# Patient Record
Sex: FEMALE | Race: BLACK OR AFRICAN AMERICAN | NOT HISPANIC OR LATINO | Employment: OTHER | URBAN - METROPOLITAN AREA
[De-identification: names, ages, dates, MRNs, and addresses within clinical notes are randomized per-mention and may not be internally consistent; named-entity substitution may affect disease eponyms.]

---

## 2017-05-09 PROBLEM — I20.9 ANGINA, CLASS III: Status: ACTIVE | Noted: 2017-05-09

## 2018-11-13 PROBLEM — N18.9 ACUTE ON CHRONIC RENAL FAILURE: Status: ACTIVE | Noted: 2018-11-13

## 2018-11-13 PROBLEM — E87.29 METABOLIC ACIDOSIS, INCREASED ANION GAP: Status: ACTIVE | Noted: 2018-11-13

## 2018-11-13 PROBLEM — N18.4 CKD (CHRONIC KIDNEY DISEASE) STAGE 4, GFR 15-29 ML/MIN: Status: ACTIVE | Noted: 2018-11-13

## 2018-11-13 PROBLEM — R73.9 HYPERGLYCEMIA: Status: ACTIVE | Noted: 2018-11-13

## 2018-11-13 PROBLEM — N17.9 ACUTE ON CHRONIC RENAL FAILURE: Status: ACTIVE | Noted: 2018-11-13

## 2018-12-03 ENCOUNTER — TELEPHONE (OUTPATIENT)
Dept: ADMINISTRATIVE | Facility: HOSPITAL | Age: 38
End: 2018-12-03

## 2018-12-03 NOTE — TELEPHONE ENCOUNTER
Attempted to reach patient, unable to contact, left a message. Hospital follow up rescheduled and mailed.

## 2018-12-17 ENCOUNTER — TELEPHONE (OUTPATIENT)
Dept: ADMINISTRATIVE | Facility: HOSPITAL | Age: 38
End: 2018-12-17

## 2019-07-22 PROBLEM — M25.562 ACUTE PAIN OF LEFT KNEE: Status: ACTIVE | Noted: 2019-07-22

## 2019-07-26 PROBLEM — M10.9 GOUT: Status: ACTIVE | Noted: 2019-07-26

## 2019-08-05 PROBLEM — N18.9 CHRONIC KIDNEY DISEASE: Status: ACTIVE | Noted: 2019-08-05

## 2019-08-05 PROBLEM — I73.9 PERIPHERAL VASCULAR DISEASE: Status: ACTIVE | Noted: 2019-08-05

## 2020-04-18 ENCOUNTER — HISTORICAL (OUTPATIENT)
Dept: ADMINISTRATIVE | Facility: HOSPITAL | Age: 40
End: 2020-04-18

## 2020-04-18 LAB
ALBUMIN SERPL BCP-MCNC: 3 G/DL (ref 3.5–5)
ALBUMIN/GLOB SERPL ELPH: 0.6 {RATIO} (ref 1.5–2.2)
ALP SERPL-CCNC: 157 U/L (ref 45–117)
ALT SERPL W P-5'-P-CCNC: 20 U/L (ref 13–56)
ANION GAP SERPL CALC-SCNC: 11.6 MEQ/L (ref 10–20)
AST SERPL-CCNC: 27 U/L (ref 15–37)
BASOPHILS NFR BLD: 0.1 10 (ref 0–0.1)
BASOPHILS NFR BLD: 0.7 % (ref 0–1.5)
BILIRUB SERPL-MCNC: 0.24 MG/DL (ref 0.2–1)
BUN SERPL-MCNC: 65 MG/DL (ref 7–18)
CALCIUM SERPL-MCNC: 8.8 MG/DL (ref 8.5–10.1)
CHLORIDE SERPL-SCNC: 85 MMOL/L (ref 98–107)
CO2 SERPL-SCNC: 32 MMOL/L (ref 22–32)
CREAT SERPL-MCNC: 3.49 MG/DL (ref 0.55–1.02)
EGFR: 19 ML/MIN/1.73M
EOSINOPHIL NFR BLD: 0.6 10 (ref 0–0.7)
EOSINOPHIL NFR BLD: 6.1 % (ref 0–7)
ERYTHROCYTE [DISTWIDTH] IN BLOOD BY AUTOMATED COUNT: 14.9 % (ref 11.5–14.5)
GLOBULIN: 5.3 G/DL (ref 2.3–3.5)
GLUCOSE SERPL-MCNC: 414 MG/DL (ref 70–99)
GRAN #: 6.09 10 (ref 2–7.5)
GRAN%: 0.5 %
GRAN%: 58.8 % (ref 50–80)
HCT VFR BLD AUTO: 28.6 % (ref 37.7–47.9)
HEMOCUE, POC GLUCOSE: 411 MG/DL (ref 74–106)
HEMOCUE, POC GLUCOSE: >444 MG/DL (ref 74–106)
HGB BLD-MCNC: 9.8 G/DL (ref 12.2–16.2)
IMMATURE GRANULOCYTES #: 0.05 10
LIPASE SERPL-CCNC: 297 U/L (ref 73–393)
LYMPH #: 2.8 10 (ref 1–3.5)
LYMPH%: 27.4 % (ref 12–50)
MCH RBC QN AUTO: 27.9 PG (ref 27–31)
MCHC RBC AUTO-ENTMCNC: 34.3 G% (ref 32–35)
MCV RBC AUTO: 81.5 FL (ref 80–97)
MONO #: 0.7 10 (ref 0–0.8)
MONO%: 6.5 % (ref 0–12)
OSMOC: 290 MOSM/KG (ref 275–295)
PMV BLD AUTO: 11.8 FL (ref 7.4–10.4)
PMV BLD AUTO: 334 10 (ref 142–424)
POTASSIUM SERPL-SCNC: 2.6 MMOL/L (ref 3.5–5.1)
PROT SERPL-MCNC: 8.3 G/DL (ref 6.4–8.2)
RBC # BLD AUTO: 3.51 M/UL (ref 4.04–5.48)
SODIUM BLD-SCNC: 126 MMOL/L (ref 136–145)
TROPONIN I SERPL DL<=0.01 NG/ML-MCNC: 0.37 NG/ML (ref 0–0.05)
WBC # BLD AUTO: 10.4 10 (ref 4–10.2)

## 2020-10-06 PROBLEM — I50.30 HEART FAILURE WITH PRESERVED EJECTION FRACTION: Status: ACTIVE | Noted: 2020-10-06

## 2020-10-06 PROBLEM — E03.9 HYPOTHYROIDISM: Status: ACTIVE | Noted: 2020-10-06

## 2020-10-06 PROBLEM — R55 SYNCOPE: Status: ACTIVE | Noted: 2020-10-06

## 2020-10-06 PROBLEM — N18.4 CKD (CHRONIC KIDNEY DISEASE), STAGE IV: Status: ACTIVE | Noted: 2020-10-06

## 2020-12-16 ENCOUNTER — HOSPITAL ENCOUNTER (OUTPATIENT)
Facility: HOSPITAL | Age: 40
Discharge: HOME | End: 2020-12-17
Attending: EMERGENCY MEDICINE | Admitting: INTERNAL MEDICINE
Payer: MEDICAID

## 2020-12-16 DIAGNOSIS — R07.9 CHEST PAIN: ICD-10-CM

## 2020-12-16 DIAGNOSIS — N18.4 CKD (CHRONIC KIDNEY DISEASE), STAGE IV: ICD-10-CM

## 2020-12-16 DIAGNOSIS — I16.0 HYPERTENSIVE URGENCY: Primary | ICD-10-CM

## 2020-12-16 LAB
ALBUMIN SERPL BCP-MCNC: 2.3 G/DL (ref 3.5–5.2)
ALP SERPL-CCNC: 122 U/L (ref 55–135)
ALT SERPL W/O P-5'-P-CCNC: 20 U/L (ref 10–44)
ANION GAP SERPL CALC-SCNC: 4 MMOL/L (ref 8–16)
APTT BLDCRRT: 25.3 SEC (ref 21–32)
AST SERPL-CCNC: 15 U/L (ref 10–40)
BASOPHILS # BLD AUTO: 0.06 K/UL (ref 0–0.2)
BASOPHILS NFR BLD: 0.8 % (ref 0–1.9)
BILIRUB SERPL-MCNC: 0.2 MG/DL (ref 0.1–1)
BUN SERPL-MCNC: 13 MG/DL (ref 6–20)
CALCIUM SERPL-MCNC: 8.7 MG/DL (ref 8.7–10.5)
CHLORIDE SERPL-SCNC: 105 MMOL/L (ref 95–110)
CO2 SERPL-SCNC: 32 MMOL/L (ref 23–29)
CREAT SERPL-MCNC: 1.7 MG/DL (ref 0.5–1.4)
CTP QC/QA: YES
DIFFERENTIAL METHOD: ABNORMAL
EOSINOPHIL # BLD AUTO: 0.3 K/UL (ref 0–0.5)
EOSINOPHIL NFR BLD: 3.4 % (ref 0–8)
ERYTHROCYTE [DISTWIDTH] IN BLOOD BY AUTOMATED COUNT: 16.3 % (ref 11.5–14.5)
EST. GFR  (AFRICAN AMERICAN): 42.6 ML/MIN/1.73 M^2
EST. GFR  (NON AFRICAN AMERICAN): 36.9 ML/MIN/1.73 M^2
GLUCOSE SERPL-MCNC: 255 MG/DL (ref 70–110)
HCT VFR BLD AUTO: 27.4 % (ref 37–48.5)
HGB BLD-MCNC: 8.5 G/DL (ref 12–16)
IMM GRANULOCYTES # BLD AUTO: 0.03 K/UL (ref 0–0.04)
IMM GRANULOCYTES NFR BLD AUTO: 0.4 % (ref 0–0.5)
INR PPP: 0.8 (ref 0.8–1.2)
LIPASE SERPL-CCNC: 137 U/L (ref 23–300)
LYMPHOCYTES # BLD AUTO: 2.3 K/UL (ref 1–4.8)
LYMPHOCYTES NFR BLD: 28.8 % (ref 18–48)
MAGNESIUM SERPL-MCNC: 1.7 MG/DL (ref 1.6–2.6)
MCH RBC QN AUTO: 26.2 PG (ref 27–31)
MCHC RBC AUTO-ENTMCNC: 31 G/DL (ref 32–36)
MCV RBC AUTO: 84 FL (ref 82–98)
MONOCYTES # BLD AUTO: 0.6 K/UL (ref 0.3–1)
MONOCYTES NFR BLD: 8.1 % (ref 4–15)
NEUTROPHILS # BLD AUTO: 4.7 K/UL (ref 1.8–7.7)
NEUTROPHILS NFR BLD: 58.5 % (ref 38–73)
NRBC BLD-RTO: 0 /100 WBC
NT-PROBNP SERPL-MCNC: 1554 PG/ML (ref 5–450)
PLATELET # BLD AUTO: 463 K/UL (ref 150–350)
PMV BLD AUTO: 10.7 FL (ref 9.2–12.9)
POTASSIUM SERPL-SCNC: 3.7 MMOL/L (ref 3.5–5.1)
PROT SERPL-MCNC: 7 G/DL (ref 6–8.4)
PROTHROMBIN TIME: 10.2 SEC (ref 9–12.5)
RBC # BLD AUTO: 3.25 M/UL (ref 4–5.4)
SARS-COV-2 RDRP RESP QL NAA+PROBE: NEGATIVE
SODIUM SERPL-SCNC: 141 MMOL/L (ref 136–145)
TROPONIN I SERPL DL<=0.01 NG/ML-MCNC: 1.16 NG/ML (ref 0–0.03)
TROPONIN I SERPL DL<=0.01 NG/ML-MCNC: 1.21 NG/ML (ref 0–0.03)
WBC # BLD AUTO: 7.93 K/UL (ref 3.9–12.7)

## 2020-12-16 PROCEDURE — 99285 EMERGENCY DEPT VISIT HI MDM: CPT | Mod: 25

## 2020-12-16 PROCEDURE — 96372 THER/PROPH/DIAG INJ SC/IM: CPT | Mod: 59

## 2020-12-16 PROCEDURE — 84484 ASSAY OF TROPONIN QUANT: CPT

## 2020-12-16 PROCEDURE — 63600175 PHARM REV CODE 636 W HCPCS: Performed by: EMERGENCY MEDICINE

## 2020-12-16 PROCEDURE — 85025 COMPLETE CBC W/AUTO DIFF WBC: CPT

## 2020-12-16 PROCEDURE — 93005 ELECTROCARDIOGRAM TRACING: CPT

## 2020-12-16 PROCEDURE — 93010 ELECTROCARDIOGRAM REPORT: CPT | Mod: ,,, | Performed by: INTERNAL MEDICINE

## 2020-12-16 PROCEDURE — 93010 EKG 12-LEAD: ICD-10-PCS | Mod: ,,, | Performed by: INTERNAL MEDICINE

## 2020-12-16 PROCEDURE — 85730 THROMBOPLASTIN TIME PARTIAL: CPT

## 2020-12-16 PROCEDURE — 85610 PROTHROMBIN TIME: CPT

## 2020-12-16 PROCEDURE — 36415 COLL VENOUS BLD VENIPUNCTURE: CPT

## 2020-12-16 PROCEDURE — U0002 COVID-19 LAB TEST NON-CDC: HCPCS | Performed by: EMERGENCY MEDICINE

## 2020-12-16 PROCEDURE — 96374 THER/PROPH/DIAG INJ IV PUSH: CPT

## 2020-12-16 PROCEDURE — G0378 HOSPITAL OBSERVATION PER HR: HCPCS

## 2020-12-16 PROCEDURE — 25000003 PHARM REV CODE 250: Performed by: CLINICAL NURSE SPECIALIST

## 2020-12-16 PROCEDURE — 83690 ASSAY OF LIPASE: CPT

## 2020-12-16 PROCEDURE — 83735 ASSAY OF MAGNESIUM: CPT

## 2020-12-16 PROCEDURE — 83880 ASSAY OF NATRIURETIC PEPTIDE: CPT

## 2020-12-16 PROCEDURE — 84484 ASSAY OF TROPONIN QUANT: CPT | Mod: 91

## 2020-12-16 PROCEDURE — 25000003 PHARM REV CODE 250: Performed by: EMERGENCY MEDICINE

## 2020-12-16 PROCEDURE — 80053 COMPREHEN METABOLIC PANEL: CPT

## 2020-12-16 RX ORDER — IBUPROFEN 200 MG
24 TABLET ORAL
Status: DISCONTINUED | OUTPATIENT
Start: 2020-12-16 | End: 2020-12-17 | Stop reason: HOSPADM

## 2020-12-16 RX ORDER — ZOLPIDEM TARTRATE 5 MG/1
5 TABLET ORAL NIGHTLY PRN
Status: DISCONTINUED | OUTPATIENT
Start: 2020-12-16 | End: 2020-12-17 | Stop reason: HOSPADM

## 2020-12-16 RX ORDER — LISINOPRIL 20 MG/1
40 TABLET ORAL NIGHTLY
Status: DISCONTINUED | OUTPATIENT
Start: 2020-12-16 | End: 2020-12-17 | Stop reason: HOSPADM

## 2020-12-16 RX ORDER — ALLOPURINOL 100 MG/1
100 TABLET ORAL 2 TIMES DAILY
Status: DISCONTINUED | OUTPATIENT
Start: 2020-12-16 | End: 2020-12-17 | Stop reason: HOSPADM

## 2020-12-16 RX ORDER — TORSEMIDE 20 MG/1
100 TABLET ORAL 2 TIMES DAILY
Status: DISCONTINUED | OUTPATIENT
Start: 2020-12-16 | End: 2020-12-17 | Stop reason: HOSPADM

## 2020-12-16 RX ORDER — ONDANSETRON 2 MG/ML
4 INJECTION INTRAMUSCULAR; INTRAVENOUS EVERY 8 HOURS PRN
Status: DISCONTINUED | OUTPATIENT
Start: 2020-12-16 | End: 2020-12-17 | Stop reason: HOSPADM

## 2020-12-16 RX ORDER — AMOXICILLIN 250 MG
1 CAPSULE ORAL 2 TIMES DAILY
Status: DISCONTINUED | OUTPATIENT
Start: 2020-12-16 | End: 2020-12-17 | Stop reason: HOSPADM

## 2020-12-16 RX ORDER — POLYETHYLENE GLYCOL 3350 17 G/17G
17 POWDER, FOR SOLUTION ORAL NIGHTLY
Status: DISCONTINUED | OUTPATIENT
Start: 2020-12-16 | End: 2020-12-17 | Stop reason: HOSPADM

## 2020-12-16 RX ORDER — HYDRALAZINE HYDROCHLORIDE 25 MG/1
50 TABLET, FILM COATED ORAL
Status: COMPLETED | OUTPATIENT
Start: 2020-12-16 | End: 2020-12-16

## 2020-12-16 RX ORDER — NAPROXEN SODIUM 220 MG/1
81 TABLET, FILM COATED ORAL DAILY
Status: DISCONTINUED | OUTPATIENT
Start: 2020-12-17 | End: 2020-12-17 | Stop reason: HOSPADM

## 2020-12-16 RX ORDER — ACETAMINOPHEN 500 MG
500 TABLET ORAL EVERY 6 HOURS PRN
Status: DISCONTINUED | OUTPATIENT
Start: 2020-12-16 | End: 2020-12-17 | Stop reason: HOSPADM

## 2020-12-16 RX ORDER — GLUCAGON 1 MG
1 KIT INJECTION
Status: DISCONTINUED | OUTPATIENT
Start: 2020-12-16 | End: 2020-12-17 | Stop reason: HOSPADM

## 2020-12-16 RX ORDER — AMLODIPINE BESYLATE 10 MG/1
10 TABLET ORAL DAILY
Status: DISCONTINUED | OUTPATIENT
Start: 2020-12-17 | End: 2020-12-17 | Stop reason: HOSPADM

## 2020-12-16 RX ORDER — IBUPROFEN 200 MG
16 TABLET ORAL
Status: DISCONTINUED | OUTPATIENT
Start: 2020-12-16 | End: 2020-12-17 | Stop reason: HOSPADM

## 2020-12-16 RX ORDER — AMLODIPINE BESYLATE 10 MG/1
10 TABLET ORAL
Status: COMPLETED | OUTPATIENT
Start: 2020-12-16 | End: 2020-12-17

## 2020-12-16 RX ORDER — ATORVASTATIN CALCIUM 40 MG/1
40 TABLET, FILM COATED ORAL DAILY
Status: DISCONTINUED | OUTPATIENT
Start: 2020-12-17 | End: 2020-12-17 | Stop reason: HOSPADM

## 2020-12-16 RX ORDER — ONDANSETRON 4 MG/1
4 TABLET, ORALLY DISINTEGRATING ORAL
Status: COMPLETED | OUTPATIENT
Start: 2020-12-16 | End: 2020-12-16

## 2020-12-16 RX ORDER — TALC
6 POWDER (GRAM) TOPICAL NIGHTLY PRN
Status: DISCONTINUED | OUTPATIENT
Start: 2020-12-16 | End: 2020-12-16

## 2020-12-16 RX ORDER — SODIUM CHLORIDE 0.9 % (FLUSH) 0.9 %
10 SYRINGE (ML) INJECTION
Status: DISCONTINUED | OUTPATIENT
Start: 2020-12-16 | End: 2020-12-17 | Stop reason: HOSPADM

## 2020-12-16 RX ORDER — ASPIRIN 325 MG
325 TABLET ORAL
Status: COMPLETED | OUTPATIENT
Start: 2020-12-16 | End: 2020-12-16

## 2020-12-16 RX ORDER — SYRING-NEEDL,DISP,INSUL,0.3 ML 29 G X1/2"
296 SYRINGE, EMPTY DISPOSABLE MISCELLANEOUS
Status: COMPLETED | OUTPATIENT
Start: 2020-12-16 | End: 2020-12-16

## 2020-12-16 RX ORDER — CARVEDILOL 12.5 MG/1
25 TABLET ORAL 2 TIMES DAILY WITH MEALS
Status: DISCONTINUED | OUTPATIENT
Start: 2020-12-17 | End: 2020-12-17 | Stop reason: HOSPADM

## 2020-12-16 RX ORDER — ENOXAPARIN SODIUM 100 MG/ML
100 INJECTION SUBCUTANEOUS
Status: COMPLETED | OUTPATIENT
Start: 2020-12-16 | End: 2020-12-16

## 2020-12-16 RX ORDER — LABETALOL HCL 20 MG/4 ML
20 SYRINGE (ML) INTRAVENOUS
Status: COMPLETED | OUTPATIENT
Start: 2020-12-16 | End: 2020-12-16

## 2020-12-16 RX ADMIN — ONDANSETRON 4 MG: 4 TABLET, ORALLY DISINTEGRATING ORAL at 06:12

## 2020-12-16 RX ADMIN — HYDRALAZINE HYDROCHLORIDE 50 MG: 25 TABLET, FILM COATED ORAL at 05:12

## 2020-12-16 RX ADMIN — LABETALOL HYDROCHLORIDE 20 MG: 5 INJECTION, SOLUTION INTRAVENOUS at 07:12

## 2020-12-16 RX ADMIN — MAGNESIUM CITRATE 296 ML: 1.75 LIQUID ORAL at 05:12

## 2020-12-16 RX ADMIN — LIDOCAINE HYDROCHLORIDE: 20 SOLUTION ORAL; TOPICAL at 05:12

## 2020-12-16 RX ADMIN — ENOXAPARIN SODIUM 100 MG: 100 INJECTION SUBCUTANEOUS at 07:12

## 2020-12-16 RX ADMIN — ASPIRIN 325 MG ORAL TABLET 325 MG: 325 PILL ORAL at 07:12

## 2020-12-16 NOTE — ED PROVIDER NOTES
Encounter Date: 2020       History     Chief Complaint   Patient presents with    Abdominal Pain     upper abdominal pain and constipation for 3-4 days. Denies N/V.      Pilar Fernandez is an 40 y.o. female who complains of LEFT CHEST WALL PAIN, SHORTNESS OF BREATH, EPIGASTRIC PAIN, CONSTIPATION.  PATIENT REPORTS TAKING MIRALAX AT HOME AND HAVING A SMALL BOWEL MOVEMENT THIS MORNING. Symptoms BEGAN 3-4 DAYS AGO.  HISTORY OF GERD, CONSTIPATION, HYPERTENSION, BUT DID NOT TAKE HER MORNING MEDS          Review of patient's allergies indicates:   Allergen Reactions    Tramadol Itching and Nausea And Vomiting     Past Medical History:   Diagnosis Date    Anemia     Anemia     Chronic constipation     CKD (chronic kidney disease)     Diabetes mellitus     Erosive gastritis 2016    GERD (gastroesophageal reflux disease)     Gout     High cholesterol     Hypertension     Knee pain      Past Surgical History:   Procedure Laterality Date    AV FISTULA PLACEMENT Left 2019    Procedure: creation av fistula;  Surgeon: Buck Fernandez MD;  Location: [facility];  Service: Cardiovascular;  Laterality: Left;  Left Radialcephalic: PACU     SECTION  , , ,     CHOLECYSTECTOMY      COLONOSCOPY N/A 10/23/2017    Procedure: COLONOSCOPY;  Surgeon: Bri Harry MD;  Location: [facility];  Service: Endoscopy;  Laterality: N/A;    TUBAL LIGATION      UPPER GASTROINTESTINAL ENDOSCOPY  2016    Erosive Gastritis-Dr Bailey     Family History   Problem Relation Age of Onset    Thyroid disease Mother     Diabetes Father     Cancer Maternal Aunt      Social History     Tobacco Use    Smoking status: Never Smoker    Smokeless tobacco: Never Used   Substance Use Topics    Alcohol use: No     Alcohol/week: 0.0 standard drinks    Drug use: No     Review of Systems   Constitutional: Negative for fever.   HENT: Negative for sore throat.    Respiratory: Positive for chest  tightness and shortness of breath.    Cardiovascular: Positive for chest pain.   Gastrointestinal: Positive for abdominal distention, abdominal pain and constipation. Negative for nausea.   Genitourinary: Negative for dysuria.   Musculoskeletal: Negative for back pain.   Skin: Negative for rash.   Neurological: Negative for weakness.   Hematological: Does not bruise/bleed easily.   All other systems reviewed and are negative.      Physical Exam     Initial Vitals [12/16/20 1709]   BP Pulse Resp Temp SpO2   (!) 223/104 84 18 98.4 °F (36.9 °C) 99 %      MAP       --         Physical Exam    Nursing note and vitals reviewed.  Constitutional: She appears well-developed and well-nourished.   HENT:   Head: Normocephalic and atraumatic.   Eyes: Pupils are equal, round, and reactive to light.   Neck: Normal range of motion.   Cardiovascular: Normal rate and regular rhythm.   Pulmonary/Chest: Breath sounds normal.   Abdominal: Soft. Bowel sounds are normal. She exhibits distension. There is abdominal tenderness.   Musculoskeletal: Normal range of motion.   Neurological: She is alert and oriented to person, place, and time.   Skin: Skin is warm and dry.   Psychiatric: She has a normal mood and affect.         ED Course   Procedures  Labs Reviewed   NT-PRO NATRIURETIC PEPTIDE - Abnormal; Notable for the following components:       Result Value    NT-proBNP 1554 (*)     All other components within normal limits   CBC W/ AUTO DIFFERENTIAL - Abnormal; Notable for the following components:    RBC 3.25 (*)     Hemoglobin 8.5 (*)     Hematocrit 27.4 (*)     MCH 26.2 (*)     MCHC 31.0 (*)     RDW 16.3 (*)     Platelets 463 (*)     All other components within normal limits   COMPREHENSIVE METABOLIC PANEL - Abnormal; Notable for the following components:    CO2 32 (*)     Glucose 255 (*)     Creatinine 1.7 (*)     Albumin 2.3 (*)     Anion Gap 4 (*)     eGFR if  42.6 (*)     eGFR if non  36.9 (*)     All  other components within normal limits   TROPONIN I - Abnormal; Notable for the following components:    Troponin I 1.210 (*)     All other components within normal limits   LIPASE   APTT   MAGNESIUM   PROTIME-INR     EKG Readings: (Independently Interpreted)   Initial Reading: No STEMI. Previous EKG: Compared with most recent EKG Rhythm: Normal Sinus Rhythm. Heart Rate: 98. Conduction: Normal. ST Segments: Normal ST Segments.       Imaging Results    None          Medical Decision Making:   Differential Diagnosis:   GERD, ABDOMINAL PAIN, MI, ANXIETY  Clinical Tests:   Lab Tests: Ordered and Reviewed  ED Management:  Patient with history of hypertension and renal failure.  Patient had chest pain with mild shortness of breath last night and again this morning, no current chest pain in the emergency room.  Patient is not taking her hypertension medications was found to be quite hypertensive.  Troponin elevated to 1.2, upon review of her history her troponin is always slightly elevated around 0.0 7 or 8.  Discussed with Adriel with [facility], and will admit for hypertension of urgency and rule out acute coronary syndrome.  Discussed with Yvonne schreiber Dr. addition will be the primary admission.  Patient given aspirin, Lovenox, hydralazine, and labetalol                             Clinical Impression:     ICD-10-CM ICD-9-CM   1. Hypertensive urgency  I16.0 401.9   2. Chest pain  R07.9 786.50                          ED Disposition Condition    Admit                             Elvin Camara MD  12/16/20 2023

## 2020-12-17 VITALS
HEIGHT: 64 IN | HEART RATE: 96 BPM | RESPIRATION RATE: 20 BRPM | OXYGEN SATURATION: 95 % | WEIGHT: 240.5 LBS | DIASTOLIC BLOOD PRESSURE: 81 MMHG | SYSTOLIC BLOOD PRESSURE: 142 MMHG | BODY MASS INDEX: 41.06 KG/M2 | TEMPERATURE: 99 F

## 2020-12-17 LAB
ALBUMIN SERPL BCP-MCNC: 2.3 G/DL (ref 3.5–5.2)
ALP SERPL-CCNC: 119 U/L (ref 55–135)
ALT SERPL W/O P-5'-P-CCNC: 18 U/L (ref 10–44)
ANION GAP SERPL CALC-SCNC: 8 MMOL/L (ref 8–16)
AST SERPL-CCNC: 14 U/L (ref 10–40)
BASOPHILS # BLD AUTO: 0.07 K/UL (ref 0–0.2)
BASOPHILS NFR BLD: 0.8 % (ref 0–1.9)
BILIRUB SERPL-MCNC: 0.2 MG/DL (ref 0.1–1)
BUN SERPL-MCNC: 14 MG/DL (ref 6–20)
CALCIUM SERPL-MCNC: 8.7 MG/DL (ref 8.7–10.5)
CHLORIDE SERPL-SCNC: 105 MMOL/L (ref 95–110)
CO2 SERPL-SCNC: 27 MMOL/L (ref 23–29)
CREAT SERPL-MCNC: 1.7 MG/DL (ref 0.5–1.4)
DIFFERENTIAL METHOD: ABNORMAL
EOSINOPHIL # BLD AUTO: 0.3 K/UL (ref 0–0.5)
EOSINOPHIL NFR BLD: 3.4 % (ref 0–8)
ERYTHROCYTE [DISTWIDTH] IN BLOOD BY AUTOMATED COUNT: 16.7 % (ref 11.5–14.5)
EST. GFR  (AFRICAN AMERICAN): 42.6 ML/MIN/1.73 M^2
EST. GFR  (NON AFRICAN AMERICAN): 36.9 ML/MIN/1.73 M^2
GLUCOSE SERPL-MCNC: 301 MG/DL (ref 70–110)
HCT VFR BLD AUTO: 27 % (ref 37–48.5)
HGB BLD-MCNC: 8.5 G/DL (ref 12–16)
IMM GRANULOCYTES # BLD AUTO: 0.04 K/UL (ref 0–0.04)
IMM GRANULOCYTES NFR BLD AUTO: 0.5 % (ref 0–0.5)
LYMPHOCYTES # BLD AUTO: 2.8 K/UL (ref 1–4.8)
LYMPHOCYTES NFR BLD: 33 % (ref 18–48)
MCH RBC QN AUTO: 26.5 PG (ref 27–31)
MCHC RBC AUTO-ENTMCNC: 31.5 G/DL (ref 32–36)
MCV RBC AUTO: 84 FL (ref 82–98)
MONOCYTES # BLD AUTO: 0.6 K/UL (ref 0.3–1)
MONOCYTES NFR BLD: 7.2 % (ref 4–15)
NEUTROPHILS # BLD AUTO: 4.7 K/UL (ref 1.8–7.7)
NEUTROPHILS NFR BLD: 55.1 % (ref 38–73)
NRBC BLD-RTO: 0 /100 WBC
PLATELET # BLD AUTO: 465 K/UL (ref 150–350)
PMV BLD AUTO: 12.3 FL (ref 9.2–12.9)
POTASSIUM SERPL-SCNC: 3.3 MMOL/L (ref 3.5–5.1)
PROT SERPL-MCNC: 6.7 G/DL (ref 6–8.4)
RBC # BLD AUTO: 3.21 M/UL (ref 4–5.4)
SODIUM SERPL-SCNC: 140 MMOL/L (ref 136–145)
TROPONIN I SERPL DL<=0.01 NG/ML-MCNC: 1.13 NG/ML (ref 0–0.03)
WBC # BLD AUTO: 8.48 K/UL (ref 3.9–12.7)

## 2020-12-17 PROCEDURE — 25000003 PHARM REV CODE 250: Performed by: NURSE PRACTITIONER

## 2020-12-17 PROCEDURE — 85025 COMPLETE CBC W/AUTO DIFF WBC: CPT

## 2020-12-17 PROCEDURE — 84484 ASSAY OF TROPONIN QUANT: CPT

## 2020-12-17 PROCEDURE — 80053 COMPREHEN METABOLIC PANEL: CPT

## 2020-12-17 PROCEDURE — G0378 HOSPITAL OBSERVATION PER HR: HCPCS

## 2020-12-17 PROCEDURE — 36415 COLL VENOUS BLD VENIPUNCTURE: CPT

## 2020-12-17 PROCEDURE — 25000003 PHARM REV CODE 250: Performed by: EMERGENCY MEDICINE

## 2020-12-17 RX ORDER — HYDRALAZINE HYDROCHLORIDE 25 MG/1
25 TABLET, FILM COATED ORAL EVERY 8 HOURS
Qty: 90 TABLET | Refills: 0 | Status: SHIPPED | OUTPATIENT
Start: 2020-12-17 | End: 2022-12-21

## 2020-12-17 RX ORDER — HYDRALAZINE HYDROCHLORIDE 25 MG/1
25 TABLET, FILM COATED ORAL EVERY 8 HOURS
Status: DISCONTINUED | OUTPATIENT
Start: 2020-12-17 | End: 2020-12-17 | Stop reason: HOSPADM

## 2020-12-17 RX ORDER — POLYETHYLENE GLYCOL 3350 17 G/17G
17 POWDER, FOR SOLUTION ORAL NIGHTLY
Qty: 30 PACKET | Refills: 0 | Status: SHIPPED | OUTPATIENT
Start: 2020-12-17 | End: 2021-01-16

## 2020-12-17 RX ADMIN — ALLOPURINOL 100 MG: 100 TABLET ORAL at 12:12

## 2020-12-17 RX ADMIN — AMLODIPINE BESYLATE 10 MG: 10 TABLET ORAL at 09:12

## 2020-12-17 RX ADMIN — ATORVASTATIN CALCIUM 40 MG: 40 TABLET, FILM COATED ORAL at 09:12

## 2020-12-17 RX ADMIN — SENNOSIDES AND DOCUSATE SODIUM 1 TABLET: 8.6; 5 TABLET ORAL at 12:12

## 2020-12-17 RX ADMIN — ALLOPURINOL 100 MG: 100 TABLET ORAL at 09:12

## 2020-12-17 RX ADMIN — TORSEMIDE 100 MG: 20 TABLET ORAL at 09:12

## 2020-12-17 RX ADMIN — POLYETHYLENE GLYCOL 3350 17 G: 17 POWDER, FOR SOLUTION ORAL at 12:12

## 2020-12-17 RX ADMIN — CARVEDILOL 25 MG: 12.5 TABLET, FILM COATED ORAL at 06:12

## 2020-12-17 RX ADMIN — ASPIRIN 81 MG 81 MG: 81 TABLET ORAL at 09:12

## 2020-12-17 RX ADMIN — AMLODIPINE BESYLATE 10 MG: 10 TABLET ORAL at 12:12

## 2020-12-17 RX ADMIN — LISINOPRIL 40 MG: 20 TABLET ORAL at 12:12

## 2020-12-17 RX ADMIN — SENNOSIDES AND DOCUSATE SODIUM 1 TABLET: 8.6; 5 TABLET ORAL at 09:12

## 2020-12-17 RX ADMIN — TORSEMIDE 100 MG: 20 TABLET ORAL at 12:12

## 2020-12-17 RX ADMIN — HYDRALAZINE HYDROCHLORIDE 25 MG: 25 TABLET, FILM COATED ORAL at 09:12

## 2020-12-17 NOTE — PLAN OF CARE
12/17/20 1342   Final Note   Assessment Type Final Discharge Note   Anticipated Discharge Disposition Home   What phone number can be called within the next 1-3 days to see how you are doing after discharge? [phone]   Hospital Follow Up  Appt(s) scheduled? No  (Patient making own appts)   Post-Acute Status   Discharge Delays None known at this time

## 2020-12-17 NOTE — SUBJECTIVE & OBJECTIVE
Past Medical History:   Diagnosis Date    Anemia     Anemia     Chronic constipation     CKD (chronic kidney disease)     Diabetes mellitus     Erosive gastritis 2016    GERD (gastroesophageal reflux disease)     Gout     High cholesterol     Hypertension     Knee pain        Past Surgical History:   Procedure Laterality Date    AV FISTULA PLACEMENT Left 2019    Procedure: creation av fistula;  Surgeon: Buck Fernandez MD;  Location: [facility];  Service: Cardiovascular;  Laterality: Left;  Left Radialcephalic: PACU     SECTION  , , ,     CHOLECYSTECTOMY      COLONOSCOPY N/A 10/23/2017    Procedure: COLONOSCOPY;  Surgeon: Bri Harry MD;  Location: [facility]placed by [facility];  Service: Endoscopy;  Laterality: N/A;    TUBAL LIGATION      UPPER GASTROINTESTINAL ENDOSCOPY  2016    Erosive Gastritis-Dr Bailey       Review of patient's allergies indicates:   Allergen Reactions    Tramadol Itching and Nausea And Vomiting       No current facility-administered medications on file prior to encounter.      Current Outpatient Medications on File Prior to Encounter   Medication Sig    acetaminophen (TYLENOL) 500 MG tablet Take 1 tablet (500 mg total) by mouth daily as needed for Pain.    albuterol sulfate 2.5 mg/0.5 mL Nebu Take 2.5 mg by nebulization every 4 (four) hours as needed.    allopurinoL (ZYLOPRIM) 100 MG tablet Take 100 mg by mouth 2 (two) times daily.    amlodipine (NORVASC) 10 MG tablet Take 1 tablet (10 mg total) by mouth once daily.    aspirin 81 MG Chew Take 1 tablet (81 mg total) by mouth once daily.    atorvastatin (LIPITOR) 40 MG tablet Take 1 tablet (40 mg total) by mouth once daily.    carvedilol (COREG) 25 MG tablet TAKE ONE TABLET BY MOUTH TWICE DAILY WITH MEALS    gabapentin (NEURONTIN) 400 MG capsule Take 1 capsule by mouth 3 (three) times daily.    HYDROcodone-acetaminophen (NORCO) 5-325 mg per tablet Take 1 tablet by mouth every 4 (four) hours  as needed.    insulin aspart U-100 (NOVOLOG U-100 INSULIN ASPART) 100 unit/mL injection Inject 10 Units into the skin 3 (three) times daily before meals.    insulin detemir U-100 (LEVEMIR U-100 INSULIN) 100 unit/mL injection Inject 70 Units into the skin As instructed (Every afternoon (noon)).    lactulose (CHRONULAC) 10 gram/15 mL solution Take by mouth.     lisinopril (PRINIVIL,ZESTRIL) 40 MG tablet Take 1 tablet (40 mg total) by mouth every evening.    polyethylene glycol (GLYCOLAX) 17 gram/dose powder Take 17 g by mouth every evening. (Patient taking differently: Take 17 g by mouth every evening. )    senna-docusate 8.6-50 mg (SENNA WITH DOCUSATE SODIUM) 8.6-50 mg per tablet Take 1 tablet by mouth 2 (two) times daily.    sorbitol 70 % solution Take 30 mLs by mouth daily as needed (Severe constipation.  Use prn no BM X 3 days despite Miralax qhs.).    torsemide (DEMADEX) 100 MG Tab Take 1-2 tablets by mouth 2 (two) times daily.    zolpidem (AMBIEN) 5 MG Tab Take 5 mg by mouth nightly as needed.     Family History     Problem Relation (Age of Onset)    Cancer Maternal Aunt    Diabetes Father    Thyroid disease Mother        Tobacco Use    Smoking status: Never Smoker    Smokeless tobacco: Never Used   Substance and Sexual Activity    Alcohol use: No     Alcohol/week: 0.0 standard drinks    Drug use: No    Sexual activity: Yes     Partners: Male     Birth control/protection: None, Surgical     Review of Systems   Constitutional: Positive for fatigue.   Cardiovascular: Positive for chest pain.   Gastrointestinal: Positive for abdominal pain and constipation.   Musculoskeletal: Positive for arthralgias.     Objective:     Vital Signs (Most Recent):  Temp: 98 °F (36.7 °C) (12/17/20 0710)  Pulse: 102 (12/17/20 0710)  Resp: 20 (12/17/20 0710)  BP: (!) 157/75 (12/17/20 0710)  SpO2: (!) 92 % (12/17/20 0710) Vital Signs (24h Range):  Temp:  [98 °F (36.7 °C)-98.9 °F (37.2 °C)] 98 °F (36.7 °C)  Pulse:  []  102  Resp:  [12-27] 20  SpO2:  [92 %-99 %] 92 %  BP: (157-223)/() 157/75     Weight: 109.1 kg (240 lb 8 oz)  Body mass index is 41.28 kg/m².    Physical Exam  Constitutional:       Appearance: She is obese. She is ill-appearing.   HENT:      Head: Normocephalic.      Mouth/Throat:      Mouth: Mucous membranes are moist.   Cardiovascular:      Rate and Rhythm: Normal rate and regular rhythm.      Pulses: Normal pulses.   Pulmonary:      Effort: Pulmonary effort is normal.   Abdominal:      General: Bowel sounds are normal.      Palpations: Abdomen is soft.   Musculoskeletal: Normal range of motion.   Neurological:      Mental Status: She is alert.   Psychiatric:         Mood and Affect: Mood normal.         Thought Content: Thought content normal.         Judgment: Judgment normal.             Significant Labs:   CBC:   Recent Labs   Lab 12/16/20 1730 12/17/20 0225   WBC 7.93 8.48   HGB 8.5* 8.5*   HCT 27.4* 27.0*   * 465*     CMP:   Recent Labs   Lab 12/16/20 1730 12/17/20 0225    140   K 3.7 3.3*    105   CO2 32* 27   * 301*   BUN 13 14   CREATININE 1.7* 1.7*   CALCIUM 8.7 8.7   PROT 7.0 6.7   ALBUMIN 2.3* 2.3*   BILITOT 0.2 0.2   ALKPHOS 122 119   AST 15 14   ALT 20 18   ANIONGAP 4* 8   EGFRNONAA 36.9* 36.9*     Magnesium:   Recent Labs   Lab 12/16/20 1730   MG 1.7     Recent Labs   Lab 12/17/20 0225   TROPONINI 1.130*     Significant Imaging: none

## 2020-12-17 NOTE — HOSPITAL COURSE
12/17/20 LF patient has chronic constipation and had not had a bowel movement in a couple of days causing chest pain and she was unable to take blood pressure medication. Came in with HTN emergency bp 223/104. Medications resumed, patient had bowel movement is feeling much better this am. See per Cardiology and ok to discharge

## 2020-12-17 NOTE — PLAN OF CARE
Pt DC home. IV and telemetry removed. Pt walked down in wheelchair. Pt verbalizes understanding of DC instructions

## 2020-12-17 NOTE — H&P
[facility] Medicine  History & Physical    Patient Name: Pilar Fernandez  MRN: [id number]  Patient Class: OP- Observation  Admission Date: 2020  Attending Physician: Debra Fong MD   Primary Care Provider: Harris Haley MD         Patient information was obtained from patient, past medical records and ER records.     Subjective:     Principal Problem:Hypertensive urgency    Chief Complaint:   Chief Complaint   Patient presents with    Abdominal Pain     upper abdominal pain and constipation for 3-4 days. Denies N/V.         HPI: Pilar Fernandez is an 40 y.o. female who complains of LEFT CHEST WALL PAIN, SHORTNESS OF BREATH, EPIGASTRIC PAIN, CONSTIPATION.  PATIENT REPORTS TAKING MIRALAX AT HOME AND HAVING A SMALL BOWEL MOVEMENT THIS MORNING. Symptoms BEGAN 3-4 DAYS AGO.  HISTORY OF GERD, CONSTIPATION, HYPERTENSION, BUT DID NOT TAKE HER MORNING MEDS patient of dr dudley pcp and dr zepeda    Past Medical History:   Diagnosis Date    Anemia     Anemia     Chronic constipation     CKD (chronic kidney disease)     Diabetes mellitus     Erosive gastritis 2016    GERD (gastroesophageal reflux disease)     Gout     High cholesterol     Hypertension     Knee pain        Past Surgical History:   Procedure Laterality Date    AV FISTULA PLACEMENT Left 2019    Procedure: creation av fistula;  Surgeon: Buck Fernandez MD;  Location: [facility];  Service: Cardiovascular;  Laterality: Left;  Left Radialcephalic: PACU     SECTION  , , ,     CHOLECYSTECTOMY      COLONOSCOPY N/A 10/23/2017    Procedure: COLONOSCOPY;  Surgeon: Bri Harry MD;  Location: [facility];  Service: Endoscopy;  Laterality: N/A;    TUBAL LIGATION      UPPER GASTROINTESTINAL ENDOSCOPY  2016    Erosive Gastritis-Dr Bailey       Review of patient's allergies indicates:   Allergen Reactions    Tramadol Itching and Nausea And Vomiting       No current  facility-administered medications on file prior to encounter.      Current Outpatient Medications on File Prior to Encounter   Medication Sig    acetaminophen (TYLENOL) 500 MG tablet Take 1 tablet (500 mg total) by mouth daily as needed for Pain.    albuterol sulfate 2.5 mg/0.5 mL Nebu Take 2.5 mg by nebulization every 4 (four) hours as needed.    allopurinoL (ZYLOPRIM) 100 MG tablet Take 100 mg by mouth 2 (two) times daily.    amlodipine (NORVASC) 10 MG tablet Take 1 tablet (10 mg total) by mouth once daily.    aspirin 81 MG Chew Take 1 tablet (81 mg total) by mouth once daily.    atorvastatin (LIPITOR) 40 MG tablet Take 1 tablet (40 mg total) by mouth once daily.    carvedilol (COREG) 25 MG tablet TAKE ONE TABLET BY MOUTH TWICE DAILY WITH MEALS    gabapentin (NEURONTIN) 400 MG capsule Take 1 capsule by mouth 3 (three) times daily.    HYDROcodone-acetaminophen (NORCO) 5-325 mg per tablet Take 1 tablet by mouth every 4 (four) hours as needed.    insulin aspart U-100 (NOVOLOG U-100 INSULIN ASPART) 100 unit/mL injection Inject 10 Units into the skin 3 (three) times daily before meals.    insulin detemir U-100 (LEVEMIR U-100 INSULIN) 100 unit/mL injection Inject 70 Units into the skin As instructed (Every afternoon (noon)).    lactulose (CHRONULAC) 10 gram/15 mL solution Take by mouth.     lisinopril (PRINIVIL,ZESTRIL) 40 MG tablet Take 1 tablet (40 mg total) by mouth every evening.    polyethylene glycol (GLYCOLAX) 17 gram/dose powder Take 17 g by mouth every evening. (Patient taking differently: Take 17 g by mouth every evening. )    senna-docusate 8.6-50 mg (SENNA WITH DOCUSATE SODIUM) 8.6-50 mg per tablet Take 1 tablet by mouth 2 (two) times daily.    sorbitol 70 % solution Take 30 mLs by mouth daily as needed (Severe constipation.  Use prn no BM X 3 days despite Miralax qhs.).    torsemide (DEMADEX) 100 MG Tab Take 1-2 tablets by mouth 2 (two) times daily.    zolpidem (AMBIEN) 5 MG Tab Take 5 mg  by mouth nightly as needed.     Family History     Problem Relation (Age of Onset)    Cancer Maternal Aunt    Diabetes Father    Thyroid disease Mother        Tobacco Use    Smoking status: Never Smoker    Smokeless tobacco: Never Used   Substance and Sexual Activity    Alcohol use: No     Alcohol/week: 0.0 standard drinks    Drug use: No    Sexual activity: Yes     Partners: Male     Birth control/protection: None, Surgical     Review of Systems   Constitutional: Positive for fatigue.   Cardiovascular: Positive for chest pain.   Gastrointestinal: Positive for abdominal pain and constipation.   Musculoskeletal: Positive for arthralgias.     Objective:     Vital Signs (Most Recent):  Temp: 98 °F (36.7 °C) (12/17/20 0710)  Pulse: 102 (12/17/20 0710)  Resp: 20 (12/17/20 0710)  BP: (!) 157/75 (12/17/20 0710)  SpO2: (!) 92 % (12/17/20 0710) Vital Signs (24h Range):  Temp:  [98 °F (36.7 °C)-98.9 °F (37.2 °C)] 98 °F (36.7 °C)  Pulse:  [] 102  Resp:  [12-27] 20  SpO2:  [92 %-99 %] 92 %  BP: (157-223)/() 157/75     Weight: 109.1 kg (240 lb 8 oz)  Body mass index is 41.28 kg/m².    Physical Exam  Constitutional:       Appearance: She is obese. She is ill-appearing.   HENT:      Head: Normocephalic.      Mouth/Throat:      Mouth: Mucous membranes are moist.   Cardiovascular:      Rate and Rhythm: Normal rate and regular rhythm.      Pulses: Normal pulses.   Pulmonary:      Effort: Pulmonary effort is normal.   Abdominal:      General: Bowel sounds are normal.      Palpations: Abdomen is soft.   Musculoskeletal: Normal range of motion.   Neurological:      Mental Status: She is alert.   Psychiatric:         Mood and Affect: Mood normal.         Thought Content: Thought content normal.         Judgment: Judgment normal.             Significant Labs:   CBC:   Recent Labs   Lab 12/16/20  1730 12/17/20  0225   WBC 7.93 8.48   HGB 8.5* 8.5*   HCT 27.4* 27.0*   * 465*     CMP:   Recent Labs   Lab  12/16/20  1730 12/17/20  0225    140   K 3.7 3.3*    105   CO2 32* 27   * 301*   BUN 13 14   CREATININE 1.7* 1.7*   CALCIUM 8.7 8.7   PROT 7.0 6.7   ALBUMIN 2.3* 2.3*   BILITOT 0.2 0.2   ALKPHOS 122 119   AST 15 14   ALT 20 18   ANIONGAP 4* 8   EGFRNONAA 36.9* 36.9*     Magnesium:   Recent Labs   Lab 12/16/20  1730   MG 1.7     Recent Labs   Lab 12/17/20  0225   TROPONINI 1.130*     Significant Imaging: none    Assessment/Plan:     * Hypertensive urgency  12/17/20 LF patient due to constipation was unable to take bp meds. Restarted, seen per cards and adjusted bp at baseline, plan to d/c today      Constipation  12/17/20 LF resolved discharge home with miralax      Chest pain  12/17/20 LF chronic and resolved with BM--seen per Cards this am and ok to discharge home         VTE Risk Mitigation (From admission, onward)         Ordered     IP VTE HIGH RISK PATIENT  Once      12/16/20 2225     Place sequential compression device  Until discontinued      12/16/20 2225                   Velia Delacruz NP  Department of Hospital Medicine   [facility]

## 2020-12-17 NOTE — HPI
Pilar Fernandez is an 40 y.o. female who complains of LEFT CHEST WALL PAIN, SHORTNESS OF BREATH, EPIGASTRIC PAIN, CONSTIPATION.  PATIENT REPORTS TAKING MIRALAX AT HOME AND HAVING A SMALL BOWEL MOVEMENT THIS MORNING. Symptoms BEGAN 3-4 DAYS AGO.  HISTORY OF GERD, CONSTIPATION, HYPERTENSION, BUT DID NOT TAKE HER MORNING MEDS patient of dr dudley pcp and dr zepeda

## 2020-12-17 NOTE — NURSING
Patient up to floor via stretcher, oriented to room and nurse, assessment performed, bed low, call bell in reach, side rails up x 2. No distress noted, denies any pain or discomfort at present, will continue to monitor.

## 2020-12-17 NOTE — CONSULTS
[facility]  Cardiology  Consult Note    Patient Name: Pilar Fernandez  Patient : 1980  MRN: [id number]  Admission Date: 2020  Hospital Length of Stay: 0 days  Code Status: Full Code   Attending Provider: Debra Fong MD   Consulting Provider: NATY Maldonado  Primary Care Physician: Harris Haley MD  Principal Problem:<principal problem not specified>      Patient information was obtained from patient and ER records.     Inpatient consult to Cardiology  Consult performed by: NATY Maldonado  Consult ordered by: Elvin Camara MD        Subjective:     Chief Complaint:  Abdominal pain     HPI:      Patient is a 40-year-old female with hypertension, hyperlipidemia and type 2 diabetes.    Patient presented to hospital for epigastric pain and missed her morning medications.  Blood pressures were markedly elevated and labs were drawn to assess for MI.  Troponin was elevated and she was admitted for further monitoring.      This morning during exam she reports that she feels great and denies any chest pain, headaches, or shortness of breath.  Blood pressure is better controlled but still above goal.  ECG without acute findings.     Of note, patient had a normal LHC in 2017 without evidence of CAD and has chronically elevated troponin levels.    Past Medical History:   Diagnosis Date    Anemia     Anemia     Chronic constipation     CKD (chronic kidney disease)     Diabetes mellitus     Erosive gastritis 2016    GERD (gastroesophageal reflux disease)     Gout     High cholesterol     Hypertension     Knee pain        Past Surgical History:   Procedure Laterality Date    AV FISTULA PLACEMENT Left 2019    Procedure: creation av fistula;  Surgeon: Buck Fernandez MD;  [facility];  Service: Cardiovascular;  Laterality: Left;  Left Radialcephalic: PACU     SECTION  , , ,     CHOLECYSTECTOMY      COLONOSCOPY N/A 10/23/2017     Procedure: COLONOSCOPY;  Surgeon: Bri Harry MD;  Location: [facility];  Service: Endoscopy;  Laterality: N/A;    TUBAL LIGATION      UPPER GASTROINTESTINAL ENDOSCOPY  03/31/2016    Erosive Gastritis-Dr Bailey       Review of patient's allergies indicates:   Allergen Reactions    Tramadol Itching and Nausea And Vomiting       No current facility-administered medications on file prior to encounter.      Current Outpatient Medications on File Prior to Encounter   Medication Sig    acetaminophen (TYLENOL) 500 MG tablet Take 1 tablet (500 mg total) by mouth daily as needed for Pain.    albuterol sulfate 2.5 mg/0.5 mL Nebu Take 2.5 mg by nebulization every 4 (four) hours as needed.    allopurinoL (ZYLOPRIM) 100 MG tablet Take 100 mg by mouth 2 (two) times daily.    amlodipine (NORVASC) 10 MG tablet Take 1 tablet (10 mg total) by mouth once daily.    aspirin 81 MG Chew Take 1 tablet (81 mg total) by mouth once daily.    atorvastatin (LIPITOR) 40 MG tablet Take 1 tablet (40 mg total) by mouth once daily.    carvedilol (COREG) 25 MG tablet TAKE ONE TABLET BY MOUTH TWICE DAILY WITH MEALS    gabapentin (NEURONTIN) 400 MG capsule Take 1 capsule by mouth 3 (three) times daily.    HYDROcodone-acetaminophen (NORCO) 5-325 mg per tablet Take 1 tablet by mouth every 4 (four) hours as needed.    insulin aspart U-100 (NOVOLOG U-100 INSULIN ASPART) 100 unit/mL injection Inject 10 Units into the skin 3 (three) times daily before meals.    insulin detemir U-100 (LEVEMIR U-100 INSULIN) 100 unit/mL injection Inject 70 Units into the skin As instructed (Every afternoon (noon)).    lactulose (CHRONULAC) 10 gram/15 mL solution Take by mouth.     lisinopril (PRINIVIL,ZESTRIL) 40 MG tablet Take 1 tablet (40 mg total) by mouth every evening.    polyethylene glycol (GLYCOLAX) 17 gram/dose powder Take 17 g by mouth every evening. (Patient taking differently: Take 17 g by mouth every evening. )    senna-docusate  8.6-50 mg (SENNA WITH DOCUSATE SODIUM) 8.6-50 mg per tablet Take 1 tablet by mouth 2 (two) times daily.    sorbitol 70 % solution Take 30 mLs by mouth daily as needed (Severe constipation.  Use prn no BM X 3 days despite Miralax qhs.).    torsemide (DEMADEX) 100 MG Tab Take 1-2 tablets by mouth 2 (two) times daily.    zolpidem (AMBIEN) 5 MG Tab Take 5 mg by mouth nightly as needed.     Family History     Problem Relation (Age of Onset)    Cancer Maternal Aunt    Diabetes Father    Thyroid disease Mother        Tobacco Use    Smoking status: Never Smoker    Smokeless tobacco: Never Used   Substance and Sexual Activity    Alcohol use: No     Alcohol/week: 0.0 standard drinks    Drug use: No    Sexual activity: Yes     Partners: Male     Birth control/protection: None, Surgical     Review of Systems   Constitutional: Negative.    HENT: Negative.    Eyes: Negative.    Respiratory: Negative.    Cardiovascular: Negative.    Gastrointestinal: Positive for abdominal pain.   Endocrine: Negative.    Genitourinary: Negative.    Musculoskeletal: Negative.    Skin: Negative.    Allergic/Immunologic: Negative.    Neurological: Negative.    Psychiatric/Behavioral: Negative.      Objective:     Vital Signs (Most Recent):  Temp: 98 °F (36.7 °C) (12/17/20 0710)  Pulse: 102 (12/17/20 0710)  Resp: 20 (12/17/20 0710)  BP: (!) 157/75 (12/17/20 0710)  SpO2: (!) 92 % (12/17/20 0710) Vital Signs (24h Range):  Temp:  [98 °F (36.7 °C)-98.9 °F (37.2 °C)] 98 °F (36.7 °C)  Pulse:  [] 102  Resp:  [12-27] 20  SpO2:  [92 %-99 %] 92 %  BP: (157-223)/() 157/75     Weight: 109.1 kg (240 lb 8 oz)  Body mass index is 41.28 kg/m².    SpO2: (!) 92 %  O2 Device (Oxygen Therapy): room air      Intake/Output Summary (Last 24 hours) at 12/17/2020 0854  Last data filed at 12/17/2020 0500  Gross per 24 hour   Intake 240 ml   Output --   Net 240 ml       Lines/Drains/Airways     Drain                 Hemodialysis AV Fistula 08/05/19 Left  forearm 500 days          Peripheral Intravenous Line                 Peripheral IV - Single Lumen 12/16/20 1900 20 G Right Antecubital less than 1 day                Physical Exam  Vitals signs and nursing note reviewed.   Constitutional:       Appearance: She is obese.   HENT:      Head: Normocephalic.      Nose: Nose normal.      Mouth/Throat:      Mouth: Mucous membranes are moist.   Eyes:      Pupils: Pupils are equal, round, and reactive to light.   Neck:      Musculoskeletal: Normal range of motion.   Cardiovascular:      Rate and Rhythm: Normal rate and regular rhythm.   Pulmonary:      Effort: Pulmonary effort is normal.      Breath sounds: Normal breath sounds.   Abdominal:      General: Bowel sounds are normal.      Palpations: Abdomen is soft.   Musculoskeletal: Normal range of motion.   Skin:     General: Skin is warm and dry.      Capillary Refill: Capillary refill takes less than 2 seconds.   Neurological:      General: No focal deficit present.      Mental Status: She is alert and oriented to person, place, and time. Mental status is at baseline.   Psychiatric:         Mood and Affect: Mood normal.         Behavior: Behavior normal.         Thought Content: Thought content normal.         Judgment: Judgment normal.         Significant Labs:  All pertinent lab results from the last 24 hours have been reviewed.   Recent Lab Results  (Last 5 results in the past 72 hours)      12/17/20  0225   12/16/20  2235   12/16/20  2146   12/16/20  1730   12/14/20  1143        Lipase Result       137       Albumin 2.3     2.3       Alkaline Phosphatase 119     122       ALT 18     20       Anion Gap 8     4       aPTT       25.3  Comment:  aPTT therapeutic range = 39-69 seconds       AST 14     15       Baso # 0.07     0.06       Basophil % 0.8     0.8       BILIRUBIN TOTAL 0.2  Comment:  For infants and newborns, interpretation of results should be based  on gestational age, weight and in agreement with  clinical  observations.  Premature Infant recommended reference ranges:  Up to 24 hours.............<8.0 mg/dL  Up to 48 hours............<12.0 mg/dL  3-5 days..................<15.0 mg/dL  6-29 days.................<15.0 mg/dL  For patients on Eltrombopag therapy, use of Dimension [place] TBIL is   not   recommended.       0.2  Comment:  For infants and newborns, interpretation of results should be based  on gestational age, weight and in agreement with clinical  observations.  Premature Infant recommended reference ranges:  Up to 24 hours.............<8.0 mg/dL  Up to 48 hours............<12.0 mg/dL  3-5 days..................<15.0 mg/dL  6-29 days.................<15.0 mg/dL  For patients on Eltrombopag therapy, use of Dimension [place] TBIL is   not   recommended.         BUN 14     13       Calcium 8.7     8.7       Chloride 105     105       CO2 27     32       Creatinine 1.7     1.7       Creatinine, Urine         64.2  Comment:  The random urine reference ranges provided were established   for 24 hour urine collections.  No reference ranges exist for  random urine specimens.  Correlate clinically.       Differential Method Automated     Automated       eGFR if  42.6     42.6       eGFR if non  36.9  Comment:  Calculation used to obtain the estimated glomerular filtration  rate (eGFR) is the CKD-EPI equation.        36.9  Comment:  Calculation used to obtain the estimated glomerular filtration  rate (eGFR) is the CKD-EPI equation.          Eos # 0.3     0.3       Eosinophil % 3.4     3.4       Glucose 301     255       Gran # (ANC) 4.7     4.7       Gran % 55.1     58.5       Hematocrit 27.0     27.4       Hemoglobin 8.5     8.5       Immature Grans (Abs) 0.04  Comment:  Mild elevation in immature granulocytes is non specific and   can be seen in a variety of conditions including stress response,   acute inflammation, trauma and pregnancy. Correlation with other   laboratory and  clinical findings is essential.       0.03  Comment:  Mild elevation in immature granulocytes is non specific and   can be seen in a variety of conditions including stress response,   acute inflammation, trauma and pregnancy. Correlation with other   laboratory and clinical findings is essential.         Immature Granulocytes 0.5     0.4       INR       0.8  Comment:  Coumadin Therapy:  2.0 - 3.0 for INR for all indicators except mechanical heart valves  and antiphospholipid syndromes which should use 2.5 - 3.5.         Lymph # 2.8     2.3       Lymph % 33.0     28.8       Magnesium       1.7       MCH 26.5     26.2       MCHC 31.5     31.0       MCV 84     84       Mono # 0.6     0.6       Mono % 7.2     8.1       MPV 12.3     10.7       nRBC 0     0       NT-proBNP       1554       Platelets 465     463       Potassium 3.3     3.7       Prot/Creat Ratio, Urine         5.17     PROTEIN TOTAL 6.7     7.0       Protein, Urine Random         332  Comment:  The random urine reference ranges provided were established   for 24 hour urine collections.  No reference ranges exist for  random urine specimens.  Correlate clinically.       Protime       10.2        Acceptable     Yes         RBC 3.21     3.25       RDW 16.7     16.3       SARS-CoV-2 RNA, Amplification, Qual     Negative         Sodium 140     141       Troponin I 1.130  Comment:  The reference interval for Troponin I represents the 99th percentile   cutoff   for our facility and is consistent with 3rd generation assay   performance.  ATTENTION: The use of Biotin Supplements may interfere with this   assay.   1.160  Comment:  The reference interval for Troponin I represents the 99th percentile   cutoff   for our facility and is consistent with 3rd generation assay   performance.  ATTENTION: The use of Biotin Supplements may interfere with this   assay.     1.210  Comment:  The reference interval for Troponin I represents the 99th percentile    cutoff   for our facility and is consistent with 3rd generation assay   performance.  ATTENTION: The use of Biotin Supplements may interfere with this   assay.         WBC 8.48     7.93                            Significant Imaging:  Imaging Results    None         ECHO:         ECG Sinus rhythm, no acute change     TELEMETRY: Sinus rhythm     LAST PERFUSION:       LAST LIPIDS:     Lab Results   Component Value Date    CHOL 138 10/06/2020    CHOL 143 04/30/2019    CHOL 185 10/18/2018     Lab Results   Component Value Date    HDL 27 (L) 10/06/2020    HDL 25 (L) 04/30/2019    HDL 25 (L) 10/18/2018     Lab Results   Component Value Date    LDLCALC 78.2 10/06/2020    LDLCALC 78.8 04/30/2019    LDLCALC 116.4 10/18/2018     Lab Results   Component Value Date    TRIG 164 (H) 10/06/2020    TRIG 196 (H) 04/30/2019    TRIG 218 (H) 10/18/2018     Lab Results   Component Value Date    CHOLHDL 19.6 (L) 10/06/2020    CHOLHDL 17.5 (L) 04/30/2019    CHOLHDL 13.5 (L) 10/18/2018       MEDICATIONS:     Current Facility-Administered Medications:     acetaminophen tablet 500 mg, 500 mg, Oral, Q6H PRN, Elvin Camara MD    allopurinoL tablet 100 mg, 100 mg, Oral, BID, Elvin Camara MD, 100 mg at 12/17/20 0040    amLODIPine tablet 10 mg, 10 mg, Oral, Daily, Elvin Camara MD    aspirin chewable tablet 81 mg, 81 mg, Oral, Daily, Elvin Camara MD    atorvastatin tablet 40 mg, 40 mg, Oral, Daily, Elvin Camara MD    carvediloL tablet 25 mg, 25 mg, Oral, BID WM, Elvin Camara MD, 25 mg at 12/17/20 0652    dextrose 50% injection 12.5 g, 12.5 g, Intravenous, PRN, Elvin Camara MD    dextrose 50% injection 25 g, 25 g, Intravenous, PRN, Elvin Camara MD    glucagon (human recombinant) injection 1 mg, 1 mg, Intramuscular, PRN, Elvin Camara MD    glucose chewable tablet 16 g, 16 g, Oral, PRN, Elvin Camara MD    glucose chewable tablet 24 g, 24 g, Oral, PRN, Elvin Camara MD     hydrALAZINE tablet 25 mg, 25 mg, Oral, Q8H, NATY Maldonado    lisinopriL tablet 40 mg, 40 mg, Oral, QHS, Elvin Camara MD, 40 mg at 12/17/20 0040    ondansetron injection 4 mg, 4 mg, Intravenous, Q8H PRN, Elvin Camara MD    polyethylene glycol packet 17 g, 17 g, Oral, QHS, Elvin Camara MD, 17 g at 12/17/20 0041    senna-docusate 8.6-50 mg per tablet 1 tablet, 1 tablet, Oral, BID, Elvin Camara MD, 1 tablet at 12/17/20 0039    sodium chloride 0.9% flush 10 mL, 10 mL, Intravenous, PRN, Elvin Camara MD    torsemide tablet 100 mg, 100 mg, Oral, BID, Elvin Camara MD, 100 mg at 12/17/20 0039    zolpidem tablet 5 mg, 5 mg, Oral, Nightly PRN, Elvin Camara MD      Assessment and Plan:     Active Diagnoses:    Diagnosis Date Noted POA    Hypertensive urgency [I16.0] 12/16/2020 Yes      Problems Resolved During this Admission:       VTE Risk Mitigation (From admission, onward)         Ordered     IP VTE HIGH RISK PATIENT  Once      12/16/20 2225     Place sequential compression device  Until discontinued      12/16/20 2225              1.  Hypertensive emergency:  Blood pressures better controlled.  She denies any hypertensive s/s at this time  Will start hydralazine 25mg PO TID    2.  Chronically elevated troponin:  Ok to discharge home, normal coronary angiogram in 2017  Plan for outpatient lexiscan MPI to further evaluate.     3.  DMII:  Defer to PCP     4.  CKD:  AV fistula in place in left arm      Ok to d/c home from cardiac perspective.  Follow up at [facility] with lexiscan MPI.            Thank you for your consult.          NATY Maldonado  Cardiology  [facility]  12/17/2020

## 2020-12-17 NOTE — DISCHARGE SUMMARY
[facility] Hospital Medicine  Discharge Summary      Patient Name: Pilar Fernandez  MRN: [id number]  Patient Class: OP- Observation  Admission Date: 12/16/2020  Hospital Length of Stay: 0 days  Discharge Date and Time:  12/17/2020 9:25 AM  Attending Physician: Debra Fong MD   Discharging Provider: Velia Reed NP  Primary Care Provider: Harris Haley MD      HPI:   Pilar Fernandez is an 40 y.o. female who complains of LEFT CHEST WALL PAIN, SHORTNESS OF BREATH, EPIGASTRIC PAIN, CONSTIPATION.  PATIENT REPORTS TAKING MIRALAX AT HOME AND HAVING A SMALL BOWEL MOVEMENT THIS MORNING. Symptoms BEGAN 3-4 DAYS AGO.  HISTORY OF GERD, CONSTIPATION, HYPERTENSION, BUT DID NOT TAKE HER MORNING MEDS patient of dr dudley pcp and dr zepeda    * No surgery found *      Hospital Course:   12/17/20 LF patient has chronic constipation and had not had a bowel movement in a couple of days causing chest pain and she was unable to take blood pressure medication. Came in with HTN emergency bp 223/104. Medications resumed, patient had bowel movement is feeling much better this am. See per Cardiology and ok to discharge     Consults:   Consults (From admission, onward)        Status Ordering Provider     Inpatient consult to Cardiology  Once     Provider:  Alf Hairston III, MD    Completed VELIA REED          * Hypertensive urgency  12/17/20 LF patient due to constipation was unable to take bp meds. Restarted, seen per cards and adjusted bp at baseline, plan to d/c today      Constipation  12/17/20 LF resolved discharge home with miralax      Chest pain  12/17/20 LF chronic and resolved with BM--seen per Cards this am and ok to discharge home         Final Active Diagnoses:    Diagnosis Date Noted POA    PRINCIPAL PROBLEM:  Hypertensive urgency [I16.0] 12/16/2020 Yes    Constipation [K59.00] 04/20/2016 Yes    Chest pain [R07.9] 02/11/2016 Yes      Problems Resolved During this Admission:        Discharged Condition: good    Disposition:     Follow Up:  Follow-up Information     Harris Haley MD In 1 week.    Specialty: Family Medicine  Why: hospital follow up  Contact information:  [address]  [place] [address]  [phone]             Vinh Starks MD In 2 weeks.    Specialty: Cardiology  Why: hospital follow  Contact information:  [address] [facility] [address]  [phone]                 Patient Instructions:   No discharge procedures on file.    Significant Diagnostic Studies: Labs:   CMP   Recent Labs   Lab 12/16/20  1730 12/17/20  0225    140   K 3.7 3.3*    105   CO2 32* 27   * 301*   BUN 13 14   CREATININE 1.7* 1.7*   CALCIUM 8.7 8.7   PROT 7.0 6.7   ALBUMIN 2.3* 2.3*   BILITOT 0.2 0.2   ALKPHOS 122 119   AST 15 14   ALT 20 18   ANIONGAP 4* 8   ESTGFRAFRICA 42.6* 42.6*   EGFRNONAA 36.9* 36.9*    and CBC   Recent Labs   Lab 12/16/20  1730 12/17/20  0225   WBC 7.93 8.48   HGB 8.5* 8.5*   HCT 27.4* 27.0*   * 465*       Pending Diagnostic Studies:     None         Medications:  Reconciled Home Medications:      Medication List      START taking these medications    hydrALAZINE 25 MG tablet  Commonly known as: APRESOLINE  Take 1 tablet (25 mg total) by mouth every 8 (eight) hours.        CHANGE how you take these medications    * polyethylene glycol 17 gram/dose powder  Commonly known as: GLYCOLAX  Take 17 g by mouth every evening.  What changed: Another medication with the same name was added. Make sure you understand how and when to take each.     * polyethylene glycol 17 gram Pwpk  Commonly known as: GLYCOLAX  Take 17 g by mouth every evening.  What changed: You were already taking a medication with the same name, and this prescription was added. Make sure you understand how and when to take each.         * This list has 2 medication(s) that are the same as other medications prescribed for you. Read the directions carefully, and ask your  doctor or other care provider to review them with you.            CONTINUE taking these medications    acetaminophen 500 MG tablet  Commonly known as: TYLENOL  Take 1 tablet (500 mg total) by mouth daily as needed for Pain.     albuterol sulfate 2.5 mg/0.5 mL Nebu  Take 2.5 mg by nebulization every 4 (four) hours as needed.     allopurinoL 100 MG tablet  Commonly known as: ZYLOPRIM  Take 100 mg by mouth 2 (two) times daily.     amLODIPine 10 MG tablet  Commonly known as: NORVASC  Take 1 tablet (10 mg total) by mouth once daily.     aspirin 81 MG Chew  Take 1 tablet (81 mg total) by mouth once daily.     atorvastatin 40 MG tablet  Commonly known as: LIPITOR  Take 1 tablet (40 mg total) by mouth once daily.     carvediloL 25 MG tablet  Commonly known as: COREG  TAKE ONE TABLET BY MOUTH TWICE DAILY WITH MEALS     gabapentin 400 MG capsule  Commonly known as: NEURONTIN  Take 1 capsule by mouth 3 (three) times daily.     HYDROcodone-acetaminophen 5-325 mg per tablet  Commonly known as: NORCO  Take 1 tablet by mouth every 4 (four) hours as needed.     insulin aspart U-100 100 unit/mL injection  Commonly known as: NovoLOG U-100 Insulin aspart  Inject 10 Units into the skin 3 (three) times daily before meals.     lactulose 10 gram/15 mL solution  Commonly known as: CHRONULAC  Take by mouth.     LEVEMIR U-100 INSULIN 100 unit/mL injection  Generic drug: insulin detemir U-100  Inject 70 Units into the skin As instructed (Every afternoon (noon)).     lisinopriL 40 MG tablet  Commonly known as: PRINIVIL,ZESTRIL  Take 1 tablet (40 mg total) by mouth every evening.     senna-docusate 8.6-50 mg 8.6-50 mg per tablet  Commonly known as: SENNA WITH DOCUSATE SODIUM  Take 1 tablet by mouth 2 (two) times daily.     sorbitoL 70 % solution  Take 30 mLs by mouth daily as needed (Severe constipation.  Use prn no BM X 3 days despite Miralax qhs.).     torsemide 100 MG Tab  Commonly known as: DEMADEX  Take 1-2 tablets by mouth 2 (two) times  daily.     zolpidem 5 MG Tab  Commonly known as: AMBIEN  Take 5 mg by mouth nightly as needed.            Indwelling Lines/Drains at time of discharge:   Lines/Drains/Airways     Drain                 Hemodialysis AV Fistula 08/05/19 Left forearm 500 days                Time spent on the discharge of patient: 45 minutes  Patient was seen and examined on the date of discharge and determined to be suitable for discharge.         Velia Delacruz NP  Department of Hospital Medicine  [facility]

## 2020-12-17 NOTE — ASSESSMENT & PLAN NOTE
12/17/20 LF patient due to constipation was unable to take bp meds. Restarted, seen per cards and adjusted bp at baseline, plan to d/c today

## 2020-12-17 NOTE — PLAN OF CARE
12/17/20 0828   Discharge Assessment   Assessment Type Discharge Planning Assessment   Discharge Plan A Home   Discharge Plan B Home

## 2021-03-16 ENCOUNTER — HISTORICAL (OUTPATIENT)
Dept: ADMINISTRATIVE | Facility: HOSPITAL | Age: 41
End: 2021-03-16

## 2021-03-18 LAB — FINAL CULTURE: NO GROWTH

## 2021-05-06 ENCOUNTER — PATIENT MESSAGE (OUTPATIENT)
Dept: RESEARCH | Facility: HOSPITAL | Age: 41
End: 2021-05-06

## 2021-07-01 ENCOUNTER — PATIENT MESSAGE (OUTPATIENT)
Dept: ADMINISTRATIVE | Facility: OTHER | Age: 41
End: 2021-07-01

## 2021-07-17 PROCEDURE — 99283 EMERGENCY DEPT VISIT LOW MDM: CPT

## 2021-07-18 ENCOUNTER — HOSPITAL ENCOUNTER (EMERGENCY)
Facility: HOSPITAL | Age: 41
Discharge: HOME | End: 2021-07-18
Attending: FAMILY MEDICINE
Payer: MEDICAID

## 2021-07-18 VITALS
WEIGHT: 255 LBS | HEART RATE: 103 BPM | OXYGEN SATURATION: 95 % | SYSTOLIC BLOOD PRESSURE: 158 MMHG | DIASTOLIC BLOOD PRESSURE: 79 MMHG | HEIGHT: 64 IN | RESPIRATION RATE: 18 BRPM | TEMPERATURE: 98 F | BODY MASS INDEX: 43.54 KG/M2

## 2021-07-18 DIAGNOSIS — R05.9 COUGH: Primary | ICD-10-CM

## 2021-07-18 DIAGNOSIS — I10 HYPERTENSION, UNSPECIFIED TYPE: ICD-10-CM

## 2021-07-18 DIAGNOSIS — B34.9 VIRAL SYNDROME: ICD-10-CM

## 2021-07-18 LAB
CTP QC/QA: YES
SARS-COV-2 RDRP RESP QL NAA+PROBE: NEGATIVE

## 2021-07-18 PROCEDURE — U0002 COVID-19 LAB TEST NON-CDC: HCPCS | Performed by: FAMILY MEDICINE

## 2021-07-18 PROCEDURE — 25000003 PHARM REV CODE 250: Performed by: FAMILY MEDICINE

## 2021-07-18 RX ORDER — BENZONATATE 100 MG/1
100 CAPSULE ORAL ONCE
Status: COMPLETED | OUTPATIENT
Start: 2021-07-18 | End: 2021-07-18

## 2021-07-18 RX ORDER — CLONIDINE HYDROCHLORIDE 0.1 MG/1
0.1 TABLET ORAL
Status: COMPLETED | OUTPATIENT
Start: 2021-07-18 | End: 2021-07-18

## 2021-07-18 RX ORDER — BENZONATATE 100 MG/1
100 CAPSULE ORAL 3 TIMES DAILY PRN
Qty: 10 CAPSULE | Refills: 0 | Status: SHIPPED | OUTPATIENT
Start: 2021-07-18 | End: 2021-07-28

## 2021-07-18 RX ADMIN — CLONIDINE HYDROCHLORIDE 0.1 MG: 0.1 TABLET ORAL at 01:07

## 2021-07-18 RX ADMIN — BENZONATATE 100 MG: 100 CAPSULE ORAL at 01:07

## 2021-08-05 ENCOUNTER — LAB VISIT (OUTPATIENT)
Dept: LAB | Facility: HOSPITAL | Age: 41
End: 2021-08-05
Attending: INTERNAL MEDICINE
Payer: MEDICAID

## 2021-08-05 DIAGNOSIS — N18.6 ESRD (END STAGE RENAL DISEASE): ICD-10-CM

## 2021-08-05 PROCEDURE — 80074 ACUTE HEPATITIS PANEL: CPT | Performed by: INTERNAL MEDICINE

## 2021-08-05 PROCEDURE — 86706 HEP B SURFACE ANTIBODY: CPT | Performed by: INTERNAL MEDICINE

## 2021-08-05 PROCEDURE — 36415 COLL VENOUS BLD VENIPUNCTURE: CPT | Performed by: INTERNAL MEDICINE

## 2021-08-05 PROCEDURE — 86704 HEP B CORE ANTIBODY TOTAL: CPT | Performed by: INTERNAL MEDICINE

## 2021-08-06 LAB
HAV IGM SERPL QL IA: NEGATIVE
HBV CORE AB SERPL QL IA: NEGATIVE
HBV CORE IGM SERPL QL IA: NEGATIVE
HBV SURFACE AB SER-ACNC: NEGATIVE M[IU]/ML
HBV SURFACE AG SERPL QL IA: NEGATIVE
HCV AB SERPL QL IA: NEGATIVE

## 2021-08-25 ENCOUNTER — TELEPHONE (OUTPATIENT)
Dept: TRANSPLANT | Facility: CLINIC | Age: 41
End: 2021-08-25

## 2022-08-12 ENCOUNTER — HOSPITAL ENCOUNTER (EMERGENCY)
Facility: HOSPITAL | Age: 42
Discharge: HOME | End: 2022-08-13
Attending: EMERGENCY MEDICINE
Payer: MEDICARE

## 2022-08-12 DIAGNOSIS — R10.9 RIGHT FLANK PAIN: Primary | ICD-10-CM

## 2022-08-12 DIAGNOSIS — N12 PYELONEPHRITIS: ICD-10-CM

## 2022-08-12 LAB
ALBUMIN SERPL BCP-MCNC: 3.4 G/DL (ref 3.5–5.2)
ALP SERPL-CCNC: 88 U/L (ref 55–135)
ALT SERPL W/O P-5'-P-CCNC: 15 U/L (ref 10–44)
ANION GAP SERPL CALC-SCNC: 7 MMOL/L (ref 8–16)
AST SERPL-CCNC: 12 U/L (ref 10–40)
B-HCG UR QL: NEGATIVE
BACTERIA #/AREA URNS HPF: ABNORMAL /HPF
BASOPHILS # BLD AUTO: 0.07 K/UL (ref 0–0.2)
BASOPHILS NFR BLD: 1 % (ref 0–1.9)
BILIRUB SERPL-MCNC: 0.3 MG/DL (ref 0.1–1)
BILIRUB UR QL STRIP: NEGATIVE
BUN SERPL-MCNC: 36 MG/DL (ref 6–20)
CALCIUM SERPL-MCNC: 8.6 MG/DL (ref 8.7–10.5)
CHLORIDE SERPL-SCNC: 100 MMOL/L (ref 95–110)
CLARITY UR: ABNORMAL
CO2 SERPL-SCNC: 27 MMOL/L (ref 23–29)
COLOR UR: YELLOW
CREAT SERPL-MCNC: 6.4 MG/DL (ref 0.5–1.4)
DIFFERENTIAL METHOD: ABNORMAL
EOSINOPHIL # BLD AUTO: 0.3 K/UL (ref 0–0.5)
EOSINOPHIL NFR BLD: 4.1 % (ref 0–8)
ERYTHROCYTE [DISTWIDTH] IN BLOOD BY AUTOMATED COUNT: 14.7 % (ref 11.5–14.5)
EST. GFR  (NO RACE VARIABLE): 7.7 ML/MIN/1.73 M^2
GLUCOSE SERPL-MCNC: 323 MG/DL (ref 70–110)
GLUCOSE UR QL STRIP: ABNORMAL
HCT VFR BLD AUTO: 30.9 % (ref 37–48.5)
HGB BLD-MCNC: 10.4 G/DL (ref 12–16)
HGB UR QL STRIP: ABNORMAL
HYALINE CASTS #/AREA URNS LPF: 6.3 /LPF
IMM GRANULOCYTES # BLD AUTO: 0.02 K/UL (ref 0–0.04)
IMM GRANULOCYTES NFR BLD AUTO: 0.3 % (ref 0–0.5)
KETONES UR QL STRIP: NEGATIVE
LEUKOCYTE ESTERASE UR QL STRIP: ABNORMAL
LYMPHOCYTES # BLD AUTO: 2 K/UL (ref 1–4.8)
LYMPHOCYTES NFR BLD: 27.7 % (ref 18–48)
MCH RBC QN AUTO: 29.7 PG (ref 27–31)
MCHC RBC AUTO-ENTMCNC: 33.7 G/DL (ref 32–36)
MCV RBC AUTO: 88 FL (ref 82–98)
MICROSCOPIC COMMENT: ABNORMAL
MONOCYTES # BLD AUTO: 0.6 K/UL (ref 0.3–1)
MONOCYTES NFR BLD: 8.8 % (ref 4–15)
NEUTROPHILS # BLD AUTO: 4.2 K/UL (ref 1.8–7.7)
NEUTROPHILS NFR BLD: 58.1 % (ref 38–73)
NITRITE UR QL STRIP: NEGATIVE
NRBC BLD-RTO: 0 /100 WBC
PH UR STRIP: 7 [PH] (ref 5–8)
PLATELET # BLD AUTO: 241 K/UL (ref 150–450)
PMV BLD AUTO: 11.3 FL (ref 9.2–12.9)
POTASSIUM SERPL-SCNC: 3 MMOL/L (ref 3.5–5.1)
PROT SERPL-MCNC: 7.5 G/DL (ref 6–8.4)
PROT UR QL STRIP: ABNORMAL
RBC # BLD AUTO: 3.5 M/UL (ref 4–5.4)
RBC #/AREA URNS HPF: 23 /HPF (ref 0–4)
SODIUM SERPL-SCNC: 134 MMOL/L (ref 136–145)
SP GR UR STRIP: 1.01 (ref 1–1.03)
SQUAMOUS #/AREA URNS HPF: 29 /HPF
URN SPEC COLLECT METH UR: ABNORMAL
UROBILINOGEN UR STRIP-ACNC: 1 EU/DL
WBC # BLD AUTO: 7.28 K/UL (ref 3.9–12.7)
WBC #/AREA URNS HPF: 16 /HPF (ref 0–5)
YEAST URNS QL MICRO: ABNORMAL

## 2022-08-12 PROCEDURE — 81025 URINE PREGNANCY TEST: CPT | Performed by: EMERGENCY MEDICINE

## 2022-08-12 PROCEDURE — 81000 URINALYSIS NONAUTO W/SCOPE: CPT | Performed by: EMERGENCY MEDICINE

## 2022-08-12 PROCEDURE — 87086 URINE CULTURE/COLONY COUNT: CPT | Performed by: EMERGENCY MEDICINE

## 2022-08-12 PROCEDURE — 80053 COMPREHEN METABOLIC PANEL: CPT | Performed by: EMERGENCY MEDICINE

## 2022-08-12 PROCEDURE — 36415 COLL VENOUS BLD VENIPUNCTURE: CPT | Performed by: EMERGENCY MEDICINE

## 2022-08-12 PROCEDURE — 96375 TX/PRO/DX INJ NEW DRUG ADDON: CPT

## 2022-08-12 PROCEDURE — 25000003 PHARM REV CODE 250: Performed by: EMERGENCY MEDICINE

## 2022-08-12 PROCEDURE — 99285 EMERGENCY DEPT VISIT HI MDM: CPT | Mod: 25

## 2022-08-12 PROCEDURE — 96365 THER/PROPH/DIAG IV INF INIT: CPT

## 2022-08-12 PROCEDURE — 63600175 PHARM REV CODE 636 W HCPCS: Performed by: EMERGENCY MEDICINE

## 2022-08-12 PROCEDURE — 85025 COMPLETE CBC W/AUTO DIFF WBC: CPT | Performed by: EMERGENCY MEDICINE

## 2022-08-12 PROCEDURE — 96376 TX/PRO/DX INJ SAME DRUG ADON: CPT

## 2022-08-12 RX ORDER — LEVOFLOXACIN 250 MG/1
250 TABLET ORAL DAILY
Qty: 5 TABLET | Refills: 0 | Status: SHIPPED | OUTPATIENT
Start: 2022-08-12 | End: 2022-08-17

## 2022-08-12 RX ORDER — MORPHINE SULFATE 4 MG/ML
4 INJECTION, SOLUTION INTRAMUSCULAR; INTRAVENOUS
Status: COMPLETED | OUTPATIENT
Start: 2022-08-12 | End: 2022-08-12

## 2022-08-12 RX ORDER — MORPHINE SULFATE 4 MG/ML
4 INJECTION, SOLUTION INTRAMUSCULAR; INTRAVENOUS
Status: COMPLETED | OUTPATIENT
Start: 2022-08-13 | End: 2022-08-12

## 2022-08-12 RX ORDER — ONDANSETRON 2 MG/ML
4 INJECTION INTRAMUSCULAR; INTRAVENOUS
Status: COMPLETED | OUTPATIENT
Start: 2022-08-12 | End: 2022-08-12

## 2022-08-12 RX ORDER — ONDANSETRON 2 MG/ML
4 INJECTION INTRAMUSCULAR; INTRAVENOUS
Status: COMPLETED | OUTPATIENT
Start: 2022-08-13 | End: 2022-08-12

## 2022-08-12 RX ADMIN — ONDANSETRON 4 MG: 2 INJECTION INTRAMUSCULAR; INTRAVENOUS at 11:08

## 2022-08-12 RX ADMIN — MORPHINE SULFATE 4 MG: 4 INJECTION INTRAVENOUS at 10:08

## 2022-08-12 RX ADMIN — ONDANSETRON HYDROCHLORIDE 4 MG: 2 SOLUTION INTRAMUSCULAR; INTRAVENOUS at 10:08

## 2022-08-12 RX ADMIN — POTASSIUM BICARBONATE 20 MEQ: 391 TABLET, EFFERVESCENT ORAL at 10:08

## 2022-08-12 RX ADMIN — CEFTRIAXONE 1 G: 1 INJECTION, SOLUTION INTRAVENOUS at 10:08

## 2022-08-12 RX ADMIN — MORPHINE SULFATE 4 MG: 4 INJECTION INTRAVENOUS at 11:08

## 2022-08-13 VITALS
RESPIRATION RATE: 16 BRPM | DIASTOLIC BLOOD PRESSURE: 71 MMHG | OXYGEN SATURATION: 100 % | BODY MASS INDEX: 41.48 KG/M2 | HEART RATE: 74 BPM | TEMPERATURE: 98 F | HEIGHT: 64 IN | SYSTOLIC BLOOD PRESSURE: 198 MMHG | WEIGHT: 243 LBS

## 2022-08-13 PROCEDURE — 63600175 PHARM REV CODE 636 W HCPCS: Performed by: EMERGENCY MEDICINE

## 2022-08-13 RX ORDER — ONDANSETRON 4 MG/1
4-8 TABLET, ORALLY DISINTEGRATING ORAL EVERY 8 HOURS PRN
Qty: 20 TABLET | Refills: 0 | Status: SHIPPED | OUTPATIENT
Start: 2022-08-12 | End: 2022-09-30

## 2022-08-13 RX ORDER — OXYCODONE AND ACETAMINOPHEN 7.5; 325 MG/1; MG/1
1 TABLET ORAL EVERY 6 HOURS PRN
Qty: 18 TABLET | Refills: 0 | Status: SHIPPED | OUTPATIENT
Start: 2022-08-12 | End: 2022-09-30

## 2022-08-13 NOTE — DISCHARGE INSTRUCTIONS
Read all discharge instructions/education provided: follow all recommendations and return precautions.  Take all medications as prescribed.  Follow up with your primary care doctor as directed.  Return to emergency department immediately for any new or worsening or recurrent symptoms.

## 2022-08-14 LAB
BACTERIA UR CULT: NORMAL
BACTERIA UR CULT: NORMAL

## 2022-08-14 NOTE — ED PROVIDER NOTES
Encounter Date: 2022       History     Chief Complaint   Patient presents with    Flank Pain     Pt stated that for the past 2 days she has been experiencing intermittent right flank/back pain with nausea. Denied dysuria. Last BM 2 days ago. Pt does home dialysis 4 days/week - unable to complete treatment today.      42-year-old female with a history of anemia, diabetes, GERD, hypertension, hypercholesterolemia comes in complaining of flank pain.  She reports right-sided flank pain associated with nausea for the last 2 days.  She denies dysuria and hematuria.  She also reports constipation.  Patient does home dialysis 4 days a week; she was supposed to dialyze today but did not and will dialyze tomorrow.  She has no chest pain or shortness of breath or fever or abdominal pain or vomiting or diarrhea.            Review of patient's allergies indicates:   Allergen Reactions    Tramadol Itching and Nausea And Vomiting     Past Medical History:   Diagnosis Date    Anemia     Anemia     Chronic constipation     CKD (chronic kidney disease)     Diabetes mellitus     Erosive gastritis 2016    GERD (gastroesophageal reflux disease)     Gout     High cholesterol     Hypertension     Knee pain      Past Surgical History:   Procedure Laterality Date    AV FISTULA PLACEMENT Left 2019    Procedure: creation av fistula;  Surgeon: Buck Fernandez MD;  Location: [facility];  Service: Cardiovascular;  Laterality: Left;  Left Radialcephalic: PACU     SECTION  , , ,     CHOLECYSTECTOMY      COLONOSCOPY N/A 10/23/2017    Procedure: COLONOSCOPY;  Surgeon: Bri Harry MD;  Location: [facility];  Service: Endoscopy;  Laterality: N/A;    TUBAL LIGATION      UPPER GASTROINTESTINAL ENDOSCOPY  2016    Erosive Gastritis-Dr Bailey     Family History   Problem Relation Age of Onset    Thyroid disease Mother     Diabetes Father     Ovarian cancer Maternal Aunt     Breast  cancer Maternal Aunt      Social History     Tobacco Use    Smoking status: Never Smoker    Smokeless tobacco: Never Used   Substance Use Topics    Alcohol use: No     Alcohol/week: 0.0 standard drinks    Drug use: No     Review of Systems   Constitutional: Negative for fever.   HENT: Negative for sore throat.    Respiratory: Negative for shortness of breath.    Cardiovascular: Negative for chest pain.   Gastrointestinal: Negative for nausea.   Genitourinary: Positive for flank pain. Negative for dysuria.   Musculoskeletal: Negative for back pain.   Skin: Negative for rash.   Neurological: Negative for weakness.   Hematological: Does not bruise/bleed easily.   All other systems reviewed and are negative.      Physical Exam     Initial Vitals [08/12/22 2203]   BP Pulse Resp Temp SpO2   (!) 238/118 94 16 98.2 °F (36.8 °C) 98 %      MAP       --         Physical Exam    Nursing note and vitals reviewed.  Constitutional: She appears well-developed and well-nourished. She is not diaphoretic. No distress.   HENT:   Head: Normocephalic and atraumatic.   Eyes: EOM are normal. Pupils are equal, round, and reactive to light.   Neck: Neck supple.   Normal range of motion.  Cardiovascular: Normal rate and regular rhythm.   Pulmonary/Chest: Breath sounds normal. She has no wheezes.   Abdominal: Abdomen is soft. Bowel sounds are normal. There is no abdominal tenderness.   Musculoskeletal:         General: No edema. Normal range of motion.      Cervical back: Normal range of motion and neck supple.     Neurological: She is alert and oriented to person, place, and time. She has normal strength. No cranial nerve deficit or sensory deficit.   Skin: Skin is warm and dry.   Psychiatric: She has a normal mood and affect. Thought content normal.         ED Course   Procedures  Labs Reviewed   CBC W/ AUTO DIFFERENTIAL - Abnormal; Notable for the following components:       Result Value    RBC 3.50 (*)     Hemoglobin 10.4 (*)      Hematocrit 30.9 (*)     RDW 14.7 (*)     All other components within normal limits   COMPREHENSIVE METABOLIC PANEL - Abnormal; Notable for the following components:    Sodium 134 (*)     Potassium 3.0 (*)     Glucose 323 (*)     BUN 36 (*)     Creatinine 6.4 (*)     Calcium 8.6 (*)     Albumin 3.4 (*)     Anion Gap 7 (*)     eGFR 7.7 (*)     All other components within normal limits   URINALYSIS, REFLEX TO URINE CULTURE - Abnormal; Notable for the following components:    Appearance, UA Cloudy (*)     Protein, UA 3+ (*)     Glucose, UA 3+ (*)     Occult Blood UA 1+ (*)     Leukocytes, UA 1+ (*)     All other components within normal limits    Narrative:     Preferred Collection Type->Urine, Clean Catch  Specimen Source->Urine   URINALYSIS MICROSCOPIC - Abnormal; Notable for the following components:    RBC, UA 23 (*)     WBC, UA 16 (*)     Hyaline Casts, UA 6.3 (*)     All other components within normal limits    Narrative:     Preferred Collection Type->Urine, Clean Catch  Specimen Source->Urine   CULTURE, URINE   PREGNANCY TEST, URINE RAPID    Narrative:     Specimen Source->Urine          Imaging Results          CT Renal Stone Study ABD Pelvis WO (Final result)  Result time 08/13/22 07:19:56    Final result by Adriel Foster MD (08/13/22 07:19:56)                 Impression:      1. No CT evidence of urinary tract stone.  2. IUD present with lower limb appearing to be within the cervix.      Electronically signed by: Adriel Foster MD  Date:    08/13/2022  Time:    07:19             Narrative:    EXAMINATION:  CT RENAL STONE STUDY ABD PELVIS WO    CLINICAL HISTORY:  Flank pain, kidney stone suspected;    TECHNIQUE:  Iterative reconstruction technique was used.    CT/cardiac nuclear exam/s in prior 12 months:  0.    COMPARISON:  None.    FINDINGS:  Unremarkable noncontrast liver and spleen.  Prior cholecystectomy.  Unremarkable noncontrast pancreas and adrenal glands.  No stones in either kidney, and no  hydronephrosis.  No bowel dilatation.  No free fluid.  IUD present with the lower limb.  To be within the cervix.  No free fluid.                                 Medications   potassium bicarbonate disintegrating tablet 20 mEq (20 mEq Oral Given 8/12/22 2255)   morphine injection 4 mg (4 mg Intravenous Given 8/12/22 2254)   ondansetron injection 4 mg (4 mg Intravenous Given 8/12/22 2255)   cefTRIAXone (ROCEPHIN) 1 g/50 mL D5W IVPB (0 g Intravenous Stopped 8/12/22 2347)   morphine injection 4 mg (4 mg Intravenous Given 8/12/22 2357)   ondansetron injection 4 mg (4 mg Intravenous Given 8/12/22 2357)                Attending Attestation:             Attending ED Notes:   42-year-old female with a history of anemia, diabetes, GERD, hypertension, hypercholesterolemia comes in complaining of flank pain.  She reports right-sided flank pain associated with nausea for the last 2 days.  She denies dysuria and hematuria.  She also reports constipation.  Patient does home dialysis 4 days a week; she was supposed to dialyze today but did not and will dialyze tomorrow.  She has no chest pain or shortness of breath or fever or abdominal pain or vomiting or diarrhea.    CT stone reveals no evidence of nephrolithiasis.  Incidental findings noted on CT reported to patient.  Presentation consistent with pyelonephritis.  Patient given renally dosed Levaquin prescription along with analgesics and antiemetics. Patient is non-toxic appearing, in no acute distress, and vital signs are stable and normal upon discharge. Upon completion of ED evaluation and management, with consideration of thorough differential diagnosis, the patient was found to have no acutely abnormal physical exam findings or other pathology requiring further emergent intervention or admission at this time. Patient/caregiver has no complaints upon discharge and verbalizes understanding and agreement with diagnosis and treatment plan. Discharge instructions with return  precautions provided. Patient/caregiver verbalizes understanding to return to ED immediately for any new or worsening symptoms and to follow up with PCP/specialist recommended in 1-2 days.                      Clinical Impression:   Final diagnoses:  [R10.9] Right flank pain (Primary)  [N12] Pyelonephritis          ED Disposition Condition    Discharge Stable        ED Prescriptions     Medication Sig Dispense Start Date End Date Auth. Provider    levoFLOXacin (LEVAQUIN) 250 MG tablet Take 1 tablet (250 mg total) by mouth once daily. for 5 days 5 tablet 8/12/2022 8/17/2022 Val Wallace MD    oxyCODONE-acetaminophen (PERCOCET) 7.5-325 mg per tablet Take 1 tablet by mouth every 6 (six) hours as needed for Pain. 18 tablet 8/12/2022  Val Wallace MD    ondansetron (ZOFRAN-ODT) 4 MG TbDL Take 1-2 tablets (4-8 mg total) by mouth every 8 (eight) hours as needed (Nausea/Vomiting). 20 tablet 8/12/2022  Val Wallace MD        Follow-up Information     Follow up With Specialties Details Why Contact Info Additional Information    Harris Haley MD Family Medicine Schedule an appointment as soon as possible for a visit in 1 day  [address]  [facility] [address]  [phone]       your nephrologist  Schedule an appointment as soon as possible for a visit in 1 day       DIALYSIS  Go in 1 day       [facility] Emergency Department Emergency Medicine Go to  As needed, If symptoms worsen [address] [facility] [id number] [phone] Floor 1           Val Wallace MD  08/14/22 0561

## 2022-08-22 ENCOUNTER — HOSPITAL ENCOUNTER (EMERGENCY)
Facility: HOSPITAL | Age: 42
Discharge: HOME | End: 2022-08-23
Attending: EMERGENCY MEDICINE
Payer: MEDICARE

## 2022-08-22 DIAGNOSIS — N39.0 URINARY TRACT INFECTION WITHOUT HEMATURIA, SITE UNSPECIFIED: Primary | ICD-10-CM

## 2022-08-22 PROCEDURE — 96365 THER/PROPH/DIAG IV INF INIT: CPT

## 2022-08-22 PROCEDURE — 87088 URINE BACTERIA CULTURE: CPT | Performed by: EMERGENCY MEDICINE

## 2022-08-22 PROCEDURE — 87086 URINE CULTURE/COLONY COUNT: CPT | Performed by: EMERGENCY MEDICINE

## 2022-08-22 PROCEDURE — 99284 EMERGENCY DEPT VISIT MOD MDM: CPT | Mod: 25

## 2022-08-22 PROCEDURE — 81000 URINALYSIS NONAUTO W/SCOPE: CPT | Performed by: EMERGENCY MEDICINE

## 2022-08-22 PROCEDURE — 96375 TX/PRO/DX INJ NEW DRUG ADDON: CPT

## 2022-08-23 VITALS
SYSTOLIC BLOOD PRESSURE: 204 MMHG | RESPIRATION RATE: 18 BRPM | HEART RATE: 82 BPM | BODY MASS INDEX: 41.28 KG/M2 | OXYGEN SATURATION: 98 % | HEIGHT: 64 IN | DIASTOLIC BLOOD PRESSURE: 90 MMHG | TEMPERATURE: 99 F | WEIGHT: 241.81 LBS

## 2022-08-23 LAB
BACTERIA #/AREA URNS HPF: ABNORMAL /HPF
BILIRUB UR QL STRIP: NEGATIVE
CLARITY UR: ABNORMAL
COLOR UR: YELLOW
GLUCOSE UR QL STRIP: ABNORMAL
HGB UR QL STRIP: ABNORMAL
HYALINE CASTS #/AREA URNS LPF: 0 /LPF
KETONES UR QL STRIP: NEGATIVE
LEUKOCYTE ESTERASE UR QL STRIP: ABNORMAL
MICROSCOPIC COMMENT: ABNORMAL
NITRITE UR QL STRIP: NEGATIVE
PH UR STRIP: 7 [PH] (ref 5–8)
PROT UR QL STRIP: ABNORMAL
RBC #/AREA URNS HPF: 48 /HPF (ref 0–4)
SP GR UR STRIP: 1.02 (ref 1–1.03)
SQUAMOUS #/AREA URNS HPF: >36 /HPF
URN SPEC COLLECT METH UR: ABNORMAL
UROBILINOGEN UR STRIP-ACNC: NEGATIVE EU/DL
WBC #/AREA URNS HPF: 30 /HPF (ref 0–5)
YEAST URNS QL MICRO: ABNORMAL

## 2022-08-23 PROCEDURE — 63600175 PHARM REV CODE 636 W HCPCS: Performed by: EMERGENCY MEDICINE

## 2022-08-23 PROCEDURE — 25000003 PHARM REV CODE 250: Performed by: EMERGENCY MEDICINE

## 2022-08-23 RX ORDER — HYDRALAZINE HYDROCHLORIDE 20 MG/ML
10 INJECTION INTRAMUSCULAR; INTRAVENOUS
Status: COMPLETED | OUTPATIENT
Start: 2022-08-23 | End: 2022-08-23

## 2022-08-23 RX ORDER — CEPHALEXIN 500 MG/1
500 CAPSULE ORAL EVERY 12 HOURS
Qty: 10 CAPSULE | Refills: 0 | Status: SHIPPED | OUTPATIENT
Start: 2022-08-23 | End: 2022-08-28

## 2022-08-23 RX ORDER — LABETALOL HCL 20 MG/4 ML
10 SYRINGE (ML) INTRAVENOUS
Status: COMPLETED | OUTPATIENT
Start: 2022-08-23 | End: 2022-08-23

## 2022-08-23 RX ADMIN — CEFTRIAXONE 1 G: 1 INJECTION, SOLUTION INTRAVENOUS at 12:08

## 2022-08-23 RX ADMIN — LABETALOL HYDROCHLORIDE 10 MG: 5 INJECTION, SOLUTION INTRAVENOUS at 12:08

## 2022-08-23 RX ADMIN — LABETALOL HYDROCHLORIDE 10 MG: 5 INJECTION, SOLUTION INTRAVENOUS at 01:08

## 2022-08-23 RX ADMIN — HYDRALAZINE HYDROCHLORIDE 10 MG: 20 INJECTION, SOLUTION INTRAMUSCULAR; INTRAVENOUS at 01:08

## 2022-08-25 LAB — BACTERIA UR CULT: ABNORMAL

## 2022-08-26 ENCOUNTER — PATIENT OUTREACH (OUTPATIENT)
Dept: EMERGENCY MEDICINE | Facility: HOSPITAL | Age: 42
End: 2022-08-26
Payer: MEDICARE

## 2022-08-26 NOTE — ED PROVIDER NOTES
"Encounter Date: 2022       History     Chief Complaint   Patient presents with    Hypotension     Reports hypotensive (89/60) at home after finishing peritoneal dialysis at 2230h. Was seen by [facility] and was supposed to have antibiotics and blood pressure patches, but won't be ready until tomorrow. Feeling "like I have an infection".      42-year-old female comes in with concerns that her blood pressure is low.  She reports that she had a blood pressure of 89/60 at home after she finished peritoneal dialysis.  This was just prior to arrival.  She has no other specific complaints but feels like she may have an infection of some sort.  Upon arrival patient is actually hypertensive.  No chest pain or shortness of breath or numbness or weakness or vision changes or speech difficulty.          Review of patient's allergies indicates:   Allergen Reactions    Tramadol Itching and Nausea And Vomiting     Past Medical History:   Diagnosis Date    Anemia     Anemia     Chronic constipation     CKD (chronic kidney disease)     Diabetes mellitus     Erosive gastritis 2016    GERD (gastroesophageal reflux disease)     Gout     High cholesterol     Hypertension     Knee pain      Past Surgical History:   Procedure Laterality Date    AV FISTULA PLACEMENT Left 2019    Procedure: creation av fistula;  Surgeon: Bcuk Fernandez MD;  Location: [facility];  Service: Cardiovascular;  Laterality: Left;  Left Radialcephalic: PACU     SECTION  , , ,     CHOLECYSTECTOMY      COLONOSCOPY N/A 10/23/2017    Procedure: COLONOSCOPY;  Surgeon: Bri Harry MD;  Location: [facility];  Service: Endoscopy;  Laterality: N/A;    TUBAL LIGATION      UPPER GASTROINTESTINAL ENDOSCOPY  2016    Erosive Gastritis-Dr Bailey     Family History   Problem Relation Age of Onset    Thyroid disease Mother     Diabetes Father     Ovarian cancer Maternal Aunt     Breast cancer Maternal Aunt  "     Social History     Tobacco Use    Smoking status: Never Smoker    Smokeless tobacco: Never Used   Substance Use Topics    Alcohol use: No     Alcohol/week: 0.0 standard drinks    Drug use: No     Review of Systems   Constitutional: Negative for activity change and appetite change.   HENT: Negative for congestion and voice change.    Eyes: Negative for pain and visual disturbance.   Respiratory: Negative for cough, shortness of breath and wheezing.    Cardiovascular: Negative for chest pain, palpitations and leg swelling.   Gastrointestinal: Negative for abdominal pain, blood in stool, diarrhea, nausea and vomiting.   Genitourinary: Negative for difficulty urinating, dysuria, hematuria and vaginal bleeding.   Musculoskeletal: Negative for arthralgias and myalgias.   Skin: Negative for color change and pallor.   Neurological: Negative for dizziness, facial asymmetry, speech difficulty and numbness.   Hematological: Negative for adenopathy. Does not bruise/bleed easily.   Psychiatric/Behavioral: Negative for agitation and confusion.   All other systems reviewed and are negative.      Physical Exam     Initial Vitals   BP Pulse Resp Temp SpO2   08/22/22 2324 08/22/22 2321 08/22/22 2324 08/22/22 2324 08/22/22 2324   (!) 151/96 99 18 98.6 °F (37 °C) 99 %      MAP       --                Physical Exam    Nursing note and vitals reviewed.  Constitutional: She appears well-developed and well-nourished. She is not diaphoretic. No distress.   HENT:   Head: Normocephalic and atraumatic.   Eyes: EOM are normal. Pupils are equal, round, and reactive to light.   Neck: Neck supple.   Normal range of motion.  Cardiovascular: Normal rate and regular rhythm.   Pulmonary/Chest: Breath sounds normal. She has no wheezes.   Abdominal: Abdomen is soft. Bowel sounds are normal. There is no abdominal tenderness.   Musculoskeletal:         General: No edema. Normal range of motion.      Cervical back: Normal range of motion and neck  supple.     Neurological: She is alert and oriented to person, place, and time. She has normal strength. No cranial nerve deficit or sensory deficit.   Skin: Skin is warm and dry.   Psychiatric: She has a normal mood and affect. Thought content normal.         ED Course   Procedures  Labs Reviewed   CULTURE, URINE - Abnormal; Notable for the following components:       Result Value    Urine Culture, Routine   (*)     Value: COAGULASE-NEGATIVE STAPHYLOCOCCUS SPECIES  10,000 - 49,999 cfu/ml  Susceptibility testing not routinely performed.      All other components within normal limits    Narrative:     Preferred Collection Type->Urine, Clean Catch  Specimen Source->Urine   URINALYSIS, REFLEX TO URINE CULTURE - Abnormal; Notable for the following components:    Appearance, UA Cloudy (*)     Protein, UA 3+ (*)     Glucose, UA 3+ (*)     Occult Blood UA 2+ (*)     Leukocytes, UA Trace (*)     All other components within normal limits    Narrative:     Preferred Collection Type->Urine, Clean Catch  Specimen Source->Urine   URINALYSIS MICROSCOPIC - Abnormal; Notable for the following components:    RBC, UA 48 (*)     WBC, UA 30 (*)     Bacteria Few (*)     All other components within normal limits    Narrative:     Preferred Collection Type->Urine, Clean Catch  Specimen Source->Urine          Imaging Results    None          Medications   cefTRIAXone (ROCEPHIN) 1 g/50 mL D5W IVPB (0 g Intravenous Stopped 8/23/22 0107)   labetalol 20 mg/4 mL (5 mg/mL) IV syring (10 mg Intravenous Given 8/23/22 0057)   labetalol 20 mg/4 mL (5 mg/mL) IV syring (10 mg Intravenous Given 8/23/22 0105)   hydrALAZINE injection 10 mg (10 mg Intravenous Given 8/23/22 0118)                Attending Attestation:             Attending ED Notes:   42-year-old female comes in with concerns that her blood pressure is low.  She reports that she had a blood pressure of 89/60 at home after she finished peritoneal dialysis.  This was just prior to arrival.   She has no other specific complaints but feels like she may have an infection of some sort.  Upon arrival patient is actually hypertensive.  No chest pain or shortness of breath or numbness or weakness or vision changes or speech difficulty.    Medications  cefTRIAXone (ROCEPHIN) 1 g/50 mL D5W IVPB (0 g Intravenous Stopped 8/23/22 0107)  labetalol 20 mg/4 mL (5 mg/mL) IV syring (10 mg Intravenous Given 8/23/22 0057)  labetalol 20 mg/4 mL (5 mg/mL) IV syring (10 mg Intravenous Given 8/23/22 0105)  hydrALAZINE injection 10 mg (10 mg Intravenous Given 8/23/22 0118)    Presentation is consistent with hypertension uncontrolled and UTI.  No evidence of end-organ damage.  Patient sent home with prescription for Keflex.  MAP appropriately decreased by 20% from the highest blood pressure refill she was in the emergency department.    Patient is non-toxic appearing, in no acute distress, and vital signs are stable and normal upon discharge. Upon completion of ED evaluation and management, with consideration of thorough differential diagnosis, the patient was found to have no acutely abnormal physical exam findings or other pathology requiring further emergent intervention or admission at this time. Patient/caregiver has no complaints upon discharge and verbalizes understanding and agreement with diagnosis and treatment plan. Discharge instructions with return precautions provided. Patient/caregiver verbalizes understanding to return to ED immediately for any new or worsening symptoms and to follow up with PCP/specialist recommended in 1-2 days.                          Clinical Impression:   Final diagnoses:  [N39.0] Urinary tract infection without hematuria, site unspecified (Primary)          ED Disposition Condition    Discharge Stable        ED Prescriptions     Medication Sig Dispense Start Date End Date Auth. Provider    cephALEXin (KEFLEX) 500 MG capsule Take 1 capsule (500 mg total) by mouth every 12 (twelve) hours.  for 5 days 10 capsule 8/23/2022 8/28/2022 Val Wallace MD        Follow-up Information     Follow up With Specialties Details Why Contact Info Additional Information    Harris Haley MD Family Medicine Schedule an appointment as soon as possible for a visit   [address]  [place] [address]  [phone]       [facility] Emergency Department Emergency Medicine Go to  If symptoms worsen, As needed [address] [place] [id number] [phone] Floor 1           Val Wallace MD  08/25/22 2248

## 2022-09-01 NOTE — PROGRESS NOTES
ED Navigator attempted to contact patient on 3 or more separate occasions, patient is unable to reach. ED Navigator to close encounter at this time.     Nora Tijerina  ED Navigator- [place]

## 2022-09-29 PROBLEM — H25.11 AGE-RELATED NUCLEAR CATARACT, RIGHT EYE: Status: ACTIVE | Noted: 2022-09-29

## 2022-09-29 PROBLEM — H25.011 CORTICAL AGE-RELATED CATARACT, RIGHT EYE: Status: ACTIVE | Noted: 2022-09-29

## 2022-09-29 NOTE — DISCHARGE INSTRUCTIONS
BEFORE THE PROCEDURE:    REPORT ANY CHANGE IN YOUR PHYSICAL CONDITION TO YOUR DOCTOR IMMEDIATELY.  SELF ISOLATE AND CHECK TEMPERATURE DAILY, IF TEMP OVER 100, CALL PHYSICIAN IMMEDIATELY.   NO SMOKING OR ALCOHOL FOR 72 HOURS BEFORE YOUR PROCEDURE.   DO NOT EAT OR DRINK ANYTHING AFTER MIDNIGHT THE NIGHT BEFORE YOUR PROCEDURE    THE MORNING OF YOUR PROCEDURE:      YOU  WILL GET A PHONE CALL THE DAY BEFORE YOUR PROCEDURE WITH YOUR APPOINTED TIME   NO MAKE UP OR NAIL POLISH.   ALL MINORS MUST BE ACCOMPANIED BY AN ADULT AT ALL TIMES.     TAKE A SHOWER/BATH THE NIGHT BEFORE YOUR PROCEDURE.     TAKE BLOOD PRESSURE MEDICATIONS THE MORNING OF YOUR PROCEDURE, WITH SMALL SIPS WATER, AS DIRECTED BY YOUR PHYSICIAN.   DO NOT TAKE ANY DIABETIC MEDICATIONS UNLESS DIRECTED TO DO SO BY YOUR PHYSICIAN.   CONTACT LENSES AND DENTURES MUST BE REMOVED.  A RESPONSIBLE ADULT MUST ACCOMPANY YOU HOME UPON DISCHARGE.   ONLY 1 VISITORS ALLOWED PER ROOM.   BRING YOUR EYE DROPS    YOUR THOUGHTS AND OPINIONS HELP US TO BETTER SERVE YOU.     PLEASE PARTICIPATE IN SURVEYS ABOUT YOUR CARE.     THANK YOU FOR CHOOSING [facility].

## 2022-09-30 VITALS — WEIGHT: 230 LBS | HEIGHT: 64 IN | BODY MASS INDEX: 39.27 KG/M2

## 2022-10-03 ENCOUNTER — HOSPITAL ENCOUNTER (OUTPATIENT)
Dept: PREADMISSION TESTING | Facility: HOSPITAL | Age: 42
Discharge: HOME | End: 2022-10-03
Attending: OPHTHALMOLOGY
Payer: MEDICARE

## 2022-10-12 ENCOUNTER — DOCUMENTATION ONLY (OUTPATIENT)
Dept: INTERVENTIONAL RADIOLOGY/VASCULAR | Facility: HOSPITAL | Age: 42
End: 2022-10-12
Payer: MEDICARE

## 2022-10-12 NOTE — PROGRESS NOTES
Attempted to reach patient for updated on plan of care, no numbers in patient chart work or belong to patient.

## 2022-10-19 ENCOUNTER — HOSPITAL ENCOUNTER (EMERGENCY)
Facility: HOSPITAL | Age: 42
Discharge: HOME | End: 2022-10-20
Attending: STUDENT IN AN ORGANIZED HEALTH CARE EDUCATION/TRAINING PROGRAM
Payer: MEDICARE

## 2022-10-19 DIAGNOSIS — R07.9 CHEST PAIN: ICD-10-CM

## 2022-10-19 DIAGNOSIS — R42 DIZZINESS: ICD-10-CM

## 2022-10-19 DIAGNOSIS — M79.631 PAIN OF RIGHT FOREARM: Primary | ICD-10-CM
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
## 2022-10-19 DIAGNOSIS — Z99.2 DIALYSIS PATIENT: ICD-10-CM

## 2022-10-19 LAB
ALBUMIN SERPL BCP-MCNC: 3.7 G/DL (ref 3.5–5.2)
ALP SERPL-CCNC: 88 U/L (ref 55–135)
ALT SERPL W/O P-5'-P-CCNC: 19 U/L (ref 10–44)
ANION GAP SERPL CALC-SCNC: 5 MMOL/L (ref 8–16)
APTT BLDCRRT: 26.6 SEC (ref 21–32)
AST SERPL-CCNC: 17 U/L (ref 10–40)
BASOPHILS # BLD AUTO: 0.07 K/UL (ref 0–0.2)
BASOPHILS NFR BLD: 0.8 % (ref 0–1.9)
BILIRUB SERPL-MCNC: 0.3 MG/DL (ref 0.1–1)
BUN SERPL-MCNC: 43 MG/DL (ref 6–20)
CALCIUM SERPL-MCNC: 8.9 MG/DL (ref 8.7–10.5)
CHLORIDE SERPL-SCNC: 105 MMOL/L (ref 95–110)
CO2 SERPL-SCNC: 29 MMOL/L (ref 23–29)
CREAT SERPL-MCNC: 6.9 MG/DL (ref 0.5–1.4)
DIFFERENTIAL METHOD: ABNORMAL
EOSINOPHIL # BLD AUTO: 0.4 K/UL (ref 0–0.5)
EOSINOPHIL NFR BLD: 4.3 % (ref 0–8)
ERYTHROCYTE [DISTWIDTH] IN BLOOD BY AUTOMATED COUNT: 13.2 % (ref 11.5–14.5)
EST. GFR  (NO RACE VARIABLE): 7.1 ML/MIN/1.73 M^2
GLUCOSE SERPL-MCNC: 140 MG/DL (ref 70–110)
HCT VFR BLD AUTO: 34.1 % (ref 37–48.5)
HGB BLD-MCNC: 11.7 G/DL (ref 12–16)
IMM GRANULOCYTES # BLD AUTO: 0.03 K/UL (ref 0–0.04)
IMM GRANULOCYTES NFR BLD AUTO: 0.4 % (ref 0–0.5)
INR PPP: 0.9 (ref 0.8–1.2)
LYMPHOCYTES # BLD AUTO: 1.9 K/UL (ref 1–4.8)
LYMPHOCYTES NFR BLD: 21.8 % (ref 18–48)
MAGNESIUM SERPL-MCNC: 1.9 MG/DL (ref 1.6–2.6)
MCH RBC QN AUTO: 30.2 PG (ref 27–31)
MCHC RBC AUTO-ENTMCNC: 34.3 G/DL (ref 32–36)
MCV RBC AUTO: 88 FL (ref 82–98)
MONOCYTES # BLD AUTO: 0.6 K/UL (ref 0.3–1)
MONOCYTES NFR BLD: 6.6 % (ref 4–15)
NEUTROPHILS # BLD AUTO: 5.6 K/UL (ref 1.8–7.7)
NEUTROPHILS NFR BLD: 66.1 % (ref 38–73)
NRBC BLD-RTO: 0 /100 WBC
PLATELET # BLD AUTO: 270 K/UL (ref 150–450)
PMV BLD AUTO: 12.6 FL (ref 9.2–12.9)
POTASSIUM SERPL-SCNC: 3 MMOL/L (ref 3.5–5.1)
PROT SERPL-MCNC: 8.2 G/DL (ref 6–8.4)
PROTHROMBIN TIME: 10.2 SEC (ref 9–12.5)
RBC # BLD AUTO: 3.87 M/UL (ref 4–5.4)
SODIUM SERPL-SCNC: 139 MMOL/L (ref 136–145)
TROPONIN I SERPL DL<=0.01 NG/ML-MCNC: 554.1 PG/ML (ref 0–60)
TROPONIN I SERPL DL<=0.01 NG/ML-MCNC: 628.4 PG/ML (ref 0–60)
WBC # BLD AUTO: 8.53 K/UL (ref 3.9–12.7)

## 2022-10-19 PROCEDURE — 93010 ELECTROCARDIOGRAM REPORT: CPT | Mod: ,,, | Performed by: INTERNAL MEDICINE

## 2022-10-19 PROCEDURE — 80053 COMPREHEN METABOLIC PANEL: CPT | Performed by: STUDENT IN AN ORGANIZED HEALTH CARE EDUCATION/TRAINING PROGRAM

## 2022-10-19 PROCEDURE — 96374 THER/PROPH/DIAG INJ IV PUSH: CPT

## 2022-10-19 PROCEDURE — 93005 ELECTROCARDIOGRAM TRACING: CPT

## 2022-10-19 PROCEDURE — 96375 TX/PRO/DX INJ NEW DRUG ADDON: CPT

## 2022-10-19 PROCEDURE — 85025 COMPLETE CBC W/AUTO DIFF WBC: CPT | Performed by: STUDENT IN AN ORGANIZED HEALTH CARE EDUCATION/TRAINING PROGRAM

## 2022-10-19 PROCEDURE — 93010 EKG 12-LEAD: ICD-10-PCS | Mod: 76,,, | Performed by: INTERNAL MEDICINE

## 2022-10-19 PROCEDURE — 85730 THROMBOPLASTIN TIME PARTIAL: CPT | Performed by: STUDENT IN AN ORGANIZED HEALTH CARE EDUCATION/TRAINING PROGRAM

## 2022-10-19 PROCEDURE — 83735 ASSAY OF MAGNESIUM: CPT | Performed by: STUDENT IN AN ORGANIZED HEALTH CARE EDUCATION/TRAINING PROGRAM

## 2022-10-19 PROCEDURE — 99285 EMERGENCY DEPT VISIT HI MDM: CPT | Mod: 25

## 2022-10-19 PROCEDURE — 84484 ASSAY OF TROPONIN QUANT: CPT | Mod: 91 | Performed by: STUDENT IN AN ORGANIZED HEALTH CARE EDUCATION/TRAINING PROGRAM

## 2022-10-19 PROCEDURE — 25000003 PHARM REV CODE 250: Performed by: STUDENT IN AN ORGANIZED HEALTH CARE EDUCATION/TRAINING PROGRAM

## 2022-10-19 PROCEDURE — 63600175 PHARM REV CODE 636 W HCPCS: Performed by: STUDENT IN AN ORGANIZED HEALTH CARE EDUCATION/TRAINING PROGRAM

## 2022-10-19 PROCEDURE — 85610 PROTHROMBIN TIME: CPT | Performed by: STUDENT IN AN ORGANIZED HEALTH CARE EDUCATION/TRAINING PROGRAM

## 2022-10-19 RX ORDER — ONDANSETRON 2 MG/ML
4 INJECTION INTRAMUSCULAR; INTRAVENOUS
Status: COMPLETED | OUTPATIENT
Start: 2022-10-19 | End: 2022-10-19

## 2022-10-19 RX ORDER — LABETALOL HCL 20 MG/4 ML
10 SYRINGE (ML) INTRAVENOUS
Status: COMPLETED | OUTPATIENT
Start: 2022-10-19 | End: 2022-10-19

## 2022-10-19 RX ORDER — ASPIRIN 325 MG
325 TABLET ORAL
Status: COMPLETED | OUTPATIENT
Start: 2022-10-19 | End: 2022-10-19

## 2022-10-19 RX ORDER — POTASSIUM CHLORIDE 20 MEQ/1
40 TABLET, EXTENDED RELEASE ORAL
Status: COMPLETED | OUTPATIENT
Start: 2022-10-19 | End: 2022-10-19

## 2022-10-19 RX ORDER — MORPHINE SULFATE 4 MG/ML
4 INJECTION, SOLUTION INTRAMUSCULAR; INTRAVENOUS
Status: COMPLETED | OUTPATIENT
Start: 2022-10-19 | End: 2022-10-19

## 2022-10-19 RX ORDER — ACETAMINOPHEN 325 MG/1
650 TABLET ORAL
Status: COMPLETED | OUTPATIENT
Start: 2022-10-19 | End: 2022-10-19

## 2022-10-19 RX ADMIN — LABETALOL HYDROCHLORIDE 10 MG: 5 INJECTION, SOLUTION INTRAVENOUS at 09:10

## 2022-10-19 RX ADMIN — MORPHINE SULFATE 4 MG: 4 INJECTION INTRAVENOUS at 09:10

## 2022-10-19 RX ADMIN — ASPIRIN 325 MG ORAL TABLET 325 MG: 325 PILL ORAL at 10:10

## 2022-10-19 RX ADMIN — ONDANSETRON HYDROCHLORIDE 4 MG: 2 SOLUTION INTRAMUSCULAR; INTRAVENOUS at 09:10

## 2022-10-19 RX ADMIN — ACETAMINOPHEN 650 MG: 325 TABLET ORAL at 09:10

## 2022-10-19 RX ADMIN — POTASSIUM CHLORIDE 40 MEQ: 1500 TABLET, EXTENDED RELEASE ORAL at 10:10

## 2022-10-19 NOTE — Clinical Note
"Pilar "Pilar" Rebecca was seen and treated in our emergency department on 10/19/2022.  She may return to work on 10/21/2022.       If you have any questions or concerns, please don't hesitate to call.      Stanley Avalos MD"

## 2022-10-20 VITALS
DIASTOLIC BLOOD PRESSURE: 58 MMHG | SYSTOLIC BLOOD PRESSURE: 122 MMHG | TEMPERATURE: 98 F | BODY MASS INDEX: 41.52 KG/M2 | HEIGHT: 64 IN | WEIGHT: 243.19 LBS | RESPIRATION RATE: 16 BRPM | OXYGEN SATURATION: 98 % | HEART RATE: 69 BPM

## 2022-10-20 RX ORDER — METHOCARBAMOL 500 MG/1
500 TABLET, FILM COATED ORAL 3 TIMES DAILY
Qty: 15 TABLET | Refills: 0 | Status: SHIPPED | OUTPATIENT
Start: 2022-10-20 | End: 2022-10-25

## 2022-10-20 NOTE — ED PROVIDER NOTES
Encounter Date: 10/19/2022       History     Chief Complaint   Patient presents with    Fall     Pt reports getting dizzy and falling from standing height prior to dialysis at home. Complains of lower right arm pain and chest wall pain. Pain 10.     42-year-old female with history of dialysis left fistula, hypertension, high cholesterol presents with right forearm pain and left chest wall pain.  Pain is sharp worse with palpation and certain movements.  Patient says that she fell because she felt dizzy prior to doing her dialysis.  Patient says that she has similar symptoms prior to her dialysis but improves after normally.  Denies any vomiting, diarrhea, shortness of breath, head trauma, blood thinner use, neck pain    Review of patient's allergies indicates:   Allergen Reactions    Tramadol Itching and Nausea And Vomiting     Past Medical History:   Diagnosis Date    Anemia     Anemia     Chronic constipation     CKD (chronic kidney disease)     Diabetes mellitus     Dialysis patient     MON, ISABELAE, THURS, SAT.    Encounter for blood transfusion     Erosive gastritis 2016    GERD (gastroesophageal reflux disease)     Gout     High cholesterol     Hypertension     Knee pain     Thyroid disease     Unspecified cataract 2022     Past Surgical History:   Procedure Laterality Date    AV FISTULA PLACEMENT Left 2019    Procedure: creation av fistula;  Surgeon: Buck Fernandez MD;  Location: [facility];  Service: Cardiovascular;  Laterality: Left;  Left Radialcephalic: PACU     SECTION  , , ,     CHOLECYSTECTOMY      COLONOSCOPY N/A 10/23/2017    Procedure: COLONOSCOPY;  Surgeon: Bri Harry MD;  Location: [facility];  Service: Endoscopy;  Laterality: N/A;    TUBAL LIGATION      UPPER GASTROINTESTINAL ENDOSCOPY  2016    Erosive Gastritis-Dr Bailey     Family History   Problem Relation Age of Onset    Thyroid disease Mother     Diabetes Father     Ovarian cancer Maternal Aunt      Breast cancer Maternal Aunt      Social History     Tobacco Use    Smoking status: Never    Smokeless tobacco: Never   Substance Use Topics    Alcohol use: No     Alcohol/week: 0.0 standard drinks    Drug use: No     Review of Systems   Constitutional: Negative.    HENT: Negative.     Respiratory: Negative.     Cardiovascular: Negative.    Gastrointestinal: Negative.    Genitourinary: Negative.    Musculoskeletal:  Positive for myalgias.   Skin: Negative.    Neurological:  Positive for dizziness and light-headedness.   Psychiatric/Behavioral: Negative.     All other systems reviewed and are negative.    Physical Exam     Initial Vitals [10/19/22 2020]   BP Pulse Resp Temp SpO2   (!) 192/107 83 16 98.4 °F (36.9 °C) 100 %      MAP       --         Physical Exam    Nursing note and vitals reviewed.  Constitutional: Vital signs are normal. She appears well-developed and well-nourished.   HENT:   Head: Normocephalic and atraumatic.   Eyes: Conjunctivae and lids are normal.   Neck: Trachea normal. Neck supple.   Cardiovascular:  Normal rate, regular rhythm, normal heart sounds, intact distal pulses and normal pulses.           No murmur heard.  Pulmonary/Chest: Breath sounds normal. No respiratory distress.   Abdominal: Abdomen is soft. Bowel sounds are normal.   Musculoskeletal:      Cervical back: Neck supple.      Comments: Tenderness to palpation of right forearm without any obvious deformity.  Compartments soft.  Tenderness to hand without any obvious deformity.  No snuffbox tenderness full range of motion of right wrist and elbow and digits     Neurological: She is alert and oriented to person, place, and time. She has normal strength. No cranial nerve deficit or sensory deficit.   Skin: Skin is warm. Capillary refill takes less than 2 seconds.   Psychiatric: She has a normal mood and affect. Her speech is normal. Thought content normal.       ED Course   Procedures  Labs Reviewed   CBC W/ AUTO DIFFERENTIAL -  Abnormal; Notable for the following components:       Result Value    RBC 3.87 (*)     Hemoglobin 11.7 (*)     Hematocrit 34.1 (*)     All other components within normal limits   COMPREHENSIVE METABOLIC PANEL - Abnormal; Notable for the following components:    Potassium 3.0 (*)     Glucose 140 (*)     BUN 43 (*)     Creatinine 6.9 (*)     Anion Gap 5 (*)     eGFR 7.1 (*)     All other components within normal limits   TROPONIN I HIGH SENSITIVITY - Abnormal; Notable for the following components:    Troponin I High Sensitivity 628.4 (*)     All other components within normal limits   TROPONIN I HIGH SENSITIVITY - Abnormal; Notable for the following components:    Troponin I High Sensitivity 554.1 (*)     All other components within normal limits   MAGNESIUM   PROTIME-INR   APTT     EKG Readings: (Independently Interpreted)   Sinus rhythm with PVCs.  No STEMI.  QTC 47     Imaging Results              X-Ray Hand 3 view Right (In process)  Result time 10/19/22 21:17:27                     X-Ray Forearm Right (In process)                      X-Ray Chest 1 View (In process)                      Medications   acetaminophen tablet 650 mg (650 mg Oral Given 10/19/22 2105)   labetalol 20 mg/4 mL (5 mg/mL) IV syring (10 mg Intravenous Given 10/19/22 2142)   morphine injection 4 mg (4 mg Intravenous Given 10/19/22 2142)   ondansetron injection 4 mg (4 mg Intravenous Given 10/19/22 2142)   aspirin tablet 325 mg (325 mg Oral Given 10/19/22 2213)   potassium chloride SA CR tablet 40 mEq (40 mEq Oral Given 10/19/22 2213)     Medical Decision Making:   Initial Assessment:   42-year-old female with history of dialysis left fistula, hypertension, high cholesterol presents with right forearm pain and left chest wall pain.  Pain is sharp worse with palpation and certain movements.  Hypertensive otherwise stable vitals.  Physical noted.  Will treat pain.  Will check for electrolyte derangement, arrhythmia, injury.  Treat pain.   Re-evaluate  Clinical Tests:   Lab Tests: Reviewed and Ordered  The following lab test(s) were unremarkable: CBC, CMP and Troponin  Radiological Study: Ordered and Reviewed  Medical Tests: Ordered and Reviewed           ED Course as of 10/20/22 0010   Wed Oct 19, 2022   2159 Lymph #: 1.9 [HD]   2239 Patient [HD]   Thu Oct 20, 2022   0007 Labs imaging noted.  Troponin improved.  Patient has history of end-stage renal disease most likely always elevated.  troponin more elevated earlier due to hypertension.  Patient says chest pain that she had is very similar to the chest pain that she normally has prior to dialysis.  Offered patient admission for observation but patient says that she would rather go home and has had follow-up with kidney doctor tomorrow and will call her cardiologist tomorrow also.  X-ray does not show any obvious fracture.  Will call patient if radiologist reads anything different. [HD]      ED Course User Index  [HD] Stanley Avalos MD                 Clinical Impression:   Final diagnoses:  [Z99.2] Dialysis patient  [W19.XXXA] Fall  [M79.631] Pain of right forearm (Primary)  [R42] Dizziness  [R07.9] Chest pain        ED Disposition Condition    Discharge Stable          ED Prescriptions       Medication Sig Dispense Start Date End Date Auth. Provider    methocarbamoL (ROBAXIN) 500 MG Tab Take 1 tablet (500 mg total) by mouth 3 (three) times daily. for 5 days 15 tablet 10/20/2022 10/25/2022 Stanley Avalos MD          Follow-up Information       Follow up With Specialties Details Why Contact Info    Vinh Starks MD Cardiology In 1 day  [address] [facility] [id number] [phone]               Stanley Avalos MD  10/20/22 0010

## 2022-10-21 NOTE — PROVIDER PROGRESS NOTES - EMERGENCY DEPT.
Encounter Date: 10/19/2022    ED Physician Progress Notes        Attempted to reach out to patient and close contact to update on x-ray and follow-up but was unable to reach patient.

## 2022-12-20 ENCOUNTER — DOCUMENTATION ONLY (OUTPATIENT)
Dept: HEMATOLOGY/ONCOLOGY | Facility: CLINIC | Age: 42
End: 2022-12-20
Payer: MEDICARE

## 2022-12-20 NOTE — PROGRESS NOTES
Spoke with Shayne at [facility].  I faxed an DEYSI to the number she gave me requesting slides of both sites.  She said she would get them to their  first thing in the morning.  I provided my direct contact information and encouraged her to call for any assistance.

## 2022-12-22 ENCOUNTER — DOCUMENTATION ONLY (OUTPATIENT)
Dept: HEMATOLOGY/ONCOLOGY | Facility: CLINIC | Age: 42
End: 2022-12-22
Payer: MEDICARE

## 2022-12-22 DIAGNOSIS — C50.919 BREAST CANCER: Primary | ICD-10-CM

## 2022-12-28 ENCOUNTER — LAB VISIT (OUTPATIENT)
Dept: LAB | Facility: HOSPITAL | Age: 42
End: 2022-12-28
Attending: SURGERY
Payer: MEDICARE

## 2022-12-28 DIAGNOSIS — C50.919 BREAST CANCER: ICD-10-CM

## 2022-12-28 PROCEDURE — 88321 CONSLTJ&REPRT SLD PREP ELSWR: CPT | Mod: ,,, | Performed by: PATHOLOGY

## 2022-12-28 PROCEDURE — 88321 CONSLTJ&REPRT SLD PREP ELSWR: CPT | Performed by: PATHOLOGY

## 2022-12-28 PROCEDURE — 88321 PR  MICROSLIDE CONSULT: ICD-10-PCS | Mod: ,,, | Performed by: PATHOLOGY

## 2022-12-30 PROBLEM — N63.0 MASS OF BREAST: Status: ACTIVE | Noted: 2022-12-30

## 2022-12-30 LAB
FINAL PATHOLOGIC DIAGNOSIS: NORMAL
Lab: NORMAL

## 2023-04-21 ENCOUNTER — HOSPITAL ENCOUNTER (EMERGENCY)
Facility: HOSPITAL | Age: 43
Discharge: HOME | End: 2023-04-21
Attending: EMERGENCY MEDICINE
Payer: MEDICARE

## 2023-04-21 VITALS
WEIGHT: 257 LBS | OXYGEN SATURATION: 94 % | HEART RATE: 78 BPM | SYSTOLIC BLOOD PRESSURE: 195 MMHG | DIASTOLIC BLOOD PRESSURE: 93 MMHG | HEIGHT: 64 IN | BODY MASS INDEX: 43.87 KG/M2 | RESPIRATION RATE: 20 BRPM | TEMPERATURE: 99 F

## 2023-04-21 DIAGNOSIS — K04.01 ACUTE PULPITIS: Primary | ICD-10-CM

## 2023-04-21 PROCEDURE — 99284 EMERGENCY DEPT VISIT MOD MDM: CPT

## 2023-04-21 PROCEDURE — 25000003 PHARM REV CODE 250

## 2023-04-21 RX ORDER — PENICILLIN V POTASSIUM 500 MG/1
500 TABLET, FILM COATED ORAL 4 TIMES DAILY
Qty: 40 TABLET | Refills: 0 | Status: SHIPPED | OUTPATIENT
Start: 2023-04-21 | End: 2023-05-01

## 2023-04-21 RX ORDER — HYDROCODONE BITARTRATE AND ACETAMINOPHEN 10; 325 MG/1; MG/1
1 TABLET ORAL
Status: COMPLETED | OUTPATIENT
Start: 2023-04-21 | End: 2023-04-21

## 2023-04-21 RX ORDER — LIDOCAINE HYDROCHLORIDE 20 MG/ML
SOLUTION OROPHARYNGEAL
Qty: 100 ML | Refills: 0 | Status: SHIPPED | OUTPATIENT
Start: 2023-04-21

## 2023-04-21 RX ADMIN — HYDROCODONE BITARTRATE AND ACETAMINOPHEN 1 TABLET: 10; 325 TABLET ORAL at 08:04

## 2023-04-22 NOTE — ED PROVIDER NOTES
Encounter Date: 2023       History     Chief Complaint   Patient presents with    Dental Problem     Pt stated that for the past 2 days she has been experiencing left lower dental pain with pain radiating to face/ear.      43-year-old female with history of diabetes, dialysis, GERD, gout, hypertension presents to ED with complaints of left-sided dental pain that radiates into her left ear.  Symptoms have been ongoing for 2 days.  She does report she has an appointment with a dentist in 3 days.  Denies any fevers.  She is a Tuesday, Thursday, Saturday dialysis patient.    The history is provided by the patient.   Review of patient's allergies indicates:   Allergen Reactions    Tramadol Itching and Nausea And Vomiting     Past Medical History:   Diagnosis Date    Anemia     Anemia     Breast mass     Chronic constipation     CKD (chronic kidney disease)     Diabetes mellitus     Dialysis patient     MON, TUE, THURS, SAT.    Encounter for blood transfusion     Erosive gastritis 2016    GERD (gastroesophageal reflux disease)     Gout     High cholesterol     Hypertension     Knee pain     Thyroid disease     Unspecified cataract 2022     Past Surgical History:   Procedure Laterality Date    AV FISTULA PLACEMENT Left 2019    Procedure: creation av fistula;  Surgeon: Buck Fernandez MD;  Location: [facility];  Service: Cardiovascular;  Laterality: Left;  Left Radialcephalic: PACU     SECTION  , , ,     CHOLECYSTECTOMY      COLONOSCOPY N/A 10/23/2017    Procedure: COLONOSCOPY;  Surgeon: Bri Harry MD;  [facility]: [facility];  Service: Endoscopy;  Laterality: N/A;    EXCISIONAL BIOPSY Left 2022    Procedure: EXCISIONAL BIOPSY;  Surgeon: Mick Juarez MD;  Location: [facility];  Service: General;  Laterality: Left;    TUBAL LIGATION      UPPER GASTROINTESTINAL ENDOSCOPY  2016    Erosive Gastritis-Dr Bailey     Family History   Problem Relation Age of Onset    Thyroid  disease Mother     Diabetes Father     Ovarian cancer Maternal Aunt     Breast cancer Maternal Aunt      Social History     Tobacco Use    Smoking status: Never    Smokeless tobacco: Never   Substance Use Topics    Alcohol use: No     Alcohol/week: 0.0 standard drinks    Drug use: No     Review of Systems   Constitutional:  Negative for fever.   HENT:  Positive for dental problem and ear pain. Negative for sore throat.    Eyes: Negative.    Respiratory:  Negative for shortness of breath.    Cardiovascular:  Negative for chest pain.   Gastrointestinal:  Negative for nausea.   Endocrine: Negative.    Genitourinary:  Negative for dysuria.   Musculoskeletal:  Negative for back pain.   Skin:  Negative for rash.   Allergic/Immunologic: Negative.    Neurological:  Negative for weakness.   Hematological:  Does not bruise/bleed easily.   Psychiatric/Behavioral: Negative.       Physical Exam     Initial Vitals   BP Pulse Resp Temp SpO2   04/21/23 1959 04/21/23 1958 04/21/23 1958 04/21/23 1958 04/21/23 1959   (!) 195/93 78 16 98.5 °F (36.9 °C) (!) 94 %      MAP       --                Physical Exam    Nursing note and vitals reviewed.  Constitutional: She appears well-developed and well-nourished.   HENT:   Head: Normocephalic and atraumatic.   Mouth/Throat: Mucous membranes are normal. No dental abscesses. Posterior oropharyngeal erythema present. No oropharyngeal exudate, posterior oropharyngeal edema or tonsillar abscesses.       Eyes: EOM are normal.   Neck: Neck supple.   Normal range of motion.  Cardiovascular:  Normal rate and regular rhythm.           Pulmonary/Chest: No respiratory distress.   Abdominal: She exhibits no distension.   Musculoskeletal:         General: Normal range of motion.      Cervical back: Normal range of motion and neck supple.     Neurological: She is alert and oriented to person, place, and time.   Skin: Skin is warm and dry.   Psychiatric: Thought content normal.       ED Course    Procedures  Labs Reviewed - No data to display       Imaging Results    None          Medications   HYDROcodone-acetaminophen  mg per tablet 1 tablet (1 tablet Oral Given 4/21/23 2025)     Medical Decision Making:   History:   Old Medical Records: I decided to obtain old medical records.  ED Management:  43-year-old female to ED for above complaints.  She was multiple fractured teeth noted in her left lower oral cavity.  There was some mild erythema noted in her mouth.  No trismus was noted.  No obvious drainable dental abscess was noted.  Due to her history I will cover her with penicillin VK.  She says she has follow up with dentist in 3 days.  She was instructed to follow up without fail.  She was stable for discharge.  She was given 1 [place] while in ED.  She was also sent home with a prescription for lidocaine topical gel.                        Clinical Impression:   Final diagnoses:  [K04.01] Acute pulpitis (Primary)        ED Disposition Condition    Discharge Stable          ED Prescriptions       Medication Sig Dispense Start Date End Date Auth. Provider    penicillin v potassium (VEETID) 500 MG tablet Take 1 tablet (500 mg total) by mouth 4 (four) times daily. for 10 days 40 tablet 4/21/2023 5/1/2023 Geremias Stover NP    LIDOcaine HCl 2% (LIDOCAINE VISCOUS) 2 % Soln by Mucous Membrane route every 3 (three) hours. Apply with a piece of gauze or cotton ball between cheek and gums. 100 mL 4/21/2023 -- Geremias Stover NP          Follow-up Information       Follow up With Specialties Details Why Contact Info    Your Dentist  On 4/24/2023               Geremias Stover NP  04/21/23 2038

## 2023-04-22 NOTE — DISCHARGE INSTRUCTIONS
Follow up your dentist as scheduled.  Take the antibiotics 4 times a day until completed.  You can apply the lidocaine every 3 hours as needed for pain.  You can also try using clove oil.

## 2023-04-26 ENCOUNTER — PATIENT OUTREACH (OUTPATIENT)
Dept: EMERGENCY MEDICINE | Facility: HOSPITAL | Age: 43
End: 2023-04-26
Payer: MEDICAID

## 2023-04-28 NOTE — PROGRESS NOTES
ED navigator will close encounter.    Nora Sandoval  ED Navigator- [place]  [phone]

## 2023-06-07 ENCOUNTER — HOSPITAL ENCOUNTER (EMERGENCY)
Facility: HOSPITAL | Age: 43
Discharge: HOME | End: 2023-06-07
Attending: EMERGENCY MEDICINE
Payer: MEDICARE

## 2023-06-07 VITALS
HEART RATE: 82 BPM | WEIGHT: 232 LBS | TEMPERATURE: 99 F | SYSTOLIC BLOOD PRESSURE: 177 MMHG | OXYGEN SATURATION: 97 % | HEIGHT: 64 IN | RESPIRATION RATE: 18 BRPM | DIASTOLIC BLOOD PRESSURE: 73 MMHG | BODY MASS INDEX: 39.61 KG/M2

## 2023-06-07 DIAGNOSIS — M79.604 RIGHT LEG PAIN: Primary | ICD-10-CM

## 2023-06-07 PROCEDURE — 63600175 PHARM REV CODE 636 W HCPCS

## 2023-06-07 PROCEDURE — 99284 EMERGENCY DEPT VISIT MOD MDM: CPT

## 2023-06-07 PROCEDURE — 25000003 PHARM REV CODE 250

## 2023-06-07 PROCEDURE — 96372 THER/PROPH/DIAG INJ SC/IM: CPT

## 2023-06-07 RX ORDER — TIZANIDINE 4 MG/1
4 TABLET ORAL EVERY 6 HOURS PRN
Qty: 20 TABLET | Refills: 0 | Status: SHIPPED | OUTPATIENT
Start: 2023-06-07 | End: 2023-06-12

## 2023-06-07 RX ORDER — HYDROCODONE BITARTRATE AND ACETAMINOPHEN 10; 325 MG/1; MG/1
1 TABLET ORAL
Status: COMPLETED | OUTPATIENT
Start: 2023-06-07 | End: 2023-06-07

## 2023-06-07 RX ORDER — ORPHENADRINE CITRATE 30 MG/ML
60 INJECTION INTRAMUSCULAR; INTRAVENOUS
Status: COMPLETED | OUTPATIENT
Start: 2023-06-07 | End: 2023-06-07

## 2023-06-07 RX ADMIN — HYDROCODONE BITARTRATE AND ACETAMINOPHEN 1 TABLET: 10; 325 TABLET ORAL at 02:06

## 2023-06-07 RX ADMIN — ORPHENADRINE CITRATE 60 MG: 30 INJECTION INTRAMUSCULAR; INTRAVENOUS at 02:06

## 2023-06-07 NOTE — ED PROVIDER NOTES
Encounter Date: 2023       History     Chief Complaint   Patient presents with    Leg Pain     Pt stated that for the past 3 days she has been experiencing right thigh pain - no injury/fall.      43-year-old female with history diabetes, end-stage renal disease, GERD, gout, hypertension presents to ED with complaints of right upper leg pain x1 week.  Denies any trauma or injury.  Reports pain is similar to when she pulled a muscle on her left side.  She attempted Flexeril without any improvement in symptoms.  Also reports Tylenol is not effective.  She did see her nephrologist today was instructed to increase the amount of fluid that she removes through home dialysis.  Labs were drawn by nephrologist today.    The history is provided by the patient.   Review of patient's allergies indicates:   Allergen Reactions    Tramadol Itching and Nausea And Vomiting     Past Medical History:   Diagnosis Date    Anemia     Anemia     Breast mass     Chronic constipation     CKD (chronic kidney disease)     Diabetes mellitus     Dialysis patient     MON, TUE, THURS, SAT.    Encounter for blood transfusion     Erosive gastritis 2016    GERD (gastroesophageal reflux disease)     Gout     High cholesterol     Hypertension     Knee pain     Thyroid disease     Unspecified cataract 2022     Past Surgical History:   Procedure Laterality Date    AV FISTULA PLACEMENT Left 2019    Procedure: creation av fistula;  Surgeon: Buck Fernandez MD;  Location: [facility];  Service: Cardiovascular;  Laterality: Left;  Left Radialcephalic: PACU     SECTION  , , ,     CHOLECYSTECTOMY      COLONOSCOPY N/A 10/23/2017    Procedure: COLONOSCOPY;  Surgeon: Bri Harry MD;  Location: [facility];  Service: Endoscopy;  Laterality: N/A;    EXCISIONAL BIOPSY Left 2022    Procedure: EXCISIONAL BIOPSY;  Surgeon: Mick Juarez MD;  Location: [facility];  Service: General;  Laterality: Left;    TUBAL LIGATION       UPPER GASTROINTESTINAL ENDOSCOPY  2016    Erosive Gastritis-Dr Bailey     Family History   Problem Relation Age of Onset    Thyroid disease Mother     Diabetes Father     Ovarian cancer Maternal Aunt     Breast cancer Maternal Aunt      Social History     Tobacco Use    Smoking status: Never    Smokeless tobacco: Never   Substance Use Topics    Alcohol use: No     Alcohol/week: 0.0 standard drinks    Drug use: No     Review of Systems   Constitutional:  Negative for fever.   HENT:  Negative for sore throat.    Eyes: Negative.    Respiratory:  Negative for shortness of breath.    Cardiovascular:  Positive for leg swelling. Negative for chest pain.   Gastrointestinal:  Negative for nausea.   Endocrine: Negative.    Genitourinary:  Negative for dysuria.   Musculoskeletal:  Positive for myalgias (Right upper leg). Negative for back pain.   Skin:  Negative for rash.   Allergic/Immunologic: Negative.    Neurological:  Negative for weakness.   Hematological:  Does not bruise/bleed easily.   Psychiatric/Behavioral: Negative.       Physical Exam     Initial Vitals   BP Pulse Resp Temp SpO2   23 1416 23 1415 23 1415 23 1415 23 1416   (!) 177/73 82 16 98.5 °F (36.9 °C) 97 %      MAP       --                Physical Exam    Nursing note and vitals reviewed.  Constitutional: She appears well-developed and well-nourished.   HENT:   Head: Normocephalic and atraumatic.   Eyes: EOM are normal.   Neck: Neck supple.   Normal range of motion.  Cardiovascular:  Normal rate and regular rhythm.           Pulmonary/Chest: No respiratory distress.   Abdominal: She exhibits no distension.   Musculoskeletal:         General: Edema present. Normal range of motion.      Cervical back: Normal range of motion and neck supple.      Right upper leg: No swelling, edema, deformity or tenderness.      Left upper leg: No swelling or tenderness.      Right lower le+ Pitting Edema present.      Left lower leg:  2+ Pitting Edema present.     Neurological: She is alert and oriented to person, place, and time.   Skin: Skin is warm and dry.   Psychiatric: Thought content normal.       ED Course   Procedures  Labs Reviewed - No data to display       Imaging Results    None          Medications   orphenadrine injection 60 mg (60 mg Intramuscular Given 6/7/23 0371)   HYDROcodone-acetaminophen  mg per tablet 1 tablet (1 tablet Oral Given 6/7/23 4228)     Medical Decision Making:   History:   Old Medical Records: I decided to obtain old medical records.  ED Management:  43-year-old female to ED for above complaints.  She was complaining of right upper leg pain without known injury or trauma.  Patient reported that her pain was similar to muscular pain she has had previously.  She does have 2+ pitting edema bilaterally in her lower extremities.  There was no obvious swelling or deformity noted in upper right leg.  No tenderness noted on exam.  Patient was ambulatory without assistance.  She saw Nephrology today and was instructed to increase her dialysis frequency to 5 days a week.  Labs were drawn by nephrologist today.  He was instructed to keep and close contact with Nephrology concerning labs as lower extremity edema.  Treated her muscular pain with Norflex and [place] while in ED.  She was sent home with a prescription for Zanaflex.  She was to follow up with primary care if symptoms do not improve.                        Clinical Impression:   Final diagnoses:  [M79.604] Right leg pain (Primary)        ED Disposition Condition    Discharge Stable          ED Prescriptions       Medication Sig Dispense Start Date End Date Auth. Provider    tiZANidine (ZANAFLEX) 4 MG tablet Take 1 tablet (4 mg total) by mouth every 6 (six) hours as needed (pain). 20 tablet 6/7/2023 6/12/2023 Geremias Stover NP          Follow-up Information       Follow up With Specialties Details Why Contact Info    Carlitos Ibrahim MD Internal  Medicine In 2 days  [address] [facility] [id number]  [phone]               Geremias Stover NP  06/07/23 1391

## 2023-06-07 NOTE — DISCHARGE INSTRUCTIONS
You can take the Zanaflex every 6 hours as needed for pain.  Follow up with your primary care provider if symptoms do not improve.  Make sure you are keeping in close contact with your nephrologist to monitor the amount of fluid your pulling off with dialysis.  Return to ED with extreme muscle cramping.

## 2023-11-26 ENCOUNTER — HOSPITAL ENCOUNTER (EMERGENCY)
Facility: HOSPITAL | Age: 43
Discharge: HOME | End: 2023-11-26
Attending: EMERGENCY MEDICINE
Payer: MEDICARE

## 2023-11-26 VITALS
OXYGEN SATURATION: 100 % | RESPIRATION RATE: 16 BRPM | BODY MASS INDEX: 34.15 KG/M2 | DIASTOLIC BLOOD PRESSURE: 93 MMHG | TEMPERATURE: 99 F | WEIGHT: 200 LBS | HEIGHT: 64 IN | SYSTOLIC BLOOD PRESSURE: 183 MMHG | HEART RATE: 68 BPM

## 2023-11-26 DIAGNOSIS — N18.6 ESRD ON HEMODIALYSIS: ICD-10-CM

## 2023-11-26 DIAGNOSIS — E87.6 HYPOKALEMIA: ICD-10-CM

## 2023-11-26 DIAGNOSIS — K86.2 PANCREATIC CYST: ICD-10-CM

## 2023-11-26 DIAGNOSIS — K85.90 ACUTE PANCREATITIS, UNSPECIFIED COMPLICATION STATUS, UNSPECIFIED PANCREATITIS TYPE: Primary | ICD-10-CM

## 2023-11-26 DIAGNOSIS — Z99.2 ESRD ON HEMODIALYSIS: ICD-10-CM

## 2023-11-26 DIAGNOSIS — R10.13 EPIGASTRIC ABDOMINAL PAIN: ICD-10-CM

## 2023-11-26 LAB
ALBUMIN SERPL BCP-MCNC: 3.4 G/DL (ref 3.5–5.2)
ALP SERPL-CCNC: 153 U/L (ref 55–135)
ALT SERPL W/O P-5'-P-CCNC: 40 U/L (ref 10–44)
ANION GAP SERPL CALC-SCNC: 7 MMOL/L (ref 3–11)
AST SERPL-CCNC: 38 U/L (ref 10–40)
BACTERIA #/AREA URNS HPF: ABNORMAL /HPF
BASOPHILS # BLD AUTO: 0.05 K/UL (ref 0–0.2)
BASOPHILS NFR BLD: 0.7 % (ref 0–1.9)
BILIRUB SERPL-MCNC: 0.6 MG/DL (ref 0.1–1)
BILIRUB UR QL STRIP: NEGATIVE
BUN SERPL-MCNC: 23 MG/DL (ref 6–20)
CALCIUM SERPL-MCNC: 9.2 MG/DL (ref 8.7–10.5)
CHLORIDE SERPL-SCNC: 94 MMOL/L (ref 95–110)
CLARITY UR: ABNORMAL
CO2 SERPL-SCNC: 31 MMOL/L (ref 23–29)
COLOR UR: YELLOW
CREAT SERPL-MCNC: 5.7 MG/DL (ref 0.5–1.4)
DIFFERENTIAL METHOD: ABNORMAL
EOSINOPHIL # BLD AUTO: 0.1 K/UL (ref 0–0.5)
EOSINOPHIL NFR BLD: 1.9 % (ref 0–8)
ERYTHROCYTE [DISTWIDTH] IN BLOOD BY AUTOMATED COUNT: 18 % (ref 11.5–14.5)
EST. GFR  (NO RACE VARIABLE): 8.8 ML/MIN/1.73 M^2
GLUCOSE SERPL-MCNC: 194 MG/DL (ref 70–110)
GLUCOSE UR QL STRIP: NEGATIVE
GRAN CASTS #/AREA URNS LPF: 1 /LPF
HCT VFR BLD AUTO: 32.9 % (ref 37–48.5)
HGB BLD-MCNC: 10.8 G/DL (ref 12–16)
HGB UR QL STRIP: ABNORMAL
HYALINE CASTS #/AREA URNS LPF: 21.2 /LPF
IMM GRANULOCYTES # BLD AUTO: 0.3 K/UL (ref 0–0.04)
IMM GRANULOCYTES NFR BLD AUTO: 4 % (ref 0–0.5)
KETONES UR QL STRIP: ABNORMAL
LEUKOCYTE ESTERASE UR QL STRIP: ABNORMAL
LIPASE SERPL-CCNC: 705 U/L (ref 13–75)
LYMPHOCYTES # BLD AUTO: 1.2 K/UL (ref 1–4.8)
LYMPHOCYTES NFR BLD: 15.9 % (ref 18–48)
MCH RBC QN AUTO: 28.7 PG (ref 27–31)
MCHC RBC AUTO-ENTMCNC: 32.8 G/DL (ref 32–36)
MCV RBC AUTO: 88 FL (ref 82–98)
MICROSCOPIC COMMENT: ABNORMAL
MONOCYTES # BLD AUTO: 0.9 K/UL (ref 0.3–1)
MONOCYTES NFR BLD: 11.7 % (ref 4–15)
NEUTROPHILS # BLD AUTO: 5 K/UL (ref 1.8–7.7)
NEUTROPHILS NFR BLD: 65.8 % (ref 38–73)
NITRITE UR QL STRIP: NEGATIVE
NRBC BLD-RTO: 1 /100 WBC
PH UR STRIP: 6 [PH] (ref 5–8)
PLATELET # BLD AUTO: 214 K/UL (ref 150–450)
PMV BLD AUTO: 11.5 FL (ref 9.2–12.9)
POTASSIUM SERPL-SCNC: 2.6 MMOL/L (ref 3.5–5.1)
PROT SERPL-MCNC: 7.7 G/DL (ref 6–8.4)
PROT UR QL STRIP: ABNORMAL
RBC # BLD AUTO: 3.76 M/UL (ref 4–5.4)
RBC #/AREA URNS HPF: 5 /HPF (ref 0–4)
SODIUM SERPL-SCNC: 132 MMOL/L (ref 136–145)
SP GR UR STRIP: 1.02 (ref 1–1.03)
SQUAMOUS #/AREA URNS HPF: 20 /HPF
URN SPEC COLLECT METH UR: ABNORMAL
UROBILINOGEN UR STRIP-ACNC: NEGATIVE EU/DL
WBC # BLD AUTO: 7.54 K/UL (ref 3.9–12.7)
WBC #/AREA URNS HPF: 15 /HPF (ref 0–5)
YEAST URNS QL MICRO: ABNORMAL

## 2023-11-26 PROCEDURE — 81000 URINALYSIS NONAUTO W/SCOPE: CPT | Performed by: EMERGENCY MEDICINE

## 2023-11-26 PROCEDURE — 25000003 PHARM REV CODE 250: Performed by: EMERGENCY MEDICINE

## 2023-11-26 PROCEDURE — 80053 COMPREHEN METABOLIC PANEL: CPT | Performed by: EMERGENCY MEDICINE

## 2023-11-26 PROCEDURE — 99285 EMERGENCY DEPT VISIT HI MDM: CPT | Mod: 25

## 2023-11-26 PROCEDURE — 25500020 PHARM REV CODE 255: Performed by: EMERGENCY MEDICINE

## 2023-11-26 PROCEDURE — 87086 URINE CULTURE/COLONY COUNT: CPT | Performed by: EMERGENCY MEDICINE

## 2023-11-26 PROCEDURE — 83690 ASSAY OF LIPASE: CPT | Performed by: EMERGENCY MEDICINE

## 2023-11-26 PROCEDURE — 36415 COLL VENOUS BLD VENIPUNCTURE: CPT | Performed by: EMERGENCY MEDICINE

## 2023-11-26 PROCEDURE — 85025 COMPLETE CBC W/AUTO DIFF WBC: CPT | Performed by: EMERGENCY MEDICINE

## 2023-11-26 RX ORDER — POTASSIUM CHLORIDE 20 MEQ/1
20 TABLET, EXTENDED RELEASE ORAL 2 TIMES DAILY
Qty: 6 TABLET | Refills: 0 | Status: SHIPPED | OUTPATIENT
Start: 2023-11-26 | End: 2023-11-29

## 2023-11-26 RX ORDER — ONDANSETRON 4 MG/1
4 TABLET, ORALLY DISINTEGRATING ORAL EVERY 8 HOURS PRN
Qty: 12 TABLET | Refills: 1 | Status: SHIPPED | OUTPATIENT
Start: 2023-11-26

## 2023-11-26 RX ORDER — HYDROCODONE BITARTRATE AND ACETAMINOPHEN 10; 325 MG/1; MG/1
1 TABLET ORAL EVERY 6 HOURS PRN
Qty: 12 TABLET | Refills: 0 | Status: SHIPPED | OUTPATIENT
Start: 2023-11-26

## 2023-11-26 RX ORDER — POTASSIUM CHLORIDE 20 MEQ/1
40 TABLET, EXTENDED RELEASE ORAL
Status: COMPLETED | OUTPATIENT
Start: 2023-11-26 | End: 2023-11-26

## 2023-11-26 RX ORDER — MAG HYDROX/ALUMINUM HYD/SIMETH 200-200-20
30 SUSPENSION, ORAL (FINAL DOSE FORM) ORAL ONCE
Status: COMPLETED | OUTPATIENT
Start: 2023-11-26 | End: 2023-11-26

## 2023-11-26 RX ORDER — ONDANSETRON 4 MG/1
4 TABLET, ORALLY DISINTEGRATING ORAL
Status: COMPLETED | OUTPATIENT
Start: 2023-11-26 | End: 2023-11-26

## 2023-11-26 RX ORDER — LIDOCAINE HYDROCHLORIDE 20 MG/ML
15 SOLUTION OROPHARYNGEAL ONCE
Status: DISCONTINUED | OUTPATIENT
Start: 2023-11-26 | End: 2023-11-26

## 2023-11-26 RX ORDER — HYDROCODONE BITARTRATE AND ACETAMINOPHEN 5; 325 MG/1; MG/1
2 TABLET ORAL
Status: COMPLETED | OUTPATIENT
Start: 2023-11-26 | End: 2023-11-26

## 2023-11-26 RX ADMIN — HYDROCODONE BITARTRATE AND ACETAMINOPHEN 2 TABLET: 5; 325 TABLET ORAL at 07:11

## 2023-11-26 RX ADMIN — ONDANSETRON 4 MG: 4 TABLET, ORALLY DISINTEGRATING ORAL at 08:11

## 2023-11-26 RX ADMIN — IOHEXOL 100 ML: 350 INJECTION, SOLUTION INTRAVENOUS at 08:11

## 2023-11-26 RX ADMIN — ALUMINUM HYDROXIDE, MAGNESIUM HYDROXIDE, AND SIMETHICONE 30 ML: 200; 200; 20 SUSPENSION ORAL at 07:11

## 2023-11-26 RX ADMIN — CLONIDINE HYDROCHLORIDE 0.3 MG: 0.2 TABLET ORAL at 07:11

## 2023-11-26 RX ADMIN — POTASSIUM CHLORIDE 40 MEQ: 1500 TABLET, EXTENDED RELEASE ORAL at 09:11

## 2023-11-26 NOTE — DISCHARGE INSTRUCTIONS
As discussed, you have a mild case of pancreatitis, the reason for this is not certain.      You also have mildly low potassium, and there is a cyst on your pancreas that has been present for several years and has gotten slightly larger.      Take the potassium as prescribed, use the other medications as labeled as needed for symptoms, clear liquids for 2 days, normal home hemodialysis including today, and see your primary care for a recheck in the next 1-3 days.      Return here at any time if worse.

## 2023-11-26 NOTE — ED PROVIDER NOTES
"Encounter Date: 11/26/2023       History     Chief Complaint   Patient presents with    Abdominal Pain     Patient reports abdominal pain since Friday. Went to [facility] at this time - had a CT scan performed (normal results per patient). Had vomiting Thursday.   "I just need something for the pain."      She reports ongoing upper epigastric abdominal discomfort for a few days, went to another facility 2 days ago and by report had laboratory studies, CT scan, morphine, and Lortab.  Workup was reported negative and she was felt to have gastritis or similar by report, documentation is not available.  She has had similar episodes in the past.  Denies being on chronic pain medication.  Other relevant past history includes cholecystectomy, tubal ligation, upper endoscopy, and other relevant past history includes hypertension, diabetes, chronic renal insufficiency, chronic constipation, erosive gastritis, and dialysis.  She is on chronic oxygen by nasal cannula, no dyspnea.  She dates the onset of symptoms 2 shortly after receiving a flu vaccine about 2 weeks ago.  No other specific complaints.  She performs home hemodialysis 4 days a week, varies the specific days somewhat, is planning to dialyzed tonight.  She does recall that she has been diagnosed with pancreatitis in the past once. She does not drink alcohol.    The history is provided by the patient. No  was used.     Review of patient's allergies indicates:   Allergen Reactions    Tramadol Itching and Nausea And Vomiting     Past Medical History:   Diagnosis Date    Anemia     Anemia     Breast mass     Chronic constipation     CKD (chronic kidney disease)     Diabetes mellitus     Dialysis patient     MON, TUE, THURS, SAT.    Encounter for blood transfusion     Erosive gastritis 03/2016    GERD (gastroesophageal reflux disease)     Gout     High cholesterol     Hypertension     Knee pain     Thyroid disease     Unspecified cataract 09/2022 "     Past Surgical History:   Procedure Laterality Date    AV FISTULA PLACEMENT Left 2019    Procedure: creation av fistula;  Surgeon: Buck Fernandez MD;  Location: [facility];  Service: Cardiovascular;  Laterality: Left;  Left Radialcephalic: PACU     SECTION  , , ,     CHOLECYSTECTOMY      COLONOSCOPY N/A 10/23/2017    Procedure: COLONOSCOPY;  Surgeon: Bri Harry MD;  Location: [facility];  Service: Endoscopy;  Laterality: N/A;    EXCISIONAL BIOPSY Left 2022    Procedure: EXCISIONAL BIOPSY;  Surgeon: Mick Juarez MD;  Location: [facility];  Service: General;  Laterality: Left;    TUBAL LIGATION      UPPER GASTROINTESTINAL ENDOSCOPY  2016    Erosive Gastritis-Dr Bailey     Family History   Problem Relation Age of Onset    Thyroid disease Mother     Diabetes Father     Ovarian cancer Maternal Aunt     Breast cancer Maternal Aunt      Social History     Tobacco Use    Smoking status: Never    Smokeless tobacco: Never   Substance Use Topics    Alcohol use: No     Alcohol/week: 0.0 standard drinks of alcohol    Drug use: No     Review of Systems   Constitutional:  Negative for activity change, fatigue and fever.   HENT:  Negative for congestion, ear pain, facial swelling, nosebleeds, sinus pressure and sore throat.    Eyes:  Negative for pain, discharge, redness and visual disturbance.   Respiratory:  Negative for cough, choking, chest tightness, shortness of breath and wheezing.    Cardiovascular:  Negative for chest pain, palpitations and leg swelling.   Gastrointestinal:  Positive for abdominal pain, nausea and vomiting. Negative for abdominal distention.   Endocrine: Negative for heat intolerance, polydipsia and polyuria.   Genitourinary:  Negative for difficulty urinating, dysuria, flank pain, hematuria and urgency.   Musculoskeletal:  Negative for back pain, gait problem, joint swelling and myalgias.   Skin:  Negative for color change and rash.    Allergic/Immunologic: Negative for environmental allergies and food allergies.   Neurological:  Negative for dizziness, weakness, numbness and headaches.   Hematological:  Negative for adenopathy. Does not bruise/bleed easily.   Psychiatric/Behavioral:  Negative for agitation and behavioral problems. The patient is not nervous/anxious.    All other systems reviewed and are negative.      Physical Exam     Initial Vitals [11/26/23 0735]   BP Pulse Resp Temp SpO2   (!) 214/87 68 18 98.5 °F (36.9 °C) 100 %      MAP       --         Physical Exam    Nursing note and vitals reviewed.  Constitutional: She appears well-developed and well-nourished. She is not diaphoretic. No distress.   Mildly obese/ mildly uncomfortable   HENT:   Head: Normocephalic and atraumatic.   Mouth/Throat: No oropharyngeal exudate.   Eyes: Conjunctivae and EOM are normal. Pupils are equal, round, and reactive to light. Right eye exhibits no discharge. Left eye exhibits no discharge. No scleral icterus.   Neck: Neck supple. No thyromegaly present. No tracheal deviation present. No JVD present.   Normal range of motion.  Cardiovascular:  Normal rate, regular rhythm, normal heart sounds and intact distal pulses.     Exam reveals no gallop and no friction rub.       No murmur heard.  Pulmonary/Chest: Breath sounds normal. No stridor. No respiratory distress. She has no wheezes. She has no rhonchi. She has no rales. She exhibits no tenderness.   Abdominal: Abdomen is soft. Bowel sounds are normal. She exhibits no distension and no mass. There is abdominal tenderness.   Minimal upper epigastric tendrness There is no rebound and no guarding.   Musculoskeletal:         General: No tenderness or edema. Normal range of motion.      Cervical back: Normal range of motion and neck supple.     Neurological: She is alert and oriented to person, place, and time. She has normal strength.   Skin: Skin is warm and dry. No rash and no abscess noted. No erythema.    Psychiatric: She has a normal mood and affect. Her behavior is normal. Judgment and thought content normal.         ED Course   Procedures  Labs Reviewed   CBC W/ AUTO DIFFERENTIAL - Abnormal; Notable for the following components:       Result Value    RBC 3.76 (*)     Hemoglobin 10.8 (*)     Hematocrit 32.9 (*)     RDW 18.0 (*)     Immature Granulocytes 4.0 (*)     Immature Grans (Abs) 0.30 (*)     nRBC 1 (*)     Lymph % 15.9 (*)     All other components within normal limits   COMPREHENSIVE METABOLIC PANEL - Abnormal; Notable for the following components:    Sodium 132 (*)     Potassium 2.6 (*)     Chloride 94 (*)     CO2 31 (*)     Glucose 194 (*)     BUN 23 (*)     Creatinine 5.7 (*)     Albumin 3.4 (*)     Alkaline Phosphatase 153 (*)     eGFR 8.8 (*)     All other components within normal limits    Narrative:     Potassium critical result(s) called and verbal readback obtained from   Kristen Hsu RN  by [facility] 11/26/2023 08:20   LIPASE - Abnormal; Notable for the following components:    Lipase 705 (*)     All other components within normal limits   URINALYSIS, REFLEX TO URINE CULTURE - Abnormal; Notable for the following components:    Appearance, UA Cloudy (*)     Protein, UA 3+ (*)     Ketones, UA Trace (*)     Occult Blood UA 1+ (*)     Leukocytes, UA Trace (*)     All other components within normal limits    Narrative:     Preferred Collection Type->Urine, Clean Catch  Specimen Source->Urine   URINALYSIS MICROSCOPIC - Abnormal; Notable for the following components:    RBC, UA 5 (*)     WBC, UA 15 (*)     Yeast, UA Many (*)     Hyaline Casts, UA 21.2 (*)     Granular Casts, UA 1 (*)     All other components within normal limits    Narrative:     Preferred Collection Type->Urine, Clean Catch  Specimen Source->Urine   CULTURE, URINE          Imaging Results              CT Abdomen Pelvis With IV Contrast NO Oral Contrast (Final result)  Result time 11/26/23 09:32:50      Final result by Adriel Foster MD  (11/26/23 09:32:50)                   Impression:      1. Moderate pericardial and left pleural fluid collections with adjacent left lower lobe airspace disease.  2. Mild ascites.  3. Moderate anasarca.  4. 2.6 cm cystic area of the uncinate process of the pancreas, present dating back to 10/30/2018, but enlarged since that time.      Electronically signed by: Adriel Foster MD  Date:    11/26/2023  Time:    09:32               Narrative:    EXAMINATION:  CT ABDOMEN PELVIS WITH IV CONTRAST    CLINICAL HISTORY:  Pancreatitis, acute, severe;    TECHNIQUE:  Iterative reconstruction technique was used.    CT/cardiac nuclear exam/s in prior 12 months:  0.    COMPARISON:  CT abdomen and pelvis 08/12/2022, 10/30/2018.    FINDINGS:  Lower chest images demonstrate moderate amount of pericardial fluid and moderate left pleural fluid collection with adjacent airspace disease.  Diffusely decreased hepatic attenuation consistent with steatosis.  Previous cholecystectomy.  No splenic abnormality.  2.6 cm cystic area of the uncinate process of the pancreas, present dating back to 10/30/2018, but has enlarged since that time.  No solid pancreatic mass detected.  No evidence of renal mass or hydronephrosis.  Trace ascites.  IUD in place.  Moderate to severe anasarca.                                       Medications   HYDROcodone-acetaminophen 5-325 mg per tablet 2 tablet (2 tablets Oral Given 11/26/23 0757)   cloNIDine tablet 0.3 mg (0.3 mg Oral Given 11/26/23 0757)   aluminum-magnesium hydroxide-simethicone 200-200-20 mg/5 mL suspension 30 mL (30 mLs Oral Given 11/26/23 0757)   ondansetron disintegrating tablet 4 mg (4 mg Oral Given 11/26/23 0810)   potassium chloride SA CR tablet 40 mEq (40 mEq Oral Given 11/26/23 0922)   iohexoL (OMNIPAQUE 350) injection 100 mL (100 mLs Intravenous Given 11/26/23 0849)       10:26 AM Good symptom relief, counseled in detail.  She recalls one previous episode of uncomplicated pancreatitis.   Counseled regarding all findings including hypokalemia and pancreatic cyst with the clinical finding of mild recurrent pancreatitis in the setting of chronic renal insufficiency on home hemodialysis.  Appropriate for home management with close follow-up, she believes she can see her primary care provider in the next day or two.       Medical Decision Making  Problems Addressed:  Acute pancreatitis, unspecified complication status, unspecified pancreatitis type: acute illness or injury  Hypokalemia: acute illness or injury  Pancreatic cyst: chronic illness or injury    Amount and/or Complexity of Data Reviewed  Labs: ordered. Decision-making details documented in ED Course.  Radiology: ordered. Decision-making details documented in ED Course.    Risk  OTC drugs.  Prescription drug management.  Decision regarding hospitalization.      Additional MDM:   Differential Diagnosis:   Gastritis, esophagitis, ulcer, hepatitis, pancreatitis, other causes of upper epigastric pain                                 Clinical Impression:  Final diagnoses:  [R10.13] Epigastric abdominal pain  [K85.90] Acute pancreatitis, unspecified complication status, unspecified pancreatitis type (Primary)  [E87.6] Hypokalemia  [N18.6, Z99.2] ESRD on hemodialysis  [K86.2] Pancreatic cyst          ED Disposition Condition    Discharge Stable          ED Prescriptions       Medication Sig Dispense Start Date End Date Auth. Provider    potassium chloride SA (K-DUR,KLOR-CON) 20 MEQ tablet Take 1 tablet (20 mEq total) by mouth 2 (two) times daily. for 3 days 6 tablet 11/26/2023 11/29/2023 René Witt MD    HYDROcodone-acetaminophen (NORCO)  mg per tablet Take 1 tablet by mouth every 6 (six) hours as needed for Pain (pain). 12 tablet 11/26/2023 -- René Witt MD    ondansetron (ZOFRAN-ODT) 4 MG TbDL Take 1 tablet (4 mg total) by mouth every 8 (eight) hours as needed (n/v). 12 tablet 11/26/2023 -- René Witt MD           Follow-up Information       Follow up With Specialties Details Why Contact Info Additional Information    [facility] - Emergency Department Emergency Medicine  As needed [address] [facility] [address] [phone] [address]    Carlitos Ibrahim MD Internal Medicine In 2 days  [address] [facility] [address]  [phone]                René Witt MD  11/26/23 1034       René Witt MD  11/26/23 1037       René Witt MD  11/26/23 1043

## 2023-11-26 NOTE — Clinical Note
Roberto Sharma accompanied their family member to the emergency department on 11/26/2023. They may return to work on 11/27/2023.      If you have any questions or concerns, please don't hesitate to call.      TYLER Barksdale

## 2023-11-27 ENCOUNTER — HOSPITAL ENCOUNTER (OUTPATIENT)
Dept: RADIOLOGY | Facility: HOSPITAL | Age: 43
Discharge: HOME | End: 2023-11-27
Attending: NURSE PRACTITIONER
Payer: MEDICARE

## 2023-11-27 DIAGNOSIS — R06.02 SHORTNESS OF BREATH: ICD-10-CM

## 2023-11-27 DIAGNOSIS — R06.02 SHORTNESS OF BREATH: Primary | ICD-10-CM

## 2023-11-27 LAB
BACTERIA UR CULT: NORMAL
BACTERIA UR CULT: NORMAL

## 2023-11-27 PROCEDURE — 71046 X-RAY EXAM CHEST 2 VIEWS: CPT | Mod: TC

## 2024-01-04 ENCOUNTER — HOSPITAL ENCOUNTER (EMERGENCY)
Facility: HOSPITAL | Age: 44
Discharge: HOME | End: 2024-01-04
Attending: EMERGENCY MEDICINE
Payer: MEDICARE

## 2024-01-04 VITALS
TEMPERATURE: 99 F | HEART RATE: 72 BPM | BODY MASS INDEX: 39.27 KG/M2 | WEIGHT: 230 LBS | DIASTOLIC BLOOD PRESSURE: 86 MMHG | OXYGEN SATURATION: 100 % | SYSTOLIC BLOOD PRESSURE: 193 MMHG | HEIGHT: 64 IN | RESPIRATION RATE: 18 BRPM

## 2024-01-04 DIAGNOSIS — Z20.828 EXPOSURE TO INFLUENZA: Primary | ICD-10-CM

## 2024-01-04 LAB
CTP QC/QA: YES
POC MOLECULAR INFLUENZA A AGN: NEGATIVE
POC MOLECULAR INFLUENZA B AGN: NEGATIVE

## 2024-01-04 PROCEDURE — 99284 EMERGENCY DEPT VISIT MOD MDM: CPT | Mod: 25

## 2024-01-04 PROCEDURE — 87502 INFLUENZA DNA AMP PROBE: CPT

## 2024-01-04 PROCEDURE — 25000003 PHARM REV CODE 250: Performed by: EMERGENCY MEDICINE

## 2024-01-04 RX ORDER — OSELTAMIVIR PHOSPHATE 6 MG/ML
30 FOR SUSPENSION ORAL ONCE
Status: COMPLETED | OUTPATIENT
Start: 2024-01-04 | End: 2024-01-04

## 2024-01-04 RX ORDER — MINOXIDIL 10 MG/1
10 TABLET ORAL
COMMUNITY
Start: 2023-12-29

## 2024-01-04 RX ORDER — ONDANSETRON 4 MG/1
4 TABLET, ORALLY DISINTEGRATING ORAL EVERY 8 HOURS PRN
Qty: 8 TABLET | Refills: 0 | Status: SHIPPED | OUTPATIENT
Start: 2024-01-04

## 2024-01-04 RX ORDER — TEMAZEPAM 15 MG/1
15 CAPSULE ORAL NIGHTLY
COMMUNITY
Start: 2023-12-28

## 2024-01-04 RX ORDER — PANTOPRAZOLE SODIUM 40 MG/1
40 TABLET, DELAYED RELEASE ORAL
COMMUNITY
Start: 2023-12-31

## 2024-01-04 RX ORDER — NIFEDIPINE 60 MG/1
1 TABLET, EXTENDED RELEASE ORAL
COMMUNITY
Start: 2022-11-01

## 2024-01-04 RX ORDER — SULFAMETHOXAZOLE AND TRIMETHOPRIM 400; 80 MG/1; MG/1
1 TABLET ORAL
COMMUNITY
Start: 2023-12-28

## 2024-01-04 RX ORDER — TRAZODONE HYDROCHLORIDE 100 MG/1
100 TABLET ORAL NIGHTLY PRN
COMMUNITY
Start: 2023-12-18

## 2024-01-04 RX ORDER — OSELTAMIVIR PHOSPHATE 30 MG/1
30 CAPSULE ORAL SEE ADMIN INSTRUCTIONS
Qty: 3 CAPSULE | Refills: 0 | Status: SHIPPED | OUTPATIENT
Start: 2024-01-05 | End: 2024-01-10

## 2024-01-04 RX ORDER — ONDANSETRON 4 MG/1
4 TABLET, ORALLY DISINTEGRATING ORAL
Status: COMPLETED | OUTPATIENT
Start: 2024-01-04 | End: 2024-01-04

## 2024-01-04 RX ADMIN — OSELTAMIVIR PHOSPHATE 30 MG: 6 FOR SUSPENSION ORAL at 11:01

## 2024-01-04 RX ADMIN — ONDANSETRON 4 MG: 4 TABLET, ORALLY DISINTEGRATING ORAL at 11:01

## 2024-01-05 NOTE — DISCHARGE INSTRUCTIONS
You were exposed to the flu.  Please take 30 mg of Oseltamivir (Tamiflu) after your dialysis session.  You should try to take it with food to prevent nausea.  Do this for only the next 5 days, last dose being on 1/8/24.

## 2024-01-05 NOTE — ED PROVIDER NOTES
EMERGENCY DEPARTMENT HISTORY AND PHYSICAL EXAM     This note is dictated on M*Modal word recognition program.  There are word recognition mistakes and grammatical errors that are occasionally missed on review.     Date: 1/4/2024   Patient Name: Pilar Fernandez       History of Presenting Illness      Chief Complaint   Patient presents with    Cough     Pt to the ER w/ complaints of cough and nausea x 2 days, family member at home flu positive.         2300   Pilar Fernandez is a 43 y.o. female with PMHX of ESRD, type 2 diabetes who presents to the emergency department C/O influenza exposure.    Patient initially arrived here as a visitor for a family member who tested positive for flu.  After learning of positive flu diagnosis patient checked in because she is requesting antiviral treatment.  Patient reports cough and congestion for the past 2 days.    PCP: Lindsey Singletary FNP        Current Facility-Administered Medications   Medication Dose Route Frequency Provider Last Rate Last Admin    levonorgestreL (LILETTA) 20.1 mcg/24 hrs (6 yrs) 52 mg IUD 18.6 mcg  18.6 mcg Intrauterine  Jolanta Bo MD   18.6 mcg at 04/27/22 1114    oseltamivir 6 mg/mL 30 mg  30 mg Oral Once Vicente Mcneill MD         Current Outpatient Medications   Medication Sig Dispense Refill    minoxidiL (LONITEN) 10 MG Tab Take 10 mg by mouth.      NIFEdipine (PROCARDIA-XL) 60 MG (OSM) 24 hr tablet Take 1 tablet by mouth.      pantoprazole (PROTONIX) 40 MG tablet Take 40 mg by mouth.      sulfamethoxazole-trimethoprim 400-80mg (BACTRIM,SEPTRA) 400-80 mg per tablet Take 1 tablet by mouth.      temazepam (RESTORIL) 15 mg Cap Take 15 mg by mouth every evening.      traZODone (DESYREL) 100 MG tablet Take 100 mg by mouth nightly as needed.      acetaminophen (TYLENOL) 500 MG tablet Take 1 tablet (500 mg total) by mouth daily as needed for Pain.  0    acetaminophen (TYLENOL) 650 MG TbSR Take 1 tablet (650 mg total) by mouth  every 6 (six) hours as needed (pain). (Patient not taking: Reported on 2/15/2023) 60 tablet 0    acetaminophen with codeine (ACETAMINOPHEN-CODEINE) 120mg 12mg 5mL Soln       allopurinoL (ZYLOPRIM) 300 MG tablet Take 300 mg by mouth once daily.       amlodipine (NORVASC) 10 MG tablet Take 1 tablet (10 mg total) by mouth once daily. 90 tablet 11    atorvastatin (LIPITOR) 40 MG tablet Take 1 tablet by mouth once daily 90 tablet 0    cloNIDine (CATAPRES) 0.3 MG tablet TAKE 1 TABLET BY MOUTH THREE TIMES DAILY (REPLACE PATCHES)      colchicine (COLCRYS) 0.6 mg tablet TAKE 1 TABLET BY MOUTH ONCE DAILY AS NEEDED FOR GOUT PAIN      EUTHYROX 50 mcg tablet Take 1 tablet (50 mcg total) by mouth every morning. 90 tablet 1    gabapentin (NEURONTIN) 400 MG capsule Take 1 capsule by mouth 3 (three) times daily.      HYDROcodone-acetaminophen (NORCO)  mg per tablet Take 1 tablet by mouth every 6 (six) hours as needed for Pain (pain). 12 tablet 0    insulin aspart U-100 (NOVOLOG FLEXPEN U-100 INSULIN) 100 unit/mL (3 mL) InPn pen Inject 10 Units into the skin 2 (two) times daily with meals. 15 mL 11    lactulose (CHRONULAC) 10 gram/15 mL solution Take by mouth.   5    LANTUS U-100 INSULIN 100 unit/mL injection 70 Units every evening.      LIDOcaine HCl 2% (LIDOCAINE VISCOUS) 2 % Soln by Mucous Membrane route every 3 (three) hours. Apply with a piece of gauze or cotton ball between cheek and gums. 100 mL 0    linaGLIPtin (TRADJENTA) 5 mg Tab tablet TAKE 1 TABLET (5 MG TOTAL) BY MOUTH ONCE DAILY. 90 tablet 3    nebivoloL (BYSTOLIC) 20 mg Tab Take 1 tablet by mouth.      ondansetron (ZOFRAN-ODT) 4 MG TbDL Take 1 tablet (4 mg total) by mouth every 8 (eight) hours as needed (n/v). 12 tablet 1    [START ON 1/5/2024] oseltamivir (TAMIFLU) 30 MG capsule Take 1 capsule (30 mg total) by mouth As instructed (Take 1 cap (30mg) after each dialysis session you have over the next 5 days). 3 capsule 0    oxyCODONE (ROXICODONE) 5 MG immediate  release tablet Take 1 tablet (5 mg total) by mouth every 6 (six) hours as needed for Pain. (Patient not taking: Reported on 2/15/2023) 12 tablet 0    polyethylene glycol (GLYCOLAX) 17 gram/dose powder Take 17 g by mouth every evening. 510 g 0    senna-docusate 8.6-50 mg (SENNA WITH DOCUSATE SODIUM) 8.6-50 mg per tablet Take 1 tablet by mouth 2 (two) times daily.      sorbitol 70 % solution Take 30 mLs by mouth daily as needed (Severe constipation.  Use prn no BM X 3 days despite Miralax qhs.). 454 mL 0    zolpidem (AMBIEN) 5 MG Tab Take 5 mg by mouth nightly as needed.             Past History     Past Medical History:   Past Medical History:   Diagnosis Date    Anemia     Anemia     Breast mass     Chronic constipation     CKD (chronic kidney disease)     Diabetes mellitus     Dialysis patient     MON, E, THURS, SAT.    Encounter for blood transfusion     Erosive gastritis 2016    GERD (gastroesophageal reflux disease)     Gout     High cholesterol     Hypertension     Knee pain     Thyroid disease     Unspecified cataract 2022        Past Surgical History:   Past Surgical History:   Procedure Laterality Date    AV FISTULA PLACEMENT Left 2019    Procedure: creation av fistula;  Surgeon: Buck Fernandez MD;  Location: [facility];  Service: Cardiovascular;  Laterality: Left;  Left Radialcephalic: PACU     SECTION  , , ,     CHOLECYSTECTOMY      COLONOSCOPY N/A 10/23/2017    Procedure: COLONOSCOPY;  Surgeon: Bri Harry MD;  Location: [facility];  Service: Endoscopy;  Laterality: N/A;    EXCISIONAL BIOPSY Left 2022    Procedure: EXCISIONAL BIOPSY;  Surgeon: Mick Juarez MD;  Location: [facility];  Service: General;  Laterality: Left;    TUBAL LIGATION      UPPER GASTROINTESTINAL ENDOSCOPY  2016    Erosive Gastritis-Dr Bailey        Family History:   Family History   Problem Relation Age of Onset    Thyroid disease Mother     Diabetes Father     Ovarian cancer  "Maternal Aunt     Breast cancer Maternal Aunt         Social History:   Social History     Tobacco Use    Smoking status: Never    Smokeless tobacco: Never   Substance Use Topics    Alcohol use: No     Alcohol/week: 0.0 standard drinks of alcohol    Drug use: No        Allergies:   Review of patient's allergies indicates:   Allergen Reactions    Tramadol Itching and Nausea And Vomiting          Review of Systems   Review of Systems   See HPI for pertinent positives and negatives       Physical Exam     Vitals:    01/04/24 2254   Weight: 104.3 kg (230 lb)   Height: 5' 4" (1.626 m)      Physical Exam  Vitals and nursing note reviewed.   Constitutional:       General: She is not in acute distress.     Appearance: Normal appearance. She is not ill-appearing.   HENT:      Head: Normocephalic and atraumatic.      Right Ear: External ear normal.      Left Ear: External ear normal.      Nose: Nose normal. No congestion or rhinorrhea.      Mouth/Throat:      Mouth: Mucous membranes are moist.   Eyes:      Conjunctiva/sclera: Conjunctivae normal.      Pupils: Pupils are equal, round, and reactive to light.   Pulmonary:      Effort: Pulmonary effort is normal. No respiratory distress.   Musculoskeletal:         General: No deformity. Normal range of motion.      Cervical back: Normal range of motion. No rigidity.   Skin:     General: Skin is dry.   Neurological:      General: No focal deficit present.      Mental Status: She is alert and oriented to person, place, and time. Mental status is at baseline.   Psychiatric:         Mood and Affect: Mood normal.         Behavior: Behavior normal.              Diagnostic Study Results      Labs -   No results found for this or any previous visit (from the past 12 hour(s)).     Radiologic Studies -    No orders to display        Medications given in the ED-   Medications   oseltamivir 6 mg/mL 30 mg (has no administration in time range)           Medical Decision Making    I am the " first provider for this patient.     I reviewed the vital signs, available nursing notes, past medical history, past surgical history, family history and social history.     Vital Signs:  Reviewed the patient's vital signs.     Pulse Oximetry Analysis and Interpretation:    100% on NC, normal (baseline on 24/7 NC)        External Test Results (Pertinent to encounter):    Records Reviewed: Nursing Notes    History Obtained By: Patient    Provider Notes: Pilar Fernandez is a 43 y.o. female with influenza exposure    Co-morbidities Considered:  ESRD    Differential Diagnosis:  Influenza exposure      ED Course:    Given high risk and close exposure to influenza will start on Tamiflu course, renally dosed around patient's dialysis sessions         Problems Addressed:  Flu exposure    Procedures:   Procedures       Diagnosis and Disposition     Critical Care:      DISCHARGE NOTE:       Pilar Fernandez's  results have been reviewed with her.  She has been counseled regarding her diagnosis, treatment, and plan.  She verbally conveys understanding and agreement of the signs, symptoms, diagnosis, treatment and prognosis and additionally agrees to follow up as discussed.  She also agrees with the care-plan and conveys that all of her questions have been answered.  I have also provided discharge instructions for her that include: educational information regarding their diagnosis and treatment, and list of reasons why they would want to return to the ED prior to their follow-up appointment, should her condition change. She has been provided with education for proper emergency department utilization.         CLINICAL IMPRESSION:         1. Exposure to influenza              PLAN:   1. Discharge Home  2.      Medication List        START taking these medications      oseltamivir 30 MG capsule  Commonly known as: TAMIFLU  Take 1 capsule (30 mg total) by mouth As instructed (Take 1 cap (30mg) after each dialysis  session you have over the next 5 days).  Start taking on: January 5, 2024            ASK your doctor about these medications      * acetaminophen 500 MG tablet  Commonly known as: TYLENOL  Take 1 tablet (500 mg total) by mouth daily as needed for Pain.     * acetaminophen 650 MG Tbsr  Commonly known as: TYLENOL  Take 1 tablet (650 mg total) by mouth every 6 (six) hours as needed (pain).     acetaminophen-codeine 120mg 12mg 5mL Soln     allopurinoL 300 MG tablet  Commonly known as: ZYLOPRIM     amLODIPine 10 MG tablet  Commonly known as: NORVASC  Take 1 tablet (10 mg total) by mouth once daily.     atorvastatin 40 MG tablet  Commonly known as: LIPITOR  Take 1 tablet by mouth once daily     cloNIDine 0.3 MG tablet  Commonly known as: CATAPRES     colchicine 0.6 mg tablet  Commonly known as: COLCRYS     EUTHYROX 50 MCG tablet  Generic drug: levothyroxine  Take 1 tablet (50 mcg total) by mouth every morning.     gabapentin 400 MG capsule  Commonly known as: NEURONTIN     HYDROcodone-acetaminophen  mg per tablet  Commonly known as: NORCO  Take 1 tablet by mouth every 6 (six) hours as needed for Pain (pain).     insulin aspart U-100 100 unit/mL (3 mL) Inpn pen  Commonly known as: NovoLOG Flexpen U-100 Insulin  Inject 10 Units into the skin 2 (two) times daily with meals.     lactulose 10 gram/15 mL solution  Commonly known as: CHRONULAC     LANTUS U-100 INSULIN 100 unit/mL injection  Generic drug: insulin glargine     LIDOcaine viscous HCl 2% Soln  Commonly known as: LIDOcaine VISCOUS  by Mucous Membrane route every 3 (three) hours. Apply with a piece of gauze or cotton ball between cheek and gums.     linaGLIPtin 5 mg Tab tablet  Commonly known as: TRADJENTA  TAKE 1 TABLET (5 MG TOTAL) BY MOUTH ONCE DAILY.     minoxidiL 10 MG Tab  Commonly known as: LONITEN     nebivoloL 20 mg Tab  Commonly known as: BYSTOLIC     NIFEdipine 60 MG (OSM) 24 hr tablet  Commonly known as: PROCARDIA-XL     ondansetron 4 MG  Tbdl  Commonly known as: ZOFRAN-ODT  Take 1 tablet (4 mg total) by mouth every 8 (eight) hours as needed (n/v).     oxyCODONE 5 MG immediate release tablet  Commonly known as: ROXICODONE  Take 1 tablet (5 mg total) by mouth every 6 (six) hours as needed for Pain.     pantoprazole 40 MG tablet  Commonly known as: PROTONIX     polyethylene glycol 17 gram/dose powder  Commonly known as: GLYCOLAX  Take 17 g by mouth every evening.     senna-docusate 8.6-50 mg 8.6-50 mg per tablet  Commonly known as: SENNA WITH DOCUSATE SODIUM  Take 1 tablet by mouth 2 (two) times daily.     sorbitoL 70 % solution  Take 30 mLs by mouth daily as needed (Severe constipation.  Use prn no BM X 3 days despite Miralax qhs.).     sulfamethoxazole-trimethoprim 400-80mg 400-80 mg per tablet  Commonly known as: BACTRIM,SEPTRA     temazepam 15 mg Cap  Commonly known as: RESTORIL     traZODone 100 MG tablet  Commonly known as: DESYREL     zolpidem 5 MG Tab  Commonly known as: AMBIEN           * This list has 2 medication(s) that are the same as other medications prescribed for you. Read the directions carefully, and ask your doctor or other care provider to review them with you.                   Where to Get Your Medications        These medications were sent to [facility] [address] [place] [id number]      Phone: [phone]   oseltamivir 30 MG capsule        3. Lindsey Singletary, P  [address] [place] [id number]  [phone]    Schedule an appointment as soon as possible for a visit   As needed       _______________________________     Please note that this dictation was completed with M*Converser, the computer voice recognition software.  Quite often unanticipated grammatical, syntax, homophones, and other interpretive errors are inadvertently transcribed by the computer software.  Please disregard these errors.  Please excuse any errors that have escaped final proofreading.              Vicente Mcneill MD  01/04/24 3342

## 2024-04-12 ENCOUNTER — LAB VISIT (OUTPATIENT)
Dept: LAB | Facility: HOSPITAL | Age: 44
End: 2024-04-12
Payer: MEDICARE

## 2024-04-12 DIAGNOSIS — R30.0 DYSURIA: ICD-10-CM

## 2024-04-12 LAB
BACTERIA #/AREA URNS HPF: ABNORMAL /HPF
BILIRUB UR QL STRIP: NEGATIVE
CLARITY UR: ABNORMAL
COLOR UR: YELLOW
GLUCOSE UR QL STRIP: NEGATIVE
GRAN CASTS #/AREA URNS LPF: 1 /LPF
HGB UR QL STRIP: ABNORMAL
HYALINE CASTS #/AREA URNS LPF: 3.2 /LPF
KETONES UR QL STRIP: NEGATIVE
LEUKOCYTE ESTERASE UR QL STRIP: ABNORMAL
MICROSCOPIC COMMENT: ABNORMAL
NITRITE UR QL STRIP: NEGATIVE
PH UR STRIP: 6 [PH] (ref 5–8)
PROT UR QL STRIP: ABNORMAL
RBC #/AREA URNS HPF: 24 /HPF (ref 0–4)
SP GR UR STRIP: 1.02 (ref 1–1.03)
SQUAMOUS #/AREA URNS HPF: 23 /HPF
URN SPEC COLLECT METH UR: ABNORMAL
UROBILINOGEN UR STRIP-ACNC: NEGATIVE EU/DL
WBC #/AREA URNS HPF: >100 /HPF (ref 0–5)
YEAST URNS QL MICRO: ABNORMAL

## 2024-04-12 PROCEDURE — 87086 URINE CULTURE/COLONY COUNT: CPT | Performed by: NURSE PRACTITIONER

## 2024-04-12 PROCEDURE — 81000 URINALYSIS NONAUTO W/SCOPE: CPT | Performed by: NURSE PRACTITIONER

## 2024-04-14 LAB — BACTERIA UR CULT: NO GROWTH

## 2024-07-13 ENCOUNTER — HOSPITAL ENCOUNTER (INPATIENT)
Facility: HOSPITAL | Age: 44
LOS: 4 days | Discharge: HOME | DRG: 064 | End: 2024-07-17
Attending: EMERGENCY MEDICINE | Admitting: EMERGENCY MEDICINE
Payer: MEDICARE

## 2024-07-13 DIAGNOSIS — I50.30 HEART FAILURE WITH PRESERVED EJECTION FRACTION, UNSPECIFIED HF CHRONICITY: ICD-10-CM

## 2024-07-13 DIAGNOSIS — I63.9 CEREBROVASCULAR ACCIDENT (CVA), UNSPECIFIED MECHANISM: ICD-10-CM

## 2024-07-13 DIAGNOSIS — I63.9 ISCHEMIC STROKE OF FRONTAL LOBE: Primary | ICD-10-CM

## 2024-07-13 DIAGNOSIS — N18.6 ESRD (END STAGE RENAL DISEASE) ON DIALYSIS: ICD-10-CM

## 2024-07-13 DIAGNOSIS — I63.411 EMBOLIC STROKE INVOLVING RIGHT MIDDLE CEREBRAL ARTERY: ICD-10-CM

## 2024-07-13 DIAGNOSIS — Z99.2 ESRD (END STAGE RENAL DISEASE) ON DIALYSIS: ICD-10-CM

## 2024-07-13 DIAGNOSIS — R29.818 ACUTE FOCAL NEUROLOGICAL DEFICIT: ICD-10-CM

## 2024-07-13 LAB
ALBUMIN SERPL BCP-MCNC: 3.5 G/DL (ref 3.5–5.2)
ALP SERPL-CCNC: 232 U/L (ref 55–135)
ALT SERPL W/O P-5'-P-CCNC: 25 U/L (ref 10–44)
ANION GAP SERPL CALC-SCNC: 11 MMOL/L (ref 3–11)
AST SERPL-CCNC: 39 U/L (ref 10–40)
BASOPHILS # BLD AUTO: 0.08 K/UL (ref 0–0.2)
BASOPHILS NFR BLD: 1.1 % (ref 0–1.9)
BILIRUB SERPL-MCNC: 1.3 MG/DL (ref 0.1–1)
BUN SERPL-MCNC: 34 MG/DL (ref 6–20)
CALCIUM SERPL-MCNC: 9 MG/DL (ref 8.7–10.5)
CHLORIDE SERPL-SCNC: 97 MMOL/L (ref 95–110)
CHOLEST SERPL-MCNC: 109 MG/DL (ref 120–199)
CHOLEST/HDLC SERPL: 1.6 {RATIO} (ref 2–5)
CO2 SERPL-SCNC: 27 MMOL/L (ref 23–29)
CREAT SERPL-MCNC: 6 MG/DL (ref 0.5–1.4)
DIFFERENTIAL METHOD BLD: ABNORMAL
EOSINOPHIL # BLD AUTO: 0.3 K/UL (ref 0–0.5)
EOSINOPHIL NFR BLD: 4.4 % (ref 0–8)
ERYTHROCYTE [DISTWIDTH] IN BLOOD BY AUTOMATED COUNT: 18.2 % (ref 11.5–14.5)
EST. GFR  (NO RACE VARIABLE): 8.2 ML/MIN/1.73 M^2
ESTIMATED AVG GLUCOSE: 114 MG/DL (ref 68–131)
GLUCOSE SERPL-MCNC: 190 MG/DL (ref 70–110)
HBA1C MFR BLD: 5.6 % (ref 4–5.6)
HCT VFR BLD AUTO: 31.9 % (ref 37–48.5)
HDLC SERPL-MCNC: 69 MG/DL (ref 40–75)
HDLC SERPL: 63.3 % (ref 20–50)
HGB BLD-MCNC: 10.6 G/DL (ref 12–16)
IMM GRANULOCYTES # BLD AUTO: 0.29 K/UL (ref 0–0.04)
IMM GRANULOCYTES NFR BLD AUTO: 3.9 % (ref 0–0.5)
INR PPP: 1.3 (ref 0.8–1.2)
LDLC SERPL CALC-MCNC: 27 MG/DL (ref 63–159)
LYMPHOCYTES # BLD AUTO: 0.7 K/UL (ref 1–4.8)
LYMPHOCYTES NFR BLD: 9.7 % (ref 18–48)
MCH RBC QN AUTO: 29.4 PG (ref 27–31)
MCHC RBC AUTO-ENTMCNC: 33.2 G/DL (ref 32–36)
MCV RBC AUTO: 88 FL (ref 82–98)
MONOCYTES # BLD AUTO: 1.3 K/UL (ref 0.3–1)
MONOCYTES NFR BLD: 16.8 % (ref 4–15)
NEUTROPHILS # BLD AUTO: 4.8 K/UL (ref 1.8–7.7)
NEUTROPHILS NFR BLD: 64.1 % (ref 38–73)
NONHDLC SERPL-MCNC: 40 MG/DL
NRBC BLD-RTO: 0 /100 WBC
PLATELET # BLD AUTO: 154 K/UL (ref 150–450)
PMV BLD AUTO: 11.1 FL (ref 9.2–12.9)
POCT GLUCOSE: 114 MG/DL (ref 70–110)
POCT GLUCOSE: 169 MG/DL (ref 70–110)
POTASSIUM SERPL-SCNC: 3 MMOL/L (ref 3.5–5.1)
PROT SERPL-MCNC: 8.5 G/DL (ref 6–8.4)
PROTHROMBIN TIME: 13.6 SEC (ref 9–12.5)
RBC # BLD AUTO: 3.61 M/UL (ref 4–5.4)
SODIUM SERPL-SCNC: 135 MMOL/L (ref 136–145)
T4 FREE SERPL-MCNC: 1.02 NG/DL (ref 0.71–1.51)
TRIGL SERPL-MCNC: 65 MG/DL (ref 30–150)
TSH SERPL DL<=0.005 MIU/L-ACNC: 8.47 UIU/ML (ref 0.4–4)
WBC # BLD AUTO: 7.51 K/UL (ref 3.9–12.7)

## 2024-07-13 PROCEDURE — 99285 EMERGENCY DEPT VISIT HI MDM: CPT | Mod: 25

## 2024-07-13 PROCEDURE — 80053 COMPREHEN METABOLIC PANEL: CPT | Performed by: EMERGENCY MEDICINE

## 2024-07-13 PROCEDURE — 25500020 PHARM REV CODE 255: Performed by: EMERGENCY MEDICINE

## 2024-07-13 PROCEDURE — 84443 ASSAY THYROID STIM HORMONE: CPT | Performed by: EMERGENCY MEDICINE

## 2024-07-13 PROCEDURE — 84439 ASSAY OF FREE THYROXINE: CPT | Performed by: EMERGENCY MEDICINE

## 2024-07-13 PROCEDURE — 85025 COMPLETE CBC W/AUTO DIFF WBC: CPT | Performed by: EMERGENCY MEDICINE

## 2024-07-13 PROCEDURE — 93010 ELECTROCARDIOGRAM REPORT: CPT | Mod: ,,, | Performed by: INTERNAL MEDICINE

## 2024-07-13 PROCEDURE — 36415 COLL VENOUS BLD VENIPUNCTURE: CPT | Performed by: EMERGENCY MEDICINE

## 2024-07-13 PROCEDURE — 85610 PROTHROMBIN TIME: CPT | Performed by: EMERGENCY MEDICINE

## 2024-07-13 PROCEDURE — 93005 ELECTROCARDIOGRAM TRACING: CPT

## 2024-07-13 PROCEDURE — 27000221 HC OXYGEN, UP TO 24 HOURS

## 2024-07-13 PROCEDURE — 94761 N-INVAS EAR/PLS OXIMETRY MLT: CPT

## 2024-07-13 PROCEDURE — 83036 HEMOGLOBIN GLYCOSYLATED A1C: CPT | Performed by: EMERGENCY MEDICINE

## 2024-07-13 PROCEDURE — 25000003 PHARM REV CODE 250: Performed by: INTERNAL MEDICINE

## 2024-07-13 PROCEDURE — 99900035 HC TECH TIME PER 15 MIN (STAT)

## 2024-07-13 PROCEDURE — 21400001 HC TELEMETRY ROOM

## 2024-07-13 PROCEDURE — 80061 LIPID PANEL: CPT | Performed by: EMERGENCY MEDICINE

## 2024-07-13 PROCEDURE — G0408 INPT/TELE FOLLOW UP 35: HCPCS | Mod: 95,,, | Performed by: STUDENT IN AN ORGANIZED HEALTH CARE EDUCATION/TRAINING PROGRAM

## 2024-07-13 PROCEDURE — 82962 GLUCOSE BLOOD TEST: CPT

## 2024-07-13 PROCEDURE — 25000003 PHARM REV CODE 250: Performed by: EMERGENCY MEDICINE

## 2024-07-13 RX ORDER — ACETAMINOPHEN 325 MG/1
650 TABLET ORAL EVERY 8 HOURS PRN
Status: DISCONTINUED | OUTPATIENT
Start: 2024-07-13 | End: 2024-07-17 | Stop reason: HOSPADM

## 2024-07-13 RX ORDER — SODIUM CHLORIDE 0.9 % (FLUSH) 0.9 %
10 SYRINGE (ML) INJECTION
Status: DISCONTINUED | OUTPATIENT
Start: 2024-07-13 | End: 2024-07-17 | Stop reason: HOSPADM

## 2024-07-13 RX ORDER — ENOXAPARIN SODIUM 100 MG/ML
30 INJECTION SUBCUTANEOUS EVERY 24 HOURS
Status: DISCONTINUED | OUTPATIENT
Start: 2024-07-13 | End: 2024-07-17 | Stop reason: HOSPADM

## 2024-07-13 RX ORDER — FUROSEMIDE 40 MG/1
40 TABLET ORAL 2 TIMES DAILY
Status: ON HOLD | COMMUNITY
End: 2024-07-17 | Stop reason: HOSPADM

## 2024-07-13 RX ORDER — ONDANSETRON HYDROCHLORIDE 2 MG/ML
4 INJECTION, SOLUTION INTRAVENOUS EVERY 8 HOURS PRN
Status: DISCONTINUED | OUTPATIENT
Start: 2024-07-13 | End: 2024-07-17 | Stop reason: HOSPADM

## 2024-07-13 RX ORDER — CLOPIDOGREL BISULFATE 75 MG/1
300 TABLET ORAL
Status: COMPLETED | OUTPATIENT
Start: 2024-07-13 | End: 2024-07-13

## 2024-07-13 RX ORDER — IBUPROFEN 200 MG
16 TABLET ORAL
Status: DISCONTINUED | OUTPATIENT
Start: 2024-07-13 | End: 2024-07-17 | Stop reason: HOSPADM

## 2024-07-13 RX ORDER — TALC
6 POWDER (GRAM) TOPICAL NIGHTLY PRN
Status: DISCONTINUED | OUTPATIENT
Start: 2024-07-13 | End: 2024-07-17 | Stop reason: HOSPADM

## 2024-07-13 RX ORDER — GLUCAGON 1 MG
1 KIT INJECTION
Status: DISCONTINUED | OUTPATIENT
Start: 2024-07-13 | End: 2024-07-17 | Stop reason: HOSPADM

## 2024-07-13 RX ORDER — ATORVASTATIN CALCIUM 40 MG/1
40 TABLET, FILM COATED ORAL DAILY
Status: DISCONTINUED | OUTPATIENT
Start: 2024-07-14 | End: 2024-07-17 | Stop reason: HOSPADM

## 2024-07-13 RX ORDER — ASPIRIN 325 MG
325 TABLET ORAL
Status: COMPLETED | OUTPATIENT
Start: 2024-07-13 | End: 2024-07-13

## 2024-07-13 RX ORDER — POLYETHYLENE GLYCOL 3350 17 G/17G
17 POWDER, FOR SOLUTION ORAL DAILY
Status: DISCONTINUED | OUTPATIENT
Start: 2024-07-14 | End: 2024-07-17 | Stop reason: HOSPADM

## 2024-07-13 RX ORDER — AMLODIPINE BESYLATE 10 MG/1
10 TABLET ORAL DAILY
Status: DISCONTINUED | OUTPATIENT
Start: 2024-07-14 | End: 2024-07-14

## 2024-07-13 RX ORDER — OXYCODONE AND ACETAMINOPHEN 5; 325 MG/1; MG/1
1 TABLET ORAL EVERY 6 HOURS PRN
Status: DISCONTINUED | OUTPATIENT
Start: 2024-07-13 | End: 2024-07-15

## 2024-07-13 RX ORDER — IBUPROFEN 200 MG
24 TABLET ORAL
Status: DISCONTINUED | OUTPATIENT
Start: 2024-07-13 | End: 2024-07-17 | Stop reason: HOSPADM

## 2024-07-13 RX ADMIN — CLOPIDOGREL BISULFATE 300 MG: 75 TABLET ORAL at 01:07

## 2024-07-13 RX ADMIN — ASPIRIN 325 MG ORAL TABLET 325 MG: 325 PILL ORAL at 01:07

## 2024-07-13 RX ADMIN — OXYCODONE HYDROCHLORIDE AND ACETAMINOPHEN 1 TABLET: 5; 325 TABLET ORAL at 10:07

## 2024-07-13 RX ADMIN — IOHEXOL 100 ML: 350 INJECTION, SOLUTION INTRAVENOUS at 12:07

## 2024-07-13 NOTE — ED PROVIDER NOTES
Encounter Date: 2024       History     Chief Complaint   Patient presents with    Extremity Weakness     Pt arrived per EMS with symptoms of possible CVA. Pt has L sided deficits, L sided facial drooping and slurred speech since 0500 this am after falling. Last known well at 0100 this am.  Pt states on O2 at @L nC at home. Pt denies having previous strokes. Pt on dialysis.      44-year-old female presents to the emergency department for evaluation due to left upper and lower extremity hemiparesis with left facial droop and slurred speech.  Onset 7 hours prior to ED arrival.  No pain.  No palliation.  No associated symptoms.        Review of patient's allergies indicates:   Allergen Reactions    Tramadol Itching and Nausea And Vomiting     Past Medical History:   Diagnosis Date    Anemia     Anemia     Breast mass     Chronic constipation     CKD (chronic kidney disease)     Diabetes mellitus     Dialysis patient     MON, E, THURS, SAT.    Embolic stroke involving right middle cerebral artery 2024    Encounter for blood transfusion     Erosive gastritis 2016    GERD (gastroesophageal reflux disease)     Gout     High cholesterol     Hypertension     Knee pain     Thyroid disease     Unspecified cataract 2022     Past Surgical History:   Procedure Laterality Date    AV FISTULA PLACEMENT Left 2019    Procedure: creation av fistula;  Surgeon: Buck Fernandez MD;  Location: [facility];  Service: Cardiovascular;  Laterality: Left;  Left Radialcephalic: PACU     SECTION  , , ,     CHOLECYSTECTOMY      COLONOSCOPY N/A 10/23/2017    Procedure: COLONOSCOPY;  Surgeon: Bri Harry MD;  Location: [facility];  Service: Endoscopy;  Laterality: N/A;    EXCISIONAL BIOPSY Left 2022    Procedure: EXCISIONAL BIOPSY;  Surgeon: Mick Juarez MD;  Location: [facility];  Service: General;  Laterality: Left;    TUBAL LIGATION      UPPER GASTROINTESTINAL ENDOSCOPY  2016     Erosive Gastritis-Dr Bailey     Family History   Problem Relation Name Age of Onset    Thyroid disease Mother      Diabetes Father      Ovarian cancer Maternal Aunt Malathi     Breast cancer Maternal Aunt Ada      Social History     Tobacco Use    Smoking status: Never    Smokeless tobacco: Never   Substance Use Topics    Alcohol use: No     Alcohol/week: 0.0 standard drinks of alcohol    Drug use: No     Review of Systems   Neurological:  Positive for speech difficulty.        Left hemiparesis.  Left facial droop.  Slurred speech.   All other systems reviewed and are negative.      Physical Exam     Initial Vitals   BP Pulse Resp Temp SpO2   07/13/24 1219 07/13/24 1219 07/13/24 1225 07/13/24 1219 07/13/24 1219   (!) 146/67 69 18 97.3 °F (36.3 °C) (!) 93 %      MAP       --                Physical Exam    Nursing note and vitals reviewed.  Constitutional: She appears well-developed and well-nourished.   HENT:   Head: Normocephalic and atraumatic.   Eyes: EOM are normal. Pupils are equal, round, and reactive to light.   Neck: Neck supple.   Normal range of motion.  Cardiovascular:  Normal rate, regular rhythm and normal heart sounds.     Exam reveals no gallop and no friction rub.       No murmur heard.  Pulmonary/Chest: Effort normal and breath sounds normal.   Abdominal: Abdomen is soft. Bowel sounds are normal. She exhibits no distension. There is no abdominal tenderness.   Musculoskeletal:         General: Normal range of motion.      Cervical back: Normal range of motion and neck supple.     Neurological: She is alert and oriented to person, place, and time. A cranial nerve deficit is present. GCS score is 15. GCS eye subscore is 4. GCS verbal subscore is 5. GCS motor subscore is 6.   Left hemiparesis.  Left facial droop.  Slurred speech.   Skin: Skin is warm and dry. No rash noted.   Psychiatric: She has a normal mood and affect.         ED Course   Procedures  Labs Reviewed   CBC W/ AUTO DIFFERENTIAL -  Abnormal; Notable for the following components:       Result Value    RBC 3.61 (*)     Hemoglobin 10.6 (*)     Hematocrit 31.9 (*)     RDW 18.2 (*)     Immature Granulocytes 3.9 (*)     Immature Grans (Abs) 0.29 (*)     Lymph # 0.7 (*)     Mono # 1.3 (*)     Lymph % 9.7 (*)     Mono % 16.8 (*)     All other components within normal limits   COMPREHENSIVE METABOLIC PANEL - Abnormal; Notable for the following components:    Sodium 135 (*)     Potassium 3.0 (*)     Glucose 190 (*)     BUN 34 (*)     Creatinine 6.0 (*)     Total Protein 8.5 (*)     Total Bilirubin 1.3 (*)     Alkaline Phosphatase 232 (*)     eGFR 8.2 (*)     All other components within normal limits   PROTIME-INR - Abnormal; Notable for the following components:    Prothrombin Time 13.6 (*)     INR 1.3 (*)     All other components within normal limits   TSH - Abnormal; Notable for the following components:    TSH 8.470 (*)     All other components within normal limits   LIPID PANEL - Abnormal; Notable for the following components:    Cholesterol 109 (*)     LDL Cholesterol 27.0 (*)     HDL/Cholesterol Ratio 63.3 (*)     Total Cholesterol/HDL Ratio 1.6 (*)     All other components within normal limits   POCT GLUCOSE - Abnormal; Notable for the following components:    POCT Glucose 169 (*)     All other components within normal limits   T4, FREE   HEMOGLOBIN A1C   POCT GLUCOSE, HAND-HELD DEVICE   POCT GLUCOSE MONITORING CONTINUOUS     EKG Readings: (Independently Interpreted)   Initial Reading: No STEMI. Rhythm: Normal Sinus Rhythm. Heart Rate: 71. Ectopy: No Ectopy. ST Segments: Normal ST Segments. Clinical Impression: Normal Sinus Rhythm       Imaging Results              CTA Head and Neck (xpd) (Final result)  Result time 07/13/24 13:59:53      Final result by Olegario Quiñones MD (07/13/24 13:59:53)                   Impression:      Contrast bolus on this exam is insufficient for evaluation of the cervical and intracranial arterial vasculature.   Atherosclerotic calcifications are seen at bilateral carotid bulb/proximal internal carotid and cavernous carotid regions.      Electronically signed by: Olegario Quiñones MD  Date:    07/13/2024  Time:    13:59               Narrative:    EXAMINATION:  CTA HEAD AND NECK (XPD)    CLINICAL HISTORY:  Left-sided facial droop and slurred speech Stroke/TIA, determine embolic source;    TECHNIQUE:  Helical CT images obtained from the thoracic inlet to the skull vertex after intravenous administration of contrast.  Maximum intensity projection images were evaluated in multiple planes along with the source images. Iterative reconstruction technique was used. NASCET criteria utilized for stenoses measurements.  CT/Cardiac nuclear examinations in the past 12 months: 1    COMPARISON:  Correlation made to CT head performed on today's date    FINDINGS:  Vasculature: Contrast bolus is insufficient for evaluation of the cervical and intracranial arterial vasculature atherosclerotic calcification is seen at bilateral carotid bulb/proximal internal carotid arteries as well as at the cavernous portions of bilateral internal carotid arteries.    Remaining structures:    Neck: Motion artifact through the upper lungs without definite focal parenchymal abnormality.  Peripherally calcified 8 mm nodule right thyroid lobe.  Bilateral submandibular and parotid glands are symmetric and unremarkable.  Mucosal surfaces of the nasopharynx and oropharynx are relatively symmetric.  Epiglottis is normal in appearance.  No detected cervical mass or evidence of cervical adenopathy.  Diffuse component of anasarca.    Head: Globes are intact in CT appearance.  No detected interval developed evidence for ventricular or basal cistern effacement.  No developed midline shift or mass effect.  Previously described subtle hypodensity within the right cerebrum is not clearly delineated on this exam.                                       CT Head Without Contrast  (Final result)  Result time 07/13/24 13:31:52      Final result by Olegario Quiñones MD (07/13/24 13:31:52)                   Impression:      Ill-defined subtle region of hypodensity within the right cerebrum measuring up to 1.4 cm in diameter could represent an evolving focus of recent ischemia.  Follow-up MRI could be performed to further assess.      Electronically signed by: Olegario Quiñones MD  Date:    07/13/2024  Time:    13:31               Narrative:    EXAMINATION:  CT HEAD WITHOUT CONTRAST    CLINICAL HISTORY:  Left-sided deficit, left-sided facial droop with slurred speech stroke, follow up;    TECHNIQUE:  Axial CT images were obtained from the skull base to the vertex without contrast. Iterative reconstruction technique was used.  CT/Cardiac nuclear examinations in the past 12 months: 1    COMPARISON:  10/06/2020    FINDINGS:  No ventricular or basal cistern effacement.  No evidence of acute intracranial hemorrhage, midline shift, mass, or mass effect.  No detected extra-axial fluid collections.  Gray-white differentiation is overall maintained.  Ill-defined rounded region of hypodensity within the right cerebrum image 41 measuring 1.4 cm in diameter.  Mild diffuse mucosal thickening and fluid within the paranasal sinuses.  The mastoid air cells are clear.  Calvarium is intact.                                       Medications   glucose chewable tablet 16 g (has no administration in time range)   glucose chewable tablet 24 g (has no administration in time range)   glucagon (human recombinant) injection 1 mg (has no administration in time range)   dextrose 10% bolus 125 mL 125 mL (has no administration in time range)   dextrose 10% bolus 250 mL 250 mL (has no administration in time range)   iohexoL (OMNIPAQUE 350) injection 100 mL (100 mLs Intravenous Given 7/13/24 1257)   aspirin tablet 325 mg (325 mg Oral Given 7/13/24 1359)   clopidogreL tablet 300 mg (300 mg Oral Given 7/13/24 1359)     Medical Decision  Making  Amount and/or Complexity of Data Reviewed  Labs: ordered.  Radiology: ordered.    Risk  OTC drugs.  Prescription drug management.      Additional MDM:     NIH Stroke Scale:   Interval = baseline (upon arrival/admit)  Level of consciousness = 0 - alert  LOC questions = 0 - answers both correctly  LOC commands = 0 - performs both correctly  Best gaze = 0 - normal  Facial palsy = 2 - partial  Motor left arm =  1 - drift  Motor right arm =  0 - no drift  Motor left leg = 2 - can't resist gravity  Motor right leg =  0 - no drift  Limb ataxia = 0 - absent  Sensory = 0 - normal  Best language = 0 - no aphasia  Dysarthria = 1 - mild to moderate dysarthria  Extinction and inattention = 0 - no neglect              ED Course as of 07/13/24 1734   Sat Jul 13, 2024 1227 Discussed with Dr. Cobb ([facility]). The patient is not a candidate for TPA. Dr. Cobb advises CTA Head and Neck; Dr. Cobb is aware that the patient is a hemodialysis patient, and he also advises that the IV Contrast can be dialyzed. [DH]   1625 The patient has had an acute CVA.  Admit for a follow up MRI. [DH]   1625 Discussed with Dr. Cordero. Admit to Dr. Duvall. [DH]      ED Course User Index  [DH] Nicola Tarango DO                           Clinical Impression:  Final diagnoses:  [R29.818] Acute focal neurological deficit  [I63.9] Cerebrovascular accident (CVA), unspecified mechanism (Primary)          ED Disposition Condition    Admit Stable                Nicola Tarango DO  07/13/24 1734

## 2024-07-13 NOTE — TELEMEDICINE CONSULT
[facility]  Vascular Neurology  Comprehensive Stroke Center  TeleVascular Neurology Acute Consultation Note        Consult Information  Consult to Telemedicine - Acute Stroke  Consult performed by: Umberto Nicholson MD  Consult ordered by: Nicola Tarango DO          Consulting Provider: NICOAL TARANGO   Current Providers  No providers found    Patient Location:  [facility] EMERGENCY DEPARTMENT Emergency Department    Spoke hospital nurse at bedside with patient assisting consultant.  Patient information was obtained from patient, past medical records, and ER records.       Stroke Documentation  Acute Stroke Times   Last Known Normal Date: 07/13/24  Last Known Normal Time: 0100  Stroke Team Called Date: 07/13/24  Stroke Team Called Time: 1214  Stroke Team Arrival Date: 07/13/24  Stroke Team Arrival Time: 1219  CT Interpretation Time: 1226  Thrombolytic Therapy Recommended: No  CTA Interpretation Time: 1310  Thrombectomy Recommended: No    NIH Scale:  1a. Level of Consciousness: 0-->Alert, keenly responsive  1b. LOC Questions: 0-->Answers both questions correctly  1c. LOC Commands: 0-->Performs both tasks correctly  2. Best Gaze: 0-->Normal  3. Visual: 0-->No visual loss  4. Facial Palsy: 2-->Partial paralysis (total or near-total paralysis of lower face)  5a. Motor Arm, Left: 2-->Some effort against gravity, limb cannot get to or maintain (if cued) 90 (or 45) degrees, drifts down to bed, but has some effort against gravity  5b. Motor Arm, Right: 0-->No drift, limb holds 90 (or 45) degrees for full 10 secs  6a. Motor Leg, Left: 3-->No effort against gravity, leg falls to bed immediately  6b. Motor Leg, Right: 0-->No drift, leg holds 30 degree position for full 5 secs  7. Limb Ataxia: 0-->Absent  8. Sensory: 0-->Normal, no sensory loss  9. Best Language: 0-->No aphasia, normal  10. Dysarthria: 1-->Mild-to-moderate dysarthria, patient slurs at least some words and, at worst, can be understood  with some difficulty  11. Extinction and Inattention (formerly Neglect): 1-->Visual, tactile, auditory, spatial, or personal inattention or extinction to bilateral simultaneous stimulation in one of the sensory modalities  Total (NIH Stroke Scale): 9      Modified Fillmore: Score: 0  Feli Coma Scale:     ABCD2 Score:    [id number] Score:    HAS -BLED Score:    ICH Score:    Hunt & Rincon Classification:      Blood pressure (!) 158/74, pulse 94, temperature 97.3 °F (36.3 °C), temperature source Tympanic, resp. rate 18, SpO2 100%, not currently breastfeeding.    Van Positive  In your opinion, this was a: Tier 1     Medical Decision Making  HPI:  44 y.o. female with a history of HTN, DM, HLD, CKD IV on HD, ardiomyopathy presents with left-sided weakness, dysarthria.  Fell today morning when she woke up at 5:00 a.m..  Not on any antithrombotics.    /67       Images personally reviewed and interpreted:  Study: Head CT  Study Interpretation:  Acute ischemic changes in the right insular cortex, extending to the BG.   ASPECTS 8  CTA - suboptimal imaging due to poor contrast bolus timing. No obvious LOV seen - M1 and M2 appear patent.      Assessment and plan:  43 y/o female with the above history who presents with acute infarct in the right MCA territory.    Not a candidate for IV lytics as she is out of the window.  CTA suboptimal due to poor contrast bolus timing (unclear if she has underlying heart failure), however, there is no obvious evidence of intracranial occlusion in the M1 and M2 segments.    -- Plavix load of 300mg x1 and ASA load of 325mg x1 now followed by dual antiplatelet therapy with ASA 81mg + Plavix 75mg x21 day, followed by monotherapy with ASA 81mg thereafter.  If already taking ASA, add Plavix with load similar to above, or, if already on Plavix, add ASA load similar to above and then dual antiplatelet therapy x21 days followed by monotherapy.  If unable to swallow, please administer rectal  aspirin 300 mg until cleared by Speech therapy.    If unable to receive aspirin due to history of renal disease, recommend just Plavix therapy.    -- MRI brain. Permissive HTN w/ BP<220/110 for the next 48-72 hrs.  -- Lipitor 40mg daily.   -- Check Lipid panel, A1c  -- Target LDL<70, A1c<7   -- TTE w/ bubble  -- PT/OT/SLP    Discussed recommendations w/ attending physician.   If MD unavailable, recommendations will be conveyed to patient's nurse.         Lytics recommendation: Thrombolytic therapy not recommended due to Outside of treatment window   Thrombectomy recommendation: No; No large vessel occlusion identified on imaging   Placement recommendation: pending further studies  admit to inpatient               ROS  Physical Exam  Neurological:      Mental Status: She is alert and oriented to person, place, and time.      Cranial Nerves: Dysarthria and facial asymmetry present.      Sensory: No sensory deficit.      Motor: Weakness and pronator drift present.      Comments: Possibly has some sensory neglect in the left lower extremity       Past Medical History:   Diagnosis Date    Anemia     Anemia     Breast mass     Chronic constipation     CKD (chronic kidney disease)     Diabetes mellitus     Dialysis patient     MON, TUE, ALANNAXIN, SAT.    Embolic stroke involving right middle cerebral artery 2024    Encounter for blood transfusion     Erosive gastritis 2016    GERD (gastroesophageal reflux disease)     Gout     High cholesterol     Hypertension     Knee pain     Thyroid disease     Unspecified cataract 2022     Past Surgical History:   Procedure Laterality Date    AV FISTULA PLACEMENT Left 2019    Procedure: creation av fistula;  Surgeon: Buck Fernandez MD;  Location: [facility];  Service: Cardiovascular;  Laterality: Left;  Left Radialcephalic: PACU     SECTION  , , ,     CHOLECYSTECTOMY      COLONOSCOPY N/A 10/23/2017    Procedure: COLONOSCOPY;  Surgeon: Bri  MD Kamryn;  Location: [facility];  Service: Endoscopy;  Laterality: N/A;    EXCISIONAL BIOPSY Left 12/30/2022    Procedure: EXCISIONAL BIOPSY;  Surgeon: Mick Juarez MD;  Location: [facility];  Service: General;  Laterality: Left;    TUBAL LIGATION      UPPER GASTROINTESTINAL ENDOSCOPY  03/31/2016    Erosive Gastritis-Dr Bailey     Family History   Problem Relation Name Age of Onset    Thyroid disease Mother      Diabetes Father      Ovarian cancer Maternal Aunt Malathi     Breast cancer Maternal Aunt Ada        Diagnoses  Problem Noted   Embolic Stroke Involving Right Middle Cerebral Artery 7/13/2024       Umberto Nicholson MD      Emergent/Acute neurological consultation requested by spoke provider due to critical concerns for possible cerebrovascular event that could result in permanent loss of neurologic/bodily function, severe disability or death of this patient.  Immediate/timely evaluation by a highly prepared expert is paramount for optimal outcomes  High risk for neurological deterioration if not properly diagnosed  High risk for neurological deterioration if not treated promplty/as soon as possible  Complex diagnostic evaluation may be required (advanced imaging)  High risk treatment options (thrombolytics and/or thrombectomy)    Patient care was coordinated with spoke provider, including but not limted to    Discussing likely diagnosis/etiology of symptoms  Making recommendations for further diagnostic studies  Making recommendations for intravenous thrombolytics or other advanced therapies  Making recommendations for disposition (admission/transfer for higher level of care)

## 2024-07-13 NOTE — SUBJECTIVE & OBJECTIVE
HPI:  44 y.o. female with a history of HTN, DM, HLD, CKD IV on HD, ardiomyopathy presents with left-sided weakness, dysarthria.  Fell today morning when she woke up at 5:00 a.m..  Not on any antithrombotics.    /67       Images personally reviewed and interpreted:  Study: Head CT  Study Interpretation:  Acute ischemic changes in the right insular cortex, extending to the BG.   ASPECTS 8  CTA - suboptimal imaging due to poor contrast bolus timing. M1 and M2 appear patent.      Assessment and plan:  45 y/o female with the above history who presents with acute infarct in the right MCA territory.    Not a candidate for IV lytics as she is out of the window.  CTA suboptimal due to poor contrast bolus timing (unclear if she has underlying heart failure), however, there is no obvious evidence of intracranial occlusion in the M1 and M2 segments.    -- Plavix load of 300mg x1 and ASA load of 325mg x1 now followed by dual antiplatelet therapy with ASA 81mg + Plavix 75mg x21 day, followed by monotherapy with ASA 81mg thereafter.  If already taking ASA, add Plavix with load similar to above, or, if already on Plavix, add ASA load similar to above and then dual antiplatelet therapy x21 days followed by monotherapy.  If unable to swallow, please administer rectal aspirin 300 mg until cleared by Speech therapy.    If unable to receive aspirin due to history of renal disease, recommend just Plavix therapy.    -- MRI brain. Permissive HTN w/ BP<220/110 until MRI rules out the presence of acute ischemia. If acute ischemia seen, please maintain permissive HTN for the next 48-72 hrs.  -- Lipitor 40mg daily.   -- Check Lipid panel, A1c  -- Target LDL<70, A1c<7   -- TTE w/ bubble  -- PT/OT/SLP    Discussed recommendations w/ attending physician.   If MD unavailable, recommendations will be conveyed to patient's nurse.         Lytics recommendation: Thrombolytic therapy not recommended due to Outside of treatment window    Thrombectomy recommendation: No; No large vessel occlusion identified on imaging   Placement recommendation: pending further studies  admit to inpatient

## 2024-07-14 PROBLEM — N18.6 ESRD (END STAGE RENAL DISEASE) ON DIALYSIS: Status: ACTIVE | Noted: 2020-10-06

## 2024-07-14 PROBLEM — Z99.2 ESRD (END STAGE RENAL DISEASE) ON DIALYSIS: Status: ACTIVE | Noted: 2020-10-06

## 2024-07-14 LAB
ALBUMIN SERPL BCP-MCNC: 3.3 G/DL (ref 3.5–5.2)
ALP SERPL-CCNC: 206 U/L (ref 55–135)
ALT SERPL W/O P-5'-P-CCNC: 19 U/L (ref 10–44)
ANION GAP SERPL CALC-SCNC: 8 MMOL/L (ref 3–11)
AST SERPL-CCNC: 31 U/L (ref 10–40)
BASOPHILS # BLD AUTO: 0.07 K/UL (ref 0–0.2)
BASOPHILS NFR BLD: 1.2 % (ref 0–1.9)
BILIRUB SERPL-MCNC: 1 MG/DL (ref 0.1–1)
BUN SERPL-MCNC: 37 MG/DL (ref 6–20)
CALCIUM SERPL-MCNC: 9.1 MG/DL (ref 8.7–10.5)
CHLORIDE SERPL-SCNC: 99 MMOL/L (ref 95–110)
CO2 SERPL-SCNC: 28 MMOL/L (ref 23–29)
CREAT SERPL-MCNC: 6.6 MG/DL (ref 0.5–1.4)
DIFFERENTIAL METHOD BLD: ABNORMAL
EOSINOPHIL # BLD AUTO: 0.4 K/UL (ref 0–0.5)
EOSINOPHIL NFR BLD: 7.3 % (ref 0–8)
ERYTHROCYTE [DISTWIDTH] IN BLOOD BY AUTOMATED COUNT: 18.2 % (ref 11.5–14.5)
EST. GFR  (NO RACE VARIABLE): 7.4 ML/MIN/1.73 M^2
GLUCOSE SERPL-MCNC: 137 MG/DL (ref 70–110)
HCT VFR BLD AUTO: 31.1 % (ref 37–48.5)
HGB BLD-MCNC: 10.2 G/DL (ref 12–16)
IMM GRANULOCYTES # BLD AUTO: 0.15 K/UL (ref 0–0.04)
IMM GRANULOCYTES NFR BLD AUTO: 2.5 % (ref 0–0.5)
LYMPHOCYTES # BLD AUTO: 0.9 K/UL (ref 1–4.8)
LYMPHOCYTES NFR BLD: 14.5 % (ref 18–48)
MCH RBC QN AUTO: 29.2 PG (ref 27–31)
MCHC RBC AUTO-ENTMCNC: 32.8 G/DL (ref 32–36)
MCV RBC AUTO: 89 FL (ref 82–98)
MONOCYTES # BLD AUTO: 1 K/UL (ref 0.3–1)
MONOCYTES NFR BLD: 17.2 % (ref 4–15)
NEUTROPHILS # BLD AUTO: 3.5 K/UL (ref 1.8–7.7)
NEUTROPHILS NFR BLD: 57.3 % (ref 38–73)
NRBC BLD-RTO: 0 /100 WBC
OHS QRS DURATION: 84 MS
OHS QTC CALCULATION: 478 MS
PLATELET # BLD AUTO: 164 K/UL (ref 150–450)
PMV BLD AUTO: 12.1 FL (ref 9.2–12.9)
POCT GLUCOSE: 113 MG/DL (ref 70–110)
POCT GLUCOSE: 121 MG/DL (ref 70–110)
POCT GLUCOSE: 126 MG/DL (ref 70–110)
POTASSIUM SERPL-SCNC: 3 MMOL/L (ref 3.5–5.1)
PROT SERPL-MCNC: 7.9 G/DL (ref 6–8.4)
RBC # BLD AUTO: 3.49 M/UL (ref 4–5.4)
SODIUM SERPL-SCNC: 135 MMOL/L (ref 136–145)
WBC # BLD AUTO: 6.05 K/UL (ref 3.9–12.7)

## 2024-07-14 PROCEDURE — 63600175 PHARM REV CODE 636 W HCPCS: Performed by: INTERNAL MEDICINE

## 2024-07-14 PROCEDURE — 99900031 HC PATIENT EDUCATION (STAT)

## 2024-07-14 PROCEDURE — 80053 COMPREHEN METABOLIC PANEL: CPT | Performed by: EMERGENCY MEDICINE

## 2024-07-14 PROCEDURE — 25000003 PHARM REV CODE 250: Performed by: EMERGENCY MEDICINE

## 2024-07-14 PROCEDURE — 36415 COLL VENOUS BLD VENIPUNCTURE: CPT | Performed by: EMERGENCY MEDICINE

## 2024-07-14 PROCEDURE — 21400001 HC TELEMETRY ROOM

## 2024-07-14 PROCEDURE — 25000003 PHARM REV CODE 250: Performed by: INTERNAL MEDICINE

## 2024-07-14 PROCEDURE — 94761 N-INVAS EAR/PLS OXIMETRY MLT: CPT

## 2024-07-14 PROCEDURE — 85025 COMPLETE CBC W/AUTO DIFF WBC: CPT | Performed by: EMERGENCY MEDICINE

## 2024-07-14 PROCEDURE — 27000221 HC OXYGEN, UP TO 24 HOURS

## 2024-07-14 PROCEDURE — 99900035 HC TECH TIME PER 15 MIN (STAT)

## 2024-07-14 RX ORDER — POTASSIUM CHLORIDE 20 MEQ/1
40 TABLET, EXTENDED RELEASE ORAL ONCE
Status: COMPLETED | OUTPATIENT
Start: 2024-07-14 | End: 2024-07-14

## 2024-07-14 RX ORDER — SODIUM CHLORIDE 9 MG/ML
INJECTION, SOLUTION INTRAVENOUS ONCE
Status: CANCELLED | OUTPATIENT
Start: 2024-07-14 | End: 2024-07-14

## 2024-07-14 RX ORDER — MUPIROCIN 20 MG/G
OINTMENT TOPICAL 2 TIMES DAILY
Status: CANCELLED | OUTPATIENT
Start: 2024-07-14 | End: 2024-07-19

## 2024-07-14 RX ORDER — INSULIN GLARGINE 100 [IU]/ML
20 INJECTION, SOLUTION SUBCUTANEOUS DAILY
Status: DISCONTINUED | OUTPATIENT
Start: 2024-07-14 | End: 2024-07-17 | Stop reason: HOSPADM

## 2024-07-14 RX ORDER — INSULIN ASPART 100 [IU]/ML
0-5 INJECTION, SOLUTION INTRAVENOUS; SUBCUTANEOUS
Status: DISCONTINUED | OUTPATIENT
Start: 2024-07-14 | End: 2024-07-17 | Stop reason: HOSPADM

## 2024-07-14 RX ORDER — PANTOPRAZOLE SODIUM 40 MG/1
40 TABLET, DELAYED RELEASE ORAL DAILY
Status: DISCONTINUED | OUTPATIENT
Start: 2024-07-14 | End: 2024-07-16

## 2024-07-14 RX ORDER — LEVOTHYROXINE SODIUM 50 UG/1
50 TABLET ORAL EVERY MORNING
Status: DISCONTINUED | OUTPATIENT
Start: 2024-07-14 | End: 2024-07-15

## 2024-07-14 RX ADMIN — ATORVASTATIN CALCIUM 40 MG: 40 TABLET, FILM COATED ORAL at 09:07

## 2024-07-14 RX ADMIN — AMLODIPINE BESYLATE 10 MG: 10 TABLET ORAL at 09:07

## 2024-07-14 RX ADMIN — PANTOPRAZOLE SODIUM 40 MG: 40 TABLET, DELAYED RELEASE ORAL at 10:07

## 2024-07-14 RX ADMIN — OXYCODONE HYDROCHLORIDE AND ACETAMINOPHEN 1 TABLET: 5; 325 TABLET ORAL at 11:07

## 2024-07-14 RX ADMIN — ENOXAPARIN SODIUM 30 MG: 40 INJECTION SUBCUTANEOUS at 07:07

## 2024-07-14 RX ADMIN — POTASSIUM CHLORIDE 40 MEQ: 20 TABLET, EXTENDED RELEASE ORAL at 10:07

## 2024-07-14 RX ADMIN — INSULIN GLARGINE 20 UNITS: 100 INJECTION, SOLUTION SUBCUTANEOUS at 11:07

## 2024-07-14 NOTE — SUBJECTIVE & OBJECTIVE
Past Medical History:   Diagnosis Date    Anemia     Anemia     Breast mass     Chronic constipation     CKD (chronic kidney disease)     Diabetes mellitus     Dialysis patient     GRZEGORZ BARRIOS, THURS, SAT.    Embolic stroke involving right middle cerebral artery 2024    Encounter for blood transfusion     Erosive gastritis 2016    GERD (gastroesophageal reflux disease)     Gout     High cholesterol     Hypertension     Knee pain     Thyroid disease     Unspecified cataract 2022       Past Surgical History:   Procedure Laterality Date    AV FISTULA PLACEMENT Left 2019    Procedure: creation av fistula;  Surgeon: Buck Fernandez MD;  Location: [facility];  Service: Cardiovascular;  Laterality: Left;  Left Radialcephalic: PACU     SECTION  , , ,     CHOLECYSTECTOMY      COLONOSCOPY N/A 10/23/2017    Procedure: COLONOSCOPY;  Surgeon: Bri Harry MD;  Location: [facility];  Service: Endoscopy;  Laterality: N/A;    EXCISIONAL BIOPSY Left 2022    Procedure: EXCISIONAL BIOPSY;  Surgeon: Mick Juarez MD;  Location: [facility];  Service: General;  Laterality: Left;    TUBAL LIGATION      UPPER GASTROINTESTINAL ENDOSCOPY  2016    Erosive Gastritis-Dr aBiley       Review of patient's allergies indicates:   Allergen Reactions    Tramadol Itching and Nausea And Vomiting       No current facility-administered medications on file prior to encounter.     Current Outpatient Medications on File Prior to Encounter   Medication Sig    acetaminophen (TYLENOL) 500 MG tablet Take 1 tablet (500 mg total) by mouth daily as needed for Pain.    amlodipine (NORVASC) 10 MG tablet Take 1 tablet (10 mg total) by mouth once daily.    atorvastatin (LIPITOR) 40 MG tablet Take 1 tablet by mouth once daily    EUTHYROX 50 mcg tablet Take 1 tablet (50 mcg total) by mouth every morning.    furosemide (LASIX) 40 MG tablet Take 40 mg by mouth 2 (two) times daily.    gabapentin (NEURONTIN) 400 MG capsule  Take 1 capsule by mouth 3 (three) times daily.    LANTUS U-100 INSULIN 100 unit/mL injection 70 Units every evening.    LIDOcaine HCl 2% (LIDOCAINE VISCOUS) 2 % Soln by Mucous Membrane route every 3 (three) hours. Apply with a piece of gauze or cotton ball between cheek and gums.    minoxidiL (LONITEN) 10 MG Tab Take 10 mg by mouth.    nebivoloL (BYSTOLIC) 20 mg Tab Take 1 tablet by mouth.    ondansetron (ZOFRAN-ODT) 4 MG TbDL Take 1 tablet (4 mg total) by mouth every 8 (eight) hours as needed ([place]).    oxyCODONE (ROXICODONE) 5 MG immediate release tablet Take 1 tablet (5 mg total) by mouth every 6 (six) hours as needed for Pain.    pantoprazole (PROTONIX) 40 MG tablet Take 40 mg by mouth.    traZODone (DESYREL) 100 MG tablet Take 100 mg by mouth nightly as needed.    acetaminophen (TYLENOL) 650 MG TbSR Take 1 tablet (650 mg total) by mouth every 6 (six) hours as needed (pain). (Patient not taking: Reported on 2/15/2023)    acetaminophen with codeine (ACETAMINOPHEN-CODEINE) 120mg 12mg 5mL Soln     colchicine (COLCRYS) 0.6 mg tablet TAKE 1 TABLET BY MOUTH ONCE DAILY AS NEEDED FOR GOUT PAIN    HYDROcodone-acetaminophen (NORCO)  mg per tablet Take 1 tablet by mouth every 6 (six) hours as needed for Pain (pain).    insulin aspart U-100 (NOVOLOG FLEXPEN U-100 INSULIN) 100 unit/mL (3 mL) InPn pen Inject 10 Units into the skin 2 (two) times daily with meals.    lactulose (CHRONULAC) 10 gram/15 mL solution Take by mouth.     linaGLIPtin (TRADJENTA) 5 mg Tab tablet TAKE 1 TABLET (5 MG TOTAL) BY MOUTH ONCE DAILY.    NIFEdipine (PROCARDIA-XL) 60 MG (OSM) 24 hr tablet Take 1 tablet by mouth.    ondansetron (ZOFRAN-ODT) 4 MG TbDL Take 1 tablet (4 mg total) by mouth every 8 (eight) hours as needed (n/v).    polyethylene glycol (GLYCOLAX) 17 gram/dose powder Take 17 g by mouth every evening.    senna-docusate 8.6-50 mg (SENNA WITH DOCUSATE SODIUM) 8.6-50 mg per tablet Take 1 tablet by mouth 2 (two) times daily.     sorbitol 70 % solution Take 30 mLs by mouth daily as needed (Severe constipation.  Use prn no BM X 3 days despite Miralax qhs.).    sulfamethoxazole-trimethoprim 400-80mg (BACTRIM,SEPTRA) 400-80 mg per tablet Take 1 tablet by mouth.    temazepam (RESTORIL) 15 mg Cap Take 15 mg by mouth every evening.    zolpidem (AMBIEN) 5 MG Tab Take 5 mg by mouth nightly as needed.     Family History       Problem Relation (Age of Onset)    Breast cancer Maternal Aunt    Diabetes Father    Ovarian cancer Maternal Aunt    Thyroid disease Mother          Tobacco Use    Smoking status: Never    Smokeless tobacco: Never   Substance and Sexual Activity    Alcohol use: No     Alcohol/week: 0.0 standard drinks of alcohol    Drug use: No    Sexual activity: Yes     Partners: Male     Birth control/protection: None, Surgical     Review of Systems   Constitutional:  Negative for chills, fatigue and fever.   HENT:  Negative for congestion.    Respiratory:  Positive for shortness of breath (at baseline). Negative for wheezing.    Cardiovascular:  Positive for leg swelling. Negative for chest pain.   Gastrointestinal:  Negative for abdominal pain, constipation, diarrhea, nausea and vomiting.   Musculoskeletal:  Positive for arthralgias and gait problem. Negative for back pain.   Skin:  Negative for wound.   Neurological:  Positive for weakness and numbness. Negative for speech difficulty.   Psychiatric/Behavioral:  Negative for agitation. The patient is not nervous/anxious.      Objective:     Vital Signs (Most Recent):  Temp: 97.7 °F (36.5 °C) (07/14/24 0808)  Pulse: 62 (07/14/24 1131)  Resp: 20 (07/14/24 0808)  BP: (!) 141/67 (07/14/24 0808)  SpO2: 100 % (07/14/24 1131) Vital Signs (24h Range):  Temp:  [97.7 °F (36.5 °C)-98.6 °F (37 °C)] 97.7 °F (36.5 °C)  Pulse:  [62-68] 62  Resp:  [12-20] 20  SpO2:  [97 %-100 %] 100 %  BP: (117-154)/(56-74) 141/67     Weight: 104.3 kg (229 lb 15 oz)  Body mass index is 39.47 kg/m².     Physical  Exam  Vitals and nursing note reviewed.   Constitutional:       General: She is not in acute distress.     Appearance: She is well-developed. She is not diaphoretic.   HENT:      Head: Normocephalic and atraumatic.      Nose: No congestion or rhinorrhea.   Eyes:      General: No scleral icterus.        Right eye: No discharge.         Left eye: No discharge.      Extraocular Movements: Extraocular movements intact.   Cardiovascular:      Rate and Rhythm: Normal rate.   Pulmonary:      Effort: Pulmonary effort is normal. No respiratory distress.   Musculoskeletal:      Cervical back: Normal range of motion.   Skin:     Coloration: Skin is not jaundiced or pale.   Neurological:      General: No focal deficit present.      Mental Status: She is alert. Mental status is at baseline.      Cranial Nerves: Dysarthria (improved) and facial asymmetry present.      Sensory: Sensory deficit (left lower extremity) present.      Motor: Weakness and pronator drift present.      Comments: Patient reported upper extremity weakness improved                Significant Labs: All pertinent labs within the past 24 hours have been reviewed.  A1C:   Recent Labs   Lab 07/13/24  1250   HGBA1C 5.6     Recent Lab Results         07/14/24  1239   07/14/24  0332   07/13/24  2122        Albumin   3.3         ALP   206         ALT   19         Anion Gap   8         AST   31         Baso #   0.07         Basophil %   1.2         BILIRUBIN TOTAL   1.0  Comment: For infants and newborns, interpretation of results should be based  on gestational age, weight and in agreement with clinical  observations.    Premature Infant recommended reference ranges:  Up to 24 hours.............<8.0 mg/dL  Up to 48 hours............<12.0 mg/dL  3-5 days..................<15.0 mg/dL  6-29 days.................<15.0 mg/dL    For patients on Eltrombopag therapy, use of Dimension [place] TBIL is   not   recommended.           BUN   37         Calcium   9.1         Chloride    99         CO2   28         Creatinine   6.6         Differential Method   Automated         eGFR   7.4         Eos #   0.4         Eos %   7.3         Glucose   137         Gran # (ANC)   3.5         Gran %   57.3         Hematocrit   31.1         Hemoglobin   10.2         Immature Grans (Abs)   0.15  Comment: Mild elevation in immature granulocytes is non specific and   can be seen in a variety of conditions including stress response,   acute inflammation, trauma and pregnancy. Correlation with other   laboratory and clinical findings is essential.           Immature Granulocytes   2.5         Lymph #   0.9         Lymph %   14.5         MCH   29.2         MCHC   32.8         MCV   89         Mono #   1.0         Mono %   17.2         MPV   12.1         nRBC   0         Platelet Count   164         POCT Glucose 126     114       Potassium   3.0         PROTEIN TOTAL   7.9         RBC   3.49         RDW   18.2         Sodium   135         WBC   6.05                 Significant Imaging: I have reviewed all pertinent imaging results/findings within the past 24 hours.

## 2024-07-14 NOTE — HPI
Patient 44-year-old female with past medical history hypertension, diabetes, hyperlipidemia, ESRD on HD, cardiomyopathy, presented with left-sided weakness and dysarthria about 7 hours prior to arrival, patient reported that she noted initial symptom of weakness when she went to grab her cup and dropped it, patient reported she went to bed around 1:00 a.m., woke up at 5:00 a.m. and fell, came to the ED for evaluation, had evaluation with telemedicine vascular Neurology, CTA was completed, suspected right MCA infarct, patient admitted for MRI and 2D echo, patient reported she was on aspirin therapy prior to admission Plavix started, patient admitted for stroke workup

## 2024-07-14 NOTE — H&P
[facility] Medicine  History & Physical    Patient Name: Pilar Fernandez  MRN: [id number]  Patient Class: IP- Inpatient  Admission Date: 7/13/2024  Attending Physician: Estiven Duvall DO   Primary Care Provider: Lindsey Singletary FNP         Patient information was obtained from patient, past medical records, and ER records.     Subjective:     Principal Problem:Embolic stroke involving right middle cerebral artery    Chief Complaint:   Chief Complaint   Patient presents with    Extremity Weakness     Pt arrived per EMS with symptoms of possible CVA. Pt has L sided deficits, L sided facial drooping and slurred speech since 0500 this am after falling. Last known well at 0100 this am.  Pt states on O2 at @L nC at home. Pt denies having previous strokes. Pt on dialysis.         HPI: Patient 44-year-old female with past medical history hypertension, diabetes, hyperlipidemia, ESRD on HD, cardiomyopathy, presented with left-sided weakness and dysarthria about 7 hours prior to arrival, patient reported that she noted initial symptom of weakness when she went to grab her cup and dropped it, patient reported she went to bed around 1:00 a.m., woke up at 5:00 a.m. and fell, came to the ED for evaluation, had evaluation with telemedicine vascular Neurology, CTA was completed, suspected right MCA infarct, patient admitted for MRI and 2D echo, patient reported she was on aspirin therapy prior to admission Plavix started, patient admitted for stroke workup        Past Medical History:   Diagnosis Date    Anemia     Anemia     Breast mass     Chronic constipation     CKD (chronic kidney disease)     Diabetes mellitus     Dialysis patient     MON, TUE, THURS, SAT.    Embolic stroke involving right middle cerebral artery 7/13/2024    Encounter for blood transfusion     Erosive gastritis 03/2016    GERD (gastroesophageal reflux disease)     Gout     High cholesterol     Hypertension     Knee pain      Thyroid disease     Unspecified cataract 2022       Past Surgical History:   Procedure Laterality Date    AV FISTULA PLACEMENT Left 2019    Procedure: creation av fistula;  Surgeon: Buck Fernandez MD;  Location: [facility];  Service: Cardiovascular;  Laterality: Left;  Left Radialcephalic: PACU     SECTION  , , ,     CHOLECYSTECTOMY      COLONOSCOPY N/A 10/23/2017    Procedure: COLONOSCOPY;  Surgeon: Bri Harry MD;  Location: [facility];  Service: Endoscopy;  Laterality: N/A;    EXCISIONAL BIOPSY Left 2022    Procedure: EXCISIONAL BIOPSY;  Surgeon: Mick Juarez MD;  Location: [facility];  Service: General;  Laterality: Left;    TUBAL LIGATION      UPPER GASTROINTESTINAL ENDOSCOPY  2016    Erosive Gastritis-Dr Bailey       Review of patient's allergies indicates:   Allergen Reactions    Tramadol Itching and Nausea And Vomiting       No current facility-administered medications on file prior to encounter.     Current Outpatient Medications on File Prior to Encounter   Medication Sig    acetaminophen (TYLENOL) 500 MG tablet Take 1 tablet (500 mg total) by mouth daily as needed for Pain.    amlodipine (NORVASC) 10 MG tablet Take 1 tablet (10 mg total) by mouth once daily.    atorvastatin (LIPITOR) 40 MG tablet Take 1 tablet by mouth once daily    EUTHYROX 50 mcg tablet Take 1 tablet (50 mcg total) by mouth every morning.    furosemide (LASIX) 40 MG tablet Take 40 mg by mouth 2 (two) times daily.    gabapentin (NEURONTIN) 400 MG capsule Take 1 capsule by mouth 3 (three) times daily.    LANTUS U-100 INSULIN 100 unit/mL injection 70 Units every evening.    LIDOcaine HCl 2% (LIDOCAINE VISCOUS) 2 % Soln by Mucous Membrane route every 3 (three) hours. Apply with a piece of gauze or cotton ball between cheek and gums.    minoxidiL (LONITEN) 10 MG Tab Take 10 mg by mouth.    nebivoloL (BYSTOLIC) 20 mg Tab Take 1 tablet by mouth.    ondansetron (ZOFRAN-ODT) 4 MG TbDL Take 1  tablet (4 mg total) by mouth every 8 (eight) hours as needed ([place]).    oxyCODONE (ROXICODONE) 5 MG immediate release tablet Take 1 tablet (5 mg total) by mouth every 6 (six) hours as needed for Pain.    pantoprazole (PROTONIX) 40 MG tablet Take 40 mg by mouth.    traZODone (DESYREL) 100 MG tablet Take 100 mg by mouth nightly as needed.    acetaminophen (TYLENOL) 650 MG TbSR Take 1 tablet (650 mg total) by mouth every 6 (six) hours as needed (pain). (Patient not taking: Reported on 2/15/2023)    acetaminophen with codeine (ACETAMINOPHEN-CODEINE) 120mg 12mg 5mL Soln     colchicine (COLCRYS) 0.6 mg tablet TAKE 1 TABLET BY MOUTH ONCE DAILY AS NEEDED FOR GOUT PAIN    HYDROcodone-acetaminophen (NORCO)  mg per tablet Take 1 tablet by mouth every 6 (six) hours as needed for Pain (pain).    insulin aspart U-100 (NOVOLOG FLEXPEN U-100 INSULIN) 100 unit/mL (3 mL) InPn pen Inject 10 Units into the skin 2 (two) times daily with meals.    lactulose (CHRONULAC) 10 gram/15 mL solution Take by mouth.     linaGLIPtin (TRADJENTA) 5 mg Tab tablet TAKE 1 TABLET (5 MG TOTAL) BY MOUTH ONCE DAILY.    NIFEdipine (PROCARDIA-XL) 60 MG (OSM) 24 hr tablet Take 1 tablet by mouth.    ondansetron (ZOFRAN-ODT) 4 MG TbDL Take 1 tablet (4 mg total) by mouth every 8 (eight) hours as needed (n/v).    polyethylene glycol (GLYCOLAX) 17 gram/dose powder Take 17 g by mouth every evening.    senna-docusate 8.6-50 mg (SENNA WITH DOCUSATE SODIUM) 8.6-50 mg per tablet Take 1 tablet by mouth 2 (two) times daily.    sorbitol 70 % solution Take 30 mLs by mouth daily as needed (Severe constipation.  Use prn no BM X 3 days despite Miralax qhs.).    sulfamethoxazole-trimethoprim 400-80mg (BACTRIM,SEPTRA) 400-80 mg per tablet Take 1 tablet by mouth.    temazepam (RESTORIL) 15 mg Cap Take 15 mg by mouth every evening.    zolpidem (AMBIEN) 5 MG Tab Take 5 mg by mouth nightly as needed.     Family History       Problem Relation (Age of Onset)    Breast cancer  Maternal Aunt    Diabetes Father    Ovarian cancer Maternal Aunt    Thyroid disease Mother          Tobacco Use    Smoking status: Never    Smokeless tobacco: Never   Substance and Sexual Activity    Alcohol use: No     Alcohol/week: 0.0 standard drinks of alcohol    Drug use: No    Sexual activity: Yes     Partners: Male     Birth control/protection: None, Surgical     Review of Systems   Constitutional:  Negative for chills, fatigue and fever.   HENT:  Negative for congestion.    Respiratory:  Positive for shortness of breath (at baseline). Negative for wheezing.    Cardiovascular:  Positive for leg swelling. Negative for chest pain.   Gastrointestinal:  Negative for abdominal pain, constipation, diarrhea, nausea and vomiting.   Musculoskeletal:  Positive for arthralgias and gait problem. Negative for back pain.   Skin:  Negative for wound.   Neurological:  Positive for weakness and numbness. Negative for speech difficulty.   Psychiatric/Behavioral:  Negative for agitation. The patient is not nervous/anxious.      Objective:     Vital Signs (Most Recent):  Temp: 97.7 °F (36.5 °C) (07/14/24 0808)  Pulse: 62 (07/14/24 1131)  Resp: 20 (07/14/24 0808)  BP: (!) 141/67 (07/14/24 0808)  SpO2: 100 % (07/14/24 1131) Vital Signs (24h Range):  Temp:  [97.7 °F (36.5 °C)-98.6 °F (37 °C)] 97.7 °F (36.5 °C)  Pulse:  [62-68] 62  Resp:  [12-20] 20  SpO2:  [97 %-100 %] 100 %  BP: (117-154)/(56-74) 141/67     Weight: 104.3 kg (229 lb 15 oz)  Body mass index is 39.47 kg/m².     Physical Exam  Vitals and nursing note reviewed.   Constitutional:       General: She is not in acute distress.     Appearance: She is well-developed. She is not diaphoretic.   HENT:      Head: Normocephalic and atraumatic.      Nose: No congestion or rhinorrhea.   Eyes:      General: No scleral icterus.        Right eye: No discharge.         Left eye: No discharge.      Extraocular Movements: Extraocular movements intact.   Cardiovascular:      Rate and  Rhythm: Normal rate.   Pulmonary:      Effort: Pulmonary effort is normal. No respiratory distress.   Musculoskeletal:      Cervical back: Normal range of motion.   Skin:     Coloration: Skin is not jaundiced or pale.   Neurological:      General: No focal deficit present.      Mental Status: She is alert. Mental status is at baseline.      Cranial Nerves: Dysarthria (improved) and facial asymmetry present.      Sensory: Sensory deficit (left lower extremity) present.      Motor: Weakness and pronator drift present.      Comments: Patient reported upper extremity weakness improved                Significant Labs: All pertinent labs within the past 24 hours have been reviewed.  A1C:   Recent Labs   Lab 07/13/24  1250   HGBA1C 5.6     Recent Lab Results         07/14/24  1239   07/14/24  0332   07/13/24  2122        Albumin   3.3         ALP   206         ALT   19         Anion Gap   8         AST   31         Baso #   0.07         Basophil %   1.2         BILIRUBIN TOTAL   1.0  Comment: For infants and newborns, interpretation of results should be based  on gestational age, weight and in agreement with clinical  observations.    Premature Infant recommended reference ranges:  Up to 24 hours.............<8.0 mg/dL  Up to 48 hours............<12.0 mg/dL  3-5 days..................<15.0 mg/dL  6-29 days.................<15.0 mg/dL    For patients on Eltrombopag therapy, use of Dimension [place] TBIL is   not   recommended.           BUN   37         Calcium   9.1         Chloride   99         CO2   28         Creatinine   6.6         Differential Method   Automated         eGFR   7.4         Eos #   0.4         Eos %   7.3         Glucose   137         Gran # (ANC)   3.5         Gran %   57.3         Hematocrit   31.1         Hemoglobin   10.2         Immature Grans (Abs)   0.15  Comment: Mild elevation in immature granulocytes is non specific and   can be seen in a variety of conditions including stress response,   acute  inflammation, trauma and pregnancy. Correlation with other   laboratory and clinical findings is essential.           Immature Granulocytes   2.5         Lymph #   0.9         Lymph %   14.5         MCH   29.2         MCHC   32.8         MCV   89         Mono #   1.0         Mono %   17.2         MPV   12.1         nRBC   0         Platelet Count   164         POCT Glucose 126     114       Potassium   3.0         PROTEIN TOTAL   7.9         RBC   3.49         RDW   18.2         Sodium   135         WBC   6.05                 Significant Imaging: I have reviewed all pertinent imaging results/findings within the past 24 hours.  Assessment/Plan:     * Embolic stroke involving right middle cerebral artery  Patient with episode of left-sided upper and lower extremity weakness, dysarthria and slurred speech, tele neuro stroke consult completed, aspirin Plavix, MRI pending, PT OT, statin, does not smoke  Antithrombotics for secondary stroke prevention: Antiplatelets: Aspirin: 81 mg daily  Clopidogrel: 300 mg loading dose x 1, now    Statins for secondary stroke prevention and hyperlipidemia, if present:   Statins: Atorvastatin- 40 mg daily    Aggressive risk factor modification: HTN, DM, HLD, Diet, Obesity     Rehab efforts: The patient has been evaluated by a stroke team provider and the therapy needs have been fully considered based off the presenting complaints and exam findings. The following therapy evaluations are needed: PT evaluate and treat, OT evaluate and treat, SLP evaluate and treat    Diagnostics ordered/pending: CTA Head to assess vasculature , CTA Neck/Arch to assess vasculature, MRI head without contrast to assess brain parenchyma, TTE to assess cardiac function/status     VTE prophylaxis: Enoxaparin (CrCl < 30 ml/min) 30 mg SQ every 24 hours    BP parameters: Infarct: No intervention, SBP <220        Hypothyroidism  Continue Synthroid      ESRD (end stage renal disease) on dialysis  Creatine stable for  now. BMP reviewed- noted Estimated Creatinine Clearance: 12.7 mL/min (A) (based on SCr of 6.6 mg/dL (H)). according to latest data. Based on current GFR, CKD stage is end stage.  Monitor UOP and serial BMP and adjust therapy as needed. Renally dose meds. Avoid nephrotoxic medications and procedures.  -patient on home dialysis, usually does four days a week, renal consult    GERD (gastroesophageal reflux disease)  Continue Protonix      Hyperlipidemia associated with type 2 diabetes mellitus  Continue Lipitor      Essential hypertension  Chronic,  allow permissive hypertension in setting of acute stroke . Latest blood pressure and vitals reviewed-     Temp:  [97.7 °F (36.5 °C)-98.6 °F (37 °C)]   Pulse:  [62-68]   Resp:  [12-20]   BP: (117-154)/(56-74)   SpO2:  [97 %-100 %] .   Home meds for hypertension were reviewed and noted below.   Hypertension Medications               amlodipine (NORVASC) 10 MG tablet Take 1 tablet (10 mg total) by mouth once daily.    furosemide (LASIX) 40 MG tablet Take 40 mg by mouth 2 (two) times daily.    minoxidiL (LONITEN) 10 MG Tab Take 10 mg by mouth.    nebivoloL (BYSTOLIC) 20 mg Tab Take 1 tablet by mouth.    NIFEdipine (PROCARDIA-XL) 60 MG (OSM) 24 hr tablet Take 1 tablet by mouth.            While in the hospital, will manage blood pressure as follows; medications held secondary to acute stroke    Will utilize p.r.n. blood pressure medication only if patient's blood pressure greater than 220/110 and she develops symptoms such as worsening chest pain or shortness of breath.    Type 2 diabetes mellitus with hyperglycemia, with long-term current use of insulin  Hold po meds, insulin regime and/or CSI, goal 140-180, adjust prn        VTE Risk Mitigation (From admission, onward)           Ordered     enoxaparin injection 30 mg  Daily         07/13/24 2108     IP VTE HIGH RISK PATIENT  Once         07/13/24 2108     Place sequential compression device  Until discontinued         07/13/24  0481                                    Maximus Cordero MD  Department of Hospital Medicine  [facility]

## 2024-07-14 NOTE — ASSESSMENT & PLAN NOTE
Patient with episode of left-sided upper and lower extremity weakness, dysarthria and slurred speech, tele neuro stroke consult completed, aspirin Plavix, MRI pending, PT OT, statin, does not smoke  Antithrombotics for secondary stroke prevention: Antiplatelets: Aspirin: 81 mg daily  Clopidogrel: 300 mg loading dose x 1, now    Statins for secondary stroke prevention and hyperlipidemia, if present:   Statins: Atorvastatin- 40 mg daily    Aggressive risk factor modification: HTN, DM, HLD, Diet, Obesity     Rehab efforts: The patient has been evaluated by a stroke team provider and the therapy needs have been fully considered based off the presenting complaints and exam findings. The following therapy evaluations are needed: PT evaluate and treat, OT evaluate and treat, SLP evaluate and treat    Diagnostics ordered/pending: CTA Head to assess vasculature , CTA Neck/Arch to assess vasculature, MRI head without contrast to assess brain parenchyma, TTE to assess cardiac function/status     VTE prophylaxis: Enoxaparin (CrCl < 30 ml/min) 30 mg SQ every 24 hours    BP parameters: Infarct: No intervention, SBP <220

## 2024-07-14 NOTE — ASSESSMENT & PLAN NOTE
Creatine stable for now. BMP reviewed- noted Estimated Creatinine Clearance: 12.7 mL/min (A) (based on SCr of 6.6 mg/dL (H)). according to latest data. Based on current GFR, CKD stage is end stage.  Monitor UOP and serial BMP and adjust therapy as needed. Renally dose meds. Avoid nephrotoxic medications and procedures.  -patient on home dialysis, usually does four days a week, renal consult

## 2024-07-14 NOTE — ASSESSMENT & PLAN NOTE
Chronic,  allow permissive hypertension in setting of acute stroke . Latest blood pressure and vitals reviewed-     Temp:  [97.7 °F (36.5 °C)-98.6 °F (37 °C)]   Pulse:  [62-68]   Resp:  [12-20]   BP: (117-154)/(56-74)   SpO2:  [97 %-100 %] .   Home meds for hypertension were reviewed and noted below.   Hypertension Medications               amlodipine (NORVASC) 10 MG tablet Take 1 tablet (10 mg total) by mouth once daily.    furosemide (LASIX) 40 MG tablet Take 40 mg by mouth 2 (two) times daily.    minoxidiL (LONITEN) 10 MG Tab Take 10 mg by mouth.    nebivoloL (BYSTOLIC) 20 mg Tab Take 1 tablet by mouth.    NIFEdipine (PROCARDIA-XL) 60 MG (OSM) 24 hr tablet Take 1 tablet by mouth.            While in the hospital, will manage blood pressure as follows; medications held secondary to acute stroke    Will utilize p.r.n. blood pressure medication only if patient's blood pressure greater than 220/110 and she develops symptoms such as worsening chest pain or shortness of breath.

## 2024-07-14 NOTE — NURSING
Received report from ED nurse TYLER Quevedo. Patient arrived to med-surg room 622 at approximately 2130 with complaints of left sided weakness.Plan of care reviewed with patient and spouse.Patient is being treated for CVA. MRI scheduled for Monday 7/15. Patent is saline locked. Bed alarm on for weakness. Left sided defects improving.Tele monitor placed and audible. Pain medication given , minimal relief. ACHS. Fall risk, allergy and limb alert to left side bracelets placed. All patients questions answered upon arrival to room. No concerns at this time. Call bell and remote in reach.

## 2024-07-14 NOTE — PLAN OF CARE
Plan of care reviewed with the patient. Accuchecks completed per order. Nephrology consulted for dialysis as is existing dialysis patient.

## 2024-07-15 LAB
ANION GAP SERPL CALC-SCNC: 13 MMOL/L (ref 3–11)
BUN SERPL-MCNC: 46 MG/DL (ref 6–20)
CALCIUM SERPL-MCNC: 9.2 MG/DL (ref 8.7–10.5)
CHLORIDE SERPL-SCNC: 97 MMOL/L (ref 95–110)
CO2 SERPL-SCNC: 25 MMOL/L (ref 23–29)
CREAT SERPL-MCNC: 7.8 MG/DL (ref 0.5–1.4)
EST. GFR  (NO RACE VARIABLE): 6 ML/MIN/1.73 M^2
GLUCOSE SERPL-MCNC: 124 MG/DL (ref 70–110)
MAGNESIUM SERPL-MCNC: 2.3 MG/DL (ref 1.6–2.6)
PHOSPHATE SERPL-MCNC: 5.3 MG/DL (ref 2.7–4.5)
POCT GLUCOSE: 118 MG/DL (ref 70–110)
POTASSIUM SERPL-SCNC: 3.6 MMOL/L (ref 3.5–5.1)
SODIUM SERPL-SCNC: 135 MMOL/L (ref 136–145)

## 2024-07-15 PROCEDURE — 99900035 HC TECH TIME PER 15 MIN (STAT)

## 2024-07-15 PROCEDURE — 25000003 PHARM REV CODE 250: Performed by: INTERNAL MEDICINE

## 2024-07-15 PROCEDURE — 84100 ASSAY OF PHOSPHORUS: CPT | Performed by: STUDENT IN AN ORGANIZED HEALTH CARE EDUCATION/TRAINING PROGRAM

## 2024-07-15 PROCEDURE — 99900031 HC PATIENT EDUCATION (STAT)

## 2024-07-15 PROCEDURE — 94761 N-INVAS EAR/PLS OXIMETRY MLT: CPT

## 2024-07-15 PROCEDURE — 99233 SBSQ HOSP IP/OBS HIGH 50: CPT | Mod: ,,, | Performed by: STUDENT IN AN ORGANIZED HEALTH CARE EDUCATION/TRAINING PROGRAM

## 2024-07-15 PROCEDURE — 97530 THERAPEUTIC ACTIVITIES: CPT

## 2024-07-15 PROCEDURE — 27000221 HC OXYGEN, UP TO 24 HOURS

## 2024-07-15 PROCEDURE — 21400001 HC TELEMETRY ROOM

## 2024-07-15 PROCEDURE — 97167 OT EVAL HIGH COMPLEX 60 MIN: CPT

## 2024-07-15 PROCEDURE — 36415 COLL VENOUS BLD VENIPUNCTURE: CPT | Performed by: STUDENT IN AN ORGANIZED HEALTH CARE EDUCATION/TRAINING PROGRAM

## 2024-07-15 PROCEDURE — 83735 ASSAY OF MAGNESIUM: CPT | Performed by: STUDENT IN AN ORGANIZED HEALTH CARE EDUCATION/TRAINING PROGRAM

## 2024-07-15 PROCEDURE — 80048 BASIC METABOLIC PNL TOTAL CA: CPT | Performed by: STUDENT IN AN ORGANIZED HEALTH CARE EDUCATION/TRAINING PROGRAM

## 2024-07-15 PROCEDURE — 63600175 PHARM REV CODE 636 W HCPCS: Performed by: INTERNAL MEDICINE

## 2024-07-15 PROCEDURE — 25000003 PHARM REV CODE 250: Performed by: STUDENT IN AN ORGANIZED HEALTH CARE EDUCATION/TRAINING PROGRAM

## 2024-07-15 PROCEDURE — 97162 PT EVAL MOD COMPLEX 30 MIN: CPT

## 2024-07-15 RX ORDER — FAMOTIDINE 10 MG/1
10 TABLET ORAL
Status: DISCONTINUED | OUTPATIENT
Start: 2024-07-15 | End: 2024-07-16

## 2024-07-15 RX ORDER — LEVOTHYROXINE SODIUM 50 UG/1
50 TABLET ORAL
Status: DISCONTINUED | OUTPATIENT
Start: 2024-07-15 | End: 2024-07-17 | Stop reason: HOSPADM

## 2024-07-15 RX ORDER — ALUMINUM HYDROXIDE, MAGNESIUM HYDROXIDE, AND SIMETHICONE 1200; 120; 1200 MG/30ML; MG/30ML; MG/30ML
30 SUSPENSION ORAL ONCE
Status: COMPLETED | OUTPATIENT
Start: 2024-07-15 | End: 2024-07-15

## 2024-07-15 RX ADMIN — POLYETHYLENE GLYCOL (3350) 17 G: 17 POWDER, FOR SOLUTION ORAL at 08:07

## 2024-07-15 RX ADMIN — PANTOPRAZOLE SODIUM 40 MG: 40 TABLET, DELAYED RELEASE ORAL at 08:07

## 2024-07-15 RX ADMIN — INSULIN GLARGINE 20 UNITS: 100 INJECTION, SOLUTION SUBCUTANEOUS at 08:07

## 2024-07-15 RX ADMIN — ENOXAPARIN SODIUM 30 MG: 40 INJECTION SUBCUTANEOUS at 05:07

## 2024-07-15 RX ADMIN — ATORVASTATIN CALCIUM 40 MG: 40 TABLET, FILM COATED ORAL at 08:07

## 2024-07-15 RX ADMIN — LEVOTHYROXINE SODIUM 50 MCG: 50 TABLET ORAL at 05:07

## 2024-07-15 RX ADMIN — ALUMINUM HYDROXIDE, MAGNESIUM HYDROXIDE, AND SIMETHICONE 30 ML: 1200; 120; 1200 SUSPENSION ORAL at 08:07

## 2024-07-15 RX ADMIN — Medication 6 MG: at 10:07

## 2024-07-15 RX ADMIN — ACETAMINOPHEN 650 MG: 325 TABLET ORAL at 10:07

## 2024-07-15 RX ADMIN — OXYCODONE HYDROCHLORIDE AND ACETAMINOPHEN 1 TABLET: 5; 325 TABLET ORAL at 05:07

## 2024-07-15 NOTE — PLAN OF CARE
[facility] - Med Surg  Initial Discharge Assessment       Primary Care Provider: Lindsey Singletary FNP    Admission Diagnosis: Acute focal neurological deficit [R29.818]  Cerebrovascular accident (CVA), unspecified mechanism [I63.9]    Admission Date: 7/13/2024  Expected Discharge Date:     Transition of Care Barriers: None    Payor: [facility] MCARE / Plan: [facility] DUAL COMPLETE PPO SNP / Product Type: Medicare Advantage /     Extended Emergency Contact Information  Primary Emergency Contact: Indira Ferrell   [place]  Mobile Phone: [phone]  Relation: Sister  Secondary Emergency Contact: Mile Welch  Mobile Phone: [phone]  Relation: Mother    Discharge Plan A: Home with family, Home Health  Discharge Plan B: Home with family      [facility]  [address]  [place] [address]  Phone: [phone] Fax: [phone]    [facility] [address] [place] [address]  Phone: [phone] Fax: [phone]      Initial Assessment (most recent)       Adult Discharge Assessment - 07/15/24 1013          Discharge Assessment    Assessment Type Discharge Planning Assessment     Confirmed/corrected address, phone number and insurance Yes     Confirmed Demographics Correct on Facesheet   physical address is [address]    Source of Information patient     When was your last doctors appointment? 02/16/24     Communicated SHASHA with patient/caregiver Date not available/Unable to determine     Reason For Admission Embolic stroke involving right middle cerebral artery     People in Home significant other;grandchild(jennifer)     Do you expect to return to your current living situation? Yes     Do you have help at home or someone to help you manage your care at home? Yes     Who are your caregiver(s) and their phone number(s)? Sister     Prior to hospitilization cognitive status: Alert/Oriented     Current cognitive status:  Alert/Oriented     Walking or Climbing Stairs Difficulty no     Dressing/Bathing Difficulty yes     Dressing/Bathing bathing difficulty, assistance 1 person;dressing difficulty, assistance 1 person     Home Layout Able to live on 1st floor     Equipment Currently Used at Home oxygen     Readmission within 30 days? No     Patient currently being followed by outpatient case management? No     Do you currently have service(s) that help you manage your care at home? Yes     How Many hours does patient receive services 18     Name and Contact number of agency Waiver Program ([facility])     Is the pt/caregiver preference to resume services with current agency Yes     Do you take prescription medications? Yes     Do you have prescription coverage? Yes     Do you have any problems affording any of your prescribed medications? No     Is the patient taking medications as prescribed? yes     Who is going to help you get home at discharge? family     How do you get to doctors appointments? family or friend will provide     Are you on dialysis? Yes     Dialysis Name and Scheduled days Currently on PD waiting to be scheduled for HD at [facility]     Do you take coumadin? No     Discharge Plan A Home with family;Home Health     Discharge Plan B Home with family     DME Needed Upon Discharge  wheelchair;rollator   Patient requesting    Discharge Plan discussed with: Patient     Transition of Care Barriers None        Physical Activity    On average, how many days per week do you engage in moderate to strenuous exercise (like a brisk walk)? 0 days     On average, how many minutes do you engage in exercise at this level? 0 min        Financial Resource Strain    How hard is it for you to pay for the very basics like food, housing, medical care, and heating? Not hard at all        Housing Stability    In the last 12 months, was there a time when you were not able to pay the mortgage or rent on time? No     At any time  in the past 12 months, were you homeless or living in a shelter (including now)? No        Transportation Needs    Has the lack of transportation kept you from medical appointments, meetings, work or from getting things needed for daily living? No        Food Insecurity    Within the past 12 months, you worried that your food would run out before you got the money to buy more. Never true     Within the past 12 months, the food you bought just didn't last and you didn't have money to get more. Never true        Stress    Do you feel stress - tense, restless, nervous, or anxious, or unable to sleep at night because your mind is troubled all the time - these days? Only a little        Social Isolation    How often do you feel lonely or isolated from those around you?  Never        [employer]    In the past 12 months has the electric, gas, oil, or water company threatened to shut off services in your home? No                   Discharge assessment completed with patient.  Patient is currently on PD but is waiting on a chair time at [facility] to start HD.  Patient is requesting a rollator, wheelchair and home health services.  Patient currently have 18 hours of waiver service with .  SW will follow as needed.

## 2024-07-15 NOTE — PLAN OF CARE
07/15/24 1241   Medicare Message   Important Message from Medicare regarding Discharge Appeal Rights Given to patient/caregiver;Explained to patient/caregiver;Signed/date by patient/caregiver   Date IMM was signed 07/15/24   Time IMM was signed 1232

## 2024-07-15 NOTE — PLAN OF CARE
Problem: Occupational Therapy  Goal: Occupational Therapy Goal  Description: Goals to be met by: 07/24/24     Patient will increase functional independence with ADLs by performing:    Feeding with Set-up Assistance.  UE Dressing with Minimal Assistance.  LE Dressing with Moderate Assistance.  Grooming while seated with Set-up Assistance.  Toileting from toilet with Minimal Assistance for hygiene and clothing management.   Bathing from  shower chair/bench with Minimal Assistance.  Toilet transfer to toilet with Contact Guard Assistance.  Increased functional strength to 4/5 for (L) UE.    Outcome: Established OT POC

## 2024-07-15 NOTE — PT/OT/SLP EVAL
Occupational Therapy   Evaluation    Name: Pilar Fernandez  MRN: [id number]  Admitting Diagnosis: Embolic stroke involving right middle cerebral artery  Recent Surgery: * No surgery found *      Recommendations:     Discharge Recommendations: High Intensity Therapy  Discharge Equipment Recommendations:  walker, rolling, bedside commode, bath bench  Barriers to discharge:  Other (Comment) (Medical status)    Assessment:     Pilar Fernandez is a 44 y.o. female with a medical diagnosis of Embolic stroke involving right middle cerebral artery.  She presents with unilateral weakness in (L) UE and LE. Performance deficits affecting function: weakness, impaired endurance, impaired sensation, impaired self care skills, impaired functional mobility, impaired balance, visual deficits, decreased coordination, decreased upper extremity function, decreased lower extremity function, decreased safety awareness, decreased ROM, impaired fine motor, impaired cardiopulmonary response to activity, edema, impaired joint extensibility, impaired muscle length.      Rehab Prognosis: Good; patient would benefit from acute skilled OT services to address these deficits and reach maximum level of function.       Plan:     Patient to be seen 5 x/week to address the above listed problems via self-care/home management, therapeutic activities, therapeutic exercises, neuromuscular re-education, sensory integration  Plan of Care Expires: 07/24/24  Plan of Care Reviewed with: patient    Subjective     Chief Complaint: (L) sided weakness  Patient/Family Comments/goals: Pt would like to increase functional use on (L) while regain independence with ADL's including functional mobility in order to return home safely with spouse.     Occupational Profile:  Living Environment: Pt lives in a [facility] with 3 steps and no handrails to enter/exit.  Previous level of function: Independent   Roles and Routines: ADL's and IADL's  Equipment Used at Home:  cane, straight  Assistance upon Discharge: Spouse    Pain/Comfort:  Pain Rating 1: 0/10  Pain Rating Post-Intervention 1: 0/10    Patients cultural, spiritual, Adventist conflicts given the current situation: no    Objective:     Communicated with: nurse prior to session.  Patient found HOB elevated with telemetry, peripheral IV, oxygen upon OT entry to room.    General Precautions: Standard, fall  Orthopedic Precautions: N/A  Braces: N/A  Respiratory Status: Nasal cannula, flow 2 L/min    Occupational Performance:    Bed Mobility:    Patient completed Rolling/Turning to Left with  minimum assistance  Patient completed Rolling/Turning to Right with minimum assistance  Patient completed Scooting/Bridging with minimum assistance  Patient completed Supine to Sit with moderate assistance  Patient completed Sit to Supine with moderate assistance    Functional Mobility/Transfers:  Patient completed Sit <> Stand Transfer with moderate assistance  with  rolling walker   Patient completed Bed <> Chair Transfer using Step Transfer technique with moderate assistance with rolling walker  Patient completed Toilet Transfer Step Transfer technique with moderate assistance with  rolling walker and bedside commode  Functional Mobility: Pt ambulated 73' between surfaces requiring steadying assist utilizing RW.     Activities of Daily Living:  Feeding:  supervision    Grooming: supervision    Bathing: moderate assistance    Upper Body Dressing: moderate assistance    Lower Body Dressing: maximal assistance    Toileting: moderate assistance      Cognitive/Visual Perceptual:  Cognitive/Psychosocial Skills:  -       Oriented to: Person, Place, Time, and Situation   -       Follows Commands/attention:Follows multistep  commands  -       Communication: dysarthria  -       Memory: No Deficits noted  -       Safety awareness/insight to disability: impaired   -       Mood/Affect/Coping skills/emotional control: Cooperative and  Pleasant    Physical Exam:  Postural examination/scapula alignment: -       Rounded shoulders  -       Forward head  Edema:  Mild (L) UE  Sensation: -       Impaired  light/touch  , proprioception  , and stereognosis (L) UE  Dominant hand: -       Right  Upper Extremity Range of Motion:  -       Right Upper Extremity: WFL  -       Left Upper Extremity: WFL  Upper Extremity Strength: -       Right Upper Extremity: grossly 4+/5  -       Left Upper Extremity: Deficits: 3- to 3+/5  Fine Motor Coordination: -       Impaired  Left hand, manipulation of objects    Gross motor coordination: (L) UE hemiplegia/paresis    AMPAC 6 Click ADL:  AMPAC Total Score: 17    Treatment & Education:  Pt was provided education / instruction regarding role of OT and established OT POC.    Patient left HOB elevated with all lines intact, call button in reach, and nurse notified    GOALS:   Goals to be met by: 07/24/24     Patient will increase functional independence with ADLs by performing:    Feeding with Set-up Assistance.  UE Dressing with Minimal Assistance.  LE Dressing with Moderate Assistance.  Grooming while seated with Set-up Assistance.  Toileting from toilet with Minimal Assistance for hygiene and clothing management.   Bathing from  shower chair/bench with Minimal Assistance.  Toilet transfer to toilet with Contact Guard Assistance.  Increased functional strength to 4/5 for (L) UE.      History:     Past Medical History:   Diagnosis Date    Anemia     Anemia     Breast mass     Chronic constipation     CKD (chronic kidney disease)     Diabetes mellitus     Dialysis patient     MON, TUE, THURS, SAT.    Embolic stroke involving right middle cerebral artery 7/13/2024    Encounter for blood transfusion     Erosive gastritis 03/2016    GERD (gastroesophageal reflux disease)     Gout     High cholesterol     Hypertension     Knee pain     Thyroid disease     Unspecified cataract 09/2022         Past Surgical History:   Procedure  Laterality Date    AV FISTULA PLACEMENT Left 2019    Procedure: creation av fistula;  Surgeon: Buck Fernandez MD;  Location: [facility];  Service: Cardiovascular;  Laterality: Left;  Left Radialcephalic: PACU     SECTION  , , ,     CHOLECYSTECTOMY      COLONOSCOPY N/A 10/23/2017    Procedure: COLONOSCOPY;  Surgeon: Bri Harry MD;  Location: [facility];  Service: Endoscopy;  Laterality: N/A;    EXCISIONAL BIOPSY Left 2022    Procedure: EXCISIONAL BIOPSY;  Surgeon: Mick Juarez MD;  Location: [facility];  Service: General;  Laterality: Left;    TUBAL LIGATION      UPPER GASTROINTESTINAL ENDOSCOPY  2016    Erosive Gastritis-Dr Bailey       Time Tracking:     OT Date of Treatment: 07/15/24  OT Start Time: 845  OT Stop Time: 923  OT Total Time (min): 38 min    Billable Minutes:Evaluation 38    7/15/2024

## 2024-07-15 NOTE — PT/OT/SLP EVAL
Physical Therapy Evaluation    Patient Name:  Pilar Fernandez   MRN:  [id number]    Recommendations:     Discharge Recommendations: High Intensity Therapy   Discharge Equipment Recommendations: bedside commode, bath bench, walker, rolling   Barriers to discharge: Ongoing medical treatment    Assessment:     Pilar Fernandez is a 44 y.o. female admitted with a medical diagnosis of Embolic stroke involving right middle cerebral artery.  She presents with the following impairments/functional limitations: weakness, impaired endurance, impaired sensation, impaired self care skills, impaired functional mobility, gait instability, impaired balance, decreased coordination, decreased upper extremity function, decreased lower extremity function, decreased safety awareness, decreased ROM, abnormal tone, impaired fine motor, edema, impaired cardiopulmonary response to activity. These limitations are causing decreased functional mobility and independence.     Patient found resting supine A&Ox4 with supplemental O2 via nasal canula at 2.5L. VSS prior to therapy. Patient able to perform bed mobility tasks and STS transfer with RW with minimal assistance. Patient ambulated ~100 ft with RW and minimal assistance for AD management secondary to LUE impairments. Patient reported several instances of fatigue likely secondary to SpO2 dropping to ~87%. Patient understands importance of early mobilization, strength training, and NMR when recovering from a stroke. She is pleasant and cooperative.     Rehab Prognosis: Fair; patient would benefit from acute skilled PT services to address these deficits and reach maximum level of function.    Recent Surgery: * No surgery found *      Plan:     During this hospitalization, patient to be seen 5 x/week to address the identified rehab impairments via gait training, therapeutic activities, therapeutic exercises, neuromuscular re-education and progress toward the following goals:    Plan  "of Care Expires:  07/22/24    Subjective     Chief Complaint: "I'm getting tired, I need a break"  Patient/Family Comments/goals: "We have a walker, but we need a wheelchair."  Pain/Comfort:  Pain Rating 1: 0/10    Patients cultural, spiritual, Worship conflicts given the current situation: no    Living Environment:  Living Environment: Pt lives in a [facility] with 3 steps and no handrails to enter/exit.  Previous level of function: Independent   Roles and Routines: ADL's and IADL's  Equipment Used at Home: cane, straight  Equipment owned but not currently used: rolling walker  Assistance upon Discharge: Spouse    Objective:     Communicated with nurse, family, and patient prior to session.  Patient found supine with telemetry, oxygen, peripheral IV  upon PT entry to room.    General Precautions: Standard, fall  Orthopedic Precautions:N/A   Braces: N/A  Respiratory Status: Nasal cannula, flow 2.5 L/min    Vitals:   Prior to treatment (supine) After treatment (seated EOB)   /56 mmHg 160/72 mmHg   HR 64 bpm 65-69 bpm   SpO2 (2 L/min) 96% 87-95%       Exams:  Cognitive Exam:  Patient is oriented to Person, Place, Time, and Situation  RLE ROM: WFL  RLE Strength: WFL  LLE ROM: Deficits: AROM moderately impaired, PROM WFL  LLE Strength: Deficits: grossly 3+/5    Functional Mobility:  Bed Mobility:     Rolling Left:  minimum assistance  Rolling Right: minimum assistance  Scooting: minimum assistance  Bridging: minimum assistance  Supine to Sit: minimum assistance  Sit to Supine: minimum assistance  Transfers:     Sit to Stand:  contact guard assistance with rolling walker  Gait: ~100 ft with RW and min assist for AD management secondary to LUE deficits   Balance: Standing balance with RW SBA-supervision, Sitting balance EOB supervision       AM-PAC 6 CLICK MOBILITY  Total Score:17       Treatment & Education:  Evaluation moderate complexity    Gait   Balance   Bed mobility   Transfers   Education   Patient educated on " prognosis, AD management, role of acute care PT, POC, importance of OOB mobility, transfer safety, and call bell usage.      Patient left supine with all lines intact, call button in reach, nurse notified, and family present.    GOALS:   Multidisciplinary Problems       Physical Therapy Goals          Problem: Physical Therapy    Goal Priority Disciplines Outcome Goal Variances Interventions   Physical Therapy Goal     PT, PT/OT Progressing     Description: Goals to be met by: 2024     Patient will increase functional independence with mobility by performin. Supine to sit with Stand-by Assistance  2. Sit to supine with Stand-by Assistance  3. Sit to stand transfer with Stand-by Assistance  4. Gait  x 200 feet with Stand-by Assistance using Rolling Walker.                          History:     Past Medical History:   Diagnosis Date    Anemia     Anemia     Breast mass     Chronic constipation     CKD (chronic kidney disease)     Diabetes mellitus     Dialysis patient     MON, TUE, THXIN, SAT.    Embolic stroke involving right middle cerebral artery 2024    Encounter for blood transfusion     Erosive gastritis 2016    GERD (gastroesophageal reflux disease)     Gout     High cholesterol     Hypertension     Knee pain     Thyroid disease     Unspecified cataract 2022       Past Surgical History:   Procedure Laterality Date    AV FISTULA PLACEMENT Left 2019    Procedure: creation av fistula;  Surgeon: Buck Fernandez MD;  Location: [facility];  Service: Cardiovascular;  Laterality: Left;  Left Radialcephalic: PACU     SECTION  , , ,     CHOLECYSTECTOMY      COLONOSCOPY N/A 10/23/2017    Procedure: COLONOSCOPY;  Surgeon: Bri Harry MD;  Location: [facility];  Service: Endoscopy;  Laterality: N/A;    EXCISIONAL BIOPSY Left 2022    Procedure: EXCISIONAL BIOPSY;  Surgeon: Mick Juarez MD;  Location: [facility] OR;  Service: General;  Laterality: Left;    TUBAL  LIGATION      UPPER GASTROINTESTINAL ENDOSCOPY  03/31/2016    Erosive Gastritis-Dr Bailey       Time Tracking:     PT Received On: 07/15/24  PT Start Time: 1045     PT Stop Time: 1112  PT Total Time (min): 27 min     Billable Minutes: Evaluation 15 and Therapeutic Activity 12      07/15/2024

## 2024-07-15 NOTE — PLAN OF CARE
Problem: Fall Injury Risk  Goal: Absence of Fall and Fall-Related Injury  Outcome: Not Progressing     Problem: Adult Inpatient Plan of Care  Goal: Plan of Care Review  Outcome: Not Progressing  Goal: Patient-Specific Goal (Individualized)  Outcome: Not Progressing  Goal: Absence of Hospital-Acquired Illness or Injury  Outcome: Not Progressing  Goal: Optimal Comfort and Wellbeing  Outcome: Not Progressing  Goal: Readiness for Transition of Care  Outcome: Not Progressing     Problem: Acute Kidney Injury/Impairment  Goal: Fluid and Electrolyte Balance  Outcome: Not Progressing  Goal: Improved Oral Intake  Outcome: Not Progressing  Goal: Effective Renal Function  Outcome: Not Progressing     Problem: Diabetes Comorbidity  Goal: Blood Glucose Level Within Targeted Range  Outcome: Not Progressing     Problem: Hemodialysis  Goal: Safe, Effective Therapy Delivery  Outcome: Not Progressing  Goal: Effective Tissue Perfusion  Outcome: Not Progressing  Goal: Absence of Infection Signs and Symptoms  Outcome: Not Progressing

## 2024-07-15 NOTE — PLAN OF CARE
Problem: Physical Therapy  Goal: Physical Therapy Goal  Description: Goals to be met by: 2024     Patient will increase functional independence with mobility by performin. Supine to sit with Stand-by Assistance  2. Sit to supine with Stand-by Assistance  3. Sit to stand transfer with Stand-by Assistance  4. Gait  x 200 feet with Stand-by Assistance using Rolling Walker.     7/15/2024 1644 by Zachary Espinal, PT  Outcome: Plan of Care Established

## 2024-07-16 LAB
ALBUMIN SERPL BCP-MCNC: 3.3 G/DL (ref 3.5–5.2)
ALP SERPL-CCNC: 223 U/L (ref 55–135)
ALT SERPL W/O P-5'-P-CCNC: 23 U/L (ref 10–44)
ANION GAP SERPL CALC-SCNC: 10 MMOL/L (ref 3–11)
AST SERPL-CCNC: 36 U/L (ref 10–40)
BILIRUB SERPL-MCNC: 0.9 MG/DL (ref 0.1–1)
BUN SERPL-MCNC: 52 MG/DL (ref 6–20)
CALCIUM SERPL-MCNC: 8.8 MG/DL (ref 8.7–10.5)
CHLORIDE SERPL-SCNC: 97 MMOL/L (ref 95–110)
CO2 SERPL-SCNC: 27 MMOL/L (ref 23–29)
CREAT SERPL-MCNC: 8.4 MG/DL (ref 0.5–1.4)
ERYTHROCYTE [DISTWIDTH] IN BLOOD BY AUTOMATED COUNT: 18 % (ref 11.5–14.5)
EST. GFR  (NO RACE VARIABLE): 5.5 ML/MIN/1.73 M^2
GLUCOSE SERPL-MCNC: 92 MG/DL (ref 70–110)
HCT VFR BLD AUTO: 32.4 % (ref 37–48.5)
HGB BLD-MCNC: 10.7 G/DL (ref 12–16)
MAGNESIUM SERPL-MCNC: 2.3 MG/DL (ref 1.6–2.6)
MCH RBC QN AUTO: 29.6 PG (ref 27–31)
MCHC RBC AUTO-ENTMCNC: 33 G/DL (ref 32–36)
MCV RBC AUTO: 90 FL (ref 82–98)
PHOSPHATE SERPL-MCNC: 5.8 MG/DL (ref 2.7–4.5)
PLATELET # BLD AUTO: 191 K/UL (ref 150–450)
PMV BLD AUTO: 12.4 FL (ref 9.2–12.9)
POCT GLUCOSE: 76 MG/DL (ref 70–110)
POTASSIUM SERPL-SCNC: 3.8 MMOL/L (ref 3.5–5.1)
PROT SERPL-MCNC: 8.1 G/DL (ref 6–8.4)
RBC # BLD AUTO: 3.62 M/UL (ref 4–5.4)
SODIUM SERPL-SCNC: 134 MMOL/L (ref 136–145)
WBC # BLD AUTO: 6.02 K/UL (ref 3.9–12.7)

## 2024-07-16 PROCEDURE — 84100 ASSAY OF PHOSPHORUS: CPT | Performed by: STUDENT IN AN ORGANIZED HEALTH CARE EDUCATION/TRAINING PROGRAM

## 2024-07-16 PROCEDURE — 97530 THERAPEUTIC ACTIVITIES: CPT

## 2024-07-16 PROCEDURE — 80053 COMPREHEN METABOLIC PANEL: CPT | Performed by: STUDENT IN AN ORGANIZED HEALTH CARE EDUCATION/TRAINING PROGRAM

## 2024-07-16 PROCEDURE — 5A1D70Z PERFORMANCE OF URINARY FILTRATION, INTERMITTENT, LESS THAN 6 HOURS PER DAY: ICD-10-PCS | Performed by: INTERNAL MEDICINE

## 2024-07-16 PROCEDURE — 97116 GAIT TRAINING THERAPY: CPT

## 2024-07-16 PROCEDURE — 94761 N-INVAS EAR/PLS OXIMETRY MLT: CPT

## 2024-07-16 PROCEDURE — 63600175 PHARM REV CODE 636 W HCPCS: Performed by: INTERNAL MEDICINE

## 2024-07-16 PROCEDURE — 85027 COMPLETE CBC AUTOMATED: CPT | Performed by: STUDENT IN AN ORGANIZED HEALTH CARE EDUCATION/TRAINING PROGRAM

## 2024-07-16 PROCEDURE — 25000003 PHARM REV CODE 250: Performed by: INTERNAL MEDICINE

## 2024-07-16 PROCEDURE — 83735 ASSAY OF MAGNESIUM: CPT | Performed by: STUDENT IN AN ORGANIZED HEALTH CARE EDUCATION/TRAINING PROGRAM

## 2024-07-16 PROCEDURE — 99233 SBSQ HOSP IP/OBS HIGH 50: CPT | Mod: ,,, | Performed by: STUDENT IN AN ORGANIZED HEALTH CARE EDUCATION/TRAINING PROGRAM

## 2024-07-16 PROCEDURE — 25000003 PHARM REV CODE 250: Performed by: STUDENT IN AN ORGANIZED HEALTH CARE EDUCATION/TRAINING PROGRAM

## 2024-07-16 PROCEDURE — 80100016 HC MAINTENANCE HEMODIALYSIS

## 2024-07-16 PROCEDURE — 27000221 HC OXYGEN, UP TO 24 HOURS

## 2024-07-16 PROCEDURE — 99900031 HC PATIENT EDUCATION (STAT)

## 2024-07-16 PROCEDURE — 63600175 PHARM REV CODE 636 W HCPCS: Performed by: STUDENT IN AN ORGANIZED HEALTH CARE EDUCATION/TRAINING PROGRAM

## 2024-07-16 PROCEDURE — 99900035 HC TECH TIME PER 15 MIN (STAT)

## 2024-07-16 PROCEDURE — 36415 COLL VENOUS BLD VENIPUNCTURE: CPT | Performed by: STUDENT IN AN ORGANIZED HEALTH CARE EDUCATION/TRAINING PROGRAM

## 2024-07-16 PROCEDURE — 21400001 HC TELEMETRY ROOM

## 2024-07-16 RX ORDER — LORAZEPAM 2 MG/ML
2 INJECTION INTRAMUSCULAR ONCE
Status: DISCONTINUED | OUTPATIENT
Start: 2024-07-16 | End: 2024-07-16

## 2024-07-16 RX ORDER — HYDROCODONE BITARTRATE AND ACETAMINOPHEN 5; 325 MG/1; MG/1
1 TABLET ORAL EVERY 6 HOURS PRN
Status: DISCONTINUED | OUTPATIENT
Start: 2024-07-16 | End: 2024-07-16

## 2024-07-16 RX ORDER — ALUMINUM HYDROXIDE, MAGNESIUM HYDROXIDE, AND SIMETHICONE 1200; 120; 1200 MG/30ML; MG/30ML; MG/30ML
30 SUSPENSION ORAL EVERY 6 HOURS PRN
Status: DISCONTINUED | OUTPATIENT
Start: 2024-07-16 | End: 2024-07-16

## 2024-07-16 RX ORDER — MINOXIDIL 10 MG/1
10 TABLET ORAL DAILY
Status: DISCONTINUED | OUTPATIENT
Start: 2024-07-16 | End: 2024-07-17 | Stop reason: HOSPADM

## 2024-07-16 RX ORDER — MUPIROCIN 20 MG/G
OINTMENT TOPICAL 2 TIMES DAILY
Status: DISCONTINUED | OUTPATIENT
Start: 2024-07-16 | End: 2024-07-17 | Stop reason: HOSPADM

## 2024-07-16 RX ORDER — HYDROCODONE BITARTRATE AND ACETAMINOPHEN 5; 325 MG/1; MG/1
1 TABLET ORAL EVERY 8 HOURS PRN
Status: DISCONTINUED | OUTPATIENT
Start: 2024-07-16 | End: 2024-07-16

## 2024-07-16 RX ORDER — LORAZEPAM 2 MG/ML
0.5 INJECTION INTRAMUSCULAR ONCE
Status: COMPLETED | OUTPATIENT
Start: 2024-07-16 | End: 2024-07-16

## 2024-07-16 RX ORDER — HYDROCODONE BITARTRATE AND ACETAMINOPHEN 5; 325 MG/1; MG/1
1 TABLET ORAL EVERY 6 HOURS PRN
Status: DISCONTINUED | OUTPATIENT
Start: 2024-07-16 | End: 2024-07-17 | Stop reason: HOSPADM

## 2024-07-16 RX ORDER — FAMOTIDINE 20 MG/1
20 TABLET, FILM COATED ORAL DAILY
Status: DISCONTINUED | OUTPATIENT
Start: 2024-07-16 | End: 2024-07-17 | Stop reason: HOSPADM

## 2024-07-16 RX ORDER — ALUMINUM HYDROXIDE, MAGNESIUM HYDROXIDE, AND SIMETHICONE 1200; 120; 1200 MG/30ML; MG/30ML; MG/30ML
30 SUSPENSION ORAL EVERY 6 HOURS PRN
Status: DISCONTINUED | OUTPATIENT
Start: 2024-07-16 | End: 2024-07-17 | Stop reason: HOSPADM

## 2024-07-16 RX ADMIN — HYDROCODONE BITARTRATE AND ACETAMINOPHEN 1 TABLET: 5; 325 TABLET ORAL at 03:07

## 2024-07-16 RX ADMIN — ENOXAPARIN SODIUM 30 MG: 40 INJECTION SUBCUTANEOUS at 04:07

## 2024-07-16 RX ADMIN — ATORVASTATIN CALCIUM 40 MG: 40 TABLET, FILM COATED ORAL at 09:07

## 2024-07-16 RX ADMIN — LORAZEPAM 0.5 MG: 2 INJECTION INTRAMUSCULAR; INTRAVENOUS at 10:07

## 2024-07-16 RX ADMIN — PANTOPRAZOLE SODIUM 40 MG: 40 TABLET, DELAYED RELEASE ORAL at 09:07

## 2024-07-16 RX ADMIN — MINOXIDIL 10 MG: 10 TABLET ORAL at 09:07

## 2024-07-16 RX ADMIN — FAMOTIDINE 20 MG: 20 TABLET, FILM COATED ORAL at 04:07

## 2024-07-16 RX ADMIN — LEVOTHYROXINE SODIUM 50 MCG: 50 TABLET ORAL at 06:07

## 2024-07-16 RX ADMIN — POLYETHYLENE GLYCOL (3350) 17 G: 17 POWDER, FOR SOLUTION ORAL at 09:07

## 2024-07-16 RX ADMIN — ALUMINUM HYDROXIDE, MAGNESIUM HYDROXIDE, AND SIMETHICONE 30 ML: 1200; 120; 1200 SUSPENSION ORAL at 04:07

## 2024-07-16 RX ADMIN — INSULIN GLARGINE 20 UNITS: 100 INJECTION, SOLUTION SUBCUTANEOUS at 09:07

## 2024-07-16 NOTE — SUBJECTIVE & OBJECTIVE
Interval History: Patient seen and examined.     Review of Systems   Constitutional:  Negative for chills, fatigue and fever.   HENT:  Negative for congestion.    Respiratory:  Positive for shortness of breath (at baseline). Negative for wheezing.    Cardiovascular:  Positive for leg swelling (chronic lymphedema). Negative for chest pain.   Gastrointestinal:  Negative for abdominal pain, constipation, diarrhea, nausea and vomiting.   Musculoskeletal:  Positive for arthralgias and gait problem. Negative for back pain, joint swelling, myalgias and neck pain.   Skin:  Negative for wound.   Neurological:  Positive for weakness and numbness. Negative for speech difficulty.   Psychiatric/Behavioral:  Negative for agitation. The patient is not nervous/anxious.      Objective:     Vital Signs (Most Recent):  Temp: 96.5 °F (35.8 °C) (07/15/24 1933)  Pulse: 62 (07/15/24 2304)  Resp: 18 (07/15/24 1933)  BP: (!) 183/83 (07/15/24 1933)  SpO2: 98 % (07/15/24 1933) Vital Signs (24h Range):  Temp:  [96.5 °F (35.8 °C)-98.6 °F (37 °C)] 96.5 °F (35.8 °C)  Pulse:  [61-65] 62  Resp:  [18-20] 18  SpO2:  [96 %-100 %] 98 %  BP: (120-183)/(56-83) 183/83     Weight: 104.3 kg (229 lb 15 oz)  Body mass index is 39.47 kg/m².    Intake/Output Summary (Last 24 hours) at 7/16/2024 0206  Last data filed at 7/15/2024 1758  Gross per 24 hour   Intake 640 ml   Output --   Net 640 ml         Physical Exam  Vitals and nursing note reviewed.   Constitutional:       General: She is not in acute distress.     Appearance: She is well-developed. She is obese. She is not ill-appearing, toxic-appearing or diaphoretic.   HENT:      Head: Normocephalic and atraumatic.      Nose: No congestion or rhinorrhea.   Eyes:      General: No scleral icterus.        Right eye: No discharge.         Left eye: No discharge.      Extraocular Movements: Extraocular movements intact.   Cardiovascular:      Rate and Rhythm: Normal rate.      Heart sounds: No murmur  "heard.  Pulmonary:      Effort: Pulmonary effort is normal. No respiratory distress.      Breath sounds: No wheezing.   Chest:      Chest wall: No tenderness.   Abdominal:      General: There is no distension.      Palpations: Abdomen is soft.      Tenderness: There is no abdominal tenderness. There is no guarding.   Musculoskeletal:      Cervical back: Normal range of motion and neck supple. No rigidity or tenderness.      Right lower leg: Edema present.      Left lower leg: Edema present.   Lymphadenopathy:      Cervical: No cervical adenopathy.   Skin:     Coloration: Skin is not jaundiced or pale.   Neurological:      General: No focal deficit present.      Mental Status: She is alert. Mental status is at baseline.      Cranial Nerves: Dysarthria (improved) and facial asymmetry present.      Sensory: No sensory deficit.      Motor: Weakness and pronator drift present.      Comments: Patient reported upper extremity weakness improved   Psychiatric:         Mood and Affect: Mood normal.         Behavior: Behavior normal.         Thought Content: Thought content normal.             Significant Labs: All pertinent labs within the past 24 hours have been reviewed.  A1C:   Recent Labs   Lab 07/13/24  1250   HGBA1C 5.6     Bilirubin:   Recent Labs   Lab 07/13/24  1250 07/14/24  0332   BILITOT 1.3* 1.0     Blood Culture: No results for input(s): "LABBLOO" in the last 48 hours.  BMP:   Recent Labs   Lab 07/15/24  0919   *   *   K 3.6   CL 97   CO2 25   BUN 46*   CREATININE 7.8*   CALCIUM 9.2   MG 2.3     CBC:   Recent Labs   Lab 07/14/24  0332   WBC 6.05   HGB 10.2*   HCT 31.1*        CMP:   Recent Labs   Lab 07/14/24  0332 07/15/24  0919   * 135*   K 3.0* 3.6   CL 99 97   CO2 28 25   * 124*   BUN 37* 46*   CREATININE 6.6* 7.8*   CALCIUM 9.1 9.2   PROT 7.9  --    ALBUMIN 3.3*  --    BILITOT 1.0  --    ALKPHOS 206*  --    AST 31  --    ALT 19  --    ANIONGAP 8 13*     Recent Labs   Lab " 07/15/24  0919   MG 2.3     POCT Glucose:   Recent Labs   Lab 07/14/24  1718 07/14/24  2100 07/15/24  2355   POCTGLUCOSE 113* 121* 118*      Lab Results   Component Value Date    TSH 8.470 (H) 07/13/2024    FREET4 1.02 07/13/2024       CTA Head and Neck (xpd)  Narrative: EXAMINATION:  CTA HEAD AND NECK (XPD)    CLINICAL HISTORY:  Left-sided facial droop and slurred speech Stroke/TIA, determine embolic source;    TECHNIQUE:  Helical CT images obtained from the thoracic inlet to the skull vertex after intravenous administration of contrast.  Maximum intensity projection images were evaluated in multiple planes along with the source images. Iterative reconstruction technique was used. NASCET criteria utilized for stenoses measurements.  CT/Cardiac nuclear examinations in the past 12 months: 1    COMPARISON:  Correlation made to CT head performed on today's date    FINDINGS:  Vasculature: Contrast bolus is insufficient for evaluation of the cervical and intracranial arterial vasculature atherosclerotic calcification is seen at bilateral carotid bulb/proximal internal carotid arteries as well as at the cavernous portions of bilateral internal carotid arteries.    Remaining structures:    Neck: Motion artifact through the upper lungs without definite focal parenchymal abnormality.  Peripherally calcified 8 mm nodule right thyroid lobe.  Bilateral submandibular and parotid glands are symmetric and unremarkable.  Mucosal surfaces of the nasopharynx and oropharynx are relatively symmetric.  Epiglottis is normal in appearance.  No detected cervical mass or evidence of cervical adenopathy.  Diffuse component of anasarca.    Head: Globes are intact in CT appearance.  No detected interval developed evidence for ventricular or basal cistern effacement.  No developed midline shift or mass effect.  Previously described subtle hypodensity within the right cerebrum is not clearly delineated on this exam.  Impression: Contrast bolus  on this exam is insufficient for evaluation of the cervical and intracranial arterial vasculature.  Atherosclerotic calcifications are seen at bilateral carotid bulb/proximal internal carotid and cavernous carotid regions.    Electronically signed by: Olegario Quiñones MD  Date:    07/13/2024  Time:    13:59  CT Head Without Contrast  Narrative: EXAMINATION:  CT HEAD WITHOUT CONTRAST    CLINICAL HISTORY:  Left-sided deficit, left-sided facial droop with slurred speech stroke, follow up;    TECHNIQUE:  Axial CT images were obtained from the skull base to the vertex without contrast. Iterative reconstruction technique was used.  CT/Cardiac nuclear examinations in the past 12 months: 1    COMPARISON:  10/06/2020    FINDINGS:  No ventricular or basal cistern effacement.  No evidence of acute intracranial hemorrhage, midline shift, mass, or mass effect.  No detected extra-axial fluid collections.  Gray-white differentiation is overall maintained.  Ill-defined rounded region of hypodensity within the right cerebrum image 41 measuring 1.4 cm in diameter.  Mild diffuse mucosal thickening and fluid within the paranasal sinuses.  The mastoid air cells are clear.  Calvarium is intact.  Impression: Ill-defined subtle region of hypodensity within the right cerebrum measuring up to 1.4 cm in diameter could represent an evolving focus of recent ischemia.  Follow-up MRI could be performed to further assess.    Electronically signed by: Olegario Quiñones MD  Date:    07/13/2024  Time:    13:31     Significant Imaging: I have reviewed all pertinent imaging results/findings within the past 24 hours.

## 2024-07-16 NOTE — PLAN OF CARE
Spoke to patient to inform her that recommendations were for patient to go to [facility], however, [facility] is not in network with patient's insurance. Informed her that [facility] nor [facility] are in network with her insurance either and that I would have to send referral to facilities farther that local. Patient states she would rather do [facility] as it is more convenient due to her having to go to dialysis. CM reaching out to MD to inform her of the above mentioned and request orders to submit request to [facility] rehab facilities.

## 2024-07-16 NOTE — PT/OT/SLP PROGRESS
"Physical Therapy Treatment    Patient Name:  Pilar Fernandez   MRN:  [id number]    Recommendations:     Discharge Recommendations: Low Intensity Therapy (out patient P.T.)  Discharge Equipment Recommendations: bedside commode, bath bench, walker, rolling  Barriers to discharge: None    Assessment:     Pilar Fernandez is a 44 y.o. female admitted with a medical diagnosis of Embolic stroke involving right middle cerebral artery.  She presents with the following impairments/functional limitations: weakness, impaired joint extensibility, impaired endurance, impaired cognition, impaired muscle length, impaired self care skills, decreased upper extremity function, impaired functional mobility, decreased lower extremity function, gait instability, impaired balance, impaired cardiopulmonary response to activity, decreased safety awareness Patient was found sitting at edge of bed with O2 supplement not placed properly.  O2 replaced on her nose.  SBA with sit <> stand using a RW, ambulated ~126 feet with RW with CGA, , left foot with decrease foot clearance from the floor, patient refused to walk farther.  She reports that this distance that she walked is a lot more than what she does at home. Patient was left sitting on the recliner chair with legs elevated due to lymphedema on both legs.     Rehab Prognosis: Fair; patient would benefit from acute skilled PT services to address these deficits and reach maximum level of function.    Recent Surgery: * No surgery found *      Plan:     During this hospitalization, patient to be seen 5 x/week to address the identified rehab impairments via gait training, therapeutic activities, therapeutic exercises, neuromuscular re-education and progress toward the following goals:    Plan of Care Expires:  07/22/24    Subjective     Chief Complaint: "I do not walk a lot at home."   Patient/Family Comments/goals: "I have to pay my utility bills."   Pain/Comfort:  Pain Rating 1: " 0/10  Pain Rating Post-Intervention 1: 0/10      Objective:     Communicated with nurse and patient  prior to session.  Patient found sitting edge of bed with telemetry, oxygen, peripheral IV upon PT entry to room.     General Precautions: Standard, fall  Orthopedic Precautions: N/A  Braces: N/A  Respiratory Status: Nasal cannula, flow 2 L/min     Functional Mobility:  Transfers:     Sit to Stand:  stand by assistance with rolling walker  Gait: 126 feet with RW with CGA, , left foot with decrease foot clearance from the floor, patient refused to walk farther.   Balance: independent with static sitting, SBA with static standing using a RW       AM-PAC 6 CLICK MOBILITY  Turning over in bed (including adjusting bedclothes, sheets and blankets)?: 3  Sitting down on and standing up from a chair with arms (e.g., wheelchair, bedside commode, etc.): 3  Moving from lying on back to sitting on the side of the bed?: 3  Moving to and from a bed to a chair (including a wheelchair)?: 3  Need to walk in hospital room?: 3  Climbing 3-5 steps with a railing?: 2 (based on clinical judgement)  Basic Mobility Total Score: 17       Treatment & Education:    Sit <> stand with RW  Standing balance/tolerance   Out of bed   Gait with RW   Chair positioning         Seated   Both Lower Extremity   Active ROM Sets / Reps / Resistance / Etc.   Hip Flexion 1 x 10   LAQ 1 x 10    Ankle DF/PF  1 x 10 (AAROM with stretch at end range, noted lymphedema on the left leg)        Patient left up in chair with all lines intact, call button in reach, and nurse  notified..    GOALS:   Multidisciplinary Problems       Physical Therapy Goals          Problem: Physical Therapy    Goal Priority Disciplines Outcome Goal Variances Interventions   Physical Therapy Goal     PT, PT/OT Progressing     Description: Goals to be met by: 2024     Patient will increase functional independence with mobility by performin. Supine to sit with Stand-by  Assistance  2. Sit to supine with Stand-by Assistance  3. Sit to stand transfer with Stand-by Assistance  4. Gait  x 200 feet with Stand-by Assistance using Rolling Walker.                          Time Tracking:     PT Received On: 07/16/24  PT Start Time: 0850     PT Stop Time: 0913  PT Total Time (min): 23 min     Billable Minutes: Gait Training 15 and Therapeutic Activity 8    Treatment Type: Treatment  PT/PTA: PT     Number of PTA visits since last PT visit: 0     07/16/2024

## 2024-07-16 NOTE — PT/OT/SLP PROGRESS
Occupational Therapy   Treatment    Name: Pilar Fernandez  MRN: [id number]  Admitting Diagnosis:  Embolic stroke involving right middle cerebral artery       Recommendations:     Discharge Recommendations: High Intensity Therapy  Discharge Equipment Recommendations:  walker, rolling, bedside commode, bath bench  Barriers to discharge:  Other (Comment) (Medical status)    Assessment:     Pilar Fernandez is a 44 y.o. female with a medical diagnosis of Embolic stroke involving right middle cerebral artery.  She presents with improved functional performance with transfers as noted by min assist secondary to steadying with use of RW. Performance deficits affecting function are weakness, impaired endurance, impaired sensation, impaired self care skills, impaired functional mobility, impaired balance, visual deficits, decreased coordination, decreased upper extremity function, decreased lower extremity function, decreased safety awareness, decreased ROM, impaired fine motor, impaired cardiopulmonary response to activity, edema, impaired joint extensibility, impaired muscle length.     Rehab Prognosis:  Good; patient would benefit from acute skilled OT services to address these deficits and reach maximum level of function.       Plan:     Patient to be seen 5 x/week to address the above listed problems via self-care/home management, therapeutic activities, therapeutic exercises, neuromuscular re-education, sensory integration  Plan of Care Expires: 07/24/24  Plan of Care Reviewed with: patient    Subjective     Chief Complaint: pain in (L) foot  Patient/Family Comments/goals: Pt would like to increase functional use on (L) while regain independence with ADL's including functional mobility in order to return home safely with spouse.   Pain/Comfort:  Pain Rating 1: 9/10  Location - Side 1: Left  Location 1: foot  Pain Addressed 1: Reposition, Distraction, Nurse notified  Pain Rating Post-Intervention 1:  9/10    Objective:     Communicated with: nurse prior to session.  Patient found HOB elevated with telemetry, oxygen, peripheral IV upon OT entry to room.    General Precautions: Standard, fall    Orthopedic Precautions:N/A  Braces: N/A  Respiratory Status: Nasal cannula, flow 2 L/min     Occupational Performance:     Bed Mobility:    Pt did not perform on this date.    Functional Mobility/Transfers:  Patient completed Sit <> Stand Transfer with minimum assistance  with  rolling walker   Patient completed Bed <> Chair Transfer using Step Transfer technique with minimum assistance with rolling walker  Patient completed Toilet Transfer Step Transfer technique with minimum assistance with  rolling walker and grab bars  Functional Mobility: Pt ambulated 138' between surfaces requiring steadying assist utilizing RW.     Treatment & Education:  Pt was cooperative and motivated with minimal verbal encouragement while exhibiting positive affect. She participated in functional transfer retraining to / from bed, chair, and toilet emphasizing fall prevention with use of assistive devices providing extra time requiring min assist secondary to steadying assist with cueing for safety awareness and technique. Also, she participated in therapeutic activity addressing functional reaching, gross motor coordination, static / dynamic standing balance, standing tolerance, and energy for task / endurance challenging her to reach in multiple planes utilizing affected (L) UE to lift, stabilize, manipulate, and place various items needed to perform functional tasks of ADL's requiring verbal and tactile cueing to facilitate optimal movement patterns while emphasizing quality of movement.    Patient left up in chair with all lines intact, call button in reach, and nurse notified    GOALS:   Multidisciplinary Problems       Occupational Therapy Goals          Problem: Occupational Therapy    Goal Priority Disciplines Outcome Interventions    Occupational Therapy Goal     OT, PT/OT Progressing    Description: Goals to be met by: 07/24/24     Patient will increase functional independence with ADLs by performing:    Feeding with Set-up Assistance.  UE Dressing with Minimal Assistance.  LE Dressing with Moderate Assistance.  Grooming while seated with Set-up Assistance.  Toileting from toilet with Minimal Assistance for hygiene and clothing management.   Bathing from  shower chair/bench with Minimal Assistance.  Toilet transfer to toilet with Contact Guard Assistance.  Increased functional strength to 4/5 for (L) UE.                         Time Tracking:     OT Date of Treatment: 07/16/24  OT Start Time: 1155  OT Stop Time: 1221  OT Total Time (min): 26 min    Billable Minutes:Therapeutic Activity 26    OT/MIKE: OT          7/16/2024

## 2024-07-16 NOTE — PLAN OF CARE
07/16/24 1033   Post-Acute Status   Post-Acute Authorization HME   HME Status Referrals Sent         New referral for rollator sent to [facility] via fax.       Addendum: 7/16/24 1105  Spoke to Dustin with [facility]. Rollator approved and will be dispatched to hospital today.

## 2024-07-16 NOTE — PLAN OF CARE
Problem: Occupational Therapy  Goal: Occupational Therapy Goal  Description: Goals to be met by: 07/24/24     Patient will increase functional independence with ADLs by performing:    Feeding with Set-up Assistance.  UE Dressing with Minimal Assistance.  LE Dressing with Moderate Assistance.  Grooming while seated with Set-up Assistance.  Toileting from toilet with Minimal Assistance for hygiene and clothing management.   Bathing from  shower chair/bench with Minimal Assistance.  Toilet transfer to toilet with Contact Guard Assistance.  Increased functional strength to 4/5 for (L) UE.    Outcome: Progressing

## 2024-07-16 NOTE — PROGRESS NOTES
[facility] Medicine  Progress Note    Patient Name: Pilar Fernandez  MRN: [id number]  Patient Class: IP- Inpatient   Admission Date: 7/13/2024  Length of Stay: 3 days  Attending Physician: Estiven Duvall DO  Primary Care Provider: Lindsey Singletary FNP        Subjective:     Principal Problem:Embolic stroke involving right middle cerebral artery        HPI:  Patient 44-year-old female with past medical history hypertension, diabetes, hyperlipidemia, ESRD on HD, cardiomyopathy, presented with left-sided weakness and dysarthria about 7 hours prior to arrival, patient reported that she noted initial symptom of weakness when she went to grab her cup and dropped it, patient reported she went to bed around 1:00 a.m., woke up at 5:00 a.m. and fell, came to the ED for evaluation, had evaluation with telemedicine vascular Neurology, CTA was completed, suspected right MCA infarct, patient admitted for MRI and 2D echo, patient reported she was on aspirin therapy prior to admission Plavix started, patient admitted for stroke workup        Overview/Hospital Course:  7/16/24 44 year old with diabetes mellitus type 2, CHF with LVEF 45%, hypertension, pulmonary hypertension, hyperlipidemia, COPD on chronic supplemental oxygen, chronic lymphedema, ESRD on hemodialysis with suspect right MCA infarct. Patient awake and alert, oriented to self, place, time endorses left side weakness in upper extremity compared to right. No acute events overnight. Breathing comfortably on home O2 2L via nasal cannula. Tolerating PO solids and fluids with no difficulties. Follow up MRI of brain. Follow up PT and OT evaluation and treat.     Interval History: Patient seen and examined.     Review of Systems   Constitutional:  Negative for chills, fatigue and fever.   HENT:  Negative for congestion.    Respiratory:  Positive for shortness of breath (at baseline). Negative for wheezing.    Cardiovascular:  Positive for leg  swelling (chronic lymphedema). Negative for chest pain.   Gastrointestinal:  Negative for abdominal pain, constipation, diarrhea, nausea and vomiting.   Musculoskeletal:  Positive for arthralgias and gait problem. Negative for back pain, joint swelling, myalgias and neck pain.   Skin:  Negative for wound.   Neurological:  Positive for weakness and numbness. Negative for speech difficulty.   Psychiatric/Behavioral:  Negative for agitation. The patient is not nervous/anxious.      Objective:     Vital Signs (Most Recent):  Temp: 96.5 °F (35.8 °C) (07/15/24 1933)  Pulse: 62 (07/15/24 2304)  Resp: 18 (07/15/24 1933)  BP: (!) 183/83 (07/15/24 1933)  SpO2: 98 % (07/15/24 1933) Vital Signs (24h Range):  Temp:  [96.5 °F (35.8 °C)-98.6 °F (37 °C)] 96.5 °F (35.8 °C)  Pulse:  [61-65] 62  Resp:  [18-20] 18  SpO2:  [96 %-100 %] 98 %  BP: (120-183)/(56-83) 183/83     Weight: 104.3 kg (229 lb 15 oz)  Body mass index is 39.47 kg/m².    Intake/Output Summary (Last 24 hours) at 7/16/2024 0206  Last data filed at 7/15/2024 1758  Gross per 24 hour   Intake 640 ml   Output --   Net 640 ml         Physical Exam  Vitals and nursing note reviewed.   Constitutional:       General: She is not in acute distress.     Appearance: She is well-developed. She is obese. She is not ill-appearing, toxic-appearing or diaphoretic.   HENT:      Head: Normocephalic and atraumatic.      Nose: No congestion or rhinorrhea.   Eyes:      General: No scleral icterus.        Right eye: No discharge.         Left eye: No discharge.      Extraocular Movements: Extraocular movements intact.   Cardiovascular:      Rate and Rhythm: Normal rate.      Heart sounds: No murmur heard.  Pulmonary:      Effort: Pulmonary effort is normal. No respiratory distress.      Breath sounds: No wheezing.   Chest:      Chest wall: No tenderness.   Abdominal:      General: There is no distension.      Palpations: Abdomen is soft.      Tenderness: There is no abdominal tenderness.  "There is no guarding.   Musculoskeletal:      Cervical back: Normal range of motion and neck supple. No rigidity or tenderness.      Right lower leg: Edema present.      Left lower leg: Edema present.   Lymphadenopathy:      Cervical: No cervical adenopathy.   Skin:     Coloration: Skin is not jaundiced or pale.   Neurological:      General: No focal deficit present.      Mental Status: She is alert. Mental status is at baseline.      Cranial Nerves: Dysarthria (improved) and facial asymmetry present.      Sensory: No sensory deficit.      Motor: Weakness and pronator drift present.      Comments: Patient reported upper extremity weakness improved   Psychiatric:         Mood and Affect: Mood normal.         Behavior: Behavior normal.         Thought Content: Thought content normal.             Significant Labs: All pertinent labs within the past 24 hours have been reviewed.  A1C:   Recent Labs   Lab 07/13/24  1250   HGBA1C 5.6     Bilirubin:   Recent Labs   Lab 07/13/24  1250 07/14/24  0332   BILITOT 1.3* 1.0     Blood Culture: No results for input(s): "LABBLOO" in the last 48 hours.  BMP:   Recent Labs   Lab 07/15/24  0919   *   *   K 3.6   CL 97   CO2 25   BUN 46*   CREATININE 7.8*   CALCIUM 9.2   MG 2.3     CBC:   Recent Labs   Lab 07/14/24  0332   WBC 6.05   HGB 10.2*   HCT 31.1*        CMP:   Recent Labs   Lab 07/14/24  0332 07/15/24  0919   * 135*   K 3.0* 3.6   CL 99 97   CO2 28 25   * 124*   BUN 37* 46*   CREATININE 6.6* 7.8*   CALCIUM 9.1 9.2   PROT 7.9  --    ALBUMIN 3.3*  --    BILITOT 1.0  --    ALKPHOS 206*  --    AST 31  --    ALT 19  --    ANIONGAP 8 13*     Recent Labs   Lab 07/15/24  0919   MG 2.3     POCT Glucose:   Recent Labs   Lab 07/14/24  1718 07/14/24  2100 07/15/24  2355   POCTGLUCOSE 113* 121* 118*      Lab Results   Component Value Date    TSH 8.470 (H) 07/13/2024    FREET4 1.02 07/13/2024       CTA Head and Neck (xpd)  Narrative: EXAMINATION:  CTA HEAD AND " NECK (XPD)    CLINICAL HISTORY:  Left-sided facial droop and slurred speech Stroke/TIA, determine embolic source;    TECHNIQUE:  Helical CT images obtained from the thoracic inlet to the skull vertex after intravenous administration of contrast.  Maximum intensity projection images were evaluated in multiple planes along with the source images. Iterative reconstruction technique was used. NASCET criteria utilized for stenoses measurements.  CT/Cardiac nuclear examinations in the past 12 months: 1    COMPARISON:  Correlation made to CT head performed on today's date    FINDINGS:  Vasculature: Contrast bolus is insufficient for evaluation of the cervical and intracranial arterial vasculature atherosclerotic calcification is seen at bilateral carotid bulb/proximal internal carotid arteries as well as at the cavernous portions of bilateral internal carotid arteries.    Remaining structures:    Neck: Motion artifact through the upper lungs without definite focal parenchymal abnormality.  Peripherally calcified 8 mm nodule right thyroid lobe.  Bilateral submandibular and parotid glands are symmetric and unremarkable.  Mucosal surfaces of the nasopharynx and oropharynx are relatively symmetric.  Epiglottis is normal in appearance.  No detected cervical mass or evidence of cervical adenopathy.  Diffuse component of anasarca.    Head: Globes are intact in CT appearance.  No detected interval developed evidence for ventricular or basal cistern effacement.  No developed midline shift or mass effect.  Previously described subtle hypodensity within the right cerebrum is not clearly delineated on this exam.  Impression: Contrast bolus on this exam is insufficient for evaluation of the cervical and intracranial arterial vasculature.  Atherosclerotic calcifications are seen at bilateral carotid bulb/proximal internal carotid and cavernous carotid regions.    Electronically signed by: Olegario Quiñones  MD  Date:    07/13/2024  Time:    13:59  CT Head Without Contrast  Narrative: EXAMINATION:  CT HEAD WITHOUT CONTRAST    CLINICAL HISTORY:  Left-sided deficit, left-sided facial droop with slurred speech stroke, follow up;    TECHNIQUE:  Axial CT images were obtained from the skull base to the vertex without contrast. Iterative reconstruction technique was used.  CT/Cardiac nuclear examinations in the past 12 months: 1    COMPARISON:  10/06/2020    FINDINGS:  No ventricular or basal cistern effacement.  No evidence of acute intracranial hemorrhage, midline shift, mass, or mass effect.  No detected extra-axial fluid collections.  Gray-white differentiation is overall maintained.  Ill-defined rounded region of hypodensity within the right cerebrum image 41 measuring 1.4 cm in diameter.  Mild diffuse mucosal thickening and fluid within the paranasal sinuses.  The mastoid air cells are clear.  Calvarium is intact.  Impression: Ill-defined subtle region of hypodensity within the right cerebrum measuring up to 1.4 cm in diameter could represent an evolving focus of recent ischemia.  Follow-up MRI could be performed to further assess.    Electronically signed by: Olegario Quiñones MD  Date:    07/13/2024  Time:    13:31     Significant Imaging: I have reviewed all pertinent imaging results/findings within the past 24 hours.    Assessment/Plan:      * Embolic stroke involving right middle cerebral artery  Patient with episode of left-sided upper and lower extremity weakness, dysarthria and slurred speech, tele neuro stroke consult completed, aspirin Plavix, MRI pending, PT OT, statin, does not smoke  Antithrombotics for secondary stroke prevention: Antiplatelets: Aspirin: 81 mg daily  Clopidogrel: 300 mg loading dose x 1, now    Statins for secondary stroke prevention and hyperlipidemia, if present:   Statins: Atorvastatin- 40 mg daily    Aggressive risk factor modification: HTN, DM, HLD, Diet, Obesity     Rehab efforts: The  patient has been evaluated by a stroke team provider and the therapy needs have been fully considered based off the presenting complaints and exam findings. The following therapy evaluations are needed: PT evaluate and treat, OT evaluate and treat, SLP evaluate and treat    Diagnostics ordered/pending: CTA Head to assess vasculature , CTA Neck/Arch to assess vasculature, MRI head without contrast to assess brain parenchyma, TTE to assess cardiac function/status     VTE prophylaxis: Enoxaparin (CrCl < 30 ml/min) 30 mg SQ every 24 hours    BP parameters: Infarct: No intervention, SBP <220        Hypothyroidism  Continue Synthroid      ESRD (end stage renal disease) on dialysis  Creatine stable for now. BMP reviewed- noted Estimated Creatinine Clearance: 10.7 mL/min (A) (based on SCr of 7.8 mg/dL (H)). according to latest data. Based on current GFR, CKD stage is end stage.  Monitor UOP and serial BMP and adjust therapy as needed. Renally dose meds. Avoid nephrotoxic medications and procedures.  -patient on home dialysis, usually does four days a week, renal consult    GERD (gastroesophageal reflux disease)  Continue Protonix      Hyperlipidemia associated with type 2 diabetes mellitus  Continue Lipitor      Essential hypertension  Chronic,  allow permissive hypertension in setting of acute stroke . Latest blood pressure and vitals reviewed-     Temp:  [96.5 °F (35.8 °C)-98.6 °F (37 °C)]   Pulse:  [61-65]   Resp:  [18-20]   BP: (120-183)/(56-83)   SpO2:  [96 %-100 %] .   Home meds for hypertension were reviewed and noted below.   Hypertension Medications               amlodipine (NORVASC) 10 MG tablet Take 1 tablet (10 mg total) by mouth once daily.    furosemide (LASIX) 40 MG tablet Take 40 mg by mouth 2 (two) times daily.    minoxidiL (LONITEN) 10 MG Tab Take 10 mg by mouth.    nebivoloL (BYSTOLIC) 20 mg Tab Take 1 tablet by mouth.    NIFEdipine (PROCARDIA-XL) 60 MG (OSM) 24 hr tablet Take 1 tablet by mouth.             While in the hospital, will manage blood pressure as follows; medications held secondary to acute stroke    Will utilize p.r.n. blood pressure medication only if patient's blood pressure greater than 220/110 and she develops symptoms such as worsening chest pain or shortness of breath.    Type 2 diabetes mellitus with hyperglycemia, with long-term current use of insulin  Hold po meds, insulin regime and/or CSI, goal 140-180, adjust prn        VTE Risk Mitigation (From admission, onward)           Ordered     enoxaparin injection 30 mg  Daily         07/13/24 2108     IP VTE HIGH RISK PATIENT  Once         07/13/24 2108     Place sequential compression device  Until discontinued         07/13/24 2108                    Discharge Planning   SHASHA:      Code Status: Full Code   Is the patient medically ready for discharge?:     Reason for patient still in hospital (select all that apply): Patient trending condition, Treatment, Imaging, Consult recommendations, PT / OT recommendations, and Pending disposition  Discharge Plan A: Home with family, Home Health                  Estiven Duvall DO  Department of Hospital Medicine   [facility]

## 2024-07-16 NOTE — PLAN OF CARE
Problem: Physical Therapy  Goal: Physical Therapy Goal  Description: Goals to be met by: 2024     Patient will increase functional independence with mobility by performin. Supine to sit with Stand-by Assistance  2. Sit to supine with Stand-by Assistance  3. Sit to stand transfer with Stand-by Assistance  4. Gait  x 200 feet with Stand-by Assistance using Rolling Walker.     Outcome: Progressing

## 2024-07-16 NOTE — PLAN OF CARE
Problem: Fall Injury Risk  Goal: Absence of Fall and Fall-Related Injury  Outcome: Progressing     Problem: Adult Inpatient Plan of Care  Goal: Plan of Care Review  Outcome: Progressing  Goal: Patient-Specific Goal (Individualized)  Outcome: Progressing  Goal: Absence of Hospital-Acquired Illness or Injury  Outcome: Progressing  Goal: Optimal Comfort and Wellbeing  Outcome: Progressing  Goal: Readiness for Transition of Care  Outcome: Progressing     Problem: Acute Kidney Injury/Impairment  Goal: Fluid and Electrolyte Balance  Outcome: Progressing  Goal: Improved Oral Intake  Outcome: Progressing  Goal: Effective Renal Function  Outcome: Progressing     Problem: Diabetes Comorbidity  Goal: Blood Glucose Level Within Targeted Range  Outcome: Progressing     Problem: Hemodialysis  Goal: Safe, Effective Therapy Delivery  Outcome: Progressing  Goal: Effective Tissue Perfusion  Outcome: Progressing  Goal: Absence of Infection Signs and Symptoms  Outcome: Progressing

## 2024-07-16 NOTE — ASSESSMENT & PLAN NOTE
Chronic,  allow permissive hypertension in setting of acute stroke . Latest blood pressure and vitals reviewed-     Temp:  [96.5 °F (35.8 °C)-98.6 °F (37 °C)]   Pulse:  [61-65]   Resp:  [18-20]   BP: (120-183)/(56-83)   SpO2:  [96 %-100 %] .   Home meds for hypertension were reviewed and noted below.   Hypertension Medications               amlodipine (NORVASC) 10 MG tablet Take 1 tablet (10 mg total) by mouth once daily.    furosemide (LASIX) 40 MG tablet Take 40 mg by mouth 2 (two) times daily.    minoxidiL (LONITEN) 10 MG Tab Take 10 mg by mouth.    nebivoloL (BYSTOLIC) 20 mg Tab Take 1 tablet by mouth.    NIFEdipine (PROCARDIA-XL) 60 MG (OSM) 24 hr tablet Take 1 tablet by mouth.            While in the hospital, will manage blood pressure as follows; medications held secondary to acute stroke    Will utilize p.r.n. blood pressure medication only if patient's blood pressure greater than 220/110 and she develops symptoms such as worsening chest pain or shortness of breath.

## 2024-07-16 NOTE — HOSPITAL COURSE
7/15/24 44 year old with diabetes mellitus type 2, CHF with LVEF 45%, hypertension, pulmonary hypertension, hyperlipidemia, COPD on chronic supplemental oxygen, chronic lymphedema, ESRD on hemodialysis with suspect right MCA infarct. Patient awake and alert, oriented to self, place, time endorses left side weakness in upper extremity compared to right. No acute events overnight. Breathing comfortably on home O2 2L via nasal cannula. Tolerating PO solids and fluids with no difficulties. Follow up MRI of brain. Follow up PT and OT evaluation and treat.   7/16/24 MRI Brain findings of subacute 2.5 cm posterior right frontal infarct. Patient endorses strength in left upper extremity returning with being able to hold on to objects longer than yesterday. Patient still feels a left foot drag to remain when ambulating with walker. Tolerated HD today with volume removal. Patient endorses feeling better. Provide continued supportive care, add back minoxidil for blood pressure control, pain control for leg pain from lymphedema.   7/17/24 No acute events overnight. Patient endorses ready to go home with close follow up with her PCP and cardiology, nephrology with continued management of all other chronic conditions. Will arrange for patient to follow up with neurology and outpatient PT, OT and ST for regaining strength and functionality in left side upper and lower extremities post stroke.

## 2024-07-16 NOTE — ASSESSMENT & PLAN NOTE
Creatine stable for now. BMP reviewed- noted Estimated Creatinine Clearance: 10.7 mL/min (A) (based on SCr of 7.8 mg/dL (H)). according to latest data. Based on current GFR, CKD stage is end stage.  Monitor UOP and serial BMP and adjust therapy as needed. Renally dose meds. Avoid nephrotoxic medications and procedures.  -patient on home dialysis, usually does four days a week, renal consult

## 2024-07-16 NOTE — PLAN OF CARE
Plan of care reviewed with patient. Peripheral IV in place and saline. Pt tolerated meds well. PRN pain meds given per MD orders. Telemetry electrodes in place. 2L via NC and tolerating well w/ sats > 95%. VSS. NADN. Pt free from falls and injury. Questions and concerns addressed. Pt belongings and call bell within reach.   Problem: Fall Injury Risk  Goal: Absence of Fall and Fall-Related Injury  Outcome: Progressing     Problem: Adult Inpatient Plan of Care  Goal: Plan of Care Review  Outcome: Progressing  Goal: Patient-Specific Goal (Individualized)  Outcome: Progressing  Goal: Absence of Hospital-Acquired Illness or Injury  Outcome: Progressing  Goal: Optimal Comfort and Wellbeing  Outcome: Progressing  Goal: Readiness for Transition of Care  Outcome: Progressing     Problem: Acute Kidney Injury/Impairment  Goal: Fluid and Electrolyte Balance  Outcome: Progressing  Goal: Improved Oral Intake  Outcome: Progressing  Goal: Effective Renal Function  Outcome: Progressing     Problem: Diabetes Comorbidity  Goal: Blood Glucose Level Within Targeted Range  Outcome: Progressing     Problem: Hemodialysis  Goal: Safe, Effective Therapy Delivery  Outcome: Progressing  Goal: Effective Tissue Perfusion  Outcome: Progressing  Goal: Absence of Infection Signs and Symptoms  Outcome: Progressing

## 2024-07-17 ENCOUNTER — HOSPITAL ENCOUNTER (OUTPATIENT)
Dept: RADIOLOGY | Facility: HOSPITAL | Age: 44
Discharge: HOME | DRG: 064 | End: 2024-07-17
Attending: INTERNAL MEDICINE | Admitting: EMERGENCY MEDICINE
Payer: MEDICARE

## 2024-07-17 ENCOUNTER — LAB VISIT (OUTPATIENT)
Dept: LAB | Facility: HOSPITAL | Age: 44
End: 2024-07-17
Attending: INTERNAL MEDICINE
Payer: MEDICARE

## 2024-07-17 VITALS
RESPIRATION RATE: 20 BRPM | TEMPERATURE: 98 F | HEART RATE: 64 BPM | SYSTOLIC BLOOD PRESSURE: 156 MMHG | DIASTOLIC BLOOD PRESSURE: 73 MMHG | OXYGEN SATURATION: 98 % | HEIGHT: 64 IN | WEIGHT: 229.94 LBS | BODY MASS INDEX: 39.26 KG/M2

## 2024-07-17 DIAGNOSIS — E87.70 HYPERVOLEMIA, UNSPECIFIED HYPERVOLEMIA TYPE: ICD-10-CM

## 2024-07-17 DIAGNOSIS — N18.6 END STAGE RENAL DISEASE: ICD-10-CM

## 2024-07-17 PROBLEM — I63.9 ISCHEMIC STROKE OF FRONTAL LOBE: Status: ACTIVE | Noted: 2024-07-13

## 2024-07-17 PROBLEM — I89.0 LYMPHEDEMA: Chronic | Status: ACTIVE | Noted: 2020-01-01

## 2024-07-17 LAB
ALBUMIN SERPL BCP-MCNC: 3.3 G/DL (ref 3.5–5.2)
ALP SERPL-CCNC: 215 U/L (ref 55–135)
ALT SERPL W/O P-5'-P-CCNC: 21 U/L (ref 10–44)
ANION GAP SERPL CALC-SCNC: 13 MMOL/L (ref 3–11)
AST SERPL-CCNC: 33 U/L (ref 10–40)
BILIRUB SERPL-MCNC: 0.9 MG/DL (ref 0.1–1)
BUN SERPL-MCNC: 43 MG/DL (ref 6–20)
CALCIUM SERPL-MCNC: 8.9 MG/DL (ref 8.7–10.5)
CHLORIDE SERPL-SCNC: 98 MMOL/L (ref 95–110)
CO2 SERPL-SCNC: 25 MMOL/L (ref 23–29)
CREAT SERPL-MCNC: 7.7 MG/DL (ref 0.5–1.4)
ERYTHROCYTE [DISTWIDTH] IN BLOOD BY AUTOMATED COUNT: 17.6 % (ref 11.5–14.5)
EST. GFR  (NO RACE VARIABLE): 6.1 ML/MIN/1.73 M^2
GLUCOSE SERPL-MCNC: 60 MG/DL (ref 70–110)
HBV CORE AB SERPL QL IA: NORMAL
HCT VFR BLD AUTO: 31.8 % (ref 37–48.5)
HGB BLD-MCNC: 10.5 G/DL (ref 12–16)
MAGNESIUM SERPL-MCNC: 2.4 MG/DL (ref 1.6–2.6)
MCH RBC QN AUTO: 29.5 PG (ref 27–31)
MCHC RBC AUTO-ENTMCNC: 33 G/DL (ref 32–36)
MCV RBC AUTO: 89 FL (ref 82–98)
PHOSPHATE SERPL-MCNC: 4.8 MG/DL (ref 2.7–4.5)
PLATELET # BLD AUTO: 184 K/UL (ref 150–450)
PMV BLD AUTO: 12.4 FL (ref 9.2–12.9)
POTASSIUM SERPL-SCNC: 3.8 MMOL/L (ref 3.5–5.1)
PROT SERPL-MCNC: 7.9 G/DL (ref 6–8.4)
RBC # BLD AUTO: 3.56 M/UL (ref 4–5.4)
SODIUM SERPL-SCNC: 136 MMOL/L (ref 136–145)
WBC # BLD AUTO: 5.55 K/UL (ref 3.9–12.7)

## 2024-07-17 PROCEDURE — 36415 COLL VENOUS BLD VENIPUNCTURE: CPT | Performed by: INTERNAL MEDICINE

## 2024-07-17 PROCEDURE — 30200315 PPD INTRADERMAL TEST REV CODE 302: Performed by: INTERNAL MEDICINE

## 2024-07-17 PROCEDURE — 83735 ASSAY OF MAGNESIUM: CPT | Performed by: STUDENT IN AN ORGANIZED HEALTH CARE EDUCATION/TRAINING PROGRAM

## 2024-07-17 PROCEDURE — 86704 HEP B CORE ANTIBODY TOTAL: CPT | Performed by: INTERNAL MEDICINE

## 2024-07-17 PROCEDURE — 25000003 PHARM REV CODE 250: Performed by: INTERNAL MEDICINE

## 2024-07-17 PROCEDURE — 80053 COMPREHEN METABOLIC PANEL: CPT | Performed by: STUDENT IN AN ORGANIZED HEALTH CARE EDUCATION/TRAINING PROGRAM

## 2024-07-17 PROCEDURE — 85027 COMPLETE CBC AUTOMATED: CPT | Performed by: STUDENT IN AN ORGANIZED HEALTH CARE EDUCATION/TRAINING PROGRAM

## 2024-07-17 PROCEDURE — 36415 COLL VENOUS BLD VENIPUNCTURE: CPT | Performed by: STUDENT IN AN ORGANIZED HEALTH CARE EDUCATION/TRAINING PROGRAM

## 2024-07-17 PROCEDURE — 94761 N-INVAS EAR/PLS OXIMETRY MLT: CPT

## 2024-07-17 PROCEDURE — 63600175 PHARM REV CODE 636 W HCPCS: Performed by: INTERNAL MEDICINE

## 2024-07-17 PROCEDURE — 71045 X-RAY EXAM CHEST 1 VIEW: CPT | Mod: TC

## 2024-07-17 PROCEDURE — 99900031 HC PATIENT EDUCATION (STAT)

## 2024-07-17 PROCEDURE — 99900035 HC TECH TIME PER 15 MIN (STAT)

## 2024-07-17 PROCEDURE — 86580 TB INTRADERMAL TEST: CPT | Performed by: INTERNAL MEDICINE

## 2024-07-17 PROCEDURE — 87522 HEPATITIS C REVRS TRNSCRPJ: CPT | Performed by: INTERNAL MEDICINE

## 2024-07-17 PROCEDURE — 25000003 PHARM REV CODE 250: Performed by: STUDENT IN AN ORGANIZED HEALTH CARE EDUCATION/TRAINING PROGRAM

## 2024-07-17 PROCEDURE — 27000221 HC OXYGEN, UP TO 24 HOURS

## 2024-07-17 PROCEDURE — 84100 ASSAY OF PHOSPHORUS: CPT | Performed by: STUDENT IN AN ORGANIZED HEALTH CARE EDUCATION/TRAINING PROGRAM

## 2024-07-17 PROCEDURE — 99238 HOSP IP/OBS DSCHRG MGMT 30/<: CPT | Mod: ,,, | Performed by: STUDENT IN AN ORGANIZED HEALTH CARE EDUCATION/TRAINING PROGRAM

## 2024-07-17 PROCEDURE — 36415 COLL VENOUS BLD VENIPUNCTURE: CPT | Mod: XB | Performed by: INTERNAL MEDICINE

## 2024-07-17 RX ORDER — CLOPIDOGREL BISULFATE 75 MG/1
75 TABLET ORAL DAILY
Qty: 30 TABLET | Refills: 0 | Status: SHIPPED | OUTPATIENT
Start: 2024-07-17

## 2024-07-17 RX ORDER — HYDROCODONE BITARTRATE AND ACETAMINOPHEN 5; 325 MG/1; MG/1
1 TABLET ORAL EVERY 6 HOURS PRN
Qty: 20 TABLET | Refills: 0 | Status: SHIPPED | OUTPATIENT
Start: 2024-07-17

## 2024-07-17 RX ORDER — ASPIRIN 81 MG/1
81 TABLET ORAL DAILY
Status: DISCONTINUED | OUTPATIENT
Start: 2024-07-17 | End: 2024-07-17 | Stop reason: HOSPADM

## 2024-07-17 RX ORDER — ASPIRIN 81 MG/1
81 TABLET ORAL DAILY
Qty: 30 TABLET | Refills: 0 | Status: SHIPPED | OUTPATIENT
Start: 2024-07-17

## 2024-07-17 RX ORDER — CLOPIDOGREL BISULFATE 75 MG/1
75 TABLET ORAL DAILY
Status: DISCONTINUED | OUTPATIENT
Start: 2024-07-17 | End: 2024-07-17 | Stop reason: HOSPADM

## 2024-07-17 RX ADMIN — FAMOTIDINE 20 MG: 20 TABLET, FILM COATED ORAL at 09:07

## 2024-07-17 RX ADMIN — LEVOTHYROXINE SODIUM 50 MCG: 50 TABLET ORAL at 06:07

## 2024-07-17 RX ADMIN — ONDANSETRON 4 MG: 2 INJECTION INTRAMUSCULAR; INTRAVENOUS at 06:07

## 2024-07-17 RX ADMIN — INSULIN GLARGINE 20 UNITS: 100 INJECTION, SOLUTION SUBCUTANEOUS at 09:07

## 2024-07-17 RX ADMIN — HYDROCODONE BITARTRATE AND ACETAMINOPHEN 1 TABLET: 5; 325 TABLET ORAL at 11:07

## 2024-07-17 RX ADMIN — ASPIRIN 81 MG: 81 TABLET, COATED ORAL at 09:07

## 2024-07-17 RX ADMIN — ATORVASTATIN CALCIUM 40 MG: 40 TABLET, FILM COATED ORAL at 09:07

## 2024-07-17 RX ADMIN — ALUMINUM HYDROXIDE, MAGNESIUM HYDROXIDE, AND SIMETHICONE 30 ML: 1200; 120; 1200 SUSPENSION ORAL at 01:07

## 2024-07-17 RX ADMIN — TUBERCULIN PURIFIED PROTEIN DERIVATIVE 5 UNITS: 5 INJECTION INTRADERMAL at 11:07

## 2024-07-17 RX ADMIN — MINOXIDIL 10 MG: 10 TABLET ORAL at 09:07

## 2024-07-17 RX ADMIN — MUPIROCIN: 20 OINTMENT TOPICAL at 09:07

## 2024-07-17 RX ADMIN — HYDROCODONE BITARTRATE AND ACETAMINOPHEN 1 TABLET: 5; 325 TABLET ORAL at 12:07

## 2024-07-17 RX ADMIN — CLOPIDOGREL BISULFATE 75 MG: 75 TABLET ORAL at 09:07

## 2024-07-17 NOTE — SUBJECTIVE & OBJECTIVE
Interval History: Patient seen and examined.     Review of Systems   Constitutional:  Negative for chills, fatigue and fever.   HENT:  Negative for congestion.    Respiratory:  Positive for shortness of breath (at baseline). Negative for wheezing.    Cardiovascular:  Positive for leg swelling (chronic lymphedema). Negative for chest pain.   Gastrointestinal:  Negative for abdominal pain, constipation, diarrhea, nausea and vomiting.   Musculoskeletal:  Positive for arthralgias and gait problem. Negative for back pain, joint swelling, myalgias and neck pain.   Skin:  Negative for wound.   Neurological:  Positive for weakness. Negative for speech difficulty and numbness.   Psychiatric/Behavioral:  Negative for agitation. The patient is not nervous/anxious.      Objective:     Vital Signs (Most Recent):  Temp: 97.5 °F (36.4 °C) (07/16/24 1919)  Pulse: 62 (07/16/24 1919)  Resp: 18 (07/16/24 1919)  BP: (!) 168/74 (07/16/24 1919)  SpO2: 97 % (07/16/24 1919) Vital Signs (24h Range):  Temp:  [97.5 °F (36.4 °C)-98.3 °F (36.8 °C)] 97.5 °F (36.4 °C)  Pulse:  [59-82] 62  Resp:  [16-20] 18  SpO2:  [95 %-100 %] 97 %  BP: (122-186)/(59-85) 168/74     Weight: 104.3 kg (229 lb 15 oz)  Body mass index is 39.47 kg/m².    Intake/Output Summary (Last 24 hours) at 7/16/2024 1955  Last data filed at 7/16/2024 1645  Gross per 24 hour   Intake 400 ml   Output 1900 ml   Net -1500 ml         Physical Exam  Vitals and nursing note reviewed.   Constitutional:       General: She is not in acute distress.     Appearance: She is well-developed. She is obese. She is not ill-appearing, toxic-appearing or diaphoretic.   HENT:      Head: Normocephalic and atraumatic.      Nose: No congestion or rhinorrhea.   Eyes:      General: No scleral icterus.        Right eye: No discharge.         Left eye: No discharge.      Extraocular Movements: Extraocular movements intact.   Cardiovascular:      Rate and Rhythm: Normal rate.      Heart sounds: No murmur  heard.  Pulmonary:      Effort: Pulmonary effort is normal. No respiratory distress.      Breath sounds: No wheezing.   Chest:      Chest wall: No tenderness.   Abdominal:      General: There is no distension.      Palpations: Abdomen is soft.      Tenderness: There is no abdominal tenderness. There is no right CVA tenderness, left CVA tenderness or guarding.   Musculoskeletal:      Cervical back: Normal range of motion and neck supple. No rigidity or tenderness.      Right lower leg: Edema present.      Left lower leg: Edema present.   Lymphadenopathy:      Cervical: No cervical adenopathy.   Skin:     Coloration: Skin is not jaundiced or pale.   Neurological:      General: No focal deficit present.      Mental Status: She is alert and oriented to person, place, and time. Mental status is at baseline.      Cranial Nerves: Dysarthria (improved) and facial asymmetry present.      Sensory: No sensory deficit.      Motor: Weakness and pronator drift present.      Comments: Patient reported upper extremity weakness improved   Psychiatric:         Mood and Affect: Mood normal.         Behavior: Behavior normal.         Thought Content: Thought content normal.         Judgment: Judgment normal.             Significant Labs: All pertinent labs within the past 24 hours have been reviewed.  A1C:   Recent Labs   Lab 07/13/24  1250   HGBA1C 5.6     Recent Labs   Lab 07/13/24  1250 07/14/24  0332 07/16/24  0524   BILITOT 1.3* 1.0 0.9     BMP:   Recent Labs   Lab 07/16/24  0524   GLU 92   *   K 3.8   CL 97   CO2 27   BUN 52*   CREATININE 8.4*   CALCIUM 8.8   MG 2.3     CBC:   Recent Labs   Lab 07/16/24 0524   WBC 6.02   HGB 10.7*   HCT 32.4*        CMP:   Recent Labs   Lab 07/15/24  0919 07/16/24  0524   * 134*   K 3.6 3.8   CL 97 97   CO2 25 27   * 92   BUN 46* 52*   CREATININE 7.8* 8.4*   CALCIUM 9.2 8.8   PROT  --  8.1   ALBUMIN  --  3.3*   BILITOT  --  0.9   ALKPHOS  --  223*   AST  --  36   ALT   --  23   ANIONGAP 13* 10     Recent Labs   Lab 07/15/24  0919 07/16/24  0524   MG 2.3 2.3     POCT Glucose:   Recent Labs   Lab 07/14/24  2100 07/15/24  2355   POCTGLUCOSE 121* 118*       MRI Brain Without Contrast  Narrative: EXAMINATION:  MRI BRAIN WITHOUT CONTRAST    CLINICAL HISTORY:  Stroke, follow up;    TECHNIQUE:  MRI of the brain was performed in multiple planes and sequences.    COMPARISON:  Head CT 07/13/2024.    FINDINGS:  Approximately 2.5 cm area of increased T2 signal and diffusion restriction posterior right frontal lobe consistent with a subacute infarct.  No mass effect.  No signs of acute hemorrhage.  No hydrocephalus.  No mass seen.  Pituitary unremarkable.  Impression: Subacute 2.5 cm posterior right frontal infarct.    Electronically signed by: Adriel Foster MD  Date:    07/16/2024  Time:    12:26     Significant Imaging: I have reviewed all pertinent imaging results/findings within the past 24 hours.

## 2024-07-17 NOTE — ASSESSMENT & PLAN NOTE
Patient with episode of left-sided upper and lower extremity weakness, dysarthria and slurred speech, tele neuro stroke consult completed, aspirin Plavix, MRI pending, PT OT, statin, does not smoke  Antithrombotics for secondary stroke prevention: Antiplatelets: Aspirin: 81 mg daily  Clopidogrel: 300 mg loading dose x 1, now    Statins for secondary stroke prevention and hyperlipidemia, if present:   Statins: Atorvastatin- 40 mg daily    Aggressive risk factor modification: HTN, DM, HLD, Diet, Obesity     Rehab efforts: The patient has been evaluated by a stroke team provider and the therapy needs have been fully considered based off the presenting complaints and exam findings. The following therapy evaluations are needed: PT evaluate and treat, OT evaluate and treat, SLP evaluate and treat    Diagnostics ordered/pending: CTA Head to assess vasculature , CTA Neck/Arch to assess vasculature, MRI head without contrast to assess brain parenchyma, TTE to assess cardiac function/status     VTE prophylaxis: Enoxaparin (CrCl < 30 ml/min) 30 mg SQ every 24 hours    BP parameters: Infarct: No intervention, SBP <220    7/16/24 MRI findings of 2.5 cm area of increased T2 signal and diffusion restriction posterior right frontal lobe consistent with a subacute infarct. No mass effect. No signs of acute hemorrhage. No hydrocephalus. No mass seen. Pituitary unremarkable.   - improving left upper and lower extremity weakness  - continue efforts with PT and OT

## 2024-07-17 NOTE — ASSESSMENT & PLAN NOTE
Creatine stable for now. BMP reviewed- noted Estimated Creatinine Clearance: 10.9 mL/min (A) (based on SCr of 7.7 mg/dL (H)). according to latest data. Based on current GFR, CKD stage is end stage.  Monitor UOP and serial BMP and adjust therapy as needed. Renally dose meds. Avoid nephrotoxic medications and procedures.  -patient on home dialysis, usually does four days a week, renal consult  7/16/24 Tolerated HD, 1.5 L volume removed

## 2024-07-17 NOTE — PLAN OF CARE
Plan of care reviewed with patient. Peripheral IV in place and infusing. Pt tolerated meds well. PRN medication for pain and heartburn given per MD orders. 2L via NC. VSS. NADN. Pt free from falls and injury. Questions and concerns addressed. Pt belongings and call bell within reach.   Problem: Fall Injury Risk  Goal: Absence of Fall and Fall-Related Injury  Outcome: Progressing     Problem: Adult Inpatient Plan of Care  Goal: Plan of Care Review  Outcome: Progressing  Goal: Patient-Specific Goal (Individualized)  Outcome: Progressing  Goal: Absence of Hospital-Acquired Illness or Injury  Outcome: Progressing  Goal: Optimal Comfort and Wellbeing  Outcome: Progressing  Goal: Readiness for Transition of Care  Outcome: Progressing     Problem: Acute Kidney Injury/Impairment  Goal: Fluid and Electrolyte Balance  Outcome: Progressing  Goal: Improved Oral Intake  Outcome: Progressing  Goal: Effective Renal Function  Outcome: Progressing     Problem: Diabetes Comorbidity  Goal: Blood Glucose Level Within Targeted Range  Outcome: Progressing     Problem: Hemodialysis  Goal: Safe, Effective Therapy Delivery  Outcome: Progressing  Goal: Effective Tissue Perfusion  Outcome: Progressing  Goal: Absence of Infection Signs and Symptoms  Outcome: Progressing

## 2024-07-17 NOTE — ASSESSMENT & PLAN NOTE
Chronic,  allow permissive hypertension in setting of acute stroke . Latest blood pressure and vitals reviewed-     Temp:  [97.5 °F (36.4 °C)-98.3 °F (36.8 °C)]   Pulse:  [59-82]   Resp:  [16-20]   BP: (135-186)/(67-85)   SpO2:  [97 %-100 %] .   Home meds for hypertension were reviewed and noted below.   Hypertension Medications               amlodipine (NORVASC) 10 MG tablet Take 1 tablet (10 mg total) by mouth once daily.    furosemide (LASIX) 40 MG tablet Take 40 mg by mouth 2 (two) times daily.    minoxidiL (LONITEN) 10 MG Tab Take 10 mg by mouth.    nebivoloL (BYSTOLIC) 20 mg Tab Take 1 tablet by mouth.    NIFEdipine (PROCARDIA-XL) 60 MG (OSM) 24 hr tablet Take 1 tablet by mouth.            While in the hospital, will manage blood pressure as follows; medications held secondary to acute stroke    Will utilize p.r.n. blood pressure medication only if patient's blood pressure greater than 220/110 and she develops symptoms such as worsening chest pain or shortness of breath.

## 2024-07-17 NOTE — ASSESSMENT & PLAN NOTE
Chronic,  allow permissive hypertension in setting of acute stroke . Latest blood pressure and vitals reviewed-     Temp:  [97.5 °F (36.4 °C)-98.3 °F (36.8 °C)]   Pulse:  [59-82]   Resp:  [16-20]   BP: (122-186)/(59-85)   SpO2:  [95 %-100 %] .   Home meds for hypertension were reviewed and noted below.   Hypertension Medications               amlodipine (NORVASC) 10 MG tablet Take 1 tablet (10 mg total) by mouth once daily.    furosemide (LASIX) 40 MG tablet Take 40 mg by mouth 2 (two) times daily.    minoxidiL (LONITEN) 10 MG Tab Take 10 mg by mouth.    nebivoloL (BYSTOLIC) 20 mg Tab Take 1 tablet by mouth.    NIFEdipine (PROCARDIA-XL) 60 MG (OSM) 24 hr tablet Take 1 tablet by mouth.            While in the hospital, will manage blood pressure as follows; medications held secondary to acute stroke    Will utilize p.r.n. blood pressure medication only if patient's blood pressure greater than 220/110 and she develops symptoms such as worsening chest pain or shortness of breath.

## 2024-07-17 NOTE — ASSESSMENT & PLAN NOTE
Chronic bilateral leg swelling. Pain control at home with scheduled volume depletion with dialysis, gabapentin and opioid based meds taken intermittently.   - f/u vascular specialist outpatient

## 2024-07-17 NOTE — PT/OT/SLP DISCHARGE
Physical Therapy Discharge Summary    Name: Pilar Fernandez  MRN: [id number]   Principal Problem: Ischemic stroke of frontal lobe     Patient Discharged from acute Physical Therapy on 24.  Please refer to prior PT noted date on 24 for functional status.     Assessment:     Patient is DC from acute care this visit    Objective:     GOALS:   Multidisciplinary Problems       Physical Therapy Goals          Problem: Physical Therapy    Goal Priority Disciplines Outcome Goal Variances Interventions   Physical Therapy Goal     PT, PT/OT Progressing     Description: Goals to be met by: 2024     Patient will increase functional independence with mobility by performin. Supine to sit with Stand-by Assistance  2. Sit to supine with Stand-by Assistance  3. Sit to stand transfer with Stand-by Assistance  4. Gait  x 200 feet with Stand-by Assistance using Rolling Walker.                          Reasons for Discontinuation of Therapy Services  Patient DC from acute care setting       Plan:     Patient Discharged to:  Home .      2024

## 2024-07-17 NOTE — PLAN OF CARE
07/17/24 1015   Post-Acute Status   Post-Acute Authorization Other  (Outpatient Rehab)   Other Status Awaiting f/u Appts   Discharge Delays None known at this time     New Referral for outpatient rehab. Referral faxed to [facility]. JONNA pending referral status.     SW met with pt to discuss outpatient rehab vs home health. Pt stated she wanted to go to an outpatient rehab center.       ADDENDUM: SW phoned [facility] to follow up regarding pt's referral. Per Rere, pt's referral was received and is still being reviewed. Per Rere, once they verify pt's insurance benefits, they will be able to schedule an appointment. JONNA provided Rere with call back number.     ADDENDUM: SW phoned [facility] to follow up regarding pt's referral. Per Rere, pt has an intake assessment appointment for tomorrow at 7/18/24 at 2:20PM.

## 2024-07-17 NOTE — ASSESSMENT & PLAN NOTE
Creatine stable for now. BMP reviewed- noted Estimated Creatinine Clearance: 9.9 mL/min (A) (based on SCr of 8.4 mg/dL (H)). according to latest data. Based on current GFR, CKD stage is end stage.  Monitor UOP and serial BMP and adjust therapy as needed. Renally dose meds. Avoid nephrotoxic medications and procedures.  -patient on home dialysis, usually does four days a week, renal consult  7/16/24 Tolerated HD, 1.5 L volume removed

## 2024-07-17 NOTE — DISCHARGE SUMMARY
[facility] Medicine  Discharge Summary      Patient Name: Pilar Fernandez  MRN: [id number]  [facility]: [id number]  Patient Class: IP- Inpatient  Admission Date: 7/13/2024  Hospital Length of Stay: 4 days  Discharge Date and Time: No discharge date for patient encounter.  Attending Physician: Estiven Duvall DO   Discharging Provider: Estiven Duvall DO  Primary Care Provider: Lindsey Singletary FNP    Primary Care Team: Networked reference to record PCT     HPI:   Patient 44-year-old female with past medical history hypertension, diabetes, hyperlipidemia, ESRD on HD, cardiomyopathy, presented with left-sided weakness and dysarthria about 7 hours prior to arrival, patient reported that she noted initial symptom of weakness when she went to grab her cup and dropped it, patient reported she went to bed around 1:00 a.m., woke up at 5:00 a.m. and fell, came to the ED for evaluation, had evaluation with telemedicine vascular Neurology, CTA was completed, suspected right MCA infarct, patient admitted for MRI and 2D echo, patient reported she was on aspirin therapy prior to admission Plavix started, patient admitted for stroke workup        * No surgery found *      Hospital Course:   7/15/24 44 year old with diabetes mellitus type 2, CHF with LVEF 45%, hypertension, pulmonary hypertension, hyperlipidemia, COPD on chronic supplemental oxygen, chronic lymphedema, ESRD on hemodialysis with suspect right MCA infarct. Patient awake and alert, oriented to self, place, time endorses left side weakness in upper extremity compared to right. No acute events overnight. Breathing comfortably on home O2 2L via nasal cannula. Tolerating PO solids and fluids with no difficulties. Follow up MRI of brain. Follow up PT and OT evaluation and treat.   7/16/24 MRI Brain findings of subacute 2.5 cm posterior right frontal infarct. Patient endorses strength in left upper extremity returning with being able to hold on  to objects longer than yesterday. Patient still feels a left foot drag to remain when ambulating with walker. Tolerated HD today with volume removal. Patient endorses feeling better. Provide continued supportive care, add back minoxidil for blood pressure control, pain control for leg pain from lymphedema.   7/17/24 No acute events overnight. Patient endorses ready to go home with close follow up with her PCP and cardiology, nephrology with continued management of all other chronic conditions. Will arrange for patient to follow up with neurology and outpatient PT, OT and ST for regaining strength and functionality in left side upper and lower extremities post stroke.          Goals of Care Treatment Preferences:  Code Status: Full Code      Consults:   Consults (From admission, onward)          Status Ordering Provider     Inpatient consult to Cardiology  Once        Provider:  Vinh Starks MD    Acknowledged GIOVANNA RUDOLPH     Inpatient consult to Social Work/Case Management  Once        Provider:  (Not yet assigned)    Acknowledged GIOVANNA RUODLPH     Inpatient consult to Nephrology  Once        Provider:  Melva Wild MD    Acknowledged CLARISSA ACEVES     Consult to Telemedicine - Acute Stroke  Once        Provider:  Umberto Nicholson MD    Completed JANY MCDONALD            Neuro  * Ischemic stroke of frontal lobe  Patient with episode of left-sided upper and lower extremity weakness, dysarthria and slurred speech, tele neuro stroke consult completed, aspirin Plavix, MRI pending, PT OT, statin, does not smoke  Antithrombotics for secondary stroke prevention: Antiplatelets: Aspirin: 81 mg daily  Clopidogrel: 300 mg loading dose x 1, now    Statins for secondary stroke prevention and hyperlipidemia, if present:   Statins: Atorvastatin- 40 mg daily    Aggressive risk factor modification: HTN, DM, HLD, Diet, Obesity     Rehab efforts: The patient has been evaluated by a stroke team  provider and the therapy needs have been fully considered based off the presenting complaints and exam findings. The following therapy evaluations are needed: PT evaluate and treat, OT evaluate and treat, SLP evaluate and treat    Diagnostics ordered/pending: CTA Head to assess vasculature , CTA Neck/Arch to assess vasculature, MRI head without contrast to assess brain parenchyma, TTE to assess cardiac function/status     VTE prophylaxis: Enoxaparin (CrCl < 30 ml/min) 30 mg SQ every 24 hours    BP parameters: Infarct: No intervention, SBP <220    7/16/24 MRI findings of 2.5 cm area of increased T2 signal and diffusion restriction posterior right frontal lobe consistent with a subacute infarct. No mass effect. No signs of acute hemorrhage. No hydrocephalus. No mass seen. Pituitary unremarkable.   - improving left upper and lower extremity weakness  - continue efforts with PT and OT    Cardiac/Vascular  Hyperlipidemia associated with type 2 diabetes mellitus  Continue Lipitor      Essential hypertension  Chronic,  allow permissive hypertension in setting of acute stroke . Latest blood pressure and vitals reviewed-     Temp:  [97.5 °F (36.4 °C)-98.3 °F (36.8 °C)]   Pulse:  [59-82]   Resp:  [16-20]   BP: (135-186)/(67-85)   SpO2:  [97 %-100 %] .   Home meds for hypertension were reviewed and noted below.   Hypertension Medications               amlodipine (NORVASC) 10 MG tablet Take 1 tablet (10 mg total) by mouth once daily.    furosemide (LASIX) 40 MG tablet Take 40 mg by mouth 2 (two) times daily.    minoxidiL (LONITEN) 10 MG Tab Take 10 mg by mouth.    nebivoloL (BYSTOLIC) 20 mg Tab Take 1 tablet by mouth.    NIFEdipine (PROCARDIA-XL) 60 MG (OSM) 24 hr tablet Take 1 tablet by mouth.            While in the hospital, will manage blood pressure as follows; medications held secondary to acute stroke    Will utilize p.r.n. blood pressure medication only if patient's blood pressure greater than 220/110 and she develops  symptoms such as worsening chest pain or shortness of breath.    Renal/  ESRD (end stage renal disease) on dialysis  Creatine stable for now. BMP reviewed- noted Estimated Creatinine Clearance: 10.9 mL/min (A) (based on SCr of 7.7 mg/dL (H)). according to latest data. Based on current GFR, CKD stage is end stage.  Monitor UOP and serial BMP and adjust therapy as needed. Renally dose meds. Avoid nephrotoxic medications and procedures.  -patient on home dialysis, usually does four days a week, renal consult  7/16/24 Tolerated HD, 1.5 L volume removed    Endocrine  Hypothyroidism  Continue Synthroid      Type 2 diabetes mellitus with hyperglycemia, with long-term current use of insulin  Hold po meds, insulin regime and/or CSI, goal 140-180, adjust prn      GI  GERD (gastroesophageal reflux disease)  Continue Protonix      Other  Lymphedema  Chronic bilateral leg swelling. Pain control at home with scheduled volume depletion with dialysis, gabapentin and opioid based meds taken intermittently.   - f/u vascular specialist outpatient     Chronic respiratory failure with hypoxia   - Baseline respiratory status on home continuous O2 2L via nasal cannula, SpO2 >97%    Hypokalemia   - replete with oral potassium PRN    -Labs (7/13-7/17):   Potassium 3.0 (L) 3.0 (L) 3.6 3.8 3.8      Medication:                             --potassium chloride SA CR tablet 40 mEq PO (7/14 x 1)      Final Active Diagnoses:    Diagnosis Date Noted POA    PRINCIPAL PROBLEM:  Ischemic stroke of frontal lobe [I63.9] 07/13/2024 Yes    Hypothyroidism [E03.9] 10/06/2020 Yes    ESRD (end stage renal disease) on dialysis [N18.6, Z99.2] 10/06/2020 Not Applicable    Lymphedema [I89.0] 2020 Yes     Chronic    GERD (gastroesophageal reflux disease) [K21.9] 03/17/2016 Yes    Type 2 diabetes mellitus with hyperglycemia, with long-term current use of insulin [E11.65, Z79.4] 01/25/2016 Not Applicable     Chronic    Hyperlipidemia associated with type 2  "diabetes mellitus [E11.69, E78.5] 01/25/2016 Yes     Chronic    Essential hypertension [I10] 01/25/2016 Yes      Problems Resolved During this Admission:       Discharged Condition: stable    Disposition: Home or Self Care    Follow Up:   Contact information for follow-up providers       Lindsey Singletary FNP Follow up.    Specialty: Family Medicine  Contact information:  [address] [facility] [address]  [phone]               Vinh Starks MD Follow up in 2 week(s).    Specialty: Cardiology  Contact information:  [facility] [address]  [phone]                       Contact information for after-discharge care       Durable Medical Equipment       [facility] [place] .    Service: Durable Medical Equipment  Contact information:  [address] [facility] [address] [phone]                                 Patient Instructions:      WHEELCHAIR FOR HOME USE     Order Specific Question Answer Comments   Hours in W/C per day: 18    Type of Wheelchair: Standard    Size(Width): 18"(STD adult)    Leg Support: STD footrests    Leg Support: Elevating leg rests    Leg Support: Swing Away    Lap Belt: Velcro    Accessories: Anti-tippers    Cushion: Basic    Justification for cushion: Prevent pressure ulcers    Reclining Back No    Height: 5' 4" (1.626 m)    Weight: 104.3 kg (229 lb 15 oz)    Does patient have medical equipment at home? oxygen    Length of need (1-99 months): 99    Please check all that apply: Caregiver is capable and willing to operate wheelchair safely.    Please check all that apply: The patient has significant edema of the lower extremities that requires an elevating leg rest.    Please check all that apply: The patient requires the use of a w/c for activities of daily living within the Home.    Please check all that apply: Patient mobility limitations cannot be sufficiently resolved by the use of other ambulatory therapies.      WALKER FOR HOME USE     Order " "Specific Question Answer Comments   Type of Walker: Rollator with brakes and/or seat    With wheels? Yes    Height: 5' 4" (1.626 m)    Weight: 104.3 kg (229 lb 15 oz)    Length of need (1-99 months): 99    Does patient have medical equipment at home? oxygen    Please check all that apply: Patient's condition impairs ambulation.    Please check all that apply: Patient is unable to safely ambulate without equipment.      Ambulatory referral/consult to Physical/Occupational Therapy   Standing Status: Future   Referral Priority: Routine Referral Type: Physical Medicine   Referral Reason: Specialty Services Required   Requested Specialty: Physical Therapy   Number of Visits Requested: 1     Ambulatory referral/consult to Speech Therapy   Standing Status: Future   Referral Priority: Routine Referral Type: Speech Therapy   Referral Reason: Specialty Services Required   Requested Specialty: Speech Pathology   Number of Visits Requested: 1     Ambulatory referral/consult to Neurology   Standing Status: Future   Referral Priority: Routine Referral Type: Consultation   Referral Reason: Specialty Services Required   Requested Specialty: Neurology   Number of Visits Requested: 1       Significant Diagnostic Studies: Labs: BMP:   Recent Labs   Lab 07/16/24 0524 07/17/24 0518   GLU 92 60*   * 136   K 3.8 3.8   CL 97 98   CO2 27 25   BUN 52* 43*   CREATININE 8.4* 7.7*   CALCIUM 8.8 8.9   MG 2.3 2.4   , CMP   Recent Labs   Lab 07/16/24 0524 07/17/24 0518   * 136   K 3.8 3.8   CL 97 98   CO2 27 25   GLU 92 60*   BUN 52* 43*   CREATININE 8.4* 7.7*   CALCIUM 8.8 8.9   PROT 8.1 7.9   ALBUMIN 3.3* 3.3*   BILITOT 0.9 0.9   ALKPHOS 223* 215*   AST 36 33   ALT 23 21   ANIONGAP 10 13*   , CBC   Recent Labs   Lab 07/16/24 0524 07/17/24 0518   WBC 6.02 5.55   HGB 10.7* 10.5*   HCT 32.4* 31.8*    184   , INR   Lab Results   Component Value Date    INR 1.3 (H) 07/13/2024    INR 0.9 10/19/2022    INR 0.8 12/16/2020   , " "Lipid Panel   Lab Results   Component Value Date    CHOL 109 (L) 07/13/2024    HDL 69 07/13/2024    LDLCALC 27.0 (L) 07/13/2024    TRIG 65 07/13/2024    CHOLHDL 63.3 (H) 07/13/2024   , Troponin No results for input(s): "TROPONINI" in the last 168 hours., A1C:   Recent Labs   Lab 07/13/24  1250   HGBA1C 5.6     MRI Brain Without Contrast  Narrative: EXAMINATION:  MRI BRAIN WITHOUT CONTRAST    CLINICAL HISTORY:  Stroke, follow up;    TECHNIQUE:  MRI of the brain was performed in multiple planes and sequences.    COMPARISON:  Head CT 07/13/2024.    FINDINGS:  Approximately 2.5 cm area of increased T2 signal and diffusion restriction posterior right frontal lobe consistent with a subacute infarct.  No mass effect.  No signs of acute hemorrhage.  No hydrocephalus.  No mass seen.  Pituitary unremarkable.  Impression: Subacute 2.5 cm posterior right frontal infarct.    Electronically signed by: Adriel Foster MD  Date:    07/16/2024  Time:    12:26       Pending Diagnostic Studies:       Procedure Component Value Units Date/Time    MRI Brain Without Contrast [[id number]]     Order Status: Sent Lab Status: No result            Medications:  Reconciled Home Medications:      Medication List        START taking these medications      aspirin 81 MG EC tablet  Commonly known as: ECOTRIN  Take 1 tablet (81 mg total) by mouth once daily.     clopidogreL 75 mg tablet  Commonly known as: PLAVIX  Take 1 tablet (75 mg total) by mouth once daily.     HYDROcodone-acetaminophen 5-325 mg per tablet  Commonly known as: NORCO  Take 1 tablet by mouth every 6 (six) hours as needed for Pain.  Replaces: HYDROcodone-acetaminophen  mg per tablet            CHANGE how you take these medications      ondansetron 4 MG Tbdl  Commonly known as: ZOFRAN-ODT  Take 1 tablet (4 mg total) by mouth every 8 (eight) hours as needed ([place]).  What changed: Another medication with the same name was removed. Continue taking this medication, and follow the " directions you see here.            CONTINUE taking these medications      amLODIPine 10 MG tablet  Commonly known as: NORVASC  Take 1 tablet (10 mg total) by mouth once daily.     atorvastatin 40 MG tablet  Commonly known as: LIPITOR  Take 1 tablet by mouth once daily     colchicine 0.6 mg tablet  Commonly known as: COLCRYS  TAKE 1 TABLET BY MOUTH ONCE DAILY AS NEEDED FOR GOUT PAIN     EUTHYROX 50 mcg tablet  Generic drug: levothyroxine  Take 1 tablet (50 mcg total) by mouth every morning.     insulin aspart U-100 100 unit/mL (3 mL) Inpn pen  Commonly known as: NovoLOG Flexpen U-100 Insulin  Inject 10 Units into the skin 2 (two) times daily with meals.     lactulose 10 gram/15 mL solution  Commonly known as: CHRONULAC  Take by mouth.     LANTUS U-100 INSULIN 100 unit/mL injection  Generic drug: insulin glargine U-100 (Lantus)  70 Units every evening.     linaGLIPtin 5 mg Tab tablet  Commonly known as: TRADJENTA  TAKE 1 TABLET (5 MG TOTAL) BY MOUTH ONCE DAILY.     minoxidiL 10 MG Tab  Commonly known as: LONITEN  Take 10 mg by mouth.     nebivoloL 20 mg Tab  Commonly known as: BYSTOLIC  Take 1 tablet by mouth.     NIFEdipine 60 MG (OSM) 24 hr tablet  Commonly known as: PROCARDIA-XL  Take 1 tablet by mouth.     pantoprazole 40 MG tablet  Commonly known as: PROTONIX  Take 40 mg by mouth.     polyethylene glycol 17 gram/dose powder  Commonly known as: GLYCOLAX  Take 17 g by mouth every evening.     senna-docusate 8.6-50 mg 8.6-50 mg per tablet  Commonly known as: SENNA WITH DOCUSATE SODIUM  Take 1 tablet by mouth 2 (two) times daily.     sorbitoL 70 % solution  Take 30 mLs by mouth daily as needed (Severe constipation.  Use prn no BM X 3 days despite Miralax qhs.).     traZODone 100 MG tablet  Commonly known as: DESYREL  Take 100 mg by mouth nightly as needed.            STOP taking these medications      acetaminophen 500 MG tablet  Commonly known as: TYLENOL     acetaminophen 650 MG Tbsr  Commonly known as: TYLENOL      acetaminophen-codeine 120mg 12mg 5mL Soln     furosemide 40 MG tablet  Commonly known as: LASIX     gabapentin 400 MG capsule  Commonly known as: NEURONTIN     HYDROcodone-acetaminophen  mg per tablet  Commonly known as: NORCO  Replaced by: HYDROcodone-acetaminophen 5-325 mg per tablet     LIDOcaine viscous HCl 2% 2 % Soln  Commonly known as: LIDOcaine VISCOUS     oxyCODONE 5 MG immediate release tablet  Commonly known as: ROXICODONE     sulfamethoxazole-trimethoprim 400-80mg 400-80 mg per tablet  Commonly known as: BACTRIM,SEPTRA     temazepam 15 mg Cap  Commonly known as: RESTORIL     zolpidem 5 MG Tab  Commonly known as: AMBIEN              Indwelling Lines/Drains at time of discharge:   Lines/Drains/Airways       Drain  Duration                  Hemodialysis AV Fistula 08/05/19 Left forearm 1808 days                    Time spent on the discharge of patient: 30 minutes         Estiven Duvall DO  Department of Hospital Medicine  [facility] S[facility]

## 2024-07-17 NOTE — PROGRESS NOTES
[facility] Medicine  Progress Note    Patient Name: Pilar Fernandez  MRN: [id number]  Patient Class: IP- Inpatient   Admission Date: 7/13/2024  Length of Stay: 3 days  Attending Physician: Estiven Duvall DO  Primary Care Provider: Lindsey Singletary FNP        Subjective:     Principal Problem:Embolic stroke involving right middle cerebral artery        HPI:  Patient 44-year-old female with past medical history hypertension, diabetes, hyperlipidemia, ESRD on HD, cardiomyopathy, presented with left-sided weakness and dysarthria about 7 hours prior to arrival, patient reported that she noted initial symptom of weakness when she went to grab her cup and dropped it, patient reported she went to bed around 1:00 a.m., woke up at 5:00 a.m. and fell, came to the ED for evaluation, had evaluation with telemedicine vascular Neurology, CTA was completed, suspected right MCA infarct, patient admitted for MRI and 2D echo, patient reported she was on aspirin therapy prior to admission Plavix started, patient admitted for stroke workup        Overview/Hospital Course:  7/15/24 44 year old with diabetes mellitus type 2, CHF with LVEF 45%, hypertension, pulmonary hypertension, hyperlipidemia, COPD on chronic supplemental oxygen, chronic lymphedema, ESRD on hemodialysis with suspect right MCA infarct. Patient awake and alert, oriented to self, place, time endorses left side weakness in upper extremity compared to right. No acute events overnight. Breathing comfortably on home O2 2L via nasal cannula. Tolerating PO solids and fluids with no difficulties. Follow up MRI of brain. Follow up PT and OT evaluation and treat.   7/16/24 MRI Brain findings of subacute 2.5 cm posterior right frontal infarct. Patient endorses strength in left upper extremity returning with being able to hold on to objects longer than yesterday. Patient still feels a left foot drag to remain when ambulating with walker. Tolerated HD  today with volume removal. Patient endorses feeling better. Provide continued supportive care, add back minoxidil for blood pressure control, pain control for leg pain from lymphedema.        Interval History: Patient seen and examined.     Review of Systems   Constitutional:  Negative for chills, fatigue and fever.   HENT:  Negative for congestion.    Respiratory:  Positive for shortness of breath (at baseline). Negative for wheezing.    Cardiovascular:  Positive for leg swelling (chronic lymphedema). Negative for chest pain.   Gastrointestinal:  Negative for abdominal pain, constipation, diarrhea, nausea and vomiting.   Musculoskeletal:  Positive for arthralgias and gait problem. Negative for back pain, joint swelling, myalgias and neck pain.   Skin:  Negative for wound.   Neurological:  Positive for weakness. Negative for speech difficulty and numbness.   Psychiatric/Behavioral:  Negative for agitation. The patient is not nervous/anxious.      Objective:     Vital Signs (Most Recent):  Temp: 97.5 °F (36.4 °C) (07/16/24 1919)  Pulse: 62 (07/16/24 1919)  Resp: 18 (07/16/24 1919)  BP: (!) 168/74 (07/16/24 1919)  SpO2: 97 % (07/16/24 1919) Vital Signs (24h Range):  Temp:  [97.5 °F (36.4 °C)-98.3 °F (36.8 °C)] 97.5 °F (36.4 °C)  Pulse:  [59-82] 62  Resp:  [16-20] 18  SpO2:  [95 %-100 %] 97 %  BP: (122-186)/(59-85) 168/74     Weight: 104.3 kg (229 lb 15 oz)  Body mass index is 39.47 kg/m².    Intake/Output Summary (Last 24 hours) at 7/16/2024 1955  Last data filed at 7/16/2024 1645  Gross per 24 hour   Intake 400 ml   Output 1900 ml   Net -1500 ml         Physical Exam  Vitals and nursing note reviewed.   Constitutional:       General: She is not in acute distress.     Appearance: She is well-developed. She is obese. She is not ill-appearing, toxic-appearing or diaphoretic.   HENT:      Head: Normocephalic and atraumatic.      Nose: No congestion or rhinorrhea.   Eyes:      General: No scleral icterus.        Right  eye: No discharge.         Left eye: No discharge.      Extraocular Movements: Extraocular movements intact.   Cardiovascular:      Rate and Rhythm: Normal rate.      Heart sounds: No murmur heard.  Pulmonary:      Effort: Pulmonary effort is normal. No respiratory distress.      Breath sounds: No wheezing.   Chest:      Chest wall: No tenderness.   Abdominal:      General: There is no distension.      Palpations: Abdomen is soft.      Tenderness: There is no abdominal tenderness. There is no right CVA tenderness, left CVA tenderness or guarding.   Musculoskeletal:      Cervical back: Normal range of motion and neck supple. No rigidity or tenderness.      Right lower leg: Edema present.      Left lower leg: Edema present.   Lymphadenopathy:      Cervical: No cervical adenopathy.   Skin:     Coloration: Skin is not jaundiced or pale.   Neurological:      General: No focal deficit present.      Mental Status: She is alert and oriented to person, place, and time. Mental status is at baseline.      Cranial Nerves: Dysarthria (improved) and facial asymmetry present.      Sensory: No sensory deficit.      Motor: Weakness and pronator drift present.      Comments: Patient reported upper extremity weakness improved   Psychiatric:         Mood and Affect: Mood normal.         Behavior: Behavior normal.         Thought Content: Thought content normal.         Judgment: Judgment normal.             Significant Labs: All pertinent labs within the past 24 hours have been reviewed.  A1C:   Recent Labs   Lab 07/13/24  1250   HGBA1C 5.6     Recent Labs   Lab 07/13/24  1250 07/14/24  0332 07/16/24  0524   BILITOT 1.3* 1.0 0.9     BMP:   Recent Labs   Lab 07/16/24 0524   GLU 92   *   K 3.8   CL 97   CO2 27   BUN 52*   CREATININE 8.4*   CALCIUM 8.8   MG 2.3     CBC:   Recent Labs   Lab 07/16/24 0524   WBC 6.02   HGB 10.7*   HCT 32.4*        CMP:   Recent Labs   Lab 07/15/24  0919 07/16/24  0524   * 134*   K 3.6  3.8   CL 97 97   CO2 25 27   * 92   BUN 46* 52*   CREATININE 7.8* 8.4*   CALCIUM 9.2 8.8   PROT  --  8.1   ALBUMIN  --  3.3*   BILITOT  --  0.9   ALKPHOS  --  223*   AST  --  36   ALT  --  23   ANIONGAP 13* 10     Recent Labs   Lab 07/15/24  0919 07/16/24  0524   MG 2.3 2.3     POCT Glucose:   Recent Labs   Lab 07/14/24  2100 07/15/24  2355   POCTGLUCOSE 121* 118*       MRI Brain Without Contrast  Narrative: EXAMINATION:  MRI BRAIN WITHOUT CONTRAST    CLINICAL HISTORY:  Stroke, follow up;    TECHNIQUE:  MRI of the brain was performed in multiple planes and sequences.    COMPARISON:  Head CT 07/13/2024.    FINDINGS:  Approximately 2.5 cm area of increased T2 signal and diffusion restriction posterior right frontal lobe consistent with a subacute infarct.  No mass effect.  No signs of acute hemorrhage.  No hydrocephalus.  No mass seen.  Pituitary unremarkable.  Impression: Subacute 2.5 cm posterior right frontal infarct.    Electronically signed by: Adriel Foster MD  Date:    07/16/2024  Time:    12:26     Significant Imaging: I have reviewed all pertinent imaging results/findings within the past 24 hours.    Assessment/Plan:      * Embolic stroke involving right middle cerebral artery  Patient with episode of left-sided upper and lower extremity weakness, dysarthria and slurred speech, tele neuro stroke consult completed, aspirin Plavix, MRI pending, PT OT, statin, does not smoke  Antithrombotics for secondary stroke prevention: Antiplatelets: Aspirin: 81 mg daily  Clopidogrel: 300 mg loading dose x 1, now    Statins for secondary stroke prevention and hyperlipidemia, if present:   Statins: Atorvastatin- 40 mg daily    Aggressive risk factor modification: HTN, DM, HLD, Diet, Obesity     Rehab efforts: The patient has been evaluated by a stroke team provider and the therapy needs have been fully considered based off the presenting complaints and exam findings. The following therapy evaluations are needed: PT  evaluate and treat, OT evaluate and treat, SLP evaluate and treat    Diagnostics ordered/pending: CTA Head to assess vasculature , CTA Neck/Arch to assess vasculature, MRI head without contrast to assess brain parenchyma, TTE to assess cardiac function/status     VTE prophylaxis: Enoxaparin (CrCl < 30 ml/min) 30 mg SQ every 24 hours    BP parameters: Infarct: No intervention, SBP <220    7/16/24 MRI findings of 2.5 cm area of increased T2 signal and diffusion restriction posterior right frontal lobe consistent with a subacute infarct. No mass effect. No signs of acute hemorrhage. No hydrocephalus. No mass seen. Pituitary unremarkable.   - improving left upper and lower extremity weakness  - continue efforts with PT and OT    Hypothyroidism  Continue Synthroid      ESRD (end stage renal disease) on dialysis  Creatine stable for now. BMP reviewed- noted Estimated Creatinine Clearance: 9.9 mL/min (A) (based on SCr of 8.4 mg/dL (H)). according to latest data. Based on current GFR, CKD stage is end stage.  Monitor UOP and serial BMP and adjust therapy as needed. Renally dose meds. Avoid nephrotoxic medications and procedures.  -patient on home dialysis, usually does four days a week, renal consult  7/16/24 Tolerated HD, 1.5 L volume removed    GERD (gastroesophageal reflux disease)  Continue Protonix      Hyperlipidemia associated with type 2 diabetes mellitus  Continue Lipitor      Essential hypertension  Chronic,  allow permissive hypertension in setting of acute stroke . Latest blood pressure and vitals reviewed-     Temp:  [97.5 °F (36.4 °C)-98.3 °F (36.8 °C)]   Pulse:  [59-82]   Resp:  [16-20]   BP: (122-186)/(59-85)   SpO2:  [95 %-100 %] .   Home meds for hypertension were reviewed and noted below.   Hypertension Medications               amlodipine (NORVASC) 10 MG tablet Take 1 tablet (10 mg total) by mouth once daily.    furosemide (LASIX) 40 MG tablet Take 40 mg by mouth 2 (two) times daily.    minoxidiL  (LONITEN) 10 MG Tab Take 10 mg by mouth.    nebivoloL (BYSTOLIC) 20 mg Tab Take 1 tablet by mouth.    NIFEdipine (PROCARDIA-XL) 60 MG (OSM) 24 hr tablet Take 1 tablet by mouth.            While in the hospital, will manage blood pressure as follows; medications held secondary to acute stroke    Will utilize p.r.n. blood pressure medication only if patient's blood pressure greater than 220/110 and she develops symptoms such as worsening chest pain or shortness of breath.    Type 2 diabetes mellitus with hyperglycemia, with long-term current use of insulin  Hold po meds, insulin regime and/or CSI, goal 140-180, adjust prn        VTE Risk Mitigation (From admission, onward)           Ordered     enoxaparin injection 30 mg  Daily         07/13/24 2108     IP VTE HIGH RISK PATIENT  Once         07/13/24 2108     Place sequential compression device  Until discontinued         07/13/24 2108                    Discharge Planning   SHASHA:      Code Status: Full Code   Is the patient medically ready for discharge?:     Reason for patient still in hospital (select all that apply): Patient trending condition, Treatment, Consult recommendations, and PT / OT recommendations  Discharge Plan A: Home with family, Home Health                  Estiven Duvall DO  Department of Hospital Medicine   [facility]

## 2024-07-18 ENCOUNTER — PATIENT OUTREACH (OUTPATIENT)
Dept: ADMINISTRATIVE | Facility: CLINIC | Age: 44
End: 2024-07-18
Payer: MEDICARE

## 2024-07-18 LAB
HCV RNA SERPL QL NAA+PROBE: NOT DETECTED
HCV RNA SPEC NAA+PROBE-ACNC: NOT DETECTED IU/ML

## 2024-07-18 NOTE — PLAN OF CARE
Problem: Physical Therapy  Goal: Physical Therapy Goal  Description: Goals to be met by: 2024     Patient will increase functional independence with mobility by performin. Supine to sit with Stand-by Assistance  2. Sit to supine with Stand-by Assistance  3. Sit to stand transfer with Stand-by Assistance  4. Gait  x 200 feet with Stand-by Assistance using Rolling Walker.     Outcome: Adequate for Care Transition

## 2024-07-18 NOTE — PROGRESS NOTES
C3 nurse attempted to contact Pilar Fernandez for a TCC post hospital discharge follow up call. No answer. No voicemail available. The patient had a scheduled HOSFU appointment with Lindsey Singletary on 07/18/24 @ 1000.

## 2024-07-19 ENCOUNTER — PATIENT MESSAGE (OUTPATIENT)
Dept: ADMINISTRATIVE | Facility: CLINIC | Age: 44
End: 2024-07-19
Payer: MEDICARE

## 2024-07-19 NOTE — PLAN OF CARE
"SW received secure chat from Marion Hooker LPN stating "Can you please contact the patient ([phone]) or her sister Pascual ([phone]) concerning the Outpatient PT and Dialysis? She has not received Dialysis since d/c. They have not heard anything from anyone."    SW phoned patient ([phone]) but unable to reach and unable to leave a vm.     SW phoned sister Pascual ([phone]) and was able to speak to her. Sister stated she was trying to get information on pt's follow up appointments and dialysis chair time. Sister informed SW that pt had a dialysis chair time already at [facility], but then the pt came into the hospital.     SW phoned [facility] and was informed that pt was starting HD dialysis on today at 11:30AM, and they have already spoken to the pt to come in early to complete paperwork.     SW phoned pt's sister back to inform of the above information. Sister informed SW that pt's mother was aware of pt's aftercare appointments and outpatient dialysis and provided transportation for pt. Sister informed SW that pt had PT on yesterday and the agency was going to arrange all future therapy appointments surrounding pt's dialysis times.     Pt's sister voiced no other concerns at this time.   "

## 2024-07-19 NOTE — PROGRESS NOTES
C3 nurse spoke with Pilar Fernandez for a TCC post hospital discharge follow up call. The patient has a scheduled HOSFU appointment with Lindsey Singletary on 07/18/24 @ 1000.

## 2024-07-19 NOTE — PROGRESS NOTES
C3 nurse attempted to contact Pilar Fernandez's mother Mile for a TCC post hospital discharge follow up call. Mile is unable to conduct the call @ this time.  Roxana was given RCI and stated she will have the pts.  return the call.

## 2024-07-19 NOTE — PROGRESS NOTES
C3 nurse spoke with Pilar Fernandez's sister Indira for a TCC post hospital discharge follow up call. The patient has a scheduled HOSFU appointment with Lindsey Singletary on 07/18/24 @ 1000.    Pilar Fernandez's sister was unable to go over the pts. Medications.  Indira requested the C3 nurse call the pt. Back after lunchtime today.  C3 nurse stated understanding.

## 2024-09-30 ENCOUNTER — HOSPITAL ENCOUNTER (EMERGENCY)
Facility: HOSPITAL | Age: 44
Discharge: HOME | End: 2024-09-30
Attending: EMERGENCY MEDICINE
Payer: MEDICARE

## 2024-09-30 VITALS
OXYGEN SATURATION: 100 % | HEIGHT: 64 IN | RESPIRATION RATE: 18 BRPM | HEART RATE: 68 BPM | SYSTOLIC BLOOD PRESSURE: 147 MMHG | WEIGHT: 230 LBS | DIASTOLIC BLOOD PRESSURE: 73 MMHG | TEMPERATURE: 99 F | BODY MASS INDEX: 39.27 KG/M2

## 2024-09-30 DIAGNOSIS — K21.9 GASTROESOPHAGEAL REFLUX DISEASE, UNSPECIFIED WHETHER ESOPHAGITIS PRESENT: Primary | ICD-10-CM

## 2024-09-30 PROCEDURE — 99283 EMERGENCY DEPT VISIT LOW MDM: CPT

## 2024-09-30 PROCEDURE — 25000003 PHARM REV CODE 250: Performed by: CLINICAL NURSE SPECIALIST

## 2024-09-30 RX ORDER — PANTOPRAZOLE SODIUM 40 MG/1
40 TABLET, DELAYED RELEASE ORAL DAILY
Qty: 30 TABLET | Refills: 0 | Status: SHIPPED | OUTPATIENT
Start: 2024-09-30

## 2024-09-30 RX ORDER — ALUMINUM HYDROXIDE, MAGNESIUM HYDROXIDE, AND SIMETHICONE 1200; 120; 1200 MG/30ML; MG/30ML; MG/30ML
30 SUSPENSION ORAL ONCE
Status: COMPLETED | OUTPATIENT
Start: 2024-09-30 | End: 2024-09-30

## 2024-09-30 RX ORDER — ONDANSETRON 4 MG/1
4 TABLET, ORALLY DISINTEGRATING ORAL
Status: COMPLETED | OUTPATIENT
Start: 2024-09-30 | End: 2024-09-30

## 2024-09-30 RX ORDER — FAMOTIDINE 20 MG/1
20 TABLET, FILM COATED ORAL 2 TIMES DAILY PRN
Qty: 20 TABLET | Refills: 0 | Status: SHIPPED | OUTPATIENT
Start: 2024-09-30 | End: 2025-09-30

## 2024-09-30 RX ORDER — LIDOCAINE HYDROCHLORIDE 20 MG/ML
15 SOLUTION OROPHARYNGEAL ONCE
Status: COMPLETED | OUTPATIENT
Start: 2024-09-30 | End: 2024-09-30

## 2024-09-30 RX ADMIN — ALUMINUM HYDROXIDE, MAGNESIUM HYDROXIDE, AND SIMETHICONE 30 ML: 1200; 120; 1200 SUSPENSION ORAL at 09:09

## 2024-09-30 RX ADMIN — LIDOCAINE HYDROCHLORIDE 15 ML: 20 SOLUTION ORAL at 09:09

## 2024-09-30 RX ADMIN — ONDANSETRON 4 MG: 4 TABLET, ORALLY DISINTEGRATING ORAL at 09:09

## 2024-09-30 NOTE — ED PROVIDER NOTES
Encounter Date: 2024       History     Chief Complaint   Patient presents with    Foreign Body In Throat     Pt stated that for the past couple days it feels like something is stuck in her throat with discomfort to epigastric region as well, accompanied with nausea. Able to eat/drink.      44-year-old female presents to the emergency room with pain from her throat down to her epigastric region with nausea for the last few days.  Patient states she feels like something is stuck in there.  Patient swallowing on saliva.  Speaking in full sentences.  No drooling noted.  History of GERD but states she ran out of her medication.        Review of patient's allergies indicates:   Allergen Reactions    Tramadol Itching and Nausea And Vomiting     Past Medical History:   Diagnosis Date    Anemia     Anemia     Breast mass     Chronic constipation     CKD (chronic kidney disease)     Diabetes mellitus     Dialysis patient     MON, TUE, THURS, SAT.    Embolic stroke involving right middle cerebral artery 2024    Encounter for blood transfusion     Erosive gastritis 2016    GERD (gastroesophageal reflux disease)     Gout     High cholesterol     Hypertension     Knee pain     Thyroid disease     Unspecified cataract 2022     Past Surgical History:   Procedure Laterality Date    AV FISTULA PLACEMENT Left 2019    Procedure: creation av fistula;  Surgeon: Buck Fernandez MD;  Location: [facility];  Service: Cardiovascular;  Laterality: Left;  Left Radialcephalic: PACU     SECTION  , , ,     CHOLECYSTECTOMY      COLONOSCOPY N/A 10/23/2017    Procedure: COLONOSCOPY;  Surgeon: Bri Harry MD;  Location: [facility];  Service: Endoscopy;  Laterality: N/A;    EXCISIONAL BIOPSY Left 2022    Procedure: EXCISIONAL BIOPSY;  Surgeon: Mick Juarez MD;  Location: [facility];  Service: General;  Laterality: Left;    TUBAL LIGATION      UPPER GASTROINTESTINAL ENDOSCOPY  2016     Erosive Gastritis-Dr Bailey     Family History   Problem Relation Name Age of Onset    Thyroid disease Mother      Diabetes Father      Ovarian cancer Maternal Aunt Malathi     Breast cancer Maternal Aunt Ada      Social History     Tobacco Use    Smoking status: Never     Passive exposure: Never    Smokeless tobacco: Never   Substance Use Topics    Alcohol use: No     Alcohol/week: 0.0 standard drinks of alcohol    Drug use: No     Review of Systems   Constitutional:  Negative for fever.   HENT:  Negative for sore throat.    Respiratory:  Negative for shortness of breath.    Cardiovascular:  Negative for chest pain.   Gastrointestinal:  Positive for abdominal pain. Negative for nausea.   Genitourinary:  Negative for dysuria.   Musculoskeletal:  Negative for back pain.   Skin:  Negative for rash.   Neurological:  Negative for weakness.   Hematological:  Does not bruise/bleed easily.   All other systems reviewed and are negative.      Physical Exam     Initial Vitals [09/30/24 0906]   BP Pulse Resp Temp SpO2   (!) 147/73 68 18 98.5 °F (36.9 °C) 100 %      MAP       --         Physical Exam    Nursing note and vitals reviewed.  Constitutional: She appears well-developed and well-nourished.   HENT:   Head: Normocephalic and atraumatic.   Eyes: Pupils are equal, round, and reactive to light.   Neck:   Normal range of motion.  Cardiovascular:  Normal rate and regular rhythm.           Pulmonary/Chest: Breath sounds normal.   Abdominal: Abdomen is soft. Bowel sounds are normal. There is abdominal tenderness.   Musculoskeletal:         General: Normal range of motion.      Cervical back: Normal range of motion.     Neurological: She is alert and oriented to person, place, and time.   Skin: Skin is warm and dry.   Psychiatric: She has a normal mood and affect.         ED Course   Procedures  Labs Reviewed - No data to display       Imaging Results    None          Medications   aluminum-magnesium hydroxide-simethicone  200-200-20 mg/5 mL suspension 30 mL (30 mLs Oral Given 9/30/24 0953)     And   LIDOcaine viscous HCl 2% oral solution 15 mL (15 mLs Oral Given 9/30/24 0953)   ondansetron disintegrating tablet 4 mg (4 mg Oral Given 9/30/24 0952)     Medical Decision Making                                    Clinical Impression:  Final diagnoses:  [K21.9] Gastroesophageal reflux disease, unspecified whether esophagitis present (Primary)          ED Disposition Condition    Discharge Stable          ED Prescriptions       Medication Sig Dispense Start Date End Date Auth. Provider    pantoprazole (PROTONIX) 40 MG tablet Take 1 tablet (40 mg total) by mouth once daily. 30 tablet 9/30/2024 -- Carmen De La Torre NP    famotidine (PEPCID) 20 MG tablet Take 1 tablet (20 mg total) by mouth 2 (two) times daily as needed for Heartburn. 20 tablet 9/30/2024 9/30/2025 Carmen De La Torre NP          Follow-up Information       Follow up With Specialties Details Why Contact Info    Lindsey Singletary, FNP Family Medicine  As needed [address] [place] [address] [id number] [phone]               Carmen De La Torre NP  09/30/24 4473

## 2024-10-12 ENCOUNTER — HOSPITAL ENCOUNTER (EMERGENCY)
Facility: HOSPITAL | Age: 44
Discharge: HOME | End: 2024-10-13
Attending: EMERGENCY MEDICINE
Payer: MEDICARE

## 2024-10-12 DIAGNOSIS — M79.605 LEFT LEG PAIN: Primary | ICD-10-CM

## 2024-10-12 PROCEDURE — 99283 EMERGENCY DEPT VISIT LOW MDM: CPT

## 2024-10-13 VITALS
RESPIRATION RATE: 18 BRPM | DIASTOLIC BLOOD PRESSURE: 62 MMHG | BODY MASS INDEX: 39.27 KG/M2 | TEMPERATURE: 98 F | WEIGHT: 230 LBS | OXYGEN SATURATION: 98 % | HEART RATE: 76 BPM | SYSTOLIC BLOOD PRESSURE: 112 MMHG | HEIGHT: 64 IN

## 2024-10-13 PROCEDURE — 25000003 PHARM REV CODE 250: Performed by: EMERGENCY MEDICINE

## 2024-10-13 RX ORDER — HYDROCODONE BITARTRATE AND ACETAMINOPHEN 5; 325 MG/1; MG/1
1 TABLET ORAL
Status: COMPLETED | OUTPATIENT
Start: 2024-10-13 | End: 2024-10-13

## 2024-10-13 RX ADMIN — HYDROCODONE BITARTRATE AND ACETAMINOPHEN 1 TABLET: 5; 325 TABLET ORAL at 01:10

## 2024-10-13 NOTE — ED PROVIDER NOTES
Encounter Date: 10/12/2024       History     Chief Complaint   Patient presents with    Leg Pain     Patient reports left leg pain x 2 days. Hx. Lymphedema - denies increased swelling.      45 yo female with history as below with chronic LLE pain here with same. No new features. Trouble falling asleep so presented to ED. No fever. No rash. Longstanding lymphedema with no new swelling or erythema.       Review of patient's allergies indicates:   Allergen Reactions    Tramadol Itching and Nausea And Vomiting     Past Medical History:   Diagnosis Date    Anemia     Anemia     Breast mass     Chronic constipation     CKD (chronic kidney disease)     Diabetes mellitus     Dialysis patient     MON, TUE, THURS, SAT.    Embolic stroke involving right middle cerebral artery 2024    Encounter for blood transfusion     Erosive gastritis 2016    GERD (gastroesophageal reflux disease)     Gout     High cholesterol     Hypertension     Knee pain     Thyroid disease     Unspecified cataract 2022     Past Surgical History:   Procedure Laterality Date    AV FISTULA PLACEMENT Left 2019    Procedure: creation av fistula;  Surgeon: Buck Fernandez MD;  Location: [facility];  Service: Cardiovascular;  Laterality: Left;  Left Radialcephalic: PACU     SECTION  , , ,     CHOLECYSTECTOMY      COLONOSCOPY N/A 10/23/2017    Procedure: COLONOSCOPY;  Surgeon: Bri Harry MD;  Location: [facility];  Service: Endoscopy;  Laterality: N/A;    EXCISIONAL BIOPSY Left 2022    Procedure: EXCISIONAL BIOPSY;  Surgeon: Mick Juarez MD;  Location: [facility];  Service: General;  Laterality: Left;    TUBAL LIGATION      UPPER GASTROINTESTINAL ENDOSCOPY  2016    Erosive Gastritis-Dr Bailey     Family History   Problem Relation Name Age of Onset    Thyroid disease Mother      Diabetes Father      Ovarian cancer Maternal Aunt Malathi     Breast cancer Maternal Aunt Ada      Social History     Tobacco Use     Smoking status: Never     Passive exposure: Never    Smokeless tobacco: Never   Substance Use Topics    Alcohol use: No     Alcohol/week: 0.0 standard drinks of alcohol    Drug use: No     Review of Systems   Constitutional: Negative.    Respiratory: Negative.     Cardiovascular: Negative.    Gastrointestinal: Negative.    All other systems reviewed and are negative.      Physical Exam     Initial Vitals [10/12/24 2346]   BP Pulse Resp Temp SpO2   (!) 101/58 78 18 97.8 °F (36.6 °C) 98 %      MAP       --         Physical Exam    Nursing note and vitals reviewed.  Constitutional: She appears well-developed and well-nourished. She is not diaphoretic. No distress.   HENT:   Head: Normocephalic and atraumatic.   Eyes: EOM are normal. Pupils are equal, round, and reactive to light.   Neck: Neck supple.   Normal range of motion.  Cardiovascular:  Normal rate, regular rhythm and intact distal pulses.           Pulmonary/Chest: Breath sounds normal. No respiratory distress. She has no wheezes. She has no rales.   Abdominal: Abdomen is soft. Bowel sounds are normal. She exhibits no distension. There is no abdominal tenderness. There is no rebound.   Musculoskeletal:         General: Edema present. No tenderness. Normal range of motion.      Cervical back: Normal range of motion and neck supple.     Neurological: She is alert. She has normal strength and normal reflexes. GCS score is 15. GCS eye subscore is 4. GCS verbal subscore is 5. GCS motor subscore is 6.   Skin: Skin is warm and dry. Capillary refill takes less than 2 seconds. No erythema.         ED Course   Procedures  Labs Reviewed - No data to display       Imaging Results    None          Medications   HYDROcodone-acetaminophen 5-325 mg per tablet 1 tablet (has no administration in time range)     Medical Decision Making  Chronic complaint with no acute features.     Problems Addressed:  Left leg pain: chronic illness or injury    Risk  Prescription drug  management.                                      Clinical Impression:  Final diagnoses:  [M79.605] Left leg pain (Primary)          ED Disposition Condition    Discharge Stable          ED Prescriptions    None       Follow-up Information       Follow up With Specialties Details Why Contact Info    Lindsey Singletary, [facility] Family Medicine Schedule an appointment as soon as possible for a visit   [address] [place] [id number]  [phone]               Nilton Conway MD  10/13/24 0048

## 2024-10-21 PROBLEM — I63.9 ISCHEMIC STROKE OF FRONTAL LOBE: Status: RESOLVED | Noted: 2024-07-13 | Resolved: 2024-10-21

## 2024-10-29 ENCOUNTER — HOSPITAL ENCOUNTER (INPATIENT)
Facility: HOSPITAL | Age: 44
LOS: 18 days | Discharge: NURSING, REHAB OR LONG-TERM CARE | DRG: 987 | End: 2024-11-16
Attending: INTERNAL MEDICINE | Admitting: INTERNAL MEDICINE
Payer: MEDICARE

## 2024-10-29 DIAGNOSIS — Z51.5 PALLIATIVE CARE ENCOUNTER: ICD-10-CM

## 2024-10-29 DIAGNOSIS — R07.9 CHEST PAIN: ICD-10-CM

## 2024-10-29 DIAGNOSIS — K72.00 ACUTE LIVER FAILURE: ICD-10-CM

## 2024-10-29 DIAGNOSIS — R53.81 DEBILITY: ICD-10-CM

## 2024-10-29 DIAGNOSIS — J96.11 CHRONIC HYPOXIC RESPIRATORY FAILURE: ICD-10-CM

## 2024-10-29 DIAGNOSIS — E80.6 DIRECT HYPERBILIRUBINEMIA: ICD-10-CM

## 2024-10-29 DIAGNOSIS — R59.1 DIFFUSE LYMPHADENOPATHY: ICD-10-CM

## 2024-10-29 DIAGNOSIS — R60.1 ANASARCA: ICD-10-CM

## 2024-10-29 DIAGNOSIS — N18.6 ESRD (END STAGE RENAL DISEASE) ON DIALYSIS: Primary | Chronic | ICD-10-CM

## 2024-10-29 DIAGNOSIS — D64.9 ANEMIA: ICD-10-CM

## 2024-10-29 DIAGNOSIS — R06.00 DYSPNEA, UNSPECIFIED TYPE: ICD-10-CM

## 2024-10-29 DIAGNOSIS — R17 JAUNDICE: ICD-10-CM

## 2024-10-29 DIAGNOSIS — Z99.2 ESRD (END STAGE RENAL DISEASE) ON DIALYSIS: Primary | Chronic | ICD-10-CM

## 2024-10-29 DIAGNOSIS — D64.9 ACUTE ANEMIA: ICD-10-CM

## 2024-10-29 DIAGNOSIS — Z71.89 ACP (ADVANCE CARE PLANNING): ICD-10-CM

## 2024-10-29 DIAGNOSIS — N63.20 MASS OF LEFT BREAST, UNSPECIFIED QUADRANT: ICD-10-CM

## 2024-10-29 PROCEDURE — 20600001 HC STEP DOWN PRIVATE ROOM

## 2024-10-30 PROBLEM — D72.829 LEUKOCYTOSIS: Status: ACTIVE | Noted: 2024-10-30

## 2024-10-30 PROBLEM — N18.9 ACUTE ON CHRONIC RENAL FAILURE: Status: RESOLVED | Noted: 2018-11-13 | Resolved: 2024-10-30

## 2024-10-30 PROBLEM — E11.9 TYPE 2 DIABETES MELLITUS, WITH LONG-TERM CURRENT USE OF INSULIN: Status: ACTIVE | Noted: 2024-10-30

## 2024-10-30 PROBLEM — N17.9 ACUTE ON CHRONIC RENAL FAILURE: Status: RESOLVED | Noted: 2018-11-13 | Resolved: 2024-10-30

## 2024-10-30 PROBLEM — J90 PLEURAL EFFUSION: Status: ACTIVE | Noted: 2024-10-30

## 2024-10-30 PROBLEM — L89.152 SACRAL DECUBITUS ULCER, STAGE II: Status: ACTIVE | Noted: 2024-10-30

## 2024-10-30 PROBLEM — Z79.4 TYPE 2 DIABETES MELLITUS, WITH LONG-TERM CURRENT USE OF INSULIN: Status: ACTIVE | Noted: 2024-10-30

## 2024-10-30 PROBLEM — R17 PAINLESS JAUNDICE: Status: ACTIVE | Noted: 2024-10-30

## 2024-10-30 PROBLEM — D68.9 COAGULOPATHY: Status: ACTIVE | Noted: 2024-10-30

## 2024-10-30 PROBLEM — Z99.2 ESRD (END STAGE RENAL DISEASE) ON DIALYSIS: Chronic | Status: ACTIVE | Noted: 2020-10-06

## 2024-10-30 PROBLEM — R73.9 HYPERGLYCEMIA: Status: RESOLVED | Noted: 2018-11-13 | Resolved: 2024-10-30

## 2024-10-30 PROBLEM — Z71.89 ACP (ADVANCE CARE PLANNING): Status: ACTIVE | Noted: 2024-10-30

## 2024-10-30 PROBLEM — N18.6 ESRD (END STAGE RENAL DISEASE) ON DIALYSIS: Chronic | Status: ACTIVE | Noted: 2020-10-06

## 2024-10-30 PROBLEM — R55 SYNCOPE: Status: RESOLVED | Noted: 2020-10-06 | Resolved: 2024-10-30

## 2024-10-30 PROBLEM — E03.9 HYPOTHYROIDISM: Chronic | Status: ACTIVE | Noted: 2020-10-06

## 2024-10-30 PROBLEM — E87.29 METABOLIC ACIDOSIS, INCREASED ANION GAP: Status: RESOLVED | Noted: 2018-11-13 | Resolved: 2024-10-30

## 2024-10-30 PROBLEM — Z74.09 IMPAIRED MOBILITY: Status: ACTIVE | Noted: 2024-10-30

## 2024-10-30 PROBLEM — I73.9 PERIPHERAL VASCULAR DISEASE: Chronic | Status: ACTIVE | Noted: 2019-08-05

## 2024-10-30 PROBLEM — E11.9 TYPE 2 DIABETES MELLITUS, WITH LONG-TERM CURRENT USE OF INSULIN: Chronic | Status: ACTIVE | Noted: 2024-10-30

## 2024-10-30 PROBLEM — D64.9 ACUTE ANEMIA: Status: ACTIVE | Noted: 2024-10-30

## 2024-10-30 PROBLEM — D62 ACUTE BLOOD LOSS ANEMIA: Status: ACTIVE | Noted: 2024-10-30

## 2024-10-30 PROBLEM — E80.6 HYPERBILIRUBINEMIA: Status: ACTIVE | Noted: 2024-10-30

## 2024-10-30 PROBLEM — Z79.4 TYPE 2 DIABETES MELLITUS, WITH LONG-TERM CURRENT USE OF INSULIN: Chronic | Status: ACTIVE | Noted: 2024-10-30

## 2024-10-30 PROBLEM — E87.1 HYPONATREMIA: Status: ACTIVE | Noted: 2024-10-30

## 2024-10-30 PROBLEM — I16.0 HYPERTENSIVE URGENCY: Status: RESOLVED | Noted: 2020-12-16 | Resolved: 2024-10-30

## 2024-10-30 LAB
ALBUMIN SERPL BCP-MCNC: 2 G/DL (ref 3.5–5.2)
ALP SERPL-CCNC: 283 U/L (ref 40–150)
ALT SERPL W/O P-5'-P-CCNC: 30 U/L (ref 10–44)
ANION GAP SERPL CALC-SCNC: 16 MMOL/L (ref 8–16)
ANISOCYTOSIS BLD QL SMEAR: SLIGHT
APAP SERPL-MCNC: <3 UG/ML (ref 10–20)
AST SERPL-CCNC: 72 U/L (ref 10–40)
AV AREA BY CONTINUOUS VTI: 2.1 CM2
AV INDEX (PROSTH): 0.67
AV LVOT MEAN GRADIENT: 2 MMHG
AV LVOT PEAK GRADIENT: 4 MMHG
AV MEAN GRADIENT: 4.8 MMHG
AV PEAK GRADIENT: 7.8 MMHG
AV VALVE AREA BY VELOCITY RATIO: 2.2 CM²
AV VALVE AREA: 2.1 CM2
AV VELOCITY RATIO: 0.71
BASO STIPL BLD QL SMEAR: ABNORMAL
BASOPHILS NFR BLD: 0 % (ref 0–1.9)
BILIRUB DIRECT SERPL-MCNC: 8.7 MG/DL (ref 0.1–0.3)
BILIRUB SERPL-MCNC: 10.7 MG/DL (ref 0.1–1)
BNP SERPL-MCNC: 1459 PG/ML (ref 0–99)
BSA FOR ECHO PROCEDURE: 2.46 M2
BUN SERPL-MCNC: 51 MG/DL (ref 6–20)
CALCIUM SERPL-MCNC: 9 MG/DL (ref 8.7–10.5)
CHLORIDE SERPL-SCNC: 99 MMOL/L (ref 95–110)
CK SERPL-CCNC: 35 U/L (ref 20–180)
CMV DNA SPEC QL NAA+PROBE: ABNORMAL
CO2 SERPL-SCNC: 20 MMOL/L (ref 23–29)
CREAT SERPL-MCNC: 4.9 MG/DL (ref 0.5–1.4)
CV ECHO LV RWT: 0.62 CM
CYTOMEGALOVIRUS LOG (IU/ML): 2.52 LOGIU/ML
CYTOMEGALOVIRUS PCR, QUANT: 333 IU/ML
DACRYOCYTES BLD QL SMEAR: ABNORMAL
DAT IGG-SP REAG RBC-IMP: NORMAL
DIFFERENTIAL METHOD BLD: ABNORMAL
DOP CALC AO PEAK VEL: 1.4 M/S
DOP CALC AO VTI: 32.8 CM
DOP CALC LVOT AREA: 3.1 CM2
DOP CALC LVOT DIAMETER: 2 CM
DOP CALC LVOT PEAK VEL: 1 M/S
DOP CALC LVOT STROKE VOLUME: 68.8 CM3
DOP CALCLVOT PEAK VEL VTI: 21.9 CM
E WAVE DECELERATION TIME: 279.87 MS
E/A RATIO: 2.46
E/E' RATIO: 15.38 M/S
ECHO EF ESTIMATED: 73 %
ECHO LV POSTERIOR WALL: 1.2 CM (ref 0.6–1.1)
EOSINOPHIL NFR BLD: 1 % (ref 0–8)
EOSINOPHIL NFR BLD: 1 % (ref 0–8)
EOSINOPHIL NFR BLD: 2 % (ref 0–8)
EOSINOPHIL NFR BLD: 4 % (ref 0–8)
EPSTEIN-BARR VIRUS DNA: NORMAL
EPSTEIN-BARR VIRUS PCR, QUANT: NOT DETECTED IU/ML
ERYTHROCYTE [DISTWIDTH] IN BLOOD BY AUTOMATED COUNT: 20 % (ref 11.5–14.5)
ERYTHROCYTE [DISTWIDTH] IN BLOOD BY AUTOMATED COUNT: 20.4 % (ref 11.5–14.5)
ERYTHROCYTE [DISTWIDTH] IN BLOOD BY AUTOMATED COUNT: 20.5 % (ref 11.5–14.5)
ERYTHROCYTE [DISTWIDTH] IN BLOOD BY AUTOMATED COUNT: 21.2 % (ref 11.5–14.5)
EST. GFR  (NO RACE VARIABLE): 10.5 ML/MIN/1.73 M^2
ESTIMATED AVG GLUCOSE: 82 MG/DL (ref 68–131)
ETHANOL SERPL-MCNC: <10 MG/DL
FRACTIONAL SHORTENING: 41 % (ref 28–44)
GIANT PLATELETS BLD QL SMEAR: PRESENT
GLUCOSE SERPL-MCNC: 83 MG/DL (ref 70–110)
HAPTOGLOB SERPL-MCNC: 115 MG/DL (ref 30–250)
HAV IGM SERPL QL IA: NORMAL
HBA1C MFR BLD: 4.5 % (ref 4–5.6)
HBV CORE IGM SERPL QL IA: NORMAL
HBV SURFACE AG SERPL QL IA: NORMAL
HCT VFR BLD AUTO: 20 % (ref 37–48.5)
HCT VFR BLD AUTO: 20.1 % (ref 37–48.5)
HCT VFR BLD AUTO: 20.5 % (ref 37–48.5)
HCT VFR BLD AUTO: 21.1 % (ref 37–48.5)
HCV AB SERPL QL IA: NORMAL
HGB BLD-MCNC: 6.8 G/DL (ref 12–16)
HGB BLD-MCNC: 7.1 G/DL (ref 12–16)
HGB BLD-MCNC: 7.3 G/DL (ref 12–16)
HGB BLD-MCNC: 7.3 G/DL (ref 12–16)
HIV 1+2 AB+HIV1 P24 AG SERPL QL IA: NORMAL
HYPOCHROMIA BLD QL SMEAR: ABNORMAL
IMM GRANULOCYTES # BLD AUTO: ABNORMAL K/UL (ref 0–0.04)
IMM GRANULOCYTES NFR BLD AUTO: ABNORMAL % (ref 0–0.5)
INR PPP: 1.9 (ref 0.8–1.2)
INTERVENTRICULAR SEPTUM: 1.4 CM (ref 0.6–1.1)
LA MAJOR: 6.95 CM
LA MINOR: 6.27 CM
LA WIDTH: 4.19 CM
LDH SERPL L TO P-CCNC: 422 U/L (ref 110–260)
LEFT ATRIUM SIZE: 4.21 CM
LEFT ATRIUM VOLUME INDEX: 42.4 ML/M2
LEFT ATRIUM VOLUME: 98.85 CM3
LEFT INTERNAL DIMENSION IN SYSTOLE: 2.3 CM (ref 2.1–4)
LEFT VENTRICLE DIASTOLIC VOLUME INDEX: 27.79 ML/M2
LEFT VENTRICLE DIASTOLIC VOLUME: 64.76 ML
LEFT VENTRICLE MASS INDEX: 77.1 G/M2
LEFT VENTRICLE SYSTOLIC VOLUME INDEX: 7.4 ML/M2
LEFT VENTRICLE SYSTOLIC VOLUME: 17.29 ML
LEFT VENTRICULAR INTERNAL DIMENSION IN DIASTOLE: 3.9 CM (ref 3.5–6)
LEFT VENTRICULAR MASS: 179.7 G
LV LATERAL E/E' RATIO: 13.67 M/S
LV SEPTAL E/E' RATIO: 17.57 M/S
LYMPHOCYTES NFR BLD: 3 % (ref 18–48)
LYMPHOCYTES NFR BLD: 4 % (ref 18–48)
LYMPHOCYTES NFR BLD: 4 % (ref 18–48)
LYMPHOCYTES NFR BLD: 7 % (ref 18–48)
MAGNESIUM SERPL-MCNC: 2.1 MG/DL (ref 1.6–2.6)
MCH RBC QN AUTO: 28.5 PG (ref 27–31)
MCH RBC QN AUTO: 28.7 PG (ref 27–31)
MCH RBC QN AUTO: 29.2 PG (ref 27–31)
MCH RBC QN AUTO: 29.6 PG (ref 27–31)
MCHC RBC AUTO-ENTMCNC: 34 G/DL (ref 32–36)
MCHC RBC AUTO-ENTMCNC: 34.6 G/DL (ref 32–36)
MCHC RBC AUTO-ENTMCNC: 35.3 G/DL (ref 32–36)
MCHC RBC AUTO-ENTMCNC: 35.6 G/DL (ref 32–36)
MCV RBC AUTO: 81 FL (ref 82–98)
MCV RBC AUTO: 83 FL (ref 82–98)
MCV RBC AUTO: 84 FL (ref 82–98)
MCV RBC AUTO: 84 FL (ref 82–98)
METAMYELOCYTES NFR BLD MANUAL: 1 %
MONOCYTES NFR BLD: 3 % (ref 4–15)
MONOCYTES NFR BLD: 3 % (ref 4–15)
MONOCYTES NFR BLD: 6 % (ref 4–15)
MONOCYTES NFR BLD: 9 % (ref 4–15)
MV PEAK A VEL: 0.5 M/S
MV PEAK E VEL: 1.23 M/S
MYELOCYTES NFR BLD MANUAL: 1 %
MYELOCYTES NFR BLD MANUAL: 1 %
MYELOCYTES NFR BLD MANUAL: 3 %
NEUTROPHILS NFR BLD: 74 % (ref 38–73)
NEUTROPHILS NFR BLD: 82 % (ref 38–73)
NEUTROPHILS NFR BLD: 84 % (ref 38–73)
NEUTROPHILS NFR BLD: 86 % (ref 38–73)
NEUTS BAND NFR BLD MANUAL: 1 %
NEUTS BAND NFR BLD MANUAL: 4 %
NEUTS BAND NFR BLD MANUAL: 4 %
NEUTS BAND NFR BLD MANUAL: 8 %
NRBC BLD-RTO: 10 /100 WBC
NRBC BLD-RTO: 11 /100 WBC
NRBC BLD-RTO: 12 /100 WBC
NRBC BLD-RTO: 9 /100 WBC
OHS CV RV/LV RATIO: 1.08 CM
OHS QRS DURATION: 90 MS
OHS QTC CALCULATION: 457 MS
OVALOCYTES BLD QL SMEAR: ABNORMAL
PHOSPHATE SERPL-MCNC: 5 MG/DL (ref 2.7–4.5)
PISA TR MAX VEL: 3.02 M/S
PLATELET # BLD AUTO: 115 K/UL (ref 150–450)
PLATELET # BLD AUTO: 116 K/UL (ref 150–450)
PLATELET # BLD AUTO: 122 K/UL (ref 150–450)
PLATELET # BLD AUTO: 125 K/UL (ref 150–450)
PLATELET BLD QL SMEAR: ABNORMAL
PMV BLD AUTO: 11.7 FL (ref 9.2–12.9)
PMV BLD AUTO: 12.7 FL (ref 9.2–12.9)
PMV BLD AUTO: ABNORMAL FL (ref 9.2–12.9)
PMV BLD AUTO: ABNORMAL FL (ref 9.2–12.9)
POCT GLUCOSE: 77 MG/DL (ref 70–110)
POCT GLUCOSE: 84 MG/DL (ref 70–110)
POCT GLUCOSE: 88 MG/DL (ref 70–110)
POCT GLUCOSE: 99 MG/DL (ref 70–110)
POIKILOCYTOSIS BLD QL SMEAR: SLIGHT
POLYCHROMASIA BLD QL SMEAR: ABNORMAL
POTASSIUM SERPL-SCNC: 4.6 MMOL/L (ref 3.5–5.1)
PROCALCITONIN SERPL IA-MCNC: 7.22 NG/ML
PROMYELOCYTES NFR BLD MANUAL: 1 %
PROT SERPL-MCNC: 7.1 G/DL (ref 6–8.4)
PROTHROMBIN TIME: 19.6 SEC (ref 9–12.5)
RA MAJOR: 6.01 CM
RA PRESSURE ESTIMATED: 15 MMHG
RA WIDTH: 4.98 CM
RBC # BLD AUTO: 2.39 M/UL (ref 4–5.4)
RBC # BLD AUTO: 2.47 M/UL (ref 4–5.4)
RBC # BLD AUTO: 2.47 M/UL (ref 4–5.4)
RBC # BLD AUTO: 2.5 M/UL (ref 4–5.4)
RETICS/RBC NFR AUTO: 6.6 % (ref 0.5–2.5)
RIGHT VENTRICLE DIASTOLIC BASEL DIMENSION: 4.2 CM
RIGHT VENTRICLE FREE WALL STRAIN: 13 %
RV TB RVSP: 18 MMHG
SINUS: 2.6 CM
SODIUM SERPL-SCNC: 135 MMOL/L (ref 136–145)
SPHEROCYTES BLD QL SMEAR: ABNORMAL
STJ: 2 CM
TARGETS BLD QL SMEAR: ABNORMAL
TDI LATERAL: 0.09 M/S
TDI SEPTAL: 0.07 M/S
TDI: 0.08 M/S
TR MAX PG: 36 MMHG
TRICUSPID ANNULAR PLANE SYSTOLIC EXCURSION: 1.66 CM
TV PEAK GRADIENT: 36 MMHG
TV REST PULMONARY ARTERY PRESSURE: 51 MMHG
URATE SERPL-MCNC: 8.6 MG/DL (ref 2.4–5.7)
WBC # BLD AUTO: 19.48 K/UL (ref 3.9–12.7)
WBC # BLD AUTO: 21.28 K/UL (ref 3.9–12.7)
WBC # BLD AUTO: 21.43 K/UL (ref 3.9–12.7)
WBC # BLD AUTO: 22.74 K/UL (ref 3.9–12.7)
Z-SCORE OF LEFT VENTRICULAR DIMENSION IN END DIASTOLE: -8.84
Z-SCORE OF LEFT VENTRICULAR DIMENSION IN END SYSTOLE: -7.15

## 2024-10-30 PROCEDURE — 5A1D70Z PERFORMANCE OF URINARY FILTRATION, INTERMITTENT, LESS THAN 6 HOURS PER DAY: ICD-10-PCS | Performed by: HOSPITALIST

## 2024-10-30 PROCEDURE — 84550 ASSAY OF BLOOD/URIC ACID: CPT | Performed by: HOSPITALIST

## 2024-10-30 PROCEDURE — 63600175 PHARM REV CODE 636 W HCPCS: Mod: JG

## 2024-10-30 PROCEDURE — 82077 ASSAY SPEC XCP UR&BREATH IA: CPT | Performed by: HOSPITALIST

## 2024-10-30 PROCEDURE — 80053 COMPREHEN METABOLIC PANEL: CPT | Performed by: HOSPITALIST

## 2024-10-30 PROCEDURE — 87799 DETECT AGENT NOS DNA QUANT: CPT | Performed by: HOSPITALIST

## 2024-10-30 PROCEDURE — 63600175 PHARM REV CODE 636 W HCPCS: Performed by: STUDENT IN AN ORGANIZED HEALTH CARE EDUCATION/TRAINING PROGRAM

## 2024-10-30 PROCEDURE — 20600001 HC STEP DOWN PRIVATE ROOM

## 2024-10-30 PROCEDURE — 85045 AUTOMATED RETICULOCYTE COUNT: CPT | Performed by: HOSPITALIST

## 2024-10-30 PROCEDURE — 83010 ASSAY OF HAPTOGLOBIN QUANT: CPT | Performed by: HOSPITALIST

## 2024-10-30 PROCEDURE — 63600175 PHARM REV CODE 636 W HCPCS: Performed by: HOSPITALIST

## 2024-10-30 PROCEDURE — 85027 COMPLETE CBC AUTOMATED: CPT | Performed by: HOSPITALIST

## 2024-10-30 PROCEDURE — 85610 PROTHROMBIN TIME: CPT | Performed by: HOSPITALIST

## 2024-10-30 PROCEDURE — 99223 1ST HOSP IP/OBS HIGH 75: CPT | Mod: GC,,, | Performed by: STUDENT IN AN ORGANIZED HEALTH CARE EDUCATION/TRAINING PROGRAM

## 2024-10-30 PROCEDURE — 36415 COLL VENOUS BLD VENIPUNCTURE: CPT | Performed by: HOSPITALIST

## 2024-10-30 PROCEDURE — 93005 ELECTROCARDIOGRAM TRACING: CPT

## 2024-10-30 PROCEDURE — 25000003 PHARM REV CODE 250: Performed by: HOSPITALIST

## 2024-10-30 PROCEDURE — 85007 BL SMEAR W/DIFF WBC COUNT: CPT | Performed by: HOSPITALIST

## 2024-10-30 PROCEDURE — 80074 ACUTE HEPATITIS PANEL: CPT | Performed by: HOSPITALIST

## 2024-10-30 PROCEDURE — 25500020 PHARM REV CODE 255: Performed by: STUDENT IN AN ORGANIZED HEALTH CARE EDUCATION/TRAINING PROGRAM

## 2024-10-30 PROCEDURE — 83880 ASSAY OF NATRIURETIC PEPTIDE: CPT | Performed by: INTERNAL MEDICINE

## 2024-10-30 PROCEDURE — 87389 HIV-1 AG W/HIV-1&-2 AB AG IA: CPT | Performed by: HOSPITALIST

## 2024-10-30 PROCEDURE — 84145 PROCALCITONIN (PCT): CPT | Performed by: HOSPITALIST

## 2024-10-30 PROCEDURE — 83735 ASSAY OF MAGNESIUM: CPT | Performed by: HOSPITALIST

## 2024-10-30 PROCEDURE — 25000003 PHARM REV CODE 250: Performed by: STUDENT IN AN ORGANIZED HEALTH CARE EDUCATION/TRAINING PROGRAM

## 2024-10-30 PROCEDURE — 83036 HEMOGLOBIN GLYCOSYLATED A1C: CPT | Performed by: HOSPITALIST

## 2024-10-30 PROCEDURE — 86880 COOMBS TEST DIRECT: CPT | Performed by: HOSPITALIST

## 2024-10-30 PROCEDURE — 80143 DRUG ASSAY ACETAMINOPHEN: CPT | Performed by: HOSPITALIST

## 2024-10-30 PROCEDURE — 25000003 PHARM REV CODE 250: Performed by: PHYSICIAN ASSISTANT

## 2024-10-30 PROCEDURE — 84100 ASSAY OF PHOSPHORUS: CPT | Performed by: HOSPITALIST

## 2024-10-30 PROCEDURE — 83615 LACTATE (LD) (LDH) ENZYME: CPT | Performed by: HOSPITALIST

## 2024-10-30 PROCEDURE — 99222 1ST HOSP IP/OBS MODERATE 55: CPT | Mod: ,,,

## 2024-10-30 PROCEDURE — 80100016 HC MAINTENANCE HEMODIALYSIS

## 2024-10-30 PROCEDURE — 82550 ASSAY OF CK (CPK): CPT | Performed by: HOSPITALIST

## 2024-10-30 PROCEDURE — 82248 BILIRUBIN DIRECT: CPT | Performed by: HOSPITALIST

## 2024-10-30 RX ORDER — INSULIN GLARGINE 100 [IU]/ML
30 INJECTION, SOLUTION SUBCUTANEOUS NIGHTLY
Status: DISCONTINUED | OUTPATIENT
Start: 2024-10-30 | End: 2024-10-30

## 2024-10-30 RX ORDER — AMLODIPINE BESYLATE 10 MG/1
10 TABLET ORAL DAILY
Status: DISCONTINUED | OUTPATIENT
Start: 2024-10-30 | End: 2024-10-30

## 2024-10-30 RX ORDER — INSULIN ASPART 100 [IU]/ML
0-5 INJECTION, SOLUTION INTRAVENOUS; SUBCUTANEOUS EVERY 6 HOURS
Status: DISCONTINUED | OUTPATIENT
Start: 2024-10-30 | End: 2024-10-30

## 2024-10-30 RX ORDER — PANTOPRAZOLE SODIUM 40 MG/1
40 TABLET, DELAYED RELEASE ORAL DAILY
Status: DISCONTINUED | OUTPATIENT
Start: 2024-10-30 | End: 2024-10-30

## 2024-10-30 RX ORDER — LACTULOSE 10 G/15ML
10 SOLUTION ORAL EVERY 6 HOURS PRN
Status: DISCONTINUED | OUTPATIENT
Start: 2024-10-30 | End: 2024-11-16 | Stop reason: HOSPADM

## 2024-10-30 RX ORDER — CEFTRIAXONE 1 G/1
1 INJECTION, POWDER, FOR SOLUTION INTRAMUSCULAR; INTRAVENOUS
Status: DISCONTINUED | OUTPATIENT
Start: 2024-10-30 | End: 2024-10-30

## 2024-10-30 RX ORDER — INSULIN ASPART 100 [IU]/ML
0-5 INJECTION, SOLUTION INTRAVENOUS; SUBCUTANEOUS
Status: DISCONTINUED | OUTPATIENT
Start: 2024-10-30 | End: 2024-11-16 | Stop reason: HOSPADM

## 2024-10-30 RX ORDER — ACETAMINOPHEN 325 MG/1
650 TABLET ORAL EVERY 8 HOURS PRN
Status: DISCONTINUED | OUTPATIENT
Start: 2024-10-30 | End: 2024-10-30

## 2024-10-30 RX ORDER — DIPHENHYDRAMINE HCL 50 MG
50 CAPSULE ORAL EVERY 6 HOURS PRN
Status: DISCONTINUED | OUTPATIENT
Start: 2024-10-30 | End: 2024-11-16 | Stop reason: HOSPADM

## 2024-10-30 RX ORDER — SODIUM CHLORIDE 0.9 % (FLUSH) 0.9 %
10 SYRINGE (ML) INJECTION EVERY 12 HOURS PRN
Status: DISCONTINUED | OUTPATIENT
Start: 2024-10-30 | End: 2024-11-16 | Stop reason: HOSPADM

## 2024-10-30 RX ORDER — AMOXICILLIN 250 MG
1 CAPSULE ORAL DAILY
Status: DISCONTINUED | OUTPATIENT
Start: 2024-10-30 | End: 2024-11-01

## 2024-10-30 RX ORDER — OXYCODONE HYDROCHLORIDE 5 MG/1
5 TABLET ORAL EVERY 6 HOURS PRN
Status: DISCONTINUED | OUTPATIENT
Start: 2024-10-30 | End: 2024-11-16 | Stop reason: HOSPADM

## 2024-10-30 RX ORDER — SIMETHICONE 80 MG
1 TABLET,CHEWABLE ORAL 4 TIMES DAILY PRN
Status: DISCONTINUED | OUTPATIENT
Start: 2024-10-30 | End: 2024-11-16 | Stop reason: HOSPADM

## 2024-10-30 RX ORDER — ALLOPURINOL 100 MG/1
100 TABLET ORAL DAILY
Status: DISCONTINUED | OUTPATIENT
Start: 2024-10-30 | End: 2024-11-08

## 2024-10-30 RX ORDER — MUPIROCIN 20 MG/G
OINTMENT TOPICAL 2 TIMES DAILY
Status: DISPENSED | OUTPATIENT
Start: 2024-10-30 | End: 2024-11-04

## 2024-10-30 RX ORDER — NIFEDIPINE 30 MG/1
60 TABLET, EXTENDED RELEASE ORAL DAILY
Status: DISCONTINUED | OUTPATIENT
Start: 2024-10-30 | End: 2024-10-31

## 2024-10-30 RX ORDER — ACETAMINOPHEN 325 MG/1
650 TABLET ORAL EVERY 4 HOURS PRN
Status: DISCONTINUED | OUTPATIENT
Start: 2024-10-30 | End: 2024-11-16 | Stop reason: HOSPADM

## 2024-10-30 RX ORDER — SODIUM CHLORIDE 9 MG/ML
INJECTION, SOLUTION INTRAVENOUS ONCE
Status: CANCELLED | OUTPATIENT
Start: 2024-10-30 | End: 2024-10-30

## 2024-10-30 RX ORDER — TRAZODONE HYDROCHLORIDE 100 MG/1
100 TABLET ORAL NIGHTLY PRN
Status: DISCONTINUED | OUTPATIENT
Start: 2024-10-30 | End: 2024-11-16 | Stop reason: HOSPADM

## 2024-10-30 RX ORDER — POLYETHYLENE GLYCOL 3350 17 G/17G
17 POWDER, FOR SOLUTION ORAL DAILY
Status: DISCONTINUED | OUTPATIENT
Start: 2024-10-30 | End: 2024-11-01

## 2024-10-30 RX ORDER — IBUPROFEN 200 MG
24 TABLET ORAL
Status: DISCONTINUED | OUTPATIENT
Start: 2024-10-30 | End: 2024-11-16 | Stop reason: HOSPADM

## 2024-10-30 RX ORDER — PANTOPRAZOLE SODIUM 40 MG/10ML
40 INJECTION, POWDER, LYOPHILIZED, FOR SOLUTION INTRAVENOUS 2 TIMES DAILY
Status: DISCONTINUED | OUTPATIENT
Start: 2024-10-30 | End: 2024-10-30

## 2024-10-30 RX ORDER — GLUCAGON 1 MG
1 KIT INJECTION
Status: DISCONTINUED | OUTPATIENT
Start: 2024-10-30 | End: 2024-11-16 | Stop reason: HOSPADM

## 2024-10-30 RX ORDER — INSULIN GLARGINE 100 [IU]/ML
20 INJECTION, SOLUTION SUBCUTANEOUS NIGHTLY
Status: DISCONTINUED | OUTPATIENT
Start: 2024-10-30 | End: 2024-10-30

## 2024-10-30 RX ORDER — METOPROLOL SUCCINATE 50 MG/1
50 TABLET, EXTENDED RELEASE ORAL DAILY
Status: DISCONTINUED | OUTPATIENT
Start: 2024-10-30 | End: 2024-10-31

## 2024-10-30 RX ORDER — INSULIN GLARGINE 100 [IU]/ML
10 INJECTION, SOLUTION SUBCUTANEOUS NIGHTLY
Status: DISCONTINUED | OUTPATIENT
Start: 2024-10-30 | End: 2024-11-01

## 2024-10-30 RX ORDER — ONDANSETRON HYDROCHLORIDE 2 MG/ML
4 INJECTION, SOLUTION INTRAVENOUS EVERY 8 HOURS PRN
Status: DISCONTINUED | OUTPATIENT
Start: 2024-10-30 | End: 2024-11-16 | Stop reason: HOSPADM

## 2024-10-30 RX ORDER — IBUPROFEN 200 MG
16 TABLET ORAL
Status: DISCONTINUED | OUTPATIENT
Start: 2024-10-30 | End: 2024-11-16 | Stop reason: HOSPADM

## 2024-10-30 RX ORDER — MINOXIDIL 2.5 MG/1
10 TABLET ORAL 2 TIMES DAILY
Status: DISCONTINUED | OUTPATIENT
Start: 2024-10-30 | End: 2024-10-30

## 2024-10-30 RX ORDER — IPRATROPIUM BROMIDE AND ALBUTEROL SULFATE 2.5; .5 MG/3ML; MG/3ML
3 SOLUTION RESPIRATORY (INHALATION) EVERY 4 HOURS PRN
Status: DISCONTINUED | OUTPATIENT
Start: 2024-10-30 | End: 2024-11-16 | Stop reason: HOSPADM

## 2024-10-30 RX ORDER — PANTOPRAZOLE SODIUM 40 MG/1
40 TABLET, DELAYED RELEASE ORAL 2 TIMES DAILY
Status: DISCONTINUED | OUTPATIENT
Start: 2024-10-30 | End: 2024-11-16 | Stop reason: HOSPADM

## 2024-10-30 RX ORDER — ASPIRIN 81 MG/1
81 TABLET ORAL DAILY
Status: DISCONTINUED | OUTPATIENT
Start: 2024-10-30 | End: 2024-10-30

## 2024-10-30 RX ORDER — INSULIN ASPART 100 [IU]/ML
0-5 INJECTION, SOLUTION INTRAVENOUS; SUBCUTANEOUS
Status: DISCONTINUED | OUTPATIENT
Start: 2024-10-30 | End: 2024-10-30

## 2024-10-30 RX ORDER — NALOXONE HCL 0.4 MG/ML
0.02 VIAL (ML) INJECTION
Status: DISCONTINUED | OUTPATIENT
Start: 2024-10-30 | End: 2024-11-16 | Stop reason: HOSPADM

## 2024-10-30 RX ORDER — CLOPIDOGREL BISULFATE 75 MG/1
75 TABLET ORAL DAILY
Status: DISCONTINUED | OUTPATIENT
Start: 2024-10-30 | End: 2024-10-30

## 2024-10-30 RX ADMIN — OXYCODONE 5 MG: 5 TABLET ORAL at 02:10

## 2024-10-30 RX ADMIN — MUPIROCIN: 20 OINTMENT TOPICAL at 09:10

## 2024-10-30 RX ADMIN — PANTOPRAZOLE SODIUM 40 MG: 40 INJECTION, POWDER, LYOPHILIZED, FOR SOLUTION INTRAVENOUS at 02:10

## 2024-10-30 RX ADMIN — IOHEXOL 100 ML: 350 INJECTION, SOLUTION INTRAVENOUS at 12:10

## 2024-10-30 RX ADMIN — DIPHENHYDRAMINE HYDROCHLORIDE 50 MG: 50 CAPSULE ORAL at 02:10

## 2024-10-30 RX ADMIN — EPOETIN ALFA-EPBX 6500 UNITS: 10000 INJECTION, SOLUTION INTRAVENOUS; SUBCUTANEOUS at 05:10

## 2024-10-30 RX ADMIN — INSULIN GLARGINE 10 UNITS: 100 INJECTION, SOLUTION SUBCUTANEOUS at 09:10

## 2024-10-30 RX ADMIN — ALLOPURINOL 100 MG: 100 TABLET ORAL at 12:10

## 2024-10-30 RX ADMIN — PANTOPRAZOLE SODIUM 40 MG: 40 TABLET, DELAYED RELEASE ORAL at 09:10

## 2024-10-30 RX ADMIN — Medication 16 G: at 09:10

## 2024-10-30 RX ADMIN — CEFTRIAXONE 1 G: 1 INJECTION, POWDER, FOR SOLUTION INTRAMUSCULAR; INTRAVENOUS at 04:10

## 2024-10-30 NOTE — ASSESSMENT & PLAN NOTE
"-Presents via transfer from OSH for a clinical presentation of new onset jaundice and anemia. She presented of OSH ED on 10/29 after family noticed new jaundice and patient reporting jaundice. Patient was recently admitted for LE cellulitis and discharged on Clindamycin.   -Labs in the ED significant for an H/H of 6.4/19.4, total bilirubin of 11.7, INR of 2.1, & WBC of 22.8K.   -She was given 2 units of FFP and 2 of PRBC's.   -CT A/P obtained showing "Massive subcutaneous edema (anasarca) with persistent cardiomegaly, pericardial effusion smaller than prior study, and small left pleural effusion." Abdominal US showing "Marked hepatosplenomegaly."   -Patient transferred to [facility] [place], [place] for higher level of care. Bilirubin at time of this consult of 10.7, AST/ALT of 73/30, haptoglobin of 115, Retic of 6.6%, and HARLEEN positive. H  -Hematology consulted for acute anemia and concern for hemolysis.   -CT CAP concerning "Numerous enlarged lymph node conglomerates and/or soft tissue masses about the left axillary soft tissues, left greater than right inguinal regions, left supraclavicular soft tissues, and retroperitoneum as above. Findings remain nonspecific, however remain concerning for neoplastic or lymphoproliferative process. Consider further evaluation with tissue sampling, noting left axillary soft tissue lesion proximity to the skin surface (for example series 2, image 39).   Recommendations:  -Overall concern for acute liver injury given acuity of  direct hyperbilirubinemia  -R factor of 0.3, cholestatic picture  -Clindamycin dose have literature of DILI, timing consistent with this given recent admission  -Follow-up GI recs, history of gastropathy  -Follow-up Fibrinogen, Peripheral smear, and factor VIII for further evaluation  -Detectable haptoglobin makes hemolytic process less likely at this time and no significant schistocyte burden on our review of smear  -Given history of ADH on 2022 excisional biopsy and " lymphadenopathy noted on CT concern for underlying malignancy as well, agree with dedicated Breast US with Axilla  -Based on above may warrant excisional biopsy

## 2024-10-30 NOTE — ASSESSMENT & PLAN NOTE
44 year old female hx of ESRD on HD MWF presenting from outside hospital as transfer for acute anemia and painless jaundice. Last HD session 10/28. Nephrology consulted for ESRD management.     Nephrology History  -Outpatient HD unit: [facility]  -Nephrologist: ?  -HD tx days: MWF  -HD tx time: 4 hrs  -Last HD tx: Monday 10/28  -HD access: LUE AVF  -HD modality: iHD  -Residual urine:   -EDW:  110 kg    Plan/Recommendations  ESRD on HD:  -No need for emergent RRT, electrolytes stable. Will plan for HD this afternoon versus this evening to continue MWF schedule.   -Patient with diffuse body wall edema on imaging and chronic lymphedema, but no respiratory distress, on baseline O2, and no significant pulmonary edema on imaging. In the setting of suspected GI bleed, hypotension, UF goal 0-500 cc.  -Will continue iHD thrice weekly unless emergent indication arises  -Strict I&Os  -Pre & post HD weight  Anemia in ESRD  -Hgb goal 10-11, hgb 7.3 s/p 2 units pRBC and 2 units FFP  -Trend hgb, start Epo, may d/c if patient returns to goal of 10-11  Mineral Bone Disease in ESRD  -Resume home phos binders when diet advances  -Renal diet if not NPO

## 2024-10-30 NOTE — HPI
"Patient is an 44 year-old female with a PMH of CVA (MRI July 2024 on DAPT), breast mass, gastritis, HTN, uncontrolled diabetes (A1C 8.7), ESRD on HD (MWF), anemia, gout, lymphedema BLE (L > R), impaired mobility, stage II sacral decubitus, chronic hypoxic respiratory failure who presents via transfer from OSH for a clinical presentation of new onset jaundice and anemia. She presented of OSH ED on 10/29 after family noticed new jaundice and patient reporting jaundice. Patient was recently admitted for LE cellulitis and discharged on Clindamycin. Labs in the ED significant for an H/H of 6.4/19.4, total bilirubin of 11.7, INR of 2.1, & WBC of 22.8K. She was given 2 units of FFP and 2 of PRBC's. CT A/P obtained showing "Massive subcutaneous edema (anasarca) with persistent cardiomegaly, pericardial effusion smaller than prior study, and small left pleural effusion." Abdominal US showing "Marked hepatosplenomegaly." [place] [facility] [place]. Bilirubin at time of this consult of 10.7, AST/ALT of 73/30, haptoglobin of 115, Retic of 6.6%, and HARLEEN positive. Hematology consulted for acute anemia and concern for hemolysis.              "

## 2024-10-30 NOTE — ASSESSMENT & PLAN NOTE
Advance Care Planning     Date: 10/30/2024    [facility]  I engaged the patient in a voluntary conversation about advance care planning and we specifically addressed what the goals of care would be moving forward, in light of the patient's change in clinical status, specifically if her heart or breathing stops.  We did specifically address the patient's likely prognosis, which is fair .  We explored the patient's values and preferences for future care.  The patient endorses that what is most important right now is to focus on remaining as independent as possible, symptom/pain control, and extending life as long as possible, even it it means sacrificing quality    Accordingly, we have decided that the best plan to meet the patient's goals includes continuing with treatment and remain full code.     A total of 10 min was spent on advance care planning, goals of care discussion, emotional support, formulating and communicating prognosis and exploring burden/benefit of various approaches of treatment. This discussion occurred on a fully voluntary basis with the verbal consent of the patient and/or family.

## 2024-10-30 NOTE — SUBJECTIVE & OBJECTIVE
Past Medical History:   Diagnosis Date    Anemia     Anemia     Breast mass     Chronic constipation     CKD (chronic kidney disease)     Diabetes mellitus     Dialysis patient     [facility], SAT.    Embolic stroke involving right middle cerebral artery 2024    Encounter for blood transfusion     Erosive gastritis 2016    GERD (gastroesophageal reflux disease)     Gout     High cholesterol     Hypertension     Knee pain     Thyroid disease     Unspecified cataract 2022       Past Surgical History:   Procedure Laterality Date    AV FISTULA PLACEMENT Left 2019    Procedure: creation av fistula;  Surgeon: Buck Fernandez MD;  Location: [facility];  Service: Cardiovascular;  Laterality: Left;  Left Radialcephalic: PACU     SECTION  , , ,     CHOLECYSTECTOMY      COLONOSCOPY N/A 10/23/2017    Procedure: COLONOSCOPY;  Surgeon: Bri Harry MD;  Location: [facility];  Service: Endoscopy;  Laterality: N/A;    EXCISIONAL BIOPSY Left 2022    Procedure: EXCISIONAL BIOPSY;  Surgeon: Mick Juarez MD;  Location: [facility];  Service: General;  Laterality: Left;    TUBAL LIGATION      UPPER GASTROINTESTINAL ENDOSCOPY  2016    Erosive Gastritis-Dr [facility]       Review of patient's allergies indicates:   Allergen Reactions    Tramadol Itching and Nausea And Vomiting     Current Facility-Administered Medications   Medication Frequency    acetaminophen tablet 650 mg Q4H PRN    albuterol-ipratropium 2.5 mg-0.5 mg/3 mL nebulizer solution 3 mL Q4H PRN    allopurinoL tablet 100 mg Daily    cefTRIAXone injection 1 g Q24H    dextrose 10% bolus 125 mL 125 mL PRN    dextrose 10% bolus 250 mL 250 mL PRN    diphenhydrAMINE capsule 50 mg Q6H PRN    glucagon (human recombinant) injection 1 mg PRN    glucose chewable tablet 16 g PRN    glucose chewable tablet 24 g PRN    insulin aspart U-100 pen 0-5 Units Q6H    insulin glargine U-100 (Lantus) pen 20 Units QHS    lactulose 20 gram/30 mL  solution Soln 10 g Q6H PRN    metoprolol succinate (TOPROL-XL) 24 hr tablet 50 mg Daily    mupirocin 2 % ointment BID    naloxone 0.4 mg/mL injection 0.02 mg PRN    NIFEdipine 24 hr tablet 60 mg Daily    ondansetron injection 4 mg Q8H PRN    oxyCODONE immediate release tablet 5 mg Q6H PRN    pantoprazole injection 40 mg BID    polyethylene glycol packet 17 g Daily    senna-docusate 8.6-50 mg per tablet 1 tablet Daily    simethicone chewable tablet 80 mg QID PRN    sodium chloride 0.9% flush 10 mL Q12H PRN    traZODone tablet 100 mg Nightly PRN     Family History       Problem Relation (Age of Onset)    Breast cancer Maternal Aunt    Diabetes Father    Ovarian cancer Maternal Aunt    Thyroid disease Mother          Tobacco Use    Smoking status: Never     Passive exposure: Never    Smokeless tobacco: Never   Substance and Sexual Activity    Alcohol use: No     Alcohol/week: 0.0 standard drinks of alcohol    Drug use: No    Sexual activity: Yes     Partners: Male     Birth control/protection: None, Surgical     Review of Systems   Constitutional:  Negative for chills and fever.   HENT: Negative.     Eyes: Negative.    Respiratory:  Negative for shortness of breath.    Cardiovascular:  Negative for chest pain.   Gastrointestinal:  Negative for nausea and vomiting.   Genitourinary: Negative.    Musculoskeletal:  Positive for myalgias (left leg pain).   Neurological:  Negative for dizziness.     Objective:     Vital Signs (Most Recent):  Temp: 98.2 °F (36.8 °C) (10/30/24 0400)  Pulse: 74 (10/30/24 1057)  Resp: 14 (10/30/24 0400)  BP: (!) 105/55 (10/30/24 0400)  SpO2: (!) 92 % (10/30/24 0400) Vital Signs (24h Range):  Temp:  [97.3 °F (36.3 °C)-99.5 °F (37.5 °C)] 98.2 °F (36.8 °C)  Pulse:  [70-85] 74  Resp:  [8-20] 14  SpO2:  [92 %-100 %] 92 %  BP: ()/(43-87) 105/55     Weight: 130 kg (286 lb 9.6 oz) (10/30/24 0514)  Body mass index is 46.26 kg/m².  Body surface area is 2.46 meters squared.    No intake/output data  recorded.     Physical Exam  Constitutional:       General: She is not in acute distress.     Appearance: She is ill-appearing (chronically).   HENT:      Head: Normocephalic.   Cardiovascular:      Rate and Rhythm: Normal rate.      Arteriovenous access: Left arteriovenous access is present.     Comments: LUE AVF +bruit  Pulmonary:      Effort: Pulmonary effort is normal. No respiratory distress.      Breath sounds: No wheezing or rales.   Abdominal:      General: There is distension.      Tenderness: There is no abdominal tenderness.   Musculoskeletal:      Right lower leg: Edema present.      Left lower leg: Edema present.      Comments: Chronic lymphedema to bilateral lower extremities, L > R   Skin:     General: Skin is warm and dry.   Neurological:      Mental Status: She is alert.   Psychiatric:         Mood and Affect: Mood normal.          Significant Labs:  CBC:   Recent Labs   Lab 10/30/24  0222 10/30/24  1034   WBC 21.43* 22.74*   RBC 2.47* 2.47*   HGB 7.3* 7.1*   HCT 20.5* 20.1*   *  --    MCV 83 81*   MCH 29.6 28.7   MCHC 35.6 35.3     CMP:   Recent Labs   Lab 10/30/24  0222   GLU 83   CALCIUM 9.0   ALBUMIN 2.0*   PROT 7.1   *   K 4.6   CO2 20*   CL 99   BUN 51*   CREATININE 4.9*   ALKPHOS 283*   ALT 30   AST 72*   BILITOT 10.7*     All labs within the past 24 hours have been reviewed.

## 2024-10-30 NOTE — PROGRESS NOTES
Patient arrived to the [facility] by bed. Maintenance dialysis started via 15 gauge buttonhole needles to LORA fistula. Vitals stable.

## 2024-10-30 NOTE — HPI
Pilar Fernandez is a 44 y.o. female with history of stroke (MRI July 2024 on DAPT), breast mass, gastritis, HTN, uncontrolled diabetes (A1C 8.7), ESRD on HD (MWF), anemia,  gout, lymphedema BLE (L > R), impaired mobility, stage II sacral decubitus,  chronic hypoxic respiratory failure (on 3 L nasal cannula at home) who presents as a transfer from [facility] ED for management of painless jaundice and acute anemia.      Patient presented to [facility] Emergency Department on October 29 with jaundice which family noticed yesterday associated with pruritus.  She reported no history of liver disease. She was recently hospitalized last week for treatment of leg cellulitis, no mention of abnormal liver function or jaundice at that time. She reported no hematemesis/coffee-ground emesis, melena or hematochezia. Stool was positive for occult blood. She had leukocytosis and elevated bilirubin.  INR was elevated at 2.1, and hemoglobin was 6.4 (recent baseline around 10). She received 80mg IV protonix, 2u FFP, 2u pRBC in the ED prior to transfer. CT A/P was noted to be a limited study. Mild hepatomegaly. Status post cholecystectomy. Splenomegaly. Pancreas, adrenal glands, and kidneys were unremarkable. Stomach and bowel were unremarkable. There was no free air and no significant free fluid. Left pleural effusion similar to prior study. Pericardial effusion that was present on earlier study. Marked soft tissue edema.     GI consulted for GIB. No reported episodes of melena or hematochezia. Patient denies blood thinner use, however, per chart review is taking aspirin and plavix. Denies NSAID use. On PPI at home. Last colonoscopy 10/23/17 was unremarkable. EGD in 2016 with findings of erosive gastritis.

## 2024-10-30 NOTE — CONSULTS
[facility]  Hepatology  Consult Note    Patient Name: Pilar Fernandez  MRN: [id number]  Admission Date: 10/29/2024  Hospital Length of Stay: 1 days  Code Status: Full Code   Attending Provider: Stephany Ordaz MD   Consulting Provider: Angela Crowe DO  Primary Care Physician: Lindsey Singletary FNP  Principal Problem:Acute anemia    Inpatient consult to Hepatology  Consult performed by: Angela Crowe DO  Consult ordered by: Stephany Ordaz MD        Subjective:     HPI: Pilar Fernandez is a 44 y.o. female  with recent CVA (on DAPT, 7/2024) with ongoing left sided deficits, history of breast mass, HTN, poorly controlled T2DM, ESRD (~6 years) on HD MWF, lymphedema, stage II sacral decubitus ulcer and chronic hypoxic RF on 3 L's NC that presents to [facility] for evaluation of new onset painless jaundice, first noticed by her mother over the weekend. Hepatology consulted on 10/30 for evaluation of elevated LFT's. The patient denies any prior knowledge of liver disease. She does not drink alcohol and never has. She denies any history of illicit drug use. She had a stoke in 7/2024 and has ongoing left sided weakness, now largely bed bound and hardly is able to move her left lower extremity. Prior to this she was ambulating with a walker, though now she can't walk. She has never had an MI or had stents in her heart. Has had diabetes that is poorly controlled for years and has been on HD for at least 6 years. She denies any issues with her vision from her diabetes, but does wear glasses. She was found to be acutely anemic from her baseline with a Hgb of 6.4 without any overt signs of GI bleeding. She received 2 units of FFP and 2 units of packed red blood cells transfusion prior to transfer. Of note, she was recently admitted to OSH for 1 week for LE cellulitis and completed a course of oral clindamycin after being stabilized on IV antibiotics inpatient.     Imaging:    10/30/24- US Liver with Doppler- Enlarged liver measuring 25.9 cm. Homogeneous echotexture. No focal hepatic lesions. The common duct is not dilated, measuring 4 mm. No intrahepatic ductal dilatation. Pancreas appears normal. Splenomegaly noted. Satisfactory doppler evaluation.   10/30/24- CT C/A/P with contrast- Diffuse body wall anasarca throughout the visualized chest, abdomen, pelvis, and proximal thighs. Numerous enlarged lymph node conglomerates and/or soft tissue masses about the left axillary soft tissues, left greater than right inguinal regions, left supraclavicular soft tissues, and retroperitoneum concerning for neoplastic or lymphoproliferative process. Large volume pericardial effusion. Cardiomegaly with pulmonary vascular congestion, interlobular septal thickening, and scattered ground-glass attenuation, in keeping with evolving pulmonary edema. Diffuse hepatosplenomegaly.  No obvious focal hepatic lesions, noting limitations from diffuse body wall anasarca.    Past Medical History:   Diagnosis Date    Anemia     Anemia     Breast mass     Chronic constipation     CKD (chronic kidney disease)     Diabetes mellitus     Dialysis patient     MON, TUE, THURS, SAT.    Embolic stroke involving right middle cerebral artery 2024    Encounter for blood transfusion     Erosive gastritis 2016    GERD (gastroesophageal reflux disease)     Gout     High cholesterol     Hypertension     Knee pain     Thyroid disease     Unspecified cataract 2022       Past Surgical History:   Procedure Laterality Date    AV FISTULA PLACEMENT Left 2019    Procedure: creation av fistula;  Surgeon: Buck Fernandez MD;  Location: [facility];  Service: Cardiovascular;  Laterality: Left;  Left Radialcephalic: PACU     SECTION  , , ,     CHOLECYSTECTOMY      COLONOSCOPY N/A 10/23/2017    Procedure: COLONOSCOPY;  Surgeon: Bri Harry MD;  Location: [facility];  Service: Endoscopy;   Laterality: N/A;    EXCISIONAL BIOPSY Left 12/30/2022    Procedure: EXCISIONAL BIOPSY;  Surgeon: Mick Juarez MD;  Location: [facility];  Service: General;  Laterality: Left;    TUBAL LIGATION      UPPER GASTROINTESTINAL ENDOSCOPY  03/31/2016    Erosive Gastritis-Dr Bailey       Family History   Problem Relation Name Age of Onset    Thyroid disease Mother      Diabetes Father      Ovarian cancer Maternal Aunt Malathi     Breast cancer Maternal Aunt Ada        Social History     Socioeconomic History    Marital status: Single    Years of education: 11   Occupational History    Occupation:    Tobacco Use    Smoking status: Never     Passive exposure: Never    Smokeless tobacco: Never   Substance and Sexual Activity    Alcohol use: No     Alcohol/week: 0.0 standard drinks of alcohol    Drug use: No    Sexual activity: Yes     Partners: Male     Birth control/protection: None, Surgical     Social Drivers of Health     Financial Resource Strain: Low Risk  (7/15/2024)    Overall Financial Resource Strain (CARDIA)     Difficulty of Paying Living Expenses: Not hard at all   Food Insecurity: No Food Insecurity (7/15/2024)    Hunger Vital Sign     Worried About Running Out of Food in the Last Year: Never true     Ran Out of Food in the Last Year: Never true   Transportation Needs: No Transportation Needs (7/15/2024)    TRANSPORTATION NEEDS     Transportation : No   Physical Activity: Inactive (7/15/2024)    Exercise Vital Sign     Days of Exercise per Week: 0 days     Minutes of Exercise per Session: 0 min   Stress: No Stress Concern Present (7/15/2024)    Citizen of [place] [employer] - Occupational Stress Questionnaire     Feeling of Stress : Only a little   Housing Stability: Low Risk  (7/15/2024)    Housing Stability Vital Sign     Unable to Pay for Housing in the Last Year: No     Homeless in the Last Year: No       Current Facility-Administered Medications on File Prior to Encounter    Medication Dose Route Frequency Provider Last Rate Last Admin    [COMPLETED] fentaNYL 50 mcg/mL injection 50 mcg  50 mcg Intravenous ED 1 Time Ike Calles MD   50 mcg at 10/29/24 1628    [COMPLETED] ondansetron injection 4 mg  4 mg Intravenous ED 1 Time Ike Calles MD   4 mg at 10/29/24 1628    [COMPLETED] piperacillin-tazobactam (ZOSYN) 4.5 g in D5W 100 mL IVPB (MB+)  4.5 g Intravenous ED 1 Time Ike Calles MD   Stopped at 10/29/24 1656    [DISCONTINUED] 0.9%  NaCl infusion (for blood administration)   Intravenous Q24H PRN Ike Calles MD        [DISCONTINUED] 0.9%  NaCl infusion (for blood administration)   Intravenous Q24H PRN Ike Calles MD        [DISCONTINUED] 0.9%  NaCl infusion (for blood administration)   Intravenous Q24H PRN Ike Calles MD        [DISCONTINUED] morphine injection 4 mg  4 mg Intravenous ED 1 Time Ike Calles MD         Current Outpatient Medications on File Prior to Encounter   Medication Sig Dispense Refill    amlodipine (NORVASC) 10 MG tablet Take 1 tablet (10 mg total) by mouth once daily. 90 tablet 11    aspirin (ECOTRIN) 81 MG EC tablet Take 1 tablet (81 mg total) by mouth once daily. 30 tablet 0    atorvastatin (LIPITOR) 40 MG tablet Take 1 tablet by mouth once daily 90 tablet 0    clopidogreL (PLAVIX) 75 mg tablet Take 1 tablet (75 mg total) by mouth once daily. 30 tablet 0    colchicine (COLCRYS) 0.6 mg tablet TAKE 1 TABLET BY MOUTH ONCE DAILY AS NEEDED FOR GOUT PAIN (Patient not taking: Reported on 7/19/2024)      EUTHYROX 50 mcg tablet Take 1 tablet (50 mcg total) by mouth every morning. (Patient not taking: Reported on 7/19/2024) 90 tablet 1    famotidine (PEPCID) 20 MG tablet Take 1 tablet (20 mg total) by mouth 2 (two) times daily as needed for Heartburn. 20 tablet 0    HYDROcodone-acetaminophen (NORCO) 5-325 mg per tablet Take 1 tablet by mouth every 6 (six) hours as needed for Pain. (Patient not taking: Reported on 7/19/2024)  20 tablet 0    insulin aspart U-100 (NOVOLOG FLEXPEN U-100 INSULIN) 100 unit/mL (3 mL) InPn pen Inject 10 Units into the skin 2 (two) times daily with meals. 15 mL 11    lactulose (CHRONULAC) 10 gram/15 mL solution Take by mouth.  (Patient not taking: Reported on 7/19/2024)  5    LANTUS U-100 INSULIN 100 unit/mL injection 70 Units every evening.      linaGLIPtin (TRADJENTA) 5 mg Tab tablet TAKE 1 TABLET (5 MG TOTAL) BY MOUTH ONCE DAILY. 90 tablet 3    minoxidiL (LONITEN) 10 MG Tab Take 10 mg by mouth 2 (two) times daily.      nebivoloL (BYSTOLIC) 20 mg Tab Take 1 tablet by mouth once daily.      NIFEdipine (PROCARDIA-XL) 60 MG (OSM) 24 hr tablet Take 1 tablet by mouth once daily.      ondansetron (ZOFRAN-ODT) 4 MG TbDL Take 1 tablet (4 mg total) by mouth every 8 (eight) hours as needed ([place]). 8 tablet 0    pantoprazole (PROTONIX) 40 MG tablet Take 1 tablet (40 mg total) by mouth once daily. 30 tablet 0    polyethylene glycol (GLYCOLAX) 17 gram/dose powder Take 17 g by mouth every evening. (Patient not taking: Reported on 7/19/2024) 510 g 0    senna-docusate 8.6-50 mg (SENNA WITH DOCUSATE SODIUM) 8.6-50 mg per tablet Take 1 tablet by mouth 2 (two) times daily. (Patient not taking: Reported on 7/19/2024)      sorbitol 70 % solution Take 30 mLs by mouth daily as needed (Severe constipation.  Use prn no BM X 3 days despite Miralax qhs.). (Patient not taking: Reported on 7/19/2024) 454 mL 0    traZODone (DESYREL) 100 MG tablet Take 100 mg by mouth nightly as needed.         Review of patient's allergies indicates:   Allergen Reactions    Tramadol Itching and Nausea And Vomiting       Review of Systems   Constitutional:  Negative for chills and fever.   Respiratory:  Positive for shortness of breath. Negative for cough.    Cardiovascular:  Negative for chest pain and palpitations.   Gastrointestinal:  Negative for abdominal pain, nausea and vomiting.   Neurological:  Negative for dizziness and headaches.         Objective:     Vitals:    10/30/24 1500   BP:    Pulse: 78   Resp:    Temp:      Constitutional: obese and  not in acute distress  HENT: Head: Normal, normocephalic, atraumatic.  Eyes: conjunctiva clear and sclera icteric  Cardiovascular: regular rate and rhythm  Respiratory: normal chest expansion & respiratory effort , on supplemental 02  GI: soft, protuberant, and nontender  Musculoskeletal: no muscular tenderness noted  Skin: normal color  Neurological: alert, oriented x3  Psychiatric: mood and affect are within normal limits    Significant Labs:  Recent Labs   Lab 10/29/24  1805 10/30/24  0222 10/30/24  1034   HGB 6.9* 7.3* 7.1*       Lab Results   Component Value Date    WBC 22.74 (H) 10/30/2024    HGB 7.1 (L) 10/30/2024    HCT 20.1 (L) 10/30/2024    MCV 81 (L) 10/30/2024     (L) 10/30/2024       Lab Results   Component Value Date     (L) 10/30/2024    K 4.6 10/30/2024    CL 99 10/30/2024    CO2 20 (L) 10/30/2024    BUN 51 (H) 10/30/2024    CREATININE 4.9 (H) 10/30/2024    CALCIUM 9.0 10/30/2024    ANIONGAP 16 10/30/2024    ESTGFRAFRICA 17.1 (A) 07/27/2021    EGFRNONAA 14.8 (A) 07/27/2021       Lab Results   Component Value Date    ALT 30 10/30/2024    AST 72 (H) 10/30/2024    ALKPHOS 283 (H) 10/30/2024    BILITOT 10.7 (H) 10/30/2024       Lab Results   Component Value Date    INR 1.9 (H) 10/30/2024    INR 2.1 (H) 10/29/2024    INR 1.3 (H) 07/13/2024       Significant Imaging:  Reviewed pertinent radiology findings.       Assessment/Plan:     Pilar Fernandez is a 44 y.o. female with recent CVA (on DAPT, 7/2024) with ongoing left sided deficits, history of breast mass, HTN, poorly controlled T2DM, ESRD (~6 years) on HD MWF, lymphedema, stage II sacral decubitus ulcer and chronic hypoxic RF on 3 L's NC that presents to [facility] from [facility] for evaluation of new onset painless jaundice, first noticed by her mother over the weekend. Hepatology consulted on 10/30 for evaluation of elevated  LFT's.     Imaging:   10/30/24- US Liver with Doppler- Enlarged liver measuring 25.9 cm. Homogeneous echotexture. No focal hepatic lesions. The common duct is not dilated, measuring 4 mm. No intrahepatic ductal dilatation. Pancreas appears normal. Splenomegaly noted. Satisfactory doppler evaluation.   10/30/24- CT C/A/P with contrast- Diffuse body wall anasarca throughout the visualized chest, abdomen, pelvis, and proximal thighs. Numerous enlarged lymph node conglomerates and/or soft tissue masses about the left axillary soft tissues, left greater than right inguinal regions, left supraclavicular soft tissues, and retroperitoneum concerning for neoplastic or lymphoproliferative process. Large volume pericardial effusion. Cardiomegaly with pulmonary vascular congestion, interlobular septal thickening, and scattered ground-glass attenuation, in keeping with evolving pulmonary edema. Diffuse hepatosplenomegaly.  No obvious focal hepatic lesions, noting limitations from diffuse body wall anasarca.    Problem List:  Painless Jaundice   Acute Liver Injury/Elevated LFT's/Elevated INR   ESRD with Volume Overload (reportedly on HD for ~6 years)   Anasarca   Chronic Hypoxic Respiratory Failure (on 3 L's O2 since 2022)   Morbid Obesity (BMI 46)   Debility (bed bound now, previously with walker)   History of CVA (7/2024) with left sided weakness and right eye droop on DAPT (ASA/Plavix)   Lymphadenopathy     Patient is likely not a liver transplant candidate for a multitude of reasons including co-morbidities and functional status among others. Liver biopsy would help prognosticate presence/extent of scarring in liver as her imaging is not definitive, but her enzymes and liver function testing is suspect for underlying liver damage. Would recommend MR electrography as an alternative method to help prognosticate extent of liver disease since biopsy would be fairly high risk.  DILI is a possibility (recent clindamycin use) vs  underlying MASH cirrhosis vs Infiltrative process given diffuse LAD.     Recommendations:  - Please obtain MR Elastography   - Check daily CBC, CMP and INR  - Please avoid any sedating medications   - Obtain echo  - Check BNP   - Follow up serologic workup for CLD: f/u PETH, IgG, A1AT, BRIAN, ASMA, AMA, ceruloplasmin, iron and Ferritin   - PT/OT     Thank you for involving us in the care of Pilar Fernandez. Please call with any additional questions, concerns or changes in the patient's clinical status.     Angela Crowe DO  Gastroenterology Fellow PGY- V  [facility]

## 2024-10-30 NOTE — SUBJECTIVE & OBJECTIVE
Oncology Treatment Plan:   [No matching plan found]    Medications:  Continuous Infusions:  Scheduled Meds:   allopurinoL  100 mg Oral Daily    cefTRIAXone (Rocephin) IV (PEDS and ADULTS)  1 g Intravenous Q24H    epoetin jitendra-ebpx (RETACRIT) injection  50 Units/kg Intravenous Every Mon, Wed, Fri    insulin glargine U-100 (Lantus)  20 Units Subcutaneous QHS    metoprolol succinate  50 mg Oral Daily    mupirocin   Nasal BID    NIFEdipine  60 mg Oral Daily    pantoprazole  40 mg Intravenous BID    polyethylene glycol  17 g Oral Daily    senna-docusate 8.6-50 mg  1 tablet Oral Daily     PRN Meds:  Current Facility-Administered Medications:     acetaminophen, 650 mg, Oral, Q4H PRN    albuterol-ipratropium, 3 mL, Nebulization, Q4H PRN    dextrose 10%, 12.5 g, Intravenous, PRN    dextrose 10%, 25 g, Intravenous, PRN    diphenhydrAMINE, 50 mg, Oral, Q6H PRN    glucagon (human recombinant), 1 mg, Intramuscular, PRN    glucose, 16 g, Oral, PRN    glucose, 24 g, Oral, PRN    insulin aspart U-100, 0-5 Units, Subcutaneous, QID (AC + HS) PRN    lactulose, 10 g, Oral, Q6H PRN    naloxone, 0.02 mg, Intravenous, PRN    ondansetron, 4 mg, Intravenous, Q8H PRN    oxyCODONE, 5 mg, Oral, Q6H PRN    simethicone, 1 tablet, Oral, QID PRN    sodium chloride 0.9%, 10 mL, Intravenous, Q12H PRN    traZODone, 100 mg, Oral, Nightly PRN     Review of patient's allergies indicates:   Allergen Reactions    Tramadol Itching and Nausea And Vomiting        Past Medical History:   Diagnosis Date    Anemia     Anemia     Breast mass     Chronic constipation     CKD (chronic kidney disease)     Diabetes mellitus     Dialysis patient     MON, TUE, THURS, SAT.    Embolic stroke involving right middle cerebral artery 7/13/2024    Encounter for blood transfusion     Erosive gastritis 03/2016    GERD (gastroesophageal reflux disease)     Gout     High cholesterol     Hypertension     Knee pain     Thyroid disease     Unspecified cataract 09/2022     Past  Surgical History:   Procedure Laterality Date    AV FISTULA PLACEMENT Left 2019    Procedure: creation av fistula;  Surgeon: Buck Fernandez MD;  Location: [facility];  Service: Cardiovascular;  Laterality: Left;  Left Radialcephalic: PACU     SECTION  , , ,     CHOLECYSTECTOMY      COLONOSCOPY N/A 10/23/2017    Procedure: COLONOSCOPY;  Surgeon: Bri Harry MD;  [facility];  Service: Endoscopy;  Laterality: N/A;    EXCISIONAL BIOPSY Left 2022    Procedure: EXCISIONAL BIOPSY;  Surgeon: Mick Juarez MD;  Location: [facility];  Service: General;  Laterality: Left;    TUBAL LIGATION      UPPER GASTROINTESTINAL ENDOSCOPY  2016    Erosive Gastritis-Dr Bailey     Family History       Problem Relation (Age of Onset)    Breast cancer Maternal Aunt    Diabetes Father    Ovarian cancer Maternal Aunt    Thyroid disease Mother          Tobacco Use    Smoking status: Never     Passive exposure: Never    Smokeless tobacco: Never   Substance and Sexual Activity    Alcohol use: No     Alcohol/week: 0.0 standard drinks of alcohol    Drug use: No    Sexual activity: Yes     Partners: Male     Birth control/protection: None, Surgical       Review of Systems   Constitutional:  Positive for appetite change and fatigue.   Skin:         Itching & Jaundice.      Objective:     Vital Signs (Most Recent):  Temp: 98 °F (36.7 °C) (10/30/24 1245)  Pulse: 73 (10/30/24 1245)  Resp: 14 (10/30/24 1200)  BP: (!) 104/55 (10/30/24 1245)  SpO2: (!) 94 % (10/30/24 1245) Vital Signs (24h Range):  Temp:  [97.3 °F (36.3 °C)-99.5 °F (37.5 °C)] 98 °F (36.7 °C)  Pulse:  [70-85] 73  Resp:  [8-20] 14  SpO2:  [92 %-100 %] 94 %  BP: ()/(43-87) 104/55     Weight: 130 kg (286 lb 9.6 oz)  Body mass index is 46.26 kg/m².  Body surface area is 2.46 meters squared.    No intake or output data in the 24 hours ending 10/30/24 1431     Physical Exam  Vitals and nursing note reviewed.   Constitutional:        Appearance: She is ill-appearing.      Comments: Jaundiced.    Cardiovascular:      Rate and Rhythm: Normal rate and regular rhythm.   Pulmonary:      Effort: Pulmonary effort is normal.   Abdominal:      General: Abdomen is flat.   Musculoskeletal:      Comments: Severe LE lymphedema.    Skin:     General: Skin is warm and dry.   Neurological:      General: No focal deficit present.      Mental Status: She is oriented to person, place, and time.   Psychiatric:         Mood and Affect: Mood normal.         Behavior: Behavior normal.          Significant Labs:   All pertinent labs from the last 24 hours have been reviewed.    Diagnostic Results:  I have reviewed all pertinent imaging results/findings within the past 24 hours.

## 2024-10-30 NOTE — CONSULTS
[facility] ([place] [id number])  Nephrology  Consult Note    Patient Name: Pilar Fernandez  MRN: [id number]  Admission Date: 10/29/2024  Hospital Length of Stay: 1 days  Attending Provider: Stephany Ordaz MD   Primary Care Physician: Lindsey Singletary FNP  Principal Problem:Acute anemia    Inpatient consult to Nephrology  Consult performed by: Zoya Smith PA-C  Consult ordered by: Coleman Bonilla DO  Reason for consult: ESRD        Subjective:     HPI: Pilar Fernandez is a 44 y.o. female with history of stroke (MRI July 2024 on DAPT), breast mass, gastritis, HTN, uncontrolled diabetes (A1C 8.7), ESRD on HD (MWF), anemia,  gout, lymphedema BLE (L > R), impaired mobility, stage II sacral decubitus,  chronic hypoxic respiratory failure (on 3 L nasal cannula at home) who presents as a transfer from [facility] ED for management of painless jaundice and acute anemia.       Patient presented to [facility] Emergency Department on October 29 with jaundice which family noticed yesterday associated with pruritus.  She reported no history of liver disease. She was recently hospitalized last week for treatment of leg cellulitis, no mention of abnormal liver function or jaundice at that time. She reported no hematemesis/coffee-ground emesis, melena or hematochezia. Stool was positive for occult blood. She had leukocytosis and elevated bilirubin.  INR was elevated at 2.1, and hemoglobin was 6.4 (recent baseline around 10). She received 80mg IV protonix, 2u FFP, 2u pRBC in the ED prior to transfer. CT A/P was noted to be a limited study. Mild hepatomegaly. Status post cholecystectomy. Splenomegaly. Left pleural effusion similar to prior study. Pericardial effusion that was present on earlier study. Marked soft tissue edema.      Last HD session Monday 10/28. She reports she dialyzes on MWF.  Nephrology consulted for management of ESRD.        Past Medical History:   Diagnosis Date    Anemia     Anemia      Breast mass     Chronic constipation     CKD (chronic kidney disease)     Diabetes mellitus     Dialysis patient     GRZEGORZ BARRIOS, THURS, SAT.    Embolic stroke involving right middle cerebral artery 2024    Encounter for blood transfusion     Erosive gastritis 2016    GERD (gastroesophageal reflux disease)     Gout     High cholesterol     Hypertension     Knee pain     Thyroid disease     Unspecified cataract 2022       Past Surgical History:   Procedure Laterality Date    AV FISTULA PLACEMENT Left 2019    Procedure: creation av fistula;  Surgeon: Buck Fernandez MD;  Location: [facility];  Service: Cardiovascular;  Laterality: Left;  Left Radialcephalic: PACU     SECTION  , , ,     CHOLECYSTECTOMY      COLONOSCOPY N/A 10/23/2017    Procedure: COLONOSCOPY;  Surgeon: Bri Harry MD;  Location: [facility];  Service: Endoscopy;  Laterality: N/A;    EXCISIONAL BIOPSY Left 2022    Procedure: EXCISIONAL BIOPSY;  Surgeon: Mick Juarez MD;  Location: [facility];  Service: General;  Laterality: Left;    TUBAL LIGATION      UPPER GASTROINTESTINAL ENDOSCOPY  2016    Erosive Gastritis-Dr Bailey       Review of patient's allergies indicates:   Allergen Reactions    Tramadol Itching and Nausea And Vomiting     Current Facility-Administered Medications   Medication Frequency    acetaminophen tablet 650 mg Q4H PRN    albuterol-ipratropium 2.5 mg-0.5 mg/3 mL nebulizer solution 3 mL Q4H PRN    allopurinoL tablet 100 mg Daily    cefTRIAXone injection 1 g Q24H    dextrose 10% bolus 125 mL 125 mL PRN    dextrose 10% bolus 250 mL 250 mL PRN    diphenhydrAMINE capsule 50 mg Q6H PRN    glucagon (human recombinant) injection 1 mg PRN    glucose chewable tablet 16 g PRN    glucose chewable tablet 24 g PRN    insulin aspart U-100 pen 0-5 Units Q6H    insulin glargine U-100 (Lantus) pen 20 Units QHS    lactulose 20 gram/30 mL solution Soln 10 g Q6H PRN    metoprolol succinate (TOPROL-XL)  24 hr tablet 50 mg Daily    mupirocin 2 % ointment BID    naloxone 0.4 mg/mL injection 0.02 mg PRN    NIFEdipine 24 hr tablet 60 mg Daily    ondansetron injection 4 mg Q8H PRN    oxyCODONE immediate release tablet 5 mg Q6H PRN    pantoprazole injection 40 mg BID    polyethylene glycol packet 17 g Daily    senna-docusate 8.6-50 mg per tablet 1 tablet Daily    simethicone chewable tablet 80 mg QID PRN    sodium chloride 0.9% flush 10 mL Q12H PRN    traZODone tablet 100 mg Nightly PRN     Family History       Problem Relation (Age of Onset)    Breast cancer Maternal Aunt    Diabetes Father    Ovarian cancer Maternal Aunt    Thyroid disease Mother          Tobacco Use    Smoking status: Never     Passive exposure: Never    Smokeless tobacco: Never   Substance and Sexual Activity    Alcohol use: No     Alcohol/week: 0.0 standard drinks of alcohol    Drug use: No    Sexual activity: Yes     Partners: Male     Birth control/protection: None, Surgical     Review of Systems   Constitutional:  Negative for chills and fever.   HENT: Negative.     Eyes: Negative.    Respiratory:  Negative for shortness of breath.    Cardiovascular:  Negative for chest pain.   Gastrointestinal:  Negative for nausea and vomiting.   Genitourinary: Negative.    Musculoskeletal:  Positive for myalgias (left leg pain).   Neurological:  Negative for dizziness.     Objective:     Vital Signs (Most Recent):  Temp: 98.2 °F (36.8 °C) (10/30/24 0400)  Pulse: 74 (10/30/24 1057)  Resp: 14 (10/30/24 0400)  BP: (!) 105/55 (10/30/24 0400)  SpO2: (!) 92 % (10/30/24 0400) Vital Signs (24h Range):  Temp:  [97.3 °F (36.3 °C)-99.5 °F (37.5 °C)] 98.2 °F (36.8 °C)  Pulse:  [70-85] 74  Resp:  [8-20] 14  SpO2:  [92 %-100 %] 92 %  BP: ()/(43-87) 105/55     Weight: 130 kg (286 lb 9.6 oz) (10/30/24 0514)  Body mass index is 46.26 kg/m².  Body surface area is 2.46 meters squared.    No intake/output data recorded.     Physical Exam  Constitutional:       General: She  is not in acute distress.     Appearance: She is ill-appearing (chronically).   HENT:      Head: Normocephalic.   Cardiovascular:      Rate and Rhythm: Normal rate.      Arteriovenous access: Left arteriovenous access is present.     Comments: LUE AVF +bruit  Pulmonary:      Effort: Pulmonary effort is normal. No respiratory distress.      Breath sounds: No wheezing or rales.   Abdominal:      General: There is distension.      Tenderness: There is no abdominal tenderness.   Musculoskeletal:      Right lower leg: Edema present.      Left lower leg: Edema present.      Comments: Chronic lymphedema to bilateral lower extremities, L > R   Skin:     General: Skin is warm and dry.   Neurological:      Mental Status: She is alert.   Psychiatric:         Mood and Affect: Mood normal.          Significant Labs:  CBC:   Recent Labs   Lab 10/30/24  0222 10/30/24  1034   WBC 21.43* 22.74*   RBC 2.47* 2.47*   HGB 7.3* 7.1*   HCT 20.5* 20.1*   *  --    MCV 83 81*   MCH 29.6 28.7   MCHC 35.6 35.3     CMP:   Recent Labs   Lab 10/30/24  0222   GLU 83   CALCIUM 9.0   ALBUMIN 2.0*   PROT 7.1   *   K 4.6   CO2 20*   CL 99   BUN 51*   CREATININE 4.9*   ALKPHOS 283*   ALT 30   AST 72*   BILITOT 10.7*     All labs within the past 24 hours have been reviewed.    Assessment/Plan:     Renal/  ESRD (end stage renal disease) on dialysis  44 year old female hx of ESRD on HD MWF presenting from outside hospital as transfer for acute anemia and painless jaundice. Last HD session 10/28. Nephrology consulted for ESRD management.     Nephrology History  -Outpatient HD unit: [facility]  -Nephrologist: ?  -HD tx days: MWF  -HD tx time: 4 hrs  -Last HD tx: Monday 10/28  -HD access: LUE AVF  -HD modality: iHD  -Residual urine:   -EDW:  110 kg    Plan/Recommendations  ESRD on HD:  -No need for emergent RRT, electrolytes stable. Will plan for HD this afternoon versus this evening to continue MWF schedule.   -Patient with diffuse body  wall edema on imaging and chronic lymphedema, but no respiratory distress, on baseline O2, and no significant pulmonary edema on imaging. In the setting of suspected GI bleed, hypotension, UF goal 0-500 cc.  -Will continue iHD thrice weekly unless emergent indication arises  -Strict I&Os  -Pre & post HD weight  Anemia in ESRD  -Hgb goal 10-11, hgb 7.3 s/p 2 units pRBC and 2 units FFP  -Trend hgb, start Epo, may d/c if patient returns to goal of 10-11  Mineral Bone Disease in ESRD  -Resume home phos binders when diet advances  -Renal diet if not NPO      Oncology  * Acute anemia  -management per primary    GI  Painless jaundice  -management per primary        Thank you for your consult. I will follow-up with patient. Please contact us if you have any additional questions.    Zoya Smith PA-C  Nephrology  [facility] ([place])

## 2024-10-30 NOTE — ASSESSMENT & PLAN NOTE
-presented with painless jaundice   -abnormal LFTs with bilirubin 11.7, , AST 81 (normal LFT in July 2024)  -lipase normal 57  -concerning for DELA CRUZ cirrhosis with hepatic synthetic dysfunction given elevated bilirubin, elevated INR and low albumin 2  -CT abdomen showed anasarca with hepatomegaly, normal spleen   -check ultrasound liver with doppler   -check direct bilirubin, hepatitis panel, HIV, CMV, EBV, acetaminophen level, ethanol level, AFP, PETH     MELD 3.0: 38 at 10/29/2024  1:50 PM  MELD-Na: 37 at 10/29/2024  1:50 PM  Calculated from:  Serum Creatinine: On dialysis. Using the maximum value.  Serum Sodium: 134 mmol/L at 10/29/2024  1:50 PM  Total Bilirubin: 11.7 mg/dL at 10/29/2024  1:50 PM  Serum Albumin: 2 g/dL at 10/29/2024  1:50 PM  INR(ratio): 2.1 at 10/29/2024  1:50 PM  Age at listing (hypothetical): 44 years  Sex: Female at 10/29/2024  1:50 PM   -monitor MELD closely with daily labs   -consider hepatology consult

## 2024-10-30 NOTE — HPI
Pilar Fernandez is a 44 year old Black woman with hypertension, hyperlipidemia, diabetes mellitus type 2 (treated with insulin), end stage renal disease on hemodialysis (), heart failure with preserved ejection fraction, chronic hypoxic respiratory failure on supplemental oxygen (3 liters/minute), history of right middle cerebral artery stroke on 2024, peripheral vascular disease, chronic lymphedema, hypothyroidism, anemia with history of blood transfusion, gout, gastroesophageal reflux disease, chronic constipation, sacral decubitus ulcer, history of  sections, history of tubal ligation, history of cholecystectomy. She lives in [place], [place].               She presented to [facility] Emergency Department on 10/29/2024 with gastrointestinal bleeding, symptomatic anemia, jaundice, hyperbilirubinemia. She reported being hospitalized at [facility] recently for leg cellulitis and being told she had hyperbilirubinemia and was advised to follow up with Hepatology at [facility] after being discharged on 10/26/2024. She had several wounds on her sacrum and legs. She had extremely swollen legs. Stool was positive for occult blood. She had leukocytosis. Hemoglobin was 6.4 g/dL from baseline around 10. INR was elevated at 2.1. she was given 2 units of fresh frozen plasma and 2 units of packed red blood cells. Abdomen CT without contrast showed no obvious etiology of liver abnormalities or leukocytosis. She was transferred to [facility] for further evaluation of her hyperbilirubinemia. She was admitted to Hospital Medicine Team [facility]

## 2024-10-30 NOTE — HPI
Pilar Fernandez is a 44 y.o. female with history of stroke (MRI July 2024 on DAPT), breast mass, gastritis, HTN, uncontrolled diabetes (A1C 8.7), ESRD on HD (MWF), anemia,  gout, lymphedema BLE (L > R), impaired mobility, stage II sacral decubitus,  chronic hypoxic respiratory failure (on 3 L nasal cannula at home) who presents as a transfer from [facility] ED for management of painless jaundice and acute anemia.       Patient presented to [facility] Emergency Department on October 29 with jaundice which family noticed yesterday associated with pruritus.  She reported no history of liver disease. She was recently hospitalized last week for treatment of leg cellulitis, no mention of abnormal liver function or jaundice at that time. She reported no hematemesis/coffee-ground emesis, melena or hematochezia. Stool was positive for occult blood. She had leukocytosis and elevated bilirubin.  INR was elevated at 2.1, and hemoglobin was 6.4 (recent baseline around 10). She received 80mg IV protonix, 2u FFP, 2u pRBC in the ED prior to transfer. CT A/P was noted to be a limited study. Mild hepatomegaly. Status post cholecystectomy. Splenomegaly. Left pleural effusion similar to prior study. Pericardial effusion that was present on earlier study. Marked soft tissue edema.      Last HD session Monday 10/28. She reports she dialyzes on MWF.  Nephrology consulted for management of ESRD.

## 2024-10-30 NOTE — ASSESSMENT & PLAN NOTE
Patient presented to [facility] 10/29 with jaundice. Was found to have a hemoglobin of 6.4 (recent baseline around 10). She received 80mg IV protonix, 2u FFP, 2u pRBC in the ED prior to transfer. Stool was positive for occult blood. No overt signs of bleeding. No reported episodes of melena, hematochezia, hemoptysis. Patient denies blood thinner use, however, per chart review is taking aspirin and plavix. Denies NSAID use. On PPI at home. Last colonoscopy 10/23/17 was unremarkable. EGD in 2016 with findings of erosive gastritis.     - Will defer scope for now as patient without overt sign of bleeding. Will eventually need repeat EGD/colonoscopy, but this can likely be done in outpatient setting  - monitor stool for melena/hematochezia  - Trend Hgb and transfuse for goal Hgb > 7, unless otherwise indicated  - continue home pantoprazole 40 mg BID  - Please notify GI team if there is significant change in patient's clinical status

## 2024-10-30 NOTE — H&P
[facility] ([address] Medicine  History & Physical    Patient Name: Pilar Fernandez  MRN: [id number]  Patient Class: IP- Inpatient  Admission Date: 10/29/2024  Attending Physician: Kelechi Horner MD   Primary Care Provider: Lindsey Singletary FNP         Patient information was obtained from patient and outside ER records  .     Subjective:     Principal Problem:Acute blood loss anemia    Chief Complaint: jaundice        HPI: Pilar Fernandez is a 44-year-old female with history of stroke (MRI July 2024 on DAPT), breast mass, gastritis, HTN, uncontrolled diabetes (A1C 8.7), end-stage renal disease on hemodialysis (MWF), anemia,  gout, lymphedema BLE (L > R), impaired mobility, stage II sacral decubitus,  chronic hypoxic respiratory failure (on 3 L nasal cannula at home) who presents as a transfer from [facility] ED for management of painless jaundice and acute anemia.      Patient presented to [facility] Emergency Department on October 29 with jaundice which family noticed yesterday associated with pruritus.  She reported no history of liver disease.  Most recent hemodialysis was October 28. Patient states she was discharged from [facility] on Saturday 10/26/24 after 1 week stay for leg cellulitis for which she was treated wit IV antibiotics and discharged home on clindamycin which she is still taking. Patient reports she had labs done at [facility] and nothing mentioned abnormal about her liver or jaundice. last we She had no fever or chills.  She has chronic lymphedema that is unchanged.  She had wounds to her sacrum and legs.  She was awake and alert and in no distress.  She was jaundiced on exam.  She reported no hematemesis/coffee-ground emesis and no bloody stools.  Stool was positive for occult blood. She had leukocytosis and elevated bilirubin.  INR was elevated at 2.1, and hemoglobin was 6.4 (recent baseline around 10).  Peripheral IVs were placed.  In  the emergency department she received 80 mg of Protonix along with Zofran, fentanyl, and Zosyn.  She received 2 units of FFP and 2 units of packed red blood cells transfusion prior to transfer.  Imaging of the abdomen CT w/o contrast did not provide an obvious etiology for her liver abnormalities and leukocytosis.  Patient was awake and alert, in no distress.  Systolic blood pressure has been 90s-100s, and heart rate has remained in the 70s.  With elevated bilirubin/INR of uncertain etiology, along with anemia and heme-positive stool, will plan transfer to Hospital Medicine at [facility] in step-down status.  She will likely need Biliary Service and Gastroenterology evaluation.     Lactic acid 1.6, white blood cells 22.82, hemoglobin 6.4, hematocrit 19.4, platelets 154, lipase 57, sodium 134, potassium 4.6, chloride 99, CO2 20, BUN 45, creatinine 4.9, glucose 83, calcium 9.2, albumin 2, total bilirubin 11.7, AST 81, ALT 30, fecal occult blood positive, INR 2.1  -blood cultures collected     CT abdomen and pelvis ([facility] report) was noted to be a limited study.  Liver was within normal limits.  Status post cholecystectomy.  Splenomegaly.  Pancreas, adrenal glands, and kidneys were unremarkable.  Stomach and bowel were unremarkable.  There was no free air and no significant free fluid.  IUD in the lower uterine segment.  Soft tissue edema in the right breast greater than the left.  Slight atelectatic changes at the left lung base.  Left pleural effusion similar to prior study.  Pericardial effusion that was present on earlier study.  Marked soft tissue edema.    During my interview upon arrival to [facility], patient is awake, conversant and not in distress. She is oxygenating well on room air. She lives at home in [place] with her boyfriend who helps with her mobility and ADLs. She denies tobacco, alcohol or other illicit drug use.     Past Medical History:   Diagnosis Date    Anemia     Anemia     Breast mass      Chronic constipation     CKD (chronic kidney disease)     Diabetes mellitus     Dialysis patient     GRZEGORZ BARRIOS THURS, SAT.    Embolic stroke involving right middle cerebral artery 2024    Encounter for blood transfusion     Erosive gastritis 2016    GERD (gastroesophageal reflux disease)     Gout     High cholesterol     Hypertension     Knee pain     Thyroid disease     Unspecified cataract 2022       Past Surgical History:   Procedure Laterality Date    AV FISTULA PLACEMENT Left 2019    Procedure: creation av fistula;  Surgeon: Buck Fernandez MD;  Location: [facility];  Service: Cardiovascular;  Laterality: Left;  Left Radialcephalic: PACU     SECTION  , , ,     CHOLECYSTECTOMY      COLONOSCOPY N/A 10/23/2017    Procedure: COLONOSCOPY;  Surgeon: Bri Harry MD;  Location: [facility];  Service: Endoscopy;  Laterality: N/A;    EXCISIONAL BIOPSY Left 2022    Procedure: EXCISIONAL BIOPSY;  Surgeon: Mick Juarez MD;  Location: [facility];  Service: General;  Laterality: Left;    TUBAL LIGATION      UPPER GASTROINTESTINAL ENDOSCOPY  2016    Erosive Gastritis-Dr Bailey       Review of patient's allergies indicates:   Allergen Reactions    Tramadol Itching and Nausea And Vomiting       Current Facility-Administered Medications on File Prior to Encounter   Medication    [COMPLETED] fentaNYL 50 mcg/mL injection 50 mcg    [COMPLETED] ondansetron injection 4 mg    [COMPLETED] pantoprazole injection 80 mg    [COMPLETED] piperacillin-tazobactam (ZOSYN) 4.5 g in D5W 100 mL IVPB (MB+)    [DISCONTINUED] 0.9%  NaCl infusion (for blood administration)    [DISCONTINUED] 0.9%  NaCl infusion (for blood administration)    [DISCONTINUED] 0.9%  NaCl infusion (for blood administration)    [DISCONTINUED] morphine injection 4 mg     Current Outpatient Medications on File Prior to Encounter   Medication Sig    amlodipine (NORVASC) 10 MG tablet Take 1 tablet (10 mg total) by mouth once  daily.    aspirin (ECOTRIN) 81 MG EC tablet Take 1 tablet (81 mg total) by mouth once daily.    atorvastatin (LIPITOR) 40 MG tablet Take 1 tablet by mouth once daily    clopidogreL (PLAVIX) 75 mg tablet Take 1 tablet (75 mg total) by mouth once daily.    colchicine (COLCRYS) 0.6 mg tablet TAKE 1 TABLET BY MOUTH ONCE DAILY AS NEEDED FOR GOUT PAIN (Patient not taking: Reported on 7/19/2024)    EUTHYROX 50 mcg tablet Take 1 tablet (50 mcg total) by mouth every morning. (Patient not taking: Reported on 7/19/2024)    famotidine (PEPCID) 20 MG tablet Take 1 tablet (20 mg total) by mouth 2 (two) times daily as needed for Heartburn.    HYDROcodone-acetaminophen (NORCO) 5-325 mg per tablet Take 1 tablet by mouth every 6 (six) hours as needed for Pain. (Patient not taking: Reported on 7/19/2024)    insulin aspart U-100 (NOVOLOG FLEXPEN U-100 INSULIN) 100 unit/mL (3 mL) InPn pen Inject 10 Units into the skin 2 (two) times daily with meals.    lactulose (CHRONULAC) 10 gram/15 mL solution Take by mouth.  (Patient not taking: Reported on 7/19/2024)    LANTUS U-100 INSULIN 100 unit/mL injection 70 Units every evening.    linaGLIPtin (TRADJENTA) 5 mg Tab tablet TAKE 1 TABLET (5 MG TOTAL) BY MOUTH ONCE DAILY.    minoxidiL (LONITEN) 10 MG Tab Take 10 mg by mouth 2 (two) times daily.    nebivoloL (BYSTOLIC) 20 mg Tab Take 1 tablet by mouth once daily.    NIFEdipine (PROCARDIA-XL) 60 MG (OSM) 24 hr tablet Take 1 tablet by mouth once daily.    ondansetron (ZOFRAN-ODT) 4 MG TbDL Take 1 tablet (4 mg total) by mouth every 8 (eight) hours as needed ([place]).    pantoprazole (PROTONIX) 40 MG tablet Take 1 tablet (40 mg total) by mouth once daily.    polyethylene glycol (GLYCOLAX) 17 gram/dose powder Take 17 g by mouth every evening. (Patient not taking: Reported on 7/19/2024)    senna-docusate 8.6-50 mg (SENNA WITH DOCUSATE SODIUM) 8.6-50 mg per tablet Take 1 tablet by mouth 2 (two) times daily. (Patient not taking: Reported on 7/19/2024)     sorbitol 70 % solution Take 30 mLs by mouth daily as needed (Severe constipation.  Use prn no BM X 3 days despite Miralax qhs.). (Patient not taking: Reported on 7/19/2024)    traZODone (DESYREL) 100 MG tablet Take 100 mg by mouth nightly as needed.     Family History       Problem Relation (Age of Onset)    Breast cancer Maternal Aunt    Diabetes Father    Ovarian cancer Maternal Aunt    Thyroid disease Mother          Tobacco Use    Smoking status: Never     Passive exposure: Never    Smokeless tobacco: Never   Substance and Sexual Activity    Alcohol use: No     Alcohol/week: 0.0 standard drinks of alcohol    Drug use: No    Sexual activity: Yes     Partners: Male     Birth control/protection: None, Surgical     Review of Systems   Constitutional:  Positive for fatigue. Negative for activity change, appetite change, chills, diaphoresis and fever.   HENT:  Negative for congestion, dental problem, drooling, ear discharge, ear pain, facial swelling, hearing loss, mouth sores, nosebleeds, postnasal drip, rhinorrhea, sinus pressure, sneezing, sore throat, tinnitus, trouble swallowing and voice change.    Eyes:  Negative for photophobia, pain, discharge, redness, itching and visual disturbance.        Yellowing of eyes.    Respiratory:  Positive for shortness of breath. Negative for apnea, cough, choking, chest tightness, wheezing and stridor.    Cardiovascular:  Positive for leg swelling (chronic lymphedema L>R). Negative for chest pain and palpitations.   Gastrointestinal:  Negative for abdominal distention, abdominal pain, anal bleeding, blood in stool, constipation, diarrhea, nausea, rectal pain and vomiting.   Endocrine: Negative for cold intolerance, heat intolerance, polydipsia, polyphagia and polyuria.   Genitourinary:  Negative for decreased urine volume, difficulty urinating, dyspareunia, dysuria, enuresis, flank pain, frequency, genital sores, hematuria, menstrual problem, pelvic pain, urgency, vaginal  bleeding, vaginal discharge and vaginal pain.        Anuric    Musculoskeletal:  Negative for arthralgias, back pain, gait problem, joint swelling, myalgias, neck pain and neck stiffness.   Skin:  Positive for color change (jaundice) and wound (sacral decubitus). Negative for pallor and rash.        Pruritus    Allergic/Immunologic: Negative for environmental allergies, food allergies and immunocompromised state.   Neurological:  Negative for dizziness, tremors, seizures, syncope, facial asymmetry, speech difficulty, weakness, light-headedness, numbness and headaches.        Chronic left leg weakness    Hematological:  Negative for adenopathy. Does not bruise/bleed easily.   Psychiatric/Behavioral:  Negative for agitation, behavioral problems, confusion, decreased concentration, dysphoric mood, hallucinations, self-injury, sleep disturbance and suicidal ideas. The patient is nervous/anxious. The patient is not hyperactive.      Objective:     Vital Signs (Most Recent):  Temp: 98 °F (36.7 °C) (10/29/24 2345)  Pulse: 73 (10/30/24 0215)  BP: (!) 96/55 (10/29/24 2345)  SpO2: (!) 93 % (10/30/24 0215) Vital Signs (24h Range):  Temp:  [97.3 °F (36.3 °C)-99.5 °F (37.5 °C)] 98 °F (36.7 °C)  Pulse:  [72-85] 73  Resp:  [8-20] 15  SpO2:  [92 %-100 %] 93 %  BP: ()/(43-87) 96/55     Weight: 104.3 kg (229 lb 15 oz)  Body mass index is 39.47 kg/m².     Physical Exam  Constitutional:       General: She is not in acute distress.     Appearance: She is well-developed. She is obese. She is ill-appearing. She is not diaphoretic.   HENT:      Head: Normocephalic and atraumatic.      Right Ear: External ear normal.      Left Ear: External ear normal.      Nose: Nose normal.      Mouth/Throat:      Pharynx: No oropharyngeal exudate.   Eyes:      General: Scleral icterus present.      Pupils: Pupils are equal, round, and reactive to light.   Neck:      Thyroid: No thyromegaly.      Vascular: No JVD.      Trachea: No tracheal  deviation.   Cardiovascular:      Rate and Rhythm: Normal rate and regular rhythm.      Heart sounds: Normal heart sounds. No murmur heard.     No gallop.      Comments: AVF in place over left forearm with intact thrills.   Pulmonary:      Effort: Pulmonary effort is normal. No respiratory distress.      Breath sounds: No wheezing or rales.      Comments: Diminished breath sounds   Chest:      Chest wall: No tenderness.   Abdominal:      General: Bowel sounds are normal. There is no distension.      Palpations: Abdomen is soft. There is no mass.      Tenderness: There is no abdominal tenderness. There is no guarding or rebound.   Genitourinary:     Vagina: No vaginal discharge.   Musculoskeletal:         General: No tenderness.      Cervical back: Neck supple.      Right lower leg: Edema present.      Left lower leg: Edema present.      Comments: Chronic LLE weakness with 1/5 motor strength (no acute change per patient)  3/5 R LE motor strength (baseline)  Chronic lymphedema of BLE   Anasarca    Lymphadenopathy:      Cervical: No cervical adenopathy.   Skin:     General: Skin is warm and dry.      Coloration: Skin is not pale.      Findings: No erythema or rash.      Comments: Chronic lymphedema skin changes of BLE   Jaundiced   Stage II sacral decubitus ulcers    Neurological:      Mental Status: She is alert and oriented to person, place, and time.      Cranial Nerves: No cranial nerve deficit.      Motor: No abnormal muscle tone.      Coordination: Coordination normal.      Deep Tendon Reflexes: Reflexes are normal and symmetric. Reflexes normal.      Comments: Mild tremors of upper extremities    Psychiatric:         Behavior: Behavior normal.         Thought Content: Thought content normal.         Judgment: Judgment normal.      Comments: Flat affect               CRANIAL NERVES     CN III, IV, VI   Pupils are equal, round, and reactive to light.                 Significant Labs: All pertinent labs within the  past 24 hours have been reviewed.  Recent Lab Results  (Last 5 results in the past 24 hours)        10/29/24  1805   10/29/24  1442   10/29/24  1420   10/29/24  1419   10/29/24  1350        Unit Blood Type Code   5100     5100            5100                5100             Unit Expiration   [id number]     [id number]            [id number]                [id number]             Unit Blood Type   O POS     O POS            O POS                O POS             Albumin         2.0       ALP         293       ALT         30       Anion Gap         15       Aniso         Slight       AST         81       Bands         18.0       Baso #         CANCELED  Comment: Result canceled by the ancillary.       Basophil %         0.0       BILIRUBIN TOTAL         11.7  Comment: For infants and newborns, interpretation of results should be based  on gestational age, weight and in agreement with clinical  observations.    Premature Infant recommended reference ranges:  Up to 24 hours.............<8.0 mg/dL  Up to 48 hours............<12.0 mg/dL  3-5 days..................<15.0 mg/dL  6-29 days.................<15.0 mg/dL         BUN         45       Calcium         9.2       Chloride         99       CO2         20       CODING SYSTEM   [id number]     [id number]            [id number]                [id number]             Creatinine         4.9       Crossmatch Interpretation   Compatible     Compatible            Not Required                Not Required             Differential Method         Manual       DISPENSE STATUS   TRANSFUSED     TRANSFUSED            TRANSFUSED                TRANSFUSED             eGFR         10.5       Eos #         CANCELED  Comment: Result canceled by the ancillary.       Eos %         3.0       Fecal Occult Blood     Positive           Giant Platelets         Present       Glucose         83       Gran %         64.0       Group & Rh   O POS             Hematocrit 20.6         19.4  Comment: HCT critical  result(s) called and verbal readback obtained from LYNNE WHEATLEY/ED by GREGORY 10/29/2024 14:03         Hemoglobin 6.9         6.4       Immature Grans (Abs)         CANCELED  Comment: Mild elevation in immature granulocytes is non specific and   can be seen in a variety of conditions including stress response,   acute inflammation, trauma and pregnancy. Correlation with other   laboratory and clinical findings is essential.    Result canceled by the ancillary.         Immature Granulocytes         CANCELED  Comment: Result canceled by the ancillary.       INDIRECT JILLIAN   NEG             INR         2.1  Comment: Coumadin Therapy:  2.0 - 3.0 for INR for all indicators except mechanical heart valves  and antiphospholipid syndromes which should use 2.5 - 3.5.  LOT^040^PT Inn^[id number]         Lactic Acid Level   1.6             Lipase         57       Lymph #         CANCELED  Comment: Result canceled by the ancillary.       Lymph %         9.0       MCH         27.2       MCHC         33.0       MCV         83       Mono #         CANCELED  Comment: Result canceled by the ancillary.       Mono %         6.0       MPV         11.5       nRBC         7       Platelet Estimate         Appears normal       Platelet Count         154       Poly         Occasional       Potassium         4.6       Product Code   [id number]     [id number]            [id number]                [id number]             PROTEIN TOTAL         7.5       PT         21.6        Acceptable     Yes           RBC         2.35       RDW         21.5       Sodium         134       Specimen Outdate   11/01/2024 23:59             Target Cells         Occasional       UNIT NUMBER   [id number]     [id number]            [id number]                [id number]             WBC         22.82                              Significant Imaging: I have reviewed all pertinent imaging results/findings within the past 24 hours.  Assessment/Plan:     * Acute  blood loss anemia  Anemia is likely due to acute blood loss which was from suspected GI bleed . Patient is on DAPT (aspirin and plavix ) for h/o CVA and FOBT+ at [facility]. She denies hematochezia, melena or bleeding from other sites. Hgb dropped to 6.4 from recent baseline 10 in July. Most recent hemoglobin and hematocrit are listed below.  Recent Labs     10/29/24  1350 10/29/24  1805   HGB 6.4* 6.9*   HCT 19.4* 20.6*     Plan  - Monitor serial CBC: Every 8 hours  - Transfuse PRBC if patient becomes hemodynamically unstable, symptomatic or H/H drops below 7/21.  - Patient has received 2 units of PRBCs on 10/29/24  - Patient's anemia is currently stable  - will start on GI bleed pathway including protonix 40 mg IV bid and consult GI for EGD     Coagulopathy  -INR elevated to 2.1 and not on anticoagulation at home   -likely due to liver disease   -received 2 units FFP transfusion at outside hospital   -goal INR < 2      Hyperbilirubinemia  -presented with painless jaundice   -abnormal LFTs with bilirubin 11.7, , AST 81 (normal LFT in July 2024)  -lipase normal 57  -concerning for DELA CRUZ cirrhosis with hepatic synthetic dysfunction given elevated bilirubin, elevated INR and low albumin 2  -CT abdomen showed anasarca with hepatomegaly, normal spleen   -check ultrasound liver with doppler   -check direct bilirubin, hepatitis panel, HIV, CMV, EBV, acetaminophen level, ethanol level, AFP, PETH     MELD 3.0: 38 at 10/29/2024  1:50 PM  MELD-Na: 37 at 10/29/2024  1:50 PM  Calculated from:  Serum Creatinine: On dialysis. Using the maximum value.  Serum Sodium: 134 mmol/L at 10/29/2024  1:50 PM  Total Bilirubin: 11.7 mg/dL at 10/29/2024  1:50 PM  Serum Albumin: 2 g/dL at 10/29/2024  1:50 PM  INR(ratio): 2.1 at 10/29/2024  1:50 PM  Age at listing (hypothetical): 44 years  Sex: Female at 10/29/2024  1:50 PM   -monitor MELD closely with daily labs   -consider hepatology consult       Painless jaundice  -seen above  hyperbilirubinemia       ESRD (end stage renal disease) on dialysis  Creatine stable for now. BMP reviewed- noted Estimated Creatinine Clearance: 17.1 mL/min (A) (based on SCr of 4.9 mg/dL (H)). according to latest data. Based on current GFR, CKD stage is end stage.  Monitor UOP and serial BMP and adjust therapy as needed. Renally dose meds. Avoid nephrotoxic medications and procedures.  -on HD (MWF) via left forearm AVF  -last HD on 10/28  -will consult nephrology for HD     Leukocytosis  -elevated WBC 22  -likely reactive in setting of acute anemia vs infectious etiology   -afebrile w/o overt signs of infection   -blood cultures pending   -will check procalcitonin   -received empiric dose of zosyn outside ED and will start on rocephin 1 gm daily for SBP PPx in setting of liver disease       Essential hypertension  Patients blood pressure range in the last 24 hours was: BP  Min: 89/43  Max: 130/58.The patient's inpatient anti-hypertensive regimen is listed below:  Current Antihypertensives  amLODIPine tablet 10 mg, Daily, Oral  metoprolol succinate (TOPROL-XL) 24 hr tablet 50 mg, Daily, Oral  NIFEdipine 24 hr tablet 60 mg, Daily, Oral    Plan  - BP is controlled, no changes needed to their regimen  - goal BP < 130/80    ACP (advance care planning)  Advance Care Planning    Date: 10/30/2024    [facility]  I engaged the patient in a voluntary conversation about advance care planning and we specifically addressed what the goals of care would be moving forward, in light of the patient's change in clinical status, specifically if her heart or breathing stops.  We did specifically address the patient's likely prognosis, which is fair .  We explored the patient's values and preferences for future care.  The patient endorses that what is most important right now is to focus on remaining as independent as possible, symptom/pain control, and extending life as long as possible, even it it means sacrificing quality    Accordingly, we  have decided that the best plan to meet the patient's goals includes continuing with treatment and remain full code.     A total of 10 min was spent on advance care planning, goals of care discussion, emotional support, formulating and communicating prognosis and exploring burden/benefit of various approaches of treatment. This discussion occurred on a fully voluntary basis with the verbal consent of the patient and/or family.            Impaired mobility  -due to BLE lymphedema and h/o CVA   -boyfriend at home helps with mobility and ADLs       Sacral decubitus ulcer, stage II  -chronic stage II sacral decubitus due to immobility   -turn frequently and local wound care per nursing   -consider wound care consult       Type 2 diabetes mellitus, with long-term current use of insulin  -uncontrolled with last A1C 8.7  -takes basal (lantus 70 units) prandial insulin at home   -glucose today 83  -will start on lantus 20 units HS and low SSI while NPO  -adjust insulin based on CBG and oral intake           GERD (gastroesophageal reflux disease)  -on IV PPI for suspected GIB       Hyperlipidemia associated with type 2 diabetes mellitus  -holding home statin in setting of liver disease         VTE Risk Mitigation (From admission, onward)           Ordered     IP VTE HIGH RISK PATIENT  Once         10/30/24 0051     Place sequential compression device  Until discontinued         10/30/24 0051                                    Coleman Bonilla DO  Department of Hospital Medicine  [facility] - [facility] ([place])

## 2024-10-30 NOTE — ASSESSMENT & PLAN NOTE
-chronic stage II sacral decubitus due to immobility   -turn frequently and local wound care per nursing   -consider wound care consult

## 2024-10-30 NOTE — CONSULTS
[facility] ([place] [id number])  Gastroenterology  Consult Note    Patient Name: Pilar Fernandez  MRN: [id number]  Admission Date: 10/29/2024  Hospital Length of Stay: 1 days  Code Status: Full Code   Attending Provider: Stephany Ordaz MD   Consulting Provider: Miley Heller MD  Primary Care Physician: Lindsey Singletary FNP  Principal Problem:Acute blood loss anemia    Inpatient consult to Gastroenterology  Consult performed by: Miley Heller MD  Consult ordered by: Coleman Bonilla DO        Subjective:     HPI:  Pilar Fernandez is a 44 y.o. female with history of stroke (MRI July 2024 on DAPT), breast mass, gastritis, HTN, uncontrolled diabetes (A1C 8.7), ESRD on HD (MWF), anemia,  gout, lymphedema BLE (L > R), impaired mobility, stage II sacral decubitus,  chronic hypoxic respiratory failure (on 3 L nasal cannula at home) who presents as a transfer from [facility] ED for management of painless jaundice and acute anemia.      Patient presented to [facility] Emergency Department on October 29 with jaundice which family noticed yesterday associated with pruritus.  She reported no history of liver disease. She was recently hospitalized last week for treatment of leg cellulitis, no mention of abnormal liver function or jaundice at that time. She reported no hematemesis/coffee-ground emesis, melena or hematochezia. Stool was positive for occult blood. She had leukocytosis and elevated bilirubin.  INR was elevated at 2.1, and hemoglobin was 6.4 (recent baseline around 10). She received 80mg IV protonix, 2u FFP, 2u pRBC in the ED prior to transfer. CT A/P was noted to be a limited study. Mild hepatomegaly. Status post cholecystectomy. Splenomegaly. Pancreas, adrenal glands, and kidneys were unremarkable. Stomach and bowel were unremarkable. There was no free air and no significant free fluid. Left pleural effusion similar to prior study. Pericardial effusion that was present on earlier study. Marked  soft tissue edema.     GI consulted for GIB. No reported episodes of melena or hematochezia. Patient denies blood thinner use, however, per chart review is taking aspirin and plavix. Denies NSAID use. On PPI at home. Last colonoscopy 10/23/17 was unremarkable. EGD in  with findings of erosive gastritis.     Past Medical History:   Diagnosis Date    Anemia     Anemia     Breast mass     Chronic constipation     CKD (chronic kidney disease)     Diabetes mellitus     Dialysis patient     MON, TUE, THURS, SAT.    Embolic stroke involving right middle cerebral artery 2024    Encounter for blood transfusion     Erosive gastritis 2016    GERD (gastroesophageal reflux disease)     Gout     High cholesterol     Hypertension     Knee pain     Thyroid disease     Unspecified cataract 2022       Past Surgical History:   Procedure Laterality Date    AV FISTULA PLACEMENT Left 2019    Procedure: creation av fistula;  Surgeon: Buck Fernandez MD;  Location: [facility]ending sale to [facility];  Service: Cardiovascular;  Laterality: Left;  Left Radialcephalic: PACU     SECTION  , , ,     CHOLECYSTECTOMY      COLONOSCOPY N/A 10/23/2017    Procedure: COLONOSCOPY;  Surgeon: Bri Harry MD;  Location: [facility];  Service: Endoscopy;  Laterality: N/A;    EXCISIONAL BIOPSY Left 2022    Procedure: EXCISIONAL BIOPSY;  Surgeon: Mick Juarez MD;  Location: [facility]ending [facility] to [facility];  Service: General;  Laterality: Left;    TUBAL LIGATION      UPPER GASTROINTESTINAL ENDOSCOPY  2016    Erosive Gastritis-Dr Bailey       Review of patient's allergies indicates:   Allergen Reactions    Tramadol Itching and Nausea And Vomiting     Family History       Problem Relation (Age of Onset)    Breast cancer Maternal Aunt    Diabetes Father    Ovarian cancer Maternal Aunt    Thyroid disease Mother          Tobacco Use    Smoking status: Never     Passive exposure: Never    Smokeless tobacco: Never   Substance and Sexual Activity     Alcohol use: No     Alcohol/week: 0.0 standard drinks of alcohol    Drug use: No    Sexual activity: Yes     Partners: Male     Birth control/protection: None, Surgical     Review of Systems  Objective:     Vital Signs (Most Recent):  Temp: 98.2 °F (36.8 °C) (10/30/24 0400)  Pulse: 70 (10/30/24 0411)  Resp: 14 (10/30/24 0400)  BP: (!) 105/55 (10/30/24 0400)  SpO2: (!) 92 % (10/30/24 0400) Vital Signs (24h Range):  Temp:  [97.3 °F (36.3 °C)-99.5 °F (37.5 °C)] 98.2 °F (36.8 °C)  Pulse:  [70-85] 70  Resp:  [8-20] 14  SpO2:  [92 %-100 %] 92 %  BP: ()/(43-87) 105/55     Weight: 130 kg (286 lb 9.6 oz) (10/30/24 0514)  Body mass index is 46.26 kg/m².    No intake or output data in the 24 hours ending 10/30/24 0927    Lines/Drains/Airways       Peripheral Intravenous Line  Duration                  Hemodialysis AV Fistula 08/05/19 Left forearm 1913 days         Peripheral IV - Single Lumen 10/29/24 2200 20 G Anterior;Proximal;Right Forearm <1 day         Peripheral IV - Single Lumen 10/29/24 2200 20 G Anterior;Right Forearm <1 day         Peripheral IV - Single Lumen 10/29/24 2200 20 G Posterior;Right Hand <1 day                     Physical Exam  Nursing note reviewed.   Constitutional:       General: She is not in acute distress.     Appearance: She is well-developed. She is obese. She is ill-appearing. She is not diaphoretic.   HENT:      Head: Normocephalic and atraumatic.      Right Ear: External ear normal.      Left Ear: External ear normal.      Nose: Nose normal.      Mouth/Throat:      Mouth: Mucous membranes are dry.      Pharynx: No oropharyngeal exudate.   Eyes:      General: Scleral icterus present.      Pupils: Pupils are equal, round, and reactive to light.   Cardiovascular:      Rate and Rhythm: Normal rate and regular rhythm.   Pulmonary:      Effort: Pulmonary effort is normal. No respiratory distress.   Abdominal:      General: There is distension.      Palpations: Abdomen is soft.      Tenderness:  There is no abdominal tenderness.   Musculoskeletal:         General: No tenderness.      Right lower leg: Edema present.      Left lower leg: Edema present.      Comments: Chronic lymphedema of BLE    Skin:     General: Skin is warm and dry.   Neurological:      Mental Status: She is alert and oriented to person, place, and time.          Significant Labs:  All pertinent lab results from the last 24 hours have been reviewed.    Significant Imaging:  Imaging results within the past 24 hours have been reviewed.  Assessment/Plan:     Oncology  * Acute blood loss anemia  Patient presented to [facility] 10/29 with jaundice. Was found to have a hemoglobin of 6.4 (recent baseline around 10). She received 80mg IV protonix, 2u FFP, 2u pRBC in the ED prior to transfer. Stool was positive for occult blood. No overt signs of bleeding. No reported episodes of melena, hematochezia, hemoptysis. Patient denies blood thinner use, however, per chart review is taking aspirin and plavix. Denies NSAID use. On PPI at home. Last colonoscopy 10/23/17 was unremarkable. EGD in 2016 with findings of erosive gastritis.     - Will defer scope for now as patient without overt sign of bleeding. Will eventually need repeat EGD/colonoscopy, but this can likely be done in outpatient setting  - monitor stool for melena/hematochezia  - Trend Hgb and transfuse for goal Hgb > 7, unless otherwise indicated  - continue home pantoprazole 40 mg BID  - Please notify GI team if there is significant change in patient's clinical status           Miley Heller MD  Gastroenterology  [facility] ([place]-[facility])

## 2024-10-30 NOTE — ASSESSMENT & PLAN NOTE
-uncontrolled with last A1C 8.7  -takes basal (lantus 70 units) prandial insulin at home   -glucose today 83  -will start on lantus 20 units HS and low SSI while NPO  -adjust insulin based on CBG and oral intake

## 2024-10-30 NOTE — ASSESSMENT & PLAN NOTE
-INR elevated to 2.1 and not on anticoagulation at home   -likely due to liver disease   -received 2 units FFP transfusion at outside hospital   -goal INR < 2

## 2024-10-30 NOTE — ASSESSMENT & PLAN NOTE
Anemia is likely due to acute blood loss which was from suspected GI bleed . Patient is on DAPT (aspirin and plavix ) for h/o CVA and FOBT+ at [facility]. She denies hematochezia, melena or bleeding from other sites. Hgb dropped to 6.4 from recent baseline 10 in July. Most recent hemoglobin and hematocrit are listed below.  Recent Labs     10/29/24  1350 10/29/24  1805   HGB 6.4* 6.9*   HCT 19.4* 20.6*     Plan  - Monitor serial CBC: Every 8 hours  - Transfuse PRBC if patient becomes hemodynamically unstable, symptomatic or H/H drops below 7/21.  - Patient has received 2 units of PRBCs on 10/29/24  - Patient's anemia is currently stable  - will start on GI bleed pathway including protonix 40 mg IV bid and consult GI for EGD

## 2024-10-30 NOTE — ASSESSMENT & PLAN NOTE
-elevated WBC 22  -likely reactive in setting of acute anemia vs infectious etiology   -afebrile w/o overt signs of infection   -blood cultures pending   -will check procalcitonin   -received empiric dose of zosyn outside ED and will start on rocephin 1 gm daily for SBP PPx in setting of liver disease

## 2024-10-30 NOTE — CARE UPDATE
"RAPID RESPONSE NURSE CHART REVIEW        Chart Reviewed: 10/30/2024, 12:19 PM    MRN: [id number]  Bed: 33364/43377 A    Dx: Acute anemia    Pilar Fernandez has a past medical history of Anemia, Anemia, Breast mass, Chronic constipation, CKD (chronic kidney disease), Diabetes mellitus, Dialysis patient, Embolic stroke involving right middle cerebral artery, Encounter for blood transfusion, Erosive gastritis, GERD (gastroesophageal reflux disease), Gout, High cholesterol, Hypertension, Knee pain, Thyroid disease, and Unspecified cataract.    Last VS: BP (!) 105/55   Pulse 74   Temp 98.2 °F (36.8 °C)   Resp 14   Ht 5' 6" (1.676 m)   Wt 130 kg (286 lb 9.6 oz)   LMP  (LMP Unknown)   SpO2 (!) 92%   Breastfeeding No   BMI 46.26 kg/m²     24H Vital Sign Range:  Temp:  [97.3 °F (36.3 °C)-99.5 °F (37.5 °C)]   Pulse:  [70-85]   Resp:  [8-20]   BP: ()/(43-87)   SpO2:  [92 %-100 %]     Level of Consciousness (AVPU): alert    Recent Labs     10/29/24  1350 10/29/24  1805 10/30/24  0222 10/30/24  1034   WBC 22.82*  --  21.43* 22.74*   HGB 6.4* 6.9* 7.3* 7.1*   HCT 19.4* 20.6* 20.5* 20.1*     --  125* 122*       Recent Labs     10/29/24  1350 10/30/24  0222   * 135*   K 4.6 4.6   CL 99 99   CO2 20* 20*   BUN 45* 51*   CREATININE 4.9* 4.9*   GLU 83 83   PHOS  --  5.0*   MG  --  2.1        No results for input(s): "PH", "PCO2", "PO2", "HCO3", "POCSATURATED", "BE" in the last 72 hours.     OXYGEN:  Flow (L/min) (Oxygen Therapy): 3          MEWS score: 0    Charge RNCyn contacted for am rounds reports pt anemic, received blood transfusion, now requiring O2, please continue to follow. Instructed to call [phone] for further concerns or assistance.    René Noel RN        "

## 2024-10-30 NOTE — SUBJECTIVE & OBJECTIVE
Past Medical History:   Diagnosis Date    Anemia     Anemia     Breast mass     Chronic constipation     CKD (chronic kidney disease)     Diabetes mellitus     Dialysis patient     GRZEGORZ BARRIOS THURS, SAT.    Embolic stroke involving right middle cerebral artery 2024    Encounter for blood transfusion     Erosive gastritis 2016    GERD (gastroesophageal reflux disease)     Gout     High cholesterol     Hypertension     Knee pain     Thyroid disease     Unspecified cataract 2022       Past Surgical History:   Procedure Laterality Date    AV FISTULA PLACEMENT Left 2019    Procedure: creation av fistula;  Surgeon: Buck Fernandez MD;  Location: [facility];  Service: Cardiovascular;  Laterality: Left;  Left Radialcephalic: PACU     SECTION  , , ,     CHOLECYSTECTOMY      COLONOSCOPY N/A 10/23/2017    Procedure: COLONOSCOPY;  Surgeon: Bri Harry MD;  Location: [facility];  Service: Endoscopy;  Laterality: N/A;    EXCISIONAL BIOPSY Left 2022    Procedure: EXCISIONAL BIOPSY;  Surgeon: Mick Juarez MD;  Location: [facility];  Service: General;  Laterality: Left;    TUBAL LIGATION      UPPER GASTROINTESTINAL ENDOSCOPY  2016    Erosive Gastritis-Dr Bailey       Review of patient's allergies indicates:   Allergen Reactions    Tramadol Itching and Nausea And Vomiting     Family History       Problem Relation (Age of Onset)    Breast cancer Maternal Aunt    Diabetes Father    Ovarian cancer Maternal Aunt    Thyroid disease Mother          Tobacco Use    Smoking status: Never     Passive exposure: Never    Smokeless tobacco: Never   Substance and Sexual Activity    Alcohol use: No     Alcohol/week: 0.0 standard drinks of alcohol    Drug use: No    Sexual activity: Yes     Partners: Male     Birth control/protection: None, Surgical     Review of Systems  Objective:     Vital Signs (Most Recent):  Temp: 98.2 °F (36.8 °C) (10/30/24 0400)  Pulse: 70 (10/30/24 0411)  Resp: 14  (10/30/24 0400)  BP: (!) 105/55 (10/30/24 0400)  SpO2: (!) 92 % (10/30/24 0400) Vital Signs (24h Range):  Temp:  [97.3 °F (36.3 °C)-99.5 °F (37.5 °C)] 98.2 °F (36.8 °C)  Pulse:  [70-85] 70  Resp:  [8-20] 14  SpO2:  [92 %-100 %] 92 %  BP: ()/(43-87) 105/55     Weight: 130 kg (286 lb 9.6 oz) (10/30/24 0514)  Body mass index is 46.26 kg/m².    No intake or output data in the 24 hours ending 10/30/24 0927    Lines/Drains/Airways       Peripheral Intravenous Line  Duration                  Hemodialysis AV Fistula 08/05/19 Left forearm 1913 days         Peripheral IV - Single Lumen 10/29/24 2200 20 G Anterior;Proximal;Right Forearm <1 day         Peripheral IV - Single Lumen 10/29/24 2200 20 G Anterior;Right Forearm <1 day         Peripheral IV - Single Lumen 10/29/24 2200 20 G Posterior;Right Hand <1 day                     Physical Exam  Nursing note reviewed.   Constitutional:       General: She is not in acute distress.     Appearance: She is well-developed. She is obese. She is ill-appearing. She is not diaphoretic.   HENT:      Head: Normocephalic and atraumatic.      Right Ear: External ear normal.      Left Ear: External ear normal.      Nose: Nose normal.      Mouth/Throat:      Mouth: Mucous membranes are dry.      Pharynx: No oropharyngeal exudate.   Eyes:      General: Scleral icterus present.      Pupils: Pupils are equal, round, and reactive to light.   Cardiovascular:      Rate and Rhythm: Normal rate and regular rhythm.   Pulmonary:      Effort: Pulmonary effort is normal. No respiratory distress.   Abdominal:      General: There is distension.      Palpations: Abdomen is soft.      Tenderness: There is no abdominal tenderness.   Musculoskeletal:         General: No tenderness.      Right lower leg: Edema present.      Left lower leg: Edema present.      Comments: Chronic lymphedema of BLE    Skin:     General: Skin is warm and dry.   Neurological:      Mental Status: She is alert and oriented to  person, place, and time.          Significant Labs:  All pertinent lab results from the last 24 hours have been reviewed.    Significant Imaging:  Imaging results within the past 24 hours have been reviewed.

## 2024-10-30 NOTE — PLAN OF CARE
Presently in dialysis. Alert and oriented x 4. On 3 liters/nc. Vs stable. Pain med given x 1.       Problem: Adult Inpatient Plan of Care  Goal: Plan of Care Review  Outcome: Progressing  Goal: Patient-Specific Goal (Individualized)  Outcome: Progressing  Goal: Absence of Hospital-Acquired Illness or Injury  Outcome: Progressing  Goal: Optimal Comfort and Wellbeing  Outcome: Progressing  Goal: Readiness for Transition of Care  Outcome: Progressing     Problem: Diabetes Comorbidity  Goal: Blood Glucose Level Within Targeted Range  Outcome: Progressing     Problem: Acute Kidney Injury/Impairment  Goal: Fluid and Electrolyte Balance  Outcome: Progressing  Goal: Improved Oral Intake  Outcome: Progressing

## 2024-10-30 NOTE — PLAN OF CARE
I have reviewed the chart of Pilar Fernandez who is hospitalized for the following:    Active Hospital Problems    Diagnosis    *Acute anemia    Coagulopathy    Hyperbilirubinemia    Painless jaundice    Type 2 diabetes mellitus, with long-term current use of insulin    Leukocytosis    Sacral decubitus ulcer, stage II    Impaired mobility    ACP (advance care planning)    Pleural effusion  -CT chest with small left pleural effusion & diffuse body wall anasarca   -Sat 93% on room air, will monitor closely     Hyponatremia  -Na 135, will monitor with daily labs     ESRD (end stage renal disease) on dialysis    GERD (gastroesophageal reflux disease)    Essential hypertension    Hyperlipidemia associated with type 2 diabetes mellitus        Emilie Lovelace PA-C  [facility]

## 2024-10-30 NOTE — ASSESSMENT & PLAN NOTE
Patients blood pressure range in the last 24 hours was: BP  Min: 89/43  Max: 130/58.The patient's inpatient anti-hypertensive regimen is listed below:  Current Antihypertensives  amLODIPine tablet 10 mg, Daily, Oral  metoprolol succinate (TOPROL-XL) 24 hr tablet 50 mg, Daily, Oral  NIFEdipine 24 hr tablet 60 mg, Daily, Oral    Plan  - BP is controlled, no changes needed to their regimen  - goal BP < 130/80

## 2024-10-30 NOTE — ASSESSMENT & PLAN NOTE
Creatine stable for now. BMP reviewed- noted Estimated Creatinine Clearance: 17.1 mL/min (A) (based on SCr of 4.9 mg/dL (H)). according to latest data. Based on current GFR, CKD stage is end stage.  Monitor UOP and serial BMP and adjust therapy as needed. Renally dose meds. Avoid nephrotoxic medications and procedures.  -on HD (MWF) via left forearm AVF  -last HD on 10/28  -will consult nephrology for HD

## 2024-10-30 NOTE — CONSULTS
"[facility] ([place] [address])  Hematology/Oncology  Consult Note    Patient Name: Pilar Fernandez  MRN: [id number]  Admission Date: 10/29/2024  Hospital Length of Stay: 1 days  Code Status: Full Code   Attending Provider: Stephany Ordaz MD  Consulting Provider: René Becerra DO  Primary Care Physician: Linsdey Singletary FNP  Principal Problem:Acute anemia    Inpatient consult to Hematology/Oncology  Consult performed by: René Becerra DO  Consult ordered by: Stephany Ordaz MD        Subjective:     HPI:  Patient is an 44 year-old female with a PMH of CVA (MRI July 2024 on DAPT), breast mass, gastritis, HTN, uncontrolled diabetes (A1C 8.7), ESRD on HD (MWF), anemia, gout, lymphedema BLE (L > R), impaired mobility, stage II sacral decubitus, chronic hypoxic respiratory failure who presents via transfer from OSH for a clinical presentation of new onset jaundice and anemia. She presented of OSH ED on 10/29 after family noticed new jaundice and patient reporting jaundice. Patient was recently admitted for LE cellulitis and discharged on Clindamycin. Labs in the ED significant for an H/H of 6.4/19.4, total bilirubin of 11.7, INR of 2.1, & WBC of 22.8K. She was given 2 units of FFP and 2 of PRBC's. CT A/P obtained showing "Massive subcutaneous edema (anasarca) with persistent cardiomegaly, pericardial effusion smaller than prior study, and small left pleural effusion." Abdominal US showing "Marked hepatosplenomegaly." Patient transferred to [facility] for higher level of care. Bilirubin at time of this consult of 10.7, AST/ALT of 73/30, haptoglobin of 115, Retic of 6.6%, and HARLEEN positive. Hematology consulted for acute anemia and concern for hemolysis.                Oncology Treatment Plan:   [No matching plan found]    Medications:  Continuous Infusions:  Scheduled Meds:   allopurinoL  100 mg Oral Daily    cefTRIAXone (Rocephin) IV (PEDS and ADULTS)  1 g Intravenous Q24H    epoetin jitendra-ebpx (RETACRIT) " injection  50 Units/kg Intravenous Every Mon, Wed, Fri    insulin glargine U-100 (Lantus)  20 Units Subcutaneous QHS    metoprolol succinate  50 mg Oral Daily    mupirocin   Nasal BID    NIFEdipine  60 mg Oral Daily    pantoprazole  40 mg Intravenous BID    polyethylene glycol  17 g Oral Daily    senna-docusate 8.6-50 mg  1 tablet Oral Daily     PRN Meds:  Current Facility-Administered Medications:     acetaminophen, 650 mg, Oral, Q4H PRN    albuterol-ipratropium, 3 mL, Nebulization, Q4H PRN    dextrose 10%, 12.5 g, Intravenous, PRN    dextrose 10%, 25 g, Intravenous, PRN    diphenhydrAMINE, 50 mg, Oral, Q6H PRN    glucagon (human recombinant), 1 mg, Intramuscular, PRN    glucose, 16 g, Oral, PRN    glucose, 24 g, Oral, PRN    insulin aspart U-100, 0-5 Units, Subcutaneous, QID (AC + HS) PRN    lactulose, 10 g, Oral, Q6H PRN    naloxone, 0.02 mg, Intravenous, PRN    ondansetron, 4 mg, Intravenous, Q8H PRN    oxyCODONE, 5 mg, Oral, Q6H PRN    simethicone, 1 tablet, Oral, QID PRN    sodium chloride 0.9%, 10 mL, Intravenous, Q12H PRN    traZODone, 100 mg, Oral, Nightly PRN     Review of patient's allergies indicates:   Allergen Reactions    Tramadol Itching and Nausea And Vomiting        Past Medical History:   Diagnosis Date    Anemia     Anemia     Breast mass     Chronic constipation     CKD (chronic kidney disease)     Diabetes mellitus     Dialysis patient     MON, TUE, THURS, SAT.    Embolic stroke involving right middle cerebral artery 7/13/2024    Encounter for blood transfusion     Erosive gastritis 03/2016    GERD (gastroesophageal reflux disease)     Gout     High cholesterol     Hypertension     Knee pain     Thyroid disease     Unspecified cataract 09/2022     Past Surgical History:   Procedure Laterality Date    AV FISTULA PLACEMENT Left 8/5/2019    Procedure: creation av fistula;  Surgeon: Buck Fernandez MD;  Location: [facility];  Service: Cardiovascular;  Laterality: Left;  Left Radialcephalic: PACU      SECTION  , , ,     CHOLECYSTECTOMY      COLONOSCOPY N/A 10/23/2017    Procedure: COLONOSCOPY;  Surgeon: Bri Harry MD;  Location: [facility];  Service: Endoscopy;  Laterality: N/A;    EXCISIONAL BIOPSY Left 2022    Procedure: EXCISIONAL BIOPSY;  Surgeon: Mick Juarez MD;  Location: [facility];  Service: General;  Laterality: Left;    TUBAL LIGATION      UPPER GASTROINTESTINAL ENDOSCOPY  2016    Erosive Gastritis-Dr Bailey     Family History       Problem Relation (Age of Onset)    Breast cancer Maternal Aunt    Diabetes Father    Ovarian cancer Maternal Aunt    Thyroid disease Mother          Tobacco Use    Smoking status: Never     Passive exposure: Never    Smokeless tobacco: Never   Substance and Sexual Activity    Alcohol use: No     Alcohol/week: 0.0 standard drinks of alcohol    Drug use: No    Sexual activity: Yes     Partners: Male     Birth control/protection: None, Surgical       Review of Systems   Constitutional:  Positive for appetite change and fatigue.   Skin:         Itching & Jaundice.      Objective:     Vital Signs (Most Recent):  Temp: 98 °F (36.7 °C) (10/30/24 1245)  Pulse: 73 (10/30/24 1245)  Resp: 14 (10/30/24 1200)  BP: (!) 104/55 (10/30/24 1245)  SpO2: (!) 94 % (10/30/24 1245) Vital Signs (24h Range):  Temp:  [97.3 °F (36.3 °C)-99.5 °F (37.5 °C)] 98 °F (36.7 °C)  Pulse:  [70-85] 73  Resp:  [8-20] 14  SpO2:  [92 %-100 %] 94 %  BP: ()/(43-87) 104/55     Weight: 130 kg (286 lb 9.6 oz)  Body mass index is 46.26 kg/m².  Body surface area is 2.46 meters squared.    No intake or output data in the 24 hours ending 10/30/24 1431     Physical Exam  Vitals and nursing note reviewed.   Constitutional:       Appearance: She is ill-appearing.      Comments: Jaundiced.    Cardiovascular:      Rate and Rhythm: Normal rate and regular rhythm.   Pulmonary:      Effort: Pulmonary effort is normal.   Abdominal:      General: Abdomen is flat.  "  Musculoskeletal:      Comments: Severe LE lymphedema.    Skin:     General: Skin is warm and dry.   Neurological:      General: No focal deficit present.      Mental Status: She is oriented to person, place, and time.   Psychiatric:         Mood and Affect: Mood normal.         Behavior: Behavior normal.          Significant Labs:   All pertinent labs from the last 24 hours have been reviewed.    Diagnostic Results:  I have reviewed all pertinent imaging results/findings within the past 24 hours.  Assessment/Plan:     * Acute anemia  -Presents via transfer from OSH for a clinical presentation of new onset jaundice and anemia. She presented of OSH ED on 10/29 after family noticed new jaundice and patient reporting jaundice. Patient was recently admitted for LE cellulitis and discharged on Clindamycin.   -Labs in the ED significant for an H/H of 6.4/19.4, total bilirubin of 11.7, INR of 2.1, & WBC of 22.8K.   -She was given 2 units of FFP and 2 of PRBC's.   -CT A/P obtained showing "Massive subcutaneous edema (anasarca) with persistent cardiomegaly, pericardial effusion smaller than prior study, and small left pleural effusion." Abdominal US showing "Marked hepatosplenomegaly."   -Patient transferred to [facility] for higher level of care. Bilirubin at time of this consult of 10.7, AST/ALT of 73/30, haptoglobin of 115, Retic of 6.6%, and HARLEEN positive. H  -Hematology consulted for acute anemia and concern for hemolysis.   -CT CAP concerning "Numerous enlarged lymph node conglomerates and/or soft tissue masses about the left axillary soft tissues, left greater than right inguinal regions, left supraclavicular soft tissues, and retroperitoneum as above. Findings remain nonspecific, however remain concerning for neoplastic or lymphoproliferative process. Consider further evaluation with tissue sampling, noting left axillary soft tissue lesion proximity to the skin surface (for example series 2, image 39). "   Recommendations:  -Overall concern for acute liver injury given acuity of  direct hyperbilirubinemia  -R factor of 0.3, cholestatic picture  -Clindamycin dose have literature of DILI, timing consistent with this given recent admission  -Follow-up GI recs, history of gastropathy  -Follow-up Fibrinogen, Peripheral smear, and factor VIII for further evaluation  -Detectable haptoglobin makes hemolytic process less likely at this time and no significant schistocyte burden on our review of smear  -Given history of ADH on 2022 excisional biopsy and lymphadenopathy noted on CT concern for underlying malignancy as well, agree with dedicated Breast US with Axilla  -Based on above may warrant excisional biopsy           Thank you for your consult. I will follow-up with patient. Please contact us if you have any additional questions.    René Becerra, DO  Hematology/Oncology  [facility] ([place])

## 2024-10-31 PROBLEM — Z71.89 ACP (ADVANCE CARE PLANNING): Status: RESOLVED | Noted: 2024-10-30 | Resolved: 2024-10-31

## 2024-10-31 LAB
ABO + RH BLD: NORMAL
ACANTHOCYTES BLD QL SMEAR: ABNORMAL
AFP SERPL-MCNC: <2 NG/ML (ref 0–8.4)
ALBUMIN SERPL BCP-MCNC: 1.9 G/DL (ref 3.5–5.2)
ALP SERPL-CCNC: 349 U/L (ref 40–150)
ALT SERPL W/O P-5'-P-CCNC: 34 U/L (ref 10–44)
AMMONIA PLAS-SCNC: 111 UMOL/L (ref 10–50)
ANA SER QL IF: NORMAL
ANION GAP SERPL CALC-SCNC: 17 MMOL/L (ref 8–16)
ANISOCYTOSIS BLD QL SMEAR: ABNORMAL
ANISOCYTOSIS BLD QL SMEAR: ABNORMAL
ANISOCYTOSIS BLD QL SMEAR: SLIGHT
AST SERPL-CCNC: 91 U/L (ref 10–40)
AUER BODIES BLD QL SMEAR: ABNORMAL
BASO STIPL BLD QL SMEAR: ABNORMAL
BASOPHILS # BLD AUTO: ABNORMAL K/UL (ref 0–0.2)
BASOPHILS NFR BLD: 0 % (ref 0–1.9)
BASOPHILS NFR BLD: 1 % (ref 0–1.9)
BASOPHILS NFR BLD: ABNORMAL % (ref 0–1.9)
BILIRUB SERPL-MCNC: 11.5 MG/DL (ref 0.1–1)
BLASTS NFR BLD MANUAL: ABNORMAL %
BLD GP AB SCN CELLS X3 SERPL QL: NORMAL
BLD PROD TYP BPU: NORMAL
BLOOD UNIT EXPIRATION DATE: NORMAL
BLOOD UNIT TYPE CODE: 5100
BLOOD UNIT TYPE: NORMAL
BUN SERPL-MCNC: 47 MG/DL (ref 6–20)
BURR CELLS BLD QL SMEAR: ABNORMAL
CABOT RINGS BLD QL SMEAR: ABNORMAL
CALCIUM SERPL-MCNC: 8.6 MG/DL (ref 8.7–10.5)
CERULOPLASMIN SERPL-MCNC: 31 MG/DL (ref 15–45)
CHLORIDE SERPL-SCNC: 98 MMOL/L (ref 95–110)
CO2 SERPL-SCNC: 19 MMOL/L (ref 23–29)
CODING SYSTEM: NORMAL
CREAT SERPL-MCNC: 4.4 MG/DL (ref 0.5–1.4)
CROSSMATCH INTERPRETATION: NORMAL
CRP SERPL-MCNC: 258.1 MG/L (ref 0–8.2)
DACRYOCYTES BLD QL SMEAR: ABNORMAL
DIFFERENTIAL METHOD BLD: ABNORMAL
DISPENSE STATUS: NORMAL
DOHLE BOD BLD QL SMEAR: ABNORMAL
EOSINOPHIL # BLD AUTO: ABNORMAL K/UL (ref 0–0.5)
EOSINOPHIL NFR BLD: 1 % (ref 0–8)
EOSINOPHIL NFR BLD: 1 % (ref 0–8)
EOSINOPHIL NFR BLD: ABNORMAL % (ref 0–8)
ERYTHROCYTE [DISTWIDTH] IN BLOOD BY AUTOMATED COUNT: 21.2 % (ref 11.5–14.5)
ERYTHROCYTE [DISTWIDTH] IN BLOOD BY AUTOMATED COUNT: 21.4 % (ref 11.5–14.5)
ERYTHROCYTE [DISTWIDTH] IN BLOOD BY AUTOMATED COUNT: ABNORMAL % (ref 11.5–14.5)
ERYTHROCYTE [SEDIMENTATION RATE] IN BLOOD BY PHOTOMETRIC METHOD: 35 MM/HR (ref 0–36)
EST. GFR  (NO RACE VARIABLE): 12 ML/MIN/1.73 M^2
FACT VIII ACT/NOR PPP: 452 % (ref 60–170)
FERRITIN SERPL-MCNC: 4082 NG/ML (ref 20–300)
FIBRINOGEN PPP-MCNC: 249 MG/DL (ref 182–400)
GIANT PLATELETS BLD QL SMEAR: ABNORMAL
GLUCOSE SERPL-MCNC: 56 MG/DL (ref 70–110)
HCT VFR BLD AUTO: 20.3 % (ref 37–48.5)
HCT VFR BLD AUTO: 20.4 % (ref 37–48.5)
HCT VFR BLD AUTO: ABNORMAL % (ref 37–48.5)
HEINZ BOD BLD QL SMEAR: ABNORMAL
HGB BLD-MCNC: 6.7 G/DL (ref 12–16)
HGB BLD-MCNC: 6.9 G/DL (ref 12–16)
HGB BLD-MCNC: ABNORMAL G/DL (ref 12–16)
HGB C CRY RBC QL MICRO: ABNORMAL
HOWELL-JOLLY BOD BLD QL SMEAR: ABNORMAL
HYPOCHROMIA BLD QL SMEAR: ABNORMAL
HYPOCHROMIA BLD QL SMEAR: ABNORMAL
IMM GRANULOCYTES # BLD AUTO: ABNORMAL K/UL (ref 0–0.04)
IMM GRANULOCYTES NFR BLD AUTO: ABNORMAL % (ref 0–0.5)
INR PPP: 1.9 (ref 0.8–1.2)
IRON SERPL-MCNC: 117 UG/DL (ref 30–160)
LYMPHOCYTES # BLD AUTO: ABNORMAL K/UL (ref 1–4.8)
LYMPHOCYTES NFR BLD: 7 % (ref 18–48)
LYMPHOCYTES NFR BLD: 9 % (ref 18–48)
LYMPHOCYTES NFR BLD: ABNORMAL % (ref 18–48)
MAGNESIUM SERPL-MCNC: 2.1 MG/DL (ref 1.6–2.6)
MCH RBC QN AUTO: 28.4 PG (ref 27–31)
MCH RBC QN AUTO: 29.2 PG (ref 27–31)
MCH RBC QN AUTO: ABNORMAL PG (ref 27–31)
MCHC RBC AUTO-ENTMCNC: 32.8 G/DL (ref 32–36)
MCHC RBC AUTO-ENTMCNC: 34 G/DL (ref 32–36)
MCHC RBC AUTO-ENTMCNC: ABNORMAL G/DL (ref 32–36)
MCV RBC AUTO: 86 FL (ref 82–98)
MCV RBC AUTO: 86 FL (ref 82–98)
MCV RBC AUTO: ABNORMAL FL (ref 82–98)
MEGAKARYOCYTIC FRAGMENTS: ABNORMAL
METAMYELOCYTES NFR BLD MANUAL: 1 %
METAMYELOCYTES NFR BLD MANUAL: 2 %
METAMYELOCYTES NFR BLD MANUAL: ABNORMAL %
MITOCHONDRIA AB TITR SER IF: NORMAL {TITER}
MONOCYTES # BLD AUTO: ABNORMAL K/UL (ref 0.3–1)
MONOCYTES NFR BLD: 10 % (ref 4–15)
MONOCYTES NFR BLD: 5 % (ref 4–15)
MONOCYTES NFR BLD: ABNORMAL % (ref 4–15)
MYELOCYTES NFR BLD MANUAL: 2 %
MYELOCYTES NFR BLD MANUAL: 2 %
MYELOCYTES NFR BLD MANUAL: ABNORMAL %
NEUTROPHILS # BLD AUTO: ABNORMAL K/UL (ref 1.8–7.7)
NEUTROPHILS NFR BLD: 76 % (ref 38–73)
NEUTROPHILS NFR BLD: 79 % (ref 38–73)
NEUTROPHILS NFR BLD: ABNORMAL % (ref 38–73)
NEUTS BAND NFR BLD MANUAL: 1 %
NEUTS BAND NFR BLD MANUAL: 3 %
NEUTS BAND NFR BLD MANUAL: ABNORMAL %
NRBC BLD-RTO: 14 /100 WBC
NRBC BLD-RTO: 15 /100 WBC
NRBC BLD-RTO: ABNORMAL /100 WBC
NUM UNITS TRANS PACKED RBC: NORMAL
OVALOCYTES BLD QL SMEAR: ABNORMAL
PAPPENHEIMER BOD BLD QL SMEAR: ABNORMAL
PAPPENHEIMER BOD BLD QL SMEAR: PRESENT
PATH REV BLD -IMP: NORMAL
PHOSPHATE SERPL-MCNC: 4.7 MG/DL (ref 2.7–4.5)
PLASMODIUM BLD QL SMEAR: ABNORMAL
PLATELET # BLD AUTO: 108 K/UL (ref 150–450)
PLATELET # BLD AUTO: 98 K/UL (ref 150–450)
PLATELET # BLD AUTO: ABNORMAL K/UL (ref 150–450)
PLATELET BLD QL SMEAR: ABNORMAL
PMV BLD AUTO: 12.4 FL (ref 9.2–12.9)
PMV BLD AUTO: 12.4 FL (ref 9.2–12.9)
PMV BLD AUTO: ABNORMAL FL (ref 9.2–12.9)
POCT GLUCOSE: 124 MG/DL (ref 70–110)
POCT GLUCOSE: 241 MG/DL (ref 70–110)
POCT GLUCOSE: 39 MG/DL (ref 70–110)
POCT GLUCOSE: 41 MG/DL (ref 70–110)
POCT GLUCOSE: 52 MG/DL (ref 70–110)
POCT GLUCOSE: 76 MG/DL (ref 70–110)
POCT GLUCOSE: 84 MG/DL (ref 70–110)
POIKILOCYTOSIS BLD QL SMEAR: ABNORMAL
POIKILOCYTOSIS BLD QL SMEAR: SLIGHT
POIKILOCYTOSIS BLD QL SMEAR: SLIGHT
POLYCHROMASIA BLD QL SMEAR: ABNORMAL
POLYCHROMASIA BLD QL SMEAR: ABNORMAL
POTASSIUM SERPL-SCNC: 4.4 MMOL/L (ref 3.5–5.1)
PROMYELOCYTES NFR BLD MANUAL: ABNORMAL %
PROT SERPL-MCNC: 7.4 G/DL (ref 6–8.4)
PROTHROMBIN TIME: 19.7 SEC (ref 9–12.5)
RBC # BLD AUTO: 2.36 M/UL (ref 4–5.4)
RBC # BLD AUTO: 2.36 M/UL (ref 4–5.4)
RBC # BLD AUTO: ABNORMAL M/UL (ref 4–5.4)
RBC AGGLUT BLD QL: ABNORMAL
ROULEAUX BLD QL SMEAR: ABNORMAL
SATURATED IRON: 91 % (ref 20–50)
SCHISTOCYTES BLD QL SMEAR: ABNORMAL
SCHISTOCYTES BLD QL SMEAR: ABNORMAL
SICKLE CELLS BLD QL SMEAR: ABNORMAL
SMOOTH MUSCLE AB TITR SER IF: NORMAL {TITER}
SMUDGE CELLS BLD QL SMEAR: ABNORMAL
SODIUM SERPL-SCNC: 134 MMOL/L (ref 136–145)
SPECIMEN OUTDATE: NORMAL
SPHEROCYTES BLD QL SMEAR: ABNORMAL
STOMATOCYTES BLD QL SMEAR: ABNORMAL
TARGETS BLD QL SMEAR: ABNORMAL
TOTAL IRON BINDING CAPACITY: 129 UG/DL (ref 250–450)
TOXIC GRANULES BLD QL SMEAR: ABNORMAL
TRANSFERRIN SERPL-MCNC: 87 MG/DL (ref 200–375)
WBC # BLD AUTO: 20.03 K/UL (ref 3.9–12.7)
WBC # BLD AUTO: 21.76 K/UL (ref 3.9–12.7)
WBC # BLD AUTO: ABNORMAL K/UL (ref 3.9–12.7)
WBC NRBC COR # BLD: ABNORMAL K/UL (ref 3.9–12.7)
WBC OTHER NFR BLD MANUAL: ABNORMAL %
WBC TOXIC VACUOLES BLD QL SMEAR: ABNORMAL

## 2024-10-31 PROCEDURE — 86381 MITOCHONDRIAL ANTIBODY EACH: CPT | Performed by: STUDENT IN AN ORGANIZED HEALTH CARE EDUCATION/TRAINING PROGRAM

## 2024-10-31 PROCEDURE — 82787 IGG 1 2 3 OR 4 EACH: CPT | Mod: 59 | Performed by: STUDENT IN AN ORGANIZED HEALTH CARE EDUCATION/TRAINING PROGRAM

## 2024-10-31 PROCEDURE — 85652 RBC SED RATE AUTOMATED: CPT | Performed by: HOSPITALIST

## 2024-10-31 PROCEDURE — 86015 ACTIN ANTIBODY EACH: CPT | Performed by: STUDENT IN AN ORGANIZED HEALTH CARE EDUCATION/TRAINING PROGRAM

## 2024-10-31 PROCEDURE — 85610 PROTHROMBIN TIME: CPT | Performed by: STUDENT IN AN ORGANIZED HEALTH CARE EDUCATION/TRAINING PROGRAM

## 2024-10-31 PROCEDURE — 80321 ALCOHOLS BIOMARKERS 1OR 2: CPT | Performed by: STUDENT IN AN ORGANIZED HEALTH CARE EDUCATION/TRAINING PROGRAM

## 2024-10-31 PROCEDURE — 20600001 HC STEP DOWN PRIVATE ROOM

## 2024-10-31 PROCEDURE — 36415 COLL VENOUS BLD VENIPUNCTURE: CPT | Performed by: STUDENT IN AN ORGANIZED HEALTH CARE EDUCATION/TRAINING PROGRAM

## 2024-10-31 PROCEDURE — 82140 ASSAY OF AMMONIA: CPT | Performed by: HOSPITALIST

## 2024-10-31 PROCEDURE — 82728 ASSAY OF FERRITIN: CPT | Performed by: STUDENT IN AN ORGANIZED HEALTH CARE EDUCATION/TRAINING PROGRAM

## 2024-10-31 PROCEDURE — 36415 COLL VENOUS BLD VENIPUNCTURE: CPT | Mod: XB | Performed by: PHYSICIAN ASSISTANT

## 2024-10-31 PROCEDURE — 83735 ASSAY OF MAGNESIUM: CPT | Performed by: STUDENT IN AN ORGANIZED HEALTH CARE EDUCATION/TRAINING PROGRAM

## 2024-10-31 PROCEDURE — 30233N1 TRANSFUSION OF NONAUTOLOGOUS RED BLOOD CELLS INTO PERIPHERAL VEIN, PERCUTANEOUS APPROACH: ICD-10-PCS | Performed by: HOSPITALIST

## 2024-10-31 PROCEDURE — 85007 BL SMEAR W/DIFF WBC COUNT: CPT | Performed by: STUDENT IN AN ORGANIZED HEALTH CARE EDUCATION/TRAINING PROGRAM

## 2024-10-31 PROCEDURE — 85007 BL SMEAR W/DIFF WBC COUNT: CPT | Mod: 91 | Performed by: PHYSICIAN ASSISTANT

## 2024-10-31 PROCEDURE — 25000003 PHARM REV CODE 250: Performed by: HOSPITALIST

## 2024-10-31 PROCEDURE — 85384 FIBRINOGEN ACTIVITY: CPT | Performed by: STUDENT IN AN ORGANIZED HEALTH CARE EDUCATION/TRAINING PROGRAM

## 2024-10-31 PROCEDURE — P9016 RBC LEUKOCYTES REDUCED: HCPCS | Performed by: HOSPITALIST

## 2024-10-31 PROCEDURE — 85027 COMPLETE CBC AUTOMATED: CPT | Performed by: STUDENT IN AN ORGANIZED HEALTH CARE EDUCATION/TRAINING PROGRAM

## 2024-10-31 PROCEDURE — 25000003 PHARM REV CODE 250: Performed by: PHYSICIAN ASSISTANT

## 2024-10-31 PROCEDURE — 63600175 PHARM REV CODE 636 W HCPCS: Performed by: STUDENT IN AN ORGANIZED HEALTH CARE EDUCATION/TRAINING PROGRAM

## 2024-10-31 PROCEDURE — 86900 BLOOD TYPING SEROLOGIC ABO: CPT | Performed by: HOSPITALIST

## 2024-10-31 PROCEDURE — P9047 ALBUMIN (HUMAN), 25%, 50ML: HCPCS | Mod: JZ,JG | Performed by: HOSPITALIST

## 2024-10-31 PROCEDURE — 86140 C-REACTIVE PROTEIN: CPT | Performed by: HOSPITALIST

## 2024-10-31 PROCEDURE — 84466 ASSAY OF TRANSFERRIN: CPT | Performed by: STUDENT IN AN ORGANIZED HEALTH CARE EDUCATION/TRAINING PROGRAM

## 2024-10-31 PROCEDURE — 63600175 PHARM REV CODE 636 W HCPCS: Mod: JZ,JG | Performed by: HOSPITALIST

## 2024-10-31 PROCEDURE — 85027 COMPLETE CBC AUTOMATED: CPT | Mod: 91 | Performed by: PHYSICIAN ASSISTANT

## 2024-10-31 PROCEDURE — 99232 SBSQ HOSP IP/OBS MODERATE 35: CPT | Mod: ,,,

## 2024-10-31 PROCEDURE — 85240 CLOT FACTOR VIII AHG 1 STAGE: CPT | Performed by: STUDENT IN AN ORGANIZED HEALTH CARE EDUCATION/TRAINING PROGRAM

## 2024-10-31 PROCEDURE — 86920 COMPATIBILITY TEST SPIN: CPT | Performed by: HOSPITALIST

## 2024-10-31 PROCEDURE — 82104 ALPHA-1-ANTITRYPSIN PHENO: CPT | Performed by: STUDENT IN AN ORGANIZED HEALTH CARE EDUCATION/TRAINING PROGRAM

## 2024-10-31 PROCEDURE — 25000003 PHARM REV CODE 250: Performed by: STUDENT IN AN ORGANIZED HEALTH CARE EDUCATION/TRAINING PROGRAM

## 2024-10-31 PROCEDURE — 99223 1ST HOSP IP/OBS HIGH 75: CPT | Mod: ,,, | Performed by: PHYSICIAN ASSISTANT

## 2024-10-31 PROCEDURE — 86038 ANTINUCLEAR ANTIBODIES: CPT | Performed by: STUDENT IN AN ORGANIZED HEALTH CARE EDUCATION/TRAINING PROGRAM

## 2024-10-31 PROCEDURE — 82105 ALPHA-FETOPROTEIN SERUM: CPT | Performed by: STUDENT IN AN ORGANIZED HEALTH CARE EDUCATION/TRAINING PROGRAM

## 2024-10-31 PROCEDURE — 80053 COMPREHEN METABOLIC PANEL: CPT | Performed by: STUDENT IN AN ORGANIZED HEALTH CARE EDUCATION/TRAINING PROGRAM

## 2024-10-31 PROCEDURE — 84100 ASSAY OF PHOSPHORUS: CPT | Performed by: STUDENT IN AN ORGANIZED HEALTH CARE EDUCATION/TRAINING PROGRAM

## 2024-10-31 PROCEDURE — 82390 ASSAY OF CERULOPLASMIN: CPT | Performed by: STUDENT IN AN ORGANIZED HEALTH CARE EDUCATION/TRAINING PROGRAM

## 2024-10-31 PROCEDURE — 36415 COLL VENOUS BLD VENIPUNCTURE: CPT | Mod: XB | Performed by: HOSPITALIST

## 2024-10-31 PROCEDURE — 80321 ALCOHOLS BIOMARKERS 1OR 2: CPT | Mod: 91 | Performed by: HOSPITALIST

## 2024-10-31 RX ORDER — DOXYLAMINE SUCCINATE 25 MG
TABLET ORAL 2 TIMES DAILY
Status: DISCONTINUED | OUTPATIENT
Start: 2024-10-31 | End: 2024-11-16 | Stop reason: HOSPADM

## 2024-10-31 RX ORDER — ALBUMIN HUMAN 250 G/1000ML
25 SOLUTION INTRAVENOUS ONCE
Status: COMPLETED | OUTPATIENT
Start: 2024-10-31 | End: 2024-10-31

## 2024-10-31 RX ORDER — MIDODRINE HYDROCHLORIDE 5 MG/1
5 TABLET ORAL 3 TIMES DAILY
Status: DISCONTINUED | OUTPATIENT
Start: 2024-10-31 | End: 2024-11-03

## 2024-10-31 RX ADMIN — MIDODRINE HYDROCHLORIDE 5 MG: 5 TABLET ORAL at 03:10

## 2024-10-31 RX ADMIN — INSULIN ASPART 2 UNITS: 100 INJECTION, SOLUTION INTRAVENOUS; SUBCUTANEOUS at 08:10

## 2024-10-31 RX ADMIN — SENNOSIDES AND DOCUSATE SODIUM 1 TABLET: 50; 8.6 TABLET ORAL at 09:10

## 2024-10-31 RX ADMIN — MIDODRINE HYDROCHLORIDE 5 MG: 5 TABLET ORAL at 09:10

## 2024-10-31 RX ADMIN — ALLOPURINOL 100 MG: 100 TABLET ORAL at 09:10

## 2024-10-31 RX ADMIN — PANTOPRAZOLE SODIUM 40 MG: 40 TABLET, DELAYED RELEASE ORAL at 09:10

## 2024-10-31 RX ADMIN — POLYETHYLENE GLYCOL 3350 17 G: 17 POWDER, FOR SOLUTION ORAL at 09:10

## 2024-10-31 RX ADMIN — DIPHENHYDRAMINE HYDROCHLORIDE 50 MG: 50 CAPSULE ORAL at 12:10

## 2024-10-31 RX ADMIN — MUPIROCIN: 20 OINTMENT TOPICAL at 09:10

## 2024-10-31 RX ADMIN — ACETAMINOPHEN 650 MG: 325 TABLET ORAL at 09:10

## 2024-10-31 RX ADMIN — DIPHENHYDRAMINE HYDROCHLORIDE 50 MG: 50 CAPSULE ORAL at 06:10

## 2024-10-31 RX ADMIN — MICONAZOLE NITRATE: 20 CREAM TOPICAL at 05:10

## 2024-10-31 RX ADMIN — DEXTROSE MONOHYDRATE 125 ML: 100 INJECTION, SOLUTION INTRAVENOUS at 06:10

## 2024-10-31 RX ADMIN — ALBUMIN (HUMAN) 25 G: 5 SOLUTION INTRAVENOUS at 09:10

## 2024-10-31 RX ADMIN — DEXTROSE MONOHYDRATE 250 ML: 100 INJECTION, SOLUTION INTRAVENOUS at 12:10

## 2024-10-31 NOTE — PROGRESS NOTES
[facility] ([address] Medicine  Progress Note    Patient Name: Pilar Fernandez  MRN: [id number]  Patient Class: IP- Inpatient   Admission Date: 10/29/2024  Length of Stay: 2 days  Attending Physician: Shayne Diaz MD  Primary Care Provider: Lindsey Singletary FNP        Subjective:     Principal Problem:Direct hyperbilirubinemia        HPI:  Pilar Fernandez is a 44 year old Black woman with hypertension, hyperlipidemia, diabetes mellitus type 2 (treated with insulin), end stage renal disease on hemodialysis (), heart failure with preserved ejection fraction, chronic hypoxic respiratory failure on supplemental oxygen (3 liters/minute), history of right middle cerebral artery stroke on 2024, peripheral vascular disease, chronic lymphedema, hypothyroidism, anemia with history of blood transfusion, gout, gastroesophageal reflux disease, chronic constipation, sacral decubitus ulcer, history of  sections, history of tubal ligation, history of cholecystectomy. She lives in [place], [place].    She presented to [facility] Emergency Department on 10/29/2024 with gastrointestinal bleeding, symptomatic anemia, jaundice, hyperbilirubinemia. She reported being hospitalized at [facility] recently for leg cellulitis and being told she had hyperbilirubinemia and was advised to follow up with Hepatology at [facility] after being discharged on 10/26/2024. She had several wounds on her sacrum and legs. Stool was positive for occult blood. She had leukocytosis. Hemoglobin was 6.4 g/dL from baseline around 10. INR was elevated at 2.1. she was given 2 units of fresh frozen plasma and 2 units of packed red blood cells. Abdomen CT without contrast showed no obvious etiology of liver abnormalities or leukocytosis. She was transferred to [facility] for further evaluation of her  hyperbilirubinemia. She was admitted to Hospital Medicine Team B.    Overview/Hospital Course:  She was put on ceftriaxone. Hepatology and [facility] were consulted for hyperbilirubinemia. Gastroenterology was consulted for gastrointestinal bleed. Hematology-Oncology was consulted for anemia with hyperbilirubinemia and positive Britt test. CT showed diffuse anasarca, pericardial effusion, enlarged lymph nodes vs soft tissue densities, hepatosplenomegaly. Hepatology recommended liver biopsy. Gastroenterology recommended outpatient endoscopy, after she is medically optimized, due to lack of overt bleeding.Hematology-Oncology recommended breast ultrasound.     Interval History: Seen by Hepatology, GI, Hem-Onc.     Review of Systems   Constitutional:  Negative for chills and fever.   Neurological:  Negative for seizures and syncope.     Objective:     Vital Signs (Most Recent):  Temp: 98.5 °F (36.9 °C) (10/31/24 0722)  Pulse: 82 (10/31/24 0722)  Resp: 18 (10/31/24 0722)  BP: (!) 95/50 (10/31/24 0722)  SpO2: (!) 91 % (10/31/24 0722) Vital Signs (24h Range):  Temp:  [98 °F (36.7 °C)-98.5 °F (36.9 °C)] 98.5 °F (36.9 °C)  Pulse:  [73-85] 82  Resp:  [14-18] 18  SpO2:  [91 %-99 %] 91 %  BP: ()/(46-67) 95/50     Weight: 130 kg (286 lb 9.6 oz)  Body mass index is 46.26 kg/m².    Intake/Output Summary (Last 24 hours) at 10/31/2024 0910  Last data filed at 10/30/2024 1830  Gross per 24 hour   Intake --   Output 1100 ml   Net -1100 ml         Physical Exam  Vitals and nursing note reviewed.   Constitutional:       General: She is not in acute distress.     Appearance: She is well-developed. She is morbidly obese. She is not diaphoretic.      Interventions: She is not intubated.  Pulmonary:      Effort: Pulmonary effort is normal. No accessory muscle usage or respiratory distress. She is not intubated.   Skin:     General: Skin is warm and dry.      Coloration: Skin is jaundiced. Skin is not pale.    Neurological:      Mental Status: She is alert and oriented to person, place, and time. Mental status is at baseline.      Motor: No seizure activity.   Psychiatric:         Attention and Perception: Attention normal.         Behavior: Behavior is not agitated or aggressive. Behavior is cooperative.             Significant Labs: All pertinent labs within the past 24 hours have been reviewed.    Significant Imaging: I have reviewed all pertinent imaging results/findings within the past 24 hours.  US Liver with Doppler 10/30/24: FINDINGS:   Liver: Enlarged measuring 25.9 cm. Homogeneous echotexture. No focal hepatic lesions.   Gallbladder: The gallbladder is surgically absent.   Biliary system: The common duct is not dilated, measuring 4 mm.  No intrahepatic ductal dilatation.   Spleen: Enlarged with a homogeneous echotexture, measuring 16.5 x 6.0 cm.   Pancreas: The visualized portions of pancreas appear normal.   Miscellaneous: No upper abdominal ascites.  Left pleural effusion noted.   Inferior vena cava: Normal in appearance.   Main hepatic artery: Patent with normal waveforms.   Left, right anterior, right posterior hepatic arteries: Patent with normal waveforms.   Left, middle, and right hepatic veins: Patent.   Main portal vein: Patent with proper directional flow.   Left portal vein:  Patent with proper directional flow.   Anterior right portal vein:  Patent with proper directional flow.   Posterior right portal vein: Patent with proper directional flow.   Splenic vein: Patent with proper directional flow at midline and the splenic hilum.   SMV:  Patent with proper directional flow.   Impression:  Satisfactory liver Doppler evaluation.   Marked hepatosplenomegaly.   CT Chest Abdomen Pelvis With IV Contrast (XPD) Routine Oral Contrast 10/30/24: FINDINGS:   Structures at the base of the neck are unremarkable.   Peripherally calcified right thyroid nodule measuring 6 mm.   Numerous enlarged soft tissue  densities about the left axillary soft tissues, for example measuring approximately 2.9 x 2.4 cm (series 2, image 39).  Additional enlarged left axillary lymph nodes, for example a left axillary lymph node measuring 2.0 cm (series 2, image 36).  Enlarged left supraclavicular lymph node measuring 1.5 cm (series 2, image 3)   Evaluation is limited secondary to diffuse body wall anasarca throughout the thoracic soft tissues.   The heart is enlarged in size with large volume pericardial effusion.  Coronary artery calcification in a multi vessel distribution.   Pulmonary vasculature is limited in evaluation without evidence for pulmonary artery embolism or abnormality of the pulmonary veins.   Prominent right paratracheal lymph node measuring 1.0 cm (series 2, image 23).   Airways are patent.  Lungs are symmetrically expanded.  Prominence of the pulmonary vasculature, scattered interlobular septal thickening, and diffuse ground-glass attenuation throughout both lungs.  Small left pleural effusion with associated compressive atelectasis.   The liver is enlarged in size measuring 27.5 cm, demonstrating heterogeneous attenuation.  Few scattered subcentimeter hepatic hypodensities too small for characterization.   Gallbladder is surgically absent.  No intrahepatic or extrahepatic ductal dilatation.   Spleen is enlarged in size measuring 17.3 cm.  No definite focal splenic lesions.  Few splenic calcifications.   Adrenal glands are unremarkable.  2.1 cm cystic focus about the uncinate process, similar from comparison imaging.   Kidneys are normal size and location.  No hydronephrosis or nephrolithiasis.  Right subcentimeter renal hypodensity too small for characterization.  Ureters are normal in course and caliber.  Urinary bladder is unremarkable.   Uterus demonstrates no significant abnormality.  Intrauterine device is visualized within the endometrial cavity.   Stomach and duodenum are unremarkable.  This of small bowel  demonstrate no evidence for obstructive or inflammatory process.  Appendix not well visualized.  Liquid and aerated stool throughout the visualized colon and rectum.   Scattered mesenteric edema.  Layering fluid in the pelvis.  No free intra-abdominal air.  Multiple enlarged retroperitoneal lymph nodes, for example a left periaortic lymph node measuring 1.7 cm   Moderate calcific atherosclerosis of the visualized aorta.   Diffuse body wall anasarca throughout the visualized abdomen and pelvis.  Enlarged left external iliac lymph node measuring 1.9 cm (series 3, image 168).  Multiple enlarged left greater than right inguinal lymph nodes, for example measuring approximately 2.5 cm (series 3, image 209).  Additional soft tissue density about the proximal left thigh, partially visualized (series 3, image 218).   Osseous structures demonstrate no evidence for acute fracture or osseous destructive lesion.   Impression:  Diffuse body wall anasarca throughout the visualized chest, abdomen, pelvis, and proximal thighs.   Numerous enlarged lymph node conglomerates and/or soft tissue masses about the left axillary soft tissues, left greater than right inguinal regions, left supraclavicular soft tissues, and retroperitoneum as above.  Findings remain nonspecific, however remain concerning for neoplastic or lymphoproliferative process.  Consider further evaluation with tissue sampling, noting left axillary soft tissue lesion proximity to the skin surface (for example series 2, image 39).   Large volume pericardial effusion.  Correlation is advised   Cardiomegaly with pulmonary vascular congestion, interlobular septal thickening, and scattered ground-glass attenuation, in keeping with evolving pulmonary edema.   Small left pleural effusion with associated compressive atelectasis.   Diffuse hepatosplenomegaly.  No obvious focal hepatic lesions, noting limitations from diffuse body wall anasarca.   Age advanced coronary artery and  aortic atherosclerosis.   X-Ray Calcaneus 2 View Left 10/30/24: FINDINGS:   Visualized osseous structures appear intact, with no definite evidence of recent or healing fracture, lytic destructive process, or other significant abnormality identified.  A large plantar calcaneal spur is again noted.  Arterial vascular calcification in the soft tissues about the ankle/proximal foot is incidentally seen, as are extremely abundant soft tissues.     Assessment/Plan:      * Direct hyperbilirubinemia  CT abdomen showed anasarca with hepatosplenomegaly. Appreciate Hepatology.    Impaired mobility  Due to lymphedema and history of stroke. Her boyfriend at home helps with mobility and ADLs       Sacral decubitus ulcer, stage II  Chronic. Turn frequently. Wound care.      Leukocytosis  Monitor. Giving ceftriaxone for SBP prophylaxis.      Type 2 diabetes mellitus, with long-term current use of insulin  She takes insulin glargine 80 units HS, insulin aspart 10 units BID with meals. Giving insulin glargine 10 units HS, insulin sliding scale. Monitor glucose.      Painless jaundice  See primary problem.      Coagulopathy  Goal INR <2.      Acute anemia  Monitoring blood counts. Giving empiric pantoprazole. Outpatient endoscopy recommended by GI.    ESRD (end stage renal disease) on dialysis  Nephrology managing.    GERD (gastroesophageal reflux disease)  Increased home pantoprazole to BID.      Hyperlipidemia associated with type 2 diabetes mellitus  Holding home atorvastatin.      Essential hypertension  Holding home nifedipine and nebivolol. Hypotensive. Give IV albumin and start midodrine. Monitor BP.      VTE Risk Mitigation (From admission, onward)           Ordered     IP VTE HIGH RISK PATIENT  Once         10/30/24 0332     Place sequential compression device  Until discontinued         10/30/24 0051                    Discharge Planning   SHASHA: 11/2/2024     Code Status: Full Code   Is the patient medically ready for  discharge?:     Reason for patient still in hospital (select all that apply): Consult recommendations                     Shayne Diaz MD  Department of Hospital Medicine   [facility] ([place])

## 2024-10-31 NOTE — ASSESSMENT & PLAN NOTE
She takes insulin glargine 80 units HS, insulin aspart 10 units BID with meals. Giving insulin glargine 10 units HS, insulin sliding scale. Monitor glucose.

## 2024-10-31 NOTE — PROGRESS NOTES
Dialysis completed. Needles removed from LORA fistula with manual pressure held for 5 minutes each with hemostasis achieved. Gauze dressing applied. Dialyzed for 2.5 hours due to late arrived to dialysis unit.Fluid removal of 500 ml. Tolerated well with stable vital signs. Awaiting transport to her room by bed.

## 2024-10-31 NOTE — ASSESSMENT & PLAN NOTE
44 year old female hx of ESRD on HD MWF presenting from outside hospital as transfer for acute anemia and painless jaundice. Last HD session 10/28. Nephrology consulted for ESRD management.     Nephrology History  -Outpatient HD unit: [facility]  -Nephrologist: ?  -HD tx days: MWF  -HD tx time: 4 hrs  -Last HD tx: Monday 10/28  -HD access: LUE AVF  -HD modality: iHD  -Residual urine:   -EDW:  110 kg    Plan/Recommendations  ESRD on HD:  -No need for emergent RRT, HD tomorrow to continue MWF schedule.   -Patient with diffuse body wall edema on imaging and chronic lymphedema, but no respiratory distress, on baseline O2, and no significant pulmonary edema on imaging.   -Echo shows EF 60-65%, IVC 15 mm Hg. Will attempt to increase UF goal with HD tomorrow. Would recommend blood transfusion with HD if Hgb < 7.0 tomorrow.   -Will continue iHD thrice weekly unless emergent indication arises  -Strict I&Os  -Pre & post HD weight  Anemia in ESRD  -Hgb goal 10-11, hgb 7.3 s/p 2 units pRBC and 2 units FFP  -Trend hgb, start Epo, may d/c if patient returns to goal of 10-11  Mineral Bone Disease in ESRD  -Resume home phos binders when diet advances  -Renal diet if not NPO

## 2024-10-31 NOTE — PLAN OF CARE
Pt admitted overnight for HLOC iso acute anemia & hyperbilirubinemia of unk etiology. Ddx includes hepatic etiology (hepatology consulted) & hematological/malignancy (Heme-Onc consulted). While occult blood positive, no e/o overt/brisk bleeding, thus GI deferring scope for now. AES reviewed case/imaging and given absence of biliary ductal dilation have low suspicion for obstruction; deferring formal AES consult at this time. Nephrology consulted to continue pt's HD while inpatient. Allopurinol started given foot pain & elevated uric acid. PRN antipruritics & analgesics ordered. Given low suspicion for active GIB, PPI switched to PO BID dosing. Pt endorses severe chronic constipation for which bowel regimen escalated. Wound care consulted for severe, chronic BLE (L>R) lymphedema w/ assoc recent cellulitis (s/p abx). TTE ordered. CT C/A/P with contrast ordered & infectious workup pending. Unable to obtain UA 2/2 ESRD. Given absence of fever or focal s/sx of infxn, holding further abx for now. Further workup per hepatology & heme/onc recs. Remainder per Dr. Bonilla's H&P from earlier today.

## 2024-10-31 NOTE — PROGRESS NOTES
[facility] ([place] [address])  Nephrology  Progress Note    Patient Name: Pilar Fernandez  MRN: [id number]  Admission Date: 10/29/2024  Hospital Length of Stay: 2 days  Attending Provider: Shayne Diaz MD   Primary Care Physician: Lindsey Singletary FNP  Principal Problem:Direct hyperbilirubinemia    Subjective:     HPI: Pilar Fernandez is a 44 y.o. female with history of stroke (MRI July 2024 on DAPT), breast mass, gastritis, HTN, uncontrolled diabetes (A1C 8.7), ESRD on HD (MWF), anemia,  gout, lymphedema BLE (L > R), impaired mobility, stage II sacral decubitus,  chronic hypoxic respiratory failure (on 3 L nasal cannula at home) who presents as a transfer from [facility] ED for management of painless jaundice and acute anemia.       Patient presented to [facility] Emergency Department on October 29 with jaundice which family noticed yesterday associated with pruritus.  She reported no history of liver disease. She was recently hospitalized last week for treatment of leg cellulitis, no mention of abnormal liver function or jaundice at that time. She reported no hematemesis/coffee-ground emesis, melena or hematochezia. Stool was positive for occult blood. She had leukocytosis and elevated bilirubin.  INR was elevated at 2.1, and hemoglobin was 6.4 (recent baseline around 10). She received 80mg IV protonix, 2u FFP, 2u pRBC in the ED prior to transfer. CT A/P was noted to be a limited study. Mild hepatomegaly. Status post cholecystectomy. Splenomegaly. Left pleural effusion similar to prior study. Pericardial effusion that was present on earlier study. Marked soft tissue edema.      Last HD session Monday 10/28. She reports she dialyzes on MWF.  Nephrology consulted for management of ESRD.      Interval History: NAEON. Echo with EF 60-65%, elevated PA pressure at 51 mmHg, elevated IVC pressure at 15 mmHg. Patient lethargic this AM, found to be hypoglycemic, per nursing staff mentation improved  after D10 bolus. HD yesterday with net  cc in the setting of possible GI bleed and hypotension.     Review of patient's allergies indicates:   Allergen Reactions    Tramadol Itching and Nausea And Vomiting     Current Facility-Administered Medications   Medication Frequency    acetaminophen tablet 650 mg Q4H PRN    albuterol-ipratropium 2.5 mg-0.5 mg/3 mL nebulizer solution 3 mL Q4H PRN    allopurinoL tablet 100 mg Daily    dextrose 10% bolus 125 mL 125 mL PRN    dextrose 10% bolus 250 mL 250 mL PRN    diphenhydrAMINE capsule 50 mg Q6H PRN    epoetin jitendra-epbx injection 6,500 Units Every Mon, Wed, Fri    glucagon (human recombinant) injection 1 mg PRN    glucose chewable tablet 16 g PRN    glucose chewable tablet 24 g PRN    insulin aspart U-100 pen 0-5 Units QID (AC + HS) PRN    insulin glargine U-100 (Lantus) pen 10 Units QHS    lactulose 20 gram/30 mL solution Soln 10 g Q6H PRN    miconazole 2 % cream BID    midodrine tablet 5 mg TID    mupirocin 2 % ointment BID    naloxone 0.4 mg/mL injection 0.02 mg PRN    ondansetron injection 4 mg Q8H PRN    oxyCODONE immediate release tablet 5 mg Q6H PRN    pantoprazole EC tablet 40 mg BID    polyethylene glycol packet 17 g Daily    senna-docusate 8.6-50 mg per tablet 1 tablet Daily    simethicone chewable tablet 80 mg QID PRN    sodium chloride 0.9% flush 10 mL Q12H PRN    traZODone tablet 100 mg Nightly PRN       Objective:     Vital Signs (Most Recent):  Temp: 98 °F (36.7 °C) (10/31/24 1633)  Pulse: 77 (10/31/24 1633)  Resp: 18 (10/31/24 1633)  BP: (!) 118/56 (10/31/24 1633)  SpO2: 99 % (10/31/24 1633) Vital Signs (24h Range):  Temp:  [97.7 °F (36.5 °C)-98.5 °F (36.9 °C)] 98 °F (36.7 °C)  Pulse:  [76-85] 77  Resp:  [16-20] 18  SpO2:  [90 %-100 %] 99 %  BP: ()/(46-67) 118/56     Weight: 130 kg (286 lb 9.6 oz) (10/30/24 1245)  Body mass index is 46.26 kg/m².  Body surface area is 2.46 meters squared.    I/O last 3 completed shifts:  In: -   Out: 1100  [Other:1100]     Physical Exam  Constitutional:       General: She is not in acute distress.     Appearance: She is ill-appearing (chronically).      Comments: Lethargic this AM   HENT:      Head: Normocephalic.   Cardiovascular:      Rate and Rhythm: Normal rate.      Arteriovenous access: Left arteriovenous access is present.  Pulmonary:      Effort: Pulmonary effort is normal. No respiratory distress.   Abdominal:      General: There is distension.      Tenderness: There is no abdominal tenderness.   Musculoskeletal:      Right lower leg: Edema present.      Left lower leg: Edema present.      Comments: Chronic lymphedema to bilateral lower extremities, L > R   Skin:     General: Skin is warm and dry.          Significant Labs:  CBC:   Recent Labs   Lab 10/31/24  0946   WBC 21.76*   RBC 2.36*   HGB 6.9*   HCT 20.3*   PLT 98*   MCV 86   MCH 29.2   MCHC 34.0     CMP:   Recent Labs   Lab 10/31/24  0428   GLU 56*   CALCIUM 8.6*   ALBUMIN 1.9*   PROT 7.4   *   K 4.4   CO2 19*   CL 98   BUN 47*   CREATININE 4.4*   ALKPHOS 349*   ALT 34   AST 91*   BILITOT 11.5*     All labs within the past 24 hours have been reviewed.     Assessment/Plan:     Renal/  ESRD (end stage renal disease) on dialysis  44 year old female hx of ESRD on HD MWF presenting from outside hospital as transfer for acute anemia and painless jaundice. Last HD session 10/28. Nephrology consulted for ESRD management.     Nephrology History  -Outpatient HD unit: [facility]  -Nephrologist: ?  -HD tx days: MWF  -HD tx time: 4 hrs  -Last HD tx: Monday 10/28  -HD access: LUE AVF  -HD modality: iHD  -Residual urine:   -EDW:  110 kg    Plan/Recommendations  ESRD on HD:  -No need for emergent RRT, HD tomorrow to continue MWF schedule.   -Patient with diffuse body wall edema on imaging and chronic lymphedema, but no respiratory distress, on baseline O2, and no significant pulmonary edema on imaging.   -Echo shows EF 60-65%, IVC 15 mm Hg. Will attempt  to increase UF goal with HD tomorrow. Would recommend blood transfusion with HD if Hgb < 7.0 tomorrow.   -Will continue iHD thrice weekly unless emergent indication arises  -Strict I&Os  -Pre & post HD weight  Anemia in ESRD  -Hgb goal 10-11, hgb 7.3 s/p 2 units pRBC and 2 units FFP  -Trend hgb, start Epo, may d/c if patient returns to goal of 10-11  Mineral Bone Disease in ESRD  -Resume home phos binders when diet advances  -Renal diet if not NPO      GI  * Direct hyperbilirubinemia  -management per primary    Painless jaundice  -management per primary        Thank you for your consult. I will follow-up with patient. Please contact us if you have any additional questions.    Zoya Smith PA-C  Nephrology  [facility] ([place])

## 2024-10-31 NOTE — PLAN OF CARE
[facility] ([place]  Initial Discharge Assessment      Discharge Plan A and Plan B have been determined by review of patient's clinical status, future medical and therapeutic needs, and coverage/benefits for post-acute care in coordination with multidisciplinary team members.      Primary Care Provider: Lindsey Singletary FNP    Admission Diagnosis: Jaundice [R17]    Admission Date: 10/29/2024  Expected Discharge Date: 11/2/2024    Transition of Care Barriers: None    Payor: [facility] MCARE / Plan: [facility] DUAL COMPLETE HMO SNP / Product Type: Medicare Advantage /     Extended Emergency Contact Information  Primary Emergency Contact: YuriMile  Mobile Phone: [phone]  Relation: Mother  Secondary Emergency Contact: Indira Ferrell   [place]  Mobile Phone: [phone]  Relation: Sister    Discharge Plan A: Home Health  Discharge Plan B: Home with family      [facility]  [address] [facility] [address]  Phone: [phone] Fax: [phone]    [facility] [address] [place] [address] [address] [facility] [address] [address]  Phone: [phone] Fax: [phone]      Initial Assessment (most recent)       Adult Discharge Assessment - 10/30/24 0933          Discharge Assessment    Assessment Type Discharge Planning Assessment     Confirmed/corrected address, phone number and insurance Yes     Confirmed Demographics Correct on Facesheet     Source of Information patient;family     If unable to respond/provide information was family/caregiver contacted? Yes     Contact Name/Number Mile Welch (Mother)  [phone] (Mobile)     When was your last doctors appointment? 09/10/24   Lindsey Singletary FNP    Reason For Admission Direct hyperbilirubinemia     People in Home significant other     Facility Arrived From: [facility] - Emergency Dept.     Do you expect to return to your current living situation? Yes     Do you have help  at home or someone to help you manage your care at home? Yes     Who are your caregiver(s) and their phone number(s)? Mile Welch (Mother)  [phone] (Mobile)     Prior to hospitilization cognitive status: Alert/Oriented;No Deficits     Current cognitive status: Alert/Oriented;No Deficits     Walking or Climbing Stairs Difficulty yes     Walking or Climbing Stairs ambulation difficulty, requires equipment     Mobility Management wheel chair     Dressing/Bathing Difficulty no     Home Accessibility wheelchair accessible     Equipment Currently Used at Home wheelchair;walker, standard;glucometer     Readmission within 30 days? No     Patient currently being followed by outpatient case management? No     Do you currently have service(s) that help you manage your care at home? Yes     Name and Contact number of agency [facility] [phone]     Is the pt/caregiver preference to resume services with current agency Yes     Do you take prescription medications? Yes     Do you have prescription coverage? Yes     Coverage [facility] DUAL COMPLETE O SNP     Do you have any problems affording any of your prescribed medications? No     Is the patient taking medications as prescribed? yes     Who is going to help you get home at discharge? Mile Welch (Mother)  [phone] (Mobile)     How do you get to doctors appointments? family or friend will provide     Are you on dialysis? Yes     Dialysis Name and Scheduled days [facility]  [phone] F: [phone]   qMWF     Discharge Plan A Home Health     Discharge Plan B Home with family     DME Needed Upon Discharge  other (see comments)   TBD    Discharge Plan discussed with: Patient;Parent(s)     Name(s) and Number(s) Mile Welch (Mother)  [phone] (Mobile)     Transition of Care Barriers None        Physical Activity    On average, how many days per week do you engage in moderate to strenuous exercise (like  a brisk walk)? 4 days     On average, how many minutes do you engage in exercise at this level? 30 min        Financial Resource Strain    How hard is it for you to pay for the very basics like food, housing, medical care, and heating? Not hard at all        Housing Stability    In the last 12 months, was there a time when you were not able to pay the mortgage or rent on time? No     At any time in the past 12 months, were you homeless or living in a shelter (including now)? No        Transportation Needs    Has the lack of transportation kept you from medical appointments, meetings, work or from getting things needed for daily living? No        Food Insecurity    Within the past 12 months, you worried that your food would run out before you got the money to buy more. Never true     Within the past 12 months, the food you bought just didn't last and you didn't have money to get more. Never true        Stress    Do you feel stress - tense, restless, nervous, or anxious, or unable to sleep at night because your mind is troubled all the time - these days? Only a little        Social Isolation    How often do you feel lonely or isolated from those around you?  Never        Alcohol Use    Q1: How often do you have a drink containing alcohol? Never     Q2: How many drinks containing alcohol do you have on a typical day when you are drinking? Patient does not drink     Q3: How often do you have six or more drinks on one occasion? Never        Utilities    In the past 12 months has the electric, gas, oil, or water company threatened to shut off services in your home? No        Health Literacy    How often do you need to have someone help you when you read instructions, pamphlets, or other written material from your doctor or pharmacy? Never        OTHER    Name(s) of People in Home Irene PADRON met with patients mother  Mile Welch at bedside to complete discharge planning assessment.  Patients mother  states that patient is  alert and oriented xs 4.  Patients mother verified all demographic information on facesheet is correct.  Patients mother  verified PCP is  Lindsey Singletary FNP  Patients mother  verified primary health insurance is  [facility] DUAL COMPLETE HMO SNP  and [place] Medicaid. Patient and mother  are  agreeable with bedside medication delivery.   Patient is current active with Nursing Care  home health and has listed DME.  Patient with NO POA or Living Will.  Patient is on dialysis or medication coumadin.  Patient with no 30 day admission.  Patient with no financial issues at this time.  Patient  will need transportation upon discharge from facility.  Patient will have assistance from his wife upon discharge Patient dependent with ADLs. All questions answered regarding Case Management Discharge Planning, patient verbalized understanding.  Discharge booklet with CM's contact information given to patient.   Case Management will continue to follow and assist with discharge planning.       30 day Readmit   yes     no  [x]    Referrals:  My Acute Care at Home  [] yes  [ x] no      OPCM  [ ] yes [ ] no [x]    0915 am  CM provided patient/family list of resources  Patient requesting HME/DME bedside commode  and provided call  a list of low cost [employer] # [phone]  for DME/HME not covered by insurance        Cortney Harrison RN  Case Management  [facility]  [phone]       Cortney Harrison RN  Case Management  [facility]  [phone]

## 2024-10-31 NOTE — TREATMENT PLAN
Hepatology Treatment Plan    Pilar Fernandez is a 44 y.o. female admitted to hospital 10/29/2024 (Hospital Day: 3) due to Direct hyperbilirubinemia.     Interval History    Echo with severe TV regurgitation, mildly reduced RV function, and dilated RV. PASP 51 and IVC 15.      Objective  Temp:  [98 °F (36.7 °C)-98.5 °F (36.9 °C)] 98.2 °F (36.8 °C) (10/31 1200)  Pulse:  [75-85] 78 (10/31 1200)  BP: ()/(46-67) 105/51 (10/31 1200)  Resp:  [16-20] 20 (10/31 1200)  SpO2:  [90 %-100 %] 90 % (10/31 1200)    Laboratory    Lab Results   Component Value Date    WBC 21.76 (H) 10/31/2024    HGB 6.9 (L) 10/31/2024    HCT 20.3 (L) 10/31/2024    MCV 86 10/31/2024    PLT 98 (L) 10/31/2024       Lab Results   Component Value Date     (L) 10/31/2024    K 4.4 10/31/2024    CL 98 10/31/2024    CO2 19 (L) 10/31/2024    BUN 47 (H) 10/31/2024    CREATININE 4.4 (H) 10/31/2024    CALCIUM 8.6 (L) 10/31/2024       Lab Results   Component Value Date    ALBUMIN 1.9 (L) 10/31/2024    ALT 34 10/31/2024    AST 91 (H) 10/31/2024    ALKPHOS 349 (H) 10/31/2024    BILITOT 11.5 (H) 10/31/2024       Lab Results   Component Value Date    INR 1.9 (H) 10/31/2024    INR 1.9 (H) 10/30/2024    INR 2.1 (H) 10/29/2024       MELD 3.0: 37 at 10/31/2024  4:28 AM  MELD-Na: 36 at 10/31/2024  4:28 AM  Calculated from:  Serum Creatinine: On dialysis. Using the maximum value.  Serum Sodium: 134 mmol/L at 10/31/2024  4:28 AM  Total Bilirubin: 11.5 mg/dL at 10/31/2024  4:28 AM  Serum Albumin: 1.9 g/dL at 10/31/2024  4:28 AM  INR(ratio): 1.9 at 10/31/2024  4:28 AM  Age at listing (hypothetical): 44 years  Sex: Female at 10/31/2024  4:28 AM      Assessment    Pilar Fernandez is a 44 y.o. female with recent CVA (on DAPT, 7/2024) with ongoing left sided deficits, history of breast mass, HTN, poorly controlled T2DM, ESRD (~6 years) on HD MWF, lymphedema, stage II sacral decubitus ulcer and chronic hypoxic RF on 3 L's NC that presents to [facility] from  [facility] for evaluation of new onset painless jaundice, first noticed by her mother over the weekend. Hepatology consulted on 10/30 for evaluation of elevated LFT's.      Imaging:   10/30/24- US Liver with Doppler- Enlarged liver measuring 25.9 cm. Homogeneous echotexture. No focal hepatic lesions. The common duct is not dilated, measuring 4 mm. No intrahepatic ductal dilatation. Pancreas appears normal. Splenomegaly noted. Satisfactory doppler evaluation.   10/30/24- CT C/A/P with contrast- Diffuse body wall anasarca throughout the visualized chest, abdomen, pelvis, and proximal thighs. Numerous enlarged lymph node conglomerates and/or soft tissue masses about the left axillary soft tissues, left greater than right inguinal regions, left supraclavicular soft tissues, and retroperitoneum concerning for neoplastic or lymphoproliferative process. Large volume pericardial effusion. Cardiomegaly with pulmonary vascular congestion, interlobular septal thickening, and scattered ground-glass attenuation, in keeping with evolving pulmonary edema. Diffuse hepatosplenomegaly.  No obvious focal hepatic lesions, noting limitations from diffuse body wall anasarca.     Problem List:  Painless Jaundice   Acute Liver Injury/Elevated LFT's/Elevated INR   ESRD with Volume Overload (reportedly on HD for ~6 years)   Anasarca   Chronic Hypoxic Respiratory Failure (on 3 L's O2 since 2022)   Morbid Obesity (BMI 46)   Debility (bed bound now, previously with walker)   History of CVA (7/2024) with left sided weakness and right eye droop on DAPT (ASA/Plavix)   Lymphadenopathy      Patient is likely not a liver transplant candidate for a multitude of reasons including co-morbidities and functional status among others. Liver biopsy would help prognosticate presence/extent of scarring in liver as her imaging is not definitive, but her enzymes and liver function testing is suspect for underlying liver damage.   DILI is a possibility (recent  clindamycin use) vs underlying MASH cirrhosis vs Infiltrative process given diffuse LAD.      Recommendations:  - If patient ultimately goes for a lymph node biopsy, would recommend simultaneous liver biopsy    - Check daily CBC, CMP and INR  - Please avoid any sedating medications   - Given echo and BNP, would recommend optimizing volume status as able with HD/nephrology assistance.   - Follow up serologic workup for CLD: f/u PETH, IgG, A1AT, BRIAN, ASMA, AMA and HFE    - PT/OT     Thank you for involving us in the care of Pilar Fernandez. Please call with any additional concerns or questions.    Angela Crowe DO   Gastroenterology Fellow PGY- V  [facility]

## 2024-10-31 NOTE — CARE UPDATE
"RAPID RESPONSE NURSE CHART REVIEW        Chart Reviewed: 10/31/2024, 0939    MRN: [id number]  Bed: 05690/23370 A    Dx: Direct hyperbilirubinemia    Pilar Fernandez has a past medical history of Anemia, Anemia, Breast mass, Chronic constipation, CKD (chronic kidney disease), Diabetes mellitus, Dialysis patient, Embolic stroke involving right middle cerebral artery, Encounter for blood transfusion, Erosive gastritis, GERD (gastroesophageal reflux disease), Gout, High cholesterol, Hypertension, Knee pain, Thyroid disease, and Unspecified cataract.    Last VS: BP (!) 105/51 (BP Location: Right arm, Patient Position: Lying)   Pulse 78   Temp 98.2 °F (36.8 °C) (Oral)   Resp 20   Ht 5' 6" (1.676 m)   Wt 130 kg (286 lb 9.6 oz)   LMP  (LMP Unknown)   SpO2 (!) 90%   Breastfeeding No   BMI 46.26 kg/m²     24H Vital Sign Range:  Temp:  [98 °F (36.7 °C)-98.5 °F (36.9 °C)]   Pulse:  [73-85]   Resp:  [16-20]   BP: ()/(46-67)   SpO2:  [90 %-100 %]     Level of Consciousness (AVPU): responds to voice    Recent Labs     10/31/24  0427 10/31/24  0428 10/31/24  0946   WBC 20.03* SEE COMMENT 21.76*   HGB 6.7* SEE COMMENT 6.9*   HCT 20.4* SEE COMMENT 20.3*   * SEE COMMENT 98*       Recent Labs     10/29/24  1350 10/30/24  0222 10/31/24  0428   * 135* 134*   K 4.6 4.6 4.4   CL 99 99 98   CO2 20* 20* 19*   BUN 45* 51* 47*   CREATININE 4.9* 4.9* 4.4*   GLU 83 83 56*   PHOS  --  5.0* 4.7*   MG  --  2.1 2.1        No results for input(s): "PH", "PCO2", "PO2", "HCO3", "POCSATURATED", "BE" in the last 72 hours.     OXYGEN:  Flow (L/min) (Oxygen Therapy): 2          MEWS score: 3    [facility] contacted for am rounds reports anemia, repeat CBC ordered, please continue to follow. No additional concerns verbalized at this time. Instructed to call [phone] for further concerns or assistance.    René Noel RN      \  "

## 2024-10-31 NOTE — NURSING
POCT glucose is 124 after 250 ml bolus of D10 poct cbg was 42 before bolus patient is asymptomatic and is eating chocolate pudding

## 2024-10-31 NOTE — CONSULTS
Interventional Radiology  Consult/History & Physical Note    Consult Requested By: Shayne arguelles MD  Reason for Consult: liver biopsy    SUBJECTIVE:     Chief Complaint:  painless jaundice    History of Present Illness:  Pilar Fernandez is a 44 y.o. female with a PMHx of recent CVA (on DAPT, 7/2024) with ongoing left sided deficits, history of breast mass, HTN, poorly controlled T2DM, ESRD (~6 years) on HD MWF, lymphedema, stage II sacral decubitus ulcer and chronic hypoxic RF on 3 L O2 NC who initially presented to OSH ED on 10/29 for painless jaundice, GIB and acute blood loss anemia, was transferred to [facility] that same day for higher level of care. Hospital course notable for improvement in H/H following transfusion, hepatology eval for liver dysfunction. Interventional Radiology has been consulted for US guided random liver biopsy. Pt has had recent imaging including a  liver US  on 10/30 which revealed enlarged liver without overt signs of cirrhosis and marked hepatosplenomegaly. The pt has not undergone biopsy in the past. Leading Ddx is currently DILI (recent clindamycin use) vs DELA CRUZ vs infiltrative process (given diffuse LAD on imaging). Per hepatology team, she has a very poor prognosis without liver txp as her MELD is 36, however she is a poor candidate for transplant given her comorbidities and functional status. She appears encephalopathic today which her mom reports is new over the past few hours. The pt currently has a WBC of 21.76 up from 19.48. Blood cultures drawn on 10/29 are NGTD. Hgb is 6.9 today. INR is 1.9. TB is 11.5 and trending up. LFTs elevated as well. Pt is afebrile and hemodynamically stable. She denies a history of MELANY requiring nightly CPAP but does report difficulty breathing when lying flat. She takes ASA and plavix at home, last dose on 10/28. She is not NPO.    Review of Systems   Unable to perform ROS: Mental status change       Scheduled Meds:   albumin human 25%  25 g  Intravenous Once    allopurinoL  100 mg Oral Daily    epoetin jitendra-ebpx (RETACRIT) injection  50 Units/kg Intravenous Every Mon, Wed, Fri    insulin glargine U-100 (Lantus)  10 Units Subcutaneous QHS    midodrine  5 mg Oral TID    mupirocin   Nasal BID    pantoprazole  40 mg Oral BID    polyethylene glycol  17 g Oral Daily    senna-docusate 8.6-50 mg  1 tablet Oral Daily     Continuous Infusions:  PRN Meds:  Current Facility-Administered Medications:     acetaminophen, 650 mg, Oral, Q4H PRN    albuterol-ipratropium, 3 mL, Nebulization, Q4H PRN    dextrose 10%, 12.5 g, Intravenous, PRN    dextrose 10%, 25 g, Intravenous, PRN    diphenhydrAMINE, 50 mg, Oral, Q6H PRN    glucagon (human recombinant), 1 mg, Intramuscular, PRN    glucose, 16 g, Oral, PRN    glucose, 24 g, Oral, PRN    insulin aspart U-100, 0-5 Units, Subcutaneous, QID (AC + HS) PRN    lactulose, 10 g, Oral, Q6H PRN    naloxone, 0.02 mg, Intravenous, PRN    ondansetron, 4 mg, Intravenous, Q8H PRN    oxyCODONE, 5 mg, Oral, Q6H PRN    simethicone, 1 tablet, Oral, QID PRN    sodium chloride 0.9%, 10 mL, Intravenous, Q12H PRN    traZODone, 100 mg, Oral, Nightly PRN    Review of patient's allergies indicates:   Allergen Reactions    Tramadol Itching and Nausea And Vomiting       Past Medical History:   Diagnosis Date    Anemia     Anemia     Breast mass     Chronic constipation     CKD (chronic kidney disease)     Diabetes mellitus     Dialysis patient     MON, TUE, THURS, SAT.    Embolic stroke involving right middle cerebral artery 7/13/2024    Encounter for blood transfusion     Erosive gastritis 03/2016    GERD (gastroesophageal reflux disease)     Gout     High cholesterol     Hypertension     Knee pain     Thyroid disease     Unspecified cataract 09/2022     Past Surgical History:   Procedure Laterality Date    AV FISTULA PLACEMENT Left 8/5/2019    Procedure: creation av fistula;  Surgeon: Buck Fernandez MD;  Location: [facility];  Service: Cardiovascular;   Laterality: Left;  Left Radialcephalic: PACU     SECTION  , , ,     CHOLECYSTECTOMY      COLONOSCOPY N/A 10/23/2017    Procedure: COLONOSCOPY;  Surgeon: Bri Harry MD;  Location: [facility];  Service: Endoscopy;  Laterality: N/A;    EXCISIONAL BIOPSY Left 2022    Procedure: EXCISIONAL BIOPSY;  Surgeon: Mick Juarez MD;  Location: [facility];  Service: General;  Laterality: Left;    TUBAL LIGATION      UPPER GASTROINTESTINAL ENDOSCOPY  2016    Erosive Gastritis-Dr Bailey     Family History   Problem Relation Name Age of Onset    Thyroid disease Mother      Diabetes Father      Ovarian cancer Maternal Aunt Malathi     Breast cancer Maternal Aunt Ada      Social History     Tobacco Use    Smoking status: Never     Passive exposure: Never    Smokeless tobacco: Never   Substance Use Topics    Alcohol use: No     Alcohol/week: 0.0 standard drinks of alcohol    Drug use: No       OBJECTIVE:     Vital Signs (Most Recent)  Temp: 98.5 °F (36.9 °C) (10/31/24 0722)  Pulse: 82 (10/31/24 0722)  Resp: 18 (10/31/24 0947)  BP: (!) 95/50 (10/31/24 0722)  SpO2: (!) 91 % (10/31/24 0722)    Physical Exam:  Physical Exam  Vitals and nursing note reviewed.   Constitutional:       General: She is not in acute distress.     Appearance: She is obese. She is ill-appearing.      Comments: drowsy   HENT:      Head: Normocephalic and atraumatic.      Right Ear: External ear normal.      Left Ear: External ear normal.   Eyes:      General: Scleral icterus present.   Cardiovascular:      Rate and Rhythm: Normal rate.   Pulmonary:      Effort: Pulmonary effort is normal. No respiratory distress.   Abdominal:      General: Abdomen is flat.   Skin:     General: Skin is warm and dry.      Coloration: Skin is jaundiced.   Neurological:      Comments: Difficult to arouse but answers questions appropriately         Laboratory  I have reviewed all pertinent lab results within the past 24 hours.  CBC:   Recent  Labs   Lab 10/31/24  0946   WBC 21.76*   RBC 2.36*   HGB 6.9*   HCT 20.3*   PLT 98*   MCV 86   MCH 29.2   MCHC 34.0     BMP:   Recent Labs   Lab 10/31/24  0428   GLU 56*   *   K 4.4   CL 98   CO2 19*   BUN 47*   CREATININE 4.4*   CALCIUM 8.6*   MG 2.1     CMP:   Recent Labs   Lab 10/31/24  0428   GLU 56*   CALCIUM 8.6*   ALBUMIN 1.9*   PROT 7.4   *   K 4.4   CO2 19*   CL 98   BUN 47*   CREATININE 4.4*   ALKPHOS 349*   ALT 34   AST 91*   BILITOT 11.5*     LFTs:   Recent Labs   Lab 10/31/24  0428   ALT 34   AST 91*   ALKPHOS 349*   BILITOT 11.5*   PROT 7.4   ALBUMIN 1.9*     Coagulation:   Recent Labs   Lab 10/31/24  0428   LABPROT 19.7*   INR 1.9*     Microbiology Results (last 7 days)       ** No results found for the last 168 hours. **            ASA/Mallampati  ASA: 3  Mallampati: 2    Imaging:  Recent imaging studies including  liver US  on 10/30/24     EXAMINATION:  US LIVER WITH DOPPLER     CLINICAL HISTORY:  abnormal LFTs;     TECHNIQUE:  Abdominal ultrasound (including pancreas, liver, gallbladder, common bile duct, spleen, and IVC) was performed.     Abdominal doppler exam was also performed.     COMPARISON:  CT abdomen pelvis 10/29/2024.     FINDINGS:  Liver: Enlarged measuring 25.9 cm. Homogeneous echotexture. No focal hepatic lesions.     Gallbladder: The gallbladder is surgically absent.     Biliary system: The common duct is not dilated, measuring 4 mm.  No intrahepatic ductal dilatation.     Spleen: Enlarged with a homogeneous echotexture, measuring 16.5 x 6.0 cm.     Pancreas: The visualized portions of pancreas appear normal.     Miscellaneous: No upper abdominal ascites.  Left pleural effusion noted.     Inferior vena cava: Normal in appearance.     Main hepatic artery: Patent with normal waveforms.     Left, right anterior, right posterior hepatic arteries: Patent with normal waveforms.     Left, middle, and right hepatic veins: Patent.     Main portal vein: Patent with proper  directional flow.     Left portal vein:  Patent with proper directional flow.     Anterior right portal vein:  Patent with proper directional flow.     Posterior right portal vein: Patent with proper directional flow.     Splenic vein: Patent with proper directional flow at midline and the splenic hilum.     SMV:  Patent with proper directional flow.     Impression:     Satisfactory liver Doppler evaluation.     Marked hepatosplenomegaly.     Electronically signed by resident: Julian Fulton  Date:                                            10/30/2024  Time:                                           05:28     Electronically signed by: Bobby Gutierrez MD  Date:                                            10/30/2024  Time:                                           07:37    ASSESSMENT/PLAN:     Assessment:  44 y.o. female with a PMHx of recent CVA (on DAPT, 7/2024) with ongoing left sided deficits, history of breast mass, HTN, poorly controlled T2DM, ESRD (~6 years) on HD MWF, lymphedema, stage II sacral decubitus ulcer and chronic hypoxic RF on 3 L O2 NC who has been referred to IR for US guided random liver biopsy. Case reviewed with staff. Pt has an uptrending WBC of 21. No recent fevers, blood cultures negative, and CT a/p without overt signs of infection. In addition, Hgb is 6.9 today. Could proceed with transjugular liver biopsy tomorrow if Hgb has improved and pt remains without signs of infection (uptrending WBC, fever, etc). Will reevaluate in the AM. The procedure was discussed in great detail with the patient including thorough explanations of the potential risks and benefits of transjugular random liver biopsy. Risks include but are not limited to sepsis, severe infection, hemorrhage, damage to surrounding structures and need for additional procedures. The patient is a candidate for transjugular random liver biopsy under moderate sedation as long as her Hgb remains above 7 and she does not develop signs of  infection. Plan discussed with ordering physician and pt who verbalized understanding of the plan and would like to proceed.    Plan:  May proceed with transjugular liver biopsy on 11/1/24 if pt remains without signs of infection and as long as Hgb remains above 7  Please keep pt NPO starting at midnight on 11/1/24.   Anticoagulation history reviewed.  If starting prophylactic AC following procedure, ok to start lovenox 12 hours following procedure and ok to start SQ heparin 6-8 hours following procedure per SIR guidelines  Coagulation labs reviewed. INR 1.9 and plt count 98 on 10/31/24.  Thank you for the consult. Please contact with questions via i[employer] secure chat or [employer] Link    Time spent during patient care today was 87 minutes. This includes time spent before the visit reviewing the chart, discussing case with staff physician and ordering provider, time spent during the face to face patient visit, and time spent after the visit on documentation. Time excludes procedure time.     Rere Morillo PA-C  Interventional Radiology  Spectra: [id number]

## 2024-10-31 NOTE — PLAN OF CARE
Patient aao to self and place, mom at bedside. Patient is on 3l/nc o2 sat 88-94%. Patient has been hypotensive most of the shift , md wrote orders for albumin iv and midodrine. Patient's appetite is poor at lunch poct cbg was 42 gave d10 per prn orders poct cbg went up to 124. Patient is receiving 1 u of prbc's had bm on bed pan this shift, yeast rash noted to abdominal folds, and bilateral breasts nystatin cream applied dressing changed to coccyx and bilateral buttocks. Npo at midnight for liver biopsy. Safety precautions maintained call light in reach and plan of care ongoing.              Problem: Adult Inpatient Plan of Care  Goal: Plan of Care Review  Outcome: Progressing  Goal: Patient-Specific Goal (Individualized)  Outcome: Progressing  Goal: Absence of Hospital-Acquired Illness or Injury  Outcome: Progressing  Goal: Optimal Comfort and Wellbeing  Outcome: Progressing  Goal: Readiness for Transition of Care  Outcome: Progressing     Problem: Diabetes Comorbidity  Goal: Blood Glucose Level Within Targeted Range  Outcome: Progressing     Problem: Acute Kidney Injury/Impairment  Goal: Fluid and Electrolyte Balance  Outcome: Progressing  Goal: Improved Oral Intake  Outcome: Progressing  Goal: Effective Renal Function  Outcome: Progressing     Problem: Wound  Goal: Optimal Coping  Outcome: Progressing  Goal: Optimal Functional Ability  Outcome: Progressing  Goal: Absence of Infection Signs and Symptoms  Outcome: Progressing  Goal: Improved Oral Intake  Outcome: Progressing  Goal: Optimal Pain Control and Function  Outcome: Progressing  Goal: Skin Health and Integrity  Outcome: Progressing  Goal: Optimal Wound Healing  Outcome: Progressing     Problem: Skin Injury Risk Increased  Goal: Skin Health and Integrity  Outcome: Progressing     Problem: Gastrointestinal Bleeding  Goal: Optimal Coping with Acute Illness  Outcome: Progressing  Goal: Hemostasis  Outcome: Progressing     Problem: Bariatric Environmental  Safety  Goal: Safety Maintained with Care  Outcome: Progressing     Problem: Hemodialysis  Goal: Safe, Effective Therapy Delivery  Outcome: Progressing  Goal: Effective Tissue Perfusion  Outcome: Progressing  Goal: Absence of Infection Signs and Symptoms  Outcome: Progressing

## 2024-10-31 NOTE — NURSING
Patient returned from dialysis following removal of 0.5 L of fluid during session. Patient Aox4, does complain of minor pain but does not want pain medication at this time. Plan of care for bedrest activity while monitoring patient mental status and edema discussed with patient. Call light within reach, will continue to monitor.

## 2024-10-31 NOTE — SUBJECTIVE & OBJECTIVE
Interval History: NAEON. Echo with EF 60-65%, elevated PA pressure at 51 mmHg, elevated IVC pressure at 15 mmHg. Patient lethargic this AM, found to be hypoglycemic, per nursing staff mentation improved after D10 bolus. HD yesterday with net  cc in the setting of possible GI bleed and hypotension.     Review of patient's allergies indicates:   Allergen Reactions    Tramadol Itching and Nausea And Vomiting     Current Facility-Administered Medications   Medication Frequency    acetaminophen tablet 650 mg Q4H PRN    albuterol-ipratropium 2.5 mg-0.5 mg/3 mL nebulizer solution 3 mL Q4H PRN    allopurinoL tablet 100 mg Daily    dextrose 10% bolus 125 mL 125 mL PRN    dextrose 10% bolus 250 mL 250 mL PRN    diphenhydrAMINE capsule 50 mg Q6H PRN    epoetin jitendra-epbx injection 6,500 Units Every Mon, Wed, Fri    glucagon (human recombinant) injection 1 mg PRN    glucose chewable tablet 16 g PRN    glucose chewable tablet 24 g PRN    insulin aspart U-100 pen 0-5 Units QID (AC + HS) PRN    insulin glargine U-100 (Lantus) pen 10 Units QHS    lactulose 20 gram/30 mL solution Soln 10 g Q6H PRN    miconazole 2 % cream BID    midodrine tablet 5 mg TID    mupirocin 2 % ointment BID    naloxone 0.4 mg/mL injection 0.02 mg PRN    ondansetron injection 4 mg Q8H PRN    oxyCODONE immediate release tablet 5 mg Q6H PRN    pantoprazole EC tablet 40 mg BID    polyethylene glycol packet 17 g Daily    senna-docusate 8.6-50 mg per tablet 1 tablet Daily    simethicone chewable tablet 80 mg QID PRN    sodium chloride 0.9% flush 10 mL Q12H PRN    traZODone tablet 100 mg Nightly PRN       Objective:     Vital Signs (Most Recent):  Temp: 98 °F (36.7 °C) (10/31/24 1633)  Pulse: 77 (10/31/24 1633)  Resp: 18 (10/31/24 1633)  BP: (!) 118/56 (10/31/24 1633)  SpO2: 99 % (10/31/24 1633) Vital Signs (24h Range):  Temp:  [97.7 °F (36.5 °C)-98.5 °F (36.9 °C)] 98 °F (36.7 °C)  Pulse:  [76-85] 77  Resp:  [16-20] 18  SpO2:  [90 %-100 %] 99 %  BP:  ()/(46-67) 118/56     Weight: 130 kg (286 lb 9.6 oz) (10/30/24 1245)  Body mass index is 46.26 kg/m².  Body surface area is 2.46 meters squared.    I/O last 3 completed shifts:  In: -   Out: 1100 [Other:1100]     Physical Exam  Constitutional:       General: She is not in acute distress.     Appearance: She is ill-appearing (chronically).      Comments: Lethargic this AM   HENT:      Head: Normocephalic.   Cardiovascular:      Rate and Rhythm: Normal rate.      Arteriovenous access: Left arteriovenous access is present.  Pulmonary:      Effort: Pulmonary effort is normal. No respiratory distress.   Abdominal:      General: There is distension.      Tenderness: There is no abdominal tenderness.   Musculoskeletal:      Right lower leg: Edema present.      Left lower leg: Edema present.      Comments: Chronic lymphedema to bilateral lower extremities, L > R   Skin:     General: Skin is warm and dry.          Significant Labs:  CBC:   Recent Labs   Lab 10/31/24  0946   WBC 21.76*   RBC 2.36*   HGB 6.9*   HCT 20.3*   PLT 98*   MCV 86   MCH 29.2   MCHC 34.0     CMP:   Recent Labs   Lab 10/31/24  0428   GLU 56*   CALCIUM 8.6*   ALBUMIN 1.9*   PROT 7.4   *   K 4.4   CO2 19*   CL 98   BUN 47*   CREATININE 4.4*   ALKPHOS 349*   ALT 34   AST 91*   BILITOT 11.5*     All labs within the past 24 hours have been reviewed.      Walk in

## 2024-10-31 NOTE — ASSESSMENT & PLAN NOTE
Holding home nifedipine and nebivolol. Hypotensive. Give IV albumin and start midodrine. Monitor BP.

## 2024-10-31 NOTE — HOSPITAL COURSE
She was put on ceftriaxone. Hepatology and Advanced Endoscopy Service were consulted for hyperbilirubinemia. Gastroenterology was consulted for gastrointestinal bleed. Hematology-Oncology was consulted for anemia with hyperbilirubinemia and positive Britt test. CT showed diffuse anasarca, pericardial effusion, enlarged lymph nodes vs soft tissue densities, hepatosplenomegaly. Hepatology recommended liver biopsy. Gastroenterology recommended outpatient endoscopy, after she is medically optimized, due to lack of overt bleeding. Hematology-Oncology recommended breast ultrasound. Leukocytosis persisted. Sed rate was normal. CRP was elevated. Midodrine was started for post-dialysis hypotension. She developed hepatic encephalopathy with ammonia of 111 umol/L. She was transfused another unit of packed red blood cells in preparation for liver biopsy. She required vitamin K for elevated INR. She desired to sit at the side of the bed but was unable to on her own. Physical and Occupational Therapy were consulted and recommended high intensity therapy. Midodrine was stopped as blood pressures increased to normal. Her home hypertension medications continued to be held. 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 MRCP showed no obstruction or stricture but was limited by body habitus. Hepatology recommended ursodiol. Upon review of imaging, left axilla ultrasound was recommended instead of breast ultrasound as her anasarca would make interpretation of a breast ultrasound difficult. Axilla ultrasound showed abnormal axillary adenopathy with at least 6 abnormal lymph nodes (BI-RADS 4). She underwent left axilla lymph node biopsy on 11/11/2024. Hematology-Oncology reported that she would not currently for inpatient chemotherapy as she had no bulky lymphadenopathy or obstruction or compression  of organs due to lymphadenopathy. Additionally, her persistent hyperbilirubinemia excluded her from several chemotherapy regimens, and her heart failure and end stage renal disease exclude her from nearly all other remaining regimens. She awaited skilled nursing facility and was accepted on 11/15/2024. However, syphilis test was incidentally positive per the [place] [facility], so the nursing facility could not accept her before she started treatement. It was found that she had been found to have syphilis at another hospital previously. She was given the 1st dose of intramuscular penicillin G and was accepted to the nursing facility where she will get the 2nd and 3rd doses.

## 2024-10-31 NOTE — SUBJECTIVE & OBJECTIVE
Interval History: Seen by Hepatology, GI, Hem-Onc.     Review of Systems   Constitutional:  Negative for chills and fever.   Neurological:  Negative for seizures and syncope.     Objective:     Vital Signs (Most Recent):  Temp: 98.5 °F (36.9 °C) (10/31/24 0722)  Pulse: 82 (10/31/24 0722)  Resp: 18 (10/31/24 0722)  BP: (!) 95/50 (10/31/24 0722)  SpO2: (!) 91 % (10/31/24 0722) Vital Signs (24h Range):  Temp:  [98 °F (36.7 °C)-98.5 °F (36.9 °C)] 98.5 °F (36.9 °C)  Pulse:  [73-85] 82  Resp:  [14-18] 18  SpO2:  [91 %-99 %] 91 %  BP: ()/(46-67) 95/50     Weight: 130 kg (286 lb 9.6 oz)  Body mass index is 46.26 kg/m².    Intake/Output Summary (Last 24 hours) at 10/31/2024 0910  Last data filed at 10/30/2024 1830  Gross per 24 hour   Intake --   Output 1100 ml   Net -1100 ml         Physical Exam  Vitals and nursing note reviewed.   Constitutional:       General: She is not in acute distress.     Appearance: She is well-developed. She is morbidly obese. She is not diaphoretic.      Interventions: She is not intubated.  Pulmonary:      Effort: Pulmonary effort is normal. No accessory muscle usage or respiratory distress. She is not intubated.   Skin:     General: Skin is warm and dry.      Coloration: Skin is jaundiced. Skin is not pale.   Neurological:      Mental Status: She is alert and oriented to person, place, and time. Mental status is at baseline.      Motor: No seizure activity.   Psychiatric:         Attention and Perception: Attention normal.         Behavior: Behavior is not agitated or aggressive. Behavior is cooperative.             Significant Labs: All pertinent labs within the past 24 hours have been reviewed.    Significant Imaging: I have reviewed all pertinent imaging results/findings within the past 24 hours.  US Liver with Doppler 10/30/24: FINDINGS:   Liver: Enlarged measuring 25.9 cm. Homogeneous echotexture. No focal hepatic lesions.   Gallbladder: The gallbladder is surgically absent.    Biliary system: The common duct is not dilated, measuring 4 mm.  No intrahepatic ductal dilatation.   Spleen: Enlarged with a homogeneous echotexture, measuring 16.5 x 6.0 cm.   Pancreas: The visualized portions of pancreas appear normal.   Miscellaneous: No upper abdominal ascites.  Left pleural effusion noted.   Inferior vena cava: Normal in appearance.   Main hepatic artery: Patent with normal waveforms.   Left, right anterior, right posterior hepatic arteries: Patent with normal waveforms.   Left, middle, and right hepatic veins: Patent.   Main portal vein: Patent with proper directional flow.   Left portal vein:  Patent with proper directional flow.   Anterior right portal vein:  Patent with proper directional flow.   Posterior right portal vein: Patent with proper directional flow.   Splenic vein: Patent with proper directional flow at midline and the splenic hilum.   SMV:  Patent with proper directional flow.   Impression:  Satisfactory liver Doppler evaluation.   Marked hepatosplenomegaly.   CT Chest Abdomen Pelvis With IV Contrast (XPD) Routine Oral Contrast 10/30/24: FINDINGS:   Structures at the base of the neck are unremarkable.   Peripherally calcified right thyroid nodule measuring 6 mm.   Numerous enlarged soft tissue densities about the left axillary soft tissues, for example measuring approximately 2.9 x 2.4 cm (series 2, image 39).  Additional enlarged left axillary lymph nodes, for example a left axillary lymph node measuring 2.0 cm (series 2, image 36).  Enlarged left supraclavicular lymph node measuring 1.5 cm (series 2, image 3)   Evaluation is limited secondary to diffuse body wall anasarca throughout the thoracic soft tissues.   The heart is enlarged in size with large volume pericardial effusion.  Coronary artery calcification in a multi vessel distribution.   Pulmonary vasculature is limited in evaluation without evidence for pulmonary artery embolism or abnormality of the pulmonary  veins.   Prominent right paratracheal lymph node measuring 1.0 cm (series 2, image 23).   Airways are patent.  Lungs are symmetrically expanded.  Prominence of the pulmonary vasculature, scattered interlobular septal thickening, and diffuse ground-glass attenuation throughout both lungs.  Small left pleural effusion with associated compressive atelectasis.   The liver is enlarged in size measuring 27.5 cm, demonstrating heterogeneous attenuation.  Few scattered subcentimeter hepatic hypodensities too small for characterization.   Gallbladder is surgically absent.  No intrahepatic or extrahepatic ductal dilatation.   Spleen is enlarged in size measuring 17.3 cm.  No definite focal splenic lesions.  Few splenic calcifications.   Adrenal glands are unremarkable.  2.1 cm cystic focus about the uncinate process, similar from comparison imaging.   Kidneys are normal size and location.  No hydronephrosis or nephrolithiasis.  Right subcentimeter renal hypodensity too small for characterization.  Ureters are normal in course and caliber.  Urinary bladder is unremarkable.   Uterus demonstrates no significant abnormality.  Intrauterine device is visualized within the endometrial cavity.   Stomach and duodenum are unremarkable.  This of small bowel demonstrate no evidence for obstructive or inflammatory process.  Appendix not well visualized.  Liquid and aerated stool throughout the visualized colon and rectum.   Scattered mesenteric edema.  Layering fluid in the pelvis.  No free intra-abdominal air.  Multiple enlarged retroperitoneal lymph nodes, for example a left periaortic lymph node measuring 1.7 cm   Moderate calcific atherosclerosis of the visualized aorta.   Diffuse body wall anasarca throughout the visualized abdomen and pelvis.  Enlarged left external iliac lymph node measuring 1.9 cm (series 3, image 168).  Multiple enlarged left greater than right inguinal lymph nodes, for example measuring approximately 2.5 cm  (series 3, image 209).  Additional soft tissue density about the proximal left thigh, partially visualized (series 3, image 218).   Osseous structures demonstrate no evidence for acute fracture or osseous destructive lesion.   Impression:  Diffuse body wall anasarca throughout the visualized chest, abdomen, pelvis, and proximal thighs.   Numerous enlarged lymph node conglomerates and/or soft tissue masses about the left axillary soft tissues, left greater than right inguinal regions, left supraclavicular soft tissues, and retroperitoneum as above.  Findings remain nonspecific, however remain concerning for neoplastic or lymphoproliferative process.  Consider further evaluation with tissue sampling, noting left axillary soft tissue lesion proximity to the skin surface (for example series 2, image 39).   Large volume pericardial effusion.  Correlation is advised   Cardiomegaly with pulmonary vascular congestion, interlobular septal thickening, and scattered ground-glass attenuation, in keeping with evolving pulmonary edema.   Small left pleural effusion with associated compressive atelectasis.   Diffuse hepatosplenomegaly.  No obvious focal hepatic lesions, noting limitations from diffuse body wall anasarca.   Age advanced coronary artery and aortic atherosclerosis.   X-Ray Calcaneus 2 View Left 10/30/24: FINDINGS:   Visualized osseous structures appear intact, with no definite evidence of recent or healing fracture, lytic destructive process, or other significant abnormality identified.  A large plantar calcaneal spur is again noted.  Arterial vascular calcification in the soft tissues about the ankle/proximal foot is incidentally seen, as are extremely abundant soft tissues.

## 2024-11-01 PROBLEM — K76.82 HEPATIC ENCEPHALOPATHY: Status: ACTIVE | Noted: 2024-11-01

## 2024-11-01 LAB
ALBUMIN SERPL BCP-MCNC: 2 G/DL (ref 3.5–5.2)
ALP SERPL-CCNC: 341 U/L (ref 40–150)
ALT SERPL W/O P-5'-P-CCNC: 39 U/L (ref 10–44)
ANION GAP SERPL CALC-SCNC: 15 MMOL/L (ref 8–16)
ANISOCYTOSIS BLD QL SMEAR: SLIGHT
AST SERPL-CCNC: 108 U/L (ref 10–40)
BASO STIPL BLD QL SMEAR: ABNORMAL
BASOPHILS NFR BLD: 0 % (ref 0–1.9)
BILIRUB SERPL-MCNC: 13.5 MG/DL (ref 0.1–1)
BUN SERPL-MCNC: 37 MG/DL (ref 6–20)
CALCIUM SERPL-MCNC: 8.9 MG/DL (ref 8.7–10.5)
CHLORIDE SERPL-SCNC: 98 MMOL/L (ref 95–110)
CO2 SERPL-SCNC: 19 MMOL/L (ref 23–29)
CREAT SERPL-MCNC: 3.7 MG/DL (ref 0.5–1.4)
DACRYOCYTES BLD QL SMEAR: ABNORMAL
DIFFERENTIAL METHOD BLD: ABNORMAL
EOSINOPHIL NFR BLD: 2 % (ref 0–8)
ERYTHROCYTE [DISTWIDTH] IN BLOOD BY AUTOMATED COUNT: 23.4 % (ref 11.5–14.5)
EST. GFR  (NO RACE VARIABLE): 14.7 ML/MIN/1.73 M^2
GLUCOSE SERPL-MCNC: 49 MG/DL (ref 70–110)
HCT VFR BLD AUTO: 23 % (ref 37–48.5)
HGB BLD-MCNC: 7.6 G/DL (ref 12–16)
HYPOCHROMIA BLD QL SMEAR: ABNORMAL
IMM GRANULOCYTES # BLD AUTO: ABNORMAL K/UL (ref 0–0.04)
IMM GRANULOCYTES NFR BLD AUTO: ABNORMAL % (ref 0–0.5)
INR PPP: 2.1 (ref 0.8–1.2)
LYMPHOCYTES NFR BLD: 3 % (ref 18–48)
MAGNESIUM SERPL-MCNC: 2.1 MG/DL (ref 1.6–2.6)
MCH RBC QN AUTO: 30.9 PG (ref 27–31)
MCHC RBC AUTO-ENTMCNC: 33 G/DL (ref 32–36)
MCV RBC AUTO: 94 FL (ref 82–98)
METAMYELOCYTES NFR BLD MANUAL: 1 %
MONOCYTES NFR BLD: 5 % (ref 4–15)
MYELOCYTES NFR BLD MANUAL: 2 %
NEUTROPHILS NFR BLD: 84 % (ref 38–73)
NEUTS BAND NFR BLD MANUAL: 2 %
NRBC BLD-RTO: 11 /100 WBC
OVALOCYTES BLD QL SMEAR: ABNORMAL
PHOSPHATE SERPL-MCNC: 3.8 MG/DL (ref 2.7–4.5)
PLATELET # BLD AUTO: 95 K/UL (ref 150–450)
PLATELET BLD QL SMEAR: ABNORMAL
PMV BLD AUTO: 13.1 FL (ref 9.2–12.9)
POCT GLUCOSE: 104 MG/DL (ref 70–110)
POCT GLUCOSE: 133 MG/DL (ref 70–110)
POCT GLUCOSE: 64 MG/DL (ref 70–110)
POCT GLUCOSE: 84 MG/DL (ref 70–110)
POCT GLUCOSE: 85 MG/DL (ref 70–110)
POCT GLUCOSE: 87 MG/DL (ref 70–110)
POCT GLUCOSE: 88 MG/DL (ref 70–110)
POCT GLUCOSE: 88 MG/DL (ref 70–110)
POIKILOCYTOSIS BLD QL SMEAR: SLIGHT
POLYCHROMASIA BLD QL SMEAR: ABNORMAL
POTASSIUM SERPL-SCNC: 4.3 MMOL/L (ref 3.5–5.1)
PROMYELOCYTES NFR BLD MANUAL: 1 %
PROT SERPL-MCNC: 7.7 G/DL (ref 6–8.4)
PROTHROMBIN TIME: 21.5 SEC (ref 9–12.5)
RBC # BLD AUTO: 2.46 M/UL (ref 4–5.4)
SODIUM SERPL-SCNC: 132 MMOL/L (ref 136–145)
TARGETS BLD QL SMEAR: ABNORMAL
WBC # BLD AUTO: 19.36 K/UL (ref 3.9–12.7)

## 2024-11-01 PROCEDURE — 25000003 PHARM REV CODE 250: Performed by: HOSPITALIST

## 2024-11-01 PROCEDURE — 84100 ASSAY OF PHOSPHORUS: CPT | Performed by: STUDENT IN AN ORGANIZED HEALTH CARE EDUCATION/TRAINING PROGRAM

## 2024-11-01 PROCEDURE — 81256 HFE GENE: CPT | Performed by: STUDENT IN AN ORGANIZED HEALTH CARE EDUCATION/TRAINING PROGRAM

## 2024-11-01 PROCEDURE — 25000003 PHARM REV CODE 250: Performed by: STUDENT IN AN ORGANIZED HEALTH CARE EDUCATION/TRAINING PROGRAM

## 2024-11-01 PROCEDURE — 99232 SBSQ HOSP IP/OBS MODERATE 35: CPT | Mod: ,,,

## 2024-11-01 PROCEDURE — 20600001 HC STEP DOWN PRIVATE ROOM

## 2024-11-01 PROCEDURE — 80100014 HC HEMODIALYSIS 1:1

## 2024-11-01 PROCEDURE — 85610 PROTHROMBIN TIME: CPT | Performed by: STUDENT IN AN ORGANIZED HEALTH CARE EDUCATION/TRAINING PROGRAM

## 2024-11-01 PROCEDURE — 80053 COMPREHEN METABOLIC PANEL: CPT | Performed by: STUDENT IN AN ORGANIZED HEALTH CARE EDUCATION/TRAINING PROGRAM

## 2024-11-01 PROCEDURE — 83735 ASSAY OF MAGNESIUM: CPT | Performed by: STUDENT IN AN ORGANIZED HEALTH CARE EDUCATION/TRAINING PROGRAM

## 2024-11-01 PROCEDURE — 85027 COMPLETE CBC AUTOMATED: CPT | Performed by: HOSPITALIST

## 2024-11-01 PROCEDURE — 63600175 PHARM REV CODE 636 W HCPCS: Performed by: HOSPITALIST

## 2024-11-01 PROCEDURE — 85007 BL SMEAR W/DIFF WBC COUNT: CPT | Performed by: HOSPITALIST

## 2024-11-01 RX ORDER — LACTULOSE 10 G/15ML
20 SOLUTION ORAL 3 TIMES DAILY
Status: DISCONTINUED | OUTPATIENT
Start: 2024-11-01 | End: 2024-11-03

## 2024-11-01 RX ADMIN — LACTULOSE 20 G: 20 SOLUTION ORAL at 09:11

## 2024-11-01 RX ADMIN — ALLOPURINOL 100 MG: 100 TABLET ORAL at 08:11

## 2024-11-01 RX ADMIN — DEXTROSE MONOHYDRATE 125 ML: 100 INJECTION, SOLUTION INTRAVENOUS at 08:11

## 2024-11-01 RX ADMIN — MICONAZOLE NITRATE: 20 CREAM TOPICAL at 08:11

## 2024-11-01 RX ADMIN — PANTOPRAZOLE SODIUM 40 MG: 40 TABLET, DELAYED RELEASE ORAL at 08:11

## 2024-11-01 RX ADMIN — MIDODRINE HYDROCHLORIDE 5 MG: 5 TABLET ORAL at 02:11

## 2024-11-01 RX ADMIN — MUPIROCIN: 20 OINTMENT TOPICAL at 08:11

## 2024-11-01 RX ADMIN — MIDODRINE HYDROCHLORIDE 5 MG: 5 TABLET ORAL at 08:11

## 2024-11-01 RX ADMIN — DEXTROSE MONOHYDRATE 125 ML: 100 INJECTION, SOLUTION INTRAVENOUS at 07:11

## 2024-11-01 RX ADMIN — LACTULOSE 20 G: 20 SOLUTION ORAL at 02:11

## 2024-11-01 RX ADMIN — LACTULOSE 20 G: 20 SOLUTION ORAL at 08:11

## 2024-11-01 RX ADMIN — ACETAMINOPHEN 650 MG: 325 TABLET ORAL at 10:11

## 2024-11-01 RX ADMIN — PHYTONADIONE 10 MG: 10 INJECTION, EMULSION INTRAMUSCULAR; INTRAVENOUS; SUBCUTANEOUS at 10:11

## 2024-11-01 NOTE — SUBJECTIVE & OBJECTIVE
Interval History: IR planning liver biopsy next week. She wants to sit up on the side of the bed. Will need assistance. Consult PT and OT.    Review of Systems   Constitutional:  Negative for chills and fever.   Cardiovascular:  Positive for leg swelling. Negative for chest pain.   Neurological:  Negative for seizures and syncope.     Objective:     Vital Signs (Most Recent):  Temp: 98.1 °F (36.7 °C) (11/01/24 1112)  Pulse: 86 (11/01/24 1112)  Resp: 18 (11/01/24 1112)  BP: 119/61 (11/01/24 1112)  SpO2: 100 % (11/01/24 1112) Vital Signs (24h Range):  Temp:  [97.7 °F (36.5 °C)-98.8 °F (37.1 °C)] 98.1 °F (36.7 °C)  Pulse:  [77-92] 86  Resp:  [14-20] 18  SpO2:  [92 %-100 %] 100 %  BP: ()/(51-74) 119/61     Weight: 130 kg (286 lb 9.6 oz)  Body mass index is 46.26 kg/m².    Intake/Output Summary (Last 24 hours) at 11/1/2024 1449  Last data filed at 11/1/2024 1052  Gross per 24 hour   Intake 793.75 ml   Output 1500 ml   Net -706.25 ml         Physical Exam  Vitals and nursing note reviewed.   Constitutional:       General: She is not in acute distress.     Appearance: She is well-developed. She is morbidly obese. She is not diaphoretic.      Interventions: She is not intubated.  Pulmonary:      Effort: Pulmonary effort is normal. No accessory muscle usage or respiratory distress. She is not intubated.   Musculoskeletal:         General: No tenderness or signs of injury.      Right lower leg: Edema present.      Left lower leg: Edema present.   Skin:     General: Skin is warm and dry.      Coloration: Skin is not pale.   Neurological:      Mental Status: She is alert and oriented to person, place, and time. Mental status is at baseline.      Motor: No seizure activity.   Psychiatric:         Attention and Perception: Attention normal.         Behavior: Behavior is not agitated or aggressive. Behavior is cooperative.             Significant Labs: All pertinent labs within the past 24 hours have been  reviewed.    Significant Imaging: I have reviewed all pertinent imaging results/findings within the past 24 hours.

## 2024-11-01 NOTE — PLAN OF CARE
Pt alert and oriented. Speech occasionally slurred. O2 at 3LPM, Anuric. Multiple bowel movements overnight, requested bedpan, and incontinent at other times, HD performed at bedside.      Problem: Adult Inpatient Plan of Care  Goal: Plan of Care Review  Outcome: Progressing  Goal: Patient-Specific Goal (Individualized)  Outcome: Progressing  Goal: Absence of Hospital-Acquired Illness or Injury  Outcome: Progressing  Goal: Optimal Comfort and Wellbeing  Outcome: Progressing  Goal: Readiness for Transition of Care  Outcome: Progressing     Problem: Diabetes Comorbidity  Goal: Blood Glucose Level Within Targeted Range  Outcome: Progressing     Problem: Acute Kidney Injury/Impairment  Goal: Fluid and Electrolyte Balance  Outcome: Progressing  Goal: Improved Oral Intake  Outcome: Progressing  Goal: Effective Renal Function  Outcome: Progressing     Problem: Wound  Goal: Optimal Coping  Outcome: Progressing  Goal: Optimal Functional Ability  Outcome: Progressing  Goal: Absence of Infection Signs and Symptoms  Outcome: Progressing  Goal: Improved Oral Intake  Outcome: Progressing  Goal: Optimal Pain Control and Function  Outcome: Progressing  Goal: Skin Health and Integrity  Outcome: Progressing  Goal: Optimal Wound Healing  Outcome: Progressing     Problem: Skin Injury Risk Increased  Goal: Skin Health and Integrity  Outcome: Progressing     Problem: Gastrointestinal Bleeding  Goal: Optimal Coping with Acute Illness  Outcome: Progressing  Goal: Hemostasis  Outcome: Progressing     Problem: Bariatric Environmental Safety  Goal: Safety Maintained with Care  Outcome: Progressing     Problem: Hemodialysis  Goal: Safe, Effective Therapy Delivery  Outcome: Progressing  Goal: Effective Tissue Perfusion  Outcome: Progressing  Goal: Absence of Infection Signs and Symptoms  Outcome: Progressing

## 2024-11-01 NOTE — PROGRESS NOTES
[facility] ([address] Medicine  Progress Note    Patient Name: Pilar Fernandez  MRN: [id number]  Patient Class: IP- Inpatient   Admission Date: 10/29/2024  Length of Stay: 3 days  Attending Physician: Shayne Diaz MD  Primary Care Provider: Lindsey Singletary FNP        Subjective:     Principal Problem:Direct hyperbilirubinemia        HPI:  Pilar Fernandez is a 44 year old Black woman with hypertension, hyperlipidemia, diabetes mellitus type 2 (treated with insulin), end stage renal disease on hemodialysis (), heart failure with preserved ejection fraction, chronic hypoxic respiratory failure on supplemental oxygen (3 liters/minute), history of right middle cerebral artery stroke on 2024, peripheral vascular disease, chronic lymphedema, hypothyroidism, anemia with history of blood transfusion, gout, gastroesophageal reflux disease, chronic constipation, sacral decubitus ulcer, history of  sections, history of tubal ligation, history of cholecystectomy. She lives in [place], [place].    She presented to [facility] Emergency Department on 10/29/2024 with gastrointestinal bleeding, symptomatic anemia, jaundice, hyperbilirubinemia. She reported being hospitalized at [facility] recently for leg cellulitis and being told she had hyperbilirubinemia and was advised to follow up with Hepatology at [facility] after being discharged on 10/26/2024. She had several wounds on her sacrum and legs. Stool was positive for occult blood. She had leukocytosis. Hemoglobin was 6.4 g/dL from baseline around 10. INR was elevated at 2.1. she was given 2 units of fresh frozen plasma and 2 units of packed red blood cells. Abdomen CT without contrast showed no obvious etiology of liver abnormalities or leukocytosis. She was transferred to [facility] for further evaluation of her  hyperbilirubinemia. She was admitted to Hospital Medicine Team B.    Overview/Hospital Course:  She was put on ceftriaxone. Hepatology and Advanced Endoscopy Service were consulted for hyperbilirubinemia. Gastroenterology was consulted for gastrointestinal bleed. Hematology-Oncology was consulted for anemia with hyperbilirubinemia and positive Britt test. CT showed diffuse anasarca, pericardial effusion, enlarged lymph nodes vs soft tissue densities, hepatosplenomegaly. Hepatology recommended liver biopsy. Gastroenterology recommended outpatient endoscopy, after she is medically optimized, due to lack of overt bleeding. Hematology-Oncology recommended breast ultrasound. Leukocytosis persisted. Sed rate was normal. CRP was elevated. Midodrine was started for post-dialysis hypotension. She developed hepatic encephalopathy with ammonia of 111 umol/L. She was transfused another unit of packed red blood cells in preparation for liver biopsy. She required vitamin K for elevated INR.     Interval History: IR planning liver biopsy next week. She wants to sit up on the side of the bed. Will need assistance. Consult PT and OT.    Review of Systems   Constitutional:  Negative for chills and fever.   Cardiovascular:  Positive for leg swelling. Negative for chest pain.   Neurological:  Negative for seizures and syncope.     Objective:     Vital Signs (Most Recent):  Temp: 98.1 °F (36.7 °C) (11/01/24 1112)  Pulse: 86 (11/01/24 1112)  Resp: 18 (11/01/24 1112)  BP: 119/61 (11/01/24 1112)  SpO2: 100 % (11/01/24 1112) Vital Signs (24h Range):  Temp:  [97.7 °F (36.5 °C)-98.8 °F (37.1 °C)] 98.1 °F (36.7 °C)  Pulse:  [77-92] 86  Resp:  [14-20] 18  SpO2:  [92 %-100 %] 100 %  BP: ()/(51-74) 119/61     Weight: 130 kg (286 lb 9.6 oz)  Body mass index is 46.26 kg/m².    Intake/Output Summary (Last 24 hours) at 11/1/2024 1449  Last data filed at 11/1/2024 1052  Gross per 24 hour   Intake 793.75 ml   Output 1500 ml   Net -706.25 ml          Physical Exam  Vitals and nursing note reviewed.   Constitutional:       General: She is not in acute distress.     Appearance: She is well-developed. She is morbidly obese. She is not diaphoretic.      Interventions: She is not intubated.  Pulmonary:      Effort: Pulmonary effort is normal. No accessory muscle usage or respiratory distress. She is not intubated.   Musculoskeletal:         General: No tenderness or signs of injury.      Right lower leg: Edema present.      Left lower leg: Edema present.   Skin:     General: Skin is warm and dry.      Coloration: Skin is not pale.   Neurological:      Mental Status: She is alert and oriented to person, place, and time. Mental status is at baseline.      Motor: No seizure activity.   Psychiatric:         Attention and Perception: Attention normal.         Behavior: Behavior is not agitated or aggressive. Behavior is cooperative.             Significant Labs: All pertinent labs within the past 24 hours have been reviewed.    Significant Imaging: I have reviewed all pertinent imaging results/findings within the past 24 hours.    Assessment/Plan:      * Direct hyperbilirubinemia  Hepatic encephalopathy   CT abdomen showed anasarca with hepatosplenomegaly. Appreciate Hepatology. Change home senna-docusate and polyethylene glycol to lactulose to treat hepatic encephalopathy. Liver biopsy.     Impaired mobility  Due to lymphedema and history of stroke. Her boyfriend at home helps with mobility and ADLs       Sacral decubitus ulcer, stage II  Chronic. Turn frequently. Wound care.      Leukocytosis  Monitor. Giving ceftriaxone for SBP prophylaxis.      Type 2 diabetes mellitus, with long-term current use of insulin  She takes insulin glargine 80 units HS, insulin aspart 10 units BID with meals. Giving insulin glargine 10 units HS, insulin sliding scale. Monitor glucose.      Painless jaundice  See primary problem.      Coagulopathy  Goal INR <2. Give vitamin K.    Acute  anemia  Monitoring blood counts. Giving empiric pantoprazole. Outpatient endoscopy recommended by GI.    ESRD (end stage renal disease) on dialysis  Nephrology managing. Dialysis to treat anasarca.    GERD (gastroesophageal reflux disease)  Increased home pantoprazole to BID.      Hyperlipidemia associated with type 2 diabetes mellitus  Holding home atorvastatin.      Essential hypertension  Holding home nifedipine and nebivolol. Hypotensive. Give IV albumin and start midodrine. Monitor BP.      VTE Risk Mitigation (From admission, onward)           Ordered     IP VTE HIGH RISK PATIENT  Once         10/30/24 0332     Place sequential compression device  Until discontinued         10/30/24 0051                    Discharge Planning   SHASHA: 11/4/2024     Code Status: Full Code   Is the patient medically ready for discharge?:     Reason for patient still in hospital (select all that apply): Consult recommendations  Discharge Plan A: Home Health                  Shayne Diaz MD  Department of Hospital Medicine   [facility] ([place]-[facility])

## 2024-11-01 NOTE — ASSESSMENT & PLAN NOTE
44 year old female hx of ESRD on HD MWF presenting from outside hospital as transfer for acute anemia and painless jaundice. Last HD session 10/28. Nephrology consulted for ESRD management.     Nephrology History  -Outpatient HD unit: [facility]  -Nephrologist: ?  -HD tx days: MWF  -HD tx time: 4 hrs  -Last HD tx: Monday 10/28  -HD access: LUE AVF  -HD modality: iHD  -Residual urine:   -EDW:  110 kg    Plan/Recommendations  ESRD on HD:  -No need for emergent RRT, will plan for next HD Monday to continue MWF schedule  -Patient with diffuse body wall edema on imaging and chronic lymphedema, but no respiratory distress, on baseline O2, and no significant pulmonary edema on imaging.  In the setting of possible active bleeding and hypotension, has had gentle UF with HD. Will attempt to increase UF with next tx if hgb and BP stable.  -Echo shows EF 60-65%, IVC 15 mm Hg.  -1 L daily fluid restriction  -Will continue iHD thrice weekly unless emergent indication arises  -Strict I&Os  -Pre & post HD weight  Anemia in ESRD  -Hgb goal 10-11, hgb 6.9 yesterday-->7.6 today, s/p 1 unit RBC yesterday  -Trend hgb, start Epo, may d/c if patient returns to goal of 10-11  Mineral Bone Disease in ESRD  -Resume home phos binders when diet advances  -Renal diet if not NPO

## 2024-11-01 NOTE — PROGRESS NOTES
11/01/24 0014   Vital Signs   Pulse 82   BP (!) 111/58   Pre-Hemodialysis Assessment   Treatment Status Started   Gross Bleach Negative Yes   Machine Number k29   Alarms Verified Yes   pH 7.3   Machine Temperature 99.1 °F (37.3 °C)   Dialyzer F-160   Machine Conductivity 14.1   Meter Conductivity 14   Dialysate Na (mEq/L) 138   Dialysate K (mEq/L) 3   Dialysate CA (mEq/L) 2.5   Dialysate HCO3 (mEq/L) 35   Net UF Goal 1000   Total UF Goal 1500   UF Rate 500   RO Rejection Within Limits? Yes        Hemodialysis AV Fistula Left upper arm   No placement date or time found.   Present Prior to Hospital Arrival?: Yes  Size/Length: 15 G  Location: Left upper arm   Needle Size 15ga   Site Assessment Clean;Dry;No redness;No swelling   Patency Present;Thrill;Bruit   Status Accessed   Flows Good   Dressing Intervention First dressing   Dressing Status Clean;Dry   Site Condition No complications   During Hemodialysis Assessment   Blood Flow Rate (mL/min) 350 mL/min   Dialysate Flow Rate (mL/min) 700 ml/min   Ultrafiltration Rate (mL/Hr) 500 mL/Hr   Arteriovenous Lines Secure Yes   Arterial Pressure (mmHg) 120 mmHg   Venous Pressure (mmHg) 110   TMP 30   Venous Line in Air Detector Yes   Intra-Hemodialysis Comments hd initiated     Received report from primary rn, hd initiated per md orders, using aseptic technique, accessed via LLA AVF, flows well, lines secured

## 2024-11-01 NOTE — PLAN OF CARE
Patient aao to self and situation hard of hearing but mother and  is at bedside. Patient seems more alert today. Continues on 3L o2/nc. Wound care nurse came saw patient and gave orders for triad cream to buttocks and PANCHO. Patient was hypoglycemic earlier in the shift D10 given and was effective. Patient had 4 incontinent stool episodes heike dark brown and melena, md notified no new orders given. Liver biposy was scheduled to Tuesday possibly so diet was advanced. PT and OT consulted. Safety precautions maintained bed in lowest position and plan of care ongoing.                    Problem: Adult Inpatient Plan of Care  Goal: Plan of Care Review  Outcome: Progressing  Goal: Patient-Specific Goal (Individualized)  Outcome: Progressing  Goal: Absence of Hospital-Acquired Illness or Injury  Outcome: Progressing  Goal: Optimal Comfort and Wellbeing  Outcome: Progressing  Goal: Readiness for Transition of Care  Outcome: Progressing     Problem: Diabetes Comorbidity  Goal: Blood Glucose Level Within Targeted Range  Outcome: Progressing     Problem: Acute Kidney Injury/Impairment  Goal: Fluid and Electrolyte Balance  Outcome: Progressing  Goal: Improved Oral Intake  Outcome: Progressing  Goal: Effective Renal Function  Outcome: Progressing     Problem: Wound  Goal: Optimal Coping  Outcome: Progressing  Goal: Optimal Functional Ability  Outcome: Progressing  Goal: Absence of Infection Signs and Symptoms  Outcome: Progressing  Goal: Improved Oral Intake  Outcome: Progressing  Goal: Optimal Pain Control and Function  Outcome: Progressing  Goal: Skin Health and Integrity  Outcome: Progressing  Goal: Optimal Wound Healing  Outcome: Progressing     Problem: Skin Injury Risk Increased  Goal: Skin Health and Integrity  Outcome: Progressing     Problem: Gastrointestinal Bleeding  Goal: Optimal Coping with Acute Illness  Outcome: Progressing  Goal: Hemostasis  Outcome: Progressing     Problem: Bariatric Environmental  Safety  Goal: Safety Maintained with Care  Outcome: Progressing     Problem: Hemodialysis  Goal: Safe, Effective Therapy Delivery  Outcome: Progressing  Goal: Effective Tissue Perfusion  Outcome: Progressing  Goal: Absence of Infection Signs and Symptoms  Outcome: Progressing

## 2024-11-01 NOTE — PROGRESS NOTES
[facility] ([place] [id number])  Nephrology  Progress Note    Patient Name: Pilar Fernandez  MRN: [id number]  Admission Date: 10/29/2024  Hospital Length of Stay: 3 days  Attending Provider: Shayne Diaz MD   Primary Care Physician: Lindsey Singletary FNP  Principal Problem:Direct hyperbilirubinemia    Subjective:     Interval History: NAEON. HD early AM, net UF 1 L. Breathing on baseline 3 L O2 NC today. Electrolytes stable.    Review of patient's allergies indicates:   Allergen Reactions    Tramadol Itching and Nausea And Vomiting     Current Facility-Administered Medications   Medication Frequency    acetaminophen tablet 650 mg Q4H PRN    albuterol-ipratropium 2.5 mg-0.5 mg/3 mL nebulizer solution 3 mL Q4H PRN    allopurinoL tablet 100 mg Daily    dextrose 10% bolus 125 mL 125 mL PRN    dextrose 10% bolus 250 mL 250 mL PRN    diphenhydrAMINE capsule 50 mg Q6H PRN    epoetin jitendra-epbx injection 6,500 Units Every Mon, Wed, Fri    glucagon (human recombinant) injection 1 mg PRN    glucose chewable tablet 16 g PRN    glucose chewable tablet 24 g PRN    insulin aspart U-100 pen 0-5 Units QID (AC + HS) PRN    lactulose 20 gram/30 mL solution Soln 10 g Q6H PRN    lactulose 20 gram/30 mL solution Soln 20 g TID    miconazole 2 % cream BID    midodrine tablet 5 mg TID    mupirocin 2 % ointment BID    naloxone 0.4 mg/mL injection 0.02 mg PRN    ondansetron injection 4 mg Q8H PRN    oxyCODONE immediate release tablet 5 mg Q6H PRN    pantoprazole EC tablet 40 mg BID    simethicone chewable tablet 80 mg QID PRN    sodium chloride 0.9% flush 10 mL Q12H PRN    traZODone tablet 100 mg Nightly PRN       Objective:     Vital Signs (Most Recent):  Temp: 98.3 °F (36.8 °C) (11/01/24 1513)  Pulse: 84 (11/01/24 1513)  Resp: 18 (11/01/24 1513)  BP: (!) 107/54 (11/01/24 1513)  SpO2: 100 % (11/01/24 1513) Vital Signs (24h Range):  Temp:  [98 °F (36.7 °C)-98.8 °F (37.1 °C)] 98.3 °F (36.8 °C)  Pulse:  [77-92] 84  Resp:   [14-20] 18  SpO2:  [92 %-100 %] 100 %  BP: (107-136)/(54-74) 107/54     Weight: 130 kg (286 lb 9.6 oz) (10/30/24 1245)  Body mass index is 46.26 kg/m².  Body surface area is 2.46 meters squared.    I/O last 3 completed shifts:  In: 783.8 [P.O.:460; Blood:323.8]  Out: 1501 [Other:1500; Stool:1]     Physical Exam  Constitutional:       General: She is not in acute distress.     Appearance: She is ill-appearing (chronically).      Comments: Lethargic this AM   HENT:      Head: Normocephalic.   Cardiovascular:      Rate and Rhythm: Normal rate.      Arteriovenous access: Left arteriovenous access is present.  Pulmonary:      Effort: Pulmonary effort is normal. No respiratory distress.      Breath sounds: No wheezing.   Abdominal:      General: There is distension.      Tenderness: There is no abdominal tenderness.   Musculoskeletal:      Right lower leg: Edema present.      Left lower leg: Edema present.      Comments: Chronic lymphedema to bilateral lower extremities, L > R   Skin:     General: Skin is warm and dry.          Significant Labs:  CBC:   Recent Labs   Lab 11/01/24  0550   WBC 19.36*   RBC 2.46*   HGB 7.6*   HCT 23.0*   PLT 95*   MCV 94   MCH 30.9   MCHC 33.0     CMP:   Recent Labs   Lab 11/01/24  0550   GLU 49*   CALCIUM 8.9   ALBUMIN 2.0*   PROT 7.7   *   K 4.3   CO2 19*   CL 98   BUN 37*   CREATININE 3.7*   ALKPHOS 341*   ALT 39   *   BILITOT 13.5*     All labs within the past 24 hours have been reviewed.     Assessment/Plan:     Renal/  ESRD (end stage renal disease) on dialysis  44 year old female hx of ESRD on HD MWF presenting from outside hospital as transfer for acute anemia and painless jaundice. Last HD session 10/28. Nephrology consulted for ESRD management.     Nephrology History  -Outpatient HD unit: [facility]  -Nephrologist: ?  -HD tx days: MWF  -HD tx time: 4 hrs  -Last HD tx: Monday 10/28  -HD access: LUE AVF  -HD modality: iHD  -Residual urine:   -EDW:  110  kg    Plan/Recommendations  ESRD on HD:  -No need for emergent RRT, will plan for next HD Monday to continue MWF schedule  -Patient with diffuse body wall edema on imaging and chronic lymphedema, but no respiratory distress, on baseline O2, and no significant pulmonary edema on imaging.  In the setting of possible active bleeding and hypotension, has had gentle UF with HD. Will attempt to increase UF with next tx if hgb and BP stable.  -Echo shows EF 60-65%, IVC 15 mm Hg.  -1 L daily fluid restriction  -Will continue iHD thrice weekly unless emergent indication arises  -Strict I&Os  -Pre & post HD weight  Anemia in ESRD  -Hgb goal 10-11, hgb 6.9 yesterday-->7.6 today, s/p 1 unit RBC yesterday  -Trend hgb, start Epo, may d/c if patient returns to goal of 10-11  Mineral Bone Disease in ESRD  -Resume home phos binders when diet advances  -Renal diet if not NPO      GI  * Direct hyperbilirubinemia  -management per primary    Painless jaundice  -management per primary        Thank you for your consult. I will follow-up with patient. Please contact us if you have any additional questions.    Zoya Smith PA-C  Nephrology  [facility] ([place])

## 2024-11-01 NOTE — CONSULTS
[facility] ([place] [address])    Wound Care     Patient Name:  Pilar Fernandez  MRN:  [id number]  Date: 11/1/2024  Diagnosis: Direct hyperbilirubinemia     History:  Past Medical History:   Diagnosis Date    Anemia     Anemia     Breast mass     Chronic constipation     CKD (chronic kidney disease)     Diabetes mellitus     Dialysis patient     GRZEGORZ BARRIOS, THURS, SAT.    Embolic stroke involving right middle cerebral artery 7/13/2024    Encounter for blood transfusion     Erosive gastritis 03/2016    GERD (gastroesophageal reflux disease)     Gout     High cholesterol     Hypertension     Knee pain     Thyroid disease     Unspecified cataract 09/2022     Social History     Socioeconomic History    Marital status: Single    Years of education: 11   Occupational History    Occupation:    Tobacco Use    Smoking status: Never     Passive exposure: Never    Smokeless tobacco: Never   Substance and Sexual Activity    Alcohol use: No     Alcohol/week: 0.0 standard drinks of alcohol    Drug use: No    Sexual activity: Yes     Partners: Male     Birth control/protection: None, Surgical     Social Drivers of Health     Financial Resource Strain: Low Risk  (10/30/2024)    Overall Financial Resource Strain (CARDIA)     Difficulty of Paying Living Expenses: Not hard at all   Food Insecurity: No Food Insecurity (10/30/2024)    Hunger Vital Sign     Worried About Running Out of Food in the Last Year: Never true     Ran Out of Food in the Last Year: Never true   Transportation Needs: No Transportation Needs (10/30/2024)    TRANSPORTATION NEEDS     Transportation : No   Physical Activity: Insufficiently Active (10/30/2024)    Exercise Vital Sign     Days of Exercise per Week: 4 days     Minutes of Exercise per Session: 30 min   Stress: No Stress Concern Present (10/30/2024)    [employer] [place] [employer] - Occupational Stress Questionnaire     Feeling of Stress : Only a little   Housing  Stability: Low Risk  (10/30/2024)    Housing Stability Vital Sign     Unable to Pay for Housing in the Last Year: No     Homeless in the Last Year: No     Precautions:  Allergies as of 10/29/2024 - Reviewed 10/29/2024   Allergen Reaction Noted    Tramadol Itching and Nausea And Vomiting 09/27/2017       [facility] Assessment Details / Treatment:    Patient seen for wound care: New Consult   Chart reviewed for this encounter.   Labs:   WBC (K/uL)   Date Value   11/01/2024 19.36 (H)   10/31/2024 21.76 (H)     Glucose (mg/dL)   Date Value   11/01/2024 49 (LL)   10/31/2024 56 (L)     Albumin (g/dL)   Date Value   11/01/2024 2.0 (L)   10/31/2024 1.9 (L)       Lamont Score: 13    Narrative:  Pt seen for WC consultation / follow-up and agreed to assessment  Chart reviewed for this encounter.   See Flow Sheet for additional documentation and media.    Pt laying in bed, family and bedside nurse present for assessment. Pt had BM, cleansed w/purple bath wipes revealing  Open areas with partial thickness skin loss to coccyx, left buttock and right posterior upper thigh. Applied Triad.   Under bilateral breast folds, intertrigo noted, right breast fold appears more excoriated - cleansed w/purple bath wipes applied miconazole ointment, right upper thigh appears partial thickness skin loss, applied Triad.   Bilateral heels assessed revealing clean, intact, blanchable erythema, no induration, no bogginess, no open wounds noted.     Mikhail-immerse ordered.     RECOMMENDATIONS:  Bedside nurse assess for acute changes (purulence, increased redness/swelling, increased drainage, malodor, increased pain, pallor, necrosis) please contact physician on any acute changes.    Cleanse under breast folds w/coloplast bath wipes / purple bath wipes, apply miconazole ointment.     Apply Triad correctly:      Always cleanse wound before applying Triad.  To remove Triad:   Use pH-balanced wound cleanser to soften Triad  Gently wipe without scrubbing  For  "complete removal, repeat as needed.     Gently spread Triad evenly over the area of application to the thickness of a dime.     Ensure bed settings are correct:     "Immerse"  Generally each green bar = 100lbs, this pt weight 286lbs, therefore 2 or 3 green bars should be appropriate for patient immersion.     Please use nursing judgement to ensure pt is immersed.     Continue implementing Q2H turn protocol.   Use wedge if needed.     Discussed POC with patient and primary nurse.   See EMR for orders & patient education.     Discussed nutrition and the role of protein in wound healing with the patient. Instructed patient to optimize protein for wound healing.     Bedside nursing to continue care & monitoring.  Bedside nursing to maintain pressure injury prevention interventions    Recommendations made to primary team for above plan via secure chat.     Thank you for the consult. Wound Care will continue to follow.     11/01/24 1015   WOCN Assessment   WOCN Total Time (mins) 30   Visit Date 11/01/24   Visit Time 1015   Consult Type New   WOCN Speciality Wound   Wound moisture;At risk for pressure Injury   Intervention chart review;assessed;applied;consult other service;orders   Teaching on-going;complication        Wound 10/29/24 1504 Pressure Injury midline Sacral spine   Date First Assessed/Time First Assessed: 10/29/24 1504   Present on Original Admission: Yes  Primary Wound Type: Pressure Injury  Orientation: midline  Location: Sacral spine   Wound Image    Pressure Injury Stage 2   Dressing Appearance Moist drainage   Drainage Amount Scant   Drainage Characteristics/Odor Serosanguineous;No odor   Appearance Pink;Moist;Granulating   Tissue loss description Partial thickness   Red (%), Wound Tissue Color 100 %   Periwound Area Intact;Moist   Wound Edges Defined   Care Other (see comments)  (purple bath wipes)   Dressing Applied;Other (comment)  (Triad)   Off Loading Other (see comments)  (Mikhail-Immerse ordered) "   Dressing Change Due 11/01/24        Wound 11/01/24 1015 Intertrigo Right lower Breast   Date First Assessed/Time First Assessed: 11/01/24 1015   Primary Wound Type: Intertrigo  Side: Right  Orientation: lower  Location: Breast   Wound Image         Wound 11/01/24 1015 Intertrigo Left lower Breast   Date First Assessed/Time First Assessed: 11/01/24 1015   Primary Wound Type: Intertrigo  Side: Left  Orientation: lower  Location: Breast   Wound Image         Wound 11/01/24 1015 Intertrigo lower Abdomen   Date First Assessed/Time First Assessed: 11/01/24 1015   Primary Wound Type: Intertrigo  Orientation: lower  Location: (c) Abdomen   Wound Image        Posterior thighs    Right anterior upper thigh    Right     Left

## 2024-11-01 NOTE — PROGRESS NOTES
11/01/24 0314   Vital Signs   Pulse 92   /60   Pre-Hemodialysis Assessment   Treatment Status Completed        Hemodialysis AV Fistula Left upper arm   No placement date or time found.   Present Prior to Hospital Arrival?: Yes  Size/Length: 15 G  Location: Left upper arm   Dressing Status Clean;Dry;Intact   Site Condition No complications   During Hemodialysis Assessment   Blood Flow Rate (mL/min) 350 mL/min   Dialysate Flow Rate (mL/min) 700 ml/min   Ultrafiltration Rate (mL/Hr) 500 mL/Hr   Arteriovenous Lines Secure Yes   Arterial Pressure (mmHg) 190 mmHg   Venous Pressure (mmHg) 130   Blood Volume Processed (Liters) 50 L   UF Removed (mL) 1500 mL   TMP 30   Venous Line in Air Detector Yes   Intra-Hemodialysis Comments hd completed   Post-Hemodialysis Assessment   Rinseback Volume (mL) 250 mL   Blood Volume Processed (Liters) 50 L   Dialyzer Clearance Clear   Duration of Treatment 3 minutes   Total UF (mL) 1500 mL   Net Fluid Removal 1000   Patient Response to Treatment tolerated     Hd completed, deaccessed, no bleeding noted, dressing clean and intact, report given to primary rn

## 2024-11-01 NOTE — ASSESSMENT & PLAN NOTE
Hepatic encephalopathy   CT abdomen showed anasarca with hepatosplenomegaly. Appreciate Hepatology. Change home senna-docusate and polyethylene glycol to lactulose to treat hepatic encephalopathy. Liver biopsy.

## 2024-11-01 NOTE — SUBJECTIVE & OBJECTIVE
Interval History: NAEON. HD early AM, net UF 1 L. Breathing on baseline 3 L O2 NC today. Electrolytes stable.    Review of patient's allergies indicates:   Allergen Reactions    Tramadol Itching and Nausea And Vomiting     Current Facility-Administered Medications   Medication Frequency    acetaminophen tablet 650 mg Q4H PRN    albuterol-ipratropium 2.5 mg-0.5 mg/3 mL nebulizer solution 3 mL Q4H PRN    allopurinoL tablet 100 mg Daily    dextrose 10% bolus 125 mL 125 mL PRN    dextrose 10% bolus 250 mL 250 mL PRN    diphenhydrAMINE capsule 50 mg Q6H PRN    epoetin jitendra-epbx injection 6,500 Units Every Mon, Wed, Fri    glucagon (human recombinant) injection 1 mg PRN    glucose chewable tablet 16 g PRN    glucose chewable tablet 24 g PRN    insulin aspart U-100 pen 0-5 Units QID (AC + HS) PRN    lactulose 20 gram/30 mL solution Soln 10 g Q6H PRN    lactulose 20 gram/30 mL solution Soln 20 g TID    miconazole 2 % cream BID    midodrine tablet 5 mg TID    mupirocin 2 % ointment BID    naloxone 0.4 mg/mL injection 0.02 mg PRN    ondansetron injection 4 mg Q8H PRN    oxyCODONE immediate release tablet 5 mg Q6H PRN    pantoprazole EC tablet 40 mg BID    simethicone chewable tablet 80 mg QID PRN    sodium chloride 0.9% flush 10 mL Q12H PRN    traZODone tablet 100 mg Nightly PRN       Objective:     Vital Signs (Most Recent):  Temp: 98.3 °F (36.8 °C) (11/01/24 1513)  Pulse: 84 (11/01/24 1513)  Resp: 18 (11/01/24 1513)  BP: (!) 107/54 (11/01/24 1513)  SpO2: 100 % (11/01/24 1513) Vital Signs (24h Range):  Temp:  [98 °F (36.7 °C)-98.8 °F (37.1 °C)] 98.3 °F (36.8 °C)  Pulse:  [77-92] 84  Resp:  [14-20] 18  SpO2:  [92 %-100 %] 100 %  BP: (107-136)/(54-74) 107/54     Weight: 130 kg (286 lb 9.6 oz) (10/30/24 1245)  Body mass index is 46.26 kg/m².  Body surface area is 2.46 meters squared.    I/O last 3 completed shifts:  In: 783.8 [P.O.:460; Blood:323.8]  Out: 1501 [Other:1500; Stool:1]     Physical Exam  Constitutional:        General: She is not in acute distress.     Appearance: She is ill-appearing (chronically).      Comments: Lethargic this AM   HENT:      Head: Normocephalic.   Cardiovascular:      Rate and Rhythm: Normal rate.      Arteriovenous access: Left arteriovenous access is present.  Pulmonary:      Effort: Pulmonary effort is normal. No respiratory distress.      Breath sounds: No wheezing.   Abdominal:      General: There is distension.      Tenderness: There is no abdominal tenderness.   Musculoskeletal:      Right lower leg: Edema present.      Left lower leg: Edema present.      Comments: Chronic lymphedema to bilateral lower extremities, L > R   Skin:     General: Skin is warm and dry.          Significant Labs:  CBC:   Recent Labs   Lab 11/01/24  0550   WBC 19.36*   RBC 2.46*   HGB 7.6*   HCT 23.0*   PLT 95*   MCV 94   MCH 30.9   MCHC 33.0     CMP:   Recent Labs   Lab 11/01/24  0550   GLU 49*   CALCIUM 8.9   ALBUMIN 2.0*   PROT 7.7   *   K 4.3   CO2 19*   CL 98   BUN 37*   CREATININE 3.7*   ALKPHOS 341*   ALT 39   *   BILITOT 13.5*     All labs within the past 24 hours have been reviewed.

## 2024-11-01 NOTE — TREATMENT PLAN
Hepatology Treatment Plan    Pilar Fernandez is a 44 y.o. female admitted to hospital 10/29/2024 (Hospital Day: 4) due to Direct hyperbilirubinemia.     Interval History    Echo with severe TV regurgitation, mildly reduced RV function, and dilated RV. PASP 51 and IVC 15.      Objective  Temp:  [97.7 °F (36.5 °C)-98.8 °F (37.1 °C)] 98.1 °F (36.7 °C) (11/01 1112)  Pulse:  [77-92] 86 (11/01 1112)  BP: ()/(51-74) 119/61 (11/01 1112)  Resp:  [14-20] 18 (11/01 1112)  SpO2:  [92 %-100 %] 100 % (11/01 1112)    Laboratory    Lab Results   Component Value Date    WBC 19.36 (H) 11/01/2024    HGB 7.6 (L) 11/01/2024    HCT 23.0 (L) 11/01/2024    MCV 94 11/01/2024    PLT 95 (L) 11/01/2024       Lab Results   Component Value Date     (L) 11/01/2024    K 4.3 11/01/2024    CL 98 11/01/2024    CO2 19 (L) 11/01/2024    BUN 37 (H) 11/01/2024    CREATININE 3.7 (H) 11/01/2024    CALCIUM 8.9 11/01/2024       Lab Results   Component Value Date    ALBUMIN 2.0 (L) 11/01/2024    ALT 39 11/01/2024     (H) 11/01/2024    ALKPHOS 341 (H) 11/01/2024    BILITOT 13.5 (H) 11/01/2024       Lab Results   Component Value Date    INR 2.1 (H) 11/01/2024    INR 1.9 (H) 10/31/2024    INR 1.9 (H) 10/30/2024       MELD 3.0: 39 at 11/1/2024  5:50 AM  MELD-Na: 38 at 11/1/2024  5:50 AM  Calculated from:  Serum Creatinine: On dialysis. Using the maximum value.  Serum Sodium: 132 mmol/L at 11/1/2024  5:50 AM  Total Bilirubin: 13.5 mg/dL at 11/1/2024  5:50 AM  Serum Albumin: 2 g/dL at 11/1/2024  5:50 AM  INR(ratio): 2.1 at 11/1/2024  5:50 AM  Age at listing (hypothetical): 44 years  Sex: Female at 11/1/2024  5:50 AM      Assessment    Pilar Fernandez is a 44 y.o. female with recent CVA (on DAPT, 7/2024) with ongoing left sided deficits, history of breast mass, HTN, poorly controlled T2DM, ESRD (~6 years) on HD MWF, lymphedema, stage II sacral decubitus ulcer and chronic hypoxic RF on 3 L's NC that presents to [facility] from [facility] for  evaluation of new onset painless jaundice, first noticed by her mother over the weekend. Hepatology consulted on 10/30 for evaluation of elevated LFT's.      Imaging:   10/30/24- US Liver with Doppler- Enlarged liver measuring 25.9 cm. Homogeneous echotexture. No focal hepatic lesions. The common duct is not dilated, measuring 4 mm. No intrahepatic ductal dilatation. Pancreas appears normal. Splenomegaly noted. Satisfactory doppler evaluation.   10/30/24- CT C/A/P with contrast- Diffuse body wall anasarca throughout the visualized chest, abdomen, pelvis, and proximal thighs. Numerous enlarged lymph node conglomerates and/or soft tissue masses about the left axillary soft tissues, left greater than right inguinal regions, left supraclavicular soft tissues, and retroperitoneum concerning for neoplastic or lymphoproliferative process. Large volume pericardial effusion. Cardiomegaly with pulmonary vascular congestion, interlobular septal thickening, and scattered ground-glass attenuation, in keeping with evolving pulmonary edema. Diffuse hepatosplenomegaly.  No obvious focal hepatic lesions, noting limitations from diffuse body wall anasarca.     Problem List:  Painless Jaundice   Acute Liver Injury/Elevated LFT's/Elevated INR   ESRD with Volume Overload (reportedly on HD for ~6 years)   Anasarca   Chronic Hypoxic Respiratory Failure (on 3 L's O2 since 2022)   Morbid Obesity (BMI 46)   Debility (bed bound now, previously with walker)   History of CVA (7/2024) with left sided weakness and right eye droop on DAPT (ASA/Plavix)   Lymphadenopathy      Patient is likely not a liver transplant candidate for a multitude of reasons including co-morbidities and functional status among others. Liver biopsy would help prognosticate presence/extent of scarring in liver as her imaging is not definitive, but her enzymes and liver function testing is suspect for underlying liver damage.   DILI is a possibility (recent clindamycin use)  vs underlying MASH cirrhosis vs Infiltrative process given diffuse LAD.      Recommendations:  - Recommend IR consult for liver biopsy with pressure measurements   - Check daily CBC, CMP and INR  - Please avoid any sedating medications   - Given echo and BNP, would recommend optimizing volume status as able with HD/nephrology assistance.   - Follow up serologic workup for CLD: f/u PETH, IgG, A1AT, BRIAN and HFE    - PT/OT     Thank you for involving us in the care of Pilar Fernandez. Please call with any additional concerns or questions.    Angela Crowe DO   Gastroenterology Fellow PGY- V  [facility]

## 2024-11-02 LAB
ALBUMIN SERPL BCP-MCNC: 2 G/DL (ref 3.5–5.2)
ALP SERPL-CCNC: 412 U/L (ref 40–150)
ALT SERPL W/O P-5'-P-CCNC: 44 U/L (ref 10–44)
ANION GAP SERPL CALC-SCNC: 16 MMOL/L (ref 8–16)
ANISOCYTOSIS BLD QL SMEAR: ABNORMAL
AST SERPL-CCNC: 131 U/L (ref 10–40)
BASO STIPL BLD QL SMEAR: ABNORMAL
BASOPHILS NFR BLD: 1 % (ref 0–1.9)
BILIRUB SERPL-MCNC: 14.7 MG/DL (ref 0.1–1)
BUN SERPL-MCNC: 42 MG/DL (ref 6–20)
CALCIUM SERPL-MCNC: 9.3 MG/DL (ref 8.7–10.5)
CHLORIDE SERPL-SCNC: 97 MMOL/L (ref 95–110)
CO2 SERPL-SCNC: 19 MMOL/L (ref 23–29)
CREAT SERPL-MCNC: 4.6 MG/DL (ref 0.5–1.4)
DIFFERENTIAL METHOD BLD: ABNORMAL
EOSINOPHIL NFR BLD: 0 % (ref 0–8)
ERYTHROCYTE [DISTWIDTH] IN BLOOD BY AUTOMATED COUNT: 20.9 % (ref 11.5–14.5)
EST. GFR  (NO RACE VARIABLE): 11.3 ML/MIN/1.73 M^2
GLUCOSE SERPL-MCNC: 98 MG/DL (ref 70–110)
HCT VFR BLD AUTO: 23 % (ref 37–48.5)
HGB BLD-MCNC: 7.6 G/DL (ref 12–16)
IMM GRANULOCYTES # BLD AUTO: ABNORMAL K/UL (ref 0–0.04)
IMM GRANULOCYTES NFR BLD AUTO: ABNORMAL % (ref 0–0.5)
INR PPP: 1.7 (ref 0.8–1.2)
LYMPHOCYTES NFR BLD: 8 % (ref 18–48)
MAGNESIUM SERPL-MCNC: 2.3 MG/DL (ref 1.6–2.6)
MCH RBC QN AUTO: 29 PG (ref 27–31)
MCHC RBC AUTO-ENTMCNC: 33 G/DL (ref 32–36)
MCV RBC AUTO: 88 FL (ref 82–98)
METAMYELOCYTES NFR BLD MANUAL: 2 %
MONOCYTES NFR BLD: 4 % (ref 4–15)
NEUTROPHILS NFR BLD: 83 % (ref 38–73)
NEUTS BAND NFR BLD MANUAL: 2 %
NRBC BLD-RTO: 7 /100 WBC
PHOSPHATE SERPL-MCNC: 4.8 MG/DL (ref 2.7–4.5)
PLATELET # BLD AUTO: 99 K/UL (ref 150–450)
PMV BLD AUTO: ABNORMAL FL (ref 9.2–12.9)
POCT GLUCOSE: 104 MG/DL (ref 70–110)
POCT GLUCOSE: 126 MG/DL (ref 70–110)
POIKILOCYTOSIS BLD QL SMEAR: SLIGHT
POLYCHROMASIA BLD QL SMEAR: ABNORMAL
POTASSIUM SERPL-SCNC: 4.9 MMOL/L (ref 3.5–5.1)
PROT SERPL-MCNC: 7.8 G/DL (ref 6–8.4)
PROTHROMBIN TIME: 18.2 SEC (ref 9–12.5)
RBC # BLD AUTO: 2.62 M/UL (ref 4–5.4)
SODIUM SERPL-SCNC: 132 MMOL/L (ref 136–145)
SPHEROCYTES BLD QL SMEAR: ABNORMAL
TARGETS BLD QL SMEAR: ABNORMAL
WBC # BLD AUTO: 20.51 K/UL (ref 3.9–12.7)

## 2024-11-02 PROCEDURE — 20600001 HC STEP DOWN PRIVATE ROOM

## 2024-11-02 PROCEDURE — 97530 THERAPEUTIC ACTIVITIES: CPT

## 2024-11-02 PROCEDURE — 97112 NEUROMUSCULAR REEDUCATION: CPT

## 2024-11-02 PROCEDURE — 85027 COMPLETE CBC AUTOMATED: CPT | Performed by: HOSPITALIST

## 2024-11-02 PROCEDURE — 36415 COLL VENOUS BLD VENIPUNCTURE: CPT | Performed by: STUDENT IN AN ORGANIZED HEALTH CARE EDUCATION/TRAINING PROGRAM

## 2024-11-02 PROCEDURE — 97535 SELF CARE MNGMENT TRAINING: CPT

## 2024-11-02 PROCEDURE — 97161 PT EVAL LOW COMPLEX 20 MIN: CPT

## 2024-11-02 PROCEDURE — 83735 ASSAY OF MAGNESIUM: CPT | Performed by: STUDENT IN AN ORGANIZED HEALTH CARE EDUCATION/TRAINING PROGRAM

## 2024-11-02 PROCEDURE — 25000003 PHARM REV CODE 250: Performed by: STUDENT IN AN ORGANIZED HEALTH CARE EDUCATION/TRAINING PROGRAM

## 2024-11-02 PROCEDURE — 80053 COMPREHEN METABOLIC PANEL: CPT | Performed by: STUDENT IN AN ORGANIZED HEALTH CARE EDUCATION/TRAINING PROGRAM

## 2024-11-02 PROCEDURE — 99232 SBSQ HOSP IP/OBS MODERATE 35: CPT | Mod: ,,,

## 2024-11-02 PROCEDURE — 25000003 PHARM REV CODE 250: Performed by: HOSPITALIST

## 2024-11-02 PROCEDURE — 85007 BL SMEAR W/DIFF WBC COUNT: CPT | Performed by: HOSPITALIST

## 2024-11-02 PROCEDURE — 97165 OT EVAL LOW COMPLEX 30 MIN: CPT

## 2024-11-02 PROCEDURE — 85610 PROTHROMBIN TIME: CPT | Performed by: STUDENT IN AN ORGANIZED HEALTH CARE EDUCATION/TRAINING PROGRAM

## 2024-11-02 PROCEDURE — 84100 ASSAY OF PHOSPHORUS: CPT | Performed by: STUDENT IN AN ORGANIZED HEALTH CARE EDUCATION/TRAINING PROGRAM

## 2024-11-02 RX ORDER — SODIUM CHLORIDE 9 MG/ML
INJECTION, SOLUTION INTRAVENOUS ONCE
Status: CANCELLED | OUTPATIENT
Start: 2024-11-04

## 2024-11-02 RX ADMIN — MIDODRINE HYDROCHLORIDE 5 MG: 5 TABLET ORAL at 08:11

## 2024-11-02 RX ADMIN — MICONAZOLE NITRATE: 20 CREAM TOPICAL at 08:11

## 2024-11-02 RX ADMIN — LACTULOSE 20 G: 20 SOLUTION ORAL at 08:11

## 2024-11-02 RX ADMIN — MUPIROCIN: 20 OINTMENT TOPICAL at 08:11

## 2024-11-02 RX ADMIN — MIDODRINE HYDROCHLORIDE 5 MG: 5 TABLET ORAL at 02:11

## 2024-11-02 RX ADMIN — PANTOPRAZOLE SODIUM 40 MG: 40 TABLET, DELAYED RELEASE ORAL at 08:11

## 2024-11-02 RX ADMIN — ALLOPURINOL 100 MG: 100 TABLET ORAL at 08:11

## 2024-11-02 NOTE — PLAN OF CARE
Pt with improved speech, and orientation. Lactulose given, multiple bowel movements overnight. Transferred to an immersion bariatric bed. Pt refused pain medication when offered. Remains on O2@3LPM. Pt stated several times that she wanted to get up. Reminded patient that until physical therapy worked with her she could not get out of the bed.      Problem: Adult Inpatient Plan of Care  Goal: Plan of Care Review  Outcome: Progressing  Goal: Patient-Specific Goal (Individualized)  Outcome: Progressing  Goal: Absence of Hospital-Acquired Illness or Injury  Outcome: Progressing  Goal: Optimal Comfort and Wellbeing  Outcome: Progressing  Goal: Readiness for Transition of Care  Outcome: Progressing     Problem: Diabetes Comorbidity  Goal: Blood Glucose Level Within Targeted Range  Outcome: Progressing     Problem: Acute Kidney Injury/Impairment  Goal: Fluid and Electrolyte Balance  Outcome: Progressing  Goal: Improved Oral Intake  Outcome: Progressing  Goal: Effective Renal Function  Outcome: Progressing     Problem: Wound  Goal: Optimal Coping  Outcome: Progressing  Goal: Optimal Functional Ability  Outcome: Progressing  Goal: Absence of Infection Signs and Symptoms  Outcome: Progressing  Goal: Improved Oral Intake  Outcome: Progressing  Goal: Optimal Pain Control and Function  Outcome: Progressing  Goal: Skin Health and Integrity  Outcome: Progressing  Goal: Optimal Wound Healing  Outcome: Progressing     Problem: Skin Injury Risk Increased  Goal: Skin Health and Integrity  Outcome: Progressing     Problem: Gastrointestinal Bleeding  Goal: Optimal Coping with Acute Illness  Outcome: Progressing  Goal: Hemostasis  Outcome: Progressing     Problem: Bariatric Environmental Safety  Goal: Safety Maintained with Care  Outcome: Progressing     Problem: Hemodialysis  Goal: Safe, Effective Therapy Delivery  Outcome: Progressing  Goal: Effective Tissue Perfusion  Outcome: Progressing  Goal: Absence of Infection Signs and  Symptoms  Outcome: Progressing

## 2024-11-02 NOTE — PT/OT/SLP EVAL
Occupational Therapy   Evaluation and Co -Treatment (SPT with SUP of PT)  Co-treatment performed due to patient's multiple deficits requiring two skilled therapists to appropriately and safely assess patient's strength and endurance while facilitating functional tasks in addition to accommodating for patient's activity tolerance.      Name: Pilar Fernandez  MRN: [id number]  Admitting Diagnosis: Direct hyperbilirubinemia  Recent Surgery: * No surgery found *      Recommendations:     Discharge Recommendations: High Intensity Therapy  Discharge Equipment Recommendations:  bedside commode, bath bench, other (see comments) (w/c pending progress/TBD next level of care)  Barriers to discharge:  Other (Comment) (increased skilled A required)    Assessment:     Pilar Fernandez is a 44 y.o. female with a medical diagnosis of Direct hyperbilirubinemia.  She presents with performance deficits affecting function: weakness, impaired functional mobility, impaired endurance, impaired self care skills, gait instability, impaired cognition, decreased lower extremity function, decreased upper extremity function, edema, impaired fine motor, decreased safety awareness, impaired cardiopulmonary response to activity. Pt presents in bed, agreeable and with repeat requests for EOB/OOB mobility per nsg. Pt A&Ox4, follows commands well t/o session. Pt had BM to chucks and cleaned prior to further mobility as well as to assess rolling/bed mob. Pt is participative and achieves standing at best, unable to advance limb/shift weight without significant A so tsf to chair deferred to prevent fall despite pt desire. Per spouse + pt, about 1mo prior pt was [place]ain[place] with cane vs. RW home and community, and has since declined with increasing edema, feeling unwell, and progressive weakness.    Pt is a good candidate for high intensity tx at the post acute level to address deficits present impacting safe and effective participation and IND in Adls  and functional mobility. Pt demonstrates ability to tolerate 3hrs/daily or 15hr a week therapy for functional gains.         Rehab Prognosis: Good; patient would benefit from acute skilled OT services to address these deficits and reach maximum level of function.       Plan:     Patient to be seen 5 x/week to address the above listed problems via self-care/home management, therapeutic activities, therapeutic exercises, neuromuscular re-education  Plan of Care Expires: 11/30/24  Plan of Care Reviewed with: patient, spouse    Subjective     Chief Complaint: Being stuck in bed all for some time  Patient/Family Comments/goals: to regain mobility for IND OOB activity    Occupational Profile:  Living Environment: Ss (w/c accessible) with spouse, t/s with 1 GB.  Previous level of function: >1mo pt Lainey with cane vs. Walker, Edwin for LBD, otherwise ind ADLs.  Roles and Routines: watches tv, spouse  Equipment Used at Home: rollator, walker, rolling, cane, quad, glucometer, grab bar  Assistance upon Discharge: Spouse    Pain/Comfort:  Pain Rating 1: 0/10  Pain Rating Post-Intervention 1: 0/10    Patients cultural, spiritual, Latter day conflicts given the current situation: no    Objective:     Communicated with: nsg prior to session.  Patient found HOB elevated with telemetry, oxygen upon OT entry to room.    General Precautions: Standard, fall  Orthopedic Precautions: N/A  Braces: N/A  Respiratory Status: Nasal cannula    Occupational Performance:    Bed Mobility:    Patient completed Rolling/Turning to Left with  moderate assistance and with side rail  Patient completed Rolling/Turning to Right with moderate assistance and with side rail  Patient completed Scooting/Bridging with total assistance and 2 persons  Patient completed Supine to Sit with maximal assistance  Patient completed Sit to Supine with maximal assistance and 2 persons  Sits EOB SBA static, CGA dynamic with decreased trunk excursion range and  balance    Functional Mobility/Transfers:  Patient completed Sit <> Stand Transfer with maxA x1, modA x1  with  hand-held assist   Functional Mobility: Pt trials R scooting to assess hip clearance effort without A, unable to clear and advance but makes effort with good mechanics. Pt cued and facilitated for weight shift to attempt L lateral step to R LE, modA to weight shift to R, unable to advance limb and after effort fatigues sittign back to EOB.     Activities of Daily Living:  Upper Body Dressing: moderate assistance to manage gown (doff soiled, don clean) from bed, limited by L UE edema and weakness impacting reach for IND  Lower Body Dressing: total assistance EOB to don  socks, spouse reportedly dons at home  Toileting: total assistance pt rolls R <> L With modA and contributes to top leg lift for thoroughness of hygiene    Cognitive/Visual Perceptual:  Cognitive/Psychosocial Skills:     -       Oriented to: Person, Place, Time, and Situation   -       Follows Commands/attention:Follows two-step commands  -       Safety awareness/insight to disability: impaired (while pt will verbalize and correct for position concerns EOB/fall risks she does not have full insight to deficits and requires maximal education and reinforcement behind reasoning behind decision to hold chair tsf due to high fall risk given weakness and lack of weight shift in standing.     Physical Exam:  Postural examination/scapula alignment:    -       Rounded shoulders  Edema:  Severe B LE, L UE, impacts mobiltiy and ROM due to degree of edema  Dominant hand:    -       Right  Upper Extremity Range of Motion:     -       Right Upper Extremity: WFL, mildly decreased by weakness  -       Left Upper Extremity: WFL except L hand limited by edema blocking, decreased d/t weight of excess fluid + weakness   Upper Extremity Strength:    -       Right Upper Extremity: WFL  -       Left Upper Extremity: Deficits: 3 to 3+/5 (edema impact tolerance  to resistance and perforamnce)  Fine Motor Coordination:    -       Impaired  R intact, L limited d/t edema impacting range and function    AMPAC 6 Click ADL:  AMPAC Total Score: 15    Treatment & Education:  Pt presents with trunk deviated right, increased propping of posture. Pt provided skilled handling seated EOB to support postural awareness, midline and upright positioning, and manage tone from extended time in bed/supine position. Pt tolerates >25minutes in total given stand by static, CGA dynamic to support trunk strength and improved seated balance for ADLs, environmental and social engagement, functional mobility.      Pt educated on and white board updated for B UE A ROM exercises to compelte for normalization of movements and ease of self mobiltiy for progressive IND. SPT provides LB exercises also written to board by writing OT for pt carryover and participation outside tx for progress.     -B LE elevated on pillows to manage edema, Pt bed in max upright position to facilitate core engagement and manage pt's desire for sitting up more.     -Education on energy conservation and task modification to maximize safety and (I) during ADLs and mobility  -Education on importance of OOB activity to improve overall activity tolerance and promote recovery  -Pt educated to call for assistance and to transfer with hospital staff only  -Provided education regarding role of OT, POC, & discharge recommendations with pt verbalizing understanding.      Pt had no further questions & when asked whether there were any concerns pt reported none.     Patient left HOB elevated with all lines intact, call button in reach, nsg notified of pt performance and informed pt is appropriate for EOB with nsg x2 assist for up and down x1 assist once seated with bed in lowest position and rails down for pt UE support, and spouse present    GOALS:   Multidisciplinary Problems       Occupational Therapy Goals          Problem: Occupational  Therapy    Goal Priority Disciplines Outcome Interventions   Occupational Therapy Goal     OT, PT/OT Progressing    Description: Goals to be met by: 11/30/24     Patient will increase functional independence with ADLs by performing:    UE Dressing with Set-up Assistance.  LE Dressing with Moderate Assistance.  Grooming while seated with Set-up Assistance.  Toileting from bedside commode with Moderate Assistance for hygiene and clothing management.   Supine to sit with Contact Guard Assistance.  Stand pivot transfers with Maximum Assistance.  Toilet transfer to bedside commode with Maximum Assistance.    DME needs:   Tub transfer bench for t/s combo to ensure pt return to hygiene and allow safe and effective bathing in home environment with minimized fall risk due to functional mobility impairments.      Patient has a mobility limitation that significantly impairs their ability to participate in one or more mobility related activities of daily living, including toileting. This deficit can be resolved by using a bedside commode. Patient demonstrates mobility limitations that will cause them to be confined to one room at home without bathroom access for up to 30 days. Using a bedside commode will greatly improve the patient's ability to participate in MRADLs.                          History:     Past Medical History:   Diagnosis Date    Anemia     Anemia     Breast mass     Chronic constipation     CKD (chronic kidney disease)     Diabetes mellitus     Dialysis patient     MON, ISABELAE, THURS, SAT.    Embolic stroke involving right middle cerebral artery 7/13/2024    Encounter for blood transfusion     Erosive gastritis 03/2016    GERD (gastroesophageal reflux disease)     Gout     High cholesterol     Hypertension     Knee pain     Thyroid disease     Unspecified cataract 09/2022         Past Surgical History:   Procedure Laterality Date    AV FISTULA PLACEMENT Left 8/5/2019    Procedure: creation av fistula;  Surgeon:  Buck Fernandez MD;  Location: [facility];  Service: Cardiovascular;  Laterality: Left;  Left Radialcephalic: PACU     SECTION  , , ,     CHOLECYSTECTOMY      COLONOSCOPY N/A 10/23/2017    Procedure: COLONOSCOPY;  Surgeon: Bri Harry MD;  Location: [facility];  Service: Endoscopy;  Laterality: N/A;    EXCISIONAL BIOPSY Left 2022    Procedure: EXCISIONAL BIOPSY;  Surgeon: Mick Juarez MD;  Location: [facility];  Service: General;  Laterality: Left;    TUBAL LIGATION      UPPER GASTROINTESTINAL ENDOSCOPY  2016    Erosive Gastritis-Dr Bailey       Time Tracking:     OT Date of Treatment: 24  OT Start Time: 907  OT Stop Time: 1000  OT Total Time (min): 53 min    Billable Minutes:Evaluation 10  Self Care/Home Management 30  Therapeutic Activity 13    2024

## 2024-11-02 NOTE — PLAN OF CARE
Goals to be met by: 11/30/24     Patient will increase functional independence with ADLs by performing:    UE Dressing with Set-up Assistance.  LE Dressing with Moderate Assistance.  Grooming while seated with Set-up Assistance.  Toileting from bedside commode with Moderate Assistance for hygiene and clothing management.   Supine to sit with Contact Guard Assistance.  Stand pivot transfers with Maximum Assistance.  Toilet transfer to bedside commode with Maximum Assistance.    DME needs: w/c possible pending progress  Tub transfer bench for t/s combo to ensure pt return to hygiene and allow safe and effective bathing in home environment with minimized fall risk due to functional mobility impairments.      Patient has a mobility limitation that significantly impairs their ability to participate in one or more mobility related activities of daily living, including toileting. This deficit can be resolved by using a bedside commode. Patient demonstrates mobility limitations that will cause them to be confined to one room at home without bathroom access for up to 30 days. Using a bedside commode will greatly improve the patient's ability to participate in MRADLs.

## 2024-11-02 NOTE — PLAN OF CARE
Patient aaox4. Patient complains of no pain. Patient to be prepared for dialysis on 11/04/24. Patient to be npo at midnight. Patient blood sugar should be checked q6. Patient had loose red dark bowel movement. Patient turned q2. Patient due medication administered and patient tolerating well. Plain of care reviewed with patient and patient verbalized understanding. Patient safety protocols observed, call light within reach. Patient made comfortable in bed. No other concerns at this time.        Problem: Adult Inpatient Plan of Care  Goal: Plan of Care Review  Outcome: Progressing  Goal: Patient-Specific Goal (Individualized)  Outcome: Progressing  Goal: Absence of Hospital-Acquired Illness or Injury  Outcome: Progressing  Goal: Optimal Comfort and Wellbeing  Outcome: Progressing  Goal: Readiness for Transition of Care  Outcome: Progressing     Problem: Diabetes Comorbidity  Goal: Blood Glucose Level Within Targeted Range  Outcome: Progressing     Problem: Acute Kidney Injury/Impairment  Goal: Fluid and Electrolyte Balance  Outcome: Progressing  Goal: Improved Oral Intake  Outcome: Progressing  Goal: Effective Renal Function  Outcome: Progressing     Problem: Wound  Goal: Optimal Coping  Outcome: Progressing  Goal: Optimal Functional Ability  Outcome: Progressing  Goal: Absence of Infection Signs and Symptoms  Outcome: Progressing  Goal: Improved Oral Intake  Outcome: Progressing  Goal: Optimal Pain Control and Function  Outcome: Progressing  Goal: Skin Health and Integrity  Outcome: Progressing  Goal: Optimal Wound Healing  Outcome: Progressing     Problem: Skin Injury Risk Increased  Goal: Skin Health and Integrity  Outcome: Progressing     Problem: Gastrointestinal Bleeding  Goal: Optimal Coping with Acute Illness  Outcome: Progressing  Goal: Hemostasis  Outcome: Progressing     Problem: Bariatric Environmental Safety  Goal: Safety Maintained with Care  Outcome: Progressing     Problem: Hemodialysis  Goal: Safe,  Effective Therapy Delivery  Outcome: Progressing  Goal: Effective Tissue Perfusion  Outcome: Progressing  Goal: Absence of Infection Signs and Symptoms  Outcome: Progressing

## 2024-11-02 NOTE — ASSESSMENT & PLAN NOTE
Hepatic encephalopathy   CT abdomen showed anasarca with hepatosplenomegaly. Appreciate Hepatology. Changed home senna-docusate and polyethylene glycol to lactulose to treat hepatic encephalopathy. Liver biopsy.

## 2024-11-02 NOTE — ASSESSMENT & PLAN NOTE
Having intermittent GI bleed. Monitoring blood counts. Giving empiric pantoprazole. Outpatient endoscopy recommended by GI. If GI bleed acutely worsens, ask for inpatient endoscopy.

## 2024-11-02 NOTE — SUBJECTIVE & OBJECTIVE
Interval History: IR planning liver biopsy when able. Will make her NPO after midnight tonight. Does not have labs done yet today.    Review of Systems   Constitutional:  Negative for chills and fever.   Cardiovascular:  Positive for leg swelling. Negative for chest pain.   Neurological:  Negative for seizures and syncope.     Objective:     Vital Signs (Most Recent):  Temp: 98.8 °F (37.1 °C) (11/02/24 0834)  Pulse: 83 (11/02/24 0834)  Resp: 18 (11/02/24 0834)  BP: 116/68 (11/02/24 0834)  SpO2: (!) 93 % (11/02/24 0834) Vital Signs (24h Range):  Temp:  [97.9 °F (36.6 °C)-98.9 °F (37.2 °C)] 98.8 °F (37.1 °C)  Pulse:  [77-90] 83  Resp:  [16-20] 18  SpO2:  [93 %-100 %] 93 %  BP: (107-128)/(54-68) 116/68     Weight: 130 kg (286 lb 9.6 oz)  Body mass index is 46.26 kg/m².    Intake/Output Summary (Last 24 hours) at 11/2/2024 0859  Last data filed at 11/1/2024 1828  Gross per 24 hour   Intake 230 ml   Output --   Net 230 ml         Physical Exam  Vitals and nursing note reviewed.   Constitutional:       General: She is not in acute distress.     Appearance: She is well-developed. She is morbidly obese. She is not diaphoretic.      Interventions: She is not intubated.  Pulmonary:      Effort: Pulmonary effort is normal. No accessory muscle usage or respiratory distress. She is not intubated.   Musculoskeletal:         General: No tenderness or signs of injury.      Right lower leg: Edema present.      Left lower leg: Edema present.   Skin:     General: Skin is warm and dry.      Coloration: Skin is not pale.   Neurological:      Mental Status: She is alert and oriented to person, place, and time. Mental status is at baseline.      Motor: No seizure activity.   Psychiatric:         Attention and Perception: Attention normal.         Behavior: Behavior is not agitated or aggressive. Behavior is cooperative.             Significant Labs: All pertinent labs within the past 24 hours have been reviewed.    Significant Imaging: I  have reviewed all pertinent imaging results/findings within the past 24 hours.

## 2024-11-02 NOTE — PROGRESS NOTES
[facility] ([place] [id number])  Nephrology  Progress Note    Patient Name: Pilar Fernandez  MRN: [id number]  Admission Date: 10/29/2024  Hospital Length of Stay: 4 days  Attending Provider: Shayne Diaz MD   Primary Care Physician: Lindsey Singletary FNP  Principal Problem:Direct hyperbilirubinemia    Subjective:     Interval History: NAEON. Breathing comfortably on baseline 3 L O2 NC this AM. Continues with dark BM per nursing staff. Hgb stable today. HD yesterday, net UF 1 L. Electrolytes stable.     Review of patient's allergies indicates:   Allergen Reactions    Tramadol Itching and Nausea And Vomiting     Current Facility-Administered Medications   Medication Frequency    acetaminophen tablet 650 mg Q4H PRN    albuterol-ipratropium 2.5 mg-0.5 mg/3 mL nebulizer solution 3 mL Q4H PRN    allopurinoL tablet 100 mg Daily    dextrose 10% bolus 125 mL 125 mL PRN    dextrose 10% bolus 250 mL 250 mL PRN    diphenhydrAMINE capsule 50 mg Q6H PRN    epoetin jitendra-epbx injection 6,500 Units Every Mon, Wed, Fri    glucagon (human recombinant) injection 1 mg PRN    glucose chewable tablet 16 g PRN    glucose chewable tablet 24 g PRN    insulin aspart U-100 pen 0-5 Units QID (AC + HS) PRN    lactulose 20 gram/30 mL solution Soln 10 g Q6H PRN    lactulose 20 gram/30 mL solution Soln 20 g TID    miconazole 2 % cream BID    midodrine tablet 5 mg TID    mupirocin 2 % ointment BID    naloxone 0.4 mg/mL injection 0.02 mg PRN    ondansetron injection 4 mg Q8H PRN    oxyCODONE immediate release tablet 5 mg Q6H PRN    pantoprazole EC tablet 40 mg BID    simethicone chewable tablet 80 mg QID PRN    sodium chloride 0.9% flush 10 mL Q12H PRN    traZODone tablet 100 mg Nightly PRN       Objective:     Vital Signs (Most Recent):  Temp: 99.1 °F (37.3 °C) (11/02/24 1158)  Pulse: 75 (11/02/24 1158)  Resp: 18 (11/02/24 1158)  BP: 122/78 (11/02/24 1158)  SpO2: 99 % (11/02/24 1158) Vital Signs (24h Range):  Temp:  [97.9 °F (36.6  °C)-99.1 °F (37.3 °C)] 99.1 °F (37.3 °C)  Pulse:  [75-92] 75  Resp:  [16-20] 18  SpO2:  [93 %-100 %] 99 %  BP: (107-128)/(54-78) 122/78     Weight: 130 kg (286 lb 9.6 oz) (10/30/24 1245)  Body mass index is 46.26 kg/m².  Body surface area is 2.46 meters squared.    I/O last 3 completed shifts:  In: 350 [P.O.:350]  Out: 1500 [Other:1500]     Physical Exam  Constitutional:       General: She is not in acute distress.     Appearance: She is ill-appearing (chronically).   HENT:      Head: Normocephalic.   Cardiovascular:      Rate and Rhythm: Normal rate.      Arteriovenous access: Left arteriovenous access is present.  Pulmonary:      Effort: Pulmonary effort is normal. No respiratory distress.   Abdominal:      General: There is distension.      Tenderness: There is no abdominal tenderness.   Musculoskeletal:      Right lower leg: Edema present.      Left lower leg: Edema present.      Comments: Chronic lymphedema to bilateral lower extremities, L > R   Skin:     General: Skin is warm and dry.   Psychiatric:         Mood and Affect: Mood normal.          Significant Labs:  CBC:   Recent Labs   Lab 11/02/24  1344   WBC 20.51*   RBC 2.62*   HGB 7.6*   HCT 23.0*   PLT 99*   MCV 88   MCH 29.0   MCHC 33.0     CMP:   Recent Labs   Lab 11/02/24  1344   GLU 98   CALCIUM 9.3   ALBUMIN 2.0*   PROT 7.8   *   K 4.9   CO2 19*   CL 97   BUN 42*   CREATININE 4.6*   ALKPHOS 412*   ALT 44   *   BILITOT 14.7*     All labs within the past 24 hours have been reviewed.     Assessment/Plan:     Renal/  ESRD (end stage renal disease) on dialysis  44 year old female hx of ESRD on HD MWF presenting from outside hospital as transfer for acute anemia and painless jaundice. Last HD session 10/28. Nephrology consulted for ESRD management.     Nephrology History  -Outpatient HD unit: [facility]  -Nephrologist: ?  -HD tx days: MWF  -HD tx time: 4 hrs  -Last HD tx: Monday 10/28  -HD access: LUE AVF  -HD modality: iHD  -Residual  urine:   -EDW:  110 kg    Plan/Recommendations  ESRD on HD:  -No need for emergent RRT, will plan for next HD Monday to continue MWF schedule  -Patient with diffuse body wall edema on imaging and chronic lymphedema, but no respiratory distress, on baseline O2, and no significant pulmonary edema on imaging.  In the setting of possible active bleeding and hypotension, has had gentle UF with HD. Will attempt to increase UF with next tx MOnday if hgb and BP stable.  -Echo shows EF 60-65%, IVC 15 mm Hg.  -1 L daily fluid restriction  -Will continue iHD thrice weekly unless emergent indication arises  -Strict I&Os  -Pre & post HD weight  Anemia in ESRD  -Hgb goal 10-11, 7.6 today, stable  -Trend hgb, start Epo, may d/c if patient returns to goal of 10-11  Mineral Bone Disease in ESRD  -Resume home phos binders when diet advances  -Renal diet if not NPO      GI  * Direct hyperbilirubinemia  -management per primary    Painless jaundice  -management per primary        Thank you for your consult. I will follow-up with patient. Please contact us if you have any additional questions.    Zoya Smith PA-C  Nephrology  [facility] ([place])

## 2024-11-02 NOTE — SUBJECTIVE & OBJECTIVE
Interval History: NAEON. Breathing comfortably on baseline 3 L O2 NC this AM. Continues with dark BM per nursing staff. Hgb stable today. HD yesterday, net UF 1 L. Electrolytes stable.     Review of patient's allergies indicates:   Allergen Reactions    Tramadol Itching and Nausea And Vomiting     Current Facility-Administered Medications   Medication Frequency    acetaminophen tablet 650 mg Q4H PRN    albuterol-ipratropium 2.5 mg-0.5 mg/3 mL nebulizer solution 3 mL Q4H PRN    allopurinoL tablet 100 mg Daily    dextrose 10% bolus 125 mL 125 mL PRN    dextrose 10% bolus 250 mL 250 mL PRN    diphenhydrAMINE capsule 50 mg Q6H PRN    epoetin jitendra-epbx injection 6,500 Units Every Mon, Wed, Fri    glucagon (human recombinant) injection 1 mg PRN    glucose chewable tablet 16 g PRN    glucose chewable tablet 24 g PRN    insulin aspart U-100 pen 0-5 Units QID (AC + HS) PRN    lactulose 20 gram/30 mL solution Soln 10 g Q6H PRN    lactulose 20 gram/30 mL solution Soln 20 g TID    miconazole 2 % cream BID    midodrine tablet 5 mg TID    mupirocin 2 % ointment BID    naloxone 0.4 mg/mL injection 0.02 mg PRN    ondansetron injection 4 mg Q8H PRN    oxyCODONE immediate release tablet 5 mg Q6H PRN    pantoprazole EC tablet 40 mg BID    simethicone chewable tablet 80 mg QID PRN    sodium chloride 0.9% flush 10 mL Q12H PRN    traZODone tablet 100 mg Nightly PRN       Objective:     Vital Signs (Most Recent):  Temp: 99.1 °F (37.3 °C) (11/02/24 1158)  Pulse: 75 (11/02/24 1158)  Resp: 18 (11/02/24 1158)  BP: 122/78 (11/02/24 1158)  SpO2: 99 % (11/02/24 1158) Vital Signs (24h Range):  Temp:  [97.9 °F (36.6 °C)-99.1 °F (37.3 °C)] 99.1 °F (37.3 °C)  Pulse:  [75-92] 75  Resp:  [16-20] 18  SpO2:  [93 %-100 %] 99 %  BP: (107-128)/(54-78) 122/78     Weight: 130 kg (286 lb 9.6 oz) (10/30/24 1245)  Body mass index is 46.26 kg/m².  Body surface area is 2.46 meters squared.    I/O last 3 completed shifts:  In: 350 [P.O.:350]  Out: 1500  [Other:1500]     Physical Exam  Constitutional:       General: She is not in acute distress.     Appearance: She is ill-appearing (chronically).   HENT:      Head: Normocephalic.   Cardiovascular:      Rate and Rhythm: Normal rate.      Arteriovenous access: Left arteriovenous access is present.  Pulmonary:      Effort: Pulmonary effort is normal. No respiratory distress.   Abdominal:      General: There is distension.      Tenderness: There is no abdominal tenderness.   Musculoskeletal:      Right lower leg: Edema present.      Left lower leg: Edema present.      Comments: Chronic lymphedema to bilateral lower extremities, L > R   Skin:     General: Skin is warm and dry.   Psychiatric:         Mood and Affect: Mood normal.          Significant Labs:  CBC:   Recent Labs   Lab 11/02/24  1344   WBC 20.51*   RBC 2.62*   HGB 7.6*   HCT 23.0*   PLT 99*   MCV 88   MCH 29.0   MCHC 33.0     CMP:   Recent Labs   Lab 11/02/24  1344   GLU 98   CALCIUM 9.3   ALBUMIN 2.0*   PROT 7.8   *   K 4.9   CO2 19*   CL 97   BUN 42*   CREATININE 4.6*   ALKPHOS 412*   ALT 44   *   BILITOT 14.7*     All labs within the past 24 hours have been reviewed.

## 2024-11-02 NOTE — PT/OT/SLP EVAL
Physical Therapy Co-Evaluation    Patient Name:  Pilar Fernandez   MRN:  [id number]    Co-evaluation and co-treatment performed for this visit due to suspected patient need for two skilled therapists to ensure patient and staff safety and to accommodate for patient activity tolerance/pain management      Recommendations:     Discharge Recommendations: High Intensity Therapy   Discharge Equipment Recommendations: bedside commode, bath bench (WC dependent on progress/TBD at next level of care)   Barriers to discharge: Inaccessible home    Assessment:     Pilar Fernandez is a 44 y.o. female admitted with a medical diagnosis of Direct hyperbilirubinemia.  She presents with the following impairments/functional limitations: weakness, impaired endurance, impaired self care skills, impaired functional mobility, gait instability, impaired balance. Pt tolerated session fair. Pt able to transfer to EOB with max assist x1, but pt able to maintain upright seated balance with CGA. Pt performed a sit to stand at EOB with max assist x1, mod assist x1, but pt unable to take lateral steps. Pt is highly motivated and can tolerate 3 hours of PT in a day. Pt will benefit from skilled PT intervention to progress functional ambulation and decrease required assistance.     Rehab Prognosis: Good; patient would benefit from acute skilled PT services to address these deficits and reach maximum level of function.    Recent Surgery: * No surgery found *      Plan:     During this hospitalization, patient to be seen 4 x/week to address the identified rehab impairments via gait training, therapeutic activities, therapeutic exercises, neuromuscular re-education and progress toward the following goals:    Plan of Care Expires:  12/02/24    Subjective     Chief Complaint: None stated  Patient/Family Comments/goals: Pt agreeable to PT  Pain/Comfort:  Pain Rating 1: 0/10    Patients cultural, spiritual, Anabaptism conflicts given the  current situation: no    Living Environment:  Pt lives with her spouse in a H with 3 LATOYA with no HR.   Prior to admission, patients level of function was requiring assistance with ADLs and ambulation over the past month. Pt using quad cane and RW for ambulation.  Equipment used at home: rollator, walker, rolling, cane, quad, grab bar.  DME owned (not currently used): none.  Upon discharge, patient will have assistance from spouse.    Objective:     Communicated with nursing prior to session.  Patient found supine with oxygen, telemetry  upon PT entry to room.    General Precautions: Standard, fall  Orthopedic Precautions:N/A   Braces: N/A  Respiratory Status: Nasal cannula, flow 3 L/min    Exams:  RLE ROM: WFL  RLE Strength: Grossly 3/5 except for hip flexion: 2+/5  LLE ROM: WFL  LLE Strength: Grossly 3/5 except for hip flexion: 2/5     Functional Mobility:  Bed Mobility:     Rolling Left:  moderate assistance  Rolling Right: moderate assistance  Supine to Sit: maximal assistance  Sit to Supine: maximal assistance and of 2 persons  Transfers:     Sit to Stand:  maximal assistance and mod assist of a 2nd person with hand-held assist. Tactile cues provided for pt to extend hips in standing to achieve erect posture.   Balance: sitting: good - pt sat EOB for approximately 10 minutes with CGA for balance. Tactile and verbal cues provided for pt to engage core muscles for balance seated EOB. Standing: fair: CGA for approximately 30s static standing. Pt unable to take lateral steps in standing.       AM-PAC 6 CLICK MOBILITY  Total Score:10       Treatment & Education:  Pt performed bed mobility, transferred to the EOB, performed a sit to stand transfer, and transferred back into supine. Pt educated on PT goals and POC. Pt wanted to transfer into the chair, but pt was educated on current level of function, and that the transfer was not safe due to the patient not being able to take a step. Pt understanding and noted we  will try again next session.     Patient left supine with all lines intact and call button in reach.    GOALS:   Multidisciplinary Problems       Physical Therapy Goals          Problem: Physical Therapy    Goal Priority Disciplines Outcome Interventions   Physical Therapy Goal     PT, PT/OT Progressing    Description: Goals to be met by: 2024     Patient will increase functional independence with mobility by performin. Supine to sit with Set-up [place]  2. Sit to supine with Set-up [place]  3. Sit to stand transfer with Contact Guard Assistance  4. Bed to chair transfer with Minimal Assistance using LRAD  5. Gait  x 30 feet with Minimal Assistance using LRAD   6. Ascend/descend 3 stair with no Handrails Moderate Assistance using LRAD   7. Lower extremity exercise program x15 reps per handout, with supervision                         History:     Past Medical History:   Diagnosis Date    Anemia     Anemia     Breast mass     Chronic constipation     CKD (chronic kidney disease)     Diabetes mellitus     Dialysis patient     MON, TUE, THXIN, SAT.    Embolic stroke involving right middle cerebral artery 2024    Encounter for blood transfusion     Erosive gastritis 2016    GERD (gastroesophageal reflux disease)     Gout     High cholesterol     Hypertension     Knee pain     Thyroid disease     Unspecified cataract 2022       Past Surgical History:   Procedure Laterality Date    AV FISTULA PLACEMENT Left 2019    Procedure: creation av fistula;  Surgeon: Buck Fernandez MD;  Location: [facility];  Service: Cardiovascular;  Laterality: Left;  Left Radialcephalic: PACU     SECTION  , , ,     CHOLECYSTECTOMY      COLONOSCOPY N/A 10/23/2017    Procedure: COLONOSCOPY;  Surgeon: Bri Harry MD;  Location: [facility];  Service: Endoscopy;  Laterality: N/A;    EXCISIONAL BIOPSY Left 2022    Procedure: EXCISIONAL BIOPSY;  Surgeon: Mick Juarez MD;   Location: [facility];  Service: General;  Laterality: Left;    TUBAL LIGATION      UPPER GASTROINTESTINAL ENDOSCOPY  03/31/2016    Erosive Gastritis-Dr Bailey       Time Tracking:     PT Received On: 11/02/24  PT Start Time: 0907     PT Stop Time: 1000  PT Total Time (min): 53 min     Billable Minutes: Evaluation 10, Therapeutic Activity 30, and Neuromuscular Re-education 13      11/02/2024

## 2024-11-02 NOTE — ASSESSMENT & PLAN NOTE
44 year old female hx of ESRD on HD MWF presenting from outside hospital as transfer for acute anemia and painless jaundice. Last HD session 10/28. Nephrology consulted for ESRD management.     Nephrology History  -Outpatient HD unit: [facility]  -Nephrologist: ?  -HD tx days: MWF  -HD tx time: 4 hrs  -Last HD tx: Monday 10/28  -HD access: LUE AVF  -HD modality: iHD  -Residual urine:   -EDW:  110 kg    Plan/Recommendations  ESRD on HD:  -No need for emergent RRT, will plan for next HD Monday to continue MWF schedule  -Patient with diffuse body wall edema on imaging and chronic lymphedema, but no respiratory distress, on baseline O2, and no significant pulmonary edema on imaging.  In the setting of possible active bleeding and hypotension, has had gentle UF with HD. Will attempt to increase UF with next tx MOnday if hgb and BP stable.  -Echo shows EF 60-65%, IVC 15 mm Hg.  -1 L daily fluid restriction  -Will continue iHD thrice weekly unless emergent indication arises  -Strict I&Os  -Pre & post HD weight  Anemia in ESRD  -Hgb goal 10-11, 7.6 today, stable  -Trend hgb, start Epo, may d/c if patient returns to goal of 10-11  Mineral Bone Disease in ESRD  -Resume home phos binders when diet advances  -Renal diet if not NPO

## 2024-11-02 NOTE — PLAN OF CARE
Problem: Physical Therapy  Goal: Physical Therapy Goal  Description: Goals to be met by: 2024     Patient will increase functional independence with mobility by performin. Supine to sit with Set-up [place]  2. Sit to supine with Set-up [place]  3. Sit to stand transfer with Contact Guard Assistance  4. Bed to chair transfer with Minimal Assistance using LRAD  5. Gait  x 30 feet with Minimal Assistance using LRAD   6. Ascend/descend 3 stair with no Handrails Moderate Assistance using LRAD   7. Lower extremity exercise program x15 reps per handout, with supervision    Outcome: Progressing

## 2024-11-02 NOTE — PROGRESS NOTES
[facility] ([address] Medicine  Progress Note    Patient Name: Pilar Fernandez  MRN: [id number]  Patient Class: IP- Inpatient   Admission Date: 10/29/2024  Length of Stay: 4 days  Attending Physician: Shayne Diaz MD  Primary Care Provider: Lindsey Singletary FNP        Subjective:     Principal Problem:Direct hyperbilirubinemia        HPI:  Pilar Fernandez is a 44 year old Black woman with hypertension, hyperlipidemia, diabetes mellitus type 2 (treated with insulin), end stage renal disease on hemodialysis (), heart failure with preserved ejection fraction, chronic hypoxic respiratory failure on supplemental oxygen (3 liters/minute), history of right middle cerebral artery stroke on 2024, peripheral vascular disease, chronic lymphedema, hypothyroidism, anemia with history of blood transfusion, gout, gastroesophageal reflux disease, chronic constipation, sacral decubitus ulcer, history of  sections, history of tubal ligation, history of cholecystectomy. She lives in [place], [place].    She presented to [facility] on 10/29/2024 with gastrointestinal bleeding, symptomatic anemia, jaundice, hyperbilirubinemia. She reported being hospitalized at [facility] recently for leg cellulitis and being told she had hyperbilirubinemia and was advised to follow up with Hepatology at [facility] after being discharged on 10/26/2024. She had several wounds on her sacrum and legs. She had extremely swollen legs. Stool was positive for occult blood. She had leukocytosis. Hemoglobin was 6.4 g/dL from baseline around 10. INR was elevated at 2.1. she was given 2 units of fresh frozen plasma and 2 units of packed red blood cells. Abdomen CT without contrast showed no obvious etiology of liver abnormalities or leukocytosis. She was transferred to [facility] for further  evaluation of her hyperbilirubinemia. She was admitted to Hospital Medicine Team B.    Overview/Hospital Course:  She was put on ceftriaxone. Hepatology and Advanced Endoscopy Service were consulted for hyperbilirubinemia. Gastroenterology was consulted for gastrointestinal bleed. Hematology-Oncology was consulted for anemia with hyperbilirubinemia and positive Britt test. CT showed diffuse anasarca, pericardial effusion, enlarged lymph nodes vs soft tissue densities, hepatosplenomegaly. Hepatology recommended liver biopsy. Gastroenterology recommended outpatient endoscopy, after she is medically optimized, due to lack of overt bleeding. Hematology-Oncology recommended breast ultrasound. Leukocytosis persisted. Sed rate was normal. CRP was elevated. Midodrine was started for post-dialysis hypotension. She developed hepatic encephalopathy with ammonia of 111 umol/L. She was transfused another unit of packed red blood cells in preparation for liver biopsy. She required vitamin K for elevated INR. She desired to sit at the side of the bed but was unable to on her own. Physical and Occupational Therapy were consulted.     Interval History: IR planning liver biopsy when able. Will make her NPO after midnight tonight. Does not have labs done yet today.    Review of Systems   Constitutional:  Negative for chills and fever.   Cardiovascular:  Positive for leg swelling. Negative for chest pain.   Neurological:  Negative for seizures and syncope.     Objective:     Vital Signs (Most Recent):  Temp: 98.8 °F (37.1 °C) (11/02/24 0834)  Pulse: 83 (11/02/24 0834)  Resp: 18 (11/02/24 0834)  BP: 116/68 (11/02/24 0834)  SpO2: (!) 93 % (11/02/24 0834) Vital Signs (24h Range):  Temp:  [97.9 °F (36.6 °C)-98.9 °F (37.2 °C)] 98.8 °F (37.1 °C)  Pulse:  [77-90] 83  Resp:  [16-20] 18  SpO2:  [93 %-100 %] 93 %  BP: (107-128)/(54-68) 116/68     Weight: 130 kg (286 lb 9.6 oz)  Body mass index is 46.26 kg/m².    Intake/Output Summary (Last 24  hours) at 11/2/2024 0859  Last data filed at 11/1/2024 1828  Gross per 24 hour   Intake 230 ml   Output --   Net 230 ml         Physical Exam  Vitals and nursing note reviewed.   Constitutional:       General: She is not in acute distress.     Appearance: She is well-developed. She is morbidly obese. She is not diaphoretic.      Interventions: She is not intubated.  Pulmonary:      Effort: Pulmonary effort is normal. No accessory muscle usage or respiratory distress. She is not intubated.   Musculoskeletal:         General: No tenderness or signs of injury.      Right lower leg: Edema present.      Left lower leg: Edema present.   Skin:     General: Skin is warm and dry.      Coloration: Skin is not pale.   Neurological:      Mental Status: She is alert and oriented to person, place, and time. Mental status is at baseline.      Motor: No seizure activity.   Psychiatric:         Attention and Perception: Attention normal.         Behavior: Behavior is not agitated or aggressive. Behavior is cooperative.             Significant Labs: All pertinent labs within the past 24 hours have been reviewed.    Significant Imaging: I have reviewed all pertinent imaging results/findings within the past 24 hours.    Assessment/Plan:      * Direct hyperbilirubinemia  Hepatic encephalopathy   CT abdomen showed anasarca with hepatosplenomegaly. Appreciate Hepatology. Changed home senna-docusate and polyethylene glycol to lactulose to treat hepatic encephalopathy. Liver biopsy.     Impaired mobility  Due to lymphedema and history of stroke. Her boyfriend at home helps with mobility and ADLs       Sacral decubitus ulcer, stage II  Chronic. Turn frequently. Wound care.      Leukocytosis  Monitor. Giving ceftriaxone for SBP prophylaxis.      Type 2 diabetes mellitus, with long-term current use of insulin  She takes insulin glargine 80 units HS, insulin aspart 10 units BID with meals. Giving insulin glargine 10 units HS, insulin sliding  scale. Monitor glucose.      Painless jaundice  See primary problem.      Coagulopathy  Goal INR <2. Gave vitamin K.    Acute anemia  Having intermittent GI bleed. Monitoring blood counts. Giving empiric pantoprazole. Outpatient endoscopy recommended by GI. If GI bleed acutely worsens, ask for inpatient endoscopy.    ESRD (end stage renal disease) on dialysis  Nephrology managing. Dialysis to treat anasarca.    GERD (gastroesophageal reflux disease)  Increased home pantoprazole to BID.      Hyperlipidemia associated with type 2 diabetes mellitus  Holding home atorvastatin.      Essential hypertension  Holding home nifedipine and nebivolol. Started midodrine. Monitor BP.      VTE Risk Mitigation (From admission, onward)           Ordered     IP VTE HIGH RISK PATIENT  Once         10/30/24 0332     Place sequential compression device  Until discontinued         10/30/24 0051                    Discharge Planning   SHASHA: 11/4/2024     Code Status: Full Code   Is the patient medically ready for discharge?:     Reason for patient still in hospital (select all that apply): Treatment  Discharge Plan A: Home Health                  Shayne Diaz MD  Department of Hospital Medicine   [facility] ([place]

## 2024-11-03 LAB
ALBUMIN SERPL BCP-MCNC: 1.8 G/DL (ref 3.5–5.2)
ALP SERPL-CCNC: 409 U/L (ref 40–150)
ALT SERPL W/O P-5'-P-CCNC: 48 U/L (ref 10–44)
AMMONIA PLAS-SCNC: 93 UMOL/L (ref 10–50)
ANION GAP SERPL CALC-SCNC: 15 MMOL/L (ref 8–16)
ANISOCYTOSIS BLD QL SMEAR: ABNORMAL
AST SERPL-CCNC: 127 U/L (ref 10–40)
BASOPHILS NFR BLD: 0 % (ref 0–1.9)
BILIRUB SERPL-MCNC: 14.8 MG/DL (ref 0.1–1)
BUN SERPL-MCNC: 50 MG/DL (ref 6–20)
CALCIUM SERPL-MCNC: 9.2 MG/DL (ref 8.7–10.5)
CHLORIDE SERPL-SCNC: 98 MMOL/L (ref 95–110)
CLINICAL BIOCHEMIST REVIEW: NORMAL
CO2 SERPL-SCNC: 21 MMOL/L (ref 23–29)
CREAT SERPL-MCNC: 5 MG/DL (ref 0.5–1.4)
DIFFERENTIAL METHOD BLD: ABNORMAL
EOSINOPHIL NFR BLD: 6 % (ref 0–8)
ERYTHROCYTE [DISTWIDTH] IN BLOOD BY AUTOMATED COUNT: 21.6 % (ref 11.5–14.5)
EST. GFR  (NO RACE VARIABLE): 10.3 ML/MIN/1.73 M^2
GLUCOSE SERPL-MCNC: 117 MG/DL (ref 70–110)
HCT VFR BLD AUTO: 23.2 % (ref 37–48.5)
HGB BLD-MCNC: 7.6 G/DL (ref 12–16)
HYPOCHROMIA BLD QL SMEAR: ABNORMAL
IMM GRANULOCYTES # BLD AUTO: ABNORMAL K/UL (ref 0–0.04)
IMM GRANULOCYTES NFR BLD AUTO: ABNORMAL % (ref 0–0.5)
INR PPP: 1.6 (ref 0.8–1.2)
LYMPHOCYTES NFR BLD: 6 % (ref 18–48)
MAGNESIUM SERPL-MCNC: 2.2 MG/DL (ref 1.6–2.6)
MCH RBC QN AUTO: 29.5 PG (ref 27–31)
MCHC RBC AUTO-ENTMCNC: 32.8 G/DL (ref 32–36)
MCV RBC AUTO: 90 FL (ref 82–98)
METAMYELOCYTES NFR BLD MANUAL: 2 %
MONOCYTES NFR BLD: 4 % (ref 4–15)
MYELOCYTES NFR BLD MANUAL: 2 %
NEUTROPHILS NFR BLD: 77 % (ref 38–73)
NEUTS BAND NFR BLD MANUAL: 3 %
NRBC BLD-RTO: 5 /100 WBC
OVALOCYTES BLD QL SMEAR: ABNORMAL
PHOSPHATE SERPL-MCNC: 5.1 MG/DL (ref 2.7–4.5)
PLATELET # BLD AUTO: 105 K/UL (ref 150–450)
PLPETH BLD-MCNC: <10 NG/ML
PMV BLD AUTO: 13 FL (ref 9.2–12.9)
POCT GLUCOSE: 125 MG/DL (ref 70–110)
POCT GLUCOSE: 132 MG/DL (ref 70–110)
POCT GLUCOSE: 132 MG/DL (ref 70–110)
POCT GLUCOSE: 143 MG/DL (ref 70–110)
POIKILOCYTOSIS BLD QL SMEAR: SLIGHT
POLYCHROMASIA BLD QL SMEAR: ABNORMAL
POPETH BLD-MCNC: <10 NG/ML
POTASSIUM SERPL-SCNC: 5.1 MMOL/L (ref 3.5–5.1)
PROT SERPL-MCNC: 7.7 G/DL (ref 6–8.4)
PROTHROMBIN TIME: 16.7 SEC (ref 9–12.5)
RBC # BLD AUTO: 2.58 M/UL (ref 4–5.4)
SODIUM SERPL-SCNC: 134 MMOL/L (ref 136–145)
SPHEROCYTES BLD QL SMEAR: ABNORMAL
TARGETS BLD QL SMEAR: ABNORMAL
WBC # BLD AUTO: 19.71 K/UL (ref 3.9–12.7)

## 2024-11-03 PROCEDURE — 20600001 HC STEP DOWN PRIVATE ROOM

## 2024-11-03 PROCEDURE — 25000003 PHARM REV CODE 250: Performed by: HOSPITALIST

## 2024-11-03 PROCEDURE — 85027 COMPLETE CBC AUTOMATED: CPT | Performed by: HOSPITALIST

## 2024-11-03 PROCEDURE — 25000003 PHARM REV CODE 250: Performed by: STUDENT IN AN ORGANIZED HEALTH CARE EDUCATION/TRAINING PROGRAM

## 2024-11-03 PROCEDURE — 36415 COLL VENOUS BLD VENIPUNCTURE: CPT | Performed by: STUDENT IN AN ORGANIZED HEALTH CARE EDUCATION/TRAINING PROGRAM

## 2024-11-03 PROCEDURE — 83735 ASSAY OF MAGNESIUM: CPT | Performed by: STUDENT IN AN ORGANIZED HEALTH CARE EDUCATION/TRAINING PROGRAM

## 2024-11-03 PROCEDURE — 80053 COMPREHEN METABOLIC PANEL: CPT | Performed by: STUDENT IN AN ORGANIZED HEALTH CARE EDUCATION/TRAINING PROGRAM

## 2024-11-03 PROCEDURE — 82140 ASSAY OF AMMONIA: CPT | Performed by: HOSPITALIST

## 2024-11-03 PROCEDURE — 85007 BL SMEAR W/DIFF WBC COUNT: CPT | Performed by: HOSPITALIST

## 2024-11-03 PROCEDURE — 85610 PROTHROMBIN TIME: CPT | Performed by: STUDENT IN AN ORGANIZED HEALTH CARE EDUCATION/TRAINING PROGRAM

## 2024-11-03 PROCEDURE — 84100 ASSAY OF PHOSPHORUS: CPT | Performed by: STUDENT IN AN ORGANIZED HEALTH CARE EDUCATION/TRAINING PROGRAM

## 2024-11-03 RX ORDER — HYDROCORTISONE 25 MG/G
CREAM TOPICAL 2 TIMES DAILY
Status: DISCONTINUED | OUTPATIENT
Start: 2024-11-03 | End: 2024-11-16 | Stop reason: HOSPADM

## 2024-11-03 RX ORDER — LACTULOSE 10 G/15ML
20 SOLUTION ORAL 2 TIMES DAILY
Status: DISCONTINUED | OUTPATIENT
Start: 2024-11-03 | End: 2024-11-16 | Stop reason: HOSPADM

## 2024-11-03 RX ORDER — LACTULOSE 10 G/15ML
20 SOLUTION ORAL 2 TIMES DAILY
Status: DISCONTINUED | OUTPATIENT
Start: 2024-11-03 | End: 2024-11-03

## 2024-11-03 RX ADMIN — DIPHENHYDRAMINE HYDROCHLORIDE 50 MG: 50 CAPSULE ORAL at 11:11

## 2024-11-03 RX ADMIN — MICONAZOLE NITRATE: 20 CREAM TOPICAL at 08:11

## 2024-11-03 RX ADMIN — MUPIROCIN: 20 OINTMENT TOPICAL at 08:11

## 2024-11-03 RX ADMIN — OXYCODONE 5 MG: 5 TABLET ORAL at 11:11

## 2024-11-03 RX ADMIN — PANTOPRAZOLE SODIUM 40 MG: 40 TABLET, DELAYED RELEASE ORAL at 08:11

## 2024-11-03 RX ADMIN — LACTULOSE 20 G: 20 SOLUTION ORAL at 05:11

## 2024-11-03 RX ADMIN — MIDODRINE HYDROCHLORIDE 5 MG: 5 TABLET ORAL at 08:11

## 2024-11-03 RX ADMIN — ALLOPURINOL 100 MG: 100 TABLET ORAL at 08:11

## 2024-11-03 RX ADMIN — HYDROCORTISONE: 25 CREAM TOPICAL at 02:11

## 2024-11-03 RX ADMIN — PANTOPRAZOLE SODIUM 40 MG: 40 TABLET, DELAYED RELEASE ORAL at 11:11

## 2024-11-03 NOTE — SUBJECTIVE & OBJECTIVE
Interval History: IR planning liver biopsy when able. Nothing scheduled for today. Will make her NPO after midnight tonight in case they can do it tomorrow.     Review of Systems   Constitutional:  Negative for chills and fever.   Cardiovascular:  Positive for leg swelling. Negative for chest pain.   Musculoskeletal:  Positive for gait problem.   Neurological:  Negative for seizures and syncope.     Objective:     Vital Signs (Most Recent):  Temp: 97.9 °F (36.6 °C) (11/03/24 0845)  Pulse: 84 (11/03/24 0845)  Resp: 16 (11/03/24 0845)  BP: (!) 164/77 (11/03/24 0845)  SpO2: 97 % (11/03/24 0845) Vital Signs (24h Range):  Temp:  [97.5 °F (36.4 °C)-99.1 °F (37.3 °C)] 97.9 °F (36.6 °C)  Pulse:  [] 84  Resp:  [16-18] 16  SpO2:  [96 %-100 %] 97 %  BP: (122-164)/(65-82) 164/77     Weight: 130 kg (286 lb 9.6 oz)  Body mass index is 46.26 kg/m².    Intake/Output Summary (Last 24 hours) at 11/3/2024 0921  Last data filed at 11/2/2024 1100  Gross per 24 hour   Intake 280 ml   Output --   Net 280 ml         Physical Exam  Vitals and nursing note reviewed.   Constitutional:       General: She is not in acute distress.     Appearance: She is well-developed. She is morbidly obese. She is not diaphoretic.      Interventions: She is not intubated.  Pulmonary:      Effort: Pulmonary effort is normal. No accessory muscle usage or respiratory distress. She is not intubated.   Musculoskeletal:         General: No tenderness or signs of injury.      Right lower leg: Edema present.      Left lower leg: Edema present.   Skin:     General: Skin is warm and dry.      Coloration: Skin is not pale.   Neurological:      Mental Status: She is alert and oriented to person, place, and time. Mental status is at baseline.      Motor: No seizure activity.   Psychiatric:         Attention and Perception: Attention normal.         Behavior: Behavior is not agitated or aggressive. Behavior is cooperative.             Significant Labs: All pertinent  labs within the past 24 hours have been reviewed.    Significant Imaging: I have reviewed all pertinent imaging results/findings within the past 24 hours.

## 2024-11-03 NOTE — PLAN OF CARE
Patient aaox4. Patient complains of no pain. Patient to be prepared for dialysis on 11/04/24. Patient to be npo at midnight. Patient complains of back itching, dr informed and hydrocortisone cream ordered. Patient turned q2. Patient due medication administered and patient tolerating well. Plan of care reviewed with patient and patient verbalized understanding. Patient safety protocols observed, call light within reach. Patient made comfortable in bed. No other concerns at this time.                  Problem: Adult Inpatient Plan of Care  Goal: Plan of Care Review  Outcome: Progressing  Goal: Patient-Specific Goal (Individualized)  Outcome: Progressing  Goal: Absence of Hospital-Acquired Illness or Injury  Outcome: Progressing  Goal: Optimal Comfort and Wellbeing  Outcome: Progressing  Goal: Readiness for Transition of Care  Outcome: Progressing     Problem: Diabetes Comorbidity  Goal: Blood Glucose Level Within Targeted Range  Outcome: Progressing     Problem: Acute Kidney Injury/Impairment  Goal: Fluid and Electrolyte Balance  Outcome: Progressing  Goal: Improved Oral Intake  Outcome: Progressing  Goal: Effective Renal Function  Outcome: Progressing     Problem: Wound  Goal: Optimal Coping  Outcome: Progressing  Goal: Optimal Functional Ability  Outcome: Progressing  Goal: Absence of Infection Signs and Symptoms  Outcome: Progressing  Goal: Improved Oral Intake  Outcome: Progressing  Goal: Optimal Pain Control and Function  Outcome: Progressing  Goal: Skin Health and Integrity  Outcome: Progressing  Goal: Optimal Wound Healing  Outcome: Progressing     Problem: Skin Injury Risk Increased  Goal: Skin Health and Integrity  Outcome: Progressing     Problem: Gastrointestinal Bleeding  Goal: Optimal Coping with Acute Illness  Outcome: Progressing  Goal: Hemostasis  Outcome: Progressing     Problem: Bariatric Environmental Safety  Goal: Safety Maintained with Care  Outcome: Progressing     Problem:  Hemodialysis  Goal: Safe, Effective Therapy Delivery  Outcome: Progressing  Goal: Effective Tissue Perfusion  Outcome: Progressing  Goal: Absence of Infection Signs and Symptoms  Outcome: Progressing

## 2024-11-03 NOTE — PROGRESS NOTES
[facility] - Stepdown Flex ([address] Medicine  Progress Note    Patient Name: Pilar Fernandez  MRN: [id number]  Patient Class: IP- Inpatient   Admission Date: 10/29/2024  Length of Stay: 5 days  Attending Physician: Shayne Diaz MD  Primary Care Provider: Lindsey Singletary FNP        Subjective:     Principal Problem:Direct hyperbilirubinemia        HPI:  Pilar Fernandez is a 44 year old Black woman with hypertension, hyperlipidemia, diabetes mellitus type 2 (treated with insulin), end stage renal disease on hemodialysis (), heart failure with preserved ejection fraction, chronic hypoxic respiratory failure on supplemental oxygen (3 liters/minute), history of right middle cerebral artery stroke on 2024, peripheral vascular disease, chronic lymphedema, hypothyroidism, anemia with history of blood transfusion, gout, gastroesophageal reflux disease, chronic constipation, sacral decubitus ulcer, history of  sections, history of tubal ligation, history of cholecystectomy. She lives in [place], [place].    She presented to [facility] Emergency Department on 10/29/2024 with gastrointestinal bleeding, symptomatic anemia, jaundice, hyperbilirubinemia. She reported being hospitalized at [facility] recently for leg cellulitis and being told she had hyperbilirubinemia and was advised to follow up with Hepatology at [facility] after being discharged on 10/26/2024. She had several wounds on her sacrum and legs. She had extremely swollen legs. Stool was positive for occult blood. She had leukocytosis. Hemoglobin was 6.4 g/dL from baseline around 10. INR was elevated at 2.1. she was given 2 units of fresh frozen plasma and 2 units of packed red blood cells. Abdomen CT without contrast showed no obvious etiology of liver abnormalities or leukocytosis. She was transferred to [facility] for further  evaluation of her hyperbilirubinemia. She was admitted to Hospital Medicine Team B.    Overview/Hospital Course:  She was put on ceftriaxone. Hepatology and Advanced Endoscopy Service were consulted for hyperbilirubinemia. Gastroenterology was consulted for gastrointestinal bleed. Hematology-Oncology was consulted for anemia with hyperbilirubinemia and positive Britt test. CT showed diffuse anasarca, pericardial effusion, enlarged lymph nodes vs soft tissue densities, hepatosplenomegaly. Hepatology recommended liver biopsy. Gastroenterology recommended outpatient endoscopy, after she is medically optimized, due to lack of overt bleeding. Hematology-Oncology recommended breast ultrasound. Leukocytosis persisted. Sed rate was normal. CRP was elevated. Midodrine was started for post-dialysis hypotension. She developed hepatic encephalopathy with ammonia of 111 umol/L. She was transfused another unit of packed red blood cells in preparation for liver biopsy. She required vitamin K for elevated INR. She desired to sit at the side of the bed but was unable to on her own. Physical and Occupational Therapy were consulted and recommended high intensity therapy.     Interval History: IR planning liver biopsy when able. Nothing scheduled for today. Will make her NPO after midnight tonight in case they can do it tomorrow.     Review of Systems   Constitutional:  Negative for chills and fever.   Cardiovascular:  Positive for leg swelling. Negative for chest pain.   Musculoskeletal:  Positive for gait problem.   Neurological:  Negative for seizures and syncope.     Objective:     Vital Signs (Most Recent):  Temp: 97.9 °F (36.6 °C) (11/03/24 0845)  Pulse: 84 (11/03/24 0845)  Resp: 16 (11/03/24 0845)  BP: (!) 164/77 (11/03/24 0845)  SpO2: 97 % (11/03/24 0845) Vital Signs (24h Range):  Temp:  [97.5 °F (36.4 °C)-99.1 °F (37.3 °C)] 97.9 °F (36.6 °C)  Pulse:  [] 84  Resp:  [16-18] 16  SpO2:  [96 %-100 %] 97 %  BP:  (122-164)/(65-82) 164/77     Weight: 130 kg (286 lb 9.6 oz)  Body mass index is 46.26 kg/m².    Intake/Output Summary (Last 24 hours) at 11/3/2024 0921  Last data filed at 11/2/2024 1100  Gross per 24 hour   Intake 280 ml   Output --   Net 280 ml         Physical Exam  Vitals and nursing note reviewed.   Constitutional:       General: She is not in acute distress.     Appearance: She is well-developed. She is morbidly obese. She is not diaphoretic.      Interventions: She is not intubated.  Pulmonary:      Effort: Pulmonary effort is normal. No accessory muscle usage or respiratory distress. She is not intubated.   Musculoskeletal:         General: No tenderness or signs of injury.      Right lower leg: Edema present.      Left lower leg: Edema present.   Skin:     General: Skin is warm and dry.      Coloration: Skin is not pale.   Neurological:      Mental Status: She is alert and oriented to person, place, and time. Mental status is at baseline.      Motor: No seizure activity.   Psychiatric:         Attention and Perception: Attention normal.         Behavior: Behavior is not agitated or aggressive. Behavior is cooperative.             Significant Labs: All pertinent labs within the past 24 hours have been reviewed.    Significant Imaging: I have reviewed all pertinent imaging results/findings within the past 24 hours.    Assessment/Plan:      * Direct hyperbilirubinemia  Hepatic encephalopathy   CT abdomen showed anasarca with hepatosplenomegaly. Appreciate Hepatology. Changed home senna-docusate and polyethylene glycol to lactulose to treat hepatic encephalopathy. Liver biopsy.     Impaired mobility  Due to lymphedema and history of stroke. Her boyfriend at home helps with mobility and ADLs       Sacral decubitus ulcer, stage II  Chronic. Turn frequently. Wound care.      Leukocytosis  Monitor. Giving ceftriaxone for SBP prophylaxis.      Type 2 diabetes mellitus, with long-term current use of insulin  She  takes insulin glargine 80 units HS, insulin aspart 10 units BID with meals. Giving insulin glargine 10 units HS, insulin sliding scale. Monitor glucose.      Painless jaundice  See primary problem.      Coagulopathy  Goal INR <2. Gave vitamin K.    Acute anemia  Having intermittent GI bleed. Monitoring blood counts. Giving empiric pantoprazole. Outpatient endoscopy recommended by GI. If GI bleed acutely worsens, ask for inpatient endoscopy.    ESRD (end stage renal disease) on dialysis  Nephrology managing. Dialysis to treat anasarca.    GERD (gastroesophageal reflux disease)  Increased home pantoprazole to BID.      Hyperlipidemia associated with type 2 diabetes mellitus  Holding home atorvastatin.      Essential hypertension  Holding home nifedipine and nebivolol. Started midodrine. Can stop as BPs are high. Monitor BP.      VTE Risk Mitigation (From admission, onward)           Ordered     IP VTE HIGH RISK PATIENT  Once         10/30/24 0332     Place sequential compression device  Until discontinued         10/30/24 0051                    Discharge Planning   SHASHA: 11/4/2024     Code Status: Full Code   Is the patient medically ready for discharge?:     Reason for patient still in hospital (select all that apply): Other (specify) liver biopsy  Discharge Plan A: Home Health                  Shayne Diaz MD  Department of Hospital Medicine   [facility] - Stepdown Flex ([place] [address])

## 2024-11-03 NOTE — PLAN OF CARE
[facility] - Stepdown Flex ([place])  Discharge Reassessment    Primary Care Provider: Lindsey Singletary FNP    Expected Discharge Date: 11/4/2024    Reassessment (most recent)       Discharge Reassessment - 11/03/24 1255          Discharge Reassessment    Assessment Type Discharge Planning Reassessment     Did the patient's condition or plan change since previous assessment? No     Discharge Plan discussed with: Patient     Communicated SHASHA with patient/caregiver Yes     Discharge Plan A Home Health     Discharge Plan B Home with family     DME Needed Upon Discharge  walker, standard;wheelchair;glucometer (P)      Transition of Care Barriers None (P)      Why the patient remains in the hospital Requires continued medical care (P)         Post-Acute Status    Post-Acute Authorization Home Health (P)      Home Health Status Pending medical clearance/testing (P)      Coverage [facility] DUAL COMPLETE [facility] [employer] (P)      Discharge Delays None known at this time (P)                    Pt [place] need medicaid transport at [place], SW reviewed careport and noted resume HH orders sent on 10/31.    Discharge Plan A and Plan B have been determined by review of patient's clinical status, future medical and therapeutic needs, and coverage/benefits for post-acute care in coordination with multidisciplinary team members.     Rosa DENNIS,   Case Management   [phone]

## 2024-11-04 LAB
ALBUMIN SERPL BCP-MCNC: 1.7 G/DL (ref 3.5–5.2)
ALP SERPL-CCNC: 486 U/L (ref 40–150)
ALT SERPL W/O P-5'-P-CCNC: 53 U/L (ref 10–44)
ANION GAP SERPL CALC-SCNC: 16 MMOL/L (ref 8–16)
ANISOCYTOSIS BLD QL SMEAR: SLIGHT
AST SERPL-CCNC: 143 U/L (ref 10–40)
BASOPHILS NFR BLD: 0 % (ref 0–1.9)
BILIRUB SERPL-MCNC: 15.1 MG/DL (ref 0.1–1)
BUN SERPL-MCNC: 55 MG/DL (ref 6–20)
CALCIUM SERPL-MCNC: 9 MG/DL (ref 8.7–10.5)
CHLORIDE SERPL-SCNC: 100 MMOL/L (ref 95–110)
CLINICAL BIOCHEMIST REVIEW: NORMAL
CO2 SERPL-SCNC: 18 MMOL/L (ref 23–29)
CREAT SERPL-MCNC: 5.5 MG/DL (ref 0.5–1.4)
DACRYOCYTES BLD QL SMEAR: ABNORMAL
DIFFERENTIAL METHOD BLD: ABNORMAL
EOSINOPHIL NFR BLD: 2 % (ref 0–8)
ERYTHROCYTE [DISTWIDTH] IN BLOOD BY AUTOMATED COUNT: 21.8 % (ref 11.5–14.5)
EST. GFR  (NO RACE VARIABLE): 9.2 ML/MIN/1.73 M^2
GLUCOSE SERPL-MCNC: 109 MG/DL (ref 70–110)
HCG INTACT+B SERPL-ACNC: <2.4 MIU/ML
HCT VFR BLD AUTO: 22.5 % (ref 37–48.5)
HGB BLD-MCNC: 7.3 G/DL (ref 12–16)
IGG1 SER-MCNC: ABNORMAL MG/DL (ref 382–929)
IGG2 SER-MCNC: 221 MG/DL (ref 242–700)
IGG3 SER-MCNC: 238 MG/DL (ref 22–176)
IGG4 SER-MCNC: 117 MG/DL (ref 4–86)
IMM GRANULOCYTES # BLD AUTO: ABNORMAL K/UL (ref 0–0.04)
IMM GRANULOCYTES NFR BLD AUTO: ABNORMAL % (ref 0–0.5)
INR PPP: 1.5 (ref 0.8–1.2)
LYMPHOCYTES NFR BLD: 5 % (ref 18–48)
MAGNESIUM SERPL-MCNC: 2.2 MG/DL (ref 1.6–2.6)
MCH RBC QN AUTO: 28.7 PG (ref 27–31)
MCHC RBC AUTO-ENTMCNC: 32.4 G/DL (ref 32–36)
MCV RBC AUTO: 89 FL (ref 82–98)
METAMYELOCYTES NFR BLD MANUAL: 1 %
MONOCYTES NFR BLD: 5 % (ref 4–15)
MYELOCYTES NFR BLD MANUAL: 2 %
NEUTROPHILS NFR BLD: 85 % (ref 38–73)
NRBC BLD-RTO: 4 /100 WBC
OVALOCYTES BLD QL SMEAR: ABNORMAL
PHOSPHATE SERPL-MCNC: 5.3 MG/DL (ref 2.7–4.5)
PLATELET # BLD AUTO: 110 K/UL (ref 150–450)
PLPETH BLD-MCNC: <20 NG/ML
PMV BLD AUTO: 13.7 FL (ref 9.2–12.9)
POCT GLUCOSE: 102 MG/DL (ref 70–110)
POCT GLUCOSE: 120 MG/DL (ref 70–110)
POIKILOCYTOSIS BLD QL SMEAR: SLIGHT
POLYCHROMASIA BLD QL SMEAR: ABNORMAL
POPETH BLD-MCNC: <20 NG/ML
POTASSIUM SERPL-SCNC: 5 MMOL/L (ref 3.5–5.1)
PROT SERPL-MCNC: 7.1 G/DL (ref 6–8.4)
PROTHROMBIN TIME: 16.3 SEC (ref 9–12.5)
RBC # BLD AUTO: 2.54 M/UL (ref 4–5.4)
SODIUM SERPL-SCNC: 134 MMOL/L (ref 136–145)
SPHEROCYTES BLD QL SMEAR: ABNORMAL
TARGETS BLD QL SMEAR: ABNORMAL
WBC # BLD AUTO: 20.2 K/UL (ref 3.9–12.7)

## 2024-11-04 PROCEDURE — 85027 COMPLETE CBC AUTOMATED: CPT | Performed by: HOSPITALIST

## 2024-11-04 PROCEDURE — 80100016 HC MAINTENANCE HEMODIALYSIS

## 2024-11-04 PROCEDURE — 84100 ASSAY OF PHOSPHORUS: CPT | Performed by: STUDENT IN AN ORGANIZED HEALTH CARE EDUCATION/TRAINING PROGRAM

## 2024-11-04 PROCEDURE — 36415 COLL VENOUS BLD VENIPUNCTURE: CPT | Performed by: STUDENT IN AN ORGANIZED HEALTH CARE EDUCATION/TRAINING PROGRAM

## 2024-11-04 PROCEDURE — 85610 PROTHROMBIN TIME: CPT | Performed by: STUDENT IN AN ORGANIZED HEALTH CARE EDUCATION/TRAINING PROGRAM

## 2024-11-04 PROCEDURE — 36415 COLL VENOUS BLD VENIPUNCTURE: CPT | Performed by: PHYSICIAN ASSISTANT

## 2024-11-04 PROCEDURE — 20600001 HC STEP DOWN PRIVATE ROOM

## 2024-11-04 PROCEDURE — 84702 CHORIONIC GONADOTROPIN TEST: CPT | Performed by: PHYSICIAN ASSISTANT

## 2024-11-04 PROCEDURE — 85007 BL SMEAR W/DIFF WBC COUNT: CPT | Performed by: HOSPITALIST

## 2024-11-04 PROCEDURE — 80053 COMPREHEN METABOLIC PANEL: CPT | Performed by: STUDENT IN AN ORGANIZED HEALTH CARE EDUCATION/TRAINING PROGRAM

## 2024-11-04 PROCEDURE — 83735 ASSAY OF MAGNESIUM: CPT | Performed by: STUDENT IN AN ORGANIZED HEALTH CARE EDUCATION/TRAINING PROGRAM

## 2024-11-04 PROCEDURE — 25000003 PHARM REV CODE 250: Performed by: STUDENT IN AN ORGANIZED HEALTH CARE EDUCATION/TRAINING PROGRAM

## 2024-11-04 PROCEDURE — 90935 HEMODIALYSIS ONE EVALUATION: CPT | Mod: ,,, | Performed by: NURSE PRACTITIONER

## 2024-11-04 RX ADMIN — MICONAZOLE NITRATE: 20 CREAM TOPICAL at 08:11

## 2024-11-04 RX ADMIN — MICONAZOLE NITRATE: 20 CREAM TOPICAL at 09:11

## 2024-11-04 RX ADMIN — PANTOPRAZOLE SODIUM 40 MG: 40 TABLET, DELAYED RELEASE ORAL at 08:11

## 2024-11-04 RX ADMIN — HYDROCORTISONE: 25 CREAM TOPICAL at 08:11

## 2024-11-04 RX ADMIN — ALLOPURINOL 100 MG: 100 TABLET ORAL at 08:11

## 2024-11-04 RX ADMIN — PANTOPRAZOLE SODIUM 40 MG: 40 TABLET, DELAYED RELEASE ORAL at 09:11

## 2024-11-04 RX ADMIN — HYDROCORTISONE: 25 CREAM TOPICAL at 09:11

## 2024-11-04 NOTE — PLAN OF CARE
Patient in bed, awake, many concerns/questions/request about procedures and dialysis. Patient would like to increase mobility and sit on edge of bed, though unable to operate isolative motor responses such as left lifts and struggles with turning. VSS on RA. One complaint of pain. Safety precautions maintained and call light in reach.         Problem: Adult Inpatient Plan of Care  Goal: Plan of Care Review  Outcome: Progressing     Problem: Adult Inpatient Plan of Care  Goal: Patient-Specific Goal (Individualized)  Outcome: Progressing     Problem: Adult Inpatient Plan of Care  Goal: Absence of Hospital-Acquired Illness or Injury  Outcome: Progressing     Problem: Adult Inpatient Plan of Care  Goal: Optimal Comfort and Wellbeing  Outcome: Progressing      Statement Selected

## 2024-11-04 NOTE — PLAN OF CARE
Discharge Plan A and Plan B have been determined by review of patient's clinical status, future medical and therapeutic needs, and coverage/benefits for post-acute care in coordination with multidisciplinary team members.        11/04/24 0802   Discharge Reassessment   Assessment Type Discharge Planning Reassessment   Did the patient's condition or plan change since previous assessment? No   Discharge Plan discussed with: Patient;Parent(s)   Name(s) and Number(s) Mile Welch (Mother)  [phone] (Mobile)   Communicated SHASHA with patient/caregiver Yes   Discharge Plan A Home Health  (Nursing Care Home Health)   Discharge Plan B Home with family   DME Needed Upon Discharge  other (see comments)  (TBD)   Transition of Care Barriers Mobility   Why the patient remains in the hospital Requires continued medical care   Post-Acute Status   Post-Acute Authorization Home Health;Dialysis   Home Health Status Set-up Complete/Auth obtained  (Nursing Care Home  Health  [phone])   Diaylsis Status Set-up Complete/Auth obtained  ([facility]  [phone])   Coverage [facility] DUAL COMPLETE HMO SN   Discharge Delays None known at this time     CM spoke  with patient and patient motherMile to discuss discharge planning.  Patients plan is to return home with HH and resume dialysis. Patient is oxygen dependent     Case Management will continue to follow and assist with discharge planning.  SHASHA:     Cortney Harrison RN  Case Management  [facility]  [phone]

## 2024-11-04 NOTE — PT/OT/SLP PROGRESS
Physical Therapy      Patient Name:  Pilar Fernandez   MRN:  [id number]    Patient not seen today secondary to Dialysis. Will follow-up next scheduled visit date.

## 2024-11-04 NOTE — PROGRESS NOTES
11/04/24 1345   Post-Hemodialysis Assessment   Rinseback Volume (mL) 250 mL   Blood Volume Processed (Liters) 60.3 L   Dialyzer Clearance Lightly streaked   Duration of Treatment 210 minutes   Additional Fluid Intake (mL) 300 mL   Total UF (mL) 2550 mL   Net Fluid Removal 2000   Patient Response to Treatment tolerated well   Post-Hemodialysis Comments see notes     HD TX complete. Pt tolerated well. Pt AAO, VSS. NAD. Net removal 2000 ml. Hemostasis achieved. Report given to primary nurse. Awaiting transport to escort pt back to room via bed.

## 2024-11-04 NOTE — PROGRESS NOTES
[facility] NOTE     Patient currently on hemodialysis for removal of uremic toxins .     Patient seen and evaluated on hemodialysis, tolerating treatment, see HD flowsheet for vitals and assessments.      No Hypotension, chest pain, shortness of breath, cramping, nausea or vomiting.     Target UF: 2 L as tolerated, keep MAP >65.  Transferred for evaluation of hyperbilirubinemia. Plans for liver biopsy with IR.   Significant generalized edema on exam, but oxygenating well on NC. L access arm with significant swelling compared to R arm, no pain or numbness. Recommend US of L arm to r/o DVT, discussed with primary team. Tolerating adequate BFRs at this time.   Continue EPO, increased dose  Phos 5.3, no binders  Consider renal diet restrictions; please limit daily intake to 32 oz per day.   No lab stick/BP intake on access site  Continue to monitor intake and output, daily weights   Please avoid gadolinium, fleets, phos-based laxatives, NSAIDs  Will follow closely and continue dialysis treatments while in-patient    ADELE Bhakta DNP, APRN, FNP-C  Department of Nephrology  [facility]  Pager: [phone]

## 2024-11-04 NOTE — SUBJECTIVE & OBJECTIVE
Interval History: IR planning liver biopsy when able. Will check with IR if they know when.    Review of Systems   Constitutional:  Negative for chills and fever.   Cardiovascular:  Positive for leg swelling. Negative for chest pain.   Musculoskeletal:  Positive for gait problem.   Neurological:  Negative for seizures and syncope.     Objective:     Vital Signs (Most Recent):  Temp: 97.5 °F (36.4 °C) (11/04/24 0748)  Pulse: 62 (11/04/24 0748)  Resp: 18 (11/04/24 0748)  BP: 134/86 (11/04/24 0748)  SpO2: 100 % (11/04/24 0748) Vital Signs (24h Range):  Temp:  [97.5 °F (36.4 °C)-98.2 °F (36.8 °C)] 97.5 °F (36.4 °C)  Pulse:  [62-80] 62  Resp:  [16-18] 18  SpO2:  [98 %-100 %] 100 %  BP: (118-143)/(65-86) 134/86     Weight: 130 kg (286 lb 9.6 oz)  Body mass index is 46.26 kg/m².    Intake/Output Summary (Last 24 hours) at 11/4/2024 0847  Last data filed at 11/3/2024 1832  Gross per 24 hour   Intake 210 ml   Output 1 ml   Net 209 ml         Physical Exam  Vitals and nursing note reviewed.   Constitutional:       General: She is not in acute distress.     Appearance: She is well-developed. She is morbidly obese. She is not diaphoretic.      Interventions: She is not intubated.  Pulmonary:      Effort: Pulmonary effort is normal. No accessory muscle usage or respiratory distress. She is not intubated.   Musculoskeletal:         General: No tenderness or signs of injury.      Right lower leg: Edema present.      Left lower leg: Edema present.   Skin:     General: Skin is warm and dry.      Coloration: Skin is not pale.   Neurological:      Mental Status: She is alert and oriented to person, place, and time. Mental status is at baseline.      Motor: No seizure activity.   Psychiatric:         Attention and Perception: Attention normal.         Behavior: Behavior is not agitated or aggressive. Behavior is cooperative.             Significant Labs: All pertinent labs within the past 24 hours have been reviewed.    Significant  Imaging: I have reviewed all pertinent imaging results/findings within the past 24 hours.

## 2024-11-04 NOTE — PROGRESS NOTES
[facility] - Stepdown Flex ([address] Medicine  Progress Note    Patient Name: Pilar Fernandez  MRN: [id number]  Patient Class: IP- Inpatient   Admission Date: 10/29/2024  Length of Stay: 6 days  Attending Physician: Shayne Diaz MD  Primary Care Provider: Lindsey Singletary FNP        Subjective:     Principal Problem:Direct hyperbilirubinemia        HPI:  Pilar Fernandez is a 44 year old Black woman with hypertension, hyperlipidemia, diabetes mellitus type 2 (treated with insulin), end stage renal disease on hemodialysis (), heart failure with preserved ejection fraction, chronic hypoxic respiratory failure on supplemental oxygen (3 liters/minute), history of right middle cerebral artery stroke on 2024, peripheral vascular disease, chronic lymphedema, hypothyroidism, anemia with history of blood transfusion, gout, gastroesophageal reflux disease, chronic constipation, sacral decubitus ulcer, history of  sections, history of tubal ligation, history of cholecystectomy. She lives in [place], [place].    She presented to [facility] Emergency Department on 10/29/2024 with gastrointestinal bleeding, symptomatic anemia, jaundice, hyperbilirubinemia. She reported being hospitalized at [facility] recently for leg cellulitis and being told she had hyperbilirubinemia and was advised to follow up with Hepatology at [facility] after being discharged on 10/26/2024. She had several wounds on her sacrum and legs. She had extremely swollen legs. Stool was positive for occult blood. She had leukocytosis. Hemoglobin was 6.4 g/dL from baseline around 10. INR was elevated at 2.1. she was given 2 units of fresh frozen plasma and 2 units of packed red blood cells. Abdomen CT without contrast showed no obvious etiology of liver abnormalities or leukocytosis. She was transferred to [facility] for further  evaluation of her hyperbilirubinemia. She was admitted to Hospital Medicine Team B.    Overview/Hospital Course:  She was put on ceftriaxone. Hepatology and Advanced Endoscopy Service were consulted for hyperbilirubinemia. Gastroenterology was consulted for gastrointestinal bleed. Hematology-Oncology was consulted for anemia with hyperbilirubinemia and positive Britt test. CT showed diffuse anasarca, pericardial effusion, enlarged lymph nodes vs soft tissue densities, hepatosplenomegaly. Hepatology recommended liver biopsy. Gastroenterology recommended outpatient endoscopy, after she is medically optimized, due to lack of overt bleeding. Hematology-Oncology recommended breast ultrasound. Leukocytosis persisted. Sed rate was normal. CRP was elevated. Midodrine was started for post-dialysis hypotension. She developed hepatic encephalopathy with ammonia of 111 umol/L. She was transfused another unit of packed red blood cells in preparation for liver biopsy. She required vitamin K for elevated INR. She desired to sit at the side of the bed but was unable to on her own. Physical and Occupational Therapy were consulted and recommended high intensity therapy. Midodrine was stopped as blood pressures increased to normal. Her home hypertension medications continued to be held.     Interval History: IR planning liver biopsy when able. Will check with IR if they know when.    Review of Systems   Constitutional:  Negative for chills and fever.   Cardiovascular:  Positive for leg swelling. Negative for chest pain.   Musculoskeletal:  Positive for gait problem.   Neurological:  Negative for seizures and syncope.     Objective:     Vital Signs (Most Recent):  Temp: 97.5 °F (36.4 °C) (11/04/24 0748)  Pulse: 62 (11/04/24 0748)  Resp: 18 (11/04/24 0748)  BP: 134/86 (11/04/24 0748)  SpO2: 100 % (11/04/24 0748) Vital Signs (24h Range):  Temp:  [97.5 °F (36.4 °C)-98.2 °F (36.8 °C)] 97.5 °F (36.4 °C)  Pulse:  [62-80] 62  Resp:  [16-18]  18  SpO2:  [98 %-100 %] 100 %  BP: (118-143)/(65-86) 134/86     Weight: 130 kg (286 lb 9.6 oz)  Body mass index is 46.26 kg/m².    Intake/Output Summary (Last 24 hours) at 11/4/2024 0847  Last data filed at 11/3/2024 1832  Gross per 24 hour   Intake 210 ml   Output 1 ml   Net 209 ml         Physical Exam  Vitals and nursing note reviewed.   Constitutional:       General: She is not in acute distress.     Appearance: She is well-developed. She is morbidly obese. She is not diaphoretic.      Interventions: She is not intubated.  Pulmonary:      Effort: Pulmonary effort is normal. No accessory muscle usage or respiratory distress. She is not intubated.   Musculoskeletal:         General: No tenderness or signs of injury.      Right lower leg: Edema present.      Left lower leg: Edema present.   Skin:     General: Skin is warm and dry.      Coloration: Skin is not pale.   Neurological:      Mental Status: She is alert and oriented to person, place, and time. Mental status is at baseline.      Motor: No seizure activity.   Psychiatric:         Attention and Perception: Attention normal.         Behavior: Behavior is not agitated or aggressive. Behavior is cooperative.             Significant Labs: All pertinent labs within the past 24 hours have been reviewed.    Significant Imaging: I have reviewed all pertinent imaging results/findings within the past 24 hours.    Assessment/Plan:      * Direct hyperbilirubinemia  Hepatic encephalopathy   CT abdomen showed anasarca with hepatosplenomegaly. Appreciate Hepatology. Changed home senna-docusate and polyethylene glycol to lactulose to treat hepatic encephalopathy. Liver biopsy.     Impaired mobility  Due to lymphedema and history of stroke. Her boyfriend at home helps with mobility and ADLs       Sacral decubitus ulcer, stage II  Chronic. Turn frequently. Wound care.      Leukocytosis  Monitor. Giving ceftriaxone for SBP prophylaxis.      Type 2 diabetes mellitus, with  long-term current use of insulin  She takes insulin glargine 80 units HS, insulin aspart 10 units BID with meals. Giving insulin glargine 10 units HS, insulin sliding scale. Monitor glucose.      Painless jaundice  See primary problem.      Coagulopathy  Goal INR <2. Gave vitamin K.    Acute anemia  Having intermittent GI bleed. Monitoring blood counts. Giving empiric pantoprazole. Outpatient endoscopy recommended by GI. If GI bleed acutely worsens, ask for inpatient endoscopy.    ESRD (end stage renal disease) on dialysis  Nephrology managing. Dialysis to treat anasarca.    GERD (gastroesophageal reflux disease)  Increased home pantoprazole to BID.      Hyperlipidemia associated with type 2 diabetes mellitus  Holding home atorvastatin.      Essential hypertension  Holding home nifedipine and nebivolol. Monitor BP.      VTE Risk Mitigation (From admission, onward)           Ordered     IP VTE HIGH RISK PATIENT  Once         10/30/24 0332     Place sequential compression device  Until discontinued         10/30/24 0051                    Discharge Planning   SHASHA: 11/11/2024     Code Status: Full Code   Is the patient medically ready for discharge?:     Reason for patient still in hospital (select all that apply): Consult recommendations and Other (specify) liver biopsy  Discharge Plan A: Rehab   Discharge Delays: None known at this time              Shayne Diaz MD  Department of Hospital Medicine   [facility] - Stepdown Flex ([place])

## 2024-11-04 NOTE — RESEARCH
Nephrology Research participant:     Dynamic Sodium Modeling and Central BP measurement.   IRB number : [id number]  Location: [facility].  Principle investigator: ARNOL ESPINAL        I have explained the research purpose and the protocol with all the possible effects and complications. I have answered all the questions.      Pilar Wyatt had agreed to participate in the research: Dynamic Na modeling and central BP measurement during Hemodialysis.       Her next 6 dialysis session with be under the research protocol.      If you have any question please do not hesitate to call.         JARED RAUSCH.Bryn. MD. MATTHEW. FACP.  , [employer] [facility] [employer]  Nephrology Consultant. [facility].   [address] [place] [address]. [address] [place] [address].     email: [email or web address].  Tel: Office: [phone]

## 2024-11-04 NOTE — PROGRESS NOTES
Patient arrived in a  bed to dialysis unit.   Report received from primary nurse  VS's per dialysis Flowsheet.     Hemodialysis initiated using the following:     Dialysis Access: left arm button hole fistula. Pt with swelling noted to left arm and hand, ok to start HD at this time per Jayla NP, Nephrology will order US of arm.      Needle size: Button hole  Insertion with no complications.     Will Maintain telemetry and blood pressure monitoring throughout treatment.  Refer to dialysis flowsheet and MAR for details.

## 2024-11-04 NOTE — PLAN OF CARE
Patient aaox4. Patient complains of no pain. Patient dialysis done today, 2 l of fluids redraw. Patient still npo. Patient turned q2. Patient due medication administered and patient tolerating well. Patient for inpatient consult to medical rehab. Plan of care reviewed with patient and patient verbalized understanding. Patient safety protocols observed, call light within reach. Patient made comfortable in bed. No other concerns at this time.                  Problem: Adult Inpatient Plan of Care  Goal: Plan of Care Review  Outcome: Progressing  Goal: Patient-Specific Goal (Individualized)  Outcome: Progressing  Goal: Absence of Hospital-Acquired Illness or Injury  Outcome: Progressing  Goal: Optimal Comfort and Wellbeing  Outcome: Progressing  Goal: Readiness for Transition of Care  Outcome: Progressing     Problem: Diabetes Comorbidity  Goal: Blood Glucose Level Within Targeted Range  Outcome: Progressing     Problem: Acute Kidney Injury/Impairment  Goal: Fluid and Electrolyte Balance  Outcome: Progressing  Goal: Improved Oral Intake  Outcome: Progressing  Goal: Effective Renal Function  Outcome: Progressing     Problem: Wound  Goal: Optimal Coping  Outcome: Progressing  Goal: Optimal Functional Ability  Outcome: Progressing  Goal: Absence of Infection Signs and Symptoms  Outcome: Progressing  Goal: Improved Oral Intake  Outcome: Progressing  Goal: Optimal Pain Control and Function  Outcome: Progressing  Goal: Skin Health and Integrity  Outcome: Progressing  Goal: Optimal Wound Healing  Outcome: Progressing     Problem: Skin Injury Risk Increased  Goal: Skin Health and Integrity  Outcome: Progressing     Problem: Gastrointestinal Bleeding  Goal: Optimal Coping with Acute Illness  Outcome: Progressing  Goal: Hemostasis  Outcome: Progressing     Problem: Bariatric Environmental Safety  Goal: Safety Maintained with Care  Outcome: Progressing     Problem: Hemodialysis  Goal: Safe, Effective Therapy  Delivery  Outcome: Progressing  Goal: Effective Tissue Perfusion  Outcome: Progressing  Goal: Absence of Infection Signs and Symptoms  Outcome: Progressing

## 2024-11-04 NOTE — PT/OT/SLP PROGRESS
Occupational Therapy      Patient Name:  Pilar eFrnandez   MRN:  [id number]    Patient not seen today secondary to Dialysis off the unit in both AM and PM attempts. Will follow-up at next date of service.    11/4/2024

## 2024-11-04 NOTE — H&P
See IR consult dated 10/31/24. Pt re evaluated this AM. INR trending down, now at 1.5. Plan to proceed with transjugular liver biopsy under general anesthesia. Anesthesia ordered due to chronic O2 requirements and c/f respiratory decompensation when she lies flat.     ASA: 3  Mallampati: 2    Plan  Will proceed with transjugular random liver biopsy under general anesthesia on 11/4/24 pending anesthesia availability  Please keep pt NPO  Anticoagulation history reviewed.   If starting prophylactic AC following procedure, ok to start lovenox 12 hours following procedure and ok to start SQ heparin 6-8 hours following procedure per SIR guidelines  Coagulation labs reviewed. INR 1.5 and plt count 110 on 11/4/24.  Please contact with questions via [employer] secure chat or [employer] Link    Rere Morillo PA-C  Interventional Radiology   Spectra: [id number]

## 2024-11-04 NOTE — PROGRESS NOTES
Patient escorted off unit via bed for dialysis. Patient transferred sent with bed with maximum assistance. No acute distress noted. Patient denies pain. Patient peripheral IV intact. No redness or swelling noted. Transported on telemetry box. Awaiting patient's return.      Patient returned to the unit at 1600. Vital signs checked. Patient made comfortable in bed.

## 2024-11-04 NOTE — PLAN OF CARE
Discharge Plan A and Plan B have been determined by review of patient's clinical status, future medical and therapeutic needs, and coverage/benefits for post-acute care in coordination with multidisciplinary team members.    11/04/24 0808   Discharge Reassessment   Assessment Type Discharge Planning Reassessment   Did the patient's condition or plan change since previous assessment? No   Discharge Plan discussed with: Patient;Parent(s)   Name(s) and Number(s) Mile Welch (Mother)  [phone] (Mobile)   Communicated SHASHA with patient/caregiver Yes   Discharge Plan A Rehab   DME Needed Upon Discharge  other (see comments)   Transition of Care Barriers Mobility   Why the patient remains in the hospital Requires continued medical care   Post-Acute Status   Post-Acute Authorization Placement   Post-Acute Placement Status Patient List Provided   Home Health Status Discharge Plan Changed   Diaylsis Status Set-up Complete/Auth obtained  ([facility] [phone])   Coverage [facility] DUAL COMPLETE HMO SNP   Discharge Delays None known at this time         CM spoke with patient and patients mother  to discuss discharge planning.  Patients plan is to  dc to rehab. Patient is oxygen dependent and current HD patient. Patient will need transportation    PMR ordered SHASHA: 11/11/24    Discharge Recommendations: High Intensity Therapy     Discharge Equipment Recommendations:bedside commode, bath bench (WC dependent on progress/TBD at next level of care)     Barriers to discharge: Inaccessible home            Met with patient and patient mother  to review discharge recommendation of  rehab and is agreeable to plan    Patient/family provided list of facilities in-network with patient's payor plan. Providers that are owned, operated, or affiliated with [facility] are included on the list.     Notified that referral sent to below listed facilities from in-network list based on proximity to  home/family support:     1.[facility] Phone: [phone]   Response: Interested, but need more information     2.[facility] Phone: [phone]  Response: No, unable to accept patient        3.[facility] Phone: [phone]       4.[facility] Phone: [phone]       5.[facility] Phone: [phone]       6.[facility] Phone: [phone]       [facility] Phone: [phone]       Patient/family instructed to identify preference.    Preferred Facility: (if more than 1, listed in order of descending preference)  No verbalized preference     If an additional preferred facility not listed above is identified, additional referral to be sent. If above facilities unable to accept, will send additional referrals to in-network providers.      3:20 pm  CM spoke with family and family prefer a facility in or near [place].     4:11 pm  CM spoke with the patients daughter at the bedside and provided the CM the name of the facility in [place].  The daughter provided the name of [facility]. CM explained to the daughter that LTAC and Rehab are two different  levels of care.  And therapy is recommending rehab and not LTAC.       IPR referral sent  [facility] Phone: [phone]         Cortney Harrison RN  Case Management  [facility]  [phone]

## 2024-11-05 LAB
ALBUMIN SERPL BCP-MCNC: 1.7 G/DL (ref 3.5–5.2)
ALP SERPL-CCNC: 585 U/L (ref 40–150)
ALT SERPL W/O P-5'-P-CCNC: 70 U/L (ref 10–44)
ANION GAP SERPL CALC-SCNC: 16 MMOL/L (ref 8–16)
ANISOCYTOSIS BLD QL SMEAR: SLIGHT
AST SERPL-CCNC: 185 U/L (ref 10–40)
BASOPHILS NFR BLD: 0 % (ref 0–1.9)
BILIRUB SERPL-MCNC: 15.5 MG/DL (ref 0.1–1)
BUN SERPL-MCNC: 43 MG/DL (ref 6–20)
CALCIUM SERPL-MCNC: 8.9 MG/DL (ref 8.7–10.5)
CHLORIDE SERPL-SCNC: 97 MMOL/L (ref 95–110)
CO2 SERPL-SCNC: 21 MMOL/L (ref 23–29)
CREAT SERPL-MCNC: 4.6 MG/DL (ref 0.5–1.4)
DIFFERENTIAL METHOD BLD: ABNORMAL
EOSINOPHIL NFR BLD: 5 % (ref 0–8)
ERYTHROCYTE [DISTWIDTH] IN BLOOD BY AUTOMATED COUNT: 22.2 % (ref 11.5–14.5)
EST. GFR  (NO RACE VARIABLE): 11.3 ML/MIN/1.73 M^2
GLUCOSE SERPL-MCNC: 83 MG/DL (ref 70–110)
HCT VFR BLD AUTO: 23 % (ref 37–48.5)
HGB BLD-MCNC: 7.5 G/DL (ref 12–16)
HYPOCHROMIA BLD QL SMEAR: ABNORMAL
IMM GRANULOCYTES # BLD AUTO: ABNORMAL K/UL (ref 0–0.04)
IMM GRANULOCYTES NFR BLD AUTO: ABNORMAL % (ref 0–0.5)
INR PPP: 1.5 (ref 0.8–1.2)
LYMPHOCYTES NFR BLD: 4 % (ref 18–48)
MAGNESIUM SERPL-MCNC: 2.1 MG/DL (ref 1.6–2.6)
MCH RBC QN AUTO: 29.3 PG (ref 27–31)
MCHC RBC AUTO-ENTMCNC: 32.6 G/DL (ref 32–36)
MCV RBC AUTO: 90 FL (ref 82–98)
MONOCYTES NFR BLD: 2 % (ref 4–15)
NEUTROPHILS NFR BLD: 89 % (ref 38–73)
NRBC BLD-RTO: 2 /100 WBC
OVALOCYTES BLD QL SMEAR: ABNORMAL
PAPPENHEIMER BOD BLD QL SMEAR: PRESENT
PHOSPHATE SERPL-MCNC: 4.6 MG/DL (ref 2.7–4.5)
PLATELET # BLD AUTO: 115 K/UL (ref 150–450)
PLATELET BLD QL SMEAR: ABNORMAL
PMV BLD AUTO: 13 FL (ref 9.2–12.9)
POCT GLUCOSE: 100 MG/DL (ref 70–110)
POCT GLUCOSE: 105 MG/DL (ref 70–110)
POCT GLUCOSE: 107 MG/DL (ref 70–110)
POIKILOCYTOSIS BLD QL SMEAR: SLIGHT
POLYCHROMASIA BLD QL SMEAR: ABNORMAL
POTASSIUM SERPL-SCNC: 4.9 MMOL/L (ref 3.5–5.1)
PROT SERPL-MCNC: 7.3 G/DL (ref 6–8.4)
PROTHROMBIN TIME: 15.8 SEC (ref 9–12.5)
RBC # BLD AUTO: 2.56 M/UL (ref 4–5.4)
SODIUM SERPL-SCNC: 134 MMOL/L (ref 136–145)
SPHEROCYTES BLD QL SMEAR: ABNORMAL
TARGETS BLD QL SMEAR: ABNORMAL
WBC # BLD AUTO: 19.25 K/UL (ref 3.9–12.7)

## 2024-11-05 PROCEDURE — 80053 COMPREHEN METABOLIC PANEL: CPT | Performed by: STUDENT IN AN ORGANIZED HEALTH CARE EDUCATION/TRAINING PROGRAM

## 2024-11-05 PROCEDURE — 85610 PROTHROMBIN TIME: CPT | Performed by: STUDENT IN AN ORGANIZED HEALTH CARE EDUCATION/TRAINING PROGRAM

## 2024-11-05 PROCEDURE — 97530 THERAPEUTIC ACTIVITIES: CPT

## 2024-11-05 PROCEDURE — 85007 BL SMEAR W/DIFF WBC COUNT: CPT | Performed by: HOSPITALIST

## 2024-11-05 PROCEDURE — 97110 THERAPEUTIC EXERCISES: CPT

## 2024-11-05 PROCEDURE — 20600001 HC STEP DOWN PRIVATE ROOM

## 2024-11-05 PROCEDURE — 25000003 PHARM REV CODE 250: Performed by: PHYSICIAN ASSISTANT

## 2024-11-05 PROCEDURE — 97112 NEUROMUSCULAR REEDUCATION: CPT | Mod: CQ

## 2024-11-05 PROCEDURE — 97530 THERAPEUTIC ACTIVITIES: CPT | Mod: CQ

## 2024-11-05 PROCEDURE — 83735 ASSAY OF MAGNESIUM: CPT | Performed by: STUDENT IN AN ORGANIZED HEALTH CARE EDUCATION/TRAINING PROGRAM

## 2024-11-05 PROCEDURE — 36415 COLL VENOUS BLD VENIPUNCTURE: CPT | Performed by: STUDENT IN AN ORGANIZED HEALTH CARE EDUCATION/TRAINING PROGRAM

## 2024-11-05 PROCEDURE — 84100 ASSAY OF PHOSPHORUS: CPT | Performed by: STUDENT IN AN ORGANIZED HEALTH CARE EDUCATION/TRAINING PROGRAM

## 2024-11-05 PROCEDURE — 25000003 PHARM REV CODE 250: Performed by: STUDENT IN AN ORGANIZED HEALTH CARE EDUCATION/TRAINING PROGRAM

## 2024-11-05 PROCEDURE — 85027 COMPLETE CBC AUTOMATED: CPT | Performed by: HOSPITALIST

## 2024-11-05 RX ORDER — SODIUM CHLORIDE 9 MG/ML
INJECTION, SOLUTION INTRAVENOUS ONCE
Status: CANCELLED | OUTPATIENT
Start: 2024-11-06

## 2024-11-05 RX ORDER — MUPIROCIN 20 MG/G
OINTMENT TOPICAL 2 TIMES DAILY
Status: DISPENSED | OUTPATIENT
Start: 2024-11-05 | End: 2024-11-10

## 2024-11-05 RX ADMIN — MUPIROCIN: 20 OINTMENT TOPICAL at 09:11

## 2024-11-05 RX ADMIN — PANTOPRAZOLE SODIUM 40 MG: 40 TABLET, DELAYED RELEASE ORAL at 09:11

## 2024-11-05 RX ADMIN — HYDROCORTISONE: 25 CREAM TOPICAL at 09:11

## 2024-11-05 RX ADMIN — MICONAZOLE NITRATE: 20 CREAM TOPICAL at 09:11

## 2024-11-05 RX ADMIN — HYDROCORTISONE: 25 CREAM TOPICAL at 02:11

## 2024-11-05 RX ADMIN — ACETAMINOPHEN 650 MG: 325 TABLET ORAL at 08:11

## 2024-11-05 NOTE — PLAN OF CARE
"Discharge Plan A and Plan B have been determined by review of patient's clinical status, future medical and therapeutic needs, and coverage/benefits for post-acute care in coordination with multidisciplinary team members.        11/05/24 1458   Post-Acute Status   Post-Acute Authorization Placement   Post-Acute Placement Status Referrals Sent   Home Health Status Discharge Plan Changed   Diaylsis Status Set-up Complete/Auth obtained   Coverage [facility] DUAL COMPLETE HMO SNP   Patient choice form signed by patient/caregiver List from CMS Compare;List with quality metrics by geographic area provided   Discharge Delays None known at this time   Discharge Plan   Discharge Plan A Rehab     CM met with the patients mother Mile at the bedside and family are in agreement with IPR. Patient and family prefer a facility in or near [place].      CM called Berta Palmer with LTAC, located within [facility] and provided the contact information  for a sister facility in [place]. Berta FUENTES provided [facility] Phone: [phone]    F: [phone]    Met with patient and patients mother Mile  to review discharge recommendation of  rehab  and is agreeable to plan    Patient/family provided list of facilities in-network with patient's payor plan. Providers that are owned, operated, or affiliated with [facility] are included on the list.     Notified that referral sent to below listed facilities from in-network list based on proximity to home/family support:       1.[facility] Phone: [phone] Response: Referral Received        Your fax has been successfully sent to [phone] at [phone].  ------------------------------------------------------------  From: [id number]  ------------------------------------------------------------     11/5/2024 3:06:20 PM Transmission Record          Sent to [phone] with remote ID "[id number]"          " "Result: (0/339;0/0) Success          Page record: 1 - 43          Elapsed time: 18:41 on channel 34          [facility] [place]  [phone] : [phone]  Response: Referral Received      Your fax has been successfully sent to [phone] at [phone].  ------------------------------------------------------------  From: [id number]  ------------------------------------------------------------      11/5/2024 2:58:23 PM Transmission Record          Sent to [phone] with remote ID "[id number]"          Result: (0/339;0/0) Success          Page record: 1 - 43          Elapsed time: 17:10 on channel 27        Patient/family instructed to identify preference.    Preferred Facility: (if more than 1, listed in order of descending preference)    Only preference is a facility in or near [place]    If an additional preferred facility not listed above is identified, additional referral to be sent. If above facilities unable to accept, will send additional referrals to in-network providers.        Cortney Harrison RN  Case Management  [facility]  [phone]      "

## 2024-11-05 NOTE — SUBJECTIVE & OBJECTIVE
Interval History: Can get liver biopsy today.    Review of Systems   Constitutional:  Negative for chills and fever.   Cardiovascular:  Positive for leg swelling. Negative for chest pain.   Musculoskeletal:  Positive for gait problem.   Neurological:  Negative for seizures and syncope.     Objective:     Vital Signs (Most Recent):  Temp: 97.4 °F (36.3 °C) (11/05/24 0729)  Pulse: 80 (11/05/24 0729)  Resp: 18 (11/05/24 0729)  BP: (!) 126/93 (11/05/24 0729)  SpO2: 100 % (11/05/24 0729) Vital Signs (24h Range):  Temp:  [97.4 °F (36.3 °C)-98.3 °F (36.8 °C)] 97.4 °F (36.3 °C)  Pulse:  [66-84] 80  Resp:  [16-18] 18  SpO2:  [97 %-100 %] 100 %  BP: (126-171)/() 126/93     Weight: 130 kg (286 lb 9.6 oz)  Body mass index is 46.26 kg/m².    Intake/Output Summary (Last 24 hours) at 11/5/2024 0820  Last data filed at 11/4/2024 1345  Gross per 24 hour   Intake 300 ml   Output 2550 ml   Net -2250 ml         Physical Exam  Vitals and nursing note reviewed.   Constitutional:       General: She is not in acute distress.     Appearance: She is well-developed. She is morbidly obese. She is not diaphoretic.      Interventions: She is not intubated.  Pulmonary:      Effort: Pulmonary effort is normal. No accessory muscle usage or respiratory distress. She is not intubated.   Musculoskeletal:         General: No tenderness or signs of injury.      Right lower leg: Edema present.      Left lower leg: Edema present.   Skin:     General: Skin is warm and dry.      Coloration: Skin is not pale.   Neurological:      Mental Status: She is alert and oriented to person, place, and time. Mental status is at baseline.      Motor: No seizure activity.   Psychiatric:         Attention and Perception: Attention normal.         Behavior: Behavior is not agitated or aggressive. Behavior is cooperative.             Significant Labs: All pertinent labs within the past 24 hours have been reviewed.    Significant Imaging: I have reviewed all pertinent  imaging results/findings within the past 24 hours.

## 2024-11-05 NOTE — PROGRESS NOTES
[facility] ([address] Medicine  Progress Note    Patient Name: Pilar Fernandez  MRN: [id number]  Patient Class: IP- Inpatient   Admission Date: 10/29/2024  Length of Stay: 7 days  Attending Physician: Shayne Diaz MD  Primary Care Provider: Lindsey Singletary FNP        Subjective:     Principal Problem:Direct hyperbilirubinemia        HPI:  Pilar Fernandez is a 44 year old Black woman with hypertension, hyperlipidemia, diabetes mellitus type 2 (treated with insulin), end stage renal disease on hemodialysis (), heart failure with preserved ejection fraction, chronic hypoxic respiratory failure on supplemental oxygen (3 liters/minute), history of right middle cerebral artery stroke on 2024, peripheral vascular disease, chronic lymphedema, hypothyroidism, anemia with history of blood transfusion, gout, gastroesophageal reflux disease, chronic constipation, sacral decubitus ulcer, history of  sections, history of tubal ligation, history of cholecystectomy. She lives in [place], [place].    She presented to [facility] Emergency Department on 10/29/2024 with gastrointestinal bleeding, symptomatic anemia, jaundice, hyperbilirubinemia. She reported being hospitalized at [facility] recently for leg cellulitis and being told she had hyperbilirubinemia and was advised to follow up with Hepatology at [facility] after being discharged on 10/26/2024. She had several wounds on her sacrum and legs. She had extremely swollen legs. Stool was positive for occult blood. She had leukocytosis. Hemoglobin was 6.4 g/dL from baseline around 10. INR was elevated at 2.1. she was given 2 units of fresh frozen plasma and 2 units of packed red blood cells. Abdomen CT without contrast showed no obvious etiology of liver abnormalities or leukocytosis. She was transferred to [facility] for further  evaluation of her hyperbilirubinemia. She was admitted to Hospital Medicine Team B.    Overview/Hospital Course:  She was put on ceftriaxone. Hepatology and Advanced Endoscopy Service were consulted for hyperbilirubinemia. Gastroenterology was consulted for gastrointestinal bleed. Hematology-Oncology was consulted for anemia with hyperbilirubinemia and positive Britt test. CT showed diffuse anasarca, pericardial effusion, enlarged lymph nodes vs soft tissue densities, hepatosplenomegaly. Hepatology recommended liver biopsy. Gastroenterology recommended outpatient endoscopy, after she is medically optimized, due to lack of overt bleeding. Hematology-Oncology recommended breast ultrasound. Leukocytosis persisted. Sed rate was normal. CRP was elevated. Midodrine was started for post-dialysis hypotension. She developed hepatic encephalopathy with ammonia of 111 umol/L. She was transfused another unit of packed red blood cells in preparation for liver biopsy. She required vitamin K for elevated INR. She desired to sit at the side of the bed but was unable to on her own. Physical and Occupational Therapy were consulted and recommended high intensity therapy. Midodrine was stopped as blood pressures increased to normal. Her home hypertension medications continued to be held.     Interval History: Can get liver biopsy today.    Review of Systems   Constitutional:  Negative for chills and fever.   Cardiovascular:  Positive for leg swelling. Negative for chest pain.   Musculoskeletal:  Positive for gait problem.   Neurological:  Negative for seizures and syncope.     Objective:     Vital Signs (Most Recent):  Temp: 97.4 °F (36.3 °C) (11/05/24 0729)  Pulse: 80 (11/05/24 0729)  Resp: 18 (11/05/24 0729)  BP: (!) 126/93 (11/05/24 0729)  SpO2: 100 % (11/05/24 0729) Vital Signs (24h Range):  Temp:  [97.4 °F (36.3 °C)-98.3 °F (36.8 °C)] 97.4 °F (36.3 °C)  Pulse:  [66-84] 80  Resp:  [16-18] 18  SpO2:  [97 %-100 %] 100 %  BP:  (126-171)/() 126/93     Weight: 130 kg (286 lb 9.6 oz)  Body mass index is 46.26 kg/m².    Intake/Output Summary (Last 24 hours) at 11/5/2024 0820  Last data filed at 11/4/2024 1345  Gross per 24 hour   Intake 300 ml   Output 2550 ml   Net -2250 ml         Physical Exam  Vitals and nursing note reviewed.   Constitutional:       General: She is not in acute distress.     Appearance: She is well-developed. She is morbidly obese. She is not diaphoretic.      Interventions: She is not intubated.  Pulmonary:      Effort: Pulmonary effort is normal. No accessory muscle usage or respiratory distress. She is not intubated.   Musculoskeletal:         General: No tenderness or signs of injury.      Right lower leg: Edema present.      Left lower leg: Edema present.   Skin:     General: Skin is warm and dry.      Coloration: Skin is not pale.   Neurological:      Mental Status: She is alert and oriented to person, place, and time. Mental status is at baseline.      Motor: No seizure activity.   Psychiatric:         Attention and Perception: Attention normal.         Behavior: Behavior is not agitated or aggressive. Behavior is cooperative.             Significant Labs: All pertinent labs within the past 24 hours have been reviewed.    Significant Imaging: I have reviewed all pertinent imaging results/findings within the past 24 hours.    Assessment/Plan:      * Direct hyperbilirubinemia  Hepatic encephalopathy   CT abdomen showed anasarca with hepatosplenomegaly. Appreciate Hepatology. Changed home senna-docusate and polyethylene glycol to lactulose to treat hepatic encephalopathy. Liver biopsy.     Impaired mobility  Due to lymphedema and history of stroke. Her boyfriend at home helps with mobility and ADLs       Sacral decubitus ulcer, stage II  Chronic. Turn frequently. Wound care.      Leukocytosis  Monitor. Giving ceftriaxone for SBP prophylaxis.      Type 2 diabetes mellitus, with long-term current use of  insulin  She takes insulin glargine 80 units HS, insulin aspart 10 units BID with meals. Giving insulin glargine 10 units HS, insulin sliding scale. Monitor glucose.      Painless jaundice  See primary problem.      Coagulopathy  Goal INR <2. Gave vitamin K.    Acute anemia  Having intermittent GI bleed. Monitoring blood counts. Giving empiric pantoprazole. Outpatient endoscopy recommended by GI. If GI bleed acutely worsens, ask for inpatient endoscopy.    ESRD (end stage renal disease) on dialysis  Nephrology managing. Dialysis to treat anasarca.    GERD (gastroesophageal reflux disease)  Increased home pantoprazole to BID.      Hyperlipidemia associated with type 2 diabetes mellitus  Holding home atorvastatin.      Essential hypertension  Holding home nifedipine and nebivolol. Monitor BP.      VTE Risk Mitigation (From admission, onward)           Ordered     IP VTE HIGH RISK PATIENT  Once         10/30/24 0332     Place sequential compression device  Until discontinued         10/30/24 0051                    Discharge Planning   SHASHA: 11/8/2024     Code Status: Full Code   Is the patient medically ready for discharge?:     Reason for patient still in hospital (select all that apply): Consult recommendations and Other (specify) liver biopsy  Discharge Plan A: Rehab   Discharge Delays: None known at this time              Shayne Diaz MD  Department of Hospital Medicine   [facility] ([place])

## 2024-11-05 NOTE — NURSING
Patient resting in bed ,Aox4, no complaint of pain/discomfort at this time. Plan of care to continue dialysis and diuresis discussed with patient. Call light within reach, will continue to monitor.

## 2024-11-05 NOTE — PT/OT/SLP PROGRESS
"Physical Therapy Co Treatment    Patient Name:  Pilar Fernandez   MRN:  [id number]    Recommendations:     Discharge Recommendations: High Intensity Therapy  Discharge Equipment Recommendations: bedside commode, bath bench (WC dependent on progress/TBD at next level of care)  Barriers to discharge: Inaccessible home    Assessment:     Pilar Fernandez is a 44 y.o. female admitted with a medical diagnosis of Direct hyperbilirubinemia.  She presents with the following impairments/functional limitations: weakness, impaired endurance, impaired self care skills, impaired functional mobility, gait instability, impaired balance, pain, decreased lower extremity function, decreased upper extremity function, edema. Pt tolerated EOB sitting this session but continues to required increased assistance with functional mobility. Pt has BLE weakness and edema limiting ROM and strength. Pt required two person assist for most activities and is unable to perform movement independently.   Co-treatment performed due to patient's multiple deficits requiring two skilled therapists to appropriately and safely assess patient's strength and endurance while facilitating functional tasks in addition to accommodating for patient's activity tolerance.    Rehab Prognosis: Good; patient would benefit from acute skilled PT services to address these deficits and reach maximum level of function.    Recent Surgery: * No surgery found *      Plan:     During this hospitalization, patient to be seen 4 x/week to address the identified rehab impairments via gait training, therapeutic activities, therapeutic exercises, neuromuscular re-education and progress toward the following goals:    Plan of Care Expires:  12/02/24    Subjective     Chief Complaint: "I want to sit up"  Patient/Family Comments/goals: Return to PLOF  Pain/Comfort:  Pain Rating 1: 10/10  Location - Side 1: Left  Location - Orientation 1: generalized  Location 1: hip  Pain " Addressed 1: Reposition, Distraction      Objective:     Communicated with RN prior to session.  Patient found HOB elevated with oxygen, telemetry upon PT entry to room.     General Precautions: Standard, fall  Orthopedic Precautions: N/A  Braces: N/A  Respiratory Status: Room air     Functional Mobility:  Bed Mobility:     Rolling Left:  maximal assistance and of 2 persons  Rolling Right: maximal assistance and of 2 persons  Scooting: maximal assistance and of 2 persons  Supine to Sit: maximal assistance and of 2 persons  Sit to Supine: total assistance and of 2 persons  Balance: EOB sitting CGA-SBA  Performed seated postural activities and trunk mobility to improve seated balance  Modified trunk twist in sitting  Postural corrections with cuing  Seated weight shifting  Cervical mobility in sitting to reduce LOB when turning in sitting        AM-PAC 6 CLICK MOBILITY  Turning over in bed (including adjusting bedclothes, sheets and blankets)?: 2  Sitting down on and standing up from a chair with arms (e.g., wheelchair, bedside commode, etc.): 2  Moving from lying on back to sitting on the side of the bed?: 2  Moving to and from a bed to a chair (including a wheelchair)?: 1  Need to walk in hospital room?: 1  Climbing 3-5 steps with a railing?: 1  Basic Mobility Total Score: 9       Treatment & Education:  Pt educated on there purpose, goals and benefits of today's session. Pt informed on progress made and improved tolerance to activity today. Pt was given VC's throughout session for sequencing, hand placements and safety. Pt was encourage to follow commands to improve outcome of activities being performed.     Patient left sitting edge of bed with all lines intact, call button in reach, RN notified, and family present..    GOALS:   Multidisciplinary Problems       Physical Therapy Goals          Problem: Physical Therapy    Goal Priority Disciplines Outcome Interventions   Physical Therapy Goal     PT, PT/OT  Progressing    Description: Goals to be met by: 2024     Patient will increase functional independence with mobility by performin. Supine to sit with Set-up [place]  2. Sit to supine with Set-up [place]  3. Sit to stand transfer with Contact Guard Assistance  4. Bed to chair transfer with Minimal Assistance using LRAD  5. Gait  x 30 feet with Minimal Assistance using LRAD   6. Ascend/descend 3 stair with no Handrails Moderate Assistance using LRAD   7. Lower extremity exercise program x15 reps per handout, with supervision                         Time Tracking:     PT Received On: 24  PT Start Time: 1040     PT Stop Time: 1108  PT Total Time (min): 28 min     Billable Minutes: Therapeutic Activity 15 and Neuromuscular Re-education 13    Treatment Type: Treatment  PT/PTA: PTA     Number of PTA visits since last PT visit: 1     2024

## 2024-11-05 NOTE — PT/OT/SLP PROGRESS
Occupational Therapy   Co-Treatment  Co-treatment performed due to patient's multiple deficits requiring two skilled therapists to appropriately and safely assess patient's strength and endurance while facilitating functional tasks in addition to accommodating for patient's activity tolerance.        Name: Pilar Fernandez  MRN: [id number]  Admitting Diagnosis:  Direct hyperbilirubinemia       Recommendations:     Discharge Recommendations: High Intensity Therapy  Discharge Equipment Recommendations:  bedside commode, bath bench  Barriers to discharge:  Other (Comment) (Increased skilled A required)    Assessment:     Pilar Fernandez is a 44 y.o. female with a medical diagnosis of Direct hyperbilirubinemia.  She presents with the following performance deficits affecting function: weakness, impaired endurance, impaired self care skills, impaired functional mobility, gait instability, impaired balance, pain, decreased lower extremity function, decreased upper extremity function, edema. Pt was agreeable to therapy and tolerated session well. Pt was eager to engage and sit EOB today but noted pain in L hip. Pt was able to complete transfers and exercises today with increased support. Pt presented with significant edema in L hand and was educated on therapeutic exercises to help decrease edema. Pt requested to remain sitting EOB at end of session, RN was consulted concerning pt safety. Collectively agreed pt was appropriate to sit EOB for a short period of time if therapy would return to assist with transfers back to bed. Pt sat EOB for approx 40 mins with RN supervision, no LOB or falls occurred. Therapy assisted with transfers back to bed and educated on safety with OOB mobility. Patient continues to demonstrate sufficient progression to warrant high intensity therapy evidenced by objectives noted below.     Rehab Prognosis:  Good; patient would benefit from acute skilled OT services to address these deficits  "and reach maximum level of function.       Plan:     Patient to be seen 4 x/week to address the above listed problems via self-care/home management, therapeutic activities, therapeutic exercises, neuromuscular re-education  Plan of Care Expires: 11/30/24  Plan of Care Reviewed with: patient    Subjective     Chief Complaint: Pain in L hip  Patient/Family Comments/goals: "It feels so good to be sitting up, can I say like this for a while?"  Pain/Comfort:  Pain Rating 1: 10/10  Location - Side 1: Left  Location - Orientation 1: generalized  Location 1: hip  Pain Addressed 1: Reposition, Distraction    Objective:     Communicated with: RN prior to session.  Patient found HOB elevated with oxygen, telemetry upon OT entry to room.    General Precautions: Standard, fall    Orthopedic Precautions:N/A  Braces: N/A  Respiratory Status: Nasal cannula, flow 4 L/min     Occupational Performance:     Bed Mobility:    Patient completed Scooting/Bridging with maximal assistance and 2 persons  Patient completed Supine to Sit with maximal assistance and 2 persons  Patient completed Sit to Supine with maximal assistance and 2 persons     Functional Mobility/Transfers:  Functional Mobility: Pt was able to sit EOB for approx. 15 mins during therapy session and demo'd good static/dynamic sitting balance on EOB. Pt requested to remain EOB following therapy session. Tolerated approx. 40 mins of sitting EOB with RN supervision.     Activities of Daily Living:  Grooming: total assistance to apply lotion of back while sitting EOB  Lower Body Dressing: maximal assistance to don socks while sitting EOB    Therapeutic Exercises:  Pt was able to engage in 1x15 of the following exercises with increased speed and decreased accuracy of motion. Verbal and visual cues were provided to correct.   - IP flexion/extension   - Wrist flexion/extension   - Elbow flexion/extension   - Shoulder flexion/extension    [facility] 6 Click ADL: 15    Treatment & " Education:  -PT was educated on TE and the important of mobility to help decreased edema in hand.  -Education on energy conservation and task modification to maximize safety and (I) during ADLs and mobility  -Education on importance of OOB activity to improve overall activity tolerance and promote recovery  -Pt educated to call for assistance and to transfer with hospital staff only  -Provided education regarding role of OT, POC, & discharge recommendations with pt and family verbalizing understanding.  Pt had no further questions & when asked whether there were any concerns pt reported none.     Patient left with bed in chair position with all lines intact, call button in reach, RN notified, and family present    GOALS:   Multidisciplinary Problems       Occupational Therapy Goals          Problem: Occupational Therapy    Goal Priority Disciplines Outcome Interventions   Occupational Therapy Goal     OT, PT/OT Progressing    Description: Goals to be met by: 11/30/24     Patient will increase functional independence with ADLs by performing:    UE Dressing with Set-up Assistance.  LE Dressing with Moderate Assistance.  Grooming while seated with Set-up Assistance.  Toileting from bedside commode with Moderate Assistance for hygiene and clothing management.   Supine to sit with Contact Guard Assistance.  Stand pivot transfers with Maximum Assistance.  Toilet transfer to bedside commode with Maximum Assistance.    DME needs:   Tub transfer bench for t/s combo to ensure pt return to hygiene and allow safe and effective bathing in home environment with minimized fall risk due to functional mobility impairments.      Patient has a mobility limitation that significantly impairs their ability to participate in one or more mobility related activities of daily living, including toileting. This deficit can be resolved by using a bedside commode. Patient demonstrates mobility limitations that will cause them to be confined to  one room at home without bathroom access for up to 30 days. Using a bedside commode will greatly improve the patient's ability to participate in MRADLs.                          Time Tracking:     OT Date of Treatment: 11/05/24  OT Start Time: 1040 (1147)  OT Stop Time: 1108 (1158)  OT First Time (min): 28 min  OT Total Time: 39 min    Billable Minutes:Therapeutic Activity 13  Therapeutic Exercise 26    OT/MIKE: OT          11/5/2024

## 2024-11-06 ENCOUNTER — ANESTHESIA (OUTPATIENT)
Dept: INTERVENTIONAL RADIOLOGY/VASCULAR | Facility: HOSPITAL | Age: 44
End: 2024-11-06
Payer: MEDICARE

## 2024-11-06 LAB
ALBUMIN SERPL BCP-MCNC: 1.7 G/DL (ref 3.5–5.2)
ALP SERPL-CCNC: 645 U/L (ref 40–150)
ALT SERPL W/O P-5'-P-CCNC: 70 U/L (ref 10–44)
ANION GAP SERPL CALC-SCNC: 15 MMOL/L (ref 8–16)
AST SERPL-CCNC: 187 U/L (ref 10–40)
BASOPHILS # BLD AUTO: 0.09 K/UL (ref 0–0.2)
BASOPHILS NFR BLD: 0.4 % (ref 0–1.9)
BILIRUB SERPL-MCNC: 17.2 MG/DL (ref 0.1–1)
BUN SERPL-MCNC: 48 MG/DL (ref 6–20)
CALCIUM SERPL-MCNC: 8.8 MG/DL (ref 8.7–10.5)
CHLORIDE SERPL-SCNC: 97 MMOL/L (ref 95–110)
CO2 SERPL-SCNC: 20 MMOL/L (ref 23–29)
CREAT SERPL-MCNC: 5.1 MG/DL (ref 0.5–1.4)
DIFFERENTIAL METHOD BLD: ABNORMAL
EOSINOPHIL # BLD AUTO: 0.4 K/UL (ref 0–0.5)
EOSINOPHIL NFR BLD: 1.9 % (ref 0–8)
ERYTHROCYTE [DISTWIDTH] IN BLOOD BY AUTOMATED COUNT: 22.9 % (ref 11.5–14.5)
EST. GFR  (NO RACE VARIABLE): 10 ML/MIN/1.73 M^2
GENETICIST REVIEW: NORMAL
GLUCOSE SERPL-MCNC: 87 MG/DL (ref 70–110)
HCT VFR BLD AUTO: 24.7 % (ref 37–48.5)
HFE GENE MUT ANL BLD/T: NORMAL
HFE RELEASED BY: NORMAL
HFE RESULT SUMMARY: NEGATIVE
HGB BLD-MCNC: 8 G/DL (ref 12–16)
IMM GRANULOCYTES # BLD AUTO: 1 K/UL (ref 0–0.04)
IMM GRANULOCYTES NFR BLD AUTO: 4.8 % (ref 0–0.5)
INR PPP: 1.4 (ref 0.8–1.2)
LYMPHOCYTES # BLD AUTO: 0.6 K/UL (ref 1–4.8)
LYMPHOCYTES NFR BLD: 3 % (ref 18–48)
MAGNESIUM SERPL-MCNC: 2.1 MG/DL (ref 1.6–2.6)
MCH RBC QN AUTO: 29.3 PG (ref 27–31)
MCHC RBC AUTO-ENTMCNC: 32.4 G/DL (ref 32–36)
MCV RBC AUTO: 91 FL (ref 82–98)
MONOCYTES # BLD AUTO: 0.9 K/UL (ref 0.3–1)
MONOCYTES NFR BLD: 4.5 % (ref 4–15)
NEUTROPHILS # BLD AUTO: 17.7 K/UL (ref 1.8–7.7)
NEUTROPHILS NFR BLD: 85.4 % (ref 38–73)
NRBC BLD-RTO: 2 /100 WBC
PHOSPHATE SERPL-MCNC: 4.7 MG/DL (ref 2.7–4.5)
PLATELET # BLD AUTO: 144 K/UL (ref 150–450)
PMV BLD AUTO: ABNORMAL FL (ref 9.2–12.9)
POCT GLUCOSE: 105 MG/DL (ref 70–110)
POCT GLUCOSE: 109 MG/DL (ref 70–110)
POCT GLUCOSE: 93 MG/DL (ref 70–110)
POCT GLUCOSE: 94 MG/DL (ref 70–110)
POTASSIUM SERPL-SCNC: 5.1 MMOL/L (ref 3.5–5.1)
PROT SERPL-MCNC: 7.2 G/DL (ref 6–8.4)
PROTHROMBIN TIME: 15.1 SEC (ref 9–12.5)
RBC # BLD AUTO: 2.73 M/UL (ref 4–5.4)
REF LAB TEST METHOD: NORMAL
SODIUM SERPL-SCNC: 132 MMOL/L (ref 136–145)
SPECIMEN SOURCE: NORMAL
SPECIMEN,  HEMOCHROMATOSIS: NORMAL
WBC # BLD AUTO: 20.76 K/UL (ref 3.9–12.7)

## 2024-11-06 PROCEDURE — 36415 COLL VENOUS BLD VENIPUNCTURE: CPT | Performed by: STUDENT IN AN ORGANIZED HEALTH CARE EDUCATION/TRAINING PROGRAM

## 2024-11-06 PROCEDURE — 85610 PROTHROMBIN TIME: CPT | Performed by: STUDENT IN AN ORGANIZED HEALTH CARE EDUCATION/TRAINING PROGRAM

## 2024-11-06 PROCEDURE — 88313 SPECIAL STAINS GROUP 2: CPT | Mod: 26,,, | Performed by: PATHOLOGY

## 2024-11-06 PROCEDURE — 88313 SPECIAL STAINS GROUP 2: CPT | Performed by: PATHOLOGY

## 2024-11-06 PROCEDURE — 0FB03ZX EXCISION OF LIVER, PERCUTANEOUS APPROACH, DIAGNOSTIC: ICD-10-PCS | Performed by: RADIOLOGY

## 2024-11-06 PROCEDURE — 88307 TISSUE EXAM BY PATHOLOGIST: CPT | Mod: 26,,, | Performed by: PATHOLOGY

## 2024-11-06 PROCEDURE — 90935 HEMODIALYSIS ONE EVALUATION: CPT | Mod: ,,,

## 2024-11-06 PROCEDURE — 25000003 PHARM REV CODE 250: Performed by: NURSE ANESTHETIST, CERTIFIED REGISTERED

## 2024-11-06 PROCEDURE — 84100 ASSAY OF PHOSPHORUS: CPT | Performed by: STUDENT IN AN ORGANIZED HEALTH CARE EDUCATION/TRAINING PROGRAM

## 2024-11-06 PROCEDURE — 85025 COMPLETE CBC W/AUTO DIFF WBC: CPT | Performed by: HOSPITALIST

## 2024-11-06 PROCEDURE — 88307 TISSUE EXAM BY PATHOLOGIST: CPT | Performed by: PATHOLOGY

## 2024-11-06 PROCEDURE — 80053 COMPREHEN METABOLIC PANEL: CPT | Performed by: STUDENT IN AN ORGANIZED HEALTH CARE EDUCATION/TRAINING PROGRAM

## 2024-11-06 PROCEDURE — 20600001 HC STEP DOWN PRIVATE ROOM

## 2024-11-06 PROCEDURE — 83735 ASSAY OF MAGNESIUM: CPT | Performed by: STUDENT IN AN ORGANIZED HEALTH CARE EDUCATION/TRAINING PROGRAM

## 2024-11-06 PROCEDURE — 63600175 PHARM REV CODE 636 W HCPCS: Performed by: NURSE ANESTHETIST, CERTIFIED REGISTERED

## 2024-11-06 PROCEDURE — 25000003 PHARM REV CODE 250: Performed by: STUDENT IN AN ORGANIZED HEALTH CARE EDUCATION/TRAINING PROGRAM

## 2024-11-06 PROCEDURE — 63600175 PHARM REV CODE 636 W HCPCS: Mod: JG | Performed by: NURSE PRACTITIONER

## 2024-11-06 PROCEDURE — 25500020 PHARM REV CODE 255: Performed by: HOSPITALIST

## 2024-11-06 PROCEDURE — 80100016 HC MAINTENANCE HEMODIALYSIS

## 2024-11-06 RX ORDER — MIDAZOLAM HYDROCHLORIDE 1 MG/ML
INJECTION INTRAMUSCULAR; INTRAVENOUS
Status: DISCONTINUED | OUTPATIENT
Start: 2024-11-06 | End: 2024-11-06

## 2024-11-06 RX ORDER — FENTANYL CITRATE 50 UG/ML
INJECTION, SOLUTION INTRAMUSCULAR; INTRAVENOUS
Status: DISCONTINUED | OUTPATIENT
Start: 2024-11-06 | End: 2024-11-06

## 2024-11-06 RX ORDER — ONDANSETRON HYDROCHLORIDE 2 MG/ML
INJECTION, SOLUTION INTRAVENOUS
Status: DISCONTINUED | OUTPATIENT
Start: 2024-11-06 | End: 2024-11-06

## 2024-11-06 RX ORDER — LIDOCAINE HYDROCHLORIDE 20 MG/ML
INJECTION INTRAVENOUS
Status: DISCONTINUED | OUTPATIENT
Start: 2024-11-06 | End: 2024-11-06

## 2024-11-06 RX ORDER — PROPOFOL 10 MG/ML
VIAL (ML) INTRAVENOUS
Status: DISCONTINUED | OUTPATIENT
Start: 2024-11-06 | End: 2024-11-06

## 2024-11-06 RX ORDER — PHENYLEPHRINE HYDROCHLORIDE 10 MG/ML
INJECTION INTRAVENOUS
Status: DISCONTINUED | OUTPATIENT
Start: 2024-11-06 | End: 2024-11-06

## 2024-11-06 RX ORDER — ROCURONIUM BROMIDE 10 MG/ML
INJECTION, SOLUTION INTRAVENOUS
Status: DISCONTINUED | OUTPATIENT
Start: 2024-11-06 | End: 2024-11-06

## 2024-11-06 RX ADMIN — MICONAZOLE NITRATE: 20 CREAM TOPICAL at 10:11

## 2024-11-06 RX ADMIN — ROCURONIUM BROMIDE 50 MG: 10 INJECTION INTRAVENOUS at 04:11

## 2024-11-06 RX ADMIN — PHENYLEPHRINE HYDROCHLORIDE 200 MCG: 10 INJECTION INTRAVENOUS at 04:11

## 2024-11-06 RX ADMIN — PHENYLEPHRINE HYDROCHLORIDE 100 MCG: 10 INJECTION INTRAVENOUS at 04:11

## 2024-11-06 RX ADMIN — SUGAMMADEX 200 MG: 100 INJECTION, SOLUTION INTRAVENOUS at 04:11

## 2024-11-06 RX ADMIN — FENTANYL CITRATE 50 MCG: 50 INJECTION, SOLUTION INTRAMUSCULAR; INTRAVENOUS at 04:11

## 2024-11-06 RX ADMIN — PANTOPRAZOLE SODIUM 40 MG: 40 TABLET, DELAYED RELEASE ORAL at 10:11

## 2024-11-06 RX ADMIN — LIDOCAINE HYDROCHLORIDE 60 MG: 20 INJECTION INTRAVENOUS at 04:11

## 2024-11-06 RX ADMIN — EPOETIN ALFA-EPBX 13000 UNITS: 10000 INJECTION, SOLUTION INTRAVENOUS; SUBCUTANEOUS at 10:11

## 2024-11-06 RX ADMIN — PROPOFOL 100 MG: 10 INJECTION, EMULSION INTRAVENOUS at 04:11

## 2024-11-06 RX ADMIN — IOHEXOL 25 ML: 300 INJECTION, SOLUTION INTRAVENOUS at 05:11

## 2024-11-06 RX ADMIN — ONDANSETRON 4 MG: 2 INJECTION INTRAMUSCULAR; INTRAVENOUS at 04:11

## 2024-11-06 RX ADMIN — MUPIROCIN: 20 OINTMENT TOPICAL at 10:11

## 2024-11-06 RX ADMIN — MIDAZOLAM HYDROCHLORIDE 1 MG: 2 INJECTION, SOLUTION INTRAMUSCULAR; INTRAVENOUS at 04:11

## 2024-11-06 RX ADMIN — HYDROCORTISONE: 25 CREAM TOPICAL at 10:11

## 2024-11-06 NOTE — PLAN OF CARE
Discharge Plan A and Plan B have been determined by review of patient's clinical status, future medical and therapeutic needs, and coverage/benefits for post-acute care in coordination with multidisciplinary team members.        11/06/24 1213   Post-Acute Status   Post-Acute Authorization Placement   Post-Acute Placement Status Referrals Sent  (Rehab)   Home Health Status Discharge Plan Changed   Diaylsis Status Set-up Complete/Auth obtained   Coverage [facility] DUAL COMPLETE HMO SN   Patient choice form signed by patient/caregiver List from CMS Compare;List with quality metrics by geographic area provided   Discharge Delays None known at this time   Discharge Plan   Discharge Plan A Rehab     1211 pm  CM called and spoke with Sienna at [facility] Phone: [phone]  to follow up rehab referral , and referral pending review.    1212 pm   [facility] [place] [phone] and patient declined.  Response: No, unable to accept patient     1220 pm  CM spoke with patients mother Mile JENKINS via phone [phone] and updated on the above rehab referral response and updated on the rehab responses and is amendable with pursuing [facility] Phone: [phone]   Response: No, unable to accept patient     [facility] Phone: [phone]   Response: No, unable to accept patient     [facility] Phone: [phone]   Response: No, unable to accept patient     1223 pm  CM left a VM with Berta Palmer with [facility] to update that family is willing to pursue rehab services at [facility].       Cortney Harrison RN  Case Management  [facility]  [phone]

## 2024-11-06 NOTE — SUBJECTIVE & OBJECTIVE
Interval History: Liver biopsy postponed again due to lack of anesthesia availability, to today.    Review of Systems   Constitutional:  Negative for chills and fever.   Cardiovascular:  Positive for leg swelling. Negative for chest pain.   Musculoskeletal:  Positive for gait problem.   Neurological:  Negative for seizures and syncope.     Objective:     Vital Signs (Most Recent):  Temp: 98.4 °F (36.9 °C) (11/06/24 0842)  Pulse: 79 (11/06/24 0842)  Resp: 18 (11/06/24 0842)  BP: 123/64 (11/06/24 0842)  SpO2: 98 % (11/06/24 0842) Vital Signs (24h Range):  Temp:  [97.5 °F (36.4 °C)-98.4 °F (36.9 °C)] 98.4 °F (36.9 °C)  Pulse:  [71-81] 79  Resp:  [18-20] 18  SpO2:  [98 %-100 %] 98 %  BP: (123-133)/(59-69) 123/64     Weight: 130 kg (286 lb 9.6 oz)  Body mass index is 46.26 kg/m².  No intake or output data in the 24 hours ending 11/06/24 0903        Physical Exam  Vitals and nursing note reviewed.   Constitutional:       General: She is not in acute distress.     Appearance: She is well-developed. She is morbidly obese. She is not diaphoretic.      Interventions: She is not intubated.  Pulmonary:      Effort: Pulmonary effort is normal. No accessory muscle usage or respiratory distress. She is not intubated.   Musculoskeletal:         General: No tenderness or signs of injury.      Right lower leg: Edema present.      Left lower leg: Edema present.   Skin:     General: Skin is warm and dry.      Coloration: Skin is not pale.   Neurological:      Mental Status: She is alert and oriented to person, place, and time. Mental status is at baseline.      Motor: No seizure activity.   Psychiatric:         Attention and Perception: Attention normal.         Behavior: Behavior is not agitated or aggressive. Behavior is cooperative.             Significant Labs: All pertinent labs within the past 24 hours have been reviewed.    Significant Imaging: I have reviewed all pertinent imaging results/findings within the past 24 hours.

## 2024-11-06 NOTE — NURSING
Patient resting in bed ,Aox4, does complain of some pain and request tylenol. Plan of care to continue dialysis and reduce swelling discussed with patient. Call light within reach, will continue to monitor.

## 2024-11-06 NOTE — PLAN OF CARE
Patient called RN into the room requesting to go to the bathroom. Writer offered patient bedpan/purewick but patient declined stating that she needs to get up and sit on the toilet. She asked writer to bring stretcher closer to the bathroom. RN asked whether patient normally gets up and walks and she states that she has been walking to the toilet on the floor. Moved stretcher closer to the bathroom and 2 RN's assisted patient to sit on the side of the bed. Then assisted patient to a standing position where she took a few steps into the entrance of the bathroom. Patient then asked if RN was going to put her on the toilet as she could not walk that far. RN explained to patient we would have to go back to the bed and use a bedpan instead which patient then agreed to. She stepped back towards bed then began sliding down towards the floor. Writer assisted patient to sitting position on the floor and called for additional staff assistance. Patient remained conscious and stable throughout and did not hit her head. Multiple staff members helped to lift patient back to a wheelchair and then back to stretcher. VSS. DAMON GRIFFITHS notified of incident and is ok proceeding with IR procedure. Inpatient RN also notified of event.

## 2024-11-06 NOTE — PROGRESS NOTES
[facility]     Patient currently on hemodialysis for removal of uremic toxins and volume.     Patient seen and evaluated on hemodialysis, tolerating treatment, see HD flowsheet for vitals and assessments.     Labs have been reviewed and the dialysate bath has been adjusted.        Assessment/Plan:     -Patient seen on HD, tolerating treatment well, w/o complaints   -UF goal of 2.5 L as tolerated  -Plan for liver biopsy today  -1 L daily fluid restrictions  -Renal diet, if not NPO   -Strict I/O's and daily weights  -Daily renal function panels  -Keep MAP >65 while on HD   -Hgb goal 10-11, hgb 8.0, continue Epo  -Phos 4.7, no indication for binders  -Will continue to follow while inpatient      Zoya Smith PA-C  Nephrology

## 2024-11-06 NOTE — PLAN OF CARE
Pt was NPO until 1600 when informed the procedure would not be done today.  Pt was given a diabetic diet and hydration.  Pt has 3-4 small soft stools today.  Wounds cleaned and medications applied.  Dressing applied to sacral lesion for comfort.  Pt has anuria.  Pt has gross generalized edema and hyperlipidemia.  Pt did sit up on side of bed for an hour today.  Pt eating well. Safety maintained with bed alarm. Family member in room.

## 2024-11-06 NOTE — PLAN OF CARE
Pt arrived to IR room 189  for transjugular liver biopsy. Pt oriented to unit and staff. Plan of care reviewed with patient, patient verbalizes understanding. Comfort measures utilized. Pt safely transferred from stretcher to procedural table. Fall risk reviewed with patient, fall risk interventions maintained. Positioner pillows utilized to minimize pressure points. Blankets applied. Pt prepped and draped utilizing standard sterile technique. Timeouts completed utilizing standard universal time-out, per department and facility policy.  Anesthesia at bedside; Refer to anesthesia record regarding sedation and vital signs.

## 2024-11-06 NOTE — PT/OT/SLP PROGRESS
Occupational Therapy      Patient Name:  Pilar Fernandez   MRN:  [id number]    Patient not seen today secondary to Dialysis. Will follow-up on the next schedule visit accordingly to OT POC.    11/6/2024

## 2024-11-06 NOTE — ANESTHESIA PROCEDURE NOTES
Intubation    Date/Time: 11/6/2024 4:31 PM    Performed by: Grey Toscano CRNA  Authorized by: Chase Ponce Jr., MD    Intubation:     Induction:  Intravenous    Intubated:  Postinduction    Mask Ventilation:  Not attempted    Attempts:  1    Attempted By:  CRNA    Method of Intubation:  Video laryngoscopy    Blade:  Barlow 3    Laryngeal View Grade: Grade I - full view of cords      Difficult Airway Encountered?: No      Complications:  None    Airway Device:  Oral endotracheal tube    Airway Device Size:  7.0    Style/Cuff Inflation:  Cuffed (inflated to minimal occlusive pressure)    Tube secured:  22    Secured at:  The lips    Placement Verified By:  Capnometry and Fiber optic visualization    Complicating Factors:  None    Findings Post-Intubation:  Atraumatic/condition of teeth unchanged and BS equal bilateral

## 2024-11-06 NOTE — PT/OT/SLP PROGRESS
Physical Therapy      Patient Name:  Pilar Fernandez   MRN:  [id number]    Patient not seen today secondary to Dialysis. Will follow-up as appropriate.

## 2024-11-06 NOTE — PROGRESS NOTES
[facility] - [facility] ([address] Medicine  Progress Note    Patient Name: Pilar Fernandez  MRN: [id number]  Patient Class: IP- Inpatient   Admission Date: 10/29/2024  Length of Stay: 8 days  Attending Physician: Shayne Diaz MD  Primary Care Provider: Lindsey Singletary FNP        Subjective:     Principal Problem:Direct hyperbilirubinemia        HPI:  Pilar Fernandez is a 44 year old Black woman with hypertension, hyperlipidemia, diabetes mellitus type 2 (treated with insulin), end stage renal disease on hemodialysis (), heart failure with preserved ejection fraction, chronic hypoxic respiratory failure on supplemental oxygen (3 liters/minute), history of right middle cerebral artery stroke on 2024, peripheral vascular disease, chronic lymphedema, hypothyroidism, anemia with history of blood transfusion, gout, gastroesophageal reflux disease, chronic constipation, sacral decubitus ulcer, history of  sections, history of tubal ligation, history of cholecystectomy. She lives in [place], [place].    She presented to [facility] Emergency Department on 10/29/2024 with gastrointestinal bleeding, symptomatic anemia, jaundice, hyperbilirubinemia. She reported being hospitalized at [facility] recently for leg cellulitis and being told she had hyperbilirubinemia and was advised to follow up with Hepatology at [facility] after being discharged on 10/26/2024. She had several wounds on her sacrum and legs. She had extremely swollen legs. Stool was positive for occult blood. She had leukocytosis. Hemoglobin was 6.4 g/dL from baseline around 10. INR was elevated at 2.1. she was given 2 units of fresh frozen plasma and 2 units of packed red blood cells. Abdomen CT without contrast showed no obvious etiology of liver abnormalities or leukocytosis. She was transferred to [facility] for further  evaluation of her hyperbilirubinemia. She was admitted to Hospital Medicine Team B.    Overview/Hospital Course:  She was put on ceftriaxone. Hepatology and Advanced Endoscopy Service were consulted for hyperbilirubinemia. Gastroenterology was consulted for gastrointestinal bleed. Hematology-Oncology was consulted for anemia with hyperbilirubinemia and positive Britt test. CT showed diffuse anasarca, pericardial effusion, enlarged lymph nodes vs soft tissue densities, hepatosplenomegaly. Hepatology recommended liver biopsy. Gastroenterology recommended outpatient endoscopy, after she is medically optimized, due to lack of overt bleeding. Hematology-Oncology recommended breast ultrasound. Leukocytosis persisted. Sed rate was normal. CRP was elevated. Midodrine was started for post-dialysis hypotension. She developed hepatic encephalopathy with ammonia of 111 umol/L. She was transfused another unit of packed red blood cells in preparation for liver biopsy. She required vitamin K for elevated INR. She desired to sit at the side of the bed but was unable to on her own. Physical and Occupational Therapy were consulted and recommended high intensity therapy. Midodrine was stopped as blood pressures increased to normal. Her home hypertension medications continued to be held.     Interval History: Liver biopsy postponed again due to lack of anesthesia availability, to today.    Review of Systems   Constitutional:  Negative for chills and fever.   Cardiovascular:  Positive for leg swelling. Negative for chest pain.   Musculoskeletal:  Positive for gait problem.   Neurological:  Negative for seizures and syncope.     Objective:     Vital Signs (Most Recent):  Temp: 98.4 °F (36.9 °C) (11/06/24 0842)  Pulse: 79 (11/06/24 0842)  Resp: 18 (11/06/24 0842)  BP: 123/64 (11/06/24 0842)  SpO2: 98 % (11/06/24 0842) Vital Signs (24h Range):  Temp:  [97.5 °F (36.4 °C)-98.4 °F (36.9 °C)] 98.4 °F (36.9 °C)  Pulse:  [71-81] 79  Resp:   [18-20] 18  SpO2:  [98 %-100 %] 98 %  BP: (123-133)/(59-69) 123/64     Weight: 130 kg (286 lb 9.6 oz)  Body mass index is 46.26 kg/m².  No intake or output data in the 24 hours ending 11/06/24 0903        Physical Exam  Vitals and nursing note reviewed.   Constitutional:       General: She is not in acute distress.     Appearance: She is well-developed. She is morbidly obese. She is not diaphoretic.      Interventions: She is not intubated.  Pulmonary:      Effort: Pulmonary effort is normal. No accessory muscle usage or respiratory distress. She is not intubated.   Musculoskeletal:         General: No tenderness or signs of injury.      Right lower leg: Edema present.      Left lower leg: Edema present.   Skin:     General: Skin is warm and dry.      Coloration: Skin is not pale.   Neurological:      Mental Status: She is alert and oriented to person, place, and time. Mental status is at baseline.      Motor: No seizure activity.   Psychiatric:         Attention and Perception: Attention normal.         Behavior: Behavior is not agitated or aggressive. Behavior is cooperative.             Significant Labs: All pertinent labs within the past 24 hours have been reviewed.    Significant Imaging: I have reviewed all pertinent imaging results/findings within the past 24 hours.    Assessment/Plan:      * Direct hyperbilirubinemia  Hepatic encephalopathy   CT abdomen showed anasarca with hepatosplenomegaly. Appreciate Hepatology. Changed home senna-docusate and polyethylene glycol to lactulose to treat hepatic encephalopathy. Liver biopsy.     Impaired mobility  Due to lymphedema and history of stroke. Her boyfriend at home helps with mobility and ADLs       Sacral decubitus ulcer, stage II  Chronic. Turn frequently. Wound care.      Leukocytosis  Monitor. Giving ceftriaxone for SBP prophylaxis.      Type 2 diabetes mellitus, with long-term current use of insulin  She takes insulin glargine 80 units HS, insulin aspart 10  units BID with meals. Giving insulin glargine 10 units HS, insulin sliding scale. Monitor glucose.      Painless jaundice  See primary problem.      Coagulopathy  Goal INR <2. Gave vitamin K.    Acute anemia  Having intermittent GI bleed. Monitoring blood counts. Giving empiric pantoprazole. Outpatient endoscopy recommended by GI. If GI bleed acutely worsens, ask for inpatient endoscopy.    ESRD (end stage renal disease) on dialysis  Nephrology managing. Dialysis to treat anasarca.    GERD (gastroesophageal reflux disease)  Increased home pantoprazole to BID.      Hyperlipidemia associated with type 2 diabetes mellitus  Holding home atorvastatin.      Essential hypertension  Holding home nifedipine and nebivolol. Monitor BP.      VTE Risk Mitigation (From admission, onward)           Ordered     IP VTE HIGH RISK PATIENT  Once         10/30/24 0332     Place sequential compression device  Until discontinued         10/30/24 0051                    Discharge Planning   SHASHA: 11/8/2024     Code Status: Full Code   Is the patient medically ready for discharge?:     Reason for patient still in hospital (select all that apply): Other (specify) liver biopsy  Discharge Plan A: Rehab   Discharge Delays: None known at this time              Shayne Diaz MD  Department of Hospital Medicine   [facility] - [facility] ([place]

## 2024-11-06 NOTE — PLAN OF CARE
11/06/24 1610   Post-Acute Status   Post-Acute Authorization Placement   Post-Acute Placement Status Pending payor review/awaiting authorization (if required)  ([id number])   Home Health Status Discharge Plan Changed   Diaylsis Status Set-up Complete/Auth obtained   Coverage [facility] DUAL COMPLETE O SNP   Discharge Plan   Discharge Plan A Rehab  ([facility], [facility])     Cortney Harrison RN  Case Management  [facility]  [phone]

## 2024-11-06 NOTE — PLAN OF CARE
Pre-op complete. VSS on 3L O2 nasal cannula. Anesthesia and procedural consent signed and witnessed, H&P from 10/31 in chart. IV flushed/patent. IR board notified patient ready for procedure.

## 2024-11-06 NOTE — TRANSFER OF CARE
"Anesthesia Transfer of Care Note    Patient: Pilar Fernandez    Procedure(s) Performed: * No procedures listed *    Patient location: PACU    Anesthesia Type: general    Transport from OR: Transported from OR on 6-10 L/min O2 by face mask with adequate spontaneous ventilation    Post pain: adequate analgesia    Post assessment: no apparent anesthetic complications and tolerated procedure well    Post vital signs: stable    Level of consciousness: awake    Nausea/Vomiting: no nausea/vomiting    Complications: none    Transfer of care protocol was followed    Last vitals: Visit Vitals  BP (!) 121/58 (BP Location: Right forearm, Patient Position: Lying)   Pulse 83   Temp 36.2 °C (97.1 °F) (Temporal)   Resp 18   Ht 5' 6" (1.676 m)   Wt 130 kg (286 lb 9.6 oz)   LMP  (LMP Unknown)   SpO2 95%   Breastfeeding No   BMI 46.26 kg/m²     "

## 2024-11-06 NOTE — PROGRESS NOTES
11/06/24 0910 11/06/24 0926        Hemodialysis AV Fistula Left upper arm   No placement date or time found.   Present Prior to Hospital Arrival?: Yes  Size/Length: 15 G  Location: Left upper arm   Needle Size Buttonhole  --    Site Assessment Clean;Dry;Intact  --    Patency Present;Thrill;Bruit  --    Status Accessed  --    Flows Good  --    Site Condition No complications  --    During Hemodialysis Assessment   Blood Flow Rate (mL/min)  --  400 mL/min   Dialysate Flow Rate (mL/min)  --  700 ml/min   Ultrafiltration Rate (mL/Hr)  --  760 mL/Hr   Arteriovenous Lines Secure  --  Yes   Arterial Pressure (mmHg)  --  -140 mmHg   Venous Pressure (mmHg)  --  170   Blood Volume Processed (Liters)  --  0 L   UF Removed (mL)  --  0 mL   TMP  --  30   Venous Line in Air Detector  --  Yes   Intake (mL)  --  250 mL   Intra-Hemodialysis Comments  --  HD started     Patient arrived IZABEL by bed. Maintenance HD started.

## 2024-11-06 NOTE — PLAN OF CARE
Transjugular liver biopsy procedure completed. Pt tolerated procedure well. Anesthesia at bedside; Refer to anesthesia record for further information regarding sedation and vital signs. Pt to be transported to PACU for  post-procedural recovery per MD. Report to be given at bedside to RN.

## 2024-11-07 LAB
ALBUMIN SERPL BCP-MCNC: 1.7 G/DL (ref 3.5–5.2)
ALP SERPL-CCNC: 654 U/L (ref 40–150)
ALT SERPL W/O P-5'-P-CCNC: 74 U/L (ref 10–44)
ANION GAP SERPL CALC-SCNC: 14 MMOL/L (ref 8–16)
AST SERPL-CCNC: 184 U/L (ref 10–40)
BASO STIPL BLD QL SMEAR: ABNORMAL
BASOPHILS # BLD AUTO: 0.07 K/UL (ref 0–0.2)
BASOPHILS NFR BLD: 0.3 % (ref 0–1.9)
BILIRUB SERPL-MCNC: 16.7 MG/DL (ref 0.1–1)
BUN SERPL-MCNC: 38 MG/DL (ref 6–20)
CALCIUM SERPL-MCNC: 8.9 MG/DL (ref 8.7–10.5)
CHLORIDE SERPL-SCNC: 99 MMOL/L (ref 95–110)
CO2 SERPL-SCNC: 21 MMOL/L (ref 23–29)
COMMENT: NORMAL
CREAT SERPL-MCNC: 4.3 MG/DL (ref 0.5–1.4)
DACRYOCYTES BLD QL SMEAR: ABNORMAL
DIFFERENTIAL METHOD BLD: ABNORMAL
EOSINOPHIL # BLD AUTO: 0.3 K/UL (ref 0–0.5)
EOSINOPHIL NFR BLD: 1.3 % (ref 0–8)
ERYTHROCYTE [DISTWIDTH] IN BLOOD BY AUTOMATED COUNT: 23.5 % (ref 11.5–14.5)
EST. GFR  (NO RACE VARIABLE): 12.3 ML/MIN/1.73 M^2
FINAL PATHOLOGIC DIAGNOSIS: NORMAL
GLUCOSE SERPL-MCNC: 81 MG/DL (ref 70–110)
GROSS: NORMAL
HCT VFR BLD AUTO: 21.2 % (ref 37–48.5)
HGB BLD-MCNC: 7 G/DL (ref 12–16)
HOWELL-JOLLY BOD BLD QL SMEAR: ABNORMAL
HYPOCHROMIA BLD QL SMEAR: ABNORMAL
IMM GRANULOCYTES # BLD AUTO: 0.95 K/UL (ref 0–0.04)
IMM GRANULOCYTES NFR BLD AUTO: 3.7 % (ref 0–0.5)
INR PPP: 1.5 (ref 0.8–1.2)
LYMPHOCYTES # BLD AUTO: 1.1 K/UL (ref 1–4.8)
LYMPHOCYTES NFR BLD: 4.1 % (ref 18–48)
Lab: NORMAL
MAGNESIUM SERPL-MCNC: 2 MG/DL (ref 1.6–2.6)
MCH RBC QN AUTO: 30.3 PG (ref 27–31)
MCHC RBC AUTO-ENTMCNC: 33 G/DL (ref 32–36)
MCV RBC AUTO: 92 FL (ref 82–98)
MICROSCOPIC EXAM: NORMAL
MONOCYTES # BLD AUTO: 1.5 K/UL (ref 0.3–1)
MONOCYTES NFR BLD: 6 % (ref 4–15)
NEUTROPHILS # BLD AUTO: 21.9 K/UL (ref 1.8–7.7)
NEUTROPHILS NFR BLD: 84.6 % (ref 38–73)
NRBC BLD-RTO: 3 /100 WBC
PHOSPHATE SERPL-MCNC: 4.2 MG/DL (ref 2.7–4.5)
PLATELET # BLD AUTO: 208 K/UL (ref 150–450)
PLATELET BLD QL SMEAR: ABNORMAL
PMV BLD AUTO: 13.8 FL (ref 9.2–12.9)
POCT GLUCOSE: 102 MG/DL (ref 70–110)
POCT GLUCOSE: 106 MG/DL (ref 70–110)
POCT GLUCOSE: 85 MG/DL (ref 70–110)
POCT GLUCOSE: 91 MG/DL (ref 70–110)
POCT GLUCOSE: 98 MG/DL (ref 70–110)
POIKILOCYTOSIS BLD QL SMEAR: SLIGHT
POLYCHROMASIA BLD QL SMEAR: ABNORMAL
POTASSIUM SERPL-SCNC: 4.8 MMOL/L (ref 3.5–5.1)
PROT SERPL-MCNC: 7 G/DL (ref 6–8.4)
PROTHROMBIN TIME: 16.2 SEC (ref 9–12.5)
RBC # BLD AUTO: 2.31 M/UL (ref 4–5.4)
SODIUM SERPL-SCNC: 134 MMOL/L (ref 136–145)
WBC # BLD AUTO: 25.86 K/UL (ref 3.9–12.7)

## 2024-11-07 PROCEDURE — 84100 ASSAY OF PHOSPHORUS: CPT | Performed by: STUDENT IN AN ORGANIZED HEALTH CARE EDUCATION/TRAINING PROGRAM

## 2024-11-07 PROCEDURE — 97530 THERAPEUTIC ACTIVITIES: CPT | Mod: CQ

## 2024-11-07 PROCEDURE — 25000003 PHARM REV CODE 250: Performed by: STUDENT IN AN ORGANIZED HEALTH CARE EDUCATION/TRAINING PROGRAM

## 2024-11-07 PROCEDURE — 83735 ASSAY OF MAGNESIUM: CPT | Performed by: STUDENT IN AN ORGANIZED HEALTH CARE EDUCATION/TRAINING PROGRAM

## 2024-11-07 PROCEDURE — 97535 SELF CARE MNGMENT TRAINING: CPT | Mod: CO

## 2024-11-07 PROCEDURE — 85610 PROTHROMBIN TIME: CPT | Performed by: STUDENT IN AN ORGANIZED HEALTH CARE EDUCATION/TRAINING PROGRAM

## 2024-11-07 PROCEDURE — 36415 COLL VENOUS BLD VENIPUNCTURE: CPT | Performed by: STUDENT IN AN ORGANIZED HEALTH CARE EDUCATION/TRAINING PROGRAM

## 2024-11-07 PROCEDURE — 85025 COMPLETE CBC W/AUTO DIFF WBC: CPT | Performed by: HOSPITALIST

## 2024-11-07 PROCEDURE — 25000003 PHARM REV CODE 250: Performed by: HOSPITALIST

## 2024-11-07 PROCEDURE — 99232 SBSQ HOSP IP/OBS MODERATE 35: CPT | Mod: ,,, | Performed by: NURSE PRACTITIONER

## 2024-11-07 PROCEDURE — 80053 COMPREHEN METABOLIC PANEL: CPT | Performed by: STUDENT IN AN ORGANIZED HEALTH CARE EDUCATION/TRAINING PROGRAM

## 2024-11-07 PROCEDURE — 20600001 HC STEP DOWN PRIVATE ROOM

## 2024-11-07 RX ORDER — SODIUM CHLORIDE 9 MG/ML
INJECTION, SOLUTION INTRAVENOUS ONCE
Status: CANCELLED | OUTPATIENT
Start: 2024-11-08

## 2024-11-07 RX ADMIN — LACTULOSE 20 G: 20 SOLUTION ORAL at 09:11

## 2024-11-07 RX ADMIN — HYDROCORTISONE: 25 CREAM TOPICAL at 09:11

## 2024-11-07 RX ADMIN — ALLOPURINOL 100 MG: 100 TABLET ORAL at 09:11

## 2024-11-07 RX ADMIN — OXYCODONE 5 MG: 5 TABLET ORAL at 12:11

## 2024-11-07 RX ADMIN — MICONAZOLE NITRATE: 20 CREAM TOPICAL at 09:11

## 2024-11-07 RX ADMIN — MUPIROCIN: 20 OINTMENT TOPICAL at 09:11

## 2024-11-07 RX ADMIN — PANTOPRAZOLE SODIUM 40 MG: 40 TABLET, DELAYED RELEASE ORAL at 09:11

## 2024-11-07 NOTE — PT/OT/SLP PROGRESS
Occupational Therapy   Treatment    Name: Pilar Fernandez  MRN: [id number]  Admitting Diagnosis:  Direct hyperbilirubinemia       Recommendations:     Discharge Recommendations: High Intensity Therapy  Discharge Equipment Recommendations:  bedside commode, bath bench  Barriers to discharge:  Other (Comment), Decreased caregiver support (patient requries increased level of assistance)    Assessment:     Pilar Fernandez is a 44 y.o. female with a medical diagnosis of Direct hyperbilirubinemia.  She presents with  the fallowing performance deficits affecting function are weakness, impaired endurance, impaired self care skills, impaired functional mobility, gait instability, impaired balance, pain, decreased safety awareness, decreased lower extremity function, impaired cardiopulmonary response to activity, edema, decreased upper extremity function, decreased coordination, impaired skin. Patient agreeable to tx session, patient continues to required significant assistance with functional transfers, bed mobility, and self-care task. Patient continues to have limited activity tolerance due to pain and weakness from recent hospitalization. Patient would benefit from High intensity therapy intervention to address over all functional decline with mobility task, endurance, and ADLs in order to return to PLOF.     Rehab Prognosis:  Good; patient would benefit from acute skilled OT services to address these deficits and reach maximum level of function.       Plan:     Patient to be seen 4 x/week to address the above listed problems via self-care/home management, therapeutic activities, therapeutic exercises, neuromuscular re-education  Plan of Care Expires: 11/30/24  Plan of Care Reviewed with: patient    Subjective     Chief Complaint: pain  Patient/Family Comments/goals: to return to PLOF  Pain/Comfort:  Pain Rating 1: 9/10  Location - Side 1: Left  Location - Orientation 1: generalized  Location 1: leg  Pain  Addressed 1: Reposition, Distraction  Pain Rating Post-Intervention 1:  (patient did not rate)    Objective:     Communicated with: Nurse prior to session.  Patient found HOB elevated with oxygen, telemetry upon OT entry to room.  A client care conference was completed by the OTR and the GONZALES prior to treatment by the GONZALES to discuss the patient's POC and current status.   Co-treated with PT due to patient complexity deficits requiring two skilled therapist to appropriately and safely mobilized patient while facilitating functional task in addition to accommodating for patient's activity tolerance.    General Precautions: Standard, fall    Orthopedic Precautions:N/A  Braces: N/A  Respiratory Status: Nasal cannula, flow 5 L/min     Occupational Performance:     Bed Mobility:    Patient completed Rolling/Turning to Left with  maximal assistance of 2 persons   Patient completed Rolling/Turning to Right with maximal assistance of 2 persons   Patient completed Scooting to EOB with total assistance of 2 persons   Patient required Scooting back to bed to prevent patient from sliding forward total assistance of 4 and for scooting to HOB via drawsheet transfer.  Patient completed Supine to Sit with total assistance of 2 persons   Patient completed Sit to Supine with total assistance of 3 persons   Patient only able to sit up at EOB for 2-3 mins with Max A of 2 persons and B knee blocked to prevent patient from sliding forward.    Functional Mobility/Transfers:  Patient completed Sit <> Stand Transfer from EOB with maximal assistance of 2 persons with rolling walker and B knee blocked. Patient only able to partially stand only for a few seconds and needed to return to seated position.   Functional Mobility: Deferred further mobility task due to poor standing balance and tolerance     Activities of Daily Living:  Bathing: maximal assistance for UB and total for LB   Upper Body Dressing: total assistance to don/doff gown    Lower Body Dressing: total assistance to don/doff socks   Toileting: total assistance for pericare hygiene task       [facility] 6 Click ADL: 12    Treatment & Education:  Discussed OT POC and progress  Educated patient on the importance to continue to perform exercises in order to reduce stiffness and promote joint mobility and blood flow  Addressed patient's questions and concerns within GONZALES scope of practice      Patient left HOB elevated with all lines intact, call button in reach, and PCT present    GOALS:   Multidisciplinary Problems       Occupational Therapy Goals          Problem: Occupational Therapy    Goal Priority Disciplines Outcome Interventions   Occupational Therapy Goal     OT, PT/OT Progressing    Description: Goals to be met by: 11/30/24     Patient will increase functional independence with ADLs by performing:    UE Dressing with Set-up Assistance.  LE Dressing with Moderate Assistance.  Grooming while seated with Set-up Assistance.  Toileting from bedside commode with Moderate Assistance for hygiene and clothing management.   Supine to sit with Contact Guard Assistance.  Stand pivot transfers with Maximum Assistance.  Toilet transfer to bedside commode with Maximum Assistance.    DME needs:   Tub transfer bench for t/s combo to ensure pt return to hygiene and allow safe and effective bathing in home environment with minimized fall risk due to functional mobility impairments.      Patient has a mobility limitation that significantly impairs their ability to participate in one or more mobility related activities of daily living, including toileting. This deficit can be resolved by using a bedside commode. Patient demonstrates mobility limitations that will cause them to be confined to one room at home without bathroom access for up to 30 days. Using a bedside commode will greatly improve the patient's ability to participate in MRADLs.                          Time Tracking:     OT Date of  Treatment: 11/07/24  OT Start Time: 1044  OT Stop Time: 1118  OT Total Time (min): 34 min    Billable Minutes:Self Care/Home Management 34    OT/MIKE: MIKE     Number of MIKE visits since last OT visit: 1    11/7/2024

## 2024-11-07 NOTE — NURSING TRANSFER
Nursing Transfer Note      11/6/2024   7:16 PM    Pt transported to [id number] on stretcher c remote cardiac telemetry monitor.  VSS, denies pain, n/v, sob.  Report given to receiving floor rn

## 2024-11-07 NOTE — PT/OT/SLP PROGRESS
"Physical Therapy Co Treatment    Patient Name:  Pilar Fernandez   MRN:  [id number]    Recommendations:     Discharge Recommendations: High Intensity Therapy  Discharge Equipment Recommendations: bedside commode, bath bench (WC dependent on progress/TBD at next level of care)  Barriers to discharge: Inaccessible home    Assessment:     Pilar Fernandez is a 44 y.o. female admitted with a medical diagnosis of Direct hyperbilirubinemia.  She presents with the following impairments/functional limitations: weakness, impaired endurance, impaired self care skills, impaired functional mobility, gait instability, impaired balance, pain, decreased lower extremity function, decreased upper extremity function, edema. Pt was agreeable to skilled therapy session and EOB activities. Pt was soiled upon PTA entrance and guided pt through bed mobility to improve independence while pericare was performed. Pt was adamant she would be able to perform a stand but only completed ~25% hip clearance from EOB with Max A x2 persons. Pt is severely deconditioned but is highly motivated to return to PLOF.    Co-treatment performed due to patient's multiple deficits requiring two skilled therapists to appropriately and safely assess patient's strength and endurance while facilitating functional tasks in addition to accommodating for patient's activity tolerance.    Rehab Prognosis: Good; patient would benefit from acute skilled PT services to address these deficits and reach maximum level of function.    Recent Surgery: * No surgery found *      Plan:     During this hospitalization, patient to be seen 4 x/week to address the identified rehab impairments via gait training, therapeutic activities, therapeutic exercises, neuromuscular re-education and progress toward the following goals:    Plan of Care Expires:  12/02/24    Subjective     Chief Complaint: "I can stand, I did it at home"  Patient/Family Comments/goals: Stand up-per " pt  Pain/Comfort:  Pain Rating 1: 9/10  Location - Side 1: Left  Location - Orientation 1: generalized  Location 1: leg  Pain Addressed 1: Reposition, Distraction      Objective:     Communicated with RN prior to session.  Patient found supine with oxygen, telemetry upon PT entry to room.     General Precautions: Standard, fall  Orthopedic Precautions: N/A  Braces: N/A  Respiratory Status: Nasal cannula, flow 5 L/min     Functional Mobility:  Bed Mobility:     Rolling Left:  maximal assistance and of 2 persons  Rolling Right: maximal assistance and of 2 persons  Scooting: total assistance and of 4 persons  Supine to Sit: total assistance and of 2 persons  Sit to Supine: total assistance and of 3 persons  Transfers:     Sit to Stand:  maximal assistance and of 2 persons with <25% hip clearance  Gait: Not appropriate to attempt  Balance: Sitting EOB: Max A x2      AM-PAC 6 CLICK MOBILITY  Turning over in bed (including adjusting bedclothes, sheets and blankets)?: 2  Sitting down on and standing up from a chair with arms (e.g., wheelchair, bedside commode, etc.): 1  Moving from lying on back to sitting on the side of the bed?: 2  Moving to and from a bed to a chair (including a wheelchair)?: 1  Need to walk in hospital room?: 1  Climbing 3-5 steps with a railing?: 1  Basic Mobility Total Score: 8       Treatment & Education:  Pt educated on there purpose, goals and benefits of today's session. Pt informed on progress made and improved tolerance to activity today. Pt was given VC's throughout session for sequencing, hand placements and safety. Pt was encourage to follow commands to improve outcome of activities being performed.     Patient left HOB elevated with all lines intact, call button in reach, and RN present..    GOALS:   Multidisciplinary Problems       Physical Therapy Goals          Problem: Physical Therapy    Goal Priority Disciplines Outcome Interventions   Physical Therapy Goal     PT, PT/OT Progressing     Description: Goals to be met by: 2024     Patient will increase functional independence with mobility by performin. Supine to sit with Set-up [place]  2. Sit to supine with Set-up [place]  3. Sit to stand transfer with Contact Guard Assistance  4. Bed to chair transfer with Minimal Assistance using LRAD  5. Gait  x 30 feet with Minimal Assistance using LRAD   6. Ascend/descend 3 stair with no Handrails Moderate Assistance using LRAD   7. Lower extremity exercise program x15 reps per handout, with supervision                         Time Tracking:     PT Received On: 24  PT Start Time: 1044     PT Stop Time: 1118  PT Total Time (min): 34 min     Billable Minutes: Therapeutic Activity 34    Treatment Type: Treatment  PT/PTA: PTA     Number of PTA visits since last PT visit: 2     2024

## 2024-11-07 NOTE — ASSESSMENT & PLAN NOTE
44 year old female hx of ESRD on HD MWF presenting from outside hospital as transfer for acute anemia and painless jaundice. Last HD session 10/28. Nephrology consulted for ESRD management.     Nephrology History  -Outpatient HD unit: [facility]  -Nephrologist: ?  -HD tx days: MWF  -HD tx time: 4 hrs  -Last HD tx: Monday 10/28  -HD access: LUE AVF  -HD modality: iHD  -Residual urine:   -EDW:  110 kg    Plan/Recommendations  ESRD on HD:  -Continue MWF iHD schedule, next HD 11/8  -Patient with diffuse body wall edema on imaging and chronic lymphedema, but no respiratory distress, on baseline O2, and no significant pulmonary edema on imaging. -Echo shows EF 60-65%, IVC 15 mm Hg.  -1 L daily fluid restriction  -Will continue iHD thrice weekly unless emergent indication arises  -Strict I&Os  -Pre & post HD weight  Anemia in ESRD  -Hgb goal 10-11, 7.6 today, stable  -Trend hgb, continue epo   Mineral Bone Disease in ESRD  -Resume home phos binders when diet advances  -Renal diet if not NPO

## 2024-11-07 NOTE — NURSING
Patient arrived back on unit at change of shift. AA&Ox4. VSS. Tele and pulse ox monitoring cont. SPO2 wnl on 3L NC. Afebrile throughout the night. Denies n/v, headache or lightheadedness. Meds were given per MAR. Pt complained of pain on lower left side of back. Applied heating packs to area. Oxycodone 5mg was given for 10/10 pain, with mild relief. Wound care was done per order. One loose bm was charted. Pts mom remained at bedside. Remained free of falls or injuries on pm shift. Instructed to call staff for assistance. No known needs at this time. Bed is locked and in lowest position. Call light is within reach.

## 2024-11-07 NOTE — SUBJECTIVE & OBJECTIVE
Interval History: IR liver biopsy yesterday. HD yesterday, tolerated well.     Review of patient's allergies indicates:   Allergen Reactions    Tramadol Itching and Nausea And Vomiting     Current Facility-Administered Medications   Medication Frequency    acetaminophen tablet 650 mg Q4H PRN    albuterol-ipratropium 2.5 mg-0.5 mg/3 mL nebulizer solution 3 mL Q4H PRN    allopurinoL tablet 100 mg Daily    dextrose 10% bolus 125 mL 125 mL PRN    dextrose 10% bolus 250 mL 250 mL PRN    diphenhydrAMINE capsule 50 mg Q6H PRN    epoetin jitendra-epbx injection 13,000 Units Every Mon, Wed, Fri    glucagon (human recombinant) injection 1 mg PRN    glucose chewable tablet 16 g PRN    glucose chewable tablet 24 g PRN    hydrocortisone 2.5 % cream BID    insulin aspart U-100 pen 0-5 Units QID (AC + HS) PRN    lactulose 20 gram/30 mL solution Soln 10 g Q6H PRN    lactulose 20 gram/30 mL solution Soln 20 g BID    miconazole 2 % cream BID    mupirocin 2 % ointment BID    naloxone 0.4 mg/mL injection 0.02 mg PRN    ondansetron injection 4 mg Q8H PRN    oxyCODONE immediate release tablet 5 mg Q6H PRN    pantoprazole EC tablet 40 mg BID    simethicone chewable tablet 80 mg QID PRN    sodium chloride 0.9% flush 10 mL Q12H PRN    traZODone tablet 100 mg Nightly PRN       Objective:     Vital Signs (Most Recent):  Temp: 98.5 °F (36.9 °C) (11/07/24 0755)  Pulse: 85 (11/07/24 0818)  Resp: 20 (11/07/24 0818)  BP: 128/71 (11/07/24 0818)  SpO2: (!) 89 % (11/07/24 0818) Vital Signs (24h Range):  Temp:  [97.1 °F (36.2 °C)-98.5 °F (36.9 °C)] 98.5 °F (36.9 °C)  Pulse:  [68-85] 85  Resp:  [11-20] 20  SpO2:  [89 %-100 %] 89 %  BP: (107-156)/(53-99) 128/71     Weight: 130 kg (286 lb 9.6 oz) (11/06/24 1451)  Body mass index is 46.26 kg/m².  Body surface area is 2.46 meters squared.    I/O last 3 completed shifts:  In: -   Out: 3150 [Other:3150]     Physical Exam  Vitals and nursing note reviewed.   Pulmonary:      Effort: Pulmonary effort is normal.    Musculoskeletal:      Right lower leg: Edema present.      Left lower leg: Edema present.   Neurological:      Mental Status: She is alert and oriented to person, place, and time.   Psychiatric:         Mood and Affect: Mood normal.          Significant Labs:  CBC:   Recent Labs   Lab 11/07/24  0803   WBC 25.86*   RBC 2.31*   HGB 7.0*   HCT 21.2*      MCV 92   MCH 30.3   MCHC 33.0     CMP:   Recent Labs   Lab 11/07/24  0803   GLU 81   CALCIUM 8.9   ALBUMIN 1.7*   PROT 7.0   *   K 4.8   CO2 21*   CL 99   BUN 38*   CREATININE 4.3*   ALKPHOS 654*   ALT 74*   *   BILITOT 16.7*     All labs within the past 24 hours have been reviewed.

## 2024-11-07 NOTE — PROGRESS NOTES
[facility] ([place] [id number])  Nephrology  Progress Note    Patient Name: Pilar Fernandez  MRN: [id number]  Admission Date: 10/29/2024  Hospital Length of Stay: 9 days  Attending Provider: Stephany Ordaz MD   Primary Care Physician: Lindsey Singletary FNP  Principal Problem:Direct hyperbilirubinemia    Subjective:     Interval History: IR liver biopsy yesterday. HD yesterday, tolerated well.     Review of patient's allergies indicates:   Allergen Reactions    Tramadol Itching and Nausea And Vomiting     Current Facility-Administered Medications   Medication Frequency    acetaminophen tablet 650 mg Q4H PRN    albuterol-ipratropium 2.5 mg-0.5 mg/3 mL nebulizer solution 3 mL Q4H PRN    allopurinoL tablet 100 mg Daily    dextrose 10% bolus 125 mL 125 mL PRN    dextrose 10% bolus 250 mL 250 mL PRN    diphenhydrAMINE capsule 50 mg Q6H PRN    epoetin jitendra-epbx injection 13,000 Units Every Mon, Wed, Fri    glucagon (human recombinant) injection 1 mg PRN    glucose chewable tablet 16 g PRN    glucose chewable tablet 24 g PRN    hydrocortisone 2.5 % cream BID    insulin aspart U-100 pen 0-5 Units QID (AC + HS) PRN    lactulose 20 gram/30 mL solution Soln 10 g Q6H PRN    lactulose 20 gram/30 mL solution Soln 20 g BID    miconazole 2 % cream BID    mupirocin 2 % ointment BID    naloxone 0.4 mg/mL injection 0.02 mg PRN    ondansetron injection 4 mg Q8H PRN    oxyCODONE immediate release tablet 5 mg Q6H PRN    pantoprazole EC tablet 40 mg BID    simethicone chewable tablet 80 mg QID PRN    sodium chloride 0.9% flush 10 mL Q12H PRN    traZODone tablet 100 mg Nightly PRN       Objective:     Vital Signs (Most Recent):  Temp: 98.5 °F (36.9 °C) (11/07/24 0755)  Pulse: 85 (11/07/24 0818)  Resp: 20 (11/07/24 0818)  BP: 128/71 (11/07/24 0818)  SpO2: (!) 89 % (11/07/24 0818) Vital Signs (24h Range):  Temp:  [97.1 °F (36.2 °C)-98.5 °F (36.9 °C)] 98.5 °F (36.9 °C)  Pulse:  [68-85] 85  Resp:  [11-20] 20  SpO2:  [89 %-100 %]  89 %  BP: (107-156)/(53-99) 128/71     Weight: 130 kg (286 lb 9.6 oz) (11/06/24 1451)  Body mass index is 46.26 kg/m².  Body surface area is 2.46 meters squared.    I/O last 3 completed shifts:  In: -   Out: 3150 [Other:3150]     Physical Exam  Vitals and nursing note reviewed.   Pulmonary:      Effort: Pulmonary effort is normal.   Musculoskeletal:      Right lower leg: Edema present.      Left lower leg: Edema present.   Neurological:      Mental Status: She is alert and oriented to person, place, and time.   Psychiatric:         Mood and Affect: Mood normal.          Significant Labs:  CBC:   Recent Labs   Lab 11/07/24  0803   WBC 25.86*   RBC 2.31*   HGB 7.0*   HCT 21.2*      MCV 92   MCH 30.3   MCHC 33.0     CMP:   Recent Labs   Lab 11/07/24  0803   GLU 81   CALCIUM 8.9   ALBUMIN 1.7*   PROT 7.0   *   K 4.8   CO2 21*   CL 99   BUN 38*   CREATININE 4.3*   ALKPHOS 654*   ALT 74*   *   BILITOT 16.7*     All labs within the past 24 hours have been reviewed.       Assessment/Plan:     Renal/  ESRD (end stage renal disease) on dialysis  44 year old female hx of ESRD on HD MWF presenting from outside hospital as transfer for acute anemia and painless jaundice. Last HD session 10/28. Nephrology consulted for ESRD management.     Nephrology History  -Outpatient HD unit: [facility]  -Nephrologist: ?  -HD tx days: MWF  -HD tx time: 4 hrs  -Last HD tx: Monday 10/28  -HD access: LUE AVF  -HD modality: iHD  -Residual urine:   -EDW:  110 kg    Plan/Recommendations  ESRD on HD:  -Continue MWF iHD schedule, next HD 11/8  -Patient with diffuse body wall edema on imaging and chronic lymphedema, but no respiratory distress, on baseline O2, and no significant pulmonary edema on imaging. -Echo shows EF 60-65%, IVC 15 mm Hg.  -1 L daily fluid restriction  -Will continue iHD thrice weekly unless emergent indication arises  -Strict I&Os  -Pre & post HD weight  Anemia in ESRD  -Hgb goal 10-11, 7.6 today,  stable  -Trend hgb, continue epo   Mineral Bone Disease in ESRD  -Resume home phos binders when diet advances  -Renal diet if not NPO      GI  * Direct hyperbilirubinemia  -management per primary        Thank you for your consult. I will follow-up with patient. Please contact us if you have any additional questions.    Evon Bullard, YAEL  Nephrology  [facility] ([place])

## 2024-11-07 NOTE — NURSING
Shift Note    Pt rested well this shift. VSS. Pain managed with positioning and heat packs. PIV in place and patent. Skin care completed. T&RQ2 as tolerated. Sizewise bed replaced this shift. Safety precautions in place. Call light in reach. No further concerns noted at this time.

## 2024-11-07 NOTE — ANESTHESIA POSTPROCEDURE EVALUATION
Anesthesia Post Evaluation    Patient: Pilar Fernandez    Procedure(s) Performed: * No procedures listed *    Final Anesthesia Type: general      Patient location during evaluation: floor  Patient participation: Yes- Able to Participate  Level of consciousness: awake and alert and oriented  Post-procedure vital signs: reviewed and stable  Pain management: adequate  Airway patency: patent    PONV status at discharge: No PONV  Anesthetic complications: no      Cardiovascular status: blood pressure returned to baseline, hemodynamically stable and stable  Respiratory status: unassisted, room air and spontaneous ventilation  Hydration status: euvolemic  Follow-up not needed.              Vitals Value Taken Time   /60 11/07/24 1457   Temp 36.8 °C (98.3 °F) 11/07/24 1457   Pulse 90 11/07/24 1526   Resp 18 11/07/24 1455   SpO2 88 % 11/07/24 1526   Vitals shown include unfiled device data.      Event Time   Out of Recovery 11/06/2024 19:17:51         Pain/Vannesa Score: Pain Rating Prior to Med Admin: 10 (11/7/2024 12:05 AM)  Pain Rating Post Med Admin: 2 (11/7/2024  1:05 AM)  Vannesa Score: 9 (11/6/2024  5:45 PM)

## 2024-11-07 NOTE — PLAN OF CARE
Problem: Adult Inpatient Plan of Care  Goal: Plan of Care Review  Outcome: Progressing  Goal: Patient-Specific Goal (Individualized)  Outcome: Progressing  Goal: Absence of Hospital-Acquired Illness or Injury  Outcome: Progressing  Goal: Optimal Comfort and Wellbeing  Outcome: Progressing  Goal: Readiness for Transition of Care  Outcome: Progressing     Problem: Diabetes Comorbidity  Goal: Blood Glucose Level Within Targeted Range  Outcome: Progressing     Problem: Acute Kidney Injury/Impairment  Goal: Fluid and Electrolyte Balance  Outcome: Progressing  Goal: Improved Oral Intake  Outcome: Progressing  Goal: Effective Renal Function  Outcome: Progressing     Problem: Wound  Goal: Optimal Coping  Outcome: Progressing  Goal: Optimal Functional Ability  Outcome: Progressing  Goal: Absence of Infection Signs and Symptoms  Outcome: Progressing  Goal: Improved Oral Intake  Outcome: Progressing  Goal: Optimal Pain Control and Function  Outcome: Progressing  Goal: Skin Health and Integrity  Outcome: Progressing  Goal: Optimal Wound Healing  Outcome: Progressing     Problem: Skin Injury Risk Increased  Goal: Skin Health and Integrity  Outcome: Progressing     Problem: Gastrointestinal Bleeding  Goal: Optimal Coping with Acute Illness  Outcome: Progressing  Goal: Hemostasis  Outcome: Progressing     Problem: Bariatric Environmental Safety  Goal: Safety Maintained with Care  Outcome: Progressing     Problem: Hemodialysis  Goal: Safe, Effective Therapy Delivery  Outcome: Progressing  Goal: Effective Tissue Perfusion  Outcome: Progressing  Goal: Absence of Infection Signs and Symptoms  Outcome: Progressing     Problem: Chronic Kidney Disease  Goal: Electrolyte Balance  Outcome: Progressing  Goal: Fluid Balance  Outcome: Progressing

## 2024-11-07 NOTE — ANESTHESIA PREPROCEDURE EVALUATION
11/07/2024  Pre-operative evaluation for * No procedures listed *    Pilar Fernandez is a 44 y.o. female     Patient Active Problem List   Diagnosis    Iron deficiency anemia    Type 2 diabetes mellitus with hyperglycemia, with long-term current use of insulin    Essential hypertension    Hyperlipidemia associated with type 2 diabetes mellitus    GERD (gastroesophageal reflux disease)    Family history of cardiovascular disease    Cardiomyopathy    Constipation    SOB (shortness of breath)    HFrEF (heart failure with reduced ejection fraction)    Abnormal uterine bleeding (AUB)    Acute pain of left knee    Gout    Peripheral vascular disease    Chronic kidney disease    Heart failure with preserved ejection fraction    ESRD (end stage renal disease) on dialysis    Hypothyroidism    Age-related nuclear cataract, right eye    Cortical age-related cataract, right eye    Mass of breast    Lymphedema    Acute anemia    Coagulopathy    Direct hyperbilirubinemia    Painless jaundice    Type 2 diabetes mellitus, with long-term current use of insulin    Leukocytosis    Sacral decubitus ulcer, stage II    Impaired mobility    Pleural effusion    Hyponatremia    Hepatic encephalopathy       Review of patient's allergies indicates:   Allergen Reactions    Tramadol Itching and Nausea And Vomiting       No current facility-administered medications on file prior to encounter.     Current Outpatient Medications on File Prior to Encounter   Medication Sig Dispense Refill    amlodipine (NORVASC) 10 MG tablet Take 1 tablet (10 mg total) by mouth once daily. 90 tablet 11    aspirin (ECOTRIN) 81 MG EC tablet Take 1 tablet (81 mg total) by mouth once daily. 30 tablet 0    atorvastatin (LIPITOR) 40 MG tablet Take 1 tablet by mouth once daily 90 tablet 0    clopidogreL (PLAVIX) 75 mg tablet Take 1 tablet (75 mg total) by  mouth once daily. 30 tablet 0    colchicine (COLCRYS) 0.6 mg tablet TAKE 1 TABLET BY MOUTH ONCE DAILY AS NEEDED FOR GOUT PAIN (Patient not taking: Reported on 7/19/2024)      EUTHYROX 50 mcg tablet Take 1 tablet (50 mcg total) by mouth every morning. (Patient not taking: Reported on 7/19/2024) 90 tablet 1    famotidine (PEPCID) 20 MG tablet Take 1 tablet (20 mg total) by mouth 2 (two) times daily as needed for Heartburn. 20 tablet 0    HYDROcodone-acetaminophen (NORCO) 5-325 mg per tablet Take 1 tablet by mouth every 6 (six) hours as needed for Pain. (Patient not taking: Reported on 7/19/2024) 20 tablet 0    insulin aspart U-100 (NOVOLOG FLEXPEN U-100 INSULIN) 100 unit/mL (3 mL) InPn pen Inject 10 Units into the skin 2 (two) times daily with meals. 15 mL 11    lactulose (CHRONULAC) 10 gram/15 mL solution Take by mouth.  (Patient not taking: Reported on 7/19/2024)  5    LANTUS U-100 INSULIN 100 unit/mL injection 70 Units every evening.      linaGLIPtin (TRADJENTA) 5 mg Tab tablet TAKE 1 TABLET (5 MG TOTAL) BY MOUTH ONCE DAILY. 90 tablet 3    minoxidiL (LONITEN) 10 MG Tab Take 10 mg by mouth 2 (two) times daily.      nebivoloL (BYSTOLIC) 20 mg Tab Take 1 tablet by mouth once daily.      NIFEdipine (PROCARDIA-XL) 60 MG (OSM) 24 hr tablet Take 1 tablet by mouth once daily.      ondansetron (ZOFRAN-ODT) 4 MG TbDL Take 1 tablet (4 mg total) by mouth every 8 (eight) hours as needed ([place]). 8 tablet 0    pantoprazole (PROTONIX) 40 MG tablet Take 1 tablet (40 mg total) by mouth once daily. 30 tablet 0    polyethylene glycol (GLYCOLAX) 17 gram/dose powder Take 17 g by mouth every evening. (Patient not taking: Reported on 7/19/2024) 510 g 0    senna-docusate 8.6-50 mg (SENNA WITH DOCUSATE SODIUM) 8.6-50 mg per tablet Take 1 tablet by mouth 2 (two) times daily. (Patient not taking: Reported on 7/19/2024)      sorbitol 70 % solution Take 30 mLs by mouth daily as needed (Severe constipation.  Use prn no BM X 3 days despite  Miralax qhs.). (Patient not taking: Reported on 2024) 454 mL 0    traZODone (DESYREL) 100 MG tablet Take 100 mg by mouth nightly as needed.         Past Surgical History:   Procedure Laterality Date    AV FISTULA PLACEMENT Left 2019    Procedure: creation av fistula;  Surgeon: Buck Fernandez MD;  Location: [facility];  Service: Cardiovascular;  Laterality: Left;  Left Radialcephalic: PACU     SECTION  , , ,     CHOLECYSTECTOMY      COLONOSCOPY N/A 10/23/2017    Procedure: COLONOSCOPY;  Surgeon: Bri Harry MD;  Location: [facility];  Service: Endoscopy;  Laterality: N/A;    EXCISIONAL BIOPSY Left 2022    Procedure: EXCISIONAL BIOPSY;  Surgeon: Mick Juarez MD;  Location: [facility];  Service: General;  Laterality: Left;    TUBAL LIGATION      UPPER GASTROINTESTINAL ENDOSCOPY  2016    Erosive Gastritis-Dr Bailey       Social History     Socioeconomic History    Marital status: Single    Years of education: 11   Occupational History    Occupation:    Tobacco Use    Smoking status: Never     Passive exposure: Never    Smokeless tobacco: Never   Substance and Sexual Activity    Alcohol use: No     Alcohol/week: 0.0 standard drinks of alcohol    Drug use: No    Sexual activity: Yes     Partners: Male     Birth control/protection: None, Surgical     Social Drivers of Health     Financial Resource Strain: Low Risk  (10/30/2024)    Overall Financial Resource Strain (CARDIA)     Difficulty of Paying Living Expenses: Not hard at all   Food Insecurity: No Food Insecurity (10/30/2024)    Hunger Vital Sign     Worried About Running Out of Food in the Last Year: Never true     Ran Out of Food in the Last Year: Never true   Transportation Needs: No Transportation Needs (10/30/2024)    TRANSPORTATION NEEDS     Transportation : No   Physical Activity: Insufficiently Active (10/30/2024)    Exercise Vital Sign     Days of Exercise per Week: 4 days     Minutes of  Exercise per Session: 30 min   Stress: No Stress Concern Present (10/30/2024)    [employer] [place] [employer] - Occupational Stress Questionnaire     Feeling of Stress : Only a little   Housing Stability: Low Risk  (10/30/2024)    Housing Stability Vital Sign     Unable to Pay for Housing in the Last Year: No     Homeless in the Last Year: No         CBC:   Recent Labs     24  0352 24  0803   WBC 20.76* 25.86*   RBC 2.73* 2.31*   HGB 8.0* 7.0*   HCT 24.7* 21.2*   * 208   MCV 91 92   MCH 29.3 30.3   MCHC 32.4 33.0       CMP:   Recent Labs     242 24  0803   * 134*   K 5.1 4.8   CL 97 99   CO2 20* 21*   BUN 48* 38*   CREATININE 5.1* 4.3*   GLU 87 81   MG 2.1 2.0   PHOS 4.7* 4.2   CALCIUM 8.8 8.9   ALBUMIN 1.7* 1.7*   PROT 7.2 7.0   ALKPHOS 645* 654*   ALT 70* 74*   * 184*   BILITOT 17.2* 16.7*       INR  Recent Labs     24  0926 24  0352 24  0803   INR 1.5* 1.4* 1.5*           Diagnostic Studies:      EKD Echo:  Results for orders placed or performed during the hospital encounter of 12/28/15   2D echo with color flow doppler    Collection Time: 12/30/15  4:03 PM   Result Value Ref Range    EF + QEF 40 (A) 55 - 65    Mitral Valve Regurgitation TRIVIAL     Diastolic Dysfunction Yes (A)     Aortic Valve Regurgitation TRIVIAL     Est. PA Systolic Pressure 16.4     Tricuspid Valve Regurgitation TRIVIAL            Pre-op Assessment    I have reviewed the Patient Summary Reports.     I have reviewed the Nursing Notes. I have reviewed the NPO Status.   I have reviewed the Medications.     Review of Systems  Anesthesia Hx:  No problems with previous Anesthesia   History of prior surgery of interest to airway management or planning:          Denies Family Hx of Anesthesia complications.    Denies Personal Hx of Anesthesia complications.                    Hematology/Oncology:       -- Anemia:                                   Cardiovascular:     Hypertension                                          Pulmonary:    Denies COPD.  Denies Asthma.  Shortness of breath                  Renal/:  Chronic Renal Disease                Hepatic/GI:   PUD,  GERD                Neurological:   CVA                                    Endocrine:  Diabetes Hypothyroidism              Physical Exam  General: Well nourished, Cooperative, Alert and Oriented    Airway:  Mallampati: III / II  Mouth Opening: Normal  TM Distance: Normal  Tongue: Normal  Neck ROM: Normal ROM    Chest/Lungs:  Normal Respiratory Rate    Heart:  Rate: Normal  Rhythm: Regular Rhythm        Anesthesia Plan  Type of Anesthesia, risks & benefits discussed:    Anesthesia Type: Gen ETT  Intra-op Monitoring Plan: Standard ASA Monitors  Post Op Pain Control Plan: multimodal analgesia  Induction:  IV  Airway Plan: Video, Post-Induction  Informed Consent: Informed consent signed with the Patient and all parties understand the risks and agree with anesthesia plan.  All questions answered.   ASA Score: 3  Day of Surgery Review of History & Physical: H&P Update referred to the surgeon/provider.    Ready For Surgery From Anesthesia Perspective.     .

## 2024-11-08 PROBLEM — M25.562 ACUTE PAIN OF LEFT KNEE: Status: RESOLVED | Noted: 2019-07-22 | Resolved: 2024-11-08

## 2024-11-08 PROBLEM — K76.82 HEPATIC ENCEPHALOPATHY: Status: RESOLVED | Noted: 2024-11-01 | Resolved: 2024-11-08

## 2024-11-08 LAB
A1AT PHENOTYP SERPL-IMP: ABNORMAL BANDS
A1AT SERPL NEPH-MCNC: 300 MG/DL (ref 100–190)
ABO + RH BLD: NORMAL
ALBUMIN SERPL BCP-MCNC: 1.6 G/DL (ref 3.5–5.2)
ALP SERPL-CCNC: 638 U/L (ref 40–150)
ALT SERPL W/O P-5'-P-CCNC: 67 U/L (ref 10–44)
ANION GAP SERPL CALC-SCNC: 15 MMOL/L (ref 8–16)
AST SERPL-CCNC: 162 U/L (ref 10–40)
BASOPHILS # BLD AUTO: 0.06 K/UL (ref 0–0.2)
BASOPHILS NFR BLD: 0.3 % (ref 0–1.9)
BILIRUB SERPL-MCNC: 17.3 MG/DL (ref 0.1–1)
BLD GP AB SCN CELLS X3 SERPL QL: NORMAL
BLD PROD TYP BPU: NORMAL
BLOOD UNIT EXPIRATION DATE: NORMAL
BLOOD UNIT TYPE CODE: 5100
BLOOD UNIT TYPE: NORMAL
BUN SERPL-MCNC: 38 MG/DL (ref 6–20)
CALCIUM SERPL-MCNC: 8.6 MG/DL (ref 8.7–10.5)
CHLORIDE SERPL-SCNC: 98 MMOL/L (ref 95–110)
CO2 SERPL-SCNC: 20 MMOL/L (ref 23–29)
CODING SYSTEM: NORMAL
CREAT SERPL-MCNC: 4.8 MG/DL (ref 0.5–1.4)
CROSSMATCH INTERPRETATION: NORMAL
DIFFERENTIAL METHOD BLD: ABNORMAL
DISPENSE STATUS: NORMAL
EOSINOPHIL # BLD AUTO: 0.2 K/UL (ref 0–0.5)
EOSINOPHIL NFR BLD: 0.8 % (ref 0–8)
ERYTHROCYTE [DISTWIDTH] IN BLOOD BY AUTOMATED COUNT: 23.9 % (ref 11.5–14.5)
EST. GFR  (NO RACE VARIABLE): 10.8 ML/MIN/1.73 M^2
GLUCOSE SERPL-MCNC: 71 MG/DL (ref 70–110)
HCT VFR BLD AUTO: 20.6 % (ref 37–48.5)
HGB BLD-MCNC: 6.5 G/DL (ref 12–16)
IMM GRANULOCYTES # BLD AUTO: 0.75 K/UL (ref 0–0.04)
IMM GRANULOCYTES NFR BLD AUTO: 3.4 % (ref 0–0.5)
INR PPP: 1.5 (ref 0.8–1.2)
LYMPHOCYTES # BLD AUTO: 0.9 K/UL (ref 1–4.8)
LYMPHOCYTES NFR BLD: 3.9 % (ref 18–48)
MAGNESIUM SERPL-MCNC: 2.1 MG/DL (ref 1.6–2.6)
MCH RBC QN AUTO: 29.3 PG (ref 27–31)
MCHC RBC AUTO-ENTMCNC: 31.6 G/DL (ref 32–36)
MCV RBC AUTO: 93 FL (ref 82–98)
MONOCYTES # BLD AUTO: 1.2 K/UL (ref 0.3–1)
MONOCYTES NFR BLD: 5.2 % (ref 4–15)
NEUTROPHILS # BLD AUTO: 19.2 K/UL (ref 1.8–7.7)
NEUTROPHILS NFR BLD: 86.4 % (ref 38–73)
NRBC BLD-RTO: 3 /100 WBC
NUM UNITS TRANS PACKED RBC: NORMAL
PHOSPHATE SERPL-MCNC: 4.4 MG/DL (ref 2.7–4.5)
PLATELET # BLD AUTO: 222 K/UL (ref 150–450)
PMV BLD AUTO: 13.3 FL (ref 9.2–12.9)
POCT GLUCOSE: 71 MG/DL (ref 70–110)
POCT GLUCOSE: 86 MG/DL (ref 70–110)
POCT GLUCOSE: 97 MG/DL (ref 70–110)
POTASSIUM SERPL-SCNC: 4.8 MMOL/L (ref 3.5–5.1)
PROT SERPL-MCNC: 6.6 G/DL (ref 6–8.4)
PROTHROMBIN TIME: 16.3 SEC (ref 9–12.5)
RBC # BLD AUTO: 2.22 M/UL (ref 4–5.4)
SODIUM SERPL-SCNC: 133 MMOL/L (ref 136–145)
SPECIMEN OUTDATE: NORMAL
WBC # BLD AUTO: 22.22 K/UL (ref 3.9–12.7)

## 2024-11-08 PROCEDURE — P9016 RBC LEUKOCYTES REDUCED: HCPCS | Performed by: STUDENT IN AN ORGANIZED HEALTH CARE EDUCATION/TRAINING PROGRAM

## 2024-11-08 PROCEDURE — 25000003 PHARM REV CODE 250: Performed by: HOSPITALIST

## 2024-11-08 PROCEDURE — 80053 COMPREHEN METABOLIC PANEL: CPT | Performed by: STUDENT IN AN ORGANIZED HEALTH CARE EDUCATION/TRAINING PROGRAM

## 2024-11-08 PROCEDURE — 63600175 PHARM REV CODE 636 W HCPCS: Mod: JG | Performed by: NURSE PRACTITIONER

## 2024-11-08 PROCEDURE — 25000003 PHARM REV CODE 250: Performed by: STUDENT IN AN ORGANIZED HEALTH CARE EDUCATION/TRAINING PROGRAM

## 2024-11-08 PROCEDURE — 20600001 HC STEP DOWN PRIVATE ROOM

## 2024-11-08 PROCEDURE — 85025 COMPLETE CBC W/AUTO DIFF WBC: CPT | Performed by: HOSPITALIST

## 2024-11-08 PROCEDURE — 36415 COLL VENOUS BLD VENIPUNCTURE: CPT | Performed by: STUDENT IN AN ORGANIZED HEALTH CARE EDUCATION/TRAINING PROGRAM

## 2024-11-08 PROCEDURE — 80100016 HC MAINTENANCE HEMODIALYSIS

## 2024-11-08 PROCEDURE — 86900 BLOOD TYPING SEROLOGIC ABO: CPT | Performed by: STUDENT IN AN ORGANIZED HEALTH CARE EDUCATION/TRAINING PROGRAM

## 2024-11-08 PROCEDURE — 83735 ASSAY OF MAGNESIUM: CPT | Performed by: STUDENT IN AN ORGANIZED HEALTH CARE EDUCATION/TRAINING PROGRAM

## 2024-11-08 PROCEDURE — 90935 HEMODIALYSIS ONE EVALUATION: CPT | Mod: ,,, | Performed by: NURSE PRACTITIONER

## 2024-11-08 PROCEDURE — 84100 ASSAY OF PHOSPHORUS: CPT | Performed by: STUDENT IN AN ORGANIZED HEALTH CARE EDUCATION/TRAINING PROGRAM

## 2024-11-08 PROCEDURE — 86920 COMPATIBILITY TEST SPIN: CPT | Performed by: STUDENT IN AN ORGANIZED HEALTH CARE EDUCATION/TRAINING PROGRAM

## 2024-11-08 PROCEDURE — 85610 PROTHROMBIN TIME: CPT | Performed by: STUDENT IN AN ORGANIZED HEALTH CARE EDUCATION/TRAINING PROGRAM

## 2024-11-08 RX ORDER — SODIUM CHLORIDE 9 MG/ML
INJECTION, SOLUTION INTRAVENOUS ONCE
Status: CANCELLED | OUTPATIENT
Start: 2024-11-09

## 2024-11-08 RX ORDER — MIDODRINE HYDROCHLORIDE 5 MG/1
10 TABLET ORAL
Status: COMPLETED | OUTPATIENT
Start: 2024-11-08 | End: 2024-11-11

## 2024-11-08 RX ORDER — HYDROCODONE BITARTRATE AND ACETAMINOPHEN 500; 5 MG/1; MG/1
TABLET ORAL
Status: DISCONTINUED | OUTPATIENT
Start: 2024-11-08 | End: 2024-11-16 | Stop reason: HOSPADM

## 2024-11-08 RX ORDER — LORAZEPAM 0.5 MG/1
1 TABLET ORAL
Status: COMPLETED | OUTPATIENT
Start: 2024-11-08 | End: 2024-11-09

## 2024-11-08 RX ORDER — LORAZEPAM 0.5 MG/1
1 TABLET ORAL ONCE AS NEEDED
Status: DISCONTINUED | OUTPATIENT
Start: 2024-11-08 | End: 2024-11-08

## 2024-11-08 RX ADMIN — OXYCODONE 5 MG: 5 TABLET ORAL at 11:11

## 2024-11-08 RX ADMIN — MICONAZOLE NITRATE: 20 CREAM TOPICAL at 09:11

## 2024-11-08 RX ADMIN — PANTOPRAZOLE SODIUM 40 MG: 40 TABLET, DELAYED RELEASE ORAL at 11:11

## 2024-11-08 RX ADMIN — ALLOPURINOL 100 MG: 100 TABLET ORAL at 11:11

## 2024-11-08 RX ADMIN — MUPIROCIN: 20 OINTMENT TOPICAL at 08:11

## 2024-11-08 RX ADMIN — HYDROCORTISONE: 25 CREAM TOPICAL at 11:11

## 2024-11-08 RX ADMIN — HYDROCORTISONE: 25 CREAM TOPICAL at 08:11

## 2024-11-08 RX ADMIN — MUPIROCIN: 20 OINTMENT TOPICAL at 11:11

## 2024-11-08 RX ADMIN — PANTOPRAZOLE SODIUM 40 MG: 40 TABLET, DELAYED RELEASE ORAL at 08:11

## 2024-11-08 RX ADMIN — EPOETIN ALFA-EPBX 13000 UNITS: 10000 INJECTION, SOLUTION INTRAVENOUS; SUBCUTANEOUS at 04:11

## 2024-11-08 RX ADMIN — LACTULOSE 20 G: 20 SOLUTION ORAL at 11:11

## 2024-11-08 RX ADMIN — MICONAZOLE NITRATE: 20 CREAM TOPICAL at 11:11

## 2024-11-08 NOTE — PLAN OF CARE
Discharge Plan A and Plan B have been determined by review of patient's clinical status, future medical and therapeutic needs, and coverage/benefits for post-acute care in coordination with multidisciplinary team members.        11/08/24 0756   Discharge Reassessment   Assessment Type Discharge Planning Reassessment   Did the patient's condition or plan change since previous assessment? No   Discharge Plan discussed with: Patient;Parent(s)   Name(s) and Number(s) Mile Welch (Mother)  [phone] (Mobile)   Communicated SHASHA with patient/caregiver Yes   Discharge Plan A Rehab   DME Needed Upon Discharge  other (see comments);none  (TBD at next level of care)   Transition of Care Barriers None   Why the patient remains in the hospital Requires continued medical care   Post-Acute Status   Post-Acute Authorization Placement   Post-Acute Placement Status Pending payor review/awaiting authorization (if required)   Home Health Status Discharge Plan Changed   Diaylsis Status Set-up Complete/Auth obtained   Coverage : [facility] DUAL COMPLETE HMO SNP   Patient choice form signed by patient/caregiver List from CMS Compare;List with quality metrics by geographic area provided   Discharge Delays None known at this time     CM spoke with patient and patients Mile parikh to discuss discharge planning.  Patients plan is to  dc to Rehab. Patient will need a liver biopsy. Patient will need transportation and will resume HD and HH are inaptent stay,    0802 am  NEREIDA spoke with Kristie at [facility] [phone] to update on patient discharge plan and patient is current HD center.     1115 am  NEREIDA called and spoke with Berta Peña and rehab authorization Is pending.     4:00 pm   NEREIDA spoke patients attending Dr Ordaz and wanted to know if the liver biopsy can be completed while the patient is admitted to [facility] (inpatient rehab)       11/8/24  0815 am   NEREIDA spoke with Berta FUENTES with [facility]  [place] and insurance will not cover the cost of an patient being admitted to [facility] to have an outpatient procedure. The procedure will need to scheduled wither while she is an inpatient at [facility] or after her IPR @ [facility].       11/8/24 0827 am  Attending will coordinate with oncology for potential chemo after liver biopsy results.  An apportionment will need to be scheduled after patient IPR stay         Cortney Harrison RN  Case Management  [facility]  [phone]

## 2024-11-08 NOTE — PROGRESS NOTES
"                    Medical Nutrition Therapy      Reason for Assessment: LOS (Day 10)  Dx: Hyperbilirubinemia   Medical Hx: DM, ESRD on HD    General Info: NPO for biopsy at time of visit, diet now resumed. Pt tolerating w/ fair appetite. Pt appears nourished w/ UBW of 230# (currently w/ edema). Pt w/ no indicators of malnutrition. Pt scheduled to receive HD today.     Current Diet: Renal w/ 1800 mL FR  % intake of meals: 50-75%    Ht: 5'6"  Wt: 130kg   BMI: 46.2    Labs: Reviewed - Creat 4.8, GFR 10.8, A1C 4.5, Bili 10.8  Meds: Reviewed    Overall Physical Appearance: Well nourished    Level of Risk: Low    Nutrition Dx: No nutrition diagnosis at this time.    RD follow-up? Yes    MS Karnie, RD, LDN        "

## 2024-11-08 NOTE — PLAN OF CARE
General Surgery Plan of Care    Briefly, this is a 44-year-old female with a complex medical history.  Medical history includes hypertension, hyperlipidemia, type 2 diabetes mellitus, end-stage renal disease, heart failure, and left-sided breast mass which was excised in 2022.  Pathology revealed complex sclerosing lesion versus atypical ductal hyperplasia in the background of severe fibrocystic change.  Given benign findings she never received any oncological follow-up.  She presented to [facility] on October 30th, 2024 with hyperbilirubinemia.  During her workup a CT scan was performed revealing concerning lymphadenopathy within the left axilla.  General surgery was consulted on the recommendations from the heme/Onc team for biopsy of these lymph nodes.  The case was reviewed with staff and plans discussed with the primary team.  Given her significant history we recommend core needle biopsy of axillary lymph nodes to rule out malignancy with a breast primary it was our main concern.  Low suspicion for lymphoma at this time if core needle biopsy is concerning for lymphoma, we are more than happy to perform excisional biopsy for better characterization.  Please reach back out to General surgery with any questions or concerns.    Jorge Chan MD  [facility]  PGY - 5

## 2024-11-08 NOTE — PT/OT/SLP PROGRESS
Physical Therapy      Patient Name:  Pilar Fernandez   MRN:  [id number]    Patient not seen today secondary to Nurse/ MARY ALICE hold, Therapist assessment. Pt had low H&H and was awaiting dialysis treatment. Will follow-up as appropriate.

## 2024-11-08 NOTE — PROGRESS NOTES
[facility] Progress Note      Attending: Stephany Ordaz MD   Admit Date: 10/29/2024  Today's Date: 11/08/2024  Reason for Consult:  Painless jaundice and elevated LFTs    SUBJECTIVE:     Interval History:   NAEO. No complaints this AM.    Scheduled Medications:    allopurinoL  100 mg Oral Daily    epoetin jitendra-ebpx (RETACRIT) injection  100 Units/kg Intravenous Every Mon, Wed, Fri    hydrocortisone   Topical (Top) BID    lactulose  20 g Oral BID    miconazole   Topical (Top) BID    mupirocin   Nasal BID    pantoprazole  40 mg Oral BID       PRN Medications:     Current Facility-Administered Medications:     0.9%  NaCl infusion (for blood administration), , Intravenous, Q24H PRN    acetaminophen, 650 mg, Oral, Q4H PRN    albuterol-ipratropium, 3 mL, Nebulization, Q4H PRN    dextrose 10%, 12.5 g, Intravenous, PRN    dextrose 10%, 25 g, Intravenous, PRN    diphenhydrAMINE, 50 mg, Oral, Q6H PRN    glucagon (human recombinant), 1 mg, Intramuscular, PRN    glucose, 16 g, Oral, PRN    glucose, 24 g, Oral, PRN    insulin aspart U-100, 0-5 Units, Subcutaneous, QID (AC + HS) PRN    lactulose, 10 g, Oral, Q6H PRN    naloxone, 0.02 mg, Intravenous, PRN    ondansetron, 4 mg, Intravenous, Q8H PRN    oxyCODONE, 5 mg, Oral, Q6H PRN    simethicone, 1 tablet, Oral, QID PRN    sodium chloride 0.9%, 10 mL, Intravenous, Q12H PRN    traZODone, 100 mg, Oral, Nightly PRN    OBJECTIVE:     Vital Signs Trends/Hx Reviewed  Vitals:    11/08/24 0238 11/08/24 0454 11/08/24 0800 11/08/24 0803   BP:  (!) 103/58 (!) 122/58 (!) 122/58   BP Location:  Right arm  Right arm   Patient Position:  Lying  Lying   Pulse: 84 72 83 81   Resp:  16 18 18   Temp:  98 °F (36.7 °C) 97.6 °F (36.4 °C)    TempSrc:  Axillary     SpO2:  (!) 93% 96% 95%   Weight:       Height:             Physical Exam  Vitals reviewed.   Constitutional:       General: She is not in acute distress.     Appearance: She is obese. She is ill-appearing.   HENT:      Head:  Normocephalic.   Cardiovascular:      Rate and Rhythm: Normal rate.   Pulmonary:      Effort: Pulmonary effort is normal. No respiratory distress.   Musculoskeletal:      Right lower leg: Edema present.      Left lower leg: Edema present.   Neurological:      Mental Status: She is alert. Mental status is at baseline.          Laboratory:  Lab results in last 24 hours reviewed.     MELD 3.0: 36 at 11/8/2024  3:24 AM  MELD-Na: 36 at 11/8/2024  3:24 AM  Calculated from:  Serum Creatinine: On dialysis. Using the maximum value.  Serum Sodium: 133 mmol/L at 11/8/2024  3:24 AM  Total Bilirubin: 17.3 mg/dL at 11/8/2024  3:24 AM  Serum Albumin: 1.6 g/dL at 11/8/2024  3:24 AM  INR(ratio): 1.5 at 11/8/2024  3:24 AM  Age at listing (hypothetical): 44 years  Sex: Female at 11/8/2024  3:24 AM    Imaging:   10/30/24- US Liver with Doppler- Enlarged liver measuring 25.9 cm. Homogeneous echotexture. No focal hepatic lesions. The common duct is not dilated, measuring 4 mm. No intrahepatic ductal dilatation. Pancreas appears normal. Splenomegaly noted. Satisfactory doppler evaluation.   10/30/24- CT C/A/P with contrast- Diffuse body wall anasarca throughout the visualized chest, abdomen, pelvis, and proximal thighs. Numerous enlarged lymph node conglomerates and/or soft tissue masses about the left axillary soft tissues, left greater than right inguinal regions, left supraclavicular soft tissues, and retroperitoneum concerning for neoplastic or lymphoproliferative process. Large volume pericardial effusion. Cardiomegaly with pulmonary vascular congestion, interlobular septal thickening, and scattered ground-glass attenuation, in keeping with evolving pulmonary edema. Diffuse hepatosplenomegaly.  No obvious focal hepatic lesions, noting limitations from diffuse body wall anasarca.    ASSESSMENT & RECOMMENDATIONS   Pilar Fernandez is a 44 y.o. female with recent CVA (on DAPT, 7/2024) with ongoing left sided deficits, history of  breast mass, HTN, poorly controlled T2DM, ESRD (~6 years) on HD MWF, lymphedema, stage II sacral decubitus ulcer and chronic hypoxic RF on 3 L's NC that [place] [facility] [place] for evaluation of new onset painless jaundice, first noticed by her mother over the weekend. Hepatology consulted on 10/30 for evaluation of elevated LFT's. Unsure of exactly etiology but obstruction vs DILI was a consideration. US and CT imaging did now show any evidence of biliary obstruction. Due to persistent elevations in T bili, liver biopsy obtained to prognosticate presence/extent of scarring in liver as her imaging is not definitive, but her enzymes and liver function testing is suspicious for underlying liver disease (likely MASLD). PETH, hemochromatosis DNA PCR, BRIAN, AMA, ASMA, ceruloplasmin all negative. IgG elevated to 1792.     Problem List:  Painless Jaundice   Acute Liver Injury/Elevated LFT's/Elevated INR   ESRD with Volume Overload (reportedly on HD for ~6 years)   Anasarca   Chronic Hypoxic Respiratory Failure (on 3 L's O2 since 2022)   Morbid Obesity (BMI 46)   Debility (bed bound now, previously with walker)   History of CVA (7/2024) with left sided weakness and right eye droop on DAPT (ASA/Plavix)   Lymphadenopathy     Recommendations:  - Liver biopsy obtained on 11/06/24; Cholestatic hepatitis with stage 2 fibrosis. Features of hepatic venous outflow obstruction. The histologic differential diagnosis for the cholestatic hepatitis includes sepsis/infection, drug induced liver injury (DILI), and in combination with the ductular reaction, bile duct obstruction.   - Given results of biopsy, recommend obtaining MRI MRCP to definitively rule out an obstructive etiology. If this is negative, then it is possible her acute elevation in LFTs is from a DILI, given recent use of Clindamycin. Clindamycin can cause hepatic injury or both hepatocellular and cholestatic injury. If MRI MRCP negative, will start Ursodiol 300 mg TID.    - Not a transplant candidate given her functional status, co-morbid conditions and now with possible malignancy given extensive lymphadenopathy.   - GS consulted for excisional lymph node biopsy; recommended core needle biopsy of axillary lymph nodes.   - PT/OT recommending high-intensity therapy.   - Nephrology managing HD.     Thank you for allowing us to participate in the care of this patient. Please call with questions.    Ciera Farr MD  Gastroenterology Fellow, PGY-V  [facility]

## 2024-11-08 NOTE — CONSULTS
"Interventional Radiology  Consult/History & Physical Note    Consult Requested By: Stephany Ordaz MD  Reason for Consult: Lymph node biopsy    SUBJECTIVE:     Chief Complaint: lymphadenopathy    History of Present Illness:  Pilar Fernandez is a 44 y.o. female with a PMHx of recent CVA (on DAPT, 7/2024) with ongoing left sided deficits, history of breast mass, HTN, poorly controlled T2DM, ESRD (~6 years) on HD MWF, lymphedema, stage II sacral decubitus ulcer and chronic hypoxic RF on 3 L O2 NC who initially presented to [facility] ED on 10/29 for painless jaundice, GIB and acute blood loss anemia, was transferred to [facility] that same day for higher level of care. Hospital course notable for improvement in H/H following transfusion, hepatology eval for liver dysfunction including TJLB on 11/6, heme onc consult for hemolysis. Interventional Radiology has been consulted for image guided targeted  L axillary lymph node  biopsy. Pt has had recent imaging including a  CT c/a/p  on 10/30/24 which revealed "numerous enlarged lymph node conglomerates and/or soft tissue masses about the left axillary soft tissues, left greater than right inguinal regions, left supraclavicular soft tissues, and retroperitoneum as above. Findings remain nonspecific, however remain concerning for neoplastic or lymphoproliferative process". Pt evaluated by heme onc team on 10/30 who recommended L axilla US which shows at least 6 abnormal lymph nodes (1 abnormal left axillary lymph node measures 7.1 x 1.9 x 5.6 cm, and a 2nd abnormal lymph node measures 5.0 x 1.9 x 4.9 cm) which may represent metastatic disease or lymphoma. Per primary team, pt may receive inpatient treatment if the biopsy results reveal lymphoma. The pt's liver function continues to worsen as does her anemia- TB 17 and trending up, H/H 6.5/20.6 (no overt signs of bleeding, 1 u pRBC currently transfusing). Pt is afebrile and hemodynamically stable. Currently requiring 3 L O2 via NC " (on chronic home oxygen). She does admit to difficulty breathing when lying flat. She does not take any anticoagulants.     Review of Systems   Unable to perform ROS: Mental status change (drowsy)       Scheduled Meds:   allopurinoL  100 mg Oral Daily    epoetin jitendra-ebpx (RETACRIT) injection  100 Units/kg Intravenous Every Mon, Wed, Fri    hydrocortisone   Topical (Top) BID    lactulose  20 g Oral BID    miconazole   Topical (Top) BID    mupirocin   Nasal BID    pantoprazole  40 mg Oral BID     Continuous Infusions:  PRN Meds:  Current Facility-Administered Medications:     0.9%  NaCl infusion (for blood administration), , Intravenous, Q24H PRN    acetaminophen, 650 mg, Oral, Q4H PRN    albuterol-ipratropium, 3 mL, Nebulization, Q4H PRN    dextrose 10%, 12.5 g, Intravenous, PRN    dextrose 10%, 25 g, Intravenous, PRN    diphenhydrAMINE, 50 mg, Oral, Q6H PRN    glucagon (human recombinant), 1 mg, Intramuscular, PRN    glucose, 16 g, Oral, PRN    glucose, 24 g, Oral, PRN    insulin aspart U-100, 0-5 Units, Subcutaneous, QID (AC + HS) PRN    lactulose, 10 g, Oral, Q6H PRN    LORazepam, 1 mg, Oral, Once PRN    naloxone, 0.02 mg, Intravenous, PRN    ondansetron, 4 mg, Intravenous, Q8H PRN    oxyCODONE, 5 mg, Oral, Q6H PRN    simethicone, 1 tablet, Oral, QID PRN    sodium chloride 0.9%, 10 mL, Intravenous, Q12H PRN    traZODone, 100 mg, Oral, Nightly PRN    Review of patient's allergies indicates:   Allergen Reactions    Tramadol Itching and Nausea And Vomiting       Past Medical History:   Diagnosis Date    Anemia     Anemia     Breast mass     Chronic constipation     CKD (chronic kidney disease)     Diabetes mellitus     Dialysis patient     MON, TUE, THURS, SAT.    Embolic stroke involving right middle cerebral artery 7/13/2024    Encounter for blood transfusion     Erosive gastritis 03/2016    GERD (gastroesophageal reflux disease)     Gout     High cholesterol     Hypertension     Knee pain     Thyroid disease      Unspecified cataract 2022     Past Surgical History:   Procedure Laterality Date    AV FISTULA PLACEMENT Left 2019    Procedure: creation av fistula;  Surgeon: Buck Fernandez MD;  Location: [facility];  Service: Cardiovascular;  Laterality: Left;  Left Radialcephalic: PACU     SECTION  , , ,     CHOLECYSTECTOMY      COLONOSCOPY N/A 10/23/2017    Procedure: COLONOSCOPY;  Surgeon: Bri Harry MD;  Location: [facility];  Service: Endoscopy;  Laterality: N/A;    EXCISIONAL BIOPSY Left 2022    Procedure: EXCISIONAL BIOPSY;  Surgeon: Mick Juarez MD;  Location: [facility];  Service: General;  Laterality: Left;    TUBAL LIGATION      UPPER GASTROINTESTINAL ENDOSCOPY  2016    Erosive Gastritis-Dr Bailey     Family History   Problem Relation Name Age of Onset    Thyroid disease Mother      Diabetes Father      Ovarian cancer Maternal Aunt Malathi     Breast cancer Maternal Aunt Ada      Social History     Tobacco Use    Smoking status: Never     Passive exposure: Never    Smokeless tobacco: Never   Substance Use Topics    Alcohol use: No     Alcohol/week: 0.0 standard drinks of alcohol    Drug use: No       OBJECTIVE:     Vital Signs (Most Recent)  Temp: 97.6 °F (36.4 °C) (24 0800)  Pulse: 76 (24 1045)  Resp: 18 (24 1117)  BP: (!) 122/58 (24 0803)  SpO2: 95 % (24 0803)    Physical Exam:  Physical Exam  Vitals and nursing note reviewed.   Constitutional:       General: She is not in acute distress.     Appearance: She is obese. She is ill-appearing.      Comments: drowsy   HENT:      Head: Normocephalic and atraumatic.      Right Ear: External ear normal.      Left Ear: External ear normal.   Eyes:      General: Scleral icterus present.   Cardiovascular:      Rate and Rhythm: Normal rate.   Pulmonary:      Effort: Pulmonary effort is normal. No respiratory distress.   Abdominal:      General: Abdomen is flat.   Skin:     General: Skin is warm and  dry.      Coloration: Skin is jaundiced.   Neurological:      Comments: Difficult to arouse but answers questions appropriately         Laboratory  I have reviewed all pertinent lab results within the past 24 hours.  CBC:   Recent Labs   Lab 11/08/24 0324   WBC 22.22*   RBC 2.22*   HGB 6.5*   HCT 20.6*      MCV 93   MCH 29.3   MCHC 31.6*     BMP:   Recent Labs   Lab 11/08/24 0324   GLU 71   *   K 4.8   CL 98   CO2 20*   BUN 38*   CREATININE 4.8*   CALCIUM 8.6*   MG 2.1     CMP:   Recent Labs   Lab 11/08/24 0324   GLU 71   CALCIUM 8.6*   ALBUMIN 1.6*   PROT 6.6   *   K 4.8   CO2 20*   CL 98   BUN 38*   CREATININE 4.8*   ALKPHOS 638*   ALT 67*   *   BILITOT 17.3*     LFTs:   Recent Labs   Lab 11/08/24 0324   ALT 67*   *   ALKPHOS 638*   BILITOT 17.3*   PROT 6.6   ALBUMIN 1.6*     Coagulation:   Recent Labs   Lab 11/08/24 0324   LABPROT 16.3*   INR 1.5*     Microbiology Results (last 7 days)       Procedure Component Value Units Date/Time    Genital Culture [[id number]]     Order Status: No result Specimen: Genital     Vaginal Screen [[id number]]     Order Status: No result Specimen: Genital from Vagina             ASA/Mallampati  ASA: 3  Mallampati: 2    Imaging:  Recent imaging studies including  L axilla US  on 11/8/24 which was independently reviewed by Michael Hawk MD.     EXAMINATION:  US SOFT TISSUE AXILLA, LEFT     CLINICAL HISTORY:  Extensive LAD, hx of breast ca;     TECHNIQUE:  Real-time grayscale and color Doppler sonography of the left axilla.     COMPARISON:  CT chest October 30, 2024     FINDINGS:  There is left axillary adenopathy with at least 6 abnormal lymph nodes.  These lymph nodes are enlarged with cortical thickening and loss of fatty hilum.  For example 1 abnormal left axillary lymph node measures 7.1 x 1.9 x 5.6 cm, and a 2nd abnormal lymph node measures 5.0 x 1.9 x 4.9 cm.  These abnormal lymph nodes correspond to the left axillary adenopathy visible by  recent CT chest October 30, 2024.     Impression:     Abnormal left axillary adenopathy with at least 6 abnormal lymph nodes.  These correspond to the adenopathy visible by CT chest October 30, 2024.  BI-RADS 4: Suspicious.  Recommend percutaneous or surgical biopsy, as clinically indicated.  Differential considerations include metastatic disease and lymphoma, and samples should be sent for both pathology and flow cytometry.     Findings and recommendations were discussed between Dr. GABRIELLE Tee and Dr. NICK Ordaz via [employer] secure chat November 8, 2024 at 10:10.        Electronically signed by:Goyo Tee MD  Date:                                            11/08/2024  Time:                                           10:16    ASSESSMENT/PLAN:     Assessment:  44 y.o. female with a PMHx of recent CVA (on DAPT, 7/2024) with ongoing left sided deficits, history of breast mass, HTN, poorly controlled T2DM, ESRD (~6 years) on HD MWF, lymphedema, stage II sacral decubitus ulcer and chronic hypoxic RF on 3 L O2 NC who has been referred to IR for image guided targeted  L axillary lymph node  biopsy. The procedure was discussed in great detail with the patient including thorough explanations of the potential risks and benefits of US guided targeted  L axillary lymph node  biopsy. Risks include but are not limited to sepsis, severe infection, hemorrhage, damage to surrounding structures and need for additional procedures. The patient is a candidate for US guided targeted  L axilary lymph node  biopsy under local anesthesia. Plan discussed with ordering physician and pt who verbalized understanding of the plan and would like to proceed.    Plan:  Will proceed with non urgent US guided targeted  L axillary lymph node  biopsy under local anesthesia, will likely be completed sometime next week.   Hgb must be > 7.0 in order to proceed  Pt does not need to be NPO  Anticoagulation history reviewed.   If starting prophylactic AC  following procedure, ok to start lovenox 12 hours following procedure and ok to start SQ heparin 6-8 hours following procedure per SIR guidelines  Coagulation labs reviewed. INR 1.5 and plt count 222 on 11/8/23.  Thank you for the consult. Please contact with questions via [email or web address] secure chat or [employer]    Time spent during patient care today was 78 minutes. This includes time spent before the visit reviewing the chart, discussing case with staff physician and ordering provider, time spent during the face to face patient visit, and time spent after the visit on documentation. Time excludes procedure time.     Rere Morillo PA-C  Interventional Radiology  Spectra: [id number]

## 2024-11-08 NOTE — PROGRESS NOTES
[facility] - [facility]down [facility] ([address] Medicine  Progress Note    Patient Name: Pilar Fernandez  MRN: [id number]  Patient Class: IP- Inpatient   Admission Date: 10/29/2024  Length of Stay: 9 days  Attending Physician: Stephany Ordaz MD  Primary Care Provider: Lindsey Singletary FNP    Subjective:     Principal Problem: Direct hyperbilirubinemia      HPI:  Pilar Fernandez is a 44 year old Black woman with hypertension, hyperlipidemia, diabetes mellitus type 2 (treated with insulin), end stage renal disease on hemodialysis (), heart failure with preserved ejection fraction, chronic hypoxic respiratory failure on supplemental oxygen (3 liters/minute), history of right middle cerebral artery stroke on 2024, peripheral vascular disease, chronic lymphedema, hypothyroidism, anemia with history of blood transfusion, gout, gastroesophageal reflux disease, chronic constipation, sacral decubitus ulcer, history of  sections, history of tubal ligation, history of cholecystectomy. She lives in [place], [place].    She presented to [facility] on 10/29/2024 with gastrointestinal bleeding, symptomatic anemia, jaundice, hyperbilirubinemia. She reported being hospitalized at [facility] recently for leg cellulitis and being told she had hyperbilirubinemia and was advised to follow up with Hepatology at [facility] after being discharged on 10/26/2024. She had several wounds on her sacrum and legs. She had extremely swollen legs. Stool was positive for occult blood. She had leukocytosis. Hemoglobin was 6.4 g/dL from baseline around 10. INR was elevated at 2.1. she was given 2 units of fresh frozen plasma and 2 units of packed red blood cells. Abdomen CT without contrast showed no obvious etiology of liver abnormalities or leukocytosis. She was transferred to [facility] for further  evaluation of her hyperbilirubinemia. She was admitted to Hospital Medicine Team B.    Overview/Hospital Course:  She was put on ceftriaxone. Hepatology and Advanced Endoscopy Service were consulted for hyperbilirubinemia. Gastroenterology was consulted for gastrointestinal bleed. Hematology-Oncology was consulted for anemia with hyperbilirubinemia and positive Britt test. CT showed diffuse anasarca, pericardial effusion, enlarged lymph nodes vs soft tissue densities, hepatosplenomegaly. Hepatology recommended liver biopsy. Gastroenterology recommended outpatient endoscopy, after she is medically optimized, due to lack of overt bleeding. Hematology-Oncology recommended breast ultrasound. Leukocytosis persisted. Sed rate was normal. CRP was elevated. Midodrine was started for post-dialysis hypotension. She developed hepatic encephalopathy with ammonia of 111 umol/L. She was transfused another unit of packed red blood cells in preparation for liver biopsy. She required vitamin K for elevated INR. She desired to sit at the side of the bed but was unable to on her own. Physical and Occupational Therapy were consulted and recommended high intensity therapy. Midodrine was stopped as blood pressures increased to normal. Her home hypertension medications continued to be held. After multiple postponements due to limited anesthesia staffing, liver biopsy was done on Wednesday 11/6/2024.     Interval History: Doing alright this AM. LE edema/anasarca has progressed since my last exam a week ago. Will discuss w/ Nephrology possibly trying to pull more fluid off with next HD session. Pt is anuric so deferring IV diuresis. Pt reports that plan is to dc to IPR tmrw, however it appears that there is still further hepatology & heme/onc workup that is pending. Hepatology expects to have at least preliminary results of liver biopsy tmrw which they'll review with pt; addn'l inpatient workup/mgmt pending results. Tbili & LFTs continue  to uptrend. Awaiting updated hepatology recs. Breast US initially ordered 10/30 per initial Heme/Onc recs which has yet to be obtained. Discussed with radiology who reviewed pt's CT scan and advised dedicated L axilla US instead as R axillary LAD stable and breast US will be unreliable given anasarca. Heme/Onc also advises LN biopsy in the very near future (either inpatient or within the next 1-2 weeks). Unclear if SNF would allow for pt to return for outpatient/same-day LN biopsy; CM to discuss with facility. Imaging reviewed per General Surgery who had high suspicion for metastatic breast cancer and advised IR core LN biopsy for now. IR consulted. Pending IR biopsy results, may still pursue excision LN bx should core biopsy be inconclusive. Given need for further inpatient workup w/ progressive anasarca, and progressive uptrend in LFTs, would defer discharge to IPR to possibly Monday.        Review of Systems   Constitutional:  Positive for fatigue. Negative for chills, diaphoresis and fever.   HENT:  Negative for congestion, rhinorrhea, sore throat and trouble swallowing.    Eyes:  Negative for photophobia and visual disturbance.   Respiratory:  Negative for cough, shortness of breath and wheezing.    Cardiovascular:  Positive for leg swelling. Negative for chest pain and palpitations.   Gastrointestinal:  Negative for abdominal pain, diarrhea, nausea and vomiting.   Genitourinary:  Negative for difficulty urinating, dysuria and hematuria.   Musculoskeletal:  Positive for gait problem. Negative for arthralgias, joint swelling and neck pain.   Skin:  Negative for rash and wound.   Neurological:  Positive for weakness. Negative for dizziness, light-headedness and headaches.   Psychiatric/Behavioral:  Negative for agitation, confusion and sleep disturbance.      Objective:     Vital Signs (Most Recent):  Temp: 98 °F (36.7 °C) (11/07/24 2113)  Pulse: 85 (11/07/24 2113)  Resp: 20 (11/07/24 2113)  BP: 111/72 (11/07/24  2113)  SpO2: (!) 90 % (11/07/24 2113) Vital Signs (24h Range):  Temp:  [97.7 °F (36.5 °C)-98.7 °F (37.1 °C)] 98 °F (36.7 °C)  Pulse:  [77-98] 85  Resp:  [16-22] 20  SpO2:  [89 %-100 %] 90 %  BP: (107-139)/(55-83) 111/72     Weight: 130 kg (286 lb 9.6 oz)  Body mass index is 46.26 kg/m².    Intake/Output Summary (Last 24 hours) at 11/7/2024 2131  Last data filed at 11/7/2024 1730  Gross per 24 hour   Intake 600 ml   Output --   Net 600 ml         Physical Exam  Constitutional:       General: She is not in acute distress.     Appearance: She is morbidly obese. She is ill-appearing. She is not toxic-appearing or diaphoretic.   HENT:      Head: Normocephalic and atraumatic.      Mouth/Throat:      Mouth: Mucous membranes are moist.   Eyes:      Conjunctiva/sclera: Conjunctivae normal.      Comments: Swelling of R eyelid/periorbital area w/o vision changes, pain, discharge or erythema   Cardiovascular:      Rate and Rhythm: Normal rate and regular rhythm.      Heart sounds: Normal heart sounds.   Pulmonary:      Effort: Pulmonary effort is normal. No respiratory distress.      Breath sounds: Normal breath sounds. No wheezing, rhonchi or rales.      Comments: Auscultation limited by habitus  Abdominal:      General: Bowel sounds are normal. There is no distension.      Palpations: Abdomen is soft.      Tenderness: There is no abdominal tenderness. There is no guarding.   Musculoskeletal:      Right lower leg: Edema present.      Left lower leg: Edema present.      Comments: Extreme 4+ b/l LE pitting edema L>>R (c/w baseline). Diffuse anasarca.    Skin:     General: Skin is warm and dry.   Neurological:      Mental Status: She is alert and oriented to person, place, and time. Mental status is at baseline.   Psychiatric:         Mood and Affect: Mood normal.         Behavior: Behavior normal.             Significant Labs: All pertinent labs within the past 24 hours have been reviewed.    Significant Imaging: I have reviewed  all pertinent imaging results/findings within the past 24 hours.    Assessment/Plan:      * Direct hyperbilirubinemia  Hepatic encephalopathy   CT abdomen showed anasarca with hepatosplenomegaly. Appreciate Hepatology. Changed home senna-docusate and polyethylene glycol to lactulose to treat hepatic encephalopathy. Liver biopsy.     Impaired mobility  Due to lymphedema and history of stroke. Her boyfriend at home helps with mobility and ADLs       Sacral decubitus ulcer, stage II  Chronic. Turn frequently. Wound care.      Leukocytosis  Monitor. Giving ceftriaxone for SBP prophylaxis.      Type 2 diabetes mellitus, with long-term current use of insulin  She takes insulin glargine 80 units HS, insulin aspart 10 units BID with meals. Giving insulin glargine 10 units HS, insulin sliding scale. Monitor glucose.      Painless jaundice  See primary problem.      Coagulopathy  Goal INR <2. Gave vitamin K.    Acute anemia  Having intermittent GI bleed. Monitoring blood counts. Giving empiric pantoprazole. Outpatient endoscopy recommended by GI. If GI bleed acutely worsens, ask for inpatient endoscopy.    ESRD (end stage renal disease) on dialysis  Nephrology managing. Dialysis to treat anasarca.    GERD (gastroesophageal reflux disease)  Increased home pantoprazole to BID.      Hyperlipidemia associated with type 2 diabetes mellitus  Holding home atorvastatin.      Essential hypertension  Holding home nifedipine and nebivolol. Monitor BP.      VTE Risk Mitigation (From admission, onward)           Ordered     IP VTE HIGH RISK PATIENT  Once         10/30/24 0332     Place sequential compression device  Until discontinued         10/30/24 0051                    Discharge Planning   SHASHA: 11/8/2024     Code Status: Full Code   Is the patient medically ready for discharge?:     Reason for patient still in hospital (select all that apply): Patient trending condition, Laboratory test, and Consult recommendations  Discharge Plan  A: Rehab ([facility], [facility])   Discharge Delays: None known at this time        Stephany rOdaz MD  Department of Hospital Medicine   [facility] - Stepdown Flex ([place]-[facility])

## 2024-11-08 NOTE — SUBJECTIVE & OBJECTIVE
Interval History: Doing alright this AM. LE edema/anasarca has progressed since my last exam a week ago. Will discuss w/ Nephrology possibly trying to pull more fluid off with next HD session. Pt is anuric so deferring IV diuresis. Pt reports that plan is to dc to IPR tmrw, however it appears that there is still further hepatology & heme/onc workup that is pending. Hepatology expects to have at least preliminary results of liver biopsy tmrw which they'll review with pt; addn'l inpatient workup/mgmt pending results. Tbili & LFTs continue to uptrend. Awaiting updated hepatology recs. Breast US initially ordered 10/30 per initial Heme/Onc recs which has yet to be obtained. Discussed with radiology who reviewed pt's CT scan and advised dedicated L axilla US instead as R axillary LAD stable and breast US will be unreliable given anasarca. Heme/Onc also advises LN biopsy in the very near future (either inpatient or within the next 1-2 weeks). Unclear if SNF would allow for pt to return for outpatient/same-day LN biopsy; CM to discuss with facility. Imaging reviewed per General Surgery who had high suspicion for metastatic breast cancer and advised IR core LN biopsy for now. IR consulted. Pending IR biopsy results, may still pursue excision LN bx should core biopsy be inconclusive. Given need for further inpatient workup w/ progressive anasarca, and progressive uptrend in LFTs, would defer discharge to IPR to possibly Monday.        Review of Systems   Constitutional:  Positive for fatigue. Negative for chills, diaphoresis and fever.   HENT:  Negative for congestion, rhinorrhea, sore throat and trouble swallowing.    Eyes:  Negative for photophobia and visual disturbance.   Respiratory:  Negative for cough, shortness of breath and wheezing.    Cardiovascular:  Positive for leg swelling. Negative for chest pain and palpitations.   Gastrointestinal:  Negative for abdominal pain, diarrhea, nausea and vomiting.    Genitourinary:  Negative for difficulty urinating, dysuria and hematuria.   Musculoskeletal:  Positive for gait problem. Negative for arthralgias, joint swelling and neck pain.   Skin:  Negative for rash and wound.   Neurological:  Positive for weakness. Negative for dizziness, light-headedness and headaches.   Psychiatric/Behavioral:  Negative for agitation, confusion and sleep disturbance.      Objective:     Vital Signs (Most Recent):  Temp: 98 °F (36.7 °C) (11/07/24 2113)  Pulse: 85 (11/07/24 2113)  Resp: 20 (11/07/24 2113)  BP: 111/72 (11/07/24 2113)  SpO2: (!) 90 % (11/07/24 2113) Vital Signs (24h Range):  Temp:  [97.7 °F (36.5 °C)-98.7 °F (37.1 °C)] 98 °F (36.7 °C)  Pulse:  [77-98] 85  Resp:  [16-22] 20  SpO2:  [89 %-100 %] 90 %  BP: (107-139)/(55-83) 111/72     Weight: 130 kg (286 lb 9.6 oz)  Body mass index is 46.26 kg/m².    Intake/Output Summary (Last 24 hours) at 11/7/2024 2131  Last data filed at 11/7/2024 1730  Gross per 24 hour   Intake 600 ml   Output --   Net 600 ml         Physical Exam  Constitutional:       General: She is not in acute distress.     Appearance: She is morbidly obese. She is ill-appearing. She is not toxic-appearing or diaphoretic.   HENT:      Head: Normocephalic and atraumatic.      Mouth/Throat:      Mouth: Mucous membranes are moist.   Eyes:      Conjunctiva/sclera: Conjunctivae normal.      Comments: Swelling of R eyelid/periorbital area w/o vision changes, pain, discharge or erythema   Cardiovascular:      Rate and Rhythm: Normal rate and regular rhythm.      Heart sounds: Normal heart sounds.   Pulmonary:      Effort: Pulmonary effort is normal. No respiratory distress.      Breath sounds: Normal breath sounds. No wheezing, rhonchi or rales.      Comments: Auscultation limited by habitus  Abdominal:      General: Bowel sounds are normal. There is no distension.      Palpations: Abdomen is soft.      Tenderness: There is no abdominal tenderness. There is no guarding.    Musculoskeletal:      Right lower leg: Edema present.      Left lower leg: Edema present.      Comments: Extreme 4+ b/l LE pitting edema L>>R (c/w baseline). Diffuse anasarca.    Skin:     General: Skin is warm and dry.   Neurological:      Mental Status: She is alert and oriented to person, place, and time. Mental status is at baseline.   Psychiatric:         Mood and Affect: Mood normal.         Behavior: Behavior normal.             Significant Labs: All pertinent labs within the past 24 hours have been reviewed.    Significant Imaging: I have reviewed all pertinent imaging results/findings within the past 24 hours.

## 2024-11-08 NOTE — PLAN OF CARE
NPO since midnight, possible excision LN biopys (??). HD Today. Discharge to inpatient rehab when medically cleared (possibly Monday per MD notes). GI recommends OP endoscopy unless active GIB noted, BM x1 this shift w/o signs of bleeding. INR goal <2 per MD; Expect previously obtained biopsy results today per MD notes; Pt with increased O2 requirements at start of shift (as high as 6L/min; MD notified), currently back to baseline 3L home o2 at time of this note. Tele; ACHS; Anuric; No further change      Problem: Adult Inpatient Plan of Care  Goal: Plan of Care Review  Outcome: Progressing  Goal: Patient-Specific Goal (Individualized)  Outcome: Progressing  Goal: Absence of Hospital-Acquired Illness or Injury  Outcome: Progressing  Goal: Optimal Comfort and Wellbeing  Outcome: Progressing  Goal: Readiness for Transition of Care  Outcome: Progressing     Problem: Diabetes Comorbidity  Goal: Blood Glucose Level Within Targeted Range  Outcome: Progressing     Problem: Acute Kidney Injury/Impairment  Goal: Fluid and Electrolyte Balance  Outcome: Progressing  Goal: Improved Oral Intake  Outcome: Progressing  Goal: Effective Renal Function  Outcome: Progressing     Problem: Wound  Goal: Optimal Coping  Outcome: Progressing  Goal: Optimal Functional Ability  Outcome: Progressing  Goal: Absence of Infection Signs and Symptoms  Outcome: Progressing  Goal: Improved Oral Intake  Outcome: Progressing  Goal: Optimal Pain Control and Function  Outcome: Progressing  Goal: Skin Health and Integrity  Outcome: Progressing  Goal: Optimal Wound Healing  Outcome: Progressing     Problem: Skin Injury Risk Increased  Goal: Skin Health and Integrity  Outcome: Progressing     Problem: Gastrointestinal Bleeding  Goal: Optimal Coping with Acute Illness  Outcome: Progressing  Goal: Hemostasis  Outcome: Progressing     Problem: Bariatric Environmental Safety  Goal: Safety Maintained with Care  Outcome: Progressing     Problem:  Hemodialysis  Goal: Safe, Effective Therapy Delivery  Outcome: Progressing  Goal: Effective Tissue Perfusion  Outcome: Progressing  Goal: Absence of Infection Signs and Symptoms  Outcome: Progressing     Problem: Chronic Kidney Disease  Goal: Electrolyte Balance  Outcome: Progressing  Goal: Fluid Balance  Outcome: Progressing     Problem: Fall Injury Risk  Goal: Absence of Fall and Fall-Related Injury  Outcome: Progressing

## 2024-11-08 NOTE — PROGRESS NOTES
[facility]     Patient currently on hemodialysis for removal of uremic toxins and volume.     Patient seen and evaluated on hemodialysis, tolerating treatment, see HD flowsheet for vitals and assessments.    Labs have been reviewed and the dialysate bath has been adjusted.       Assessment/Plan:    -Hgb goal 6.5, plan to transfuse 1 unit prbcs   -Patient seen on HD, tolerating treatment well, w/o complaints   -UF goal of 3-3.5L, midodrine ordered prn   -Hypervolemic, anascara will plan for additional UF only yesterday.   -Renal diet, if not NPO   -Strict I/O's and daily weights  -Daily renal function panels  -Keep MAP >65 while on HD   -Hgb goal 10-11, continue epo   Liver biopsy obtained on 11/06/24; Cholestatic hepatitis with stage 2 fibrosis. Features of hepatic venous outflow obstruction. The histologic differential diagnosis for the cholestatic hepatitis includes sepsis/infection, drug induced liver injury (DILI), and in combination with the ductular reaction, bile duct obstruction.   - Hepatology recommending MRI MRCP to r/o an obstructive etiology. Pt will need HD immediately afterwards and 2 days after    -Will continue to follow while inpatient     Evon Bullard DNP-FNP, C  Nephrology  Pager: [phone]

## 2024-11-08 NOTE — PROGRESS NOTES
[facility] - Stepdown Flex ([place] [address])    Wound Care     Patient Name:  Pilar Fernandez  MRN:  [id number]  Date: 11/8/2024  Diagnosis: Direct hyperbilirubinemia     History:  Past Medical History:   Diagnosis Date    Anemia     Anemia     Breast mass     Chronic constipation     CKD (chronic kidney disease)     Diabetes mellitus     Dialysis patient     GRZEGORZ BARRIOS, THURS, SAT.    Embolic stroke involving right middle cerebral artery 7/13/2024    Encounter for blood transfusion     Erosive gastritis 03/2016    GERD (gastroesophageal reflux disease)     Gout     High cholesterol     Hypertension     Knee pain     Thyroid disease     Unspecified cataract 09/2022     Social History     Socioeconomic History    Marital status: Single    Years of education: 11   Occupational History    Occupation:    Tobacco Use    Smoking status: Never     Passive exposure: Never    Smokeless tobacco: Never   Substance and Sexual Activity    Alcohol use: No     Alcohol/week: 0.0 standard drinks of alcohol    Drug use: No    Sexual activity: Yes     Partners: Male     Birth control/protection: None, Surgical     Social Drivers of Health     Financial Resource Strain: Low Risk  (10/30/2024)    Overall Financial Resource Strain (CARDIA)     Difficulty of Paying Living Expenses: Not hard at all   Food Insecurity: No Food Insecurity (10/30/2024)    Hunger Vital Sign     Worried About Running Out of Food in the Last Year: Never true     Ran Out of Food in the Last Year: Never true   Transportation Needs: No Transportation Needs (10/30/2024)    TRANSPORTATION NEEDS     Transportation : No   Physical Activity: Insufficiently Active (10/30/2024)    Exercise Vital Sign     Days of Exercise per Week: 4 days     Minutes of Exercise per Session: 30 min   Stress: No Stress Concern Present (10/30/2024)    [employer] - Occupational Stress Questionnaire     Feeling of Stress : Only a little   Housing  Stability: Low Risk  (10/30/2024)    Housing Stability Vital Sign     Unable to Pay for Housing in the Last Year: No     Homeless in the Last Year: No     Precautions:  Allergies as of 10/29/2024 - Reviewed 10/29/2024   Allergen Reaction Noted    Tramadol Itching and Nausea And Vomiting 09/27/2017       WO Assessment Details / Treatment:    Patient seen for wound care: Follow-up   Chart reviewed for this encounter.   Labs:   WBC (K/uL)   Date Value   11/08/2024 22.22 (H)   11/07/2024 25.86 (H)     Glucose (mg/dL)   Date Value   11/08/2024 71   11/07/2024 81     Albumin (g/dL)   Date Value   11/08/2024 1.6 (L)   11/07/2024 1.7 (L)       Lamont Score: 15    Narrative:  Pt seen for WC consultation / follow-up and agreed to assessment  Chart reviewed for this encounter.   See Flow Sheet for additional documentation and media.    Pt on immerse bed, bedside nurse present for assessment. Under bilateral breasts and abdominal pannus no open wounds, continues to be moist area likely d/t pt anatomy.   Pt states she cannot turn at this time. Order in place for buttock area.  Noted pt appears swollen and skin is tight and has an indurated feeling when palpated.   Noted when mid-pannus was lifted there was a mucus like substance under area / bedside nurse notified and discovered this was vaginal discharge, MD notified via secure chat.     Interdry Ag left at bedside.    RECOMMENDATIONS:  Bedside nurse assess for acute changes (purulence, increased redness/swelling, increased drainage, malodor, increased pain, pallor, necrosis) please contact physician on any acute changes.    Discussed POC with patient and primary nurse.   See EMR for orders     Bedside nursing to continue care & monitoring.  Bedside nursing to maintain pressure injury prevention interventions    Recommendations made to primary team for above plan via secure chat.     Thank you for the consult. Wound Care will continue to follow.     11/08/24 1255   WOCN  Assessment   WOCN Total Time (mins) 30   Visit Date 11/08/24   Visit Time 1100   Consult Type Follow Up   WOCN Speciality Wound   Wound moisture;yeast;At risk for pressure Injury   Intervention chart review;assessed   Teaching on-going;complication        Wound 11/01/24 1015 Intertrigo Right lower Breast   Date First Assessed/Time First Assessed: 11/01/24 1015   Primary Wound Type: Intertrigo  Side: Right  Orientation: lower  Location: Breast   Wound Image         Wound 11/01/24 1015 Intertrigo Left lower Breast   Date First Assessed/Time First Assessed: 11/01/24 1015   Primary Wound Type: Intertrigo  Side: Left  Orientation: lower  Location: Breast   Wound Image         Wound 11/01/24 1015 Intertrigo lower Abdomen   Date First Assessed/Time First Assessed: 11/01/24 1015   Primary Wound Type: Intertrigo  Orientation: lower  Location: (c) Abdomen   Wound Image              Groin / vaginal area

## 2024-11-08 NOTE — NURSING
Patient arrived in a bed to dialysis unit. Awake and alert.     Report received from DIANA Irby RN     Hemodialysis initiated using the following:  Dialysis Access: Left Forearm  AVF     Needle size: 15 gauge ButtonHole needles.     Insertion with no complications. Tolerated well, flows good.

## 2024-11-08 NOTE — PLAN OF CARE
Discharge Plan A and Plan B have been determined by review of patient's clinical status, future medical and therapeutic needs, and coverage/benefits for post-acute care in coordination with multidisciplinary team members.        11/08/24 0900   Post-Acute Status   Post-Acute Authorization Placement   Post-Acute Placement Status Pending payor review/awaiting authorization (if required)   Coverage [facility] DUAL COMPLETE HMO SNP   Patient choice form signed by patient/caregiver List from [facility] Post-Acute Care;List from CMS Compare;List with quality metrics by geographic area provided   Discharge Delays None known at this time   Discharge Plan   Discharge Plan A Rehab  ([facility] Phone: [phone])     CM received a phone call from Berta Palmer  @ [facility] Phone: [phone]  and patients insurance is requesting a p2p.  Patients attending will need to call [phone] and to chose option 5. DR ordaz can speak directly with a medical provider at [facility] DUAL COMPLETE [facility] , deadline 11/8/24 by 12 pm.    1015 am  Dr Ordaz called and spoke with medical provider at [facility]TE [facility] hospitals in the [place] and [facility] will withdraw rehab authorization due to patient not being medically ready.  Patient will need an inpatient liver biopsy.  Rehab authorization pending outcome of liver biopsy.     Cortney Harrison RN  Case Management  [facility]  [phone]

## 2024-11-08 NOTE — PT/OT/SLP PROGRESS
Occupational Therapy      Patient Name:  Pilar Fernandez   MRN:  [id number]    Patient not seen today secondary to patient on hold per Nurse due to low H&H and awaiting for HD treatment. Will follow-up on the next schedule visit accordingly to OT POC.    11/8/2024

## 2024-11-09 PROBLEM — R59.1 DIFFUSE LYMPHADENOPATHY: Status: ACTIVE | Noted: 2024-11-09

## 2024-11-09 PROBLEM — I69.30 HISTORY OF CEREBROVASCULAR ACCIDENT (CVA) WITH RESIDUAL DEFICIT: Status: ACTIVE | Noted: 2024-11-09

## 2024-11-09 PROBLEM — K72.00 ACUTE LIVER FAILURE: Status: ACTIVE | Noted: 2024-11-09

## 2024-11-09 PROBLEM — J96.11 CHRONIC HYPOXIC RESPIRATORY FAILURE: Status: ACTIVE | Noted: 2024-11-09

## 2024-11-09 PROBLEM — E66.01 MORBID OBESITY: Status: ACTIVE | Noted: 2024-11-09

## 2024-11-09 LAB
ALBUMIN SERPL BCP-MCNC: 1.6 G/DL (ref 3.5–5.2)
ALP SERPL-CCNC: 636 U/L (ref 40–150)
ALT SERPL W/O P-5'-P-CCNC: 66 U/L (ref 10–44)
ANION GAP SERPL CALC-SCNC: 13 MMOL/L (ref 8–16)
AST SERPL-CCNC: 162 U/L (ref 10–40)
BASOPHILS # BLD AUTO: 0.07 K/UL (ref 0–0.2)
BASOPHILS NFR BLD: 0.4 % (ref 0–1.9)
BILIRUB SERPL-MCNC: 16.2 MG/DL (ref 0.1–1)
BUN SERPL-MCNC: 34 MG/DL (ref 6–20)
CALCIUM SERPL-MCNC: 8.3 MG/DL (ref 8.7–10.5)
CHLORIDE SERPL-SCNC: 98 MMOL/L (ref 95–110)
CO2 SERPL-SCNC: 21 MMOL/L (ref 23–29)
CREAT SERPL-MCNC: 4.2 MG/DL (ref 0.5–1.4)
DIFFERENTIAL METHOD BLD: ABNORMAL
EOSINOPHIL # BLD AUTO: 0.2 K/UL (ref 0–0.5)
EOSINOPHIL NFR BLD: 1 % (ref 0–8)
ERYTHROCYTE [DISTWIDTH] IN BLOOD BY AUTOMATED COUNT: 22.7 % (ref 11.5–14.5)
EST. GFR  (NO RACE VARIABLE): 12.7 ML/MIN/1.73 M^2
GLUCOSE SERPL-MCNC: 89 MG/DL (ref 70–110)
HCT VFR BLD AUTO: 23.2 % (ref 37–48.5)
HGB BLD-MCNC: 7.5 G/DL (ref 12–16)
IMM GRANULOCYTES # BLD AUTO: 0.61 K/UL (ref 0–0.04)
IMM GRANULOCYTES NFR BLD AUTO: 3.1 % (ref 0–0.5)
INR PPP: 1.5 (ref 0.8–1.2)
LYMPHOCYTES # BLD AUTO: 0.6 K/UL (ref 1–4.8)
LYMPHOCYTES NFR BLD: 3 % (ref 18–48)
MAGNESIUM SERPL-MCNC: 2 MG/DL (ref 1.6–2.6)
MCH RBC QN AUTO: 29.8 PG (ref 27–31)
MCHC RBC AUTO-ENTMCNC: 32.3 G/DL (ref 32–36)
MCV RBC AUTO: 92 FL (ref 82–98)
MONOCYTES # BLD AUTO: 1.4 K/UL (ref 0.3–1)
MONOCYTES NFR BLD: 6.8 % (ref 4–15)
NEUTROPHILS # BLD AUTO: 17 K/UL (ref 1.8–7.7)
NEUTROPHILS NFR BLD: 85.7 % (ref 38–73)
NRBC BLD-RTO: 3 /100 WBC
PHOSPHATE SERPL-MCNC: 4.1 MG/DL (ref 2.7–4.5)
PLATELET # BLD AUTO: 253 K/UL (ref 150–450)
PMV BLD AUTO: 12.6 FL (ref 9.2–12.9)
POCT GLUCOSE: 105 MG/DL (ref 70–110)
POCT GLUCOSE: 109 MG/DL (ref 70–110)
POTASSIUM SERPL-SCNC: 4.4 MMOL/L (ref 3.5–5.1)
PROT SERPL-MCNC: 6.6 G/DL (ref 6–8.4)
PROTHROMBIN TIME: 15.7 SEC (ref 9–12.5)
RBC # BLD AUTO: 2.52 M/UL (ref 4–5.4)
SODIUM SERPL-SCNC: 132 MMOL/L (ref 136–145)
WBC # BLD AUTO: 19.82 K/UL (ref 3.9–12.7)

## 2024-11-09 PROCEDURE — 0352U VAGINOSIS SCREEN BY DNA PROBE: CPT | Performed by: STUDENT IN AN ORGANIZED HEALTH CARE EDUCATION/TRAINING PROGRAM

## 2024-11-09 PROCEDURE — 25000003 PHARM REV CODE 250: Performed by: HOSPITALIST

## 2024-11-09 PROCEDURE — 25000003 PHARM REV CODE 250: Performed by: STUDENT IN AN ORGANIZED HEALTH CARE EDUCATION/TRAINING PROGRAM

## 2024-11-09 PROCEDURE — 25000003 PHARM REV CODE 250: Performed by: PHYSICIAN ASSISTANT

## 2024-11-09 PROCEDURE — 80053 COMPREHEN METABOLIC PANEL: CPT | Performed by: STUDENT IN AN ORGANIZED HEALTH CARE EDUCATION/TRAINING PROGRAM

## 2024-11-09 PROCEDURE — 85025 COMPLETE CBC W/AUTO DIFF WBC: CPT | Performed by: HOSPITALIST

## 2024-11-09 PROCEDURE — 36415 COLL VENOUS BLD VENIPUNCTURE: CPT | Performed by: STUDENT IN AN ORGANIZED HEALTH CARE EDUCATION/TRAINING PROGRAM

## 2024-11-09 PROCEDURE — 85610 PROTHROMBIN TIME: CPT | Performed by: STUDENT IN AN ORGANIZED HEALTH CARE EDUCATION/TRAINING PROGRAM

## 2024-11-09 PROCEDURE — 63600175 PHARM REV CODE 636 W HCPCS: Performed by: HOSPITALIST

## 2024-11-09 PROCEDURE — 83735 ASSAY OF MAGNESIUM: CPT | Performed by: STUDENT IN AN ORGANIZED HEALTH CARE EDUCATION/TRAINING PROGRAM

## 2024-11-09 PROCEDURE — 84100 ASSAY OF PHOSPHORUS: CPT | Performed by: STUDENT IN AN ORGANIZED HEALTH CARE EDUCATION/TRAINING PROGRAM

## 2024-11-09 PROCEDURE — 87591 N.GONORRHOEAE DNA AMP PROB: CPT | Performed by: STUDENT IN AN ORGANIZED HEALTH CARE EDUCATION/TRAINING PROGRAM

## 2024-11-09 PROCEDURE — 20600001 HC STEP DOWN PRIVATE ROOM

## 2024-11-09 RX ADMIN — PANTOPRAZOLE SODIUM 40 MG: 40 TABLET, DELAYED RELEASE ORAL at 08:11

## 2024-11-09 RX ADMIN — MUPIROCIN: 20 OINTMENT TOPICAL at 08:11

## 2024-11-09 RX ADMIN — MUPIROCIN: 20 OINTMENT TOPICAL at 10:11

## 2024-11-09 RX ADMIN — ONDANSETRON 4 MG: 2 INJECTION INTRAMUSCULAR; INTRAVENOUS at 04:11

## 2024-11-09 RX ADMIN — LORAZEPAM 1 MG: 0.5 TABLET ORAL at 02:11

## 2024-11-09 RX ADMIN — HYDROCORTISONE: 25 CREAM TOPICAL at 10:11

## 2024-11-09 RX ADMIN — HYDROCORTISONE: 25 CREAM TOPICAL at 08:11

## 2024-11-09 RX ADMIN — PANTOPRAZOLE SODIUM 40 MG: 40 TABLET, DELAYED RELEASE ORAL at 10:11

## 2024-11-09 RX ADMIN — LACTULOSE 20 G: 20 SOLUTION ORAL at 08:11

## 2024-11-09 NOTE — ASSESSMENT & PLAN NOTE
Direct hyperbilirubinemia w/ painless jaundice  Coagulopathy  Hepatic encephalopathy  Presented w/ hyperbilirubinemia per outpatient/HD labs w/ acute jaundice & assoc pruritus. On arrival, Tbili 11.7, AST 81, ALT 30, , along w/ INR 2.1 & Hgb 6.4 s/o acute liver failure w/ synthetic dysfunction. Considered hemolytic process, however less likely w/ haptoglobin of 115. CT noted hepatomegaly w/ heterogenous attenuation & few scattered sub-cm hypodensities of unk significance; galbladder surgically absent. Liver US w/ Doppler w/ marked HSM, but normal blood flow and no biliary/ductal dilation. Given absence of dilation, AES deferred consult for time being, and advised primary Hepatology involvement. Etiology unclear- decompensated cirrhosis (2/2 MASLD? W/ MELD 36) considered vs DILI (2/2 clindamycin) vs other infiltrative process (given extensive LAD). Serological workup w/ elevated A1AT (300), abnormal IgG profile, & increased factor VIII activity; AFP, PETH, BRIAN, PETH, ASMA, AMA & ceruloplasmin all nml. Developed hepatic encephalopathy which resolved following titration of bowel regimen. 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 Pt's Tbili & LFTs continued to uptrend.   - Hepatology following; appreciate recs  - MRCP pending   - Not a transplant candidate given functional status & comorbidities   - Monitor CMP, INR, & CBC  - Minimize hepatotoxic agents as able   - Bowel regimen; titrate PRN

## 2024-11-09 NOTE — ASSESSMENT & PLAN NOTE
Pt has apparently been on 3L NC at home since 2022. No known underlying COPD or other lung dx. Suspect CHRF 2/2 chronic volume overload iso ESRD & HFpEF, along w/ likely OHS/MELANY.  - Volume mgmt as above   - Supplemental O2 PRN for goal SpO2 > 92%

## 2024-11-09 NOTE — ASSESSMENT & PLAN NOTE
Has had known mass of L breast for years. Excisional biopsy (Dec '22) noted complex sclerosing lesion w/ usual ductal hyperplasia & apocrine metaplasia; no atypica or malignancy per preliminary evaluation. Per further evaluation (by [facility]), findings c/w ADH w/ assoc necrosis & proliferative fibrocystic changes. US (Jan 2023) w/ post-surgical fluid collection; BI-RADS 2. Doesn't appear to have had any further imaging or workup since then. Family hx of breast ca in one maternal aunt & ovarian cancer in another maternal aunt noted. Diffuse LAD, vickie of L axilla noted on CT, raising c/f malignancy. Imaging reviewed by radiology who advised dedicated L axillary US (R axilla findings stable and breast US unreliable given anasarca).   - Malignancy workup as above

## 2024-11-09 NOTE — ASSESSMENT & PLAN NOTE
Hx of HFpEF per 2020 TTE. Although respiratory status stable/at baseline, repeat TTE obtained iso acute illness & diffuse/severe anasarca. TTE (10/30/24) w/ EF 60-65% w/ septal flattening c/w volume overload, biatrial dilation, severe TR, moderate pulmonary HTN (PASP 51mmHg) & CVP 15mmHg. BNP 1459.   - Volume removal via HD  - Deferring lasix as pt anuric  - Low sodium diet w/ 1.8L fluid restriction

## 2024-11-09 NOTE — PLAN OF CARE
Problem: Adult Inpatient Plan of Care  Goal: Plan of Care Review  Outcome: Progressing  Goal: Patient-Specific Goal (Individualized)  Outcome: Progressing  Goal: Absence of Hospital-Acquired Illness or Injury  Outcome: Progressing  Goal: Optimal Comfort and Wellbeing  Outcome: Progressing  Goal: Readiness for Transition of Care  Outcome: Progressing     Problem: Diabetes Comorbidity  Goal: Blood Glucose Level Within Targeted Range  Outcome: Progressing     Problem: Acute Kidney Injury/Impairment  Goal: Fluid and Electrolyte Balance  Outcome: Progressing  Goal: Improved Oral Intake  Outcome: Progressing  Goal: Effective Renal Function  Outcome: Progressing     Problem: Skin Injury Risk Increased  Goal: Skin Health and Integrity  Outcome: Progressing     Problem: Gastrointestinal Bleeding  Goal: Optimal Coping with Acute Illness  Outcome: Progressing  Goal: Hemostasis  Outcome: Progressing     Problem: Bariatric Environmental Safety  Goal: Safety Maintained with Care  Outcome: Progressing     Problem: Hemodialysis  Goal: Safe, Effective Therapy Delivery  Outcome: Progressing  Goal: Effective Tissue Perfusion  Outcome: Progressing  Goal: Absence of Infection Signs and Symptoms  Outcome: Progressing     Problem: Chronic Kidney Disease  Goal: Electrolyte Balance  Outcome: Progressing  Goal: Fluid Balance  Outcome: Progressing     Problem: Fall Injury Risk  Goal: Absence of Fall and Fall-Related Injury  Outcome: Progressing     Patient alert and oriented. Cooperative with care. Medicated as ordered. Medicated for MRI as ordered. Tolerated MRI well. Patient bathed this am. Continues in bariatric free airflow bed. Patient denies pain. Resting comfortably with call light in reach

## 2024-11-09 NOTE — ASSESSMENT & PLAN NOTE
Hx of end-stage CKD (2/2 diabetic & hypertensive nephropathy). Has been on HD (M/W/F) since 2021. On admission, Cr 4.5, BUN 45, bicarb 20, & Na 134, (all c/w baseline). No uremia or notable electrolyte derangements. Pt with diffuse anasarca along w/ b/l LE lymphedema, requiring 3L NC (also all c/w baseline). No acute concerns.  - Nephrology consulted; appreciate recs  - Continued HD for clearance & volume removal   - Epo MWF w/ HD  - Monitor RFP  - Minimize nephrotoxic agents as able & renally-dose meds

## 2024-11-09 NOTE — ASSESSMENT & PLAN NOTE
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 No acute neuro concerns/focal findings on current admission.   - Holding home DAPT 2/2 anemia/coagulopathy   - Holding home statin i/s/o liver failure   - PT/OT

## 2024-11-09 NOTE — PROGRESS NOTES
[facility] - Stepdown Counts include 234 beds at the [facility] ([address] Medicine  Progress Note    Patient Name: Pilar Fernandez  MRN: [id number]  Patient Class: IP- Inpatient   Admission Date: 10/29/2024  Length of Stay: 11 days  Attending Physician: Stephany Ordaz MD  Primary Care Provider: Lindsey Singletary FNP    Subjective:     Principal Problem: Acute liver failure    HPI:  Pilar Fernandez is a 44 y.o. female w/ PMHx of ESRD (on HD), T2DM, CHF, HTN, gastritis, severe BLE lymphedema (L>R), CHRF (on 3L @ home), prior CVA (July '24; on DAPT) w/ impaired mobility & sacral decubitus ulcer, gout, and known L breast mass, who presented to [facility] ED on 10/29/24 w/ painless jaundice w/ assoc pruritus. Pt reports diffuse pruritus for few weeks, with family noticing yellow discoloration of eyes on 10/28. Was previously admitted to [facility] for cellulitis (LLE vs LUE?), for which she received IV abx and was dc'ed 10/26 on clindamycin. Pt reports she had labs done during her [facility] stay, but there was nothing mentioned re: bilirubin or liver function at that time. Pt went to routine HD on 10/28 where she was told she had elevated bilirubin and was referred to [facility] ED for further evaluation.     On arrival to [facility] ED, pt afebrile w/ borderline/normotension (/43-87); otherwise HDS. Pt reports chronic lymphedema & leg/sacral wounds (w/ assoc pain) at baseline. Denied fevers, chills, N/V, melena, hematochezia, hematemesis, BRBPR, SOB, palpitations, chest or abdominal pain or other complaints. Initial labs notable for Tbili 11.7, AST 81, ALT 30, , lipase 57, albumin 2.0, bicarb 20, Na 134, Cr 4.5, BUN 45, WBC 22.8, Hgb 6.4, plts 154, INR 2.1, & lactate 1.6. Stool positive for occult blood. CT A/P w/o contrast noted anasarca w/ persistent cardiomegaly, small & improved pericardial effusion & small L pleural effusion; no acute GI or hepatic abnormalities noted. Received IV zofran, Zosyn, IV protonix 80mg, IV fentanyl,  along w/ 2u pRBC & 2u FFP. Given severe hyperbilirubinemia & anemia w/ e/o acute liver failure of unk etiology, pt transferred to [facility] on 10/29/24 for HLOC.     Overview/Hospital Course:  She was put on ceftriaxone. Hepatology and Advanced Endoscopy Service were consulted for hyperbilirubinemia. Gastroenterology was consulted for gastrointestinal bleed. Hematology-Oncology was consulted for anemia with hyperbilirubinemia and positive Britt test. CT showed diffuse anasarca, pericardial effusion, enlarged lymph nodes vs soft tissue densities, hepatosplenomegaly. Hepatology recommended liver biopsy. Gastroenterology recommended outpatient endoscopy, after she is medically optimized, due to lack of overt bleeding. Hematology-Oncology recommended breast ultrasound. Leukocytosis persisted. Sed rate was normal. CRP was elevated. Midodrine was started for post-dialysis hypotension. She developed hepatic encephalopathy with ammonia of 111 umol/L. She was transfused another unit of packed red blood cells in preparation for liver biopsy. She required vitamin K for elevated INR. She desired to sit at the side of the bed but was unable to on her own. Physical and Occupational Therapy were consulted and recommended high intensity therapy. Midodrine was stopped as blood pressures increased to normal. Her home hypertension medications continued to be held. After multiple postponements due to limited anesthesia staffing, liver biopsy was done on Wednesday 11/6/2024.     Interval History: Doing alright this AM. LE edema/anasarca has progressed since my last exam a week ago; nephro to attempt addn'l volume removal today. Pt reports pain/discomfort, vickie of L side/hip vickie d/t difficulty self-positioning. No SOB, chest pain, etc. Pursuing MRCP as liver bx results w/ findings that could be c/w either infiltrative or obstructive process. Given potential for lymphoma, pursuing inpatient L axillary LN biopsy w/ IR early next week  (attempting core bx first w/ possible excisional bx pending results)  Tbili & LFTs relatively stable from yesterday, but overall progressively uptrending from admission. Downtrend in H/H again (Hgb 6.5 this AM); no s/sx of active bleeding- another 1u pRBCs ordered. Wound care noted profuse, white, malodorous vaginal discharge; pt w/o sx- suspect BV. Vaginal swabs ordered.     Review of Systems   Constitutional:  Positive for fatigue. Negative for chills, diaphoresis and fever.   HENT:  Negative for congestion, rhinorrhea, sore throat and trouble swallowing.    Eyes:  Negative for photophobia and visual disturbance.   Respiratory:  Negative for cough, shortness of breath and wheezing.    Cardiovascular:  Positive for leg swelling. Negative for chest pain and palpitations.   Gastrointestinal:  Negative for abdominal pain, diarrhea, nausea and vomiting.   Genitourinary:  Negative for difficulty urinating, dysuria and hematuria.   Musculoskeletal:  Positive for gait problem. Negative for arthralgias, joint swelling and neck pain.   Skin:  Negative for rash and wound.   Neurological:  Positive for weakness. Negative for dizziness, light-headedness and headaches.   Psychiatric/Behavioral:  Negative for agitation, confusion and sleep disturbance.      Objective:     Vital Signs (Most Recent):  Temp: 98.3 °F (36.8 °C) (11/08/24 2327)  Pulse: 78 (11/08/24 2327)  Resp: 18 (11/08/24 2327)  BP: (!) 112/59 (11/08/24 2327)  SpO2: 99 % (11/08/24 2327) Vital Signs (24h Range):  Temp:  [97.5 °F (36.4 °C)-98.3 °F (36.8 °C)] 98.3 °F (36.8 °C)  Pulse:  [66-84] 78  Resp:  [16-18] 18  SpO2:  [92 %-99 %] 99 %  BP: (103-146)/(55-77) 112/59     Weight: 130 kg (286 lb 9.6 oz)  Body mass index is 46.26 kg/m².    Intake/Output Summary (Last 24 hours) at 11/9/2024 0058  Last data filed at 11/8/2024 2200  Gross per 24 hour   Intake 532.5 ml   Output 3650 ml   Net -3117.5 ml         Physical Exam  Constitutional:       General: She is not in  acute distress.     Appearance: She is morbidly obese. She is ill-appearing. She is not toxic-appearing or diaphoretic.   HENT:      Head: Normocephalic and atraumatic.      Mouth/Throat:      Mouth: Mucous membranes are moist.   Eyes:      Conjunctiva/sclera: Conjunctivae normal.      Comments: Swelling of R eyelid/periorbital area w/o vision changes, pain, discharge or erythema   Cardiovascular:      Rate and Rhythm: Normal rate and regular rhythm.      Heart sounds: Normal heart sounds.   Pulmonary:      Effort: Pulmonary effort is normal. No respiratory distress.      Breath sounds: Normal breath sounds. No wheezing, rhonchi or rales.      Comments: Auscultation limited by habitus  Abdominal:      General: Bowel sounds are normal. There is no distension.      Palpations: Abdomen is soft.      Tenderness: There is no abdominal tenderness. There is no guarding.   Musculoskeletal:      Right lower leg: Edema present.      Left lower leg: Edema present.      Comments: Extreme 4+ b/l LE pitting edema L>>R (c/w baseline). Diffuse anasarca.    Skin:     General: Skin is warm and dry.   Neurological:      Mental Status: She is alert and oriented to person, place, and time. Mental status is at baseline.   Psychiatric:         Mood and Affect: Mood normal.         Behavior: Behavior normal.           Significant Labs: All pertinent labs within the past 24 hours have been reviewed.    Significant Imaging: I have reviewed all pertinent imaging results/findings within the past 24 hours.    Assessment/Plan:      * Acute liver failure  Direct hyperbilirubinemia w/ painless jaundice  Coagulopathy  Hepatic encephalopathy - resolved  Presented w/ hyperbilirubinemia per outpatient/HD labs w/ acute jaundice & assoc pruritus. On arrival, Tbili 11.7, AST 81, ALT 30, , along w/ INR 2.1 & Hgb 6.4 s/o acute liver failure w/ synthetic dysfunction. Considered hemolytic process, however less likely w/ haptoglobin of 115. CT noted  hepatomegaly w/ heterogenous attenuation & few scattered sub-cm hypodensities of unk significance; galbladder surgically absent. Liver US w/ Doppler w/ marked HSM, but normal blood flow and no biliary/ductal dilation. Given absence of dilation, AES deferred consult for time being, and advised primary Hepatology involvement. Etiology unclear- decompensated cirrhosis (2/2 MASLD? W/ MELD 36) considered vs DILI (2/2 clindamycin) vs other infiltrative process (given extensive LAD). Serological workup w/ elevated A1AT (300), abnormal IgG profile, & increased factor VIII activity; AFP, PETH, BRIAN, PETH, ASMA, AMA & ceruloplasmin all nml. Developed hepatic encephalopathy (w/ ammonia 111) which resolved following titration of bowel regimen. 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 Pt's Tbili & LFTs continued to uptrend.   - Hepatology following; appreciate recs  - MRCP pending   - Not a transplant candidate given functional status & comorbidities   - Monitor CMP, INR, & CBC  - Minimize hepatotoxic agents as able   - Bowel regimen; titrate PRN     Diffuse lymphadenopathy  CT C/A/P on arrival to C noted numerous LN conglomerates &/or soft tissue masses around L axillary soft tissues, b/l inguinal regions (L>R), L supraclavicular soft tissues, & retroperitoneum. Findings highly c/f  neoplastic or lymphoproliferative process. While metastatic breast cancer considered (vickie given hx of breast mass as below) although lymphoma also considered. Malignancy could be contributing to pt's liver failure, vickie if causing obstruction &/or infiltration. Dedicated L axillary US noted abnormal L axillary LAD w/ at least 6 abnormal LN w/ suspicious appearance (BI-RADS 4); biopsy (w/ pathology & flow cytometry) advised.   - Hematology-Oncology consulted; appreciate recs  - IR bx of L axillary LN planned for next  week   - Send for both pathology & cytometry   - Pending core bx results, may pursue excisional biopsy  - If pathology shows lymphoma, may pursue inpatient chemo; if breast cancer would defer therapy to outpatient     Acute anemia  Coagulopathy  Thrombocytopenia - resolved  On presentation, Hgb 6.4 (baseline ~10.5) w/ MCV 83, platelets 154, & INR 2.1. Pt denied melena, hematochezia, BRBPR, hematemesis or other sx of active bleeding. Fecal occult positive at OSH ED. Received 2u pRBCs & 2u FFP prior to transfer. Evaluated by GI on arrival who deferred scope for time being given absence of s/sx of brisk bleeding.  Required addn'l unit pRBCs & IV vitamin K shortly after transfer. Subsequent development of thrombocytopenia (eric 90s). Anemia & elevated INR stabilized/improved but certainly persisted. Continued to have no s/sx of active/brisk bleeding. Iron panel c/w anemia 2/2 chronic dx/inflammation. Suspect cytopenias & coagulopathy 2/2 liver failure w/ posisble contributing suspected malignancy.   - Mgmt/workup of liver failure & LAD as above/below  - Monitor CBC & for e/o active bleeding  - Transfuse PRN for goal Hgb > 7, plts > 50K & INR < 2   - S/p 2u FFP & 4u pRBCs to date    Mass of breast  Has had known mass of L breast for years. Excisional biopsy (Dec '22) noted complex sclerosing lesion w/ usual ductal hyperplasia & apocrine metaplasia; no atypica or malignancy on preliminary evaluation. Per further evaluation (by [facility]), findings c/w ADH w/ assoc necrosis & proliferative fibrocystic changes. US (Jan 2023) w/ post-surgical fluid collection; BI-RADS 2. Doesn't appear to have had any further imaging or workup since then. Family hx of breast ca in one maternal aunt & ovarian cancer in another maternal aunt noted. Diffuse LAD, vickie of L axilla noted on CT, raising c/f malignancy. Imaging reviewed by radiology who advised dedicated L axillary US (R axilla findings stable and breast US unreliable given  anasarca).   - Malignancy workup as above    ESRD (end stage renal disease) on dialysis  Hx of end-stage CKD (2/2 diabetic & hypertensive nephropathy). Has been on HD (M/W/F) since 2021. On admission, Cr 4.5, BUN 45, bicarb 20, & Na 134, (all c/w baseline). No uremia or notable electrolyte derangements. Pt with diffuse anasarca along w/ b/l LE lymphedema, requiring 3L NC (also all c/w baseline). No acute concerns.  - Nephrology consulted; appreciate recs  - Continued HD for clearance & volume removal   - Epo MWF w/ HD  - Monitor RFP  - Minimize nephrotoxic agents as able & renally-dose meds    Lymphedema  Hx of chronic BLE lymphedema (L>R) at baseline which contributes to pt's difficulty with mobility & recent cellulitis.   - Volume removal via HD  - Wound care  - Elevate as much as tolerated    Leukocytosis  WBC 22.8 on admission w/ neutrophil predominance. Pt afebrile and w/o any other SIRS. No focal s/sx. Recent cellulitis, but stable w/o active flare/infx appearance. Unable to collect UA d/t ESRD. CXR w/o focal consolidation. CRP & procal elevated but ESR nml. CT C/A/P w/ diffuse LAD w/ concern for malignancy; no infectious focus identified though. WBC remained low 20s throughout hospitalization. Suspect leukocytosis reactive iso malignancy.   - Defer abx  - Monitor CBC & fever curve  - Malignancy workup as above    Heart failure with preserved ejection fraction  Hx of HFpEF per 2020 TTE. Although respiratory status stable/at baseline, repeat TTE obtained iso acute illness & diffuse/severe anasarca. TTE (10/30/24) w/ EF 60-65% w/ septal flattening c/w volume overload, biatrial dilation, severe TR, moderate pulmonary HTN (PASP 51mmHg) & CVP 15mmHg. BNP 1459.   - Volume removal via HD  - Deferring lasix as pt anuric  - Low sodium diet w/ 1.8L fluid restriction     History of cerebrovascular accident (CVA) with residual deficit  Suffered CVA of R posterior frontal lobe in July 2024 w/ residual L-sided weakness  which has impaired mobility. No acute neuro concerns/focal findings on current admission.   - Holding home DAPT 2/2 anemia/coagulopathy   - Holding home statin i/s/o liver failure   - PT/OT    Impaired mobility  Sacral decubitus ulcer  Due to lymphedema & stroke earlier this year. Per 7/17/24 discharge summary, pt was able to ambulate w/ walker (but dragging L foot) and was discharge home w/ outpatient therapy. Pt seems to have had progressive decline in mobility since then with pt's boyfriend providing significant assistance w/ mobility & ADLs. Pt seemingly largely bedbound w/ chronic sacral ulcer noted.   - Volume/lymphedema mgmt as above  - Wound care  - Frequent turns  - PT/OT     Essential hypertension  Hx of essential & renovascular HTN. Low-normotensive on arrival to OSH (/43-87) but otherwise HDS.   - Holding home minoxidil & olmesartan  - Resume meds as needed    Type 2 diabetes mellitus, with long-term current use of insulin  Hx of IDDM2 on home regimen of linagliptin, aspart 10u BID, l& antus 70u QHS; last A1c 5.6% (July 2024; improved from 8.7 in 2021). Serum glucose 83 on admission w/ repeat A1c of 4.5%. Likely decreased insulin needs iso ongoing HD.   - Holding home linagliptin while inpatient  - LD-SSI  - CBG AC & HS  - Hypoglycemic protocol     Morbid obesity  Weight 130kg on admission (BMI 46.26 kg/m²). Morbid obesity complicates all aspects of disease management from diagnostic modalities to treatment.   - Weight loss encouraged    Chronic hypoxic respiratory failure  Pt has apparently been on 3L NC at home since 2022. No known underlying COPD or other lung dx. Suspect CHRF 2/2 chronic volume overload iso ESRD & HFpEF, along w/ likely OHS/MELANY.  - Volume mgmt as above   - Supplemental O2 PRN for goal SpO2 > 92%    Gout  Documented hx of gout (uric acid as high as 16 years ago). Pt endorsing L heel pain on admission; uric acid 8.6. Allopurinol started. XR noted extensive soft tissue swelling  (c/w known lymphedema & anasarca) along w/ calcaneal enthesophyte at plantar fascia insertion. Suspect pt's foot pain is more likely 2/2 enthesophyte than acute gout.   - Holding further allopurinol for now    GERD (gastroesophageal reflux disease)  Hx of gastritis per chart review. No EGDs on file. Asymptomatic on admission.   - Continue home PPI at increased BID dose  - PRN Maalox, antiemetics, etc.       VTE Risk Mitigation (From admission, onward)           Ordered     IP VTE HIGH RISK PATIENT  Once         10/30/24 0332     Place sequential compression device  Until discontinued         10/30/24 0051                    Discharge Planning   SHASHA: 11/12/2024     Code Status: Full Code   Is the patient medically ready for discharge?:     Reason for patient still in hospital (select all that apply): Patient trending condition, Laboratory test, Imaging, Consult recommendations, and Pending disposition  Discharge Plan A: Rehab ([facility], [facility] Phone: [phone])   Discharge Delays: None known at this time          Stephany Ordaz MD  Department of Hospital Medicine   [facility] - Stepdown Flex ([place]-14)

## 2024-11-09 NOTE — PLAN OF CARE
Problem: Adult Inpatient Plan of Care  Goal: Plan of Care Review  Outcome: Progressing  Goal: Patient-Specific Goal (Individualized)  Outcome: Progressing  Goal: Absence of Hospital-Acquired Illness or Injury  Outcome: Progressing  Goal: Optimal Comfort and Wellbeing  Outcome: Progressing  Goal: Readiness for Transition of Care  Outcome: Progressing     Problem: Diabetes Comorbidity  Goal: Blood Glucose Level Within Targeted Range  Outcome: Progressing     Problem: Acute Kidney Injury/Impairment  Goal: Fluid and Electrolyte Balance  Outcome: Progressing  Goal: Improved Oral Intake  Outcome: Progressing  Goal: Effective Renal Function  Outcome: Progressing     Problem: Wound  Goal: Optimal Coping  Outcome: Progressing  Goal: Optimal Functional Ability  Outcome: Progressing  Goal: Absence of Infection Signs and Symptoms  Outcome: Progressing  Goal: Improved Oral Intake  Outcome: Progressing  Goal: Optimal Pain Control and Function  Outcome: Progressing  Goal: Skin Health and Integrity  Outcome: Progressing  Goal: Optimal Wound Healing  Outcome: Progressing     Problem: Skin Injury Risk Increased  Goal: Skin Health and Integrity  Outcome: Progressing     Problem: Gastrointestinal Bleeding  Goal: Optimal Coping with Acute Illness  Outcome: Progressing  Goal: Hemostasis  Outcome: Progressing     Problem: Bariatric Environmental Safety  Goal: Safety Maintained with Care  Outcome: Progressing     Problem: Hemodialysis  Goal: Safe, Effective Therapy Delivery  Outcome: Progressing  Goal: Effective Tissue Perfusion  Outcome: Progressing  Goal: Absence of Infection Signs and Symptoms  Outcome: Progressing     Problem: Chronic Kidney Disease  Goal: Electrolyte Balance  Outcome: Progressing  Goal: Fluid Balance  Outcome: Progressing     Problem: Fall Injury Risk  Goal: Absence of Fall and Fall-Related Injury  Outcome: Progressing

## 2024-11-09 NOTE — ASSESSMENT & PLAN NOTE
CT C/A/P on arrival to [facility] noted numerous LN conglomerates &/or soft tissue masses around L axillary soft tissues, b/l inguinal regions (L>R), L supraclavicular soft tissues, & retroperitoneum. Findings highly c/f  neoplastic or lymphoproliferative process. While metastatic breast cancer considered (vickie given hx of breast mass as below) although lymphoma also considered. Malignancy could be contributing to pt's liver failure, vickie if causing obstruction &/or infiltration.   - Hematology-Oncology consulted; appreciate recs  - IR bx of L axillary LN planned for next week   - Pending core bx results, may pursue excisional biopsy  - If pathology shows lymphoma, may consider inpatient chemo; if breast cancer would defer therapy to outpatient

## 2024-11-09 NOTE — SUBJECTIVE & OBJECTIVE
Interval History: Doing alright this AM. LE edema/anasarca has progressed since my last exam a week ago; nephro to attempt addn'l volume removal today. Pt reports pain/discomfort, vickie of L side/hip vickie d/t difficulty self-positioning. No SOB, chest pain, etc. Pursuing MRCP as liver bx results w/ findings that could be c/w either infiltrative or obstructive process. Given potential for lymphoma, pursuing inpatient L axillary LN biopsy w/ IR early next week (attempting core bx first w/ possible excisional bx pending results)  Tbili & LFTs relatively stable from yesterday, but overall progressively uptrending from admission. Downtrend in H/H again (Hgb 6.5 this AM); no s/sx of active bleeding- another 1u pRBCs ordered. Wound care noted profuse, white, malodorous vaginal discharge; pt w/o sx- suspect BV. Vaginal swabs ordered.     Review of Systems   Constitutional:  Positive for fatigue. Negative for chills, diaphoresis and fever.   HENT:  Negative for congestion, rhinorrhea, sore throat and trouble swallowing.    Eyes:  Negative for photophobia and visual disturbance.   Respiratory:  Negative for cough, shortness of breath and wheezing.    Cardiovascular:  Positive for leg swelling. Negative for chest pain and palpitations.   Gastrointestinal:  Negative for abdominal pain, diarrhea, nausea and vomiting.   Genitourinary:  Negative for difficulty urinating, dysuria and hematuria.   Musculoskeletal:  Positive for gait problem. Negative for arthralgias, joint swelling and neck pain.   Skin:  Negative for rash and wound.   Neurological:  Positive for weakness. Negative for dizziness, light-headedness and headaches.   Psychiatric/Behavioral:  Negative for agitation, confusion and sleep disturbance.      Objective:     Vital Signs (Most Recent):  Temp: 98.3 °F (36.8 °C) (11/08/24 2327)  Pulse: 78 (11/08/24 2327)  Resp: 18 (11/08/24 2327)  BP: (!) 112/59 (11/08/24 2327)  SpO2: 99 % (11/08/24 2327) Vital Signs (24h  Range):  Temp:  [97.5 °F (36.4 °C)-98.3 °F (36.8 °C)] 98.3 °F (36.8 °C)  Pulse:  [66-84] 78  Resp:  [16-18] 18  SpO2:  [92 %-99 %] 99 %  BP: (103-146)/(55-77) 112/59     Weight: 130 kg (286 lb 9.6 oz)  Body mass index is 46.26 kg/m².    Intake/Output Summary (Last 24 hours) at 11/9/2024 0058  Last data filed at 11/8/2024 2200  Gross per 24 hour   Intake 532.5 ml   Output 3650 ml   Net -3117.5 ml         Physical Exam  Constitutional:       General: She is not in acute distress.     Appearance: She is morbidly obese. She is ill-appearing. She is not toxic-appearing or diaphoretic.   HENT:      Head: Normocephalic and atraumatic.      Mouth/Throat:      Mouth: Mucous membranes are moist.   Eyes:      Conjunctiva/sclera: Conjunctivae normal.      Comments: Swelling of R eyelid/periorbital area w/o vision changes, pain, discharge or erythema   Cardiovascular:      Rate and Rhythm: Normal rate and regular rhythm.      Heart sounds: Normal heart sounds.   Pulmonary:      Effort: Pulmonary effort is normal. No respiratory distress.      Breath sounds: Normal breath sounds. No wheezing, rhonchi or rales.      Comments: Auscultation limited by habitus  Abdominal:      General: Bowel sounds are normal. There is no distension.      Palpations: Abdomen is soft.      Tenderness: There is no abdominal tenderness. There is no guarding.   Musculoskeletal:      Right lower leg: Edema present.      Left lower leg: Edema present.      Comments: Extreme 4+ b/l LE pitting edema L>>R (c/w baseline). Diffuse anasarca.    Skin:     General: Skin is warm and dry.   Neurological:      Mental Status: She is alert and oriented to person, place, and time. Mental status is at baseline.   Psychiatric:         Mood and Affect: Mood normal.         Behavior: Behavior normal.           Significant Labs: All pertinent labs within the past 24 hours have been reviewed.    Significant Imaging: I have reviewed all pertinent imaging results/findings  within the past 24 hours.

## 2024-11-09 NOTE — SUBJECTIVE & OBJECTIVE
Interval History:   HD completed yesterday with 3L net fluid removal, tolerated well.  No complaints on exam except feeling tired.  No respiratory distress.  Labs non-emergent.    Review of patient's allergies indicates:   Allergen Reactions    Tramadol Itching and Nausea And Vomiting     Current Facility-Administered Medications   Medication Frequency    0.9%  NaCl infusion (for blood administration) Q24H PRN    acetaminophen tablet 650 mg Q4H PRN    albuterol-ipratropium 2.5 mg-0.5 mg/3 mL nebulizer solution 3 mL Q4H PRN    dextrose 10% bolus 125 mL 125 mL PRN    dextrose 10% bolus 250 mL 250 mL PRN    diphenhydrAMINE capsule 50 mg Q6H PRN    epoetin jitendra-epbx injection 13,000 Units Every Mon, Wed, Fri    glucagon (human recombinant) injection 1 mg PRN    glucose chewable tablet 16 g PRN    glucose chewable tablet 24 g PRN    hydrocortisone 2.5 % cream BID    insulin aspart U-100 pen 0-5 Units QID (AC + HS) PRN    lactulose 20 gram/30 mL solution Soln 10 g Q6H PRN    lactulose 20 gram/30 mL solution Soln 20 g BID    LORazepam tablet 1 mg Q15 Min PRN    miconazole 2 % cream BID    midodrine tablet 10 mg Once in dialysis    mupirocin 2 % ointment BID    naloxone 0.4 mg/mL injection 0.02 mg PRN    ondansetron injection 4 mg Q8H PRN    oxyCODONE immediate release tablet 5 mg Q6H PRN    pantoprazole EC tablet 40 mg BID    simethicone chewable tablet 80 mg QID PRN    sodium chloride 0.9% flush 10 mL Q12H PRN    traZODone tablet 100 mg Nightly PRN       Objective:     Vital Signs (Most Recent):  Temp: 98.7 °F (37.1 °C) (11/09/24 1152)  Pulse: 73 (11/09/24 1152)  Resp: 18 (11/09/24 1152)  BP: (!) 105/54 (11/09/24 1152)  SpO2: 99 % (11/09/24 1152) Vital Signs (24h Range):  Temp:  [97.5 °F (36.4 °C)-98.7 °F (37.1 °C)] 98.7 °F (37.1 °C)  Pulse:  [66-92] 73  Resp:  [18] 18  SpO2:  [92 %-100 %] 99 %  BP: (105-146)/(54-77) 105/54     Weight: 130 kg (286 lb 9.6 oz) (11/06/24 1451)  Body mass index is 46.26 kg/m².  Body surface  area is 2.46 meters squared.    I/O last 3 completed shifts:  In: 532.5 [Blood:532.5]  Out: 3650 [Other:3650]     Physical Exam  Vitals and nursing note reviewed.   Pulmonary:      Effort: Pulmonary effort is normal.   Musculoskeletal:      Right lower leg: Edema present.      Left lower leg: Edema present.   Neurological:      Mental Status: She is alert and oriented to person, place, and time.   Psychiatric:         Mood and Affect: Mood normal.          Significant Labs:  CBC:   Recent Labs   Lab 11/09/24 0256   WBC 19.82*   RBC 2.52*   HGB 7.5*   HCT 23.2*      MCV 92   MCH 29.8   MCHC 32.3     CMP:   Recent Labs   Lab 11/09/24 0256   GLU 89   CALCIUM 8.3*   ALBUMIN 1.6*   PROT 6.6   *   K 4.4   CO2 21*   CL 98   BUN 34*   CREATININE 4.2*   ALKPHOS 636*   ALT 66*   *   BILITOT 16.2*

## 2024-11-09 NOTE — ASSESSMENT & PLAN NOTE
Hx of IDDM2 on home regimen of linagliptin, aspart 10u BID, l& antus 70u QHS; last A1c 5.6% (July 2024; improved from 8.7 in 2021). Serum glucose 83 on admission w/ repeat A1c of 4.5%. Likely decreased insulin needs iso ongoing HD.   - Holding home linagliptin while inpatient  - LD-SSI  - CBG AC & HS  - Hypoglycemic protocol

## 2024-11-09 NOTE — ASSESSMENT & PLAN NOTE
Coagulopathy  On presentation, Hgb 6.4 (baseline ~10.5) w/ MCV 83, platelets 154, & INR 2.1. Pt denied melena, hematochezia, BRBPR, hematemesis or other sx of active bleeding. Fecal occult positive at OSH ED. Received 2u pRBCs & 2u FFP prior to transfer. Evaluated by GI on arrival who deferred scope for time being given absence of s/sx of brisk bleeding.  Required addn'l unit pRBCs & IV vitamin K shortly after transfer. Subsequent development of thrombocytopenia (eric 90s). Anemia & elevated INR stabilized/improved but certainly persisted. Continued to have no s/sx of active/brisk bleeding. Iron panel c/w anemia 2/2 chronic dx/inflammation. Suspect cytopenias & coagulopathy 2/2 liver failure w/ posisble contributing suspected malignancy.   - Mgmt/workup of liver failure & LAD as above/below  - Monitor CBC & for e/o active bleeding  - Transfuse PRN for goal Hgb > 7, plts > 50K & INR < 2   - S/p 2u FFP & 4u pRBCs to date   PAST MEDICAL HISTORY:  Alzheimer disease     Chronic obstructive pulmonary disease (COPD)     Congestive heart failure (CHF)

## 2024-11-09 NOTE — ASSESSMENT & PLAN NOTE
Hx of essential & renovascular HTN. Low-normotensive on arrival to OSH (/43-87) but otherwise HDS.   - Holding home minoxidil & olmesartan  - Resume meds as needed

## 2024-11-09 NOTE — ASSESSMENT & PLAN NOTE
Hx of gastritis per chart review. No EGDs on file. Asymptomatic on admission.   - Continue home PPI at increased BID dose  - PRN Maalox, antiemetics, etc.

## 2024-11-09 NOTE — PROGRESS NOTES
[facility] ([place] [id number])  Nephrology  Progress Note    Patient Name: Pilar Fernandez  MRN: [id number]  Admission Date: 10/29/2024  Hospital Length of Stay: 11 days  Attending Provider: Stephany Ordaz MD   Primary Care Physician: Lindsey Singletary FNP  Principal Problem:Acute liver failure    Subjective:     HPI: Pilar Fernandez is a 44 y.o. female with history of stroke (MRI July 2024 on DAPT), breast mass, gastritis, HTN, uncontrolled diabetes (A1C 8.7), ESRD on HD (MWF), anemia,  gout, lymphedema BLE (L > R), impaired mobility, stage II sacral decubitus,  chronic hypoxic respiratory failure (on 3 L nasal cannula at home) who presents as a transfer from [facility] ED for management of painless jaundice and acute anemia.       Patient presented to [facility] Emergency Department on October 29 with jaundice which family noticed yesterday associated with pruritus.  She reported no history of liver disease. She was recently hospitalized last week for treatment of leg cellulitis, no mention of abnormal liver function or jaundice at that time. She reported no hematemesis/coffee-ground emesis, melena or hematochezia. Stool was positive for occult blood. She had leukocytosis and elevated bilirubin.  INR was elevated at 2.1, and hemoglobin was 6.4 (recent baseline around 10). She received 80mg IV protonix, 2u FFP, 2u pRBC in the ED prior to transfer. CT A/P was noted to be a limited study. Mild hepatomegaly. Status post cholecystectomy. Splenomegaly. Left pleural effusion similar to prior study. Pericardial effusion that was present on earlier study. Marked soft tissue edema.      Last HD session Monday 10/28. She reports she dialyzes on MWF.  Nephrology consulted for management of ESRD.        Interval History:   HD completed yesterday with 3L net fluid removal, tolerated well.  No complaints on exam except feeling tired.  No respiratory distress.  Labs non-emergent.    Review of patient's allergies  indicates:   Allergen Reactions    Tramadol Itching and Nausea And Vomiting     Current Facility-Administered Medications   Medication Frequency    0.9%  NaCl infusion (for blood administration) Q24H PRN    acetaminophen tablet 650 mg Q4H PRN    albuterol-ipratropium 2.5 mg-0.5 mg/3 mL nebulizer solution 3 mL Q4H PRN    dextrose 10% bolus 125 mL 125 mL PRN    dextrose 10% bolus 250 mL 250 mL PRN    diphenhydrAMINE capsule 50 mg Q6H PRN    epoetin jitendra-epbx injection 13,000 Units Every Mon, Wed, Fri    glucagon (human recombinant) injection 1 mg PRN    glucose chewable tablet 16 g PRN    glucose chewable tablet 24 g PRN    hydrocortisone 2.5 % cream BID    insulin aspart U-100 pen 0-5 Units QID (AC + HS) PRN    lactulose 20 gram/30 mL solution Soln 10 g Q6H PRN    lactulose 20 gram/30 mL solution Soln 20 g BID    LORazepam tablet 1 mg Q15 Min PRN    miconazole 2 % cream BID    midodrine tablet 10 mg Once in dialysis    mupirocin 2 % ointment BID    naloxone 0.4 mg/mL injection 0.02 mg PRN    ondansetron injection 4 mg Q8H PRN    oxyCODONE immediate release tablet 5 mg Q6H PRN    pantoprazole EC tablet 40 mg BID    simethicone chewable tablet 80 mg QID PRN    sodium chloride 0.9% flush 10 mL Q12H PRN    traZODone tablet 100 mg Nightly PRN       Objective:     Vital Signs (Most Recent):  Temp: 98.7 °F (37.1 °C) (11/09/24 1152)  Pulse: 73 (11/09/24 1152)  Resp: 18 (11/09/24 1152)  BP: (!) 105/54 (11/09/24 1152)  SpO2: 99 % (11/09/24 1152) Vital Signs (24h Range):  Temp:  [97.5 °F (36.4 °C)-98.7 °F (37.1 °C)] 98.7 °F (37.1 °C)  Pulse:  [66-92] 73  Resp:  [18] 18  SpO2:  [92 %-100 %] 99 %  BP: (105-146)/(54-77) 105/54     Weight: 130 kg (286 lb 9.6 oz) (11/06/24 1451)  Body mass index is 46.26 kg/m².  Body surface area is 2.46 meters squared.    I/O last 3 completed shifts:  In: 532.5 [Blood:532.5]  Out: 3650 [Other:3650]     Physical Exam  Vitals and nursing note reviewed.   Pulmonary:      Effort: Pulmonary effort is  normal.   Musculoskeletal:      Right lower leg: Edema present.      Left lower leg: Edema present.   Neurological:      Mental Status: She is alert and oriented to person, place, and time.   Psychiatric:         Mood and Affect: Mood normal.          Significant Labs:  CBC:   Recent Labs   Lab 11/09/24  0256   WBC 19.82*   RBC 2.52*   HGB 7.5*   HCT 23.2*      MCV 92   MCH 29.8   MCHC 32.3     CMP:   Recent Labs   Lab 11/09/24  0256   GLU 89   CALCIUM 8.3*   ALBUMIN 1.6*   PROT 6.6   *   K 4.4   CO2 21*   CL 98   BUN 34*   CREATININE 4.2*   ALKPHOS 636*   ALT 66*   *   BILITOT 16.2*        Assessment/Plan:     Renal/  ESRD (end stage renal disease) on dialysis  44 year old female hx of ESRD on HD MWF presenting from outside hospital as transfer for acute anemia and painless jaundice. Last HD session 10/28. Nephrology consulted for ESRD management.     Nephrology History  -Outpatient HD unit: [facility]  -Nephrologist: ?  -HD tx days: MWF  -HD tx time: 4 hrs  -Last HD tx: Monday 10/28  -HD access: LUE AVF  -HD modality: iHD  -Residual urine:   -EDW:  110 kg    Plan/Recommendations  ESRD on HD:  -Continue MWF iHD schedule, will plan for additional HD session tonight/tomorrow for volume optimization  -Patient with diffuse body wall edema on imaging and chronic lymphedema, but no respiratory distress, on baseline O2, and no significant pulmonary edema on imaging. -Echo shows EF 60-65%, IVC 15 mm Hg.  -1 L daily fluid restriction  -Will continue iHD thrice weekly unless emergent indication arises  -Strict I&Os  -Pre & post HD weight  Anemia in ESRD  -Hgb goal 10-11, 7.5 today, stable  -Trend hgb, continue epo   Mineral Bone Disease in ESRD  -Resume home phos binders when diet advances  -Renal diet if not NPO        Bennett Romero, NP  Nephrology  [facility] ([place])

## 2024-11-09 NOTE — NURSING
3.5 hours HD tx completed. 3 L of fluid removed.    Patient tolerated well.    Blood returned. Lines flushed with NS. Needles pulled. Manual pressure held x 5 minutes until hemostasis was achieved.     One unit of RBCs given during dialysis.     Report given to DIANA Irby RN.

## 2024-11-09 NOTE — PLAN OF CARE
Problem: Adult Inpatient Plan of Care  Goal: Plan of Care Review  Outcome: Progressing  Goal: Patient-Specific Goal (Individualized)  Outcome: Progressing  Goal: Absence of Hospital-Acquired Illness or Injury  Outcome: Progressing  Goal: Optimal Comfort and Wellbeing  Outcome: Progressing  Goal: Readiness for Transition of Care  Outcome: Progressing     Problem: Diabetes Comorbidity  Goal: Blood Glucose Level Within Targeted Range  Outcome: Progressing     Problem: Acute Kidney Injury/Impairment  Goal: Fluid and Electrolyte Balance  Outcome: Progressing  Goal: Improved Oral Intake  Outcome: Progressing  Goal: Effective Renal Function  Outcome: Progressing     Problem: Wound  Goal: Optimal Coping  Outcome: Progressing

## 2024-11-09 NOTE — ASSESSMENT & PLAN NOTE
Hx of chronic BLE lymphedema (L>R) at baseline which contributes to pt's difficulty with mobility & recent cellulitis.   - Volume removal via HD  - Wound care  - Elevate as much as tolerated

## 2024-11-09 NOTE — ASSESSMENT & PLAN NOTE
44 year old female hx of ESRD on HD MWF presenting from outside hospital as transfer for acute anemia and painless jaundice. Last HD session 10/28. Nephrology consulted for ESRD management.     Nephrology History  -Outpatient HD unit: [facility]  -Nephrologist: ?  -HD tx days: MWF  -HD tx time: 4 hrs  -Last HD tx: Monday 10/28  -HD access: LUE AVF  -HD modality: iHD  -Residual urine:   -EDW:  110 kg    Plan/Recommendations  ESRD on HD:  -Continue MWF iHD schedule, will plan for additional HD session tonight/tomorrow for volume optimization  -Patient with diffuse body wall edema on imaging and chronic lymphedema, but no respiratory distress, on baseline O2, and no significant pulmonary edema on imaging. -Echo shows EF 60-65%, IVC 15 mm Hg.  -1 L daily fluid restriction  -Will continue iHD thrice weekly unless emergent indication arises  -Strict I&Os  -Pre & post HD weight  Anemia in ESRD  -Hgb goal 10-11, 7.5 today, stable  -Trend hgb, continue epo   Mineral Bone Disease in ESRD  -Resume home phos binders when diet advances  -Renal diet if not NPO

## 2024-11-09 NOTE — ASSESSMENT & PLAN NOTE
Sacral decubitus ulcer  Due to lymphedema & stroke earlier this year. Per 7/17/24 discharge summary, pt was able to ambulate w/ walker (but dragging L foot) and was discharge home w/ outpatient therapy. Pt seems to have had progressive decline in mobility since then with pt's boyfriend providing significant assistance w/ mobility & ADLs. Pt seemingly largely bedbound w/ chronic sacral ulcer noted.   - Volume/lymphedema mgmt as above  - Wound care  - Frequent turns  - PT/OT

## 2024-11-09 NOTE — ASSESSMENT & PLAN NOTE
Documented hx of gout (uric acid as high as 16 years ago). Pt endorsing L heel pain on admission; uric acid 8.6. Allopurinol started. XR noted extensive soft tissue swelling (c/w known lymphedema & anasarca) along w/ calcaneal enthesophyte at plantar fascia insertion. Suspect pt's foot pain is more likely 2/2 enthesophyte than acute gout.   - Holding further allopurinol for now

## 2024-11-09 NOTE — PROGRESS NOTES
[facility] - Stepdown [facility] ([address] Medicine  Progress Note    Patient Name: Pilar Fernandez  MRN: [id number]  Patient Class: IP- Inpatient   Admission Date: 10/29/2024  Length of Stay: 11 days  Attending Physician: Stephany Ordaz MD  Primary Care Provider: Lindsey Singletary FNP    Subjective:     Principal Problem: Acute liver failure    HPI:  Pilar Fernandez is a 44 y.o. female w/ PMHx of ESRD (on HD), T2DM, CHF, HTN, gastritis, severe BLE lymphedema (L>R), CHRF (on 3L @ home), prior CVA (July '24; on DAPT) w/ impaired mobility & sacral decubitus ulcer, gout, and known L breast mass, who presented to [facility] ED on 10/29/24 w/ painless jaundice w/ assoc pruritus. Pt reports diffuse pruritus for few weeks, with family noticing yellow discoloration of eyes on 10/28. Was previously admitted to [facility] for cellulitis (LLE vs LUE?), for which she received IV abx and was dc'ed 10/26 on clindamycin. Pt reports she had labs done during her [facility] stay, but there was nothing mentioned re: bilirubin or liver function at that time (lab reports from [facility] not available per Care Everywhere). Pt went to routine HD on 10/28 where she was told she had elevated bilirubin and was referred to [facility] ED for further evaluation.     On arrival to [facility] ED, pt afebrile w/ borderline/normotension (/43-87); otherwise HDS. Pt reports chronic lymphedema & leg/sacral wounds (w/ assoc pain) at baseline. Denied fevers, chills, N/V, melena, hematochezia, hematemesis, BRBPR, SOB, palpitations, chest or abdominal pain or other complaints. Initial labs notable for Tbili 11.7, AST 81, ALT 30, , lipase 57, albumin 2.0, bicarb 20, Na 134, Cr 4.5, BUN 45, WBC 22.8, Hgb 6.4, plts 154, INR 2.1, & lactate 1.6. Stool positive for occult blood. CT A/P w/o contrast noted anasarca w/ persistent cardiomegaly, small & improved pericardial effusion & small L pleural effusion; no acute GI or hepatic abnormalities noted.  Received IV zofran, Zosyn, IV protonix 80mg, IV fentanyl, along w/ 2u pRBC & 2u FFP. Given severe hyperbilirubinemia & anemia w/ e/o acute liver failure of unk etiology, pt transferred to [facility]- on 10/29/24 for HLOC.     Overview/Hospital Course:  On arrival to [facility], pt received dose of Zosyn & CTX given leukocytosis. Further abx held as low suspicion for infectious process & blood cx negative. GI consulted given c/f possible GI bleed; scope deferred given absence of overt bleeding. AES/Biliary consult deferred after team reviewed images and noted absence of biliary/ductal dilation. Hepatology consulted for evaluation/mgmt of acute liver failure & hyperbilirubinemia; serologic workup ordered. Liver US w/ doppler noted marked hepatosplenomegaly but otherwise nml w/ proper blood flow. Heme/Onc consulted d/t concern for possible hemolysis &/or underlying malignancy driving pt's presentation. Although Britt test positive, detectable haptoglobin made hemolysis unlikely. CT C/A/P showed notable for numerous enlarged LN conglomerates (&/or soft tissue masses) of the L axilla, inguinal regions (L > R), L supraclavicular region, & retroperitoneum- c/f neoplastic vs lymphoproliferative process. CT also noted diffuse anasarca, large pericardial effusion, cardiomegaly w/ pulmonary edema, & diffuse hepatosplenomegaly. Nephrology consulted for continuation of HD w/ volume removal. Midodrine initiated for post-dialysis hypotension & home BP meds held. Required addn'l unit of pRBCs on 10/31 for recurrent anemia & IV vitamin K on 11/1 for elevated INR. Bilirubin & LFTs continued to uptrend. Developed hepatic encephalopathy w/ ammonia lvl of 111, which improved with titration of pt's bowel regimen (switched from miralax to lactulose). BP stabilized and midodrine d/c'ed. After multiple postponements due to limited anesthesia staffing, liver biopsy done on 11/6. Pathology showed cholestatic hepatitis & features of hepatic venous  outflow obstruction. Bilateral breast US recommended per Heme/Onc, however upon radiology's review of imaging, a dedicated L axilla US obtained instead (as pt's anasarca would make interpretation of breast US difficult). US of L axilla noted abnormal axillary adenopathy w/ at least 6 abnormal LNs (BI-RADS 4); biopsy advised d/t c/f metastatic dx vs lymphoma.       Interval History: Received 1u pRBCs yesterday along w/ HD w/ removal of 3L fluid. NAEON. Labs & VS stable this AM. Pt feeling alright today- reports general discomfort with diminished sensation in her legs/feet (suspected to be 2/2 prolonged positioning) and really wants to get up if possible. No SOB, chest pain, or other complaints. MRCP pending. Plan for L axillary LN biopsy per IR on Monday. Further mgmt pending results. Hepatology & Heme/Onc following, along w/ Nephrology.     Review of Systems   Constitutional:  Negative for chills, diaphoresis and fever.   HENT:  Negative for congestion, rhinorrhea, sore throat and trouble swallowing.    Eyes:  Negative for photophobia and visual disturbance.   Respiratory:  Negative for cough, shortness of breath and wheezing.    Cardiovascular:  Positive for leg swelling. Negative for chest pain and palpitations.   Gastrointestinal:  Negative for abdominal pain, diarrhea, nausea and vomiting.   Genitourinary:  Negative for difficulty urinating, dysuria and hematuria.   Musculoskeletal:  Positive for gait problem. Negative for arthralgias, joint swelling and neck pain.   Skin:  Negative for rash and wound.   Neurological:  Positive for weakness. Negative for dizziness, light-headedness and headaches.   Psychiatric/Behavioral:  Negative for agitation, confusion and sleep disturbance.      Objective:     Vital Signs (Most Recent):  Temp: 98.7 °F (37.1 °C) (11/09/24 1152)  Pulse: 73 (11/09/24 1152)  Resp: 18 (11/09/24 1152)  BP: (!) 105/54 (11/09/24 1152)  SpO2: 99 % (11/09/24 1152) Vital Signs (24h Range):  Temp:   [97.5 °F (36.4 °C)-98.7 °F (37.1 °C)] 98.7 °F (37.1 °C)  Pulse:  [66-92] 73  Resp:  [18] 18  SpO2:  [92 %-100 %] 99 %  BP: (105-146)/(54-77) 105/54     Weight: 130 kg (286 lb 9.6 oz)  Body mass index is 46.26 kg/m².    Intake/Output Summary (Last 24 hours) at 11/9/2024 1243  Last data filed at 11/8/2024 2200  Gross per 24 hour   Intake 532.5 ml   Output 3650 ml   Net -3117.5 ml         Physical Exam  Constitutional:       General: She is not in acute distress.     Appearance: She is morbidly obese. She is ill-appearing. She is not toxic-appearing or diaphoretic.   HENT:      Head: Normocephalic and atraumatic.      Mouth/Throat:      Mouth: Mucous membranes are moist.   Eyes:      Conjunctiva/sclera: Conjunctivae normal.      Comments: Swelling of R eyelid/periorbital area w/o vision changes, pain, discharge or erythema   Cardiovascular:      Rate and Rhythm: Normal rate and regular rhythm.      Heart sounds: Normal heart sounds.   Pulmonary:      Effort: Pulmonary effort is normal. No respiratory distress.      Breath sounds: Normal breath sounds. No wheezing, rhonchi or rales.      Comments: Auscultation limited by habitus  Abdominal:      General: Bowel sounds are normal. There is no distension.      Palpations: Abdomen is soft.      Tenderness: There is no abdominal tenderness. There is no guarding.   Musculoskeletal:      Right lower leg: Edema present.      Left lower leg: Edema present.      Comments: Extreme 4+ b/l LE pitting edema L>>R (c/w baseline). Diffuse anasarca.    Lymphadenopathy:      Upper Body:      Left upper body: Axillary adenopathy present.   Skin:     General: Skin is warm and dry.   Neurological:      Mental Status: She is alert and oriented to person, place, and time. Mental status is at baseline.   Psychiatric:         Mood and Affect: Mood normal.         Behavior: Behavior normal.           Significant Labs: All pertinent labs within the past 24 hours have been reviewed.    Significant  Imaging: I have reviewed all pertinent imaging results/findings within the past 24 hours.    Assessment/Plan:      * Acute liver failure  Direct hyperbilirubinemia w/ painless jaundice  Coagulopathy  Hepatic encephalopathy - resolved  Presented w/ hyperbilirubinemia per outpatient/HD labs w/ acute jaundice & assoc pruritus. On arrival, Tbili 11.7, AST 81, ALT 30, , along w/ INR 2.1 & Hgb 6.4 s/o acute liver failure w/ synthetic dysfunction. Considered hemolytic process, however less likely w/ haptoglobin of 115. CT noted hepatomegaly w/ heterogenous attenuation & few scattered sub-cm hypodensities of unk significance; galbladder surgically absent. Liver US w/ Doppler w/ marked HSM, but normal blood flow and no biliary/ductal dilation. Given absence of dilation, AES deferred consult for time being, and advised primary Hepatology involvement. Etiology unclear- decompensated cirrhosis (2/2 MASLD? W/ MELD 36) considered vs DILI (2/2 clindamycin) vs other infiltrative process (given extensive LAD). Serological workup w/ elevated A1AT (300), abnormal IgG profile, & increased factor VIII activity; AFP, PETH, BRIAN, PETH, ASMA, AMA & ceruloplasmin all nml. Developed hepatic encephalopathy (w/ ammonia 111) which resolved following titration of bowel regimen. 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 Pt's Tbili & LFTs continued to uptrend.   - Hepatology following; appreciate recs  - MRCP pending   - Not a transplant candidate given functional status & comorbidities   - Monitor CMP, INR, & CBC  - Minimize hepatotoxic agents as able   - Bowel regimen; titrate PRN     Diffuse lymphadenopathy  CT C/A/P on arrival to [facility] noted numerous LN conglomerates &/or soft tissue masses around L axillary soft tissues, b/l inguinal regions (L>R), L supraclavicular soft tissues, & retroperitoneum. Findings  highly c/f  neoplastic or lymphoproliferative process. While metastatic breast cancer considered (vickie given hx of breast mass as below) although lymphoma also considered. Malignancy could be contributing to pt's liver failure, vickie if causing obstruction &/or infiltration. Dedicated L axillary US noted abnormal L axillary LAD w/ at least 6 abnormal LN w/ suspicious appearance (BI-RADS 4); biopsy (w/ pathology & flow cytometry) advised.   - Hematology-Oncology consulted; appreciate recs  - IR bx of L axillary LN planned for 11/11   - Send for both pathology & cytometry   - Pending core bx results, may pursue excisional biopsy  - If pathology shows lymphoma, may pursue inpatient chemo; if breast cancer would defer therapy to outpatient     Acute anemia  Coagulopathy  Thrombocytopenia - resolved  On presentation, Hgb 6.4 (baseline ~10.5) w/ MCV 83, platelets 154, & INR 2.1. Pt denied melena, hematochezia, BRBPR, hematemesis or other sx of active bleeding. Fecal occult positive at OSH ED. Received 2u pRBCs & 2u FFP prior to transfer. Evaluated by GI on arrival who deferred scope for time being given absence of s/sx of brisk bleeding.  Required addn'l unit pRBCs & IV vitamin K shortly after transfer. Subsequent development of thrombocytopenia (eric 90s). Anemia & elevated INR stabilized/improved but certainly persisted. Continued to have no s/sx of active/brisk bleeding. Iron panel c/w anemia 2/2 chronic dx/inflammation. Suspect cytopenias & coagulopathy 2/2 liver failure w/ possible contributing suspected malignancy.   - Mgmt/workup of liver failure & LAD as above/below  - Monitor CBC & for e/o active bleeding  - Transfuse PRN for goal Hgb > 7, plts > 50K & INR < 2   - S/p 2u FFP & 4u pRBCs to date (including transfusions at OSH)   - S/p IV vitamin K     Mass of breast  Has had known mass of L breast for years. Excisional biopsy (Dec '22) noted complex sclerosing lesion w/ usual ductal hyperplasia & apocrine metaplasia;  no atypica or malignancy on preliminary evaluation. Per further review by [facility], findings c/w ADH w/ assoc necrosis & proliferative fibrocystic changes. US (Jan 2023) w/ post-surgical fluid collection; BI-RADS 2. Doesn't appear to have had any further imaging or workup since then. Family hx of breast ca in one maternal aunt & ovarian cancer in another maternal aunt noted. Diffuse LAD, vickie of L axilla noted on CT, raising c/f malignancy. Imaging reviewed by radiology who advised dedicated L axillary US (R axilla findings stable and breast US unreliable given anasarca).   - Malignancy workup as above    ESRD (end stage renal disease) on dialysis  Hx of end-stage CKD (2/2 diabetic & hypertensive nephropathy). Has been on HD (M/W/F) since 2021. On admission, Cr 4.5, BUN 45, bicarb 20, & Na 134, (all c/w baseline). No uremia or notable electrolyte derangements. Pt w/ b/l LE lymphedema, requiring 3L NC (both c/w baseline). Diffuse anasarca noted which seemingly is above baseline.   - Nephrology consulted; appreciate recs  - Continued HD for clearance & volume removal   - Epo MWF w/ HD  - Monitor RFP  - Minimize nephrotoxic agents as able & renally-dose meds    Lymphedema  Hx of chronic BLE lymphedema (L>R) at baseline which contributes to pt's difficulty with mobility & recent cellulitis.   - Volume removal via HD  - Wound care  - Elevate as much as tolerated    Leukocytosis  WBC 22.8 on admission w/ neutrophil predominance. Pt afebrile and w/o any other SIRS. No focal s/sx. Recent cellulitis, but stable w/o active flare/infx appearance. Unable to collect UA d/t ESRD. CXR w/o focal consolidation. CRP & procal elevated but ESR nml. CT C/A/P w/ diffuse LAD w/ concern for malignancy; no infectious focus identified though. WBC remained low 20s throughout hospitalization. Suspect leukocytosis reactive iso malignancy.   - Defer abx  - Monitor CBC & fever curve  - Malignancy workup as above    Heart failure with preserved  ejection fraction  Hx of HFpEF per 2020 TTE. Although respiratory status stable/at baseline, repeat TTE obtained iso acute illness & diffuse/severe anasarca. TTE (10/30/24) w/ EF 60-65% w/ septal flattening c/w volume overload, biatrial dilation, severe TR, moderate pulmonary HTN (PASP 51mmHg) & CVP 15mmHg. BNP 1459.   - Volume removal via HD  - Deferring lasix as pt anuric  - Low sodium diet w/ 1.2L fluid restriction     History of cerebrovascular accident (CVA) with residual deficit  Suffered CVA of R posterior frontal lobe in July 2024 w/ residual L-sided weakness which has impaired mobility. No acute neuro concerns/focal findings on current admission.   - Holding home DAPT 2/2 anemia/coagulopathy   - Holding home statin i/s/o liver failure   - PT/OT    Impaired mobility  Sacral decubitus ulcer  Due to lymphedema & stroke earlier this year. Per 7/17/24 discharge summary, pt was able to ambulate w/ walker (but dragging L foot) and was discharge home w/ outpatient therapy. Pt seems to have had progressive decline in mobility since then with pt's boyfriend providing significant assistance w/ mobility & ADLs. Pt seemingly largely bedbound w/ chronic sacral ulcer noted.   - Volume/lymphedema mgmt as above  - Wound care  - Frequent turns  - PT/OT     Essential hypertension  Hx of essential & renovascular HTN. Low-normotensive on arrival to OSH (/43-87) but otherwise HDS.   - Holding home minoxidil & olmesartan  - Resume meds as needed    Type 2 diabetes mellitus, with long-term current use of insulin  Hx of IDDM2 on home regimen of linagliptin, aspart 10u BID, l& antus 70u QHS; last A1c 5.6% (July 2024; improved from 8.7 in 2021). Serum glucose 83 on admission w/ repeat A1c of 4.5%. Likely decreased insulin needs iso ongoing HD.   - Holding home linagliptin while inpatient  - LD-SSI  - CBG AC & HS  - Hypoglycemic protocol     Morbid obesity  Weight 130kg on admission (BMI 46.26 kg/m²). Morbid obesity complicates  all aspects of disease management from diagnostic modalities to treatment.   - Weight loss encouraged    Chronic hypoxic respiratory failure  Pt has apparently been on 3L NC at home since 2022. No known underlying COPD or other lung dx. Suspect CHRF 2/2 chronic volume overload iso ESRD & HFpEF, along w/ likely OHS/MELANY.  - Volume mgmt as above   - Supplemental O2 PRN for goal SpO2 > 92%    Gout  Documented hx of gout (uric acid as high as 16 years ago). Pt endorsing L heel pain on admission; uric acid 8.6. Allopurinol started. XR noted extensive soft tissue swelling (c/w known lymphedema & anasarca) along w/ calcaneal enthesophyte at plantar fascia insertion. Suspect pt's foot pain is more likely 2/2 enthesophyte than acute gout.   - Holding further allopurinol for now    GERD (gastroesophageal reflux disease)  Hx of gastritis per chart review. No EGDs on file. Asymptomatic on admission.   - Continue home PPI at increased BID dose  - PRN Maalox, antiemetics, etc.       VTE Risk Mitigation (From admission, onward)           Ordered     IP VTE HIGH RISK PATIENT  Once         10/30/24 0332     Place sequential compression device  Until discontinued         10/30/24 0051                    Discharge Planning   SHASHA: 11/12/2024     Code Status: Full Code   Is the patient medically ready for discharge?:     Reason for patient still in hospital (select all that apply): Patient trending condition, Laboratory test, Imaging, Consult recommendations, and Pending disposition  Discharge Plan A: Rehab ([facility], [facility] Phone: [phone])   Discharge Delays: None known at this time          Stephany Ordaz MD  Department of Hospital Medicine   [facility] - Stepdown Flex ([place]-14)

## 2024-11-09 NOTE — ASSESSMENT & PLAN NOTE
WBC 22.8 on admission w/ neutrophil predominance. Pt afebrile and w/o any other SIRS. No focal s/sx. Recent cellulitis, but stable w/o active flare/infx appearance. Unable to collect UA d/t ESRD. CXR w/o focal consolidation. CRP & procal elevated but ESR nml. CT C/A/P w/ diffuse LAD w/ concern for malignancy; no infectious focus identified though. WBC remained low 20s throughout hospitalization. Suspect leukocytosis reactive iso malignancy.   - Defer abx  - Monitor CBC & fever curve  - Malignancy workup as above

## 2024-11-09 NOTE — SUBJECTIVE & OBJECTIVE
Interval History:    Awaiting SNF.    Review of Systems   Constitutional:  Negative for chills, diaphoresis and fever.   Respiratory:  Negative for cough, shortness of breath and wheezing.    Cardiovascular:  Positive for leg swelling.   Musculoskeletal:  Positive for gait problem. Negative for joint swelling and neck pain.   Neurological:  Positive for weakness. Negative for dizziness, light-headedness and headaches.     Objective:     Vital Signs (Most Recent):  Temp: 98.7 °F (37.1 °C) (11/09/24 1152)  Pulse: 73 (11/09/24 1152)  Resp: 18 (11/09/24 1152)  BP: (!) 105/54 (11/09/24 1152)  SpO2: 99 % (11/09/24 1152) Vital Signs (24h Range):  Temp:  [97.5 °F (36.4 °C)-98.7 °F (37.1 °C)] 98.7 °F (37.1 °C)  Pulse:  [66-92] 73  Resp:  [18] 18  SpO2:  [92 %-100 %] 99 %  BP: (105-146)/(54-77) 105/54     Weight: 130 kg (286 lb 9.6 oz)  Body mass index is 46.26 kg/m².    Intake/Output Summary (Last 24 hours) at 11/9/2024 1243  Last data filed at 11/8/2024 2200  Gross per 24 hour   Intake 532.5 ml   Output 3650 ml   Net -3117.5 ml         Physical Exam  Constitutional:       General: She is not in acute distress.     Appearance: She is morbidly obese. She is not toxic-appearing or diaphoretic.   Pulmonary:      Effort: Pulmonary effort is normal. No respiratory distress.      Breath sounds: Normal breath sounds.   Musculoskeletal:      Right lower leg: Edema present.      Left lower leg: Edema present.   Neurological:      Mental Status: She is alert and oriented to person, place, and time. Mental status is at baseline.   Psychiatric:         Attention and Perception: Attention normal.         Behavior: Behavior normal. Behavior is not agitated or aggressive.           Significant Labs: All pertinent labs within the past 24 hours have been reviewed.    Significant Imaging: I have reviewed all pertinent imaging results/findings within the past 24 hours.  IR Transjugular Liver Biopsy 11/06/24: Transjugular liver biopsy with 3  Refill birth control Annual sched 7/10/23 core biopsy samples obtained.   The corrected sinusoidal pressure (wedged hepatic vein pressure minus free hepatic vein pressure) is 1 mmHg.  Gradient may be artificially lower due to the presence of veno-venous collaterals throughout the liver.   Elevated central venous pressures as above.  Right atrial pressure measures 18 mm Hg.   Venography as above shows no significant stenosis but significant veno-venous collaterals throughout the liver.   US Soft Tissue Axilla, Left 11/08/24: FINDINGS:   There is left axillary adenopathy with at least 6 abnormal lymph nodes.  These lymph nodes are enlarged with cortical thickening and loss of fatty hilum.  For example 1 abnormal left axillary lymph node measures 7.1 x 1.9 x 5.6 cm, and a 2nd abnormal lymph node measures 5.0 x 1.9 x 4.9 cm.  These abnormal lymph nodes correspond to the left axillary adenopathy visible by recent CT chest October 30, 2024.   Impression:  Abnormal left axillary adenopathy with at least 6 abnormal lymph nodes.  These correspond to the adenopathy visible by CT chest October 30, 2024.  BI-RADS 4: Suspicious.  Recommend percutaneous or surgical biopsy, as clinically indicated.  Differential considerations include metastatic disease and lymphoma, and samples should be sent for both pathology and flow cytometry.   MRI MRCP Abdomen WO Contrast 3D WO Independent WS XPD 11/09/24: FINDINGS:   Exam quality is significantly degraded by patient body habitus.  MRCP images are nondiagnostic.   Small left pleural effusion.  Large pericardial effusion, similar to prior CT.   Liver is enlarged, measuring 27.0 cm.  The gallbladder is surgically absent.  The spleen is enlarged measuring 16.8 cm.  Previously noted cystic pancreatic lesion is not well visualized.   No aneurysm.   Diffuse subcutaneous edema.   Impression:  Significantly limited examination.   Hepatosplenomegaly.   IR Biopsy Lymph Node 11/11/24: Image-guided biopsy of left axillary lymphadenopathy.

## 2024-11-09 NOTE — ASSESSMENT & PLAN NOTE
Weight 130kg on admission (BMI 46.26 kg/m²). Morbid obesity complicates all aspects of disease management from diagnostic modalities to treatment.   - Weight loss encouraged

## 2024-11-10 LAB
ALBUMIN SERPL BCP-MCNC: 1.5 G/DL (ref 3.5–5.2)
ALP SERPL-CCNC: 695 U/L (ref 40–150)
ALT SERPL W/O P-5'-P-CCNC: 64 U/L (ref 10–44)
ANION GAP SERPL CALC-SCNC: 15 MMOL/L (ref 8–16)
AST SERPL-CCNC: 162 U/L (ref 10–40)
BACTERIAL VAGINOSIS DNA: NOT DETECTED
BASOPHILS # BLD AUTO: 0.08 K/UL (ref 0–0.2)
BASOPHILS NFR BLD: 0.4 % (ref 0–1.9)
BILIRUB SERPL-MCNC: 17.1 MG/DL (ref 0.1–1)
BUN SERPL-MCNC: 40 MG/DL (ref 6–20)
C TRACH DNA SPEC QL NAA+PROBE: NOT DETECTED
CALCIUM SERPL-MCNC: 8.5 MG/DL (ref 8.7–10.5)
CANDIDA GLABRATA/KRUSEI: DETECTED
CANDIDA RRNA VAG QL PROBE: DETECTED
CHLORIDE SERPL-SCNC: 95 MMOL/L (ref 95–110)
CO2 SERPL-SCNC: 21 MMOL/L (ref 23–29)
CREAT SERPL-MCNC: 5.2 MG/DL (ref 0.5–1.4)
DIFFERENTIAL METHOD BLD: ABNORMAL
EOSINOPHIL # BLD AUTO: 0.3 K/UL (ref 0–0.5)
EOSINOPHIL NFR BLD: 1.5 % (ref 0–8)
ERYTHROCYTE [DISTWIDTH] IN BLOOD BY AUTOMATED COUNT: 23.7 % (ref 11.5–14.5)
EST. GFR  (NO RACE VARIABLE): 9.8 ML/MIN/1.73 M^2
GLUCOSE SERPL-MCNC: 94 MG/DL (ref 70–110)
HCT VFR BLD AUTO: 22.6 % (ref 37–48.5)
HGB BLD-MCNC: 7.4 G/DL (ref 12–16)
IMM GRANULOCYTES # BLD AUTO: 0.74 K/UL (ref 0–0.04)
IMM GRANULOCYTES NFR BLD AUTO: 3.8 % (ref 0–0.5)
INR PPP: 1.4 (ref 0.8–1.2)
LYMPHOCYTES # BLD AUTO: 0.8 K/UL (ref 1–4.8)
LYMPHOCYTES NFR BLD: 3.8 % (ref 18–48)
MAGNESIUM SERPL-MCNC: 2 MG/DL (ref 1.6–2.6)
MCH RBC QN AUTO: 30.3 PG (ref 27–31)
MCHC RBC AUTO-ENTMCNC: 32.7 G/DL (ref 32–36)
MCV RBC AUTO: 93 FL (ref 82–98)
MONOCYTES # BLD AUTO: 1.5 K/UL (ref 0.3–1)
MONOCYTES NFR BLD: 7.4 % (ref 4–15)
N GONORRHOEA DNA SPEC QL NAA+PROBE: NOT DETECTED
NEUTROPHILS # BLD AUTO: 16.4 K/UL (ref 1.8–7.7)
NEUTROPHILS NFR BLD: 83.1 % (ref 38–73)
NRBC BLD-RTO: 3 /100 WBC
PHOSPHATE SERPL-MCNC: 4.2 MG/DL (ref 2.7–4.5)
PLATELET # BLD AUTO: 268 K/UL (ref 150–450)
PMV BLD AUTO: 12.4 FL (ref 9.2–12.9)
POCT GLUCOSE: 116 MG/DL (ref 70–110)
POCT GLUCOSE: 120 MG/DL (ref 70–110)
POCT GLUCOSE: 122 MG/DL (ref 70–110)
POTASSIUM SERPL-SCNC: 4.5 MMOL/L (ref 3.5–5.1)
PROT SERPL-MCNC: 6.5 G/DL (ref 6–8.4)
PROTHROMBIN TIME: 15.4 SEC (ref 9–12.5)
RBC # BLD AUTO: 2.44 M/UL (ref 4–5.4)
SODIUM SERPL-SCNC: 131 MMOL/L (ref 136–145)
TRICHOMONAS VAGINALIS: NOT DETECTED
WBC # BLD AUTO: 19.72 K/UL (ref 3.9–12.7)

## 2024-11-10 PROCEDURE — 85025 COMPLETE CBC W/AUTO DIFF WBC: CPT | Performed by: HOSPITALIST

## 2024-11-10 PROCEDURE — 63600175 PHARM REV CODE 636 W HCPCS: Performed by: HOSPITALIST

## 2024-11-10 PROCEDURE — 85610 PROTHROMBIN TIME: CPT | Performed by: STUDENT IN AN ORGANIZED HEALTH CARE EDUCATION/TRAINING PROGRAM

## 2024-11-10 PROCEDURE — 36415 COLL VENOUS BLD VENIPUNCTURE: CPT | Performed by: STUDENT IN AN ORGANIZED HEALTH CARE EDUCATION/TRAINING PROGRAM

## 2024-11-10 PROCEDURE — 20600001 HC STEP DOWN PRIVATE ROOM

## 2024-11-10 PROCEDURE — 25000003 PHARM REV CODE 250: Performed by: STUDENT IN AN ORGANIZED HEALTH CARE EDUCATION/TRAINING PROGRAM

## 2024-11-10 PROCEDURE — 80053 COMPREHEN METABOLIC PANEL: CPT | Performed by: STUDENT IN AN ORGANIZED HEALTH CARE EDUCATION/TRAINING PROGRAM

## 2024-11-10 PROCEDURE — 80100014 HC HEMODIALYSIS 1:1

## 2024-11-10 PROCEDURE — 84100 ASSAY OF PHOSPHORUS: CPT | Performed by: STUDENT IN AN ORGANIZED HEALTH CARE EDUCATION/TRAINING PROGRAM

## 2024-11-10 PROCEDURE — 83735 ASSAY OF MAGNESIUM: CPT | Performed by: STUDENT IN AN ORGANIZED HEALTH CARE EDUCATION/TRAINING PROGRAM

## 2024-11-10 RX ORDER — URSODIOL 300 MG/1
300 CAPSULE ORAL 3 TIMES DAILY
Status: DISCONTINUED | OUTPATIENT
Start: 2024-11-10 | End: 2024-11-16 | Stop reason: HOSPADM

## 2024-11-10 RX ADMIN — URSODIOL 300 MG: 300 CAPSULE ORAL at 09:11

## 2024-11-10 RX ADMIN — URSODIOL 300 MG: 300 CAPSULE ORAL at 12:11

## 2024-11-10 RX ADMIN — HYDROCORTISONE: 25 CREAM TOPICAL at 12:11

## 2024-11-10 RX ADMIN — ONDANSETRON 4 MG: 2 INJECTION INTRAMUSCULAR; INTRAVENOUS at 09:11

## 2024-11-10 RX ADMIN — PANTOPRAZOLE SODIUM 40 MG: 40 TABLET, DELAYED RELEASE ORAL at 12:11

## 2024-11-10 RX ADMIN — HYDROCORTISONE: 25 CREAM TOPICAL at 09:11

## 2024-11-10 RX ADMIN — PANTOPRAZOLE SODIUM 40 MG: 40 TABLET, DELAYED RELEASE ORAL at 09:11

## 2024-11-10 RX ADMIN — URSODIOL 300 MG: 300 CAPSULE ORAL at 05:11

## 2024-11-10 NOTE — NURSING
Pt AAOx4, VSS, O2 sat above 95% on 3L. Refused lactulose. No BM this shift. Educated on use of lactulose and it's importance. 4 eyes completed. Moisture associated dermatitis present. Repositioned as requested. Teaching reinforced fluid restriction. Bed locked and lowered. Call bell in reach. Suction at bedside. Safety measures maintained    Hemodialysis completed with 3L fluid removal.

## 2024-11-10 NOTE — PROGRESS NOTES
[facility] - Stepdown [facility] ([address] Medicine  Progress Note    Patient Name: Pilar Fernandez  MRN: [id number]  Patient Class: IP- Inpatient   Admission Date: 10/29/2024  Length of Stay: 12 days  Attending Physician: Stephany Ordaz MD  Primary Care Provider: Lindsey Singletary FNP    Subjective:     Principal Problem: Acute liver failure    HPI:  Pilar Fernandez is a 44 y.o. female w/ PMHx of ESRD (on HD), T2DM, CHF, HTN, gastritis, severe BLE lymphedema (L>R), CHRF (on 3L @ home), prior CVA (July '24; on DAPT) w/ impaired mobility & sacral decubitus ulcer, gout, and known L breast mass, who presented to [facility] ED on 10/29/24 w/ painless jaundice w/ assoc pruritus. Pt reports diffuse pruritus for few weeks, with family noticing yellow discoloration of eyes on 10/28. Was previously admitted to [facility] for cellulitis (LLE vs LUE?), for which she received IV abx and was dc'ed 10/26 on clindamycin. Pt reports she had labs done during her [facility] stay, but there was nothing mentioned re: bilirubin or liver function at that time (lab reports from [facility] not available per Care Everywhere). Pt went to routine HD on 10/28 where she was told she had elevated bilirubin and was referred to [facility] ED for further evaluation.     On arrival to [facility] ED, pt afebrile w/ borderline/normotension (/43-87); otherwise HDS. Pt reports chronic lymphedema & leg/sacral wounds (w/ assoc pain) at baseline. Denied fevers, chills, N/V, melena, hematochezia, hematemesis, BRBPR, SOB, palpitations, chest or abdominal pain or other complaints. Initial labs notable for Tbili 11.7, AST 81, ALT 30, , lipase 57, albumin 2.0, bicarb 20, Na 134, Cr 4.5, BUN 45, WBC 22.8, Hgb 6.4, plts 154, INR 2.1, & lactate 1.6. Stool positive for occult blood. CT A/P w/o contrast noted anasarca w/ persistent cardiomegaly, small & improved pericardial effusion & small L pleural effusion; no acute GI or hepatic abnormalities noted.  Received IV zofran, Zosyn, IV protonix 80mg, IV fentanyl, along w/ 2u pRBC & 2u FFP. Given severe hyperbilirubinemia & anemia w/ e/o acute liver failure of unk etiology, pt transferred to [facility] on 10/29/24 for HLOC.     Overview/Hospital Course:  On arrival to [facility], pt received dose of Zosyn & CTX given leukocytosis. Further abx held as low suspicion for infectious process & blood cx negative. GI consulted given c/f possible GI bleed; scope deferred given absence of overt bleeding. AES/Biliary consult deferred after team reviewed images and noted absence of biliary/ductal dilation. Hepatology consulted for evaluation/mgmt of acute liver failure & hyperbilirubinemia; serologic workup ordered. Liver US w/ doppler noted marked hepatosplenomegaly but otherwise nml w/ proper blood flow. Heme/Onc consulted d/t concern for possible hemolysis &/or underlying malignancy driving pt's presentation. Although Britt test positive, detectable haptoglobin made hemolysis unlikely. CT C/A/P showed notable for numerous enlarged LN conglomerates (&/or soft tissue masses) of the L axilla, inguinal regions (L > R), L supraclavicular region, & retroperitoneum- c/f neoplastic vs lymphoproliferative process. CT also noted diffuse anasarca, large pericardial effusion, cardiomegaly w/ pulmonary edema, & diffuse hepatosplenomegaly. Nephrology consulted for continuation of HD w/ volume removal. Midodrine initiated for post-dialysis hypotension & home BP meds held. Required addn'l unit of pRBCs on 10/31 for recurrent anemia & IV vitamin K on 11/1 for elevated INR. Bilirubin & LFTs continued to uptrend. Developed hepatic encephalopathy w/ ammonia lvl of 111, which improved with titration of pt's bowel regimen (switched from miralax to lactulose). BP stabilized and midodrine d/c'ed. After multiple postponements due to limited anesthesia staffing, liver biopsy done on 11/6. Pathology showed cholestatic hepatitis & features of hepatic venous  outflow obstruction. Bilateral breast US recommended per Heme/Onc, however upon radiology's review of imaging, a dedicated L axilla US obtained instead (as pt's anasarca would make interpretation of breast US difficult). US of L axilla noted abnormal axillary adenopathy w/ at least 6 abnormal LNs (BI-RADS 4); biopsy advised d/t c/f metastatic dx vs lymphoma.       Interval History: R NAEON. Doing alright today- wants to get up. Reports sensation to legs/feet is improved. MRCP w/o e/o obstruction/stricture (although very limited by habitus). Ursodiol initiated per Hepatology recs. Plan for L axillary LN bx tmrw. Hepatology & Heme/Onc following, along w/ Nephrology.     Review of Systems   Constitutional:  Negative for chills, diaphoresis and fever.   HENT:  Negative for congestion, rhinorrhea, sore throat and trouble swallowing.    Eyes:  Negative for photophobia and visual disturbance.   Respiratory:  Negative for cough, shortness of breath and wheezing.    Cardiovascular:  Positive for leg swelling. Negative for chest pain and palpitations.   Gastrointestinal:  Negative for abdominal pain, diarrhea, nausea and vomiting.   Genitourinary:  Negative for difficulty urinating, dysuria and hematuria.   Musculoskeletal:  Positive for gait problem. Negative for arthralgias, joint swelling and neck pain.   Skin:  Negative for rash and wound.   Neurological:  Positive for weakness. Negative for dizziness, light-headedness and headaches.   Psychiatric/Behavioral:  Negative for agitation, confusion and sleep disturbance.      Objective:     Vital Signs (Most Recent):  Temp: 98.7 °F (37.1 °C) (11/09/24 1152)  Pulse: 73 (11/09/24 1152)  Resp: 18 (11/09/24 1152)  BP: (!) 105/54 (11/09/24 1152)  SpO2: 99 % (11/09/24 1152) Vital Signs (24h Range):  Temp:  [97.5 °F (36.4 °C)-98.7 °F (37.1 °C)] 98.7 °F (37.1 °C)  Pulse:  [66-92] 73  Resp:  [18] 18  SpO2:  [92 %-100 %] 99 %  BP: (105-146)/(54-77) 105/54     Weight: 130 kg (286 lb 9.6  oz)  Body mass index is 46.26 kg/m².    Intake/Output Summary (Last 24 hours) at 11/9/2024 1243  Last data filed at 11/8/2024 2200  Gross per 24 hour   Intake 532.5 ml   Output 3650 ml   Net -3117.5 ml         Physical Exam  Constitutional:       General: She is not in acute distress.     Appearance: She is morbidly obese. She is not toxic-appearing or diaphoretic.   HENT:      Head: Normocephalic and atraumatic.      Mouth/Throat:      Mouth: Mucous membranes are moist.   Eyes:      Conjunctiva/sclera: Conjunctivae normal.      Comments: Swelling of R eyelid/periorbital area w/o vision changes, pain, discharge or erythema   Cardiovascular:      Rate and Rhythm: Normal rate and regular rhythm.      Heart sounds: Normal heart sounds.   Pulmonary:      Effort: Pulmonary effort is normal. No respiratory distress.      Breath sounds: Normal breath sounds. No wheezing, rhonchi or rales.      Comments: Auscultation limited by habitus  Abdominal:      General: Bowel sounds are normal. There is no distension.      Palpations: Abdomen is soft.      Tenderness: There is no abdominal tenderness. There is no guarding.   Musculoskeletal:      Right lower leg: Edema present.      Left lower leg: Edema present.      Comments: Extreme 4+ b/l LE pitting edema L>>R (c/w baseline). Diffuse anasarca.    Lymphadenopathy:      Upper Body:      Left upper body: Axillary adenopathy present.   Skin:     General: Skin is warm and dry.   Neurological:      Mental Status: She is alert and oriented to person, place, and time. Mental status is at baseline.   Psychiatric:         Mood and Affect: Mood normal.         Behavior: Behavior normal.           Significant Labs: All pertinent labs within the past 24 hours have been reviewed.    Significant Imaging: I have reviewed all pertinent imaging results/findings within the past 24 hours.    Assessment/Plan:      * Acute liver failure  Direct hyperbilirubinemia w/ painless  jaundice  Coagulopathy  Hepatic encephalopathy - resolved  Presented w/ hyperbilirubinemia per outpatient/HD labs w/ acute jaundice & assoc pruritus. On arrival, Tbili 11.7, AST 81, ALT 30, , along w/ INR 2.1 & Hgb 6.4 s/o acute liver failure w/ synthetic dysfunction. Considered hemolytic process, however less likely w/ haptoglobin of 115. CT noted hepatomegaly w/ heterogenous attenuation & few scattered sub-cm hypodensities of unk significance; galbladder surgically absent. Liver US w/ Doppler w/ marked HSM, but normal blood flow and no biliary/ductal dilation. Given absence of dilation, AES deferred consult for time being, and advised primary Hepatology involvement. Etiology unclear- decompensated cirrhosis (2/2 MASLD? W/ MELD 36) considered vs DILI (2/2 clindamycin) vs other infiltrative process (given extensive LAD). Serological workup w/ elevated A1AT (300), abnormal IgG profile, & increased factor VIII activity; AFP, PETH, BRIAN, PETH, ASMA, AMA & ceruloplasmin all nml. Developed hepatic encephalopathy (w/ ammonia 111) which resolved following titration of bowel regimen. 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 Pt's Tbili & LFTs continued to uptrend. MRCP severely limited by habitus, but didn't show any stricture/obstruction.   - Hepatology following; appreciate recs  - Start ursodiol 300mg TID   - Not a transplant candidate given functional status & comorbidities   - Monitor CMP, INR, & CBC  - Minimize hepatotoxic agents as able   - Bowel regimen; titrate PRN     Diffuse lymphadenopathy  CT C/A/P [facility] noted numerous LN conglomerates &/or soft tissue masses around L axillary soft tissues, b/l inguinal regions (L>R), L supraclavicular soft tissues, & retroperitoneum. Findings highly c/f  neoplastic or lymphoproliferative process. While metastatic breast cancer  considered (vickie given hx of breast mass as below) although lymphoma also considered. Malignancy could be contributing to pt's liver failure, vickie if causing obstruction &/or infiltration. Dedicated L axillary US noted abnormal L axillary LAD w/ at least 6 abnormal LN w/ suspicious appearance (BI-RADS 4); biopsy (w/ pathology & flow cytometry) advised.   - Hematology-Oncology consulted; appreciate recs  - IR bx of L axillary LN planned for 11/11   - Send for both pathology & cytometry   - Pending core bx results, may pursue excisional biopsy  - If pathology shows lymphoma, may pursue inpatient chemo; if breast cancer would defer therapy to outpatient     Acute anemia  Coagulopathy  Thrombocytopenia - resolved  On presentation, Hgb 6.4 (baseline ~10.5) w/ MCV 83, platelets 154, & INR 2.1. Pt denied melena, hematochezia, BRBPR, hematemesis or other sx of active bleeding. Fecal occult positive at OSH ED. Received 2u pRBCs & 2u FFP prior to transfer. Evaluated by GI on arrival who deferred scope for time being given absence of s/sx of brisk bleeding.  Required addn'l unit pRBCs & IV vitamin K shortly after transfer. Subsequent development of thrombocytopenia (eric 90s). Anemia & elevated INR stabilized/improved but certainly persisted. Continued to have no s/sx of active/brisk bleeding. Iron panel c/w anemia 2/2 chronic dx/inflammation. Suspect cytopenias & coagulopathy 2/2 liver failure w/ possible contributing suspected malignancy.   - Mgmt/workup of liver failure & LAD as above/below  - Monitor CBC & for e/o active bleeding  - Transfuse PRN for goal Hgb > 7, plts > 50K & INR < 2   - S/p 2u FFP & 4u pRBCs to date (including transfusions at OSH)   - S/p IV vitamin K     Mass of breast  Has had known mass of L breast for years. Excisional biopsy (Dec '22) noted complex sclerosing lesion w/ usual ductal hyperplasia & apocrine metaplasia; no atypica or malignancy on preliminary evaluation. Per further review by [facility], findings c/w ADH w/ assoc necrosis & proliferative fibrocystic changes. US (Jan 2023) w/ post-surgical fluid collection; BI-RADS 2. Doesn't appear to have had any further imaging or workup since then. Family hx of breast ca in one maternal aunt & ovarian cancer in another maternal aunt noted. Diffuse LAD, vickie of L axilla noted on CT, raising c/f malignancy. Imaging reviewed by radiology who advised dedicated L axillary US (R axilla findings stable and breast US unreliable given anasarca).   - Malignancy workup as above    ESRD (end stage renal disease) on dialysis  Hx of end-stage CKD (2/2 diabetic & hypertensive nephropathy). Has been on HD (M/W/F) since 2021. On admission, Cr 4.5, BUN 45, bicarb 20, & Na 134, (all c/w baseline). No uremia or notable electrolyte derangements. Pt w/ b/l LE lymphedema, requiring 3L NC (both c/w baseline). Diffuse anasarca noted which seemingly is above baseline.   - Nephrology consulted; appreciate recs  - Continued HD for clearance & volume removal   - Epo MWF w/ HD  - Monitor RFP  - Minimize nephrotoxic agents as able & renally-dose meds    Lymphedema  Hx of chronic BLE lymphedema (L>R) at baseline which contributes to pt's difficulty with mobility & recent cellulitis.   - Volume removal via HD  - Wound care  - Elevate as much as tolerated    Leukocytosis  WBC 22.8 on admission w/ neutrophil predominance. Pt afebrile and w/o any other SIRS. No focal s/sx. Recent cellulitis, but stable w/o active flare/infx appearance. Unable to collect UA d/t ESRD. CXR w/o focal consolidation. CRP & procal elevated but ESR nml. CT C/A/P w/ diffuse LAD w/ concern for malignancy; no infectious focus identified though. WBC remained low 20s throughout hospitalization. Suspect leukocytosis reactive iso malignancy.   - Defer abx  - Monitor CBC & fever curve  - Malignancy workup as above    Heart failure with preserved ejection fraction  Hx of HFpEF per 2020 TTE. Although respiratory status  stable/at baseline, repeat TTE obtained iso acute illness & diffuse/severe anasarca. TTE (10/30/24) w/ EF 60-65% w/ septal flattening c/w volume overload, biatrial dilation, severe TR, moderate pulmonary HTN (PASP 51mmHg) & CVP 15mmHg. BNP 1459.   - Volume removal via HD  - Deferring lasix as pt anuric  - Low sodium diet w/ 1.2L fluid restriction     History of cerebrovascular accident (CVA) with residual deficit  Suffered CVA of R posterior frontal lobe in July 2024 w/ residual L-sided weakness which has impaired mobility. No acute neuro concerns/focal findings on current admission.   - Holding home DAPT 2/2 anemia/coagulopathy   - Holding home statin i/s/o liver failure   - PT/OT    Impaired mobility  Sacral decubitus ulcer  Due to lymphedema & stroke earlier this year. Per 7/17/24 discharge summary, pt was able to ambulate w/ walker (but dragging L foot) and was discharge home w/ outpatient therapy. Pt seems to have had progressive decline in mobility since then with pt's boyfriend providing significant assistance w/ mobility & ADLs. Pt seemingly largely bedbound w/ chronic sacral ulcer noted.   - Volume/lymphedema mgmt as above  - Wound care  - Frequent turns  - PT/OT     Essential hypertension  Hx of essential & renovascular HTN. Low-normotensive on arrival to OSH (/43-87) but otherwise HDS.   - Holding home minoxidil & olmesartan  - Resume meds as needed    Type 2 diabetes mellitus, with long-term current use of insulin  Hx of IDDM2 on home regimen of linagliptin, aspart 10u BID, l& antus 70u QHS; last A1c 5.6% (July 2024; improved from 8.7 in 2021). Serum glucose 83 on admission w/ repeat A1c of 4.5%. Likely decreased insulin needs iso ongoing HD.   - Holding home linagliptin while inpatient  - LD-SSI  - CBG AC & HS  - Hypoglycemic protocol     Morbid obesity  Weight 130kg on admission (BMI 46.26 kg/m²). Morbid obesity complicates all aspects of disease management from diagnostic modalities to  treatment.   - Weight loss encouraged    Chronic hypoxic respiratory failure  Pt has apparently been on 3L NC at home since 2022. No known underlying COPD or other lung dx. Suspect CHRF 2/2 chronic volume overload iso ESRD & HFpEF, along w/ likely OHS/MELANY.  - Volume mgmt as above   - Supplemental O2 PRN for goal SpO2 > 92%    Gout  Documented hx of gout (uric acid as high as 16 years ago). Pt endorsing L heel pain on admission; uric acid 8.6. Allopurinol started. XR noted extensive soft tissue swelling (c/w known lymphedema & anasarca) along w/ calcaneal enthesophyte at plantar fascia insertion. Suspect pt's foot pain is more likely 2/2 enthesophyte than acute gout.   - Holding further allopurinol for now    GERD (gastroesophageal reflux disease)  Hx of gastritis per chart review. No EGDs on file. Asymptomatic on admission.   - Continue home PPI at increased BID dose  - PRN Maalox, antiemetics, etc.       VTE Risk Mitigation (From admission, onward)           Ordered     IP VTE HIGH RISK PATIENT  Once         10/30/24 0332     Place sequential compression device  Until discontinued         10/30/24 0051                    Discharge Planning   SHASHA: 11/12/2024     Code Status: Full Code   Is the patient medically ready for discharge?:     Reason for patient still in hospital (select all that apply): Patient trending condition, Laboratory test, Imaging, Consult recommendations, and Pending disposition  Discharge Plan A: Rehab ([facility], [facility] Phone: [phone])   Discharge Delays: None known at this time          Stephany Ordaz MD  Department of Hospital Medicine   [facility] - Stepdown Flex ([place]-[facility])

## 2024-11-10 NOTE — PROGRESS NOTES
1:1 Bedside HD started in pt room  Report received from primary nurse  VS's per dialysis Flowsheet.     Hemodialysis initiated using the following:     Dialysis Access: left arm fistula     Needle size: 15 g button hole needles  Insertion with no complications.     Will Maintain telemetry and blood pressure monitoring throughout treatment.  Refer to dialysis flowsheet and MAR for details.

## 2024-11-10 NOTE — PROGRESS NOTES
11/10/24 1226   Post-Hemodialysis Assessment   Rinseback Volume (mL) 250 mL   Blood Volume Processed (Liters) 0 L   Dialyzer Clearance Moderately streaked   Duration of Treatment 210 minutes   Total UF (mL) 3250 mL   Net Fluid Removal 3000   Patient Response to Treatment tolerated   Post-Hemodialysis Comments see notes     HD TX complete. Pt tolerated well. Net removal 3000 ml. Pt AAO, VSS, NAD. Hemostasis achieved. Report given to primary nurse.    Lot # For Kenalog (Optional): [id number] Lot # (Optional): [id number]

## 2024-11-10 NOTE — PLAN OF CARE
Problem: Adult Inpatient Plan of Care  Goal: Plan of Care Review  Outcome: Progressing  Goal: Patient-Specific Goal (Individualized)  Outcome: Progressing  Goal: Absence of Hospital-Acquired Illness or Injury  Outcome: Progressing  Goal: Optimal Comfort and Wellbeing  Outcome: Progressing  Goal: Readiness for Transition of Care  Outcome: Progressing     Problem: Diabetes Comorbidity  Goal: Blood Glucose Level Within Targeted Range  Outcome: Progressing     Problem: Acute Kidney Injury/Impairment  Goal: Fluid and Electrolyte Balance  Outcome: Progressing  Goal: Improved Oral Intake  Outcome: Progressing  Goal: Effective Renal Function  Outcome: Progressing     Problem: Wound  Goal: Optimal Coping  Outcome: Progressing  Goal: Optimal Functional Ability  Outcome: Progressing  Goal: Absence of Infection Signs and Symptoms  Outcome: Progressing  Goal: Improved Oral Intake  Outcome: Progressing  Goal: Optimal Pain Control and Function  Outcome: Progressing  Goal: Skin Health and Integrity  Outcome: Progressing  Goal: Optimal Wound Healing  Outcome: Progressing     Problem: Skin Injury Risk Increased  Goal: Skin Health and Integrity  Outcome: Progressing     Problem: Bariatric Environmental Safety  Goal: Safety Maintained with Care  Outcome: Progressing     Problem: Hemodialysis  Goal: Safe, Effective Therapy Delivery  Outcome: Progressing  Goal: Effective Tissue Perfusion  Outcome: Progressing  Goal: Absence of Infection Signs and Symptoms  Outcome: Progressing     Problem: Chronic Kidney Disease  Goal: Electrolyte Balance  Outcome: Progressing  Goal: Fluid Balance  Outcome: Progressing     Problem: Fall Injury Risk  Goal: Absence of Fall and Fall-Related Injury  Outcome: Progressing

## 2024-11-11 PROBLEM — R06.00 DYSPNEA: Status: ACTIVE | Noted: 2024-11-11

## 2024-11-11 PROBLEM — Z51.5 PALLIATIVE CARE ENCOUNTER: Status: ACTIVE | Noted: 2024-11-11

## 2024-11-11 PROBLEM — R53.81 DEBILITY: Status: ACTIVE | Noted: 2024-11-11

## 2024-11-11 PROBLEM — R60.1 ANASARCA: Status: ACTIVE | Noted: 2024-11-11

## 2024-11-11 LAB
ALBUMIN SERPL BCP-MCNC: 1.4 G/DL (ref 3.5–5.2)
ALP SERPL-CCNC: 608 U/L (ref 40–150)
ALT SERPL W/O P-5'-P-CCNC: 55 U/L (ref 10–44)
ANION GAP SERPL CALC-SCNC: 12 MMOL/L (ref 8–16)
AST SERPL-CCNC: 139 U/L (ref 10–40)
BASOPHILS # BLD AUTO: 0.07 K/UL (ref 0–0.2)
BASOPHILS NFR BLD: 0.4 % (ref 0–1.9)
BILIRUB SERPL-MCNC: 16.8 MG/DL (ref 0.1–1)
BLD PROD TYP BPU: NORMAL
BLOOD UNIT EXPIRATION DATE: NORMAL
BLOOD UNIT TYPE CODE: 5100
BLOOD UNIT TYPE: NORMAL
BUN SERPL-MCNC: 45 MG/DL (ref 6–20)
CALCIUM SERPL-MCNC: 8.2 MG/DL (ref 8.7–10.5)
CHLORIDE SERPL-SCNC: 97 MMOL/L (ref 95–110)
CO2 SERPL-SCNC: 21 MMOL/L (ref 23–29)
CODING SYSTEM: NORMAL
CREAT SERPL-MCNC: 5.3 MG/DL (ref 0.5–1.4)
CROSSMATCH INTERPRETATION: NORMAL
DIFFERENTIAL METHOD BLD: ABNORMAL
DISPENSE STATUS: NORMAL
EOSINOPHIL # BLD AUTO: 0.2 K/UL (ref 0–0.5)
EOSINOPHIL NFR BLD: 0.9 % (ref 0–8)
ERYTHROCYTE [DISTWIDTH] IN BLOOD BY AUTOMATED COUNT: 23.8 % (ref 11.5–14.5)
EST. GFR  (NO RACE VARIABLE): 9.6 ML/MIN/1.73 M^2
GLUCOSE SERPL-MCNC: 84 MG/DL (ref 70–110)
HCT VFR BLD AUTO: 20.7 % (ref 37–48.5)
HGB BLD-MCNC: 6.7 G/DL (ref 12–16)
IMM GRANULOCYTES # BLD AUTO: 0.39 K/UL (ref 0–0.04)
IMM GRANULOCYTES NFR BLD AUTO: 2 % (ref 0–0.5)
INR PPP: 1.4 (ref 0.8–1.2)
LYMPHOCYTES # BLD AUTO: 1.1 K/UL (ref 1–4.8)
LYMPHOCYTES NFR BLD: 5.7 % (ref 18–48)
MAGNESIUM SERPL-MCNC: 1.9 MG/DL (ref 1.6–2.6)
MCH RBC QN AUTO: 30.2 PG (ref 27–31)
MCHC RBC AUTO-ENTMCNC: 32.4 G/DL (ref 32–36)
MCV RBC AUTO: 93 FL (ref 82–98)
MONOCYTES # BLD AUTO: 1.3 K/UL (ref 0.3–1)
MONOCYTES NFR BLD: 6.5 % (ref 4–15)
NEUTROPHILS # BLD AUTO: 16.8 K/UL (ref 1.8–7.7)
NEUTROPHILS NFR BLD: 84.5 % (ref 38–73)
NRBC BLD-RTO: 1 /100 WBC
NUM UNITS TRANS PACKED RBC: NORMAL
PHOSPHATE SERPL-MCNC: 4.4 MG/DL (ref 2.7–4.5)
PLATELET # BLD AUTO: 255 K/UL (ref 150–450)
PMV BLD AUTO: 12.1 FL (ref 9.2–12.9)
POCT GLUCOSE: 102 MG/DL (ref 70–110)
POCT GLUCOSE: 109 MG/DL (ref 70–110)
POCT GLUCOSE: 96 MG/DL (ref 70–110)
POCT GLUCOSE: 99 MG/DL (ref 70–110)
POCT GLUCOSE: 99 MG/DL (ref 70–110)
POTASSIUM SERPL-SCNC: 4.7 MMOL/L (ref 3.5–5.1)
PROT SERPL-MCNC: 5.9 G/DL (ref 6–8.4)
PROTHROMBIN TIME: 15.1 SEC (ref 9–12.5)
RBC # BLD AUTO: 2.22 M/UL (ref 4–5.4)
SODIUM SERPL-SCNC: 130 MMOL/L (ref 136–145)
WBC # BLD AUTO: 19.87 K/UL (ref 3.9–12.7)

## 2024-11-11 PROCEDURE — P9016 RBC LEUKOCYTES REDUCED: HCPCS | Performed by: STUDENT IN AN ORGANIZED HEALTH CARE EDUCATION/TRAINING PROGRAM

## 2024-11-11 PROCEDURE — 99232 SBSQ HOSP IP/OBS MODERATE 35: CPT | Mod: ,,, | Performed by: NURSE PRACTITIONER

## 2024-11-11 PROCEDURE — 97530 THERAPEUTIC ACTIVITIES: CPT | Mod: CO

## 2024-11-11 PROCEDURE — 20600001 HC STEP DOWN PRIVATE ROOM

## 2024-11-11 PROCEDURE — 36415 COLL VENOUS BLD VENIPUNCTURE: CPT | Performed by: STUDENT IN AN ORGANIZED HEALTH CARE EDUCATION/TRAINING PROGRAM

## 2024-11-11 PROCEDURE — 25000003 PHARM REV CODE 250: Performed by: STUDENT IN AN ORGANIZED HEALTH CARE EDUCATION/TRAINING PROGRAM

## 2024-11-11 PROCEDURE — 97112 NEUROMUSCULAR REEDUCATION: CPT

## 2024-11-11 PROCEDURE — 63600175 PHARM REV CODE 636 W HCPCS: Performed by: HOSPITALIST

## 2024-11-11 PROCEDURE — 80053 COMPREHEN METABOLIC PANEL: CPT | Performed by: STUDENT IN AN ORGANIZED HEALTH CARE EDUCATION/TRAINING PROGRAM

## 2024-11-11 PROCEDURE — 25000003 PHARM REV CODE 250: Performed by: NURSE PRACTITIONER

## 2024-11-11 PROCEDURE — 88341 IMHCHEM/IMCYTCHM EA ADD ANTB: CPT | Performed by: PATHOLOGY

## 2024-11-11 PROCEDURE — 85025 COMPLETE CBC W/AUTO DIFF WBC: CPT | Performed by: HOSPITALIST

## 2024-11-11 PROCEDURE — 88305 TISSUE EXAM BY PATHOLOGIST: CPT | Performed by: PATHOLOGY

## 2024-11-11 PROCEDURE — 07B63ZX EXCISION OF LEFT AXILLARY LYMPHATIC, PERCUTANEOUS APPROACH, DIAGNOSTIC: ICD-10-PCS | Performed by: RADIOLOGY

## 2024-11-11 PROCEDURE — 88342 IMHCHEM/IMCYTCHM 1ST ANTB: CPT | Performed by: PATHOLOGY

## 2024-11-11 PROCEDURE — 88333 PATH CONSLTJ SURG CYTO XM 1: CPT | Performed by: PATHOLOGY

## 2024-11-11 PROCEDURE — 25000003 PHARM REV CODE 250: Performed by: PHYSICIAN ASSISTANT

## 2024-11-11 PROCEDURE — 25000003 PHARM REV CODE 250: Performed by: HOSPITALIST

## 2024-11-11 PROCEDURE — 85610 PROTHROMBIN TIME: CPT | Performed by: STUDENT IN AN ORGANIZED HEALTH CARE EDUCATION/TRAINING PROGRAM

## 2024-11-11 PROCEDURE — 80100016 HC MAINTENANCE HEMODIALYSIS

## 2024-11-11 PROCEDURE — 88341 IMHCHEM/IMCYTCHM EA ADD ANTB: CPT | Mod: 59 | Performed by: PATHOLOGY

## 2024-11-11 PROCEDURE — 83735 ASSAY OF MAGNESIUM: CPT | Performed by: STUDENT IN AN ORGANIZED HEALTH CARE EDUCATION/TRAINING PROGRAM

## 2024-11-11 PROCEDURE — 99223 1ST HOSP IP/OBS HIGH 75: CPT | Mod: ,,,

## 2024-11-11 PROCEDURE — 84100 ASSAY OF PHOSPHORUS: CPT | Performed by: STUDENT IN AN ORGANIZED HEALTH CARE EDUCATION/TRAINING PROGRAM

## 2024-11-11 PROCEDURE — 86920 COMPATIBILITY TEST SPIN: CPT | Performed by: STUDENT IN AN ORGANIZED HEALTH CARE EDUCATION/TRAINING PROGRAM

## 2024-11-11 RX ORDER — MUPIROCIN 20 MG/G
OINTMENT TOPICAL 2 TIMES DAILY
Status: DISCONTINUED | OUTPATIENT
Start: 2024-11-11 | End: 2024-11-16 | Stop reason: HOSPADM

## 2024-11-11 RX ORDER — SODIUM CHLORIDE 9 MG/ML
INJECTION, SOLUTION INTRAVENOUS ONCE
Status: CANCELLED | OUTPATIENT
Start: 2024-11-11 | End: 2024-11-11

## 2024-11-11 RX ORDER — HYDROCODONE BITARTRATE AND ACETAMINOPHEN 500; 5 MG/1; MG/1
TABLET ORAL
Status: DISCONTINUED | OUTPATIENT
Start: 2024-11-11 | End: 2024-11-11

## 2024-11-11 RX ADMIN — ONDANSETRON 4 MG: 2 INJECTION INTRAMUSCULAR; INTRAVENOUS at 10:11

## 2024-11-11 RX ADMIN — URSODIOL 300 MG: 300 CAPSULE ORAL at 10:11

## 2024-11-11 RX ADMIN — MIDODRINE HYDROCHLORIDE 10 MG: 5 TABLET ORAL at 02:11

## 2024-11-11 RX ADMIN — PANTOPRAZOLE SODIUM 40 MG: 40 TABLET, DELAYED RELEASE ORAL at 08:11

## 2024-11-11 RX ADMIN — MICONAZOLE NITRATE: 20 CREAM TOPICAL at 08:11

## 2024-11-11 RX ADMIN — MUPIROCIN: 20 OINTMENT TOPICAL at 08:11

## 2024-11-11 RX ADMIN — HYDROCORTISONE: 25 CREAM TOPICAL at 08:11

## 2024-11-11 RX ADMIN — PANTOPRAZOLE SODIUM 40 MG: 40 TABLET, DELAYED RELEASE ORAL at 10:11

## 2024-11-11 RX ADMIN — URSODIOL 300 MG: 300 CAPSULE ORAL at 08:11

## 2024-11-11 RX ADMIN — OXYCODONE 5 MG: 5 TABLET ORAL at 11:11

## 2024-11-11 RX ADMIN — HYDROCORTISONE: 25 CREAM TOPICAL at 10:11

## 2024-11-11 RX ADMIN — MICONAZOLE NITRATE: 20 CREAM TOPICAL at 10:11

## 2024-11-11 NOTE — CONSULTS
Palliative Medicine  Consult Note       Patient Name: Pilar Fernandez   MRN: [id number]   Admission Date: 10/29/2024   Hospital Length of Stay: 13   Attending Provider: Stephany Ordaz MD   Consulting Provider: JOHANNY Guadalupe  Primary Care Physician: Lindsey Singletary FNP   Principal Problem: Acute liver failure     Patient information was obtained from patient, past medical records, ER records, and primary team.        Inpatient consult to Palliative Care  Consult performed by: Cyn Panda CNS  Consult ordered by: Stephany Ordaz MD             Assessment/Plan:      Palliative Care Encounter:  Impression:  Ms. Pilar Fernandez is a 44 y.o. female w/Hx of ESRD (on HD), T2DM, CHF (ef 60%), HTN, gastritis, severe BLE lymphedema (L>R, contributing to mobility issues), CHRF (on 3L @ home), prior CVA (July '24) w/LSW, sacral decubitus ulcer, gout, and known L breast mass. She presented to [facility] ED on 10/29/24 w/ painless jaundice w/ assoc pruritus.          CT C/A/P on arrival to C noted numerous LN conglomerates &/or soft tissue masses around L axillary soft tissues, b/l inguinal regions (L>R), L supraclavicular soft tissues, & retroperitoneum. Lymph node bx completed on 11/11.         Hepatology has been consulted this stay, liver bx 11/6 = Cholestatic hepatitis with stage 2 fibrosis with recommendations by hepatology to obtain MRI MRCP to definitively rule out an obstructive etiology. She has been deemed not a tx candidate at current due to functional status, co-morbid conditions, and possible malignancy (as evidenced by lymphadenopathy).           Palliative care consulted for ACP/GOC conversations, per communication with Dr. Ordaz.       Advance Care Planning   Advance Directives:   Living Will: No    Do Not Resuscitate Status: No    Agent's Name:  Mile Welch (mother)   Agent's Contact Number:  [phone]    Decision Making:  Patient answered questions  Goals of Care: What is most  "important right now is to focus on remaining as independent as possible, symptom/pain control, extending life as long as possible, even it it means sacrificing quality. Accordingly, we have decided that the best plan to meet the patient's goals includes continuing with treatment.       11/11/24: Met with pt this afternoon at . She was alone in room. AAOx3. Transport came in shortly after my arrival to take pt to dialysis. During our brief interaction, pt stresses that she wants to go to rehab tomorrow if "she's allowed". I reiterated that she may "medically" go tomorrow, however medical team was hoping to have her biopsy results back to best form treatment plan.   -Pt shares she would like to leave hospital as soon as possible so that she can go to rehab to get home quicker. She stresses that she would prefer to follow up on biopsy results and come back for treatment.   -Due to time constraint, I did not get to fully assess her understanding around her overall prognosis and various co-morbidities at this time.   -She is agreeable to continuing conversation tomorrow, however overall goal is to go to rehab/home at this time.    Symptom Management:    -Debility: Pt with progressive debility r/t mobility issues and BLE swelling. With scaral decub d/t immobility.     -Dyspnea: Reports SOB at times. She is on home O2 and feels better after dialysis.       Summary of recommendations and follow up plan:  -Most important goals at this time: Home with her children   -Code status: Full Code   -Disposition: Rehab      The above recommendations communicated directly to primary team on 11/11/24    Thank you for your consult. I will follow-up with patient. Please contact us if you have any additional questions.       Subjective:     Chief Complaint: No chief complaint on file.        HPI:   Per chart review: " Pilar Fernandez is a 44 y.o. female w/ PMHx of ESRD (on HD), T2DM, CHF, HTN, gastritis, severe BLE lymphedema (L>R), CHRF " "(on 3L @ home), prior CVA (July '24; on DAPT) w/ impaired mobility & sacral decubitus ulcer, gout, and known L breast mass, who presented to [facility] ED on 10/29/24 w/ painless jaundice w/ assoc pruritus. Pt reports diffuse pruritus for few weeks, with family noticing yellow discoloration of eyes on 10/28. Was previously admitted to [facility] for cellulitis (LLE vs LUE?), for which she received IV abx and was dc'ed 10/26 on clindamycin. Pt reports she had labs done during her [facility] stay, but there was nothing mentioned re: bilirubin or liver function at that time (lab reports from [facility] not available per Care Everywhere). Pt went to routine HD on 10/28 where she was told she had elevated bilirubin and was referred to [facility] ED for further evaluation.      On arrival to [facility] ED, pt afebrile w/ borderline/normotension (/43-87); otherwise HDS. Pt reports chronic lymphedema & leg/sacral wounds (w/ assoc pain) at baseline. Denied fevers, chills, N/V, melena, hematochezia, hematemesis, BRBPR, SOB, palpitations, chest or abdominal pain or other complaints. Initial labs notable for Tbili 11.7, AST 81, ALT 30, , lipase 57, albumin 2.0, bicarb 20, Na 134, Cr 4.5, BUN 45, WBC 22.8, Hgb 6.4, plts 154, INR 2.1, & lactate 1.6. Stool positive for occult blood. CT A/P w/o contrast noted anasarca w/ persistent cardiomegaly, small & improved pericardial effusion & small L pleural effusion; no acute GI or hepatic abnormalities noted. Received IV zofran, Zosyn, IV protonix 80mg, IV fentanyl, along w/ 2u pRBC & 2u FFP. Given severe hyperbilirubinemia & anemia w/ e/o acute liver failure of unk etiology, pt transferred to [facility] on 10/29/24 for HLOC."      Hospital Course:  Per chart review: "On arrival to [facility], pt received dose of Zosyn & CTX given leukocytosis. Further abx held as low suspicion for infectious process & blood cx negative. GI consulted given c/f possible GI bleed; scope deferred given absence of overt " "bleeding. AES/Biliary consult deferred after team reviewed images and noted absence of biliary/ductal dilation. Hepatology consulted for evaluation/mgmt of acute liver failure & hyperbilirubinemia; serologic workup ordered. Liver US w/ doppler noted marked hepatosplenomegaly but otherwise nml w/ proper blood flow. Heme/Onc consulted d/t concern for possible hemolysis &/or underlying malignancy driving pt's presentation. Although Britt test positive, detectable haptoglobin made hemolysis unlikely. CT C/A/P showed notable for numerous enlarged LN conglomerates (&/or soft tissue masses) of the L axilla, inguinal regions (L > R), L supraclavicular region, & retroperitoneum- c/f neoplastic vs lymphoproliferative process. CT also noted diffuse anasarca, large pericardial effusion, cardiomegaly w/ pulmonary edema, & diffuse hepatosplenomegaly. Nephrology consulted for continuation of HD w/ volume removal. Midodrine initiated for post-dialysis hypotension & home BP meds held. Required addn'l unit of pRBCs on 10/31 for recurrent anemia & IV vitamin K on 11/1 for elevated INR. Bilirubin & LFTs continued to uptrend. Developed hepatic encephalopathy w/ ammonia lvl of 111, which improved with titration of pt's bowel regimen (switched from miralax to lactulose). BP stabilized and midodrine d/c'ed. After multiple postponements due to limited anesthesia staffing, liver biopsy done on 11/6. Pathology showed cholestatic hepatitis & features of hepatic venous outflow obstruction. Bilateral breast US recommended per Heme/Onc, however upon radiology's review of imaging, a dedicated L axilla US obtained instead (as pt's anasarca would make interpretation of breast US difficult). US of L axilla noted abnormal axillary adenopathy w/ at least 6 abnormal LNs (BI-RADS 4); biopsy advised d/t c/f metastatic dx vs lymphoma."    Review of Symptoms      Symptom Assessment (ESAS 0-10 Scale)  Pain:  0  Dyspnea:  0  Anxiety:  0  Nausea:  " 0  Depression:  0  Anorexia:  0  Fatigue:  0  Insomnia:  0  Restlessness:  0  Agitation:  0         Performance Status:  40    Living Arrangements:  Lives with family    Psychosocial/Cultural:   See Palliative Psychosocial Note: Yes  PT with several children.   **Primary  to Follow**  Palliative Care  Consult: Yes    Spiritual:  F - Tia and Belief:  Stresses importance of Sikhism.           ROS:  Review of Systems   Constitutional:  Positive for fatigue.         Past Medical History:   Diagnosis Date    Anemia     Anemia     Breast mass     Chronic constipation     CKD (chronic kidney disease)     Diabetes mellitus     Dialysis patient     GRZEGORZ BARRIOS, THURS, SAT.    Embolic stroke involving right middle cerebral artery 2024    Encounter for blood transfusion     Erosive gastritis 2016    GERD (gastroesophageal reflux disease)     Gout     High cholesterol     Hypertension     Knee pain     Thyroid disease     Unspecified cataract 2022     Past Surgical History:   Procedure Laterality Date    AV FISTULA PLACEMENT Left 2019    Procedure: creation av fistula;  Surgeon: Buck Fernandez MD;  Location: [facility];  Service: Cardiovascular;  Laterality: Left;  Left Radialcephalic: PACU     SECTION  , , ,     CHOLECYSTECTOMY      COLONOSCOPY N/A 10/23/2017    Procedure: COLONOSCOPY;  Surgeon: Bri Harry MD;  Location: [facility];  Service: Endoscopy;  Laterality: N/A;    EXCISIONAL BIOPSY Left 2022    Procedure: EXCISIONAL BIOPSY;  Surgeon: Mick Juarez MD;  Location: [facility];  Service: General;  Laterality: Left;    TUBAL LIGATION      UPPER GASTROINTESTINAL ENDOSCOPY  2016    Erosive Gastritis-Dr Bailey     Family History   Problem Relation Name Age of Onset    Thyroid disease Mother      Diabetes Father      Ovarian cancer Maternal Aunt Malathi     Breast cancer Maternal Aunt Ada          Review of patient's allergies indicates:    Allergen Reactions    Tramadol Itching and Nausea And Vomiting       Medications:    Current Facility-Administered Medications:     0.9%  NaCl infusion (for blood administration), , Intravenous, Q24H PRN, Stephany Ordaz MD    0.9%  NaCl infusion (for blood administration), , Intravenous, Q24H PRN, Stephany Ordaz MD    acetaminophen tablet 650 mg, 650 mg, Oral, Q4H PRN, Stephany Ordaz MD, 650 mg at 11/05/24 2059    albuterol-ipratropium 2.5 mg-0.5 mg/3 mL nebulizer solution 3 mL, 3 mL, Nebulization, Q4H PRN, Irene, Arup K., DO    dextrose 10% bolus 125 mL 125 mL, 12.5 g, Intravenous, PRN, Irene, Arup K., DO, Stopped at 11/01/24 0820    dextrose 10% bolus 250 mL 250 mL, 25 g, Intravenous, PRN, Irene, Arup K., DO, Stopped at 10/31/24 1226    diphenhydrAMINE capsule 50 mg, 50 mg, Oral, Q6H PRN, Irene, Arup K., DO, 50 mg at 11/03/24 2309    epoetin jitendra-epbx injection 13,000 Units, 100 Units/kg, Intravenous, Every Mon, Wed, Fri, Theodora Bhakta, DNP, FNP-C, 13,000 Units at 11/08/24 1643    glucagon (human recombinant) injection 1 mg, 1 mg, Intramuscular, PRN, Irene, Arup K., DO    glucose chewable tablet 16 g, 16 g, Oral, PRN, Irene, Arup K., DO, 16 g at 10/30/24 2118    glucose chewable tablet 24 g, 24 g, Oral, PRN, Irene, Arup K., DO    hydrocortisone 2.5 % cream, , Topical (Top), BID, Shayne Diaz MD, Given at 11/11/24 1031    insulin aspart U-100 pen 0-5 Units, 0-5 Units, Subcutaneous, QID (AC + HS) PRN, Stephany Ordaz MD, 2 Units at 10/31/24 0858    lactulose 20 gram/30 mL solution Soln 10 g, 10 g, Oral, Q6H PRN, Coleman Bonilla, DO    lactulose 20 gram/30 mL solution Soln 20 g, 20 g, Oral, BID, Shayne Diaz MD, 20 g at 11/09/24 0850    miconazole 2 % cream, , Topical (Top), BID, Shayne Diaz MD, Given at 11/11/24 1031    midodrine tablet 10 mg, 10 mg, Oral, Once in dialysis, Evon Bullard, YAEL    naloxone 0.4 mg/mL injection 0.02 mg, 0.02 mg, Intravenous, PRN, Coleman Bonilla, DO     ondansetron injection 4 mg, 4 mg, Intravenous, Q8H PRN, Coleman Bonilla., DO, 4 mg at 11/10/24 2147    oxyCODONE immediate release tablet 5 mg, 5 mg, Oral, Q6H PRN, Stephany Ordaz MD, 5 mg at 11/11/24 1105    pantoprazole EC tablet 40 mg, 40 mg, Oral, BID, Stephany Ordaz MD, 40 mg at 11/11/24 1029    simethicone chewable tablet 80 mg, 1 tablet, Oral, QID PRN, Coleman Bonilla K., DO    sodium chloride 0.9% flush 10 mL, 10 mL, Intravenous, Q12H PRN, Coleman Bonilla., DO    traZODone tablet 100 mg, 100 mg, Oral, Nightly PRN, Coleman Bonilla K., DO    ursodioL capsule 300 mg, 300 mg, Oral, TID, Stephany Ordaz MD, 300 mg at 11/11/24 1029         Objective:      Physical Exam:  Vitals: Temp: 98.9 °F (37.2 °C) (11/11/24 1232)  Pulse: 77 (11/11/24 1232)  Resp: 18 (11/11/24 1232)  BP: 124/65 (11/11/24 1232)  SpO2: 97 % (11/11/24 1232)    Physical Exam  Constitutional:       Appearance: She is ill-appearing.   Neurological:      Mental Status: She is alert and oriented to person, place, and time.                 Labs:   Creatinine   Date Value Ref Range Status   11/11/2024 5.3 (H) 0.5 - 1.4 mg/dL Final      Hemoglobin   Date Value Ref Range Status   11/11/2024 6.7 (L) 12.0 - 16.0 g/dL Final      Albumin   Date Value Ref Range Status   11/11/2024 1.4 (L) 3.5 - 5.2 g/dL Final   11/10/2024 1.5 (L) 3.5 - 5.2 g/dL Final   11/09/2024 1.6 (L) 3.5 - 5.2 g/dL Final          I spent a total of 75  minutes on the day of the visit. This includes face to face time in discussion of goals of care, symptom assessment, coordination of care and emotional support.  This also includes non-face to face time preparing to see the patient (eg, review of tests/imaging), obtaining and/or reviewing separately obtained history, documenting clinical information in the electronic or other health record, independently interpreting results and communicating results to the patient/family/caregiver, or care coordinator.         Thank you for the opportunity to care  for this patient and family.       Cyn Panda, CNS

## 2024-11-11 NOTE — PLAN OF CARE
Discharge Plan A and Plan B have been determined by review of patient's clinical status, future medical and therapeutic needs, and coverage/benefits for post-acute care in coordination with multidisciplinary team members.        11/11/24 1108   Discharge Reassessment   Assessment Type Discharge Planning Reassessment   Did the patient's condition or plan change since previous assessment? No   Discharge Plan discussed with: Parent(s);Patient   Name(s) and Number(s) Mile Welch (Mother)  [phone] (Mobile)   Communicated SHASHA with patient/caregiver Yes   [facility] Phone: [phone])   DME Needed Upon Discharge  other (see comments)   Transition of Care Barriers None   Why the patient remains in the hospital Requires continued medical care   Post-Acute Status   Post-Acute Authorization Placement   Post-Acute Placement Status Pending payor review/awaiting authorization (if required)   Home Health Status Discharge Plan Changed   Diaylsis Status Set-up Complete/Auth obtained   Coverage : [facility] DUAL COMPLETE HMO SNP   Patient choice form signed by patient/caregiver List from CMS Compare;List with quality metrics by geographic area provided   Discharge Delays None known at this time     CM spoke with patient and patients Mile parikh via phone  # [phone]   to discuss discharge planning.  [facility] Phone: [phone]. Patient is pending  results   lymph node biopsy. Biopsy.   need preliminary results back first in case it comes back with lymphoma in which case we'd likely do IP chemo.  SHASHA:  11/13/24     Discharge Recommendations: high intensity therapy     Discharge Equipment Recommendations:  bedside commode, bath bench     Barriers to discharge:  Other (Comment), Decreased caregiver support (patient requries increased level of assistance)       1124 am  NEREIDA   sent updates to [facility] Phone: [phone]    via [employer].      1127 am  Patient is still current with [facility]  O:[phone] F: [phone]  and confirmed with Kristie.     1:30 pm  CM received a phone call from the patient and the patient is ready to dc to rehab. After speaking with her attending a decision was made fr the patient to pursue rehab and after rehab the patient will speak with her PCP about results of lymph node biopsy and if there is a future need for potential; outpatient chemo.     2:02 pm  CM received a message form [facility]  and will submit for authorization today      11/12/24  3:36 pm  Rehab authorization is still pending     Cortney Harrison RN  Case Management  [facility]  [phone]

## 2024-11-11 NOTE — PROGRESS NOTES
[facility] ([place] [id number])  Nephrology  Progress Note    Patient Name: Pilar Fernandez  MRN: [id number]  Admission Date: 10/29/2024  Hospital Length of Stay: 13 days  Attending Provider: Stephany Ordaz MD   Primary Care Physician: Lindsey Singletary FNP  Principal Problem:Acute liver failure    Subjective:     Interval History: Lymph node biopsy obtained today.     Review of patient's allergies indicates:   Allergen Reactions    Tramadol Itching and Nausea And Vomiting     Current Facility-Administered Medications   Medication Frequency    0.9%  NaCl infusion (for blood administration) Q24H PRN    0.9%  NaCl infusion (for blood administration) Q24H PRN    acetaminophen tablet 650 mg Q4H PRN    albuterol-ipratropium 2.5 mg-0.5 mg/3 mL nebulizer solution 3 mL Q4H PRN    dextrose 10% bolus 125 mL 125 mL PRN    dextrose 10% bolus 250 mL 250 mL PRN    diphenhydrAMINE capsule 50 mg Q6H PRN    epoetin jitendra-epbx injection 13,000 Units Every Mon, Wed, Fri    glucagon (human recombinant) injection 1 mg PRN    glucose chewable tablet 16 g PRN    glucose chewable tablet 24 g PRN    hydrocortisone 2.5 % cream BID    insulin aspart U-100 pen 0-5 Units QID (AC + HS) PRN    lactulose 20 gram/30 mL solution Soln 10 g Q6H PRN    lactulose 20 gram/30 mL solution Soln 20 g BID    miconazole 2 % cream BID    mupirocin 2 % ointment BID    naloxone 0.4 mg/mL injection 0.02 mg PRN    ondansetron injection 4 mg Q8H PRN    oxyCODONE immediate release tablet 5 mg Q6H PRN    pantoprazole EC tablet 40 mg BID    simethicone chewable tablet 80 mg QID PRN    sodium chloride 0.9% flush 10 mL Q12H PRN    traZODone tablet 100 mg Nightly PRN    ursodioL capsule 300 mg TID       Objective:     Vital Signs (Most Recent):  Temp: 98 °F (36.7 °C) (11/11/24 1402)  Pulse: 84 (11/11/24 1508)  Resp: 18 (11/11/24 1402)  BP: (!) 118/56 (11/11/24 1430)  SpO2: 95 % (11/11/24 1430) Vital Signs (24h Range):  Temp:  [98 °F (36.7 °C)-99.6 °F (37.6  °C)] 98 °F (36.7 °C)  Pulse:  [77-85] 84  Resp:  [14-18] 18  SpO2:  [91 %-100 %] 95 %  BP: (110-139)/(55-66) 118/56     Weight: 130 kg (286 lb 9.6 oz) (11/06/24 1451)  Body mass index is 46.26 kg/m².  Body surface area is 2.46 meters squared.    I/O last 3 completed shifts:  In: 360 [P.O.:360]  Out: 3250 [Other:3250]     Physical Exam  Vitals and nursing note reviewed.          Significant Labs:  CBC:   Recent Labs   Lab 11/11/24  0539   WBC 19.87*   RBC 2.22*   HGB 6.7*   HCT 20.7*      MCV 93   MCH 30.2   MCHC 32.4     CMP:   Recent Labs   Lab 11/11/24  0539   GLU 84   CALCIUM 8.2*   ALBUMIN 1.4*   PROT 5.9*   *   K 4.7   CO2 21*   CL 97   BUN 45*   CREATININE 5.3*   ALKPHOS 608*   ALT 55*   *   BILITOT 16.8*     All labs within the past 24 hours have been reviewed.     Assessment/Plan:     Renal/  ESRD (end stage renal disease) on dialysis  44 year old female hx of ESRD on HD MWF presenting from outside hospital as transfer for acute anemia and painless jaundice. Last HD session 10/28. Nephrology consulted for ESRD management.     Nephrology History  -Outpatient HD unit: [facility]  -Nephrologist: ?  -HD tx days: MWF  -HD tx time: 4 hrs  -Last HD tx: Monday 10/28  -HD access: LUE AVF  -HD modality: iHD  -Residual urine:   -EDW:  110 kg    Plan/Recommendations  ESRD on HD:    -Continue MWF iHD schedule  -Patient with diffuse body wall edema on imaging and chronic lymphedema, but no respiratory distress, on baseline O2, and no significant pulmonary edema on imaging. -Echo shows EF 60-65%, IVC 15 mm Hg.  -1 L daily fluid restriction  -Will continue iHD thrice weekly unless emergent indication arises  -Strict I&Os  -Pre & post HD weight  Anemia in ESRD  -Hgb goal 10-11, 7.5 today, stable  -Trend hgb, discontinue epo in setting of malignancy   Mineral Bone Disease in ESRD  -Resume home phos binders when diet advances  -Renal diet if not NPO      GI  * Acute liver failure  -management per  primary     Other  Anasarca  UF with HD         Thank you for your consult. I will follow-up with patient. Please contact us if you have any additional questions.    Evon Bullard, YAEL  Nephrology  [facility] ([place]-[facility])

## 2024-11-11 NOTE — ASSESSMENT & PLAN NOTE
44 year old female hx of ESRD on HD MWF presenting from outside hospital as transfer for acute anemia and painless jaundice. Last HD session 10/28. Nephrology consulted for ESRD management.     Nephrology History  -Outpatient HD unit: [facility]  -Nephrologist: ?  -HD tx days: MWF  -HD tx time: 4 hrs  -Last HD tx: Monday 10/28  -HD access: LUE AVF  -HD modality: iHD  -Residual urine:   -EDW:  110 kg    Plan/Recommendations  ESRD on HD:    -Continue MWF iHD schedule  -Patient with diffuse body wall edema on imaging and chronic lymphedema, but no respiratory distress, on baseline O2, and no significant pulmonary edema on imaging. -Echo shows EF 60-65%, IVC 15 mm Hg.  -1 L daily fluid restriction  -Will continue iHD thrice weekly unless emergent indication arises  -Strict I&Os  -Pre & post HD weight  Anemia in ESRD  -Hgb goal 10-11, 7.5 today, stable  -Trend hgb, discontinue epo in setting of malignancy   Mineral Bone Disease in ESRD  -Resume home phos binders when diet advances  -Renal diet if not NPO

## 2024-11-11 NOTE — PT/OT/SLP PROGRESS
"Occupational Therapy   Treatment    Name: Pilar Fernandez  MRN: [id number]  Admitting Diagnosis:  Acute liver failure       Recommendations:     Discharge Recommendations: High Intensity Therapy  Discharge Equipment Recommendations:  bedside commode, bath bench, wheelchair, lift device, hospital bed  Barriers to discharge:  Decreased caregiver support, Other (Comment) (patient requires increqased level of assistance)    Assessment:     Pilar Fernandez is a 44 y.o. female with a medical diagnosis of Acute liver failure.  She presents with the fallowing performance deficits affecting function are weakness, impaired endurance, impaired self care skills, impaired functional mobility, gait instability, impaired balance, pain, decreased safety awareness, decreased lower extremity function, decreased upper extremity function, decreased coordination, edema, impaired cardiopulmonary response to activity, impaired skin. Patient agreeable to tx session, patient continues to require significant assistance with functional transfers, bed mobility, and self-care task. Patient continues to have limited activity tolerance due to pain and weakness from recent hospitalization. Patient would benefit from High intensity therapy intervention to address over all functional decline with mobility task, endurance, and ADLs in order to return to PLOF.     Rehab Prognosis:  Good; patient would benefit from acute skilled OT services to address these deficits and reach maximum level of function.       Plan:     Patient to be seen 4 x/week to address the above listed problems via self-care/home management, therapeutic activities, therapeutic exercises, neuromuscular re-education  Plan of Care Expires: 11/30/24  Plan of Care Reviewed with: patient    Subjective     Chief Complaint: " I wan to sit up"   Patient/Family Comments/goals: to return to PLOF  Pain/Comfort:  Pain Rating 1: 0/10  Pain Addressed 1: Reposition, " Distraction    Objective:     Communicated with: Nurse prior to session.  Patient found HOB elevated with telemetry, oxygen upon OT entry to room.  Co-treated with PT due to patient complexity deficits requiring two skilled therapist to appropriately and safely mobilized patient while facilitating functional task in addition to accommodating for patient's activity tolerance.    General Precautions: Standard, fall    Orthopedic Precautions:N/A  Braces: N/A  Respiratory Status: Nasal cannula, flow 3 L/min     Occupational Performance:     Bed Mobility:    Patient completed Rolling/Turning to Left with  total assistance of 2 persons   Patient completed Rolling/Turning to Right with total assistance of 2 persons   Patient completed Scooting/Bridging with total assistance of 2 persons   Patient completed Supine to Sit with total assistance of 2 persons   Patient completed Sit to Supine with total assistance of 2 persons   Patient was able to tolerate sitting up at EOB for 20 mins with total assistance of 1-2 persons  Patient requires total assistance of 2 persons for dynamic sitting balance   Patient with posterior lean and left side lateral lean unable, poor upright posture unable to maintain head upright     Functional Mobility/Transfers:  Deferred mobility task due to safety concerns     Activities of Daily Living:  Patient stated that she already performed ADLs task in am with assistance from nursing staff.    [facility] 6 Click ADL: 12    Treatment & Education:  Discussed OT POC and progress  Educated patient on the importance to continue to perform exercises in order to reduce stiffness and promote joint mobility and blood flow  Addressed patient's questions and concerns within [facility] scope of practice      Patient left HOB elevated with all lines intact and call button in reach    GOALS:   Multidisciplinary Problems       Occupational Therapy Goals          Problem: Occupational Therapy    Goal Priority Disciplines  Outcome Interventions   Occupational Therapy Goal     OT, PT/OT Progressing    Description: Goals to be met by: 11/30/24     Patient will increase functional independence with ADLs by performing:    UE Dressing with Set-up Assistance.  LE Dressing with Moderate Assistance.  Grooming while seated with Set-up Assistance.  Toileting from bedside commode with Moderate Assistance for hygiene and clothing management.   Supine to sit with Contact Guard Assistance.  Stand pivot transfers with Maximum Assistance.  Toilet transfer to bedside commode with Maximum Assistance.    DME needs:   Tub transfer bench for t/s combo to ensure pt return to hygiene and allow safe and effective bathing in home environment with minimized fall risk due to functional mobility impairments.      Patient has a mobility limitation that significantly impairs their ability to participate in one or more mobility related activities of daily living, including toileting. This deficit can be resolved by using a bedside commode. Patient demonstrates mobility limitations that will cause them to be confined to one room at home without bathroom access for up to 30 days. Using a bedside commode will greatly improve the patient's ability to participate in MRADLs.                          Time Tracking:     OT Date of Treatment: 11/11/24  OT Start Time: 1143  OT Stop Time: 1215  OT Total Time (min): 32 min    Billable Minutes:Therapeutic Activity 32    OT/MIKE: MIKE     Number of MIKE visits since last OT visit: 1 11/11/2024

## 2024-11-11 NOTE — H&P
Inpatient Radiology Pre-procedure Note    Please see consult note dated 11/08/24.     Sarah Montanez MD  Radiology, PGY IV  [facility]

## 2024-11-11 NOTE — CARE UPDATE
Unit [facility] Care Support Interaction      I have reviewed the chart of Pilar Fernandez who is hospitalized for Acute liver failure. The patient is currently located in the following unit: 14W        I have assisted the primary physician in management of the following:      MRSA Decolonization - Mupirocin ordered and CHG ordered            Isabel Pettit PA-C  Unit Based [facility]

## 2024-11-11 NOTE — PLAN OF CARE
Pt tolerated procedure well, left axillary dressing c/d/I, 1 hr recovery in mpu, report given A@ bedside, called report to floor nurse

## 2024-11-11 NOTE — PROGRESS NOTES
11/11/24 1406 11/11/24 1408        Hemodialysis AV Fistula Left upper arm   No placement date or time found.   Present Prior to Hospital Arrival?: Yes  Size/Length: 15 G  Location: Left upper arm   Needle Size Buttonhole  --    Site Assessment Clean;Dry;Intact  --    Patency Present;Thrill;Bruit  --    Status Accessed  --    Flows Good  --    Site Condition No complications  --    During Hemodialysis Assessment   Blood Flow Rate (mL/min)  --  400 mL/min   Dialysate Flow Rate (mL/min)  --  700 ml/min   Ultrafiltration Rate (mL/Hr)  --  1040 mL/Hr   Arteriovenous Lines Secure  --  Yes   Arterial Pressure (mmHg)  --  -120 mmHg   Venous Pressure (mmHg)  --  130   Blood Volume Processed (Liters)  --  0 L   UF Removed (mL)  --  0 mL   TMP  --  40   Venous Line in Air Detector  --  Yes   Intake (mL)  --  250 mL   Intra-Hemodialysis Comments  --  HD started     Patient arrived [facility] by bed. Maintenance HD started.

## 2024-11-11 NOTE — PROGRESS NOTES
Jovan Boogie - Stepdown [facility] ([address] Medicine  Progress Note    Patient Name: Pilar Fernandez  MRN: [id number]  Patient Class: IP- Inpatient   Admission Date: 10/29/2024  Length of Stay: 13 days  Attending Physician: Stephany Ordaz MD  Primary Care Provider: Lindsey Singletary FNP    Subjective:     Principal Problem: Acute liver failure    HPI:  Pilar Fernandez is a 44 y.o. female w/ PMHx of ESRD (on HD), T2DM, CHF, HTN, gastritis, severe BLE lymphedema (L>R), CHRF (on 3L @ home), prior CVA (July '24; on DAPT) w/ impaired mobility & sacral decubitus ulcer, gout, and known L breast mass, who presented to [facility] ED on 10/29/24 w/ painless jaundice w/ assoc pruritus. Pt reports diffuse pruritus for few weeks, with family noticing yellow discoloration of eyes on 10/28. Was previously admitted to [facility] for cellulitis (LLE vs LUE?), for which she received IV abx and was dc'ed 10/26 on clindamycin. Pt reports she had labs done during her [facility] stay, but there was nothing mentioned re: bilirubin or liver function at that time (lab reports from [facility] not available per Care Everywhere). Pt went to routine HD on 10/28 where she was told she had elevated bilirubin and was referred to [facility] ED for further evaluation.     On arrival to [facility] ED, pt afebrile w/ borderline/normotension (/43-87); otherwise HDS. Pt reports chronic lymphedema & leg/sacral wounds (w/ assoc pain) at baseline. Denied fevers, chills, N/V, melena, hematochezia, hematemesis, BRBPR, SOB, palpitations, chest or abdominal pain or other complaints. Initial labs notable for Tbili 11.7, AST 81, ALT 30, , lipase 57, albumin 2.0, bicarb 20, Na 134, Cr 4.5, BUN 45, WBC 22.8, Hgb 6.4, plts 154, INR 2.1, & lactate 1.6. Stool positive for occult blood. CT A/P w/o contrast noted anasarca w/ persistent cardiomegaly, small & improved pericardial effusion & small L pleural effusion; no acute GI or hepatic abnormalities noted.  Received IV zofran, Zosyn, IV protonix 80mg, IV fentanyl, along w/ 2u pRBC & 2u FFP. Given severe hyperbilirubinemia & anemia w/ e/o acute liver failure of unk etiology, [place] [facility] [place].     Overview/Hospital Course:  [place] [facility] received dose of Zosyn & CTX given leukocytosis. Further abx held as low suspicion for infectious process & blood cx negative. GI consulted given c/f possible GI bleed; scope deferred given absence of overt bleeding. AES/Biliary consult deferred after team reviewed images and noted absence of biliary/ductal dilation. Hepatology consulted for evaluation/mgmt of acute liver failure & hyperbilirubinemia; serologic workup ordered. Liver US w/ doppler noted marked hepatosplenomegaly but otherwise nml w/ proper blood flow. Heme/Onc consulted d/t concern for possible hemolysis &/or underlying malignancy driving pt's presentation. Although Britt test positive, detectable haptoglobin made hemolysis unlikely. CT C/A/P showed notable for numerous enlarged LN conglomerates (&/or soft tissue masses) of the L axilla, inguinal regions (L > R), L supraclavicular region, & retroperitoneum- c/f neoplastic vs lymphoproliferative process. CT also noted diffuse anasarca, large pericardial effusion, cardiomegaly w/ pulmonary edema, & diffuse hepatosplenomegaly. Nephrology consulted for continuation of HD w/ volume removal. Midodrine initiated for post-dialysis hypotension & home BP meds held. Required addn'l unit of pRBCs on 10/31 for recurrent anemia & IV vitamin K on 11/1 for elevated INR. Bilirubin & LFTs continued to uptrend. Developed hepatic encephalopathy w/ ammonia lvl of 111, which improved with titration of pt's bowel regimen (switched from miralax to lactulose). BP stabilized and midodrine d/c'ed. After multiple postponements due to limited anesthesia staffing, liver biopsy done on 11/6. Pathology showed cholestatic hepatitis & features of hepatic venous  "outflow obstruction. MRCP w/o e/o obstruction/stricture (although very limited by habitus). Ursodiol initiated per Hepatology recs. Bilateral breast US recommended per Heme/Onc, however upon radiology's review of imaging, a dedicated L axilla US obtained instead (as pt's anasarca would make interpretation of breast US difficult). US of L axilla noted abnormal axillary adenopathy w/ at least 6 abnormal LNs (BI-RADS 4); biopsy advised d/t c/f metastatic dx vs lymphoma. Underwent core needle bx of L axillary LN per IR on 11/11.       Interval History:  NAEON. Labs & VS stable. Had core biopsy of L axillary LN this AM.    Pt perseverating on discharging to rehab tomorrow. Explained that while earlier SHASHA had been tmrw, it's been pushed back as we'd want preliminary biopsy results back first (in case she would need inpatient chemo) and then would need to submit for authorization, so earliest would be Wednesday (best case scenario), but could be later in the week pending biopsy results, chemo needs, &/or insurance issues. Discussed with patient that while her LFTs have stabilized she has liver failure with poor prognosis and no advanced treatment options, along w/ a likely metastatic malignancy, no top of advanced co-morbidities (ESRD, prior CVA w/ L-sided weakness, etc). Given these multiple and advanced medical issues, she had a poor prognosis, and while death not necessarily imminent, she had a low life expectancy. In light of that her medical team wanted her to meet with Palliative Medicine to establish with their team, explore goals/values and deal with coping, and perhaps exploring ACP/GOC. Pt became very distraught, asking if she was going to die and stating "I just wanna get home to my kids." Explained to patient that getting her to rehab and home is still the goal, but that we needed to wait until her biopsy results were back to make sure she didn't need any further inpatient care. Pt begging to go to rehab " tomorrow. Explained that if she didn't want to wait for biopsy results, and just go to rehab, that her team could move forward with submitting authorization, but that she wouldn't be able to receive any chemo while at rehab, so there would potentially be a delay in initiating cancer therapy if she went to rehab before biopsy results were available. Pt insistent that she wanted to go to rehab so she could go home. Explored pt's values further and pt repeatedly & clearly expressed that she'd like to prioritize getting to rehab and subsequently getting home to her family, even if that meant pushing back starting any cancer-therapy. Case management updated and authorization submitted. Palliative Medicine also updated who will see pt again tomorrow. Patient's mother updated via telephone.       Review of Systems   Constitutional:  Negative for chills, diaphoresis and fever.   HENT:  Negative for congestion, rhinorrhea, sore throat and trouble swallowing.    Eyes:  Negative for photophobia and visual disturbance.   Respiratory:  Negative for cough, shortness of breath and wheezing.    Cardiovascular:  Positive for leg swelling. Negative for chest pain and palpitations.   Gastrointestinal:  Negative for abdominal pain, diarrhea, nausea and vomiting.   Genitourinary:  Negative for difficulty urinating, dysuria and hematuria.   Musculoskeletal:  Positive for gait problem. Negative for arthralgias, joint swelling and neck pain.   Skin:  Positive for color change. Negative for rash and wound.   Neurological:  Positive for weakness. Negative for dizziness, light-headedness and headaches.   Psychiatric/Behavioral:  Negative for agitation, confusion and sleep disturbance. The patient is nervous/anxious.      Objective:     Vital Signs (Most Recent):  Temp: 98.7 °F (37.1 °C) (11/09/24 1152)  Pulse: 73 (11/09/24 1152)  Resp: 18 (11/09/24 1152)  BP: (!) 105/54 (11/09/24 1152)  SpO2: 99 % (11/09/24 1152) Vital Signs (24h  Range):  Temp:  [97.5 °F (36.4 °C)-98.7 °F (37.1 °C)] 98.7 °F (37.1 °C)  Pulse:  [66-92] 73  Resp:  [18] 18  SpO2:  [92 %-100 %] 99 %  BP: (105-146)/(54-77) 105/54     Weight: 130 kg (286 lb 9.6 oz)  Body mass index is 46.26 kg/m².    Intake/Output Summary (Last 24 hours) at 11/9/2024 1243  Last data filed at 11/8/2024 2200  Gross per 24 hour   Intake 532.5 ml   Output 3650 ml   Net -3117.5 ml         Physical Exam  Constitutional:       General: She is not in acute distress.     Appearance: She is morbidly obese. She is not toxic-appearing or diaphoretic.   HENT:      Head: Normocephalic and atraumatic.      Mouth/Throat:      Mouth: Mucous membranes are moist.   Eyes:      General: Scleral icterus present.      Conjunctiva/sclera: Conjunctivae normal.      Comments: Swelling of R eyelid/periorbital area w/o vision changes, pain, discharge or erythema   Cardiovascular:      Rate and Rhythm: Normal rate and regular rhythm.      Heart sounds: Normal heart sounds.   Pulmonary:      Effort: Pulmonary effort is normal. No respiratory distress.      Breath sounds: Normal breath sounds. No wheezing, rhonchi or rales.      Comments: Auscultation limited by habitus  Abdominal:      General: Bowel sounds are normal. There is no distension.      Palpations: Abdomen is soft.      Tenderness: There is no abdominal tenderness. There is no guarding.   Musculoskeletal:      Right lower leg: Edema present.      Left lower leg: Edema present.      Comments: Extreme 4+ b/l LE pitting edema L>>R (c/w baseline). Diffuse anasarca.    Lymphadenopathy:      Upper Body:      Left upper body: Axillary adenopathy present.   Skin:     General: Skin is warm and dry.      Coloration: Skin is jaundiced.   Neurological:      Mental Status: She is alert and oriented to person, place, and time. Mental status is at baseline.   Psychiatric:         Mood and Affect: Mood is anxious. Affect is tearful.         Behavior: Behavior normal.            Significant Labs: All pertinent labs within the past 24 hours have been reviewed.    Significant Imaging: I have reviewed all pertinent imaging results/findings within the past 24 hours.    Assessment/Plan:      Acute liver failure  Direct hyperbilirubinemia w/ painless jaundice  Coagulopathy  Hepatic encephalopathy - resolved  Presented w/ hyperbilirubinemia per outpatient/HD labs w/ acute jaundice & assoc pruritus. On arrival, Tbili 11.7, AST 81, ALT 30, , along w/ INR 2.1 & Hgb 6.4 s/o acute liver failure w/ synthetic dysfunction. Considered hemolytic process, however less likely w/ haptoglobin of 115. CT noted hepatomegaly w/ heterogenous attenuation & few scattered sub-cm hypodensities of unk significance; galbladder surgically absent. Liver US w/ Doppler w/ marked HSM, but normal blood flow and no biliary/ductal dilation. Given absence of dilation, AES deferred consult for time being, and advised primary Hepatology involvement. Etiology unclear- decompensated cirrhosis (2/2 MASLD? W/ MELD 36) considered vs DILI (2/2 clindamycin) vs other infiltrative process (given extensive LAD). Serological workup w/ elevated A1AT (300), abnormal IgG profile, & increased factor VIII activity; AFP, PETH, BRIAN, PETH, ASMA, AMA & ceruloplasmin all nml. Developed hepatic encephalopathy (w/ ammonia 111) which resolved following titration of bowel regimen. 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 Pt's Tbili & LFTs continued to uptrend. MRCP severely limited by habitus, but didn't show any stricture/obstruction; ursodiol initiated. Pt not transplant candidate given co-morbidities, functional status & underlying malignancy. Overall poor prognosis. MELD-Na 35.  - Hepatology following; appreciate recs  - Ursodiol 300mg TID   - Not a transplant candidate given functional status & comorbidities    - Monitor CMP, INR, & CBC  - Minimize hepatotoxic agents as able   - Bowel regimen; titrate PRN   - Palliative Medicine consulted; appreciate recs    Diffuse lymphadenopathy  CT C/A/P on arrival to C noted numerous LN conglomerates &/or soft tissue masses around L axillary soft tissues, b/l inguinal regions (L>R), L supraclavicular soft tissues, & retroperitoneum. Findings highly c/f  neoplastic or lymphoproliferative process. While metastatic breast cancer considered (vickie given hx of breast mass as below) although lymphoma also considered. Malignancy could be contributing to pt's liver failure, vickie if causing obstruction &/or infiltration. Dedicated L axillary US noted abnormal L axillary LAD w/ at least 6 abnormal LN w/ suspicious appearance (BI-RADS 4); biopsy (w/ pathology & flow cytometry) advised. Underwent core needle biopsy of L axillary LN per IR on 11/11.   - Hematology-Oncology consulted; appreciate recs  - S/p IR bx of L axillary LN 11/11; path/cytology pending   - Pending core bx results, may pursue excisional biopsy  - Inpatient vs outpatient chemo pending biopsy results (although at this time, pt expressing that she doesn't want to wait for results and wishes to prioritize rehab and getting home)   - Palliative Medicine consulted; appreciate recs    Acute anemia  Coagulopathy  Thrombocytopenia - resolved  On presentation, Hgb 6.4 (baseline ~10.5) w/ MCV 83, platelets 154, & INR 2.1. Pt denied melena, hematochezia, BRBPR, hematemesis or other sx of active bleeding. Fecal occult positive at OSH ED. Received 2u pRBCs & 2u FFP prior to transfer. Evaluated by GI on arrival who deferred scope for time being given absence of s/sx of brisk bleeding.  Required addn'l unit pRBCs & IV vitamin K shortly after transfer. Subsequent development of thrombocytopenia (eric 90s). Anemia & elevated INR stabilized/improved but certainly persisted. Continued to have no s/sx of active/brisk bleeding. Iron panel c/w  anemia 2/2 chronic dx/inflammation. Suspect cytopenias & coagulopathy 2/2 liver failure w/ possible contributing suspected malignancy.   - Mgmt/workup of liver failure & LAD as above/below  - Monitor CBC & for e/o active bleeding  - Transfuse PRN for goal Hgb > 7, plts > 50K & INR < 2   - S/p 2u FFP & 4u pRBCs to date (including transfusions at OSH)   - S/p IV vitamin K     Mass of breast  Has had known mass of L breast for years. Excisional biopsy (Dec '22) noted complex sclerosing lesion w/ usual ductal hyperplasia & apocrine metaplasia; no atypica or malignancy on preliminary evaluation. Per further review by [facility], findings c/w ADH w/ assoc necrosis & proliferative fibrocystic changes. US (Jan 2023) w/ post-surgical fluid collection; BI-RADS 2. Doesn't appear to have had any further imaging or workup since then. Family hx of breast ca in one maternal aunt & ovarian cancer in another maternal aunt noted. Diffuse LAD, vickie of L axilla noted on CT, raising c/f malignancy. Imaging reviewed by radiology who advised dedicated L axillary US (R axilla findings stable and breast US unreliable given anasarca).   - Malignancy workup as above    ESRD (end stage renal disease) on dialysis  Hx of end-stage CKD (2/2 diabetic & hypertensive nephropathy). Has been on HD (M/W/F) since 2021. On admission, Cr 4.5, BUN 45, bicarb 20, & Na 134, (all c/w baseline). No uremia or notable electrolyte derangements. Pt w/ b/l LE lymphedema, requiring 3L NC (both c/w baseline). Diffuse anasarca noted which seemingly is above baseline.   - Nephrology consulted; appreciate recs  - Continued HD for clearance & volume removal   - Epo MWF w/ HD  - Monitor RFP  - Minimize nephrotoxic agents as able & renally-dose meds    Lymphedema  Hx of chronic BLE lymphedema (L>R) at baseline which contributes to pt's difficulty with mobility & recent cellulitis.   - Volume removal via HD  - Wound care  - Elevate as much as tolerated    Leukocytosis  WBC  22.8 on admission w/ neutrophil predominance. Pt afebrile and w/o any other SIRS. No focal s/sx. Recent cellulitis, but stable w/o active flare/infx appearance. Unable to collect UA d/t ESRD. CXR w/o focal consolidation. CRP & procal elevated but ESR nml. CT C/A/P w/ diffuse LAD w/ concern for malignancy; no infectious focus identified though. WBC remained low 20s throughout hospitalization. Suspect leukocytosis reactive iso malignancy.   - Defer abx  - Monitor CBC & fever curve  - Malignancy workup as above    Heart failure with preserved ejection fraction  Hx of HFpEF per 2020 TTE. Although respiratory status stable/at baseline, repeat TTE obtained iso acute illness & diffuse/severe anasarca. TTE (10/30/24) w/ EF 60-65% w/ septal flattening c/w volume overload, biatrial dilation, severe TR, moderate pulmonary HTN (PASP 51mmHg) & CVP 15mmHg. BNP 1459.   - Volume removal via HD  - Deferring lasix as pt anuric  - Low sodium diet w/ 1.2L fluid restriction     History of cerebrovascular accident (CVA) with residual deficit  Suffered CVA of R posterior frontal lobe in July 2024 w/ residual L-sided weakness which has impaired mobility. No acute neuro concerns/focal findings on current admission.   - Holding home DAPT 2/2 anemia/coagulopathy   - Holding home statin i/s/o liver failure   - PT/OT    Impaired mobility  Sacral decubitus ulcer  Due to lymphedema & stroke earlier this year. Per 7/17/24 discharge summary, pt was able to ambulate w/ walker (but dragging L foot) and was discharge home w/ outpatient therapy. Pt seems to have had progressive decline in mobility since then with pt's boyfriend providing significant assistance w/ mobility & ADLs. Pt seemingly largely bedbound w/ chronic sacral ulcer noted.   - Volume/lymphedema mgmt as above  - Wound care  - Frequent turns  - PT/OT   - Despite pt's overall poor prognosis, would still benefit from IPR as improved mobility & functional status would absolutely affect pt's  overall quality of life, and potentially effect cancer treatment options available to her    Essential hypertension  Hx of essential & renovascular HTN. Low-normotensive on arrival to OSH (/43-87) but otherwise HDS.   - Holding home minoxidil & olmesartan  - Resume meds as needed    Type 2 diabetes mellitus, with long-term current use of insulin  Hx of IDDM2 on home regimen of linagliptin, aspart 10u BID, l& antus 70u QHS; last A1c 5.6% (July 2024; improved from 8.7 in 2021). Serum glucose 83 on admission w/ repeat A1c of 4.5%. Likely decreased insulin needs iso ongoing HD.   - Holding home linagliptin while inpatient  - LD-SSI  - CBG AC & HS  - Hypoglycemic protocol     Morbid obesity  Weight 130kg on admission (BMI 46.26 kg/m²). Morbid obesity complicates all aspects of disease management from diagnostic modalities to treatment.   - Weight loss encouraged    Chronic hypoxic respiratory failure  Pt has apparently been on 3L NC at home since 2022. No known underlying COPD or other lung dx. Suspect CHRF 2/2 chronic volume overload iso ESRD & HFpEF, along w/ likely OHS/MELANY.  - Volume mgmt as above   - Supplemental O2 PRN for goal SpO2 > 92%    Gout  Documented hx of gout (uric acid as high as 16 years ago). Pt endorsing L heel pain on admission; uric acid 8.6. Allopurinol started. XR noted extensive soft tissue swelling (c/w known lymphedema & anasarca) along w/ calcaneal enthesophyte at plantar fascia insertion. Suspect pt's foot pain is more likely 2/2 enthesophyte than acute gout.   - Holding further allopurinol for now    GERD (gastroesophageal reflux disease)  Hx of gastritis per chart review. No EGDs on file. Asymptomatic on admission.   - Continue home PPI at increased BID dose  - PRN Maalox, antiemetics, etc.       VTE Risk Mitigation (From admission, onward)           Ordered     IP VTE HIGH RISK PATIENT  Once         10/30/24 0332     Place sequential compression device  Until discontinued          10/30/24 0051                    Discharge Planning   SHASHA: 11/13/2024     Code Status: Full Code   Is the patient medically ready for discharge?:     Reason for patient still in hospital (select all that apply): Patient trending condition, Laboratory test, Imaging, Consult recommendations, and Pending disposition  Discharge Plan A: Rehab ([facility] Phone: [phone])   Discharge Delays: None known at this time          Stephany Ordaz MD  Department of Hospital Medicine   [facility] ([place] [address])

## 2024-11-11 NOTE — TREATMENT PLAN
Hepatology Treatment Plan    Pilar Fernandez is a 44 y.o. female admitted to hospital 10/29/2024 (Hospital Day: 14) due to Acute liver failure.     Interval History    Lymph node biopsy obtained today.     Objective  Temp:  [98.1 °F (36.7 °C)-99.6 °F (37.6 °C)] 99.4 °F (37.4 °C) (11/11 0803)  Pulse:  [71-86] 81 (11/11 1132)  BP: (110-139)/(55-84) 135/65 (11/11 0947)  Resp:  [14-18] 18 (11/11 1105)  SpO2:  [91 %-100 %] 92 % (11/11 0947)    Laboratory    Lab Results   Component Value Date    WBC 19.87 (H) 11/11/2024    HGB 6.7 (L) 11/11/2024    HCT 20.7 (L) 11/11/2024    MCV 93 11/11/2024     11/11/2024       Lab Results   Component Value Date     (L) 11/11/2024    K 4.7 11/11/2024    CL 97 11/11/2024    CO2 21 (L) 11/11/2024    BUN 45 (H) 11/11/2024    CREATININE 5.3 (H) 11/11/2024    CALCIUM 8.2 (L) 11/11/2024       Lab Results   Component Value Date    ALBUMIN 1.4 (L) 11/11/2024    ALT 55 (H) 11/11/2024     (H) 11/11/2024    ALKPHOS 608 (H) 11/11/2024    BILITOT 16.8 (H) 11/11/2024       Lab Results   Component Value Date    INR 1.4 (H) 11/11/2024    INR 1.4 (H) 11/10/2024    INR 1.5 (H) 11/09/2024       MELD 3.0: 36 at 11/11/2024  5:39 AM  MELD-Na: 35 at 11/11/2024  5:39 AM  Calculated from:  Serum Creatinine: On dialysis. Using the maximum value.  Serum Sodium: 130 mmol/L at 11/11/2024  5:39 AM  Total Bilirubin: 16.8 mg/dL at 11/11/2024  5:39 AM  Serum Albumin: 1.4 g/dL (Using min of 1.5 g/dL) at 11/11/2024  5:39 AM  INR(ratio): 1.4 at 11/11/2024  5:39 AM  Age at listing (hypothetical): 44 years  Sex: Female at 11/11/2024  5:39 AM      Assessment    Pilar Fernandez is a 44 y.o. female with recent CVA (on DAPT, 7/2024) with ongoing left sided deficits, history of breast mass, HTN, poorly controlled T2DM, ESRD (~6 years) on HD MWF, lymphedema, stage II sacral decubitus ulcer and chronic hypoxic RF on 3 L's NC that presents to [facility] from [facility] for evaluation of new onset painless  jaundice, first noticed by her mother over the weekend. Hepatology consulted on 10/30 for evaluation of elevated LFT's. Unsure of exactly etiology but obstruction vs DILI was a consideration. US and CT imaging did now show any evidence of biliary obstruction. Due to persistent elevations in T bili, liver biopsy obtained to prognosticate presence/extent of scarring in liver as her imaging is not definitive, but her enzymes and liver function testing is suspicious for underlying liver disease (likely MASLD). PETH, hemochromatosis DNA PCR, BRIAN, AMA, ASMA, ceruloplasmin all negative. IgG elevated to 1792.     Problem List:  Painless Jaundice   Acute Liver Injury/Elevated LFT's/Elevated INR   ESRD with Volume Overload (reportedly on HD for ~6 years)   Anasarca   Chronic Hypoxic Respiratory Failure (on 3 L's O2 since 2022)   Morbid Obesity (BMI 46)   Debility (bed bound now, previously with walker)   History of CVA (7/2024) with left sided weakness and right eye droop on DAPT (ASA/Plavix)   Lymphadenopathy     Recommendations:   - Consider palliative care consult; will defer to primary on timing   - Liver biopsy obtained on 11/06/24; Cholestatic hepatitis with stage 2 fibrosis. Features of hepatic venous outflow obstruction. The histologic differential diagnosis for the cholestatic hepatitis includes sepsis/infection, drug induced liver injury (DILI), and in combination with the ductular reaction, bile duct obstruction.   - Given results of biopsy, recommend obtaining MRI MRCP to definitively rule out an obstructive etiology. If this is negative, then it is possible her acute elevation in LFTs is from a DILI, given recent use of Clindamycin. Clindamycin can cause hepatic injury or both hepatocellular and cholestatic injury. If MRI MRCP negative, continue Ursodiol 300 mg TID (started on 11/10)   - Not a transplant candidate given her functional status, co-morbid conditions and now with possible malignancy given extensive  lymphadenopathy.   - S/p LN biopsy on 11/11; f/u path   - PT/OT recommending high-intensity therapy  - Nephrology managing HD; appreciate volume management per nephro team     Thank you for involving us in the care of Pilar Fernandez. Please call with any additional concerns or questions.    Angela Crowe DO   Gastroenterology Fellow PGY- V  [facility]

## 2024-11-11 NOTE — SUBJECTIVE & OBJECTIVE
Interval History: Lymph node biopsy obtained today.     Review of patient's allergies indicates:   Allergen Reactions    Tramadol Itching and Nausea And Vomiting     Current Facility-Administered Medications   Medication Frequency    0.9%  NaCl infusion (for blood administration) Q24H PRN    0.9%  NaCl infusion (for blood administration) Q24H PRN    acetaminophen tablet 650 mg Q4H PRN    albuterol-ipratropium 2.5 mg-0.5 mg/3 mL nebulizer solution 3 mL Q4H PRN    dextrose 10% bolus 125 mL 125 mL PRN    dextrose 10% bolus 250 mL 250 mL PRN    diphenhydrAMINE capsule 50 mg Q6H PRN    epoetin jitendra-epbx injection 13,000 Units Every Mon, Wed, Fri    glucagon (human recombinant) injection 1 mg PRN    glucose chewable tablet 16 g PRN    glucose chewable tablet 24 g PRN    hydrocortisone 2.5 % cream BID    insulin aspart U-100 pen 0-5 Units QID (AC + HS) PRN    lactulose 20 gram/30 mL solution Soln 10 g Q6H PRN    lactulose 20 gram/30 mL solution Soln 20 g BID    miconazole 2 % cream BID    mupirocin 2 % ointment BID    naloxone 0.4 mg/mL injection 0.02 mg PRN    ondansetron injection 4 mg Q8H PRN    oxyCODONE immediate release tablet 5 mg Q6H PRN    pantoprazole EC tablet 40 mg BID    simethicone chewable tablet 80 mg QID PRN    sodium chloride 0.9% flush 10 mL Q12H PRN    traZODone tablet 100 mg Nightly PRN    ursodioL capsule 300 mg TID       Objective:     Vital Signs (Most Recent):  Temp: 98 °F (36.7 °C) (11/11/24 1402)  Pulse: 84 (11/11/24 1508)  Resp: 18 (11/11/24 1402)  BP: (!) 118/56 (11/11/24 1430)  SpO2: 95 % (11/11/24 1430) Vital Signs (24h Range):  Temp:  [98 °F (36.7 °C)-99.6 °F (37.6 °C)] 98 °F (36.7 °C)  Pulse:  [77-85] 84  Resp:  [14-18] 18  SpO2:  [91 %-100 %] 95 %  BP: (110-139)/(55-66) 118/56     Weight: 130 kg (286 lb 9.6 oz) (11/06/24 1451)  Body mass index is 46.26 kg/m².  Body surface area is 2.46 meters squared.    I/O last 3 completed shifts:  In: 360 [P.O.:360]  Out: 3250 [Other:3250]     Physical  Exam  Vitals and nursing note reviewed.          Significant Labs:  CBC:   Recent Labs   Lab 11/11/24  0539   WBC 19.87*   RBC 2.22*   HGB 6.7*   HCT 20.7*      MCV 93   MCH 30.2   MCHC 32.4     CMP:   Recent Labs   Lab 11/11/24  0539   GLU 84   CALCIUM 8.2*   ALBUMIN 1.4*   PROT 5.9*   *   K 4.7   CO2 21*   CL 97   BUN 45*   CREATININE 5.3*   ALKPHOS 608*   ALT 55*   *   BILITOT 16.8*     All labs within the past 24 hours have been reviewed.

## 2024-11-11 NOTE — PLAN OF CARE
Pt arrived to 89 for lymph node biopsy. Pt oriented to unit and staff, Pt safely transferred from stretcher to procedural table. Fall risk reviewed and comfort measures utilized with interventions. Safety strap applied, position pillows to minimize pressure points. Blankets applied. Pt prepped and draped utilizing standard sterile technique. Patient placed on continuous monitoring, as required by sedation policy. Timeouts implemented utilizing standard universal time-out per department and facility policy. RN to remain at bedside with continuous monitoring. Pt resting comfortably. Denies pain/discomfort. Will continue to monitor. See flow sheets for monitoring, medication administration, and updates. patient verbalizes understanding.

## 2024-11-11 NOTE — PT/OT/SLP PROGRESS
Physical Therapy Co-Treatment    Patient Name:  Pilar Fernandez   MRN:  [id number]  Admitting Diagnosis:  Acute liver failure   Recent Surgery: * No surgery found *    Admit Date: 10/29/2024  Length of Stay: 13 days    Recommendations:     Discharge Recommendations:   High Intensity Therapy    Discharge Equipment Recommendations: bedside commode, bath bench, wheelchair, lift device, hospital bed   Barriers to discharge: Decreased caregiver support and increased need for skilled assistance, fall risk    Plan:     During this hospitalization, patient to be seen 4 x/week to address the identified rehab impairments via gait training, therapeutic activities, therapeutic exercises, neuromuscular re-education and progress towards the established goals.  Plan of Care Expires:  12/02/24  Plan of Care Reviewed with: patient    Assessment:     Pilar Fernandez is a 44 y.o. female admitted with a medical diagnosis of Acute liver failure. Pt agreeable and motivated for therapy session. Pt continues to require increased assistance with all mobility due to significant BLE edema, generalized weakness and debility. Stand not attempted this session 2/2 impaired sitting balance. Pt functioning significantly below her baseline PLOF and is a fall risk. Patient would benefit from skilled therapy services to maximize safety and independence, increase activity tolerance, decrease fall risk, decrease caregiver burden, improve QOL, improve patient's functional mobility, and decrease risk of contractures and pressure sores. Patient has demonstrated sufficient progression to warrant high intensity therapy evidenced by objectives noted below.     Problem List: weakness, impaired endurance, impaired self care skills, impaired functional mobility, gait instability, impaired balance, decreased upper extremity function, decreased lower extremity function, decreased safety awareness.  Rehab Prognosis: Good; patient would benefit from acute  "skilled PT services to address these deficits and reach maximum level of function.      Goals:   Multidisciplinary Problems       Physical Therapy Goals          Problem: Physical Therapy    Goal Priority Disciplines Outcome Interventions   Physical Therapy Goal     PT, PT/OT Progressing    Description: Goals to be met by: 2024     Patient will increase functional independence with mobility by performin. Supine to sit with Set-up [place]  2. Sit to supine with Set-up [place]  3. Sit to stand transfer with Contact Guard Assistance  4. Bed to chair transfer with Minimal Assistance using LRAD  5. Gait  x 30 feet with Minimal Assistance using LRAD   6. Ascend/descend 3 stair with no Handrails Moderate Assistance using LRAD   7. Lower extremity exercise program x15 reps per handout, with supervision                         Subjective     RN notified prior to session. No one present upon PT entrance into room. Patient agreeable to PT treatment session.    Chief Complaint: "Can I sit up later today?"  Patient/Family Comments/goals: increase mobility  Pain/Comfort:  Pain Rating 1: 0/10  Pain Rating Post-Intervention 1: 0/10      Objective:     Additional staff present: OT; OT for cotx due to pt's multiple medical comorbidities and functional deficits req'ing two skilled therapists to appropriately maximize functional potential by progressing pt's musculoskeletal strength and endurance, facilitating neuromuscular postural balance and control ,and accommodating for patient's impaired cardiopulmonary tolerance to activities.     Patient found supine with: oxygen, telemetry   Cognition:   Alert and Cooperative  Patient is oriented to Person, Place, Time, Situation  General Precautions: Standard, Cardiac fall   Orthopedic Precautions:N/A   Braces: N/A   Body mass index is 46.26 kg/m².  Oxygen Device: Nasal Cannula 3L  Vitals: /65 (BP Location: Right arm, Patient Position: Lying)   Pulse 77   Temp " "98.9 °F (37.2 °C) (Oral)   Resp 18   Ht 5' 6" (1.676 m)   Wt 130 kg (286 lb 9.6 oz)   LMP  (LMP Unknown) Comment: hcg neg 11/4  SpO2 97%   Breastfeeding No   BMI 46.26 kg/m²     Outcome Measures:  AM-PAC 6 CLICK MOBILITY  Turning over in bed (including adjusting bedclothes, sheets and blankets)?: 2  Sitting down on and standing up from a chair with arms (e.g., wheelchair, bedside commode, etc.): 1  Moving from lying on back to sitting on the side of the bed?: 2  Moving to and from a bed to a chair (including a wheelchair)?: 1  Need to walk in hospital room?: 1  Climbing 3-5 steps with a railing?: 1  Basic Mobility Total Score: 8     Functional Mobility:    Bed Mobility:   Rolling to left with total assistance and 2 persons  Scooting with total assistance and 2 persons  Supine > Sit with total assistance and 2 persons  Sit > Supine with total assistance and 2 persons    Transfers:   Sit <> Stand Transfer: Not performed 2/2 impaired sitting balance    Balance:  Sitting Balance  Static: total assistance and of 1-2 persons  Dynamic: total assistance and of 2 persons  Time: 20 minutes  Comments: Pt with posterior lean and R lateral shift of hips. Pt frequent tactile cues for orientation to midline, anterior lean, and assist with shifting hips L laterally. Pt using BUE on bedrail for balance assist throughout.     Therapeutic Exercises:   Patient performed 1 set(s) of 10 repetitions of  the following seated exercises: long arc quads for bilateral LE. Patient required skilled PT for instruction of exercises and appropriate cues to perform exercises safely and appropriately.    Education:  Time provided for education, counseling and discussion of health disposition in regards to patient's current status  All questions answered within PT scope of practice and to patient's satisfaction  PT role in POC to address current functional deficits    Patient left HOB elevated with all lines intact and call button in " reach.    Time Tracking:     PT Received On: 11/11/24  PT Start Time: 1143     PT Stop Time: 1215  PT Total Time (min): 32 min     Billable Minutes:   Neuromuscular Re-education 25 minutes       PT/PTA: PT       11/11/2024

## 2024-11-12 LAB
ALBUMIN SERPL BCP-MCNC: 1.5 G/DL (ref 3.5–5.2)
ALP SERPL-CCNC: 611 U/L (ref 40–150)
ALT SERPL W/O P-5'-P-CCNC: 51 U/L (ref 10–44)
ANION GAP SERPL CALC-SCNC: 13 MMOL/L (ref 8–16)
AST SERPL-CCNC: 134 U/L (ref 10–40)
BASOPHILS # BLD AUTO: 0.06 K/UL (ref 0–0.2)
BASOPHILS NFR BLD: 0.3 % (ref 0–1.9)
BILIRUB SERPL-MCNC: 17.9 MG/DL (ref 0.1–1)
BUN SERPL-MCNC: 38 MG/DL (ref 6–20)
CALCIUM SERPL-MCNC: 8.2 MG/DL (ref 8.7–10.5)
CHLORIDE SERPL-SCNC: 95 MMOL/L (ref 95–110)
CO2 SERPL-SCNC: 22 MMOL/L (ref 23–29)
CREAT SERPL-MCNC: 4.7 MG/DL (ref 0.5–1.4)
DIFFERENTIAL METHOD BLD: ABNORMAL
EOSINOPHIL # BLD AUTO: 0.2 K/UL (ref 0–0.5)
EOSINOPHIL NFR BLD: 0.9 % (ref 0–8)
ERYTHROCYTE [DISTWIDTH] IN BLOOD BY AUTOMATED COUNT: 23.2 % (ref 11.5–14.5)
EST. GFR  (NO RACE VARIABLE): 11.1 ML/MIN/1.73 M^2
GLUCOSE SERPL-MCNC: 89 MG/DL (ref 70–110)
HCT VFR BLD AUTO: 24.1 % (ref 37–48.5)
HGB BLD-MCNC: 8.1 G/DL (ref 12–16)
IMM GRANULOCYTES # BLD AUTO: 0.36 K/UL (ref 0–0.04)
IMM GRANULOCYTES NFR BLD AUTO: 1.8 % (ref 0–0.5)
INR PPP: 1.4 (ref 0.8–1.2)
LYMPHOCYTES # BLD AUTO: 1.2 K/UL (ref 1–4.8)
LYMPHOCYTES NFR BLD: 5.9 % (ref 18–48)
MAGNESIUM SERPL-MCNC: 1.9 MG/DL (ref 1.6–2.6)
MCH RBC QN AUTO: 30.6 PG (ref 27–31)
MCHC RBC AUTO-ENTMCNC: 33.6 G/DL (ref 32–36)
MCV RBC AUTO: 91 FL (ref 82–98)
MONOCYTES # BLD AUTO: 1.3 K/UL (ref 0.3–1)
MONOCYTES NFR BLD: 6.2 % (ref 4–15)
NEUTROPHILS # BLD AUTO: 17.2 K/UL (ref 1.8–7.7)
NEUTROPHILS NFR BLD: 84.9 % (ref 38–73)
NRBC BLD-RTO: 1 /100 WBC
PHOSPHATE SERPL-MCNC: 4.1 MG/DL (ref 2.7–4.5)
PLATELET # BLD AUTO: 248 K/UL (ref 150–450)
PMV BLD AUTO: 11.9 FL (ref 9.2–12.9)
POCT GLUCOSE: 100 MG/DL (ref 70–110)
POCT GLUCOSE: 103 MG/DL (ref 70–110)
POCT GLUCOSE: 110 MG/DL (ref 70–110)
POCT GLUCOSE: 122 MG/DL (ref 70–110)
POCT GLUCOSE: 98 MG/DL (ref 70–110)
POTASSIUM SERPL-SCNC: 4.2 MMOL/L (ref 3.5–5.1)
PROT SERPL-MCNC: 6.3 G/DL (ref 6–8.4)
PROTHROMBIN TIME: 14.6 SEC (ref 9–12.5)
RBC # BLD AUTO: 2.65 M/UL (ref 4–5.4)
SODIUM SERPL-SCNC: 130 MMOL/L (ref 136–145)
WBC # BLD AUTO: 20.23 K/UL (ref 3.9–12.7)

## 2024-11-12 PROCEDURE — 97112 NEUROMUSCULAR REEDUCATION: CPT | Mod: CQ

## 2024-11-12 PROCEDURE — 97530 THERAPEUTIC ACTIVITIES: CPT | Mod: CO

## 2024-11-12 PROCEDURE — 97535 SELF CARE MNGMENT TRAINING: CPT

## 2024-11-12 PROCEDURE — 36415 COLL VENOUS BLD VENIPUNCTURE: CPT | Performed by: STUDENT IN AN ORGANIZED HEALTH CARE EDUCATION/TRAINING PROGRAM

## 2024-11-12 PROCEDURE — 25000003 PHARM REV CODE 250: Performed by: HOSPITALIST

## 2024-11-12 PROCEDURE — 80053 COMPREHEN METABOLIC PANEL: CPT | Performed by: STUDENT IN AN ORGANIZED HEALTH CARE EDUCATION/TRAINING PROGRAM

## 2024-11-12 PROCEDURE — 20600001 HC STEP DOWN PRIVATE ROOM

## 2024-11-12 PROCEDURE — 97110 THERAPEUTIC EXERCISES: CPT | Mod: CQ

## 2024-11-12 PROCEDURE — 85025 COMPLETE CBC W/AUTO DIFF WBC: CPT | Performed by: HOSPITALIST

## 2024-11-12 PROCEDURE — 99497 ADVNCD CARE PLAN 30 MIN: CPT | Mod: 25,,,

## 2024-11-12 PROCEDURE — 92610 EVALUATE SWALLOWING FUNCTION: CPT

## 2024-11-12 PROCEDURE — 83735 ASSAY OF MAGNESIUM: CPT | Performed by: STUDENT IN AN ORGANIZED HEALTH CARE EDUCATION/TRAINING PROGRAM

## 2024-11-12 PROCEDURE — 99232 SBSQ HOSP IP/OBS MODERATE 35: CPT | Mod: ,,, | Performed by: NURSE PRACTITIONER

## 2024-11-12 PROCEDURE — 85610 PROTHROMBIN TIME: CPT | Performed by: STUDENT IN AN ORGANIZED HEALTH CARE EDUCATION/TRAINING PROGRAM

## 2024-11-12 PROCEDURE — 99233 SBSQ HOSP IP/OBS HIGH 50: CPT | Mod: 25,,,

## 2024-11-12 PROCEDURE — 84100 ASSAY OF PHOSPHORUS: CPT | Performed by: STUDENT IN AN ORGANIZED HEALTH CARE EDUCATION/TRAINING PROGRAM

## 2024-11-12 PROCEDURE — 97535 SELF CARE MNGMENT TRAINING: CPT | Mod: CO

## 2024-11-12 PROCEDURE — 25000003 PHARM REV CODE 250: Performed by: STUDENT IN AN ORGANIZED HEALTH CARE EDUCATION/TRAINING PROGRAM

## 2024-11-12 RX ORDER — FLUCONAZOLE 150 MG/1
150 TABLET ORAL ONCE
Status: COMPLETED | OUTPATIENT
Start: 2024-11-12 | End: 2024-11-12

## 2024-11-12 RX ORDER — SODIUM CHLORIDE 9 MG/ML
INJECTION, SOLUTION INTRAVENOUS ONCE
Status: CANCELLED | OUTPATIENT
Start: 2024-11-13

## 2024-11-12 RX ADMIN — URSODIOL 300 MG: 300 CAPSULE ORAL at 09:11

## 2024-11-12 RX ADMIN — PANTOPRAZOLE SODIUM 40 MG: 40 TABLET, DELAYED RELEASE ORAL at 08:11

## 2024-11-12 RX ADMIN — MUPIROCIN: 20 OINTMENT TOPICAL at 08:11

## 2024-11-12 RX ADMIN — HYDROCORTISONE: 25 CREAM TOPICAL at 08:11

## 2024-11-12 RX ADMIN — HYDROCORTISONE: 25 CREAM TOPICAL at 09:11

## 2024-11-12 RX ADMIN — MICONAZOLE NITRATE: 20 CREAM TOPICAL at 09:11

## 2024-11-12 RX ADMIN — PANTOPRAZOLE SODIUM 40 MG: 40 TABLET, DELAYED RELEASE ORAL at 09:11

## 2024-11-12 RX ADMIN — URSODIOL 300 MG: 300 CAPSULE ORAL at 03:11

## 2024-11-12 RX ADMIN — MUPIROCIN: 20 OINTMENT TOPICAL at 09:11

## 2024-11-12 RX ADMIN — URSODIOL 300 MG: 300 CAPSULE ORAL at 08:11

## 2024-11-12 RX ADMIN — DIPHENHYDRAMINE HYDROCHLORIDE 50 MG: 50 CAPSULE ORAL at 05:11

## 2024-11-12 RX ADMIN — OXYCODONE 5 MG: 5 TABLET ORAL at 12:11

## 2024-11-12 RX ADMIN — FLUCONAZOLE 150 MG: 150 TABLET ORAL at 11:11

## 2024-11-12 RX ADMIN — MICONAZOLE NITRATE: 20 CREAM TOPICAL at 08:11

## 2024-11-12 NOTE — PLAN OF CARE
Problem: SLP  Goal: SLP Goal  Description: Speech Language Pathology Goals  Goals expected to be met by 11/26    1. Pt will tolerate least restrictive PO diet without any overt s/sx of airway compromise.   2. Pt will sit upright for all PO intake/ meals.   Outcome: Progressing       Bedside swallow evaluation completed. No concerns for aspiration however pt interested in trialing level 6 soft and bite size diet to aid with self feeding. Position is primary barrier at this time. Recommend HOB elevated to max capacity and pt be placed as upright as possible for all PO intake/meals. SLP will follow up.

## 2024-11-12 NOTE — ASSESSMENT & PLAN NOTE
44 year old female hx of ESRD on HD MWF presenting from outside hospital as transfer for acute anemia and painless jaundice. Last HD session 10/28. Nephrology consulted for ESRD management.     Nephrology History  -Outpatient HD unit: [facility]  -Nephrologist: ?  -HD tx days: MWF  -HD tx time: 4 hrs  -Last HD tx: Monday 10/28  -HD access: LUE AVF  -HD modality: iHD  -Residual urine:   -EDW:  110 kg    Plan/Recommendations  ESRD on HD:    -Continue MWF iHD schedule  -Patient with diffuse body wall edema on imaging and chronic lymphedema, but no respiratory distress, on baseline O2, and no significant pulmonary edema on imaging. -Echo shows EF 60-65%, IVC 15 mm Hg.  -1 L daily fluid restriction  -Will continue iHD thrice weekly unless emergent indication arises  -Strict I&Os  -Pre & post HD weight  Anemia in ESRD  -Hgb goal 10-11, 7.5 today, stable  -Trend hgb, discontinue epo in setting of possible malignancy   Mineral Bone Disease in ESRD  -Resume home phos binders when diet advances  -Renal diet if not NPO

## 2024-11-12 NOTE — SUBJECTIVE & OBJECTIVE
Interval History: NAEON.     Review of patient's allergies indicates:   Allergen Reactions    Tramadol Itching and Nausea And Vomiting     Current Facility-Administered Medications   Medication Frequency    0.9%  NaCl infusion (for blood administration) Q24H PRN    acetaminophen tablet 650 mg Q4H PRN    albuterol-ipratropium 2.5 mg-0.5 mg/3 mL nebulizer solution 3 mL Q4H PRN    dextrose 10% bolus 125 mL 125 mL PRN    dextrose 10% bolus 250 mL 250 mL PRN    diphenhydrAMINE capsule 50 mg Q6H PRN    glucagon (human recombinant) injection 1 mg PRN    glucose chewable tablet 16 g PRN    glucose chewable tablet 24 g PRN    hydrocortisone 2.5 % cream BID    insulin aspart U-100 pen 0-5 Units QID (AC + HS) PRN    lactulose 20 gram/30 mL solution Soln 10 g Q6H PRN    lactulose 20 gram/30 mL solution Soln 20 g BID    miconazole 2 % cream BID    mupirocin 2 % ointment BID    naloxone 0.4 mg/mL injection 0.02 mg PRN    ondansetron injection 4 mg Q8H PRN    oxyCODONE immediate release tablet 5 mg Q6H PRN    pantoprazole EC tablet 40 mg BID    simethicone chewable tablet 80 mg QID PRN    sodium chloride 0.9% flush 10 mL Q12H PRN    traZODone tablet 100 mg Nightly PRN    ursodioL capsule 300 mg TID       Objective:     Vital Signs (Most Recent):  Temp: 98 °F (36.7 °C) (11/12/24 1203)  Pulse: 82 (11/12/24 1203)  Resp: 18 (11/12/24 1203)  BP: 137/87 (11/12/24 1203)  SpO2: 100 % (11/12/24 1203) Vital Signs (24h Range):  Temp:  [97.5 °F (36.4 °C)-99.2 °F (37.3 °C)] 98 °F (36.7 °C)  Pulse:  [69-89] 82  Resp:  [16-19] 18  SpO2:  [93 %-100 %] 100 %  BP: (107-143)/(51-87) 137/87     Weight: 130 kg (286 lb 9.6 oz) (11/06/24 1451)  Body mass index is 46.26 kg/m².  Body surface area is 2.46 meters squared.    I/O last 3 completed shifts:  In: 670 [P.O.:350; Blood:320]  Out: 3650 [Other:3650]     Physical Exam  Vitals and nursing note reviewed.   Constitutional:       Appearance: She is morbidly obese.   HENT:      Head: Normocephalic.       Mouth/Throat:      Mouth: Mucous membranes are moist.   Eyes:      General: Scleral icterus present.      Conjunctiva/sclera: Conjunctivae normal.      Comments: Swelling of R eyelid/periorbital area w/o vision changes, pain, discharge or erythema   Cardiovascular:      Rate and Rhythm: Normal rate and regular rhythm.      Heart sounds: Normal heart sounds.   Pulmonary:      Effort: Pulmonary effort is normal. No respiratory distress.      Breath sounds: Normal breath sounds. No wheezing, rhonchi or rales.      Comments: Auscultation limited by habitus  Abdominal:      General: Bowel sounds are normal. There is no distension.      Palpations: Abdomen is soft.      Tenderness: There is no abdominal tenderness. There is no guarding.   Musculoskeletal:      Right lower leg: Edema present.      Left lower leg: Edema present.      Comments: Extreme 4+ b/l LE pitting edema L>>R (c/w baseline). Diffuse anasarca.    Lymphadenopathy:      Upper Body:      Left upper body: Axillary adenopathy present.   Skin:     General: Skin is warm.      Coloration: Skin is jaundiced.   Neurological:      Mental Status: She is alert and oriented to person, place, and time. Mental status is at baseline.   Psychiatric:         Mood and Affect: Mood is anxious. Affect is tearful.         Behavior: Behavior normal.          Significant Labs:  CBC:   Recent Labs   Lab 11/12/24  0512   WBC 20.23*   RBC 2.65*   HGB 8.1*   HCT 24.1*      MCV 91   MCH 30.6   MCHC 33.6     CMP:   Recent Labs   Lab 11/12/24  0512   GLU 89   CALCIUM 8.2*   ALBUMIN 1.5*   PROT 6.3   *   K 4.2   CO2 22*   CL 95   BUN 38*   CREATININE 4.7*   ALKPHOS 611*   ALT 51*   *   BILITOT 17.9*     All labs within the past 24 hours have been reviewed.

## 2024-11-12 NOTE — CHAPLAIN
Palliative Care        Patient: Pilar Fernandez  MRN: [id number]  : 1980  Age: 44 y.o.  Hospital Length of Stay: 14  Code Status: Code Status Discussion Note  Attending Provider: Stephany Ordaz MD  Principal Problem: Acute liver failure     Attempted to visit pall med pt per request of pall med provider; pt was soundly sleeping; said silent prayer over pt (EPIC says pt is Evangelical S[employer]ism), left our pall med card with provider and SW and  name on it on her bed tray. Palliative  will follow up tomorrow.           To [facility] Staff:   Educated the patient/family regarding the services offered by the Spiritual Care team in my absence as the specialized Palliative Care  ([id number])  My hours are 7:30a-4:00p M-, but Spiritual Care Chaplains are available /.  I'm usually the first point of contact for palliative care patients, but any  is able to help.  Evening, overnight and weekends, please dial [phone] which is carried by an in-house Spiritual Care  at all hours.         Rev. Tereza Hernandes MDiv, [facility]  Board Certified Palliative Care   Palliative Medicine Department, 9th Floor [facility]     My SpectraLink: [phone]   Office: [phone]  ** If you call and I don't answer, please leave a voicemail because vmail is forwarded to me  Email: [email or web address]  Work hours: - [phone]     The care I provide is shaped by the Code of Ethics of the [employer] Professional Chaplains

## 2024-11-12 NOTE — PROGRESS NOTES
Palliative Medicine  Consult Note       Patient Name: Pilar Fernandez   MRN: [id number]   Admission Date: 10/29/2024   Hospital Length of Stay: 14   Attending Provider: Stephany Ordaz MD   Consulting Provider: JOHANNY Guadalupe  Primary Care Physician: Lindsey Singletary FNP   Principal Problem: Acute liver failure     Patient information was obtained from patient, past medical records, ER records, and primary team.         Assessment/Plan:      Palliative Care Encounter:  Impression:  Ms. Pilar Fernandez is a 44 y.o. female w/Hx of ESRD (on HD), T2DM, CHF (ef 60%), HTN, gastritis, severe BLE lymphedema (L>R, contributing to mobility issues), CHRF (on 3L @ home), prior CVA (July '24) w/LSW, sacral decubitus ulcer, gout, and known L breast mass. She presented to [facility] ED on 10/29/24 w/ painless jaundice w/ assoc pruritus.          [place] [facility] [place] conglomerates &/or soft tissue masses around L axillary soft tissues, b/l inguinal regions (L>R), L supraclavicular soft tissues, & retroperitoneum. Lymph node bx completed on 11/11.         Hepatology has been consulted this stay, liver bx 11/6 = Cholestatic hepatitis with stage 2 fibrosis with recommendations by hepatology to obtain MRI MRCP to definitively rule out an obstructive etiology. She has been deemed not a tx candidate at current due to functional status, co-morbid conditions, and possible malignancy (as evidenced by lymphadenopathy).           Palliative care consulted for ACP/GOC conversations, per communication with Dr. Ordaz.       Advance Care Planning   Advance Directives:   Living Will: No    Do Not Resuscitate Status: No    Medical Power of : Yes    Agent's Name:  Roberto Sharma (s.o.)   Agent's Contact Number:  [phone]    Decision Making:  Patient answered questions  Goals of Care: What is most important right now is to focus on remaining as independent as possible, symptom/pain control,  "extending life as long as possible, even it it means sacrificing quality. Accordingly, we have decided that the best plan to meet the patient's goals includes continuing with treatment.       11/12/24: Met with pt this morning at . Pt is alone in room, denies any acute pain or distress and is agreeable to conversation. Prior to this hopsitalization, pt describes she was living alone with her sig other, using a walker and/or wheelchair, and was able to transfer self. She has been mostly sleeping in her recliner and feels better sitting up.   -Explored pt's current understanding of her disease process and concerns related to d/c to rehab. Pt understands she may have cancer, pending bx, and that she "needs rehab".  -After some hesitancy, Pt shares she mostly wanted to go to rehab asap b/c she is so uncomfortable in her bed. (Of note, she has a speciality bed with air mattress best suited for her sacral decub and size.)   -She shares that she would want to get any treatment offered, based on results of her biopsy. Ultimately, she is agreeable to remaining in hospital while bx results are obtained and better understanding of prognosis/treatment can be determined.   -She expressed her overall concern that we are having these conversations and repeatedly asked if she will be able to get treatment for cancer. I explained team concern over her liver problems, dialysis, heart failure, debility, and likely cancer. We reviewed that biopsy results will determine what treatments may be available. Discussed that her multiple health issues will also be a determining factor in what therapies may be offered.   -She is somewhat understanding of this. I reiterated that we will continue to discuss what GOC are important to her as more information is gathered.  -At current, it is important to her to remain FULL code and continue with any/all treatment offered.   -Communicated conversation with Dr. Ordaz. [facility] med available to " "participate in any diagnostic conversations, as pt is easily overwhelmed and with limited understanding.     -MPOA: I engaged the pt in a conversation regarding MPOA decision making. She has 4 adult children and a sig other. She states that she would like her sig other (Roberto) and her daughter (Mirella) to be her decision makers. MPOA paperwork filled out and uploaded into EPIC to reflect this. Contact information updated in Epic.           11/11/24: Met with pt this afternoon at . She was alone in room. AAOx3. Transport came in shortly after my arrival to take pt to dialysis. During our brief interaction, pt stresses that she wants to go to rehab tomorrow if "she's allowed". I reiterated that she may "medically" go tomorrow, however medical team was hoping to have her biopsy results back to best form treatment plan.   -Pt shares she would like to leave hospital as soon as possible so that she can go to rehab to get home quicker. She stresses that she would prefer to follow up on biopsy results and come back for treatment.   -Due to time constraint, I did not get to fully assess her understanding around her overall prognosis and various co-morbidities at this time.   -She is agreeable to continuing conversation tomorrow, however overall goal is to go to rehab/home at this time.    Symptom Management:    -Debility: Pt with progressive debility r/t mobility issues and BLE swelling. With scaral decub d/t immobility.     -Dyspnea: Reports SOB at times. She is on home O2 and feels better after dialysis.       Summary of recommendations and follow up plan:  -Most important goals at this time: Home with her children   -Code status: Full Code   -Disposition: Rehab      The above recommendations communicated directly to primary team on 11/12/24    Thank you for your consult. I will follow-up with patient. Please contact us if you have any additional questions.       Subjective:     Chief Complaint: No chief complaint on " "file.        HPI:   Per chart review: " Pilar Fernandez is a 44 y.o. female w/ PMHx of ESRD (on HD), T2DM, CHF, HTN, gastritis, severe BLE lymphedema (L>R), CHRF (on 3L @ home), prior CVA (July '24; on DAPT) w/ impaired mobility & sacral decubitus ulcer, gout, and known L breast mass, who presented to [facility] ED on 10/29/24 w/ painless jaundice w/ assoc pruritus. Pt reports diffuse pruritus for few weeks, with family noticing yellow discoloration of eyes on 10/28. Was previously admitted to [facility] for cellulitis (LLE vs LUE?), for which she received IV abx and was dc'ed 10/26 on clindamycin. Pt reports she had labs done during her [facility] stay, but there was nothing mentioned re: bilirubin or liver function at that time (lab reports from [facility] not available per Care Everywhere). Pt went to routine HD on 10/28 where she was told she had elevated bilirubin and was referred to [facility] ED for further evaluation.      On arrival to [facility] ED, pt afebrile w/ borderline/normotension (/43-87); otherwise HDS. Pt reports chronic lymphedema & leg/sacral wounds (w/ assoc pain) at baseline. Denied fevers, chills, N/V, melena, hematochezia, hematemesis, BRBPR, SOB, palpitations, chest or abdominal pain or other complaints. Initial labs notable for Tbili 11.7, AST 81, ALT 30, , lipase 57, albumin 2.0, bicarb 20, Na 134, Cr 4.5, BUN 45, WBC 22.8, Hgb 6.4, plts 154, INR 2.1, & lactate 1.6. Stool positive for occult blood. CT A/P w/o contrast noted anasarca w/ persistent cardiomegaly, small & improved pericardial effusion & small L pleural effusion; no acute GI or hepatic abnormalities noted. Received IV zofran, Zosyn, IV protonix 80mg, IV fentanyl, along w/ 2u pRBC & 2u FFP. Given severe hyperbilirubinemia & anemia w/ e/o acute liver failure of unk etiology, pt transferred to [facility] on 10/29/24 for HLOC."      Hospital Course:  Per chart review: "On arrival to [facility] [place], pt received dose of Zosyn & CTX given leukocytosis. " Further abx held as low suspicion for infectious process & blood cx negative. GI consulted given c/f possible GI bleed; scope deferred given absence of overt bleeding. AES/Biliary consult deferred after team reviewed images and noted absence of biliary/ductal dilation. Hepatology consulted for evaluation/mgmt of acute liver failure & hyperbilirubinemia; serologic workup ordered. Liver US w/ doppler noted marked hepatosplenomegaly but otherwise nml w/ proper blood flow. Heme/Onc consulted d/t concern for possible hemolysis &/or underlying malignancy driving pt's presentation. Although Britt test positive, detectable haptoglobin made hemolysis unlikely. CT C/A/P showed notable for numerous enlarged LN conglomerates (&/or soft tissue masses) of the L axilla, inguinal regions (L > R), L supraclavicular region, & retroperitoneum- c/f neoplastic vs lymphoproliferative process. CT also noted diffuse anasarca, large pericardial effusion, cardiomegaly w/ pulmonary edema, & diffuse hepatosplenomegaly. Nephrology consulted for continuation of HD w/ volume removal. Midodrine initiated for post-dialysis hypotension & home BP meds held. Required addn'l unit of pRBCs on 10/31 for recurrent anemia & IV vitamin K on 11/1 for elevated INR. Bilirubin & LFTs continued to uptrend. Developed hepatic encephalopathy w/ ammonia lvl of 111, which improved with titration of pt's bowel regimen (switched from miralax to lactulose). BP stabilized and midodrine d/c'ed. After multiple postponements due to limited anesthesia staffing, liver biopsy done on 11/6. Pathology showed cholestatic hepatitis & features of hepatic venous outflow obstruction. Bilateral breast US recommended per Heme/Onc, however upon radiology's review of imaging, a dedicated L axilla US obtained instead (as pt's anasarca would make interpretation of breast US difficult). US of L axilla noted abnormal axillary adenopathy w/ at least 6 abnormal LNs (BI-RADS 4); biopsy advised  "d/t c/f metastatic dx vs lymphoma."    Review of Symptoms      Symptom Assessment (ESAS 0-10 Scale)  Pain:  0  Dyspnea:  0  Anxiety:  0  Nausea:  0  Depression:  0  Anorexia:  0  Fatigue:  0  Insomnia:  0  Restlessness:  0  Agitation:  0         Performance Status:  40    Living Arrangements:  Lives with family    Psychosocial/Cultural:   See Palliative Psychosocial Note: Yes  Pt lives with her sig. Other, Roberto, for the past 15 years. She has 4 adult children who live near.   **Primary  to Follow**  Palliative Care  Consult: Yes    Spiritual:  F - Tia and Belief:  Stresses importance of Holiness. She is [employer].           ROS:  Review of Systems   Constitutional:  Positive for fatigue.         Past Medical History:   Diagnosis Date    Anemia     Anemia     Breast mass     Chronic constipation     CKD (chronic kidney disease)     Diabetes mellitus     Dialysis patient     GRZEGORZ BARRIOS, THURS, SAT.    Embolic stroke involving right middle cerebral artery 2024    Encounter for blood transfusion     Erosive gastritis 2016    GERD (gastroesophageal reflux disease)     Gout     High cholesterol     Hypertension     Knee pain     Thyroid disease     Unspecified cataract 2022     Past Surgical History:   Procedure Laterality Date    AV FISTULA PLACEMENT Left 2019    Procedure: creation av fistula;  Surgeon: Buck Fernandez MD;  [facility];  Service: Cardiovascular;  Laterality: Left;  Left Radialcephalic: PACU     SECTION  , , ,     CHOLECYSTECTOMY      COLONOSCOPY N/A 10/23/2017    Procedure: COLONOSCOPY;  Surgeon: Bri Harry MD;  Location: [facility];  Service: Endoscopy;  Laterality: N/A;    EXCISIONAL BIOPSY Left 2022    Procedure: EXCISIONAL BIOPSY;  Surgeon: Mick Juarez MD;  Location: [facility] OR;  Service: General;  Laterality: Left;    TUBAL LIGATION      UPPER GASTROINTESTINAL ENDOSCOPY  2016    Erosive GastritisRenate " Lynn     Family History   Problem Relation Name Age of Onset    Thyroid disease Mother      Diabetes Father      Ovarian cancer Maternal Aunt Malathi     Breast cancer Maternal Aunt Ada          Review of patient's allergies indicates:   Allergen Reactions    Tramadol Itching and Nausea And Vomiting       Medications:    Current Facility-Administered Medications:     0.9%  NaCl infusion (for blood administration), , Intravenous, Q24H PRN, Stephany Ordaz MD    acetaminophen tablet 650 mg, 650 mg, Oral, Q4H PRN, Stephany Ordaz MD, 650 mg at 11/05/24 2059    albuterol-ipratropium 2.5 mg-0.5 mg/3 mL nebulizer solution 3 mL, 3 mL, Nebulization, Q4H PRN, Irene, Arup K., DO    dextrose 10% bolus 125 mL 125 mL, 12.5 g, Intravenous, PRN, Irene, Arup K., DO, Stopped at 11/01/24 0820    dextrose 10% bolus 250 mL 250 mL, 25 g, Intravenous, PRN, Irene, Arup K., DO, Stopped at 10/31/24 1226    diphenhydrAMINE capsule 50 mg, 50 mg, Oral, Q6H PRN, Irene, Arup K., DO, 50 mg at 11/12/24 0508    glucagon (human recombinant) injection 1 mg, 1 mg, Intramuscular, PRN, Irene, Arup K., DO    glucose chewable tablet 16 g, 16 g, Oral, PRN, Irene, Arup K., DO, 16 g at 10/30/24 2118    glucose chewable tablet 24 g, 24 g, Oral, PRN, Irene, Arup K., DO    hydrocortisone 2.5 % cream, , Topical (Top), BID, Shayne Diaz MD, Given at 11/12/24 0840    insulin aspart U-100 pen 0-5 Units, 0-5 Units, Subcutaneous, QID (AC + HS) PRN, Stephany Ordaz MD, 2 Units at 10/31/24 0858    lactulose 20 gram/30 mL solution Soln 10 g, 10 g, Oral, Q6H PRN, Irene, Arup K., DO    lactulose 20 gram/30 mL solution Soln 20 g, 20 g, Oral, BID, Shayne Diaz MD, 20 g at 11/09/24 0850    miconazole 2 % cream, , Topical (Top), BID, Shayne Diaz MD, Given at 11/12/24 0840    mupirocin 2 % ointment, , Nasal, BID, Isabel Pettit PA-C, Given at 11/12/24 0840    naloxone 0.4 mg/mL injection 0.02 mg, 0.02 mg, Intravenous, PRN, Coleman Bonilla,     ondansetron  injection 4 mg, 4 mg, Intravenous, Q8H PRN, Coleman Bonilla, DO, 4 mg at 11/11/24 2247    oxyCODONE immediate release tablet 5 mg, 5 mg, Oral, Q6H PRN, Stephany Ordaz MD, 5 mg at 11/12/24 0055    pantoprazole EC tablet 40 mg, 40 mg, Oral, BID, Stephany Ordaz MD, 40 mg at 11/12/24 0838    simethicone chewable tablet 80 mg, 1 tablet, Oral, QID PRN, Coleman Bonilla K., DO    sodium chloride 0.9% flush 10 mL, 10 mL, Intravenous, Q12H PRN, Coleman Bonilla., DO    traZODone tablet 100 mg, 100 mg, Oral, Nightly PRN, Coleman Bonilla K., DO    ursodioL capsule 300 mg, 300 mg, Oral, TID, Stephany Ordaz MD, 300 mg at 11/12/24 0838         Objective:      Physical Exam:  Vitals: Temp: 98 °F (36.7 °C) (11/12/24 1203)  Pulse: 82 (11/12/24 1203)  Resp: 18 (11/12/24 1203)  BP: 137/87 (11/12/24 1203)  SpO2: 100 % (11/12/24 1203)    Physical Exam  Constitutional:       Appearance: She is ill-appearing.   Neurological:      Mental Status: She is alert and oriented to person, place, and time.                 Labs:   Creatinine   Date Value Ref Range Status   11/12/2024 4.7 (H) 0.5 - 1.4 mg/dL Final      Hemoglobin   Date Value Ref Range Status   11/12/2024 8.1 (L) 12.0 - 16.0 g/dL Final      Albumin   Date Value Ref Range Status   11/12/2024 1.5 (L) 3.5 - 5.2 g/dL Final   11/11/2024 1.4 (L) 3.5 - 5.2 g/dL Final   11/10/2024 1.5 (L) 3.5 - 5.2 g/dL Final       Contains abnormal data CT Chest Abdomen Pelvis With IV Contrast (XPD) Routine Oral Contrast  Order: [id number]  Status: Final result       Visible to patient: Yes (not seen)       Next appt: None    0 Result Notes  Details    Reading Physician Reading Date Result Priority   Ike Badillo MD  [phone] 10/30/2024 Routine     Narrative & Impression  EXAMINATION:  CT CHEST ABDOMEN PELVIS WITH IV CONTRAST (XPD)     CLINICAL HISTORY:  Sepsis;     TECHNIQUE:  Low dose axial images, sagittal and coronal reformations were obtained from the neck base to the pubic symphysis after the  administration of 100 cc Omnipaque 350 intravenous contrast.  30 mL of oral Omnipaque 350 was administered.     COMPARISON:  Ultrasound liver 10/30/2024, CT abdomen pelvis without contrast 10/29/2024, CTA head and neck 07/13/2024     FINDINGS:  Structures at the base of the neck are unremarkable.     Peripherally calcified right thyroid nodule measuring 6 mm.     Numerous enlarged soft tissue densities about the left axillary soft tissues, for example measuring approximately 2.9 x 2.4 cm (series 2, image 39).  Additional enlarged left axillary lymph nodes, for example a left axillary lymph node measuring 2.0 cm (series 2, image 36).  Enlarged left supraclavicular lymph node measuring 1.5 cm (series 2, image 3)     Evaluation is limited secondary to diffuse body wall anasarca throughout the thoracic soft tissues.     The heart is enlarged in size with large volume pericardial effusion.  Coronary artery calcification in a multi vessel distribution.     Pulmonary vasculature is limited in evaluation without evidence for pulmonary artery embolism or abnormality of the pulmonary veins.     Prominent right paratracheal lymph node measuring 1.0 cm (series 2, image 23).     Airways are patent.  Lungs are symmetrically expanded.  Prominence of the pulmonary vasculature, scattered interlobular septal thickening, and diffuse ground-glass attenuation throughout both lungs.  Small left pleural effusion with associated compressive atelectasis.     The liver is enlarged in size measuring 27.5 cm, demonstrating heterogeneous attenuation.  Few scattered subcentimeter hepatic hypodensities too small for characterization.     Gallbladder is surgically absent.  No intrahepatic or extrahepatic ductal dilatation.     Spleen is enlarged in size measuring 17.3 cm.  No definite focal splenic lesions.  Few splenic calcifications.     Adrenal glands are unremarkable.  2.1 cm cystic focus about the uncinate process, similar from comparison  imaging.     Kidneys are normal size and location.  No hydronephrosis or nephrolithiasis.  Right subcentimeter renal hypodensity too small for characterization.  Ureters are normal in course and caliber.  Urinary bladder is unremarkable.     Uterus demonstrates no significant abnormality.  Intrauterine device is visualized within the endometrial cavity.     Stomach and duodenum are unremarkable.  This of small bowel demonstrate no evidence for obstructive or inflammatory process.  Appendix not well visualized.  Liquid and aerated stool throughout the visualized colon and rectum.     Scattered mesenteric edema.  Layering fluid in the pelvis.  No free intra-abdominal air.  Multiple enlarged retroperitoneal lymph nodes, for example a left periaortic lymph node measuring 1.7 cm     Moderate calcific atherosclerosis of the visualized aorta.     Diffuse body wall anasarca throughout the visualized abdomen and pelvis.  Enlarged left external iliac lymph node measuring 1.9 cm (series 3, image 168).  Multiple enlarged left greater than right inguinal lymph nodes, for example measuring approximately 2.5 cm (series 3, image 209).  Additional soft tissue density about the proximal left thigh, partially visualized (series 3, image 218).     Osseous structures demonstrate no evidence for acute fracture or osseous destructive lesion.     Impression:     Diffuse body wall anasarca throughout the visualized chest, abdomen, pelvis, and proximal thighs.     Numerous enlarged lymph node conglomerates and/or soft tissue masses about the left axillary soft tissues, left greater than right inguinal regions, left supraclavicular soft tissues, and retroperitoneum as above.  Findings remain nonspecific, however remain concerning for neoplastic or lymphoproliferative process.  Consider further evaluation with tissue sampling, noting left axillary soft tissue lesion proximity to the skin surface (for example series 2, image 39).     Large  volume pericardial effusion.  Correlation is advised     Cardiomegaly with pulmonary vascular congestion, interlobular septal thickening, and scattered ground-glass attenuation, in keeping with evolving pulmonary edema.     Small left pleural effusion with associated compressive atelectasis.     Diffuse hepatosplenomegaly.  No obvious focal hepatic lesions, noting limitations from diffuse body wall anasarca.     Age advanced coronary artery and aortic atherosclerosis.     This report was flagged in Epic as abnormal.        Electronically signed by:Ike Badillo  Date:                                            10/30/2024  Time:                                           12:42        Exam Ended: 10/30/24 12:03 CDT Last Resulted: 10/30/24 12:42 CDT         Additional 17 min time spent on a voluntary advance care planning and /or goals of care discussion, providing emotional support, formulating, and communicating prognosis and exploring burden/benefit of various approaches of treatment.      Thank you for the opportunity to care for this patient and family.       Cyn Panda, CNS

## 2024-11-12 NOTE — PROGRESS NOTES
11/11/24 1738        Hemodialysis AV Fistula Left upper arm   No placement date or time found.   Present Prior to Hospital Arrival?: Yes  Size/Length: 15 G  Location: Left upper arm   Site Assessment Clean;Dry;Intact   Patency Present;Thrill;Bruit   Status Deaccessed   Site Condition No complications   During Hemodialysis Assessment   Blood Flow Rate (mL/min) 400 mL/min   Dialysate Flow Rate (mL/min) 700 ml/min   Ultrafiltration Rate (mL/Hr) 1040 mL/Hr   Arteriovenous Lines Secure Yes   Arterial Pressure (mmHg) -120 mmHg   Venous Pressure (mmHg) 130   Blood Volume Processed (Liters) 71.1 L   UF Removed (mL) 3650 mL   TMP 40   Venous Line in Air Detector Yes   Intake (mL) 250 mL   Intra-Hemodialysis Comments HD completed   Post-Hemodialysis Assessment   Rinseback Volume (mL) 250 mL   Blood Volume Processed (Liters) 71.1 L   Dialyzer Clearance Moderately streaked   Duration of Treatment 210 minutes   Total UF (mL) 3650 mL   Net Fluid Removal 3000   Patient Response to Treatment Jamie. well     HD completed. Patient left [facility] by bed in [facility].

## 2024-11-12 NOTE — PLAN OF CARE
Advance Care Planning   [facility] ([place] [address])  Palliative Care       Patient Name: Pilar Fernandez  MRN: [id number]  Admission Date: 10/29/2024  Hospital Length of Stay: 14 days  Code Status: Full Code   Attending Provider: Stephany Ordaz MD  Palliative Care Provider:   Primary Care Physician: Lindsey Singletary FNP  Principal Problem:Acute liver failure     Advance Care Planning     Date: 11/12/2024    Power of   I initiated the process of voluntary advance care planning today and explained the importance of this process to the patient.  I introduced the concept of advance directives to the patient, as well. Then the patient received detailed information about the importance of designating a Health Care Power of  (HCPOA). She was also instructed to communicate with this person about their wishes for future healthcare, should she become sick and lose decision-making capacity. The patient has not previously appointed a HCPOA. After our discussion, the patient has decided to complete a HCPOA and has appointed her significant other, health care agent:  Roberto Sharma  & health care agent number:  [phone] . I encouraged her to communicate with this person about their wishes for future healthcare, should she become sick and lose decision-making capacity.       A total of 16 min was spent on advance care planning, goals of care discussion, emotional support, formulating and communicating prognosis and exploring burden/benefit of various approaches of treatment. This discussion occurred on a fully voluntary basis with the verbal consent of the patient and/or family.      Document explained, signed, and witnessed. No questions or concerns at this time.     Karine Huff, [id number], [facility]  Department of Palliative Medicine

## 2024-11-12 NOTE — PT/OT/SLP EVAL
"Speech Language Pathology Evaluation  Bedside Swallow    Patient Name:  Pilar Fernandez   MRN:  [id number]  Admitting Diagnosis: Acute liver failure    Recommendations:                 General Recommendations:   Follow up  Diet recommendations:  Soft & Bite Sized Diet - IDDSI Level 6, Thin liquids - IDDSI Level 0   Aspiration Precautions: HOB to 90 degrees and Standard aspiration precautions   General Precautions: Standard, fall, dental soft  Communication strategies:  none    Assessment:     Pilar Fernandez is a 44 y.o. female with an SLP diagnosis of  functional swallow .  She presents with reported difficulty eating/drinking when reclined in bed.     History:     Past Medical History:   Diagnosis Date    Anemia     Anemia     Breast mass     Chronic constipation     CKD (chronic kidney disease)     Diabetes mellitus     Dialysis patient     MON, E, TH, SAT.    Embolic stroke involving right middle cerebral artery 2024    Encounter for blood transfusion     Erosive gastritis 2016    GERD (gastroesophageal reflux disease)     Gout     High cholesterol     Hypertension     Knee pain     Thyroid disease     Unspecified cataract 2022     Past Surgical History:   Procedure Laterality Date    AV FISTULA PLACEMENT Left 2019    Procedure: creation av fistula;  Surgeon: Buck Fernandez MD;  Location: [facility];  Service: Cardiovascular;  Laterality: Left;  Left Radialcephalic: PACU     SECTION  , , ,     CHOLECYSTECTOMY      COLONOSCOPY N/A 10/23/2017    Procedure: COLONOSCOPY;  Surgeon: Bri Harry MD;  [facility];  Service: Endoscopy;  Laterality: N/A;    EXCISIONAL BIOPSY Left 2022    Procedure: EXCISIONAL BIOPSY;  Surgeon: Mick Juarez MD;  Location: [facility];  Service: General;  Laterality: Left;    TUBAL LIGATION      UPPER GASTROINTESTINAL ENDOSCOPY  2016    Erosive Gastritis-Dr Bailey     "HPI: Pilar Fernandez is a 44-year-old " "female with history of stroke (MRI July 2024 on DAPT), breast mass, gastritis, HTN, uncontrolled diabetes (A1C 8.7), end-stage renal disease on hemodialysis (MWF), anemia,  gout, lymphedema BLE (L > R), impaired mobility, stage II sacral decubitus,  chronic hypoxic respiratory failure (on 3 L nasal cannula at home) who presents as a transfer from [facility] ED for management of painless jaundice and acute anemia. "    Prior Intubation HX:  none    Modified Barium Swallow: none    Prior diet: unrestricted    Subjective     Spoke with RN prior to session. Pt awake/alert reclined in bed upon entry to room.     Pain/Comfort:  Pain Rating 1: 0/10  Pain Rating Post-Intervention 1: 0/10    Respiratory Status: Nasal cannula, flow 3 L/min    Objective:     Oral Musculature Evaluation  Oral Musculature: WFL  Dentition: present and adequate  Secretion Management: adequate  Mucosal Quality: adequate  Mandibular Strength and Mobility: WFL  Oral Labial Strength and Mobility: WFL  Lingual Strength and Mobility: WFL  Velar Elevation: WFL  Buccal Strength and Mobility: WFL  Volitional Cough: present  Volitional Swallow: timely  Voice Prior to PO Intake: clear    Bedside Swallow Eval:   Consistencies Assessed:  Thin liquids straw sips x2 oz  Solids cracker x1/2       Oral Phase:   WFL    Pharyngeal Phase:   no overt clinical signs/symptoms of aspiration  no overt clinical signs/symptoms of pharyngeal dysphagia    Compensatory Strategies  None    Treatment: HOB elevated to max capacity (~60 degrees) prior to PO trials. Pt able to self present all consistencies without concern for aspiration. She reports prolonged mastication with some meats and requesting trial of soft diet at this time. SLP will follow up on 11/13 to ensure safety and preference. Education provided re: role of SLP, diet recs, swallow precs, s/s aspiration and POC.  Pt verbalized understanding and agreement.   Diet recs and precautions written on white board and " communicated with nursing.     Goals:   Multidisciplinary Problems       SLP Goals          Problem: SLP    Goal Priority Disciplines Outcome   SLP Goal     SLP Progressing   Description: Speech Language Pathology Goals  Goals expected to be met by 11/26    1. Pt will tolerate least restrictive PO diet without any overt s/sx of airway compromise.   2. Pt will sit upright for all PO intake/ meals.                              Plan:     Patient to be seen:  2 x/week   Plan of Care expires:  12/12/24  Plan of Care reviewed with:  patient   SLP Follow-Up:  Yes       Discharge recommendations:  No Therapy Indicated   Barriers to Discharge:  None    Time Tracking:     SLP Treatment Date:   11/12/24  Speech Start Time:  1519  Speech Stop Time:  1532     Speech Total Time (min):  13 min    Billable Minutes: Eval Swallow and Oral Function 5 and Self Care/Home Management Training 8    11/12/2024

## 2024-11-12 NOTE — PROGRESS NOTES
[facility] ([place] [id number])  Nephrology  Progress Note    Patient Name: Pilar Fernandez  MRN: [id number]  Admission Date: 10/29/2024  Hospital Length of Stay: 14 days  Attending Provider: Stephany Ordaz MD   Primary Care Physician: Lindsey Singletary FNP  Principal Problem:Acute liver failure    Subjective:     Interval History: NAEON. On 3L NC.     Review of patient's allergies indicates:   Allergen Reactions    Tramadol Itching and Nausea And Vomiting     Current Facility-Administered Medications   Medication Frequency    0.9%  NaCl infusion (for blood administration) Q24H PRN    acetaminophen tablet 650 mg Q4H PRN    albuterol-ipratropium 2.5 mg-0.5 mg/3 mL nebulizer solution 3 mL Q4H PRN    dextrose 10% bolus 125 mL 125 mL PRN    dextrose 10% bolus 250 mL 250 mL PRN    diphenhydrAMINE capsule 50 mg Q6H PRN    glucagon (human recombinant) injection 1 mg PRN    glucose chewable tablet 16 g PRN    glucose chewable tablet 24 g PRN    hydrocortisone 2.5 % cream BID    insulin aspart U-100 pen 0-5 Units QID (AC + HS) PRN    lactulose 20 gram/30 mL solution Soln 10 g Q6H PRN    lactulose 20 gram/30 mL solution Soln 20 g BID    miconazole 2 % cream BID    mupirocin 2 % ointment BID    naloxone 0.4 mg/mL injection 0.02 mg PRN    ondansetron injection 4 mg Q8H PRN    oxyCODONE immediate release tablet 5 mg Q6H PRN    pantoprazole EC tablet 40 mg BID    simethicone chewable tablet 80 mg QID PRN    sodium chloride 0.9% flush 10 mL Q12H PRN    traZODone tablet 100 mg Nightly PRN    ursodioL capsule 300 mg TID       Objective:     Vital Signs (Most Recent):  Temp: 98 °F (36.7 °C) (11/12/24 1203)  Pulse: 82 (11/12/24 1203)  Resp: 18 (11/12/24 1203)  BP: 137/87 (11/12/24 1203)  SpO2: 100 % (11/12/24 1203) Vital Signs (24h Range):  Temp:  [97.5 °F (36.4 °C)-99.2 °F (37.3 °C)] 98 °F (36.7 °C)  Pulse:  [69-89] 82  Resp:  [16-19] 18  SpO2:  [93 %-100 %] 100 %  BP: (107-143)/(51-87) 137/87     Weight: 130 kg (286  lb 9.6 oz) (11/06/24 1451)  Body mass index is 46.26 kg/m².  Body surface area is 2.46 meters squared.    I/O last 3 completed shifts:  In: 670 [P.O.:350; Blood:320]  Out: 3650 [Other:3650]     Physical Exam  Vitals and nursing note reviewed.   Constitutional:       Appearance: She is morbidly obese.   HENT:      Head: Normocephalic.      Mouth/Throat:      Mouth: Mucous membranes are moist.   Eyes:      General: Scleral icterus present.      Conjunctiva/sclera: Conjunctivae normal.      Comments: Swelling of R eyelid/periorbital area w/o vision changes, pain, discharge or erythema   Cardiovascular:      Rate and Rhythm: Normal rate and regular rhythm.      Heart sounds: Normal heart sounds.   Pulmonary:      Effort: Pulmonary effort is normal. No respiratory distress.      Breath sounds: Normal breath sounds. No wheezing, rhonchi or rales.      Comments: Auscultation limited by habitus  Abdominal:      General: Bowel sounds are normal. There is no distension.      Palpations: Abdomen is soft.      Tenderness: There is no abdominal tenderness. There is no guarding.   Musculoskeletal:      Right lower leg: Edema present.      Left lower leg: Edema present.      Comments: Extreme 4+ b/l LE pitting edema L>>R (c/w baseline). Diffuse anasarca.    Lymphadenopathy:      Upper Body:      Left upper body: Axillary adenopathy present.   Skin:     General: Skin is warm.      Coloration: Skin is jaundiced.   Neurological:      Mental Status: She is alert and oriented to person, place, and time. Mental status is at baseline.   Psychiatric:         Mood and Affect: Mood is anxious. Affect is tearful.         Behavior: Behavior normal.          Significant Labs:  CBC:   Recent Labs   Lab 11/12/24  0512   WBC 20.23*   RBC 2.65*   HGB 8.1*   HCT 24.1*      MCV 91   MCH 30.6   MCHC 33.6     CMP:   Recent Labs   Lab 11/12/24  0512   GLU 89   CALCIUM 8.2*   ALBUMIN 1.5*   PROT 6.3   *   K 4.2   CO2 22*   CL 95   BUN 38*    CREATININE 4.7*   ALKPHOS 611*   ALT 51*   *   BILITOT 17.9*     All labs within the past 24 hours have been reviewed.     Assessment/Plan:     Renal/  ESRD (end stage renal disease) on dialysis  44 year old female hx of ESRD on HD MWF presenting from outside hospital as transfer for acute anemia and painless jaundice. Last HD session 10/28. Nephrology consulted for ESRD management.     Nephrology History  -Outpatient HD unit: [facility]  -Nephrologist: ?  -HD tx days: MWF  -HD tx time: 4 hrs  -Last HD tx: Monday 10/28  -HD access: LUE AVF  -HD modality: iHD  -Residual urine:   -EDW:  110 kg    Plan/Recommendations  ESRD on HD:    -Continue MWF iHD schedule  -Patient with diffuse body wall edema on imaging and chronic lymphedema, but no respiratory distress, on baseline O2, and no significant pulmonary edema on imaging. -Echo shows EF 60-65%, IVC 15 mm Hg.  -1 L daily fluid restriction  -Will continue iHD thrice weekly unless emergent indication arises  -Strict I&Os  -Pre & post HD weight  Anemia in ESRD  -Hgb goal 10-11, 7.5 today, stable  -Trend hgb, discontinue epo in setting of possible malignancy   Mineral Bone Disease in ESRD  -Resume home phos binders when diet advances  -Renal diet if not NPO      GI  * Acute liver failure  -management per primary         Thank you for your consult. I will follow-up with patient. Please contact us if you have any additional questions.    Evon Bullard, YAEL  Nephrology  [facility] ([place])

## 2024-11-12 NOTE — PLAN OF CARE
Problem: Adult Inpatient Plan of Care  Goal: Plan of Care Review  Outcome: Progressing  Goal: Patient-Specific Goal (Individualized)  Outcome: Progressing  Goal: Absence of Hospital-Acquired Illness or Injury  Outcome: Progressing  Goal: Optimal Comfort and Wellbeing  Outcome: Progressing  Goal: Readiness for Transition of Care  Outcome: Progressing     Problem: Diabetes Comorbidity  Goal: Blood Glucose Level Within Targeted Range  Outcome: Progressing     Problem: Acute Kidney Injury/Impairment  Goal: Fluid and Electrolyte Balance  Outcome: Progressing  Goal: Improved Oral Intake  Outcome: Progressing  Goal: Effective Renal Function  Outcome: Progressing     Problem: Wound  Goal: Optimal Coping  Outcome: Progressing  Goal: Optimal Functional Ability  Outcome: Progressing  Goal: Absence of Infection Signs and Symptoms  Outcome: Progressing  Goal: Improved Oral Intake  Outcome: Progressing  Goal: Optimal Pain Control and Function  Outcome: Progressing  Goal: Skin Health and Integrity  Outcome: Progressing  Goal: Optimal Wound Healing  Outcome: Progressing     Problem: Skin Injury Risk Increased  Goal: Skin Health and Integrity  Outcome: Progressing     Problem: Gastrointestinal Bleeding  Goal: Optimal Coping with Acute Illness  Outcome: Progressing  Goal: Hemostasis  Outcome: Progressing     Problem: Bariatric Environmental Safety  Goal: Safety Maintained with Care  Outcome: Progressing     Problem: Hemodialysis  Goal: Safe, Effective Therapy Delivery  Outcome: Progressing  Goal: Effective Tissue Perfusion  Outcome: Progressing  Goal: Absence of Infection Signs and Symptoms  Outcome: Progressing     Problem: Chronic Kidney Disease  Goal: Electrolyte Balance  Outcome: Progressing  Goal: Fluid Balance  Outcome: Progressing     Problem: Fall Injury Risk  Goal: Absence of Fall and Fall-Related Injury  Outcome: Progressing     Problem: Coping Ineffective  Goal: Effective Coping  Outcome: Progressing    Patient alert and  oriented. Cooperative with care. Medicated as ordered. Patient had dialysis this shift. Tolerated well. Patient also had lymph bx this shift and tolerated well. Patient back in room eating dinner at this time. Call light within reach

## 2024-11-12 NOTE — NURSING
PT AAOx4. Vitals stable, on 3L NC. SR on tele. Refused lactulose, 2 large BM this shift. Bath, incontinence care done x2. Sacral, abd and breast Wound care done as order. Pt was turned q2. Bariatric immerse bed in use. No acute event this shift. Safety precaution in place.

## 2024-11-12 NOTE — PT/OT/SLP PROGRESS
Occupational Therapy   Treatment    Name: Pilar Fernandez  MRN: [id number]  Admitting Diagnosis:  Acute liver failure       Recommendations:     Discharge Recommendations: High Intensity Therapy  Discharge Equipment Recommendations:  bedside commode, bath bench, wheelchair, lift device, hospital bed  Barriers to discharge:  Decreased caregiver support, Other (Comment) (patient requires increased level of assistance)    Assessment:     Pilar Fernandez is a 44 y.o. female with a medical diagnosis of Acute liver failure.  She presents with the fallowing performance deficits affecting function are weakness, impaired endurance, impaired self care skills, impaired functional mobility, gait instability, impaired balance, pain, decreased safety awareness, decreased lower extremity function, decreased upper extremity function, impaired skin, impaired cardiopulmonary response to activity, edema, decreased coordination. Patient agreeable to tx session, today session focused on tolerance to upright positioning for postural musculature to performed dynamic sitting to prepare for functional UB/LB dressing task sitting up at EOB. Patient with improve bed mobility task and sitting balance as compared to previous sessions with patient requiring CGA and B UE support, Patient also required total A of 2 persons for supine<>sit and over bed mobility. Patient is still well below baseline function and is not safe to return to home at this time. Patient continue to demonstrate that need for High intensity therapy intervention on daily basis post acute setting.      Rehab Prognosis:  Good; patient would benefit from acute skilled OT services to address these deficits and reach maximum level of function.       Plan:     Patient to be seen 4 x/week to address the above listed problems via self-care/home management, therapeutic activities, therapeutic exercises, neuromuscular re-education  Plan of Care Expires: 11/30/24  Plan of  "Care Reviewed with: patient    Subjective     Chief Complaint: " I want to sit upright"   Patient/Family Comments/goals: to return to PLOF  Pain/Comfort:  Pain Rating 1: 0/10    Objective:     Communicated with: Nurse prior to session.  Patient found HOB elevated with telemetry, oxygen upon OT entry to room.  Co-treated with PT due to patient complexity deficits requiring two skilled therapist to appropriately and safely mobilized patient while facilitating functional task in addition to accommodating for patient's activity tolerance.    General Precautions: Standard, fall, dental soft    Orthopedic Precautions:N/A  Braces: N/A  Respiratory Status: Nasal cannula, flow 3 L/min     Occupational Performance:     Bed Mobility:    Patient completed Rolling/Turning to Left with  total assistance of 2 persons   Patient completed Rolling/Turning to Right with total assistance of 2 persons   Patient completed Scooting/Bridging with total assistance of 2 persons   Patient completed Supine to Sit with total assistance of 2 persons   Patient completed Sit to Supine with total assistance of 2 persons   Patient was able to tolerate sitting up at EOB for 20 mins with Min to CGA for static sitting balance     Functional Mobility/Transfers:  Deferred further mobility task due to safety concerns     Activities of Daily Living:  Grooming: Setup A to wash face sitting up at EOB, patient required Max to applied lotion to legs, arms, and back       AMPA 6 Click ADL: 12    Treatment & Education:  Discussed OT POC and progress  Educated patient on the importance to continue to perform exercises in order to reduce stiffness and promote joint mobility and blood flow  Addressed patient's questions and concerns within GONZALES scope of practice      Patient left with bed in chair position with all lines intact and call button in reach    GOALS:   Multidisciplinary Problems       Occupational Therapy Goals          Problem: Occupational Therapy    " Goal Priority Disciplines Outcome Interventions   Occupational Therapy Goal     OT, PT/OT Progressing    Description: Goals to be met by: 11/30/24     Patient will increase functional independence with ADLs by performing:    UE Dressing with Set-up Assistance.  LE Dressing with Moderate Assistance.  Grooming while seated with Set-up Assistance.  Toileting from bedside commode with Moderate Assistance for hygiene and clothing management.   Supine to sit with Contact Guard Assistance.  Stand pivot transfers with Maximum Assistance.  Toilet transfer to bedside commode with Maximum Assistance.    DME needs:   Tub transfer bench for t/s combo to ensure pt return to hygiene and allow safe and effective bathing in home environment with minimized fall risk due to functional mobility impairments.      Patient has a mobility limitation that significantly impairs their ability to participate in one or more mobility related activities of daily living, including toileting. This deficit can be resolved by using a bedside commode. Patient demonstrates mobility limitations that will cause them to be confined to one room at home without bathroom access for up to 30 days. Using a bedside commode will greatly improve the patient's ability to participate in MRADLs.                          Time Tracking:     OT Date of Treatment: 11/12/24  OT Start Time: 1026  OT Stop Time: 1111  OT Total Time (min): 45 min    Billable Minutes:Self Care/Home Management 15  Therapeutic Activity 30    OT/MIKE: MIKE     Number of MIKE visits since last OT visit: 1 11/12/2024

## 2024-11-12 NOTE — PT/OT/SLP PROGRESS
"Physical Therapy Co Treatment    Patient Name:  Pilar Fernandez   MRN:  [id number]    Recommendations:     Discharge Recommendations: High Intensity Therapy  Discharge Equipment Recommendations: bedside commode, bath bench, wheelchair, lift device, hospital bed  Barriers to discharge: Decreased caregiver support and increased need for skilled assistance, fall risk     Assessment:     Pilar Fernandez is a 44 y.o. female admitted with a medical diagnosis of Acute liver failure.  She presents with the following impairments/functional limitations: weakness, impaired endurance, impaired self care skills, gait instability, impaired functional mobility, impaired balance, decreased coordination, decreased upper extremity function, decreased lower extremity function, decreased safety awareness, pain, impaired skin, edema, impaired cardiopulmonary response to activity, decreased ROM. Pt was agreeable to skilled therapy session and EOB activities. Pt is extremely motivated to improve function but has very poor mobility at this time. Pt requires total assistance of multiple people for all functional mobility.  Co-treatment performed due to patient's multiple deficits requiring two skilled therapists to appropriately and safely assess patient's strength and endurance while facilitating functional tasks in addition to accommodating for patient's activity tolerance.  Rehab Prognosis: Good; patient would benefit from acute skilled PT services to address these deficits and reach maximum level of function.    Recent Surgery: * No surgery found *      Plan:     During this hospitalization, patient to be seen 4 x/week to address the identified rehab impairments via gait training, therapeutic activities, therapeutic exercises, neuromuscular re-education and progress toward the following goals:    Plan of Care Expires:  12/02/24    Subjective     Chief Complaint: "I wanna sit up"  Patient/Family Comments/goals: Return to " PLOF  Pain/Comfort:  Pain Rating 1: 0/10      Objective:     Communicated with RN prior to session.  Patient found HOB elevated with telemetry, oxygen upon PT entry to room.     General Precautions: Standard, fall  Orthopedic Precautions: N/A  Braces: N/A  Respiratory Status: Nasal cannula, flow 2 L/min     Functional Mobility:  Bed Mobility:     Rolling Left:  total assistance and of 2 persons  Rolling Right: total assistance and of 2 persons  Scooting: total assistance and of 2 persons  Supine to Sit: total assistance and of 2 persons  Sit to Supine: total assistance and of 2 persons  Balance: EOB: Min A to CGA  Postural work, midline orientation  Therapeutic Exercise: Patient performed 2 set(s) of 10 repetitions of  the following seated exercises: ankle pumps, long arc quads, marches, hip abduction, and hip adduction for bilateral LE. Patient required skilled PT for instruction of exercises and appropriate cues to perform exercises safely and appropriately.       AM-PAC 6 CLICK MOBILITY  Turning over in bed (including adjusting bedclothes, sheets and blankets)?: 2  Sitting down on and standing up from a chair with arms (e.g., wheelchair, bedside commode, etc.): 1  Moving from lying on back to sitting on the side of the bed?: 2  Moving to and from a bed to a chair (including a wheelchair)?: 1  Need to walk in hospital room?: 1  Climbing 3-5 steps with a railing?: 1  Basic Mobility Total Score: 8       Treatment & Education:  Pt educated on there purpose, goals and benefits of today's session. Pt informed on progress made and improved tolerance to activity today. Pt was given VC's throughout session for sequencing, hand placements and safety. Pt was encourage to follow commands to improve outcome of activities being performed.     Patient left with bed in chair position with all lines intact and call button in reach..    GOALS:   Multidisciplinary Problems       Physical Therapy Goals          Problem: Physical  Therapy    Goal Priority Disciplines Outcome Interventions   Physical Therapy Goal     PT, PT/OT Progressing    Description: Goals to be met by: 2024     Patient will increase functional independence with mobility by performin. Supine to sit with Set-up [place]  2. Sit to supine with Set-up [place]  3. Sit to stand transfer with Contact Guard Assistance  4. Bed to chair transfer with Minimal Assistance using LRAD  5. Gait  x 30 feet with Minimal Assistance using LRAD   6. Ascend/descend 3 stair with no Handrails Moderate Assistance using LRAD   7. Lower extremity exercise program x15 reps per handout, with supervision                         Time Tracking:     PT Received On: 24  PT Start Time: 1026     PT Stop Time: 1111  PT Total Time (min): 45 min     Billable Minutes: Therapeutic Exercise 15 and Neuromuscular Re-education 30    Treatment Type: Treatment  PT/PTA: PTA     Number of PTA visits since last PT visit: 1     2024

## 2024-11-13 LAB
ALBUMIN SERPL BCP-MCNC: 1.4 G/DL (ref 3.5–5.2)
ALP SERPL-CCNC: 563 U/L (ref 40–150)
ALT SERPL W/O P-5'-P-CCNC: 43 U/L (ref 10–44)
ANION GAP SERPL CALC-SCNC: 12 MMOL/L (ref 8–16)
AST SERPL-CCNC: 114 U/L (ref 10–40)
BASOPHILS # BLD AUTO: 0.11 K/UL (ref 0–0.2)
BASOPHILS NFR BLD: 0.7 % (ref 0–1.9)
BILIRUB SERPL-MCNC: 17.4 MG/DL (ref 0.1–1)
BUN SERPL-MCNC: 44 MG/DL (ref 6–20)
CALCIUM SERPL-MCNC: 8.5 MG/DL (ref 8.7–10.5)
CHLORIDE SERPL-SCNC: 95 MMOL/L (ref 95–110)
CO2 SERPL-SCNC: 21 MMOL/L (ref 23–29)
CREAT SERPL-MCNC: 5 MG/DL (ref 0.5–1.4)
DIFFERENTIAL METHOD BLD: ABNORMAL
EOSINOPHIL # BLD AUTO: 0.3 K/UL (ref 0–0.5)
EOSINOPHIL NFR BLD: 1.7 % (ref 0–8)
ERYTHROCYTE [DISTWIDTH] IN BLOOD BY AUTOMATED COUNT: 24.4 % (ref 11.5–14.5)
EST. GFR  (NO RACE VARIABLE): 10.3 ML/MIN/1.73 M^2
GLUCOSE SERPL-MCNC: 73 MG/DL (ref 70–110)
HCT VFR BLD AUTO: 26.1 % (ref 37–48.5)
HGB BLD-MCNC: 8.2 G/DL (ref 12–16)
IMM GRANULOCYTES # BLD AUTO: 0.36 K/UL (ref 0–0.04)
IMM GRANULOCYTES NFR BLD AUTO: 2.2 % (ref 0–0.5)
INR PPP: 1.3 (ref 0.8–1.2)
LYMPHOCYTES # BLD AUTO: 1 K/UL (ref 1–4.8)
LYMPHOCYTES NFR BLD: 6.2 % (ref 18–48)
MAGNESIUM SERPL-MCNC: 2 MG/DL (ref 1.6–2.6)
MCH RBC QN AUTO: 31.3 PG (ref 27–31)
MCHC RBC AUTO-ENTMCNC: 31.4 G/DL (ref 32–36)
MCV RBC AUTO: 100 FL (ref 82–98)
MONOCYTES # BLD AUTO: 1.1 K/UL (ref 0.3–1)
MONOCYTES NFR BLD: 6.6 % (ref 4–15)
NEUTROPHILS # BLD AUTO: 13.3 K/UL (ref 1.8–7.7)
NEUTROPHILS NFR BLD: 82.6 % (ref 38–73)
NRBC BLD-RTO: 0 /100 WBC
PHOSPHATE SERPL-MCNC: 4.7 MG/DL (ref 2.7–4.5)
PLATELET # BLD AUTO: 241 K/UL (ref 150–450)
PMV BLD AUTO: 12.1 FL (ref 9.2–12.9)
POCT GLUCOSE: 106 MG/DL (ref 70–110)
POCT GLUCOSE: 123 MG/DL (ref 70–110)
POTASSIUM SERPL-SCNC: 4.5 MMOL/L (ref 3.5–5.1)
PROT SERPL-MCNC: 6.4 G/DL (ref 6–8.4)
PROTHROMBIN TIME: 14 SEC (ref 9–12.5)
RBC # BLD AUTO: 2.62 M/UL (ref 4–5.4)
SODIUM SERPL-SCNC: 128 MMOL/L (ref 136–145)
WBC # BLD AUTO: 16.06 K/UL (ref 3.9–12.7)

## 2024-11-13 PROCEDURE — 84100 ASSAY OF PHOSPHORUS: CPT | Performed by: STUDENT IN AN ORGANIZED HEALTH CARE EDUCATION/TRAINING PROGRAM

## 2024-11-13 PROCEDURE — 83735 ASSAY OF MAGNESIUM: CPT | Performed by: STUDENT IN AN ORGANIZED HEALTH CARE EDUCATION/TRAINING PROGRAM

## 2024-11-13 PROCEDURE — 97112 NEUROMUSCULAR REEDUCATION: CPT

## 2024-11-13 PROCEDURE — 25000003 PHARM REV CODE 250: Performed by: HOSPITALIST

## 2024-11-13 PROCEDURE — 99233 SBSQ HOSP IP/OBS HIGH 50: CPT | Mod: GT,,,

## 2024-11-13 PROCEDURE — 86580 TB INTRADERMAL TEST: CPT | Performed by: STUDENT IN AN ORGANIZED HEALTH CARE EDUCATION/TRAINING PROGRAM

## 2024-11-13 PROCEDURE — 97110 THERAPEUTIC EXERCISES: CPT | Mod: CO

## 2024-11-13 PROCEDURE — 97110 THERAPEUTIC EXERCISES: CPT

## 2024-11-13 PROCEDURE — 30200315 PPD INTRADERMAL TEST REV CODE 302: Performed by: STUDENT IN AN ORGANIZED HEALTH CARE EDUCATION/TRAINING PROGRAM

## 2024-11-13 PROCEDURE — 80100016 HC MAINTENANCE HEMODIALYSIS

## 2024-11-13 PROCEDURE — 99232 SBSQ HOSP IP/OBS MODERATE 35: CPT | Mod: ,,, | Performed by: NURSE PRACTITIONER

## 2024-11-13 PROCEDURE — 20600001 HC STEP DOWN PRIVATE ROOM

## 2024-11-13 PROCEDURE — 97530 THERAPEUTIC ACTIVITIES: CPT | Mod: CO

## 2024-11-13 PROCEDURE — 80053 COMPREHEN METABOLIC PANEL: CPT | Performed by: STUDENT IN AN ORGANIZED HEALTH CARE EDUCATION/TRAINING PROGRAM

## 2024-11-13 PROCEDURE — 97530 THERAPEUTIC ACTIVITIES: CPT

## 2024-11-13 PROCEDURE — 87635 SARS-COV-2 COVID-19 AMP PRB: CPT | Performed by: STUDENT IN AN ORGANIZED HEALTH CARE EDUCATION/TRAINING PROGRAM

## 2024-11-13 PROCEDURE — 25000003 PHARM REV CODE 250: Performed by: STUDENT IN AN ORGANIZED HEALTH CARE EDUCATION/TRAINING PROGRAM

## 2024-11-13 PROCEDURE — 97535 SELF CARE MNGMENT TRAINING: CPT | Mod: CO

## 2024-11-13 PROCEDURE — 36415 COLL VENOUS BLD VENIPUNCTURE: CPT | Performed by: STUDENT IN AN ORGANIZED HEALTH CARE EDUCATION/TRAINING PROGRAM

## 2024-11-13 PROCEDURE — 63600175 PHARM REV CODE 636 W HCPCS: Performed by: HOSPITALIST

## 2024-11-13 PROCEDURE — 85025 COMPLETE CBC W/AUTO DIFF WBC: CPT | Performed by: HOSPITALIST

## 2024-11-13 PROCEDURE — 85610 PROTHROMBIN TIME: CPT | Performed by: STUDENT IN AN ORGANIZED HEALTH CARE EDUCATION/TRAINING PROGRAM

## 2024-11-13 RX ADMIN — HYDROCORTISONE: 25 CREAM TOPICAL at 10:11

## 2024-11-13 RX ADMIN — MUPIROCIN: 20 OINTMENT TOPICAL at 10:11

## 2024-11-13 RX ADMIN — URSODIOL 300 MG: 300 CAPSULE ORAL at 11:11

## 2024-11-13 RX ADMIN — PANTOPRAZOLE SODIUM 40 MG: 40 TABLET, DELAYED RELEASE ORAL at 09:11

## 2024-11-13 RX ADMIN — HYDROCORTISONE: 25 CREAM TOPICAL at 09:11

## 2024-11-13 RX ADMIN — MUPIROCIN: 20 OINTMENT TOPICAL at 09:11

## 2024-11-13 RX ADMIN — SIMETHICONE 80 MG: 80 TABLET, CHEWABLE ORAL at 03:11

## 2024-11-13 RX ADMIN — PANTOPRAZOLE SODIUM 40 MG: 40 TABLET, DELAYED RELEASE ORAL at 10:11

## 2024-11-13 RX ADMIN — DIPHENHYDRAMINE HYDROCHLORIDE 50 MG: 50 CAPSULE ORAL at 11:11

## 2024-11-13 RX ADMIN — TUBERCULIN PURIFIED PROTEIN DERIVATIVE 5 UNITS: 5 INJECTION, SOLUTION INTRADERMAL at 06:11

## 2024-11-13 RX ADMIN — MICONAZOLE NITRATE: 20 CREAM TOPICAL at 09:11

## 2024-11-13 RX ADMIN — URSODIOL 300 MG: 300 CAPSULE ORAL at 09:11

## 2024-11-13 RX ADMIN — MICONAZOLE NITRATE: 20 CREAM TOPICAL at 10:11

## 2024-11-13 RX ADMIN — ONDANSETRON 4 MG: 2 INJECTION INTRAMUSCULAR; INTRAVENOUS at 03:11

## 2024-11-13 NOTE — PLAN OF CARE
PT AAOx4. Vitals stable, on 3L NC. SR on tele. Refused lactulose, 2 large BM this shift. Bath, incontinence care done x2. Sacral, abd and breast Wound care done as order. Pt was turned q2. Bariatric immerse bed in use. No acute event this shift. Safety precaution in place.                  Problem: Adult Inpatient Plan of Care  Goal: Plan of Care Review  Outcome: Progressing  Goal: Patient-Specific Goal (Individualized)  Outcome: Progressing  Goal: Absence of Hospital-Acquired Illness or Injury  Outcome: Progressing  Goal: Optimal Comfort and Wellbeing  Outcome: Progressing  Goal: Readiness for Transition of Care  Outcome: Progressing     Problem: Wound  Goal: Optimal Coping  Outcome: Progressing  Goal: Optimal Functional Ability  Outcome: Progressing  Goal: Absence of Infection Signs and Symptoms  Outcome: Progressing  Goal: Improved Oral Intake  Outcome: Progressing  Goal: Optimal Pain Control and Function  Outcome: Progressing  Goal: Skin Health and Integrity  Outcome: Progressing  Goal: Optimal Wound Healing  Outcome: Progressing     Problem: Skin Injury Risk Increased  Goal: Skin Health and Integrity  Outcome: Progressing     Problem: Bariatric Environmental Safety  Goal: Safety Maintained with Care  Outcome: Progressing     Problem: Hemodialysis  Goal: Safe, Effective Therapy Delivery  Outcome: Progressing  Goal: Effective Tissue Perfusion  Outcome: Progressing  Goal: Absence of Infection Signs and Symptoms  Outcome: Progressing     Problem: Fall Injury Risk  Goal: Absence of Fall and Fall-Related Injury  Outcome: Progressing

## 2024-11-13 NOTE — SUBJECTIVE & OBJECTIVE
Interval History: Biopsy results pending. HD today.     Review of patient's allergies indicates:   Allergen Reactions    Tramadol Itching and Nausea And Vomiting     Current Facility-Administered Medications   Medication Frequency    0.9%  NaCl infusion (for blood administration) Q24H PRN    acetaminophen tablet 650 mg Q4H PRN    albuterol-ipratropium 2.5 mg-0.5 mg/3 mL nebulizer solution 3 mL Q4H PRN    dextrose 10% bolus 125 mL 125 mL PRN    dextrose 10% bolus 250 mL 250 mL PRN    diphenhydrAMINE capsule 50 mg Q6H PRN    glucagon (human recombinant) injection 1 mg PRN    glucose chewable tablet 16 g PRN    glucose chewable tablet 24 g PRN    hydrocortisone 2.5 % cream BID    insulin aspart U-100 pen 0-5 Units QID (AC + HS) PRN    lactulose 20 gram/30 mL solution Soln 10 g Q6H PRN    lactulose 20 gram/30 mL solution Soln 20 g BID    miconazole 2 % cream BID    mupirocin 2 % ointment BID    naloxone 0.4 mg/mL injection 0.02 mg PRN    ondansetron injection 4 mg Q8H PRN    oxyCODONE immediate release tablet 5 mg Q6H PRN    pantoprazole EC tablet 40 mg BID    simethicone chewable tablet 80 mg QID PRN    sodium chloride 0.9% flush 10 mL Q12H PRN    traZODone tablet 100 mg Nightly PRN    tuberculin injection 5 Units Once    ursodioL capsule 300 mg TID       Objective:     Vital Signs (Most Recent):  Temp: 98.9 °F (37.2 °C) (11/13/24 1154)  Pulse: 77 (11/13/24 1154)  Resp: 18 (11/13/24 1154)  BP: 126/74 (11/13/24 1154)  SpO2: 100 % (11/13/24 1154) Vital Signs (24h Range):  Temp:  [98.3 °F (36.8 °C)-98.9 °F (37.2 °C)] 98.9 °F (37.2 °C)  Pulse:  [72-81] 77  Resp:  [17-18] 18  SpO2:  [93 %-100 %] 100 %  BP: (114-130)/(57-74) 126/74     Weight: 130 kg (286 lb 9.6 oz) (11/06/24 1451)  Body mass index is 46.26 kg/m².  Body surface area is 2.46 meters squared.    I/O last 3 completed shifts:  In: 1050 [P.O.:1050]  Out: -      Physical Exam  Vitals and nursing note reviewed.   Constitutional:       Appearance: She is morbidly  obese.   HENT:      Head: Normocephalic.   Eyes:      General: Scleral icterus present.      Comments: Swelling of R eyelid/periorbital area w/o vision changes, pain, discharge or erythema   Cardiovascular:      Rate and Rhythm: Normal rate.      Heart sounds: Normal heart sounds.   Pulmonary:      Effort: Pulmonary effort is normal. No respiratory distress.      Breath sounds: No wheezing, rhonchi or rales.      Comments: Auscultation limited by habitus  Abdominal:      Palpations: Abdomen is soft.   Musculoskeletal:      Right lower leg: Edema present.      Left lower leg: Edema present.      Comments: Extreme 4+ b/l LE pitting edema L>>R (c/w baseline). Diffuse anasarca.    Lymphadenopathy:      Upper Body:      Left upper body: Axillary adenopathy present.   Skin:     General: Skin is warm.      Coloration: Skin is jaundiced.   Neurological:      Mental Status: She is alert and oriented to person, place, and time.   Psychiatric:         Mood and Affect: Mood is anxious. Affect is tearful.         Behavior: Behavior normal.          Significant Labs:  CBC:   Recent Labs   Lab 11/13/24  0625   WBC 16.06*   RBC 2.62*   HGB 8.2*   HCT 26.1*      *   MCH 31.3*   MCHC 31.4*     CMP:   Recent Labs   Lab 11/13/24  0625   GLU 73   CALCIUM 8.5*   ALBUMIN 1.4*   PROT 6.4   *   K 4.5   CO2 21*   CL 95   BUN 44*   CREATININE 5.0*   ALKPHOS 563*   ALT 43   *   BILITOT 17.4*     All labs within the past 24 hours have been reviewed.

## 2024-11-13 NOTE — PLAN OF CARE
11/13/24 0855   Post-Acute Status   Post-Acute Authorization Placement   Post-Acute Placement Status Pending payor medical review/second level review  ([facility] DUAL COMPLETE [facility] SNP)   Home Health Status Discharge Plan Changed   Diaylsis Status Set-up Complete/Auth obtained   Coverage [facility] DUAL COMPLETE HMO SN   Discharge Plan   Discharge Plan A Rehab  ([facility] [phone])     CM received secure message  from Berta Palmer  with [facility] # [phone]  P2P department offering a P2P for rehab request for this patient. CM notified Dr shin  who in turn  to participate in the P2P. This CM provided Dr shin  with [phone]  phone number and set up the P2P for 11/13/24 . CM will follow for determination.     0930 am  CM spoke with patient and patients mother  re: [facility] Medicare request for p2p.  CM to update with patient and patients mother.    1156 am   [facility] DUAL COMPLETE HMO SNP declined IPR but recommend SNF. Patient and mother Mile JENKINS are in agreement with SNF.      Met with patient and spoke with patients mother Mile JENKINS via phone  to review discharge recommendation of  SNF  and is agreeable to plan    Patient/family provided list of facilities in-network with patient's payor plan. Providers that are owned, operated, or affiliated with [facility] are included on the list.     Notified that referral sent to below listed facilities from in-network list based on proximity to home/family support:     1.[facility] Phone: [phone]  Response:  Yes, willing to accept     2.[facility] Phone: [phone]  Response: No, unable to accept patient      3.[facility] Phone: [phone]   Response: No, unable to accept patient  Reason: Patient Not Eligible      4.[facility] Phone: [phone]   Response: No, unable to accept patient     5.   [facility] Phone: [phone]   Response: No, unable to accept patient     Patient/family instructed to identify preference.    Preferred Facility: (if more than 1, listed in order of descending prefe.    1..[facility] Phone: [phone] out of network     2. [facility] Phone: [phone]    If an additional preferred facility not listed above is identified, additional referral to be sent. If above facilities unable to accept, will send additional referrals to in-network providers.      1158 am  NEREIDA [facility] and confirmed [facility] is in network with insurance. Allison will submit for auth. Patient is currently with [facility]     1200 pm  CM spoke with Varsha at [facility] [phone] and confirmed that [facility] receives HD services for residents (patients)    1209 pm  NEREIDA completed the LOCET via phone. CM faxed [facility] to obtain the 142 for NH admission    Your fax has been successfully sent to [phone] at [phone].  ------------------------------------------------------------  From: [id number]  ------------------------------------------------------------  11/13/2024 11:45:16 AM Transmission Record          Sent to [phone] with remote ID "Fax "          Result: (0/339;0/0) Success          Page record: 1 - 5          Elapsed time: 02:08 on channel 61    1213 pm  Patient will need ppd placement and COVID nasal swab      2:25 pm  142 uploaded via [facility] Papito Harrison RN  Case Management  [facility]  [phone]    "

## 2024-11-13 NOTE — PROGRESS NOTES
Palliative Medicine  Consult Note       Patient Name: Pilar Fernandez   MRN: [id number]   Admission Date: 10/29/2024   Hospital Length of Stay: 15   Attending Provider: Stephany Ordaz MD   Consulting Provider: JOHANNY Guadalupe  Primary Care Physician: Lindsey Singletary FNP   Principal Problem: Acute liver failure     Patient information was obtained from patient, past medical records, ER records, and primary team.         Assessment/Plan:      Palliative Care Encounter:  Impression:  Ms. Pilar Fernandez is a 44 y.o. female w/Hx of ESRD (on HD), T2DM, CHF (ef 60%), HTN, gastritis, severe BLE lymphedema (L>R, contributing to mobility issues), CHRF (on 3L @ home), prior CVA (July '24) w/LSW, sacral decubitus ulcer, gout, and known L breast mass. She presented to [facility] ED on 10/29/24 w/ painless jaundice w/ assoc pruritus.          [place] [facility] [place] conglomerates &/or soft tissue masses around L axillary soft tissues, b/l inguinal regions (L>R), L supraclavicular soft tissues, & retroperitoneum. Lymph node bx completed on 11/11.         Hepatology has been consulted this stay, liver bx 11/6 = Cholestatic hepatitis with stage 2 fibrosis with recommendations by hepatology to obtain MRI MRCP to definitively rule out an obstructive etiology. She has been deemed not a tx candidate at current due to functional status, co-morbid conditions, and possible malignancy (as evidenced by lymphadenopathy).           Palliative care consulted for ACP/GOC conversations, per communication with Dr. Ordaz.       Advance Care Planning   Advance Directives:   Living Will: No    Do Not Resuscitate Status: No    Medical Power of : Yes    Agent's Name:  Roberto Sharma (s.o.)   Agent's Contact Number:  [phone]    Decision Making:  Patient answered questions  Goals of Care: What is most important right now is to focus on remaining as independent as possible, symptom/pain control,  "extending life as long as possible, even it it means sacrificing quality. Accordingly, we have decided that the best plan to meet the patient's goals includes continuing with treatment.        11/13/24:  Met with MsElsa Alem, along with Dr. Ordaz this afternoon. Pt's mother was on phone for entire conversation.   -We discussed overall concern that pt's options for "treatment" of cancer is likely limited due to her chronic renal and heart failure, along with current liver dysfunction. Of note, LN biopsy has not resulted yet.   -Pt wishes to follow up in oncology clinic for possible treatment options.   -pt and I discussed her fears of being around for her children. I discussed that while she works towards physical rehab and gathers information, she should also begin to think about what type of medical care may/may not hold value to her; such as mechanical ventilation, code status, peg tube, etc.   -Pt is tearful but understanding.       11/12/24: Met with pt this morning at . Pt is alone in room, denies any acute pain or distress and is agreeable to conversation. Prior to this hopsitalization, pt describes she was living alone with her sig other, using a walker and/or wheelchair, and was able to transfer self. She has been mostly sleeping in her recliner and feels better sitting up.   -Explored pt's current understanding of her disease process and concerns related to d/c to rehab. Pt understands she may have cancer, pending bx, and that she "needs rehab".  -After some hesitancy, Pt shares she mostly wanted to go to rehab asap b/c she is so uncomfortable in her bed. (Of note, she has a speciality bed with air mattress best suited for her sacral decub and size.)   -She shares that she would want to get any treatment offered, based on results of her biopsy. Ultimately, she is agreeable to remaining in hospital while bx results are obtained and better understanding of prognosis/treatment can be determined.   -She " "expressed her overall concern that we are having these conversations and repeatedly asked if she will be able to get treatment for cancer. I explained team concern over her liver problems, dialysis, heart failure, debility, and likely cancer. We reviewed that biopsy results will determine what treatments may be available. Discussed that her multiple health issues will also be a determining factor in what therapies may be offered.   -She is somewhat understanding of this. I reiterated that we will continue to discuss what GOC are important to her as more information is gathered.  -At current, it is important to her to remain FULL code and continue with any/all treatment offered.   -Communicated conversation with Dr. Ordaz. Pal med available to participate in any diagnostic conversations, as pt is easily overwhelmed and with limited understanding.     -MPOA: I engaged the pt in a conversation regarding MPOA decision making. She has 4 adult children and a sig other. She states that she would like her sig other (Roberto) and her daughter (Mirella) to be her decision makers. MPOA paperwork filled out and uploaded into EPIC to reflect this. Contact information updated in Epic.           11/11/24: Met with pt this afternoon at . She was alone in room. AAOx3. Transport came in shortly after my arrival to take pt to dialysis. During our brief interaction, pt stresses that she wants to go to rehab tomorrow if "she's allowed". I reiterated that she may "medically" go tomorrow, however medical team was hoping to have her biopsy results back to best form treatment plan.   -Pt shares she would like to leave hospital as soon as possible so that she can go to rehab to get home quicker. She stresses that she would prefer to follow up on biopsy results and come back for treatment.   -Due to time constraint, I did not get to fully assess her understanding around her overall prognosis and various co-morbidities at this time.   -She is " "agreeable to continuing conversation tomorrow, however overall goal is to go to rehab/home at this time.    Symptom Management:    -Debility: Pt with progressive debility r/t mobility issues and BLE swelling. With scaral decub d/t immobility.     -Dyspnea: Reports SOB at times. She is on home O2 and feels better after dialysis.       Summary of recommendations and follow up plan:  -Most important goals at this time: Home with her children   -Code status: Full Code   -Disposition: Rehab      The above recommendations communicated directly to primary team on 11/13/24    Thank you for your consult. I will follow-up with patient. Please contact us if you have any additional questions.       Subjective:     Chief Complaint: No chief complaint on file.        HPI:   Per chart review: " Pilar Fernandez is a 44 y.o. female w/ PMHx of ESRD (on HD), T2DM, CHF, HTN, gastritis, severe BLE lymphedema (L>R), CHRF (on 3L @ home), prior CVA (July '24; on DAPT) w/ impaired mobility & sacral decubitus ulcer, gout, and known L breast mass, who presented to [facility] ED on 10/29/24 w/ painless jaundice w/ assoc pruritus. Pt reports diffuse pruritus for few weeks, with family noticing yellow discoloration of eyes on 10/28. Was previously admitted to [facility] for cellulitis (LLE vs LUE?), for which she received IV abx and was dc'ed 10/26 on clindamycin. Pt reports she had labs done during her [facility] stay, but there was nothing mentioned re: bilirubin or liver function at that time (lab reports from [facility] not available per Care Everywhere). Pt went to routine HD on 10/28 where she was told she had elevated bilirubin and was referred to [facility] ED for further evaluation.      On arrival to [facility] ED, pt afebrile w/ borderline/normotension (/43-87); otherwise HDS. Pt reports chronic lymphedema & leg/sacral wounds (w/ assoc pain) at baseline. Denied fevers, chills, N/V, melena, hematochezia, hematemesis, BRBPR, SOB, palpitations, chest " "or abdominal pain or other complaints. Initial labs notable for Tbili 11.7, AST 81, ALT 30, , lipase 57, albumin 2.0, bicarb 20, Na 134, Cr 4.5, BUN 45, WBC 22.8, Hgb 6.4, plts 154, INR 2.1, & lactate 1.6. Stool positive for occult blood. CT A/P w/o contrast noted anasarca w/ persistent cardiomegaly, small & improved pericardial effusion & small L pleural effusion; no acute GI or hepatic abnormalities noted. Received IV zofran, Zosyn, IV protonix 80mg, IV fentanyl, along w/ 2u pRBC & 2u FFP. Given severe hyperbilirubinemia & anemia w/ e/o acute liver failure of unk etiology, pt transferred to [facility] on 10/29/24 for HLOC."      Hospital Course:  Per chart review: "On arrival to [facility], pt received dose of Zosyn & CTX given leukocytosis. Further abx held as low suspicion for infectious process & blood cx negative. GI consulted given c/f possible GI bleed; scope deferred given absence of overt bleeding. AES/Biliary consult deferred after team reviewed images and noted absence of biliary/ductal dilation. Hepatology consulted for evaluation/mgmt of acute liver failure & hyperbilirubinemia; serologic workup ordered. Liver US w/ doppler noted marked hepatosplenomegaly but otherwise nml w/ proper blood flow. Heme/Onc consulted d/t concern for possible hemolysis &/or underlying malignancy driving pt's presentation. Although Britt test positive, detectable haptoglobin made hemolysis unlikely. CT C/A/P showed notable for numerous enlarged LN conglomerates (&/or soft tissue masses) of the L axilla, inguinal regions (L > R), L supraclavicular region, & retroperitoneum- c/f neoplastic vs lymphoproliferative process. CT also noted diffuse anasarca, large pericardial effusion, cardiomegaly w/ pulmonary edema, & diffuse hepatosplenomegaly. Nephrology consulted for continuation of HD w/ volume removal. Midodrine initiated for post-dialysis hypotension & home BP meds held. Required addn'l unit of pRBCs on 10/31 for recurrent " "anemia & IV vitamin K on 11/1 for elevated INR. Bilirubin & LFTs continued to uptrend. Developed hepatic encephalopathy w/ ammonia lvl of 111, which improved with titration of pt's bowel regimen (switched from miralax to lactulose). BP stabilized and midodrine d/c'ed. After multiple postponements due to limited anesthesia staffing, liver biopsy done on 11/6. Pathology showed cholestatic hepatitis & features of hepatic venous outflow obstruction. Bilateral breast US recommended per Heme/Onc, however upon radiology's review of imaging, a dedicated L axilla US obtained instead (as pt's anasarca would make interpretation of breast US difficult). US of L axilla noted abnormal axillary adenopathy w/ at least 6 abnormal LNs (BI-RADS 4); biopsy advised d/t c/f metastatic dx vs lymphoma."    Review of Symptoms      Symptom Assessment (ESAS 0-10 Scale)  Pain:  0  Dyspnea:  0  Anxiety:  0  Nausea:  0  Depression:  0  Anorexia:  0  Fatigue:  0  Insomnia:  0  Restlessness:  0  Agitation:  0         Performance Status:  40    Living Arrangements:  Lives with family    Psychosocial/Cultural:   See Palliative Psychosocial Note: Yes  Pt lives with her sig. Other, Roberto, for the past 15 years. She has 4 adult children who live near.   **Primary  to Follow**  Palliative Care  Consult: Yes    Spiritual:  F - Tia and Belief:  Stresses importance of Gnosticist. She is Gnosticism.           ROS:  Review of Systems   Constitutional:  Positive for fatigue.         Past Medical History:   Diagnosis Date    Anemia     Anemia     Breast mass     Chronic constipation     CKD (chronic kidney disease)     Diabetes mellitus     Dialysis patient     MON, TUE, THURS, SAT.    Embolic stroke involving right middle cerebral artery 7/13/2024    Encounter for blood transfusion     Erosive gastritis 03/2016    GERD (gastroesophageal reflux disease)     Gout     High cholesterol     Hypertension     Knee pain     Thyroid disease  "    Unspecified cataract 2022     Past Surgical History:   Procedure Laterality Date    AV FISTULA PLACEMENT Left 2019    Procedure: creation av fistula;  Surgeon: Buck Fernandez MD;  Location: [facility];  Service: Cardiovascular;  Laterality: Left;  Left Radialcephalic: PACU     SECTION  , , ,     CHOLECYSTECTOMY      COLONOSCOPY N/A 10/23/2017    Procedure: COLONOSCOPY;  Surgeon: Bri Harry MD;  Location: [facility];  Service: Endoscopy;  Laterality: N/A;    EXCISIONAL BIOPSY Left 2022    Procedure: EXCISIONAL BIOPSY;  Surgeon: Mick Juarez MD;  Location: [facility];  Service: General;  Laterality: Left;    TUBAL LIGATION      UPPER GASTROINTESTINAL ENDOSCOPY  2016    Erosive Gastritis-Dr Bailey     Family History   Problem Relation Name Age of Onset    Thyroid disease Mother      Diabetes Father      Ovarian cancer Maternal Aunt Malathi     Breast cancer Maternal Aunt Ada          Review of patient's allergies indicates:   Allergen Reactions    Tramadol Itching and Nausea And Vomiting       Medications:    Current Facility-Administered Medications:     0.9%  NaCl infusion (for blood administration), , Intravenous, Q24H PRN, Stephany Ordaz MD    acetaminophen tablet 650 mg, 650 mg, Oral, Q4H PRN, Stephany Ordaz MD, 650 mg at 24    albuterol-ipratropium 2.5 mg-0.5 mg/3 mL nebulizer solution 3 mL, 3 mL, Nebulization, Q4H PRN, Irene, Arup K., DO    dextrose 10% bolus 125 mL 125 mL, 12.5 g, Intravenous, PRN, Irene, Arup K., DO, Stopped at 24 0820    dextrose 10% bolus 250 mL 250 mL, 25 g, Intravenous, PRN, Irene, Arup K., DO, Stopped at 10/31/24 1226    diphenhydrAMINE capsule 50 mg, 50 mg, Oral, Q6H PRN, Irene, Arup K., DO, 50 mg at 24 0508    glucagon (human recombinant) injection 1 mg, 1 mg, Intramuscular, PRN, Irene, Arup K., DO    glucose chewable tablet 16 g, 16 g, Oral, PRN, Irene, Arup K., DO, 16 g at 10/30/24 1489    glucose chewable  tablet 24 g, 24 g, Oral, PRN, IreneFelipe wanup K., DO    hydrocortisone 2.5 % cream, , Topical (Top), BID, Shayne Diaz MD, Given at 11/13/24 0907    insulin aspart U-100 pen 0-5 Units, 0-5 Units, Subcutaneous, QID (AC + HS) PRN, Stephany Ordaz MD, 2 Units at 10/31/24 0858    lactulose 20 gram/30 mL solution Soln 10 g, 10 g, Oral, Q6H PRN, Coleman Bonilla K., DO    lactulose 20 gram/30 mL solution Soln 20 g, 20 g, Oral, BID, Shayne Diaz MD, 20 g at 11/09/24 0850    miconazole 2 % cream, , Topical (Top), BID, Shayne Diaz MD, Given at 11/13/24 0907    mupirocin 2 % ointment, , Nasal, BID, Isabel Pettit PA-C, Given at 11/13/24 0910    naloxone 0.4 mg/mL injection 0.02 mg, 0.02 mg, Intravenous, PRN, Coleman Bonilla K., DO    ondansetron injection 4 mg, 4 mg, Intravenous, Q8H PRN, Coleman Bonilla, DO, 4 mg at 11/13/24 0328    oxyCODONE immediate release tablet 5 mg, 5 mg, Oral, Q6H PRN, Stephany Ordaz MD, 5 mg at 11/12/24 0055    pantoprazole EC tablet 40 mg, 40 mg, Oral, BID, Stephany Ordaz MD, 40 mg at 11/13/24 0908    simethicone chewable tablet 80 mg, 1 tablet, Oral, QID PRN, Coleman Bonilla., DO, 80 mg at 11/13/24 0342    sodium chloride 0.9% flush 10 mL, 10 mL, Intravenous, Q12H PRN, IreneColeman wan K., DO    traZODone tablet 100 mg, 100 mg, Oral, Nightly PRN, Irene, Arup K., DO    tuberculin injection 5 Units, 5 Units, Intradermal, Once **AND** [START ON 11/15/2024] POCT TB Skin Test Read, , , Once, Stephany Ordaz MD    ursodioL capsule 300 mg, 300 mg, Oral, TID, Stephany Ordaz MD, 300 mg at 11/13/24 0908         Objective:      Physical Exam:  Vitals: Temp: 98.9 °F (37.2 °C) (11/13/24 1154)  Pulse: 77 (11/13/24 1445)  Resp: 18 (11/13/24 1154)  BP: (!) 131/59 (11/13/24 1445)  SpO2: 100 % (11/13/24 1154)    Physical Exam  Constitutional:       Appearance: She is ill-appearing.   Neurological:      Mental Status: She is alert and oriented to person, place, and time.                 Labs:   Creatinine    Date Value Ref Range Status   11/13/2024 5.0 (H) 0.5 - 1.4 mg/dL Final      Hemoglobin   Date Value Ref Range Status   11/13/2024 8.2 (L) 12.0 - 16.0 g/dL Final      Albumin   Date Value Ref Range Status   11/13/2024 1.4 (L) 3.5 - 5.2 g/dL Final   11/12/2024 1.5 (L) 3.5 - 5.2 g/dL Final   11/11/2024 1.4 (L) 3.5 - 5.2 g/dL Final       Contains abnormal data CT Chest Abdomen Pelvis With IV Contrast (XPD) Routine Oral Contrast  Order: [id number]  Status: Final result       Visible to patient: Yes (not seen)       Next appt: None    0 Result Notes  Details    Reading Physician Reading Date Result Priority   Ike Badillo MD  [phone] 10/30/2024 Routine     Narrative & Impression  EXAMINATION:  CT CHEST ABDOMEN PELVIS WITH IV CONTRAST (XPD)     CLINICAL HISTORY:  Sepsis;     TECHNIQUE:  Low dose axial images, sagittal and coronal reformations were obtained from the neck base to the pubic symphysis after the administration of 100 cc Omnipaque 350 intravenous contrast.  30 mL of oral Omnipaque 350 was administered.     COMPARISON:  Ultrasound liver 10/30/2024, CT abdomen pelvis without contrast 10/29/2024, CTA head and neck 07/13/2024     FINDINGS:  Structures at the base of the neck are unremarkable.     Peripherally calcified right thyroid nodule measuring 6 mm.     Numerous enlarged soft tissue densities about the left axillary soft tissues, for example measuring approximately 2.9 x 2.4 cm (series 2, image 39).  Additional enlarged left axillary lymph nodes, for example a left axillary lymph node measuring 2.0 cm (series 2, image 36).  Enlarged left supraclavicular lymph node measuring 1.5 cm (series 2, image 3)     Evaluation is limited secondary to diffuse body wall anasarca throughout the thoracic soft tissues.     The heart is enlarged in size with large volume pericardial effusion.  Coronary artery calcification in a multi vessel distribution.     Pulmonary vasculature is limited in evaluation without  evidence for pulmonary artery embolism or abnormality of the pulmonary veins.     Prominent right paratracheal lymph node measuring 1.0 cm (series 2, image 23).     Airways are patent.  Lungs are symmetrically expanded.  Prominence of the pulmonary vasculature, scattered interlobular septal thickening, and diffuse ground-glass attenuation throughout both lungs.  Small left pleural effusion with associated compressive atelectasis.     The liver is enlarged in size measuring 27.5 cm, demonstrating heterogeneous attenuation.  Few scattered subcentimeter hepatic hypodensities too small for characterization.     Gallbladder is surgically absent.  No intrahepatic or extrahepatic ductal dilatation.     Spleen is enlarged in size measuring 17.3 cm.  No definite focal splenic lesions.  Few splenic calcifications.     Adrenal glands are unremarkable.  2.1 cm cystic focus about the uncinate process, similar from comparison imaging.     Kidneys are normal size and location.  No hydronephrosis or nephrolithiasis.  Right subcentimeter renal hypodensity too small for characterization.  Ureters are normal in course and caliber.  Urinary bladder is unremarkable.     Uterus demonstrates no significant abnormality.  Intrauterine device is visualized within the endometrial cavity.     Stomach and duodenum are unremarkable.  This of small bowel demonstrate no evidence for obstructive or inflammatory process.  Appendix not well visualized.  Liquid and aerated stool throughout the visualized colon and rectum.     Scattered mesenteric edema.  Layering fluid in the pelvis.  No free intra-abdominal air.  Multiple enlarged retroperitoneal lymph nodes, for example a left periaortic lymph node measuring 1.7 cm     Moderate calcific atherosclerosis of the visualized aorta.     Diffuse body wall anasarca throughout the visualized abdomen and pelvis.  Enlarged left external iliac lymph node measuring 1.9 cm (series 3, image 168).  Multiple  enlarged left greater than right inguinal lymph nodes, for example measuring approximately 2.5 cm (series 3, image 209).  Additional soft tissue density about the proximal left thigh, partially visualized (series 3, image 218).     Osseous structures demonstrate no evidence for acute fracture or osseous destructive lesion.     Impression:     Diffuse body wall anasarca throughout the visualized chest, abdomen, pelvis, and proximal thighs.     Numerous enlarged lymph node conglomerates and/or soft tissue masses about the left axillary soft tissues, left greater than right inguinal regions, left supraclavicular soft tissues, and retroperitoneum as above.  Findings remain nonspecific, however remain concerning for neoplastic or lymphoproliferative process.  Consider further evaluation with tissue sampling, noting left axillary soft tissue lesion proximity to the skin surface (for example series 2, image 39).     Large volume pericardial effusion.  Correlation is advised     Cardiomegaly with pulmonary vascular congestion, interlobular septal thickening, and scattered ground-glass attenuation, in keeping with evolving pulmonary edema.     Small left pleural effusion with associated compressive atelectasis.     Diffuse hepatosplenomegaly.  No obvious focal hepatic lesions, noting limitations from diffuse body wall anasarca.     Age advanced coronary artery and aortic atherosclerosis.     This report was flagged in Epic as abnormal.        Electronically signed by:Ike Badillo  Date:                                            10/30/2024  Time:                                           12:42        Exam Ended: 10/30/24 12:03 CDT Last Resulted: 10/30/24 12:42 CDT     > 50% of  55 min visit spent in chart review, face to face discussion of goals of care,  symptom assessment, coordination of care, charting, and emotional support     Thank you for the opportunity to care for this patient and family.       Cyn Panda, CNS

## 2024-11-13 NOTE — PROGRESS NOTES
[facility] ([place] [id number])    Wound Care     Patient Name:  Pilar Fernandez  MRN:  [id number]  Date: 11/13/2024  Diagnosis: Acute liver failure     History:  Past Medical History:   Diagnosis Date    Anemia     Anemia     Breast mass     Chronic constipation     CKD (chronic kidney disease)     Diabetes mellitus     Dialysis patient     MON, TUE, THURS, SAT.    Embolic stroke involving right middle cerebral artery 7/13/2024    Encounter for blood transfusion     Erosive gastritis 03/2016    GERD (gastroesophageal reflux disease)     Gout     High cholesterol     Hypertension     Knee pain     Thyroid disease     Unspecified cataract 09/2022     Social History     Socioeconomic History    Marital status: Single    Years of education: 11   Occupational History    Occupation:    Tobacco Use    Smoking status: Never     Passive exposure: Never    Smokeless tobacco: Never   Substance and Sexual Activity    Alcohol use: No     Alcohol/week: 0.0 standard drinks of alcohol    Drug use: No    Sexual activity: Yes     Partners: Male     Birth control/protection: None, Surgical     Social Drivers of Health     Financial Resource Strain: Low Risk  (10/30/2024)    Overall Financial Resource Strain (CARDIA)     Difficulty of Paying Living Expenses: Not hard at all   Food Insecurity: No Food Insecurity (10/30/2024)    Hunger Vital Sign     Worried About Running Out of Food in the Last Year: Never true     Ran Out of Food in the Last Year: Never true   Transportation Needs: No Transportation Needs (10/30/2024)    TRANSPORTATION NEEDS     Transportation : No   Physical Activity: Insufficiently Active (10/30/2024)    Exercise Vital Sign     Days of Exercise per Week: 4 days     Minutes of Exercise per Session: 30 min   Stress: No Stress Concern Present (10/30/2024)    [employer] [place] [employer] - Occupational Stress Questionnaire     Feeling of Stress : Only a little   Housing  Stability: Low Risk  (10/30/2024)    Housing Stability Vital Sign     Unable to Pay for Housing in the Last Year: No     Homeless in the Last Year: No     Precautions:  Allergies as of 10/29/2024 - Reviewed 10/29/2024   Allergen Reaction Noted    Tramadol Itching and Nausea And Vomiting 09/27/2017       [facility] Assessment Details / Treatment:    Patient seen for wound care: Follow-up   Chart reviewed for this encounter.   Labs:   WBC (K/uL)   Date Value   11/13/2024 16.06 (H)   11/12/2024 20.23 (H)     Glucose (mg/dL)   Date Value   11/13/2024 73   11/12/2024 89     Albumin (g/dL)   Date Value   11/13/2024 1.4 (L)   11/12/2024 1.5 (L)     Lamont Score: 14    Narrative:  Pt seen for WC consultation / follow-up and agreed to assessment  Chart reviewed for this encounter.   See Flow Sheet for additional documentation and media.    Pt sitting on edge of bed of Mikhail-immerse with PT at bedside to assist pt to standing position for assessment, Triad cream to bilateral buttocks, wounds appear the same as previous assessment.   Under bilateral breasts and abdominal pannus no open wounds, continues to be moist area likely d/t pt anatomy. Intertrigo to under bilateral breasts and pannus appear improved. Continue Interdry Ag.    No change in orders.    RECOMMENDATIONS:  Bedside nurse assess for acute changes (purulence, increased redness/swelling, increased drainage, malodor, increased pain, pallor, necrosis) please contact physician on any acute changes.    Apply Triad correctly:      Always cleanse wound before applying Triad.  To remove Triad:   Use pH-balanced wound cleanser to soften Triad  Gently wipe without scrubbing  For complete removal, repeat as needed.     Gently spread Triad evenly over the area of application to the thickness of a dime.    Discussed POC with patient and primary nurse.   See EMR for orders & patient education.     Discussed nutrition and the role of protein in wound healing with the patient.  Instructed patient to optimize protein for wound healing.     Bedside nursing to continue care & monitoring.  Bedside nursing to maintain pressure injury prevention interventions    Recommendations made to primary team for above plan via secure chat.     Thank you for the consult. Wound Care will continue to follow.     11/13/24 1015   WOCN Assessment   WOCN Total Time (mins) 30   Visit Date 11/13/24   Visit Time 1015   Consult Type Follow Up   WOCN Speciality Wound   Wound At risk for pressure Injury;moisture;yeast   Intervention chart review;assessed;applied;changed;orders;consult other service   Teaching complication        Wound 10/29/24 1504 Pressure Injury midline Sacral spine   Date First Assessed/Time First Assessed: 10/29/24 1504   Present on Original Admission: Yes  Primary Wound Type: Pressure Injury  Orientation: midline  Location: Sacral spine   Wound Image     Pressure Injury Stage 2        Wound 11/01/24 1015 Intertrigo Right lower Breast   Date First Assessed/Time First Assessed: 11/01/24 1015   Primary Wound Type: Intertrigo  Side: Right  Orientation: lower  Location: Breast   Wound Image         Wound 11/01/24 1015 Intertrigo Left lower Breast   Date First Assessed/Time First Assessed: 11/01/24 1015   Primary Wound Type: Intertrigo  Side: Left  Orientation: lower  Location: Breast   Wound Image         Wound 11/01/24 1015 Intertrigo lower Abdomen   Date First Assessed/Time First Assessed: 11/01/24 1015   Primary Wound Type: Intertrigo  Orientation: lower  Location: (c) Abdomen   Wound Image

## 2024-11-13 NOTE — PROGRESS NOTES
Patient arrived in a bed  to dialysis unit.     VS's per dialysis Flowsheet.     Hemodialysis initiated using the following:     Dialysis Access: left arm button hole fistula     Needle size: button hole needles  Insertion with no complications.     Will Maintain telemetry and blood pressure monitoring throughout treatment.  Refer to dialysis flowsheet and MAR for details.

## 2024-11-13 NOTE — PLAN OF CARE
Patient aaox4. Patient complains of no pain. Patient sent for dialysis, and 3.2 l fluid redrew. Patient turned q2. Patient due medication administered and patient tolerating well. Plan of care reviewed with patient and patient verbalized understanding. Patient safety protocols observed, call light within reach. No other concerns at this time.                          Problem: Adult Inpatient Plan of Care  Goal: Plan of Care Review  Outcome: Progressing  Goal: Patient-Specific Goal (Individualized)  Outcome: Progressing  Goal: Absence of Hospital-Acquired Illness or Injury  Outcome: Progressing  Goal: Optimal Comfort and Wellbeing  Outcome: Progressing  Goal: Readiness for Transition of Care  Outcome: Progressing     Problem: Diabetes Comorbidity  Goal: Blood Glucose Level Within Targeted Range  Outcome: Progressing     Problem: Acute Kidney Injury/Impairment  Goal: Fluid and Electrolyte Balance  Outcome: Progressing  Goal: Improved Oral Intake  Outcome: Progressing  Goal: Effective Renal Function  Outcome: Progressing     Problem: Wound  Goal: Optimal Coping  Outcome: Progressing  Goal: Optimal Functional Ability  Outcome: Progressing  Goal: Absence of Infection Signs and Symptoms  Outcome: Progressing  Goal: Improved Oral Intake  Outcome: Progressing  Goal: Optimal Pain Control and Function  Outcome: Progressing  Goal: Skin Health and Integrity  Outcome: Progressing  Goal: Optimal Wound Healing  Outcome: Progressing     Problem: Skin Injury Risk Increased  Goal: Skin Health and Integrity  Outcome: Progressing     Problem: Gastrointestinal Bleeding  Goal: Optimal Coping with Acute Illness  Outcome: Progressing  Goal: Hemostasis  Outcome: Progressing     Problem: Bariatric Environmental Safety  Goal: Safety Maintained with Care  Outcome: Progressing     Problem: Hemodialysis  Goal: Safe, Effective Therapy Delivery  Outcome: Progressing  Goal: Effective Tissue Perfusion  Outcome: Progressing  Goal:  Absence of Infection Signs and Symptoms  Outcome: Progressing     Problem: Chronic Kidney Disease  Goal: Electrolyte Balance  Outcome: Progressing  Goal: Fluid Balance  Outcome: Progressing     Problem: Fall Injury Risk  Goal: Absence of Fall and Fall-Related Injury  Outcome: Progressing     Problem: Coping Ineffective  Goal: Effective Coping  Outcome: Progressing

## 2024-11-13 NOTE — PT/OT/SLP PROGRESS
Physical Therapy Co-Treatment  Co-treatment with OT for maximal pt participation, safety, and activity tolerance    Patient Name:  Pilar Fernandez   MRN:  [id number]  Admitting Diagnosis:  Acute liver failure   Recent Surgery: * No surgery found *    Admit Date: 10/29/2024  Length of Stay: 15 days    Recommendations:     Discharge Recommendations:  High Intensity Therapy  Discharge Equipment Recommendations: bedside commode, bath bench, wheelchair, lift device, hospital bed   Justification for Equipment: Patient requires a hospital bed for positioning of the body in ways that are not feasible with an ordinary bed. The patient requires special positioning for pain relief, limited mobility, and/or being unable to independently make changes in body position without the use of a hospital bed. Pillows and wedges will not be adequate for resolving these positional issues.   Patient has a mobility limitation that significantly impairs their ability to participate in one or more mobility related activities of daily living in customary locations in the home. The mobility limitation cannot be sufficiently resolved by the use of a cane or walker. The use of a manual wheelchair will greatly improve the patient's ability to participate in MRADLs. The patient will use the wheelchair on a regular basis at home. They have expressed their willingness to use a manual wheelchair in the home, and have a caregiver who is available and willing to assist with the wheelchair if needed.  Barriers to discharge: Evolving Clinical Presentation    Assessment:     Pilar Fernandez is a 44 y.o. female admitted with a medical diagnosis of Acute liver failure.  She presents with the following impairments/functional limitations:  weakness, impaired functional mobility, gait instability, impaired endurance, impaired balance, impaired self care skills, decreased lower extremity function, decreased upper extremity function, pain, decreased  "safety awareness.     Pt agreeable to therapy, feeling better and wants to progress therapy, tired of being on her back and wants to sit up as much as possible. Pt with improved bed mobility and able to stand at EOB for multiple trials today though still unable to take steps. Wants to get to bedside chair or sit on EOB but RN declines as she is to go to dialysis soon. Pt maintains sitting EOB for extended period, stands and rolls with assistance for wound care as wound care nurse in room. When patient stands she remains in relative hip flexion though otherwise hips fully cleared from bed.     Rehab Prognosis: Fair; patient would benefit from acute skilled PT services to address these deficits and reach maximum level of function.    Recent Surgery: * No surgery found *      Treatment Tolerated: Fairly Well    Highest level of mobility achieved this visit: STS x 3 trials with HHA and max A x2 (partial stand achieved due to hip flexion)    Activity with RN/PCT: transfer with three person assist    Plan:     During this hospitalization, patient to be seen 4 x/week to address the identified rehab impairments via gait training, therapeutic activities, therapeutic exercises, neuromuscular re-education and progress toward the following goals:    Plan of Care Expires:  12/02/24    Subjective     RN Carmen notified prior to session. No family present upon PT entrance into room.    Chief Complaint: wants to sit up  Patient/Family Comments/goals: "I liked the bed better when it was flat"  Pain/Comfort:  Pain Rating 1:  (unrated)  Location - Side 1: Left  Location - Orientation 1: generalized  Location 1: leg  Pain Addressed 1: Reposition, Distraction      Objective:     Additional staff present: CHRISTIAN Calderon    Patient found HOB elevated with: telemetry, oxygen   Cognition:   Alert and Cooperative  Command following: Follows two-step verbal commands  Fluency: clear/fluent  General Precautions: Standard, fall, diabetic, dental " "soft   Orthopedic Precautions:N/A   Braces: N/A   Body mass index is 46.26 kg/m².  Oxygen Device: Nasal Cannula 3L    Vitals: /69   Pulse 77   Temp 98.9 °F (37.2 °C) (Oral)   Resp 18   Ht 5' 6" (1.676 m)   Wt 130 kg (286 lb 9.6 oz)   LMP  (LMP Unknown) Comment: hcg neg 11/4  SpO2 100%   Breastfeeding No   BMI 46.26 kg/m²     Outcome Measures:  AM-PAC 6 CLICK MOBILITY  Turning over in bed (including adjusting bedclothes, sheets and blankets)?: 2  Sitting down on and standing up from a chair with arms (e.g., wheelchair, bedside commode, etc.): 2  Moving from lying on back to sitting on the side of the bed?: 2  Moving to and from a bed to a chair (including a wheelchair)?: 2  Need to walk in hospital room?: 1  Climbing 3-5 steps with a railing?: 1  Basic Mobility Total Score: 10     Functional Mobility:    Bed Mobility:   Rolling/Turning to Left: moderate assistance  Rolling/Turning to Right: moderate assistance  Scooting to HOB via Drawsheet transfer: total assistance of 2 persons  Supine to Sit: maximal assistance of 2 persons; from L side of bed  Scooting anteriorly to EOB to have both feet planted on floor: maximal assistance of 2 persons  Sit to Supine: maximal assistance of 2 persons; to R side of bed    Sitting Balance at Edge of Bed:  Assistance Level Required: Supervision  Time: 30 min  Postural deviations noted: no deviations noted  Comments: Bed deflated so that feet can touch floor    Transfers:   Sit > Stand Transfer: maximal assistance of 2 persons with hand-held assist   Stand > Sit Transfer: maximal assistance of 2 persons with hand-held assist   x3 trials from EOB  Relative hip flexion maintained throughout with patient in forward bent posture, hips cleared from bed and legs extended    Standing Balance:  Assistance Level Required: Moderate Assistance  Patient used: hand-held assist   Time: 20-30 secs x 3 trials  Postural deviations noted: slouched posture and FFP  Encouraged: upright " stance with hips extended and shoulders back  Comments: unable to stand upright      Gait: Deferred due to poor standing tolerance    Therapeutic Exercises:   Static and dynamic sitting balance at EOB  Postural correction, Midline alignment, upright posture, and sitting tolerance  Static and dynamic standing balance at EOB  standing tolerance, upright stance, and hip extension    Education:  Time provided for education, counseling and discussion of health disposition in regards to patient's current status  All questions answered within PT scope of practice and to patient's satisfaction  PT role in POC to address current functional deficits  Pt educated on proper body mechanics, safety techniques, and energy conservation with PT facilitation and cueing throughout session    Patient left HOB elevated with all lines intact, call button in reach, RN Carmen notified, and wound care nurse present.    GOALS:   Multidisciplinary Problems       Physical Therapy Goals          Problem: Physical Therapy    Goal Priority Disciplines Outcome Interventions   Physical Therapy Goal     PT, PT/OT Progressing    Description: Goals to be met by: 2024     Patient will increase functional independence with mobility by performin. Supine to sit with Set-up [place]  2. Sit to supine with Set-up [place]  3. Sit to stand transfer with Contact Guard Assistance  4. Bed to chair transfer with Minimal Assistance using LRAD  5. Gait  x 30 feet with Minimal Assistance using LRAD   6. Ascend/descend 3 stair with no Handrails Moderate Assistance using LRAD   7. Lower extremity exercise program x15 reps per handout, with supervision                       Time Tracking:     PT Received On: 24  PT Start Time: 938     PT Stop Time:   PT Total Time (min): 54 min     Billable Minutes:   Therapeutic Activity 15 min, Therapeutic Exercise 10 min, and Neuromuscular Re-education 29 min    Treatment Type: Treatment  PT/PTA: PT        Elvin Talavera, PT, DPT  11/13/2024

## 2024-11-13 NOTE — PROGRESS NOTES
Patient escorted off unit via bed for dialysis. Patient transferred sent with bed with maximum assistance. No acute distress noted. Patient denies pain. Patient peripheral IV intact. No redness or swelling noted. Transported on telemetry. Awaiting patient's return.        Patient due medication will be given upon her return to the unit.    Patient returned to the unit at 1825.

## 2024-11-13 NOTE — PT/OT/SLP PROGRESS
Occupational Therapy   Treatment    Name: Pilar Fernandez  MRN: [id number]  Admitting Diagnosis:  Acute liver failure       Recommendations:     Discharge Recommendations: High Intensity Therapy  Discharge Equipment Recommendations:  bedside commode, bath bench, wheelchair, lift device, hospital bed  Barriers to discharge:  Decreased caregiver support, Other (Comment) (patient requires increased level of assistance)    Assessment:     Pilar Fernandez is a 44 y.o. female with a medical diagnosis of Acute liver failure.  She presents with the fallowing performance deficits affecting function are weakness, impaired endurance, impaired self care skills, impaired functional mobility, gait instability, impaired balance, pain, decreased safety awareness, decreased lower extremity function, decreased upper extremity function, edema, impaired skin, decreased coordination. Patient agreeable to tx session, patient is motivated to participate with therapy and work towards meeting established goals. Patient continues to required increased assistance with functional transfers, bed mobility, and self-care task, and continues to have limited activity tolerance due to pain and weakness from recent hospitalization. Patient is making progress with sitting balance and sitting tolerance at EOB.  Patient would benefit from High intensity therapy intervention to address over all functional decline with mobility task, endurance, and ADLs in order to return to PLOF.     Rehab Prognosis:  Good; patient would benefit from acute skilled OT services to address these deficits and reach maximum level of function.       Plan:     Patient to be seen 4 x/week to address the above listed problems via self-care/home management, therapeutic activities, therapeutic exercises, neuromuscular re-education  Plan of Care Expires: 11/30/24  Plan of Care Reviewed with: patient    Subjective     Chief Complaint: pain   Patient/Family Comments/goals:  to return to PLOF  Pain/Comfort:  Pain Rating 1:  (patient did not rate)  Location - Orientation 1: generalized  Location 1:  (bottom)  Pain Addressed 1: Reposition, Distraction  Pain Rating Post-Intervention 1:  (patient did not rate)    Objective:     Communicated with: Nurse prior to session.  Patient found HOB elevated with telemetry, oxygen upon OT entry to room.  Co-treated with PT due to patient complexity deficits requiring two skilled therapist to appropriately and safely mobilized patient while facilitating functional task in addition to accommodating for patient's activity tolerance.    General Precautions: Standard, fall, dental soft    Orthopedic Precautions:N/A  Braces: N/A  Respiratory Status: Nasal cannula, flow 3 L/min     Occupational Performance:     Bed Mobility:    Patient completed Rolling/Turning to Left with  maximal assistance of persons   Patient completed Rolling/Turning to Right with maximal assistance of 2 persons   Patient completed Scooting to EOB with maximal assistance of 2 persons   Patient required maximal assistance of 2 persons for scooting to HOB   Patient completed Supine to Sit with maximal assistance of 2 persons   Patient completed Sit to Supine with maximal assistance of 2 persons   Patient was able to tolerate sitting up at EOB for 30 mins with CGA to SBA for static sitting balance     Functional Mobility/Transfers:  Patient completed Sit <> Stand Transfer from EOB x3 trials  with maximal assistance of 2 persons   with  no assistive device and hand-held assist   Patient was able to stand for increments of 20-30 seconds, with Mod A of 2 persons to maintain static standing balance. Patient unable to perform upright posture due to weakness and poor hip extension  Functional Mobility: Deferred further mobility task due to safety concerns   Patient performed seated therapeutic exercises to improve B UE strength postural muscle activation and activity tolerance 1X10 reps    Biceps curls   Chest press  Shoulder press   Arm raises        Activities of Daily Living:  Grooming: Setup A to wash face   Bathing: maximal assistance for UB to sponge bath back and torso   Upper Body Dressing: moderate to min assistance to don gown  Lower Body Dressing: total assistance to don socks       [facility] 6 Click ADL: 12    Treatment & Education:  Discussed OT POC and progress  Educated patient on the importance to continue to perform exercises in order to reduce stiffness and promote joint mobility and blood flow  Addressed patient's questions and concerns within GONZALES scope of practice      Patient left HOB elevated with all lines intact, call button in reach, and nurse notified    GOALS:   Multidisciplinary Problems       Occupational Therapy Goals          Problem: Occupational Therapy    Goal Priority Disciplines Outcome Interventions   Occupational Therapy Goal     OT, PT/OT Progressing    Description: Goals to be met by: 11/30/24     Patient will increase functional independence with ADLs by performing:    UE Dressing with Set-up Assistance.  LE Dressing with Moderate Assistance.  Grooming while seated with Set-up Assistance.  Toileting from bedside commode with Moderate Assistance for hygiene and clothing management.   Supine to sit with Contact Guard Assistance.  Stand pivot transfers with Maximum Assistance.  Toilet transfer to bedside commode with Maximum Assistance.    DME needs:   Tub transfer bench for t/s combo to ensure pt return to hygiene and allow safe and effective bathing in home environment with minimized fall risk due to functional mobility impairments.      Patient has a mobility limitation that significantly impairs their ability to participate in one or more mobility related activities of daily living, including toileting. This deficit can be resolved by using a bedside commode. Patient demonstrates mobility limitations that will cause them to be confined to one room at home  without bathroom access for up to 30 days. Using a bedside commode will greatly improve the patient's ability to participate in MRADLs.                          Time Tracking:     OT Date of Treatment: 11/13/24  OT Start Time: 0935  OT Stop Time: 1028  OT Total Time (min): 53 min    Billable Minutes:Self Care/Home Management 23  Therapeutic Activity 20  Therapeutic Exercise 10    OT/MIKE: MIKE     Number of MIKE visits since last OT visit: 1    11/13/2024

## 2024-11-13 NOTE — ASSESSMENT & PLAN NOTE
44 year old female hx of ESRD on HD MWF presenting from outside hospital as transfer for acute anemia and painless jaundice. Last HD session 10/28. Nephrology consulted for ESRD management.     Nephrology History  -Outpatient HD unit: [facility]  -Nephrologist: ?  -HD tx days: MWF  -HD tx time: 4 hrs  -Last HD tx: Monday 10/28  -HD access: LUE AVF  -HD modality: iHD  -Residual urine:   -EDW:  110 kg    Plan/Recommendations  ESRD on HD:    -Continue MWF iHD schedule  -Patient with diffuse body wall edema on imaging and chronic lymphedema, but no respiratory distress, on baseline O2, and no significant pulmonary edema on imaging. -Echo shows EF 60-65%, IVC 15 mm Hg.  -1 L daily fluid restriction  -Will continue iHD thrice weekly unless emergent indication arises  -Strict I&Os  -Pre & post HD weight  Anemia in ESRD  -Hgb goal 10-11, 7.5 today, stable  -Trend hgb, discontinue epo in setting of possible malignancy   Mineral Bone Disease in ESRD  -Resume home phos binders when diet advances  -Renal diet if not NPO

## 2024-11-13 NOTE — PROGRESS NOTES
[facility] - Stepdown [facility] ([address] Medicine  Progress Note    Patient Name: Pilar Fernandez  MRN: [id number]  Patient Class: IP- Inpatient   Admission Date: 10/29/2024  Length of Stay: 14 days  Attending Physician: Stephany Ordaz MD  Primary Care Provider: Lindsey Singletary FNP    Subjective:     Principal Problem: Acute liver failure    HPI:  Pilar Fernandez is a 44 y.o. female w/ PMHx of ESRD (on HD), T2DM, CHF, HTN, gastritis, severe BLE lymphedema (L>R), CHRF (on 3L @ home), prior CVA (July '24; on DAPT) w/ impaired mobility & sacral decubitus ulcer, gout, and known L breast mass, who presented to [facility] ED on 10/29/24 w/ painless jaundice w/ assoc pruritus. Pt reports diffuse pruritus for few weeks, with family noticing yellow discoloration of eyes on 10/28. Was previously admitted to [facility] for cellulitis (LLE vs LUE?), for which she received IV abx and was dc'ed 10/26 on clindamycin. Pt reports she had labs done during her [facility] stay, but there was nothing mentioned re: bilirubin or liver function at that time (lab reports from [facility] not available per Care Everywhere). Pt went to routine HD on 10/28 where she was told she had elevated bilirubin and was referred to [facility] ED for further evaluation.     On arrival to [facility] ED, pt afebrile w/ borderline/normotension (/43-87); otherwise HDS. Pt reports chronic lymphedema & leg/sacral wounds (w/ assoc pain) at baseline. Denied fevers, chills, N/V, melena, hematochezia, hematemesis, BRBPR, SOB, palpitations, chest or abdominal pain or other complaints. Initial labs notable for Tbili 11.7, AST 81, ALT 30, , lipase 57, albumin 2.0, bicarb 20, Na 134, Cr 4.5, BUN 45, WBC 22.8, Hgb 6.4, plts 154, INR 2.1, & lactate 1.6. Stool positive for occult blood. CT A/P w/o contrast noted anasarca w/ persistent cardiomegaly, small & improved pericardial effusion & small L pleural effusion; no acute GI or hepatic abnormalities noted.  Received IV zofran, Zosyn, IV protonix 80mg, IV fentanyl, along w/ 2u pRBC & 2u FFP. Given severe hyperbilirubinemia & anemia w/ e/o acute liver failure of unk etiology, [place] [facility].     Overview/Hospital Course:  [place] [facility] received dose of Zosyn & CTX given leukocytosis. Further abx held as low suspicion for infectious process & blood cx negative. GI consulted given c/f possible GI bleed; scope deferred given absence of overt bleeding. AES/Biliary consult deferred after team reviewed images and noted absence of biliary/ductal dilation. Hepatology consulted for evaluation/mgmt of acute liver failure & hyperbilirubinemia; serologic workup ordered. Liver US w/ doppler noted marked hepatosplenomegaly but otherwise nml w/ proper blood flow. Heme/Onc consulted d/t concern for possible hemolysis &/or underlying malignancy driving pt's presentation. Although Britt test positive, detectable haptoglobin made hemolysis unlikely. CT C/A/P showed notable for numerous enlarged LN conglomerates (&/or soft tissue masses) of the L axilla, inguinal regions (L > R), L supraclavicular region, & retroperitoneum- c/f neoplastic vs lymphoproliferative process. CT also noted diffuse anasarca, large pericardial effusion, cardiomegaly w/ pulmonary edema, & diffuse hepatosplenomegaly. Nephrology consulted for continuation of HD w/ volume removal. Midodrine initiated for post-dialysis hypotension & home BP meds held. Required addn'l unit of pRBCs on 10/31 for recurrent anemia & IV vitamin K on 11/1 for elevated INR. Bilirubin & LFTs continued to uptrend. Developed hepatic encephalopathy w/ ammonia lvl of 111, which improved with titration of pt's bowel regimen (switched from miralax to lactulose). BP stabilized and midodrine d/c'ed. After multiple postponements due to limited anesthesia staffing, liver biopsy done on 11/6. Pathology showed cholestatic hepatitis & features of hepatic venous  outflow obstruction. MRCP w/o e/o obstruction/stricture (although very limited by habitus). Ursodiol initiated per Hepatology recs. Bilateral breast US recommended per Heme/Onc, however upon radiology's review of imaging, a dedicated L axilla US obtained instead (as pt's anasarca would make interpretation of breast US difficult). US of L axilla noted abnormal axillary adenopathy w/ at least 6 abnormal LNs (BI-RADS 4); biopsy advised d/t c/f metastatic dx vs lymphoma. Underwent core needle bx of L axillary LN per IR on 11/11.       Interval History:  NAEON. Pt sitting up in bed this AM and reports feeling much more comfortable. Following discussion w/ mom & Palliative Medicine, pt agreeable to stay IP until preliminary biopsy results available, should they warrant IP chemo. However, given pt's persistent & severe hyperbilrubinemia, along w/ CHF & ESRD, highly unlikely that patient would be eligible for chemo regimen should biopsy even result w/ DLBCL (or other malignancy that could possible warrant IP chemo). As such, would not delay discharge for biopsy results. Authorization for IPR still pending. Likely will have preliminary biopsy results available tmrw. Heme/Onc following peripherally. Appreciate Palliative's assistance.     Review of Systems   Constitutional:  Negative for chills, diaphoresis and fever.   HENT:  Negative for congestion, rhinorrhea, sore throat and trouble swallowing.    Eyes:  Negative for photophobia and visual disturbance.   Respiratory:  Negative for cough, shortness of breath and wheezing.    Cardiovascular:  Positive for leg swelling. Negative for chest pain and palpitations.   Gastrointestinal:  Negative for abdominal pain, diarrhea, nausea and vomiting.   Genitourinary:  Negative for difficulty urinating, dysuria and hematuria.   Musculoskeletal:  Positive for gait problem. Negative for arthralgias, joint swelling and neck pain.   Skin:  Positive for color change. Negative for rash and  wound.   Neurological:  Positive for weakness. Negative for dizziness, light-headedness and headaches.   Psychiatric/Behavioral:  Negative for agitation, confusion and sleep disturbance. The patient is nervous/anxious.      Objective:     Vital Signs (Most Recent):  Temp: 98.7 °F (37.1 °C) (11/09/24 1152)  Pulse: 73 (11/09/24 1152)  Resp: 18 (11/09/24 1152)  BP: (!) 105/54 (11/09/24 1152)  SpO2: 99 % (11/09/24 1152) Vital Signs (24h Range):  Temp:  [97.5 °F (36.4 °C)-98.7 °F (37.1 °C)] 98.7 °F (37.1 °C)  Pulse:  [66-92] 73  Resp:  [18] 18  SpO2:  [92 %-100 %] 99 %  BP: (105-146)/(54-77) 105/54     Weight: 130 kg (286 lb 9.6 oz)  Body mass index is 46.26 kg/m².    Intake/Output Summary (Last 24 hours) at 11/9/2024 1243  Last data filed at 11/8/2024 2200  Gross per 24 hour   Intake 532.5 ml   Output 3650 ml   Net -3117.5 ml         Physical Exam  Constitutional:       General: She is not in acute distress.     Appearance: She is morbidly obese. She is not toxic-appearing or diaphoretic.   HENT:      Head: Normocephalic and atraumatic.      Mouth/Throat:      Mouth: Mucous membranes are moist.   Eyes:      General: Scleral icterus present.      Conjunctiva/sclera: Conjunctivae normal.      Comments: Swelling of R eyelid/periorbital area w/o vision changes, pain, discharge or erythema   Cardiovascular:      Rate and Rhythm: Normal rate and regular rhythm.      Heart sounds: Normal heart sounds.   Pulmonary:      Effort: Pulmonary effort is normal. No respiratory distress.      Breath sounds: Normal breath sounds. No wheezing, rhonchi or rales.      Comments: Auscultation limited by habitus  Abdominal:      General: Bowel sounds are normal. There is no distension.      Palpations: Abdomen is soft.      Tenderness: There is no abdominal tenderness. There is no guarding.   Musculoskeletal:      Right lower leg: Edema present.      Left lower leg: Edema present.      Comments: Extreme 4+ b/l LE pitting edema L>>R (c/w  baseline). Diffuse anasarca.    Lymphadenopathy:      Upper Body:      Left upper body: Axillary adenopathy present.   Skin:     General: Skin is warm and dry.      Coloration: Skin is jaundiced.   Neurological:      Mental Status: She is alert and oriented to person, place, and time. Mental status is at baseline.   Psychiatric:         Mood and Affect: Affect normal. Mood is anxious.         Behavior: Behavior normal.           Significant Labs: All pertinent labs within the past 24 hours have been reviewed.    Significant Imaging: I have reviewed all pertinent imaging results/findings within the past 24 hours.    Assessment/Plan:      Acute liver failure  Direct hyperbilirubinemia w/ painless jaundice  Coagulopathy  Hepatic encephalopathy - resolved  Presented w/ hyperbilirubinemia per outpatient/HD labs w/ acute jaundice & assoc pruritus. On arrival, Tbili 11.7, AST 81, ALT 30, , along w/ INR 2.1 & Hgb 6.4 s/o acute liver failure w/ synthetic dysfunction. Considered hemolytic process, however less likely w/ haptoglobin of 115. CT noted hepatomegaly w/ heterogenous attenuation & few scattered sub-cm hypodensities of unk significance; galbladder surgically absent. Liver US w/ Doppler w/ marked HSM, but normal blood flow and no biliary/ductal dilation. Given absence of dilation, AES deferred consult for time being, and advised primary Hepatology involvement. Etiology unclear- decompensated cirrhosis (2/2 MASLD? W/ MELD 36) considered vs DILI (2/2 clindamycin) vs other infiltrative process (given extensive LAD). Serological workup w/ elevated A1AT (300), abnormal IgG profile, & increased factor VIII activity; AFP, PETH, BRIAN, PETH, ASMA, AMA & ceruloplasmin all nml. Developed hepatic encephalopathy (w/ ammonia 111) which resolved following titration of bowel regimen. Underwent IR liver bx on 11/6 w/ pathology showing cholestatic hepatitis w/ stage 2 fibrosis & e/o hepatic venous outflow obstruction (although  none noted on initial Doppler). Pt's Tbili & LFTs continued to uptrend. MRCP severely limited by habitus, but didn't show any stricture/obstruction; ursodiol initiated. Pt not transplant candidate given co-morbidities, functional status & underlying malignancy. Overall poor prognosis. MELD-Na 35.  - Hepatology following; appreciate recs  - Ursodiol 300mg TID   - Not a transplant candidate given functional status & comorbidities   - Monitor CMP, INR, & CBC  - Minimize hepatotoxic agents as able   - Bowel regimen; titrate PRN   - Palliative Medicine consulted; appreciate recs    Diffuse lymphadenopathy  CT C/A/P on arrival to [facility] noted numerous LN conglomerates &/or soft tissue masses around L axillary soft tissues, b/l inguinal regions (L>R), L supraclavicular soft tissues, & retroperitoneum. Findings highly c/f  neoplastic or lymphoproliferative process. While metastatic breast cancer considered (vickie given hx of breast mass as below) although lymphoma also considered. Malignancy could be contributing to pt's liver failure, vickie if causing obstruction &/or infiltration. Dedicated L axillary US noted abnormal L axillary LAD w/ at least 6 abnormal LN w/ suspicious appearance (BI-RADS 4); biopsy (w/ pathology & flow cytometry) advised. Underwent core needle biopsy of L axillary LN per IR on 11/11.   - Hematology-Oncology consulted; appreciate recs  - S/p IR bx of L axillary LN 11/11; path/cytology pending   - Pending core bx results, may pursue excisional biopsy  - Inpatient vs outpatient chemo pending biopsy results (however pt's persistent hyperbilirubinemia, HFpEF &/or ESRD, exclude her from most treatment regimens)  - Palliative Medicine consulted; appreciate recs    Acute anemia  Coagulopathy  Thrombocytopenia - resolved  On presentation, Hgb 6.4 (baseline ~10.5) w/ MCV 83, platelets 154, & INR 2.1. Pt denied melena, hematochezia, BRBPR, hematemesis or other sx of active bleeding. Fecal occult positive at OSH ED.  Received 2u pRBCs & 2u FFP prior to transfer. Evaluated by GI on arrival who deferred scope for time being given absence of s/sx of brisk bleeding.  Required addn'l unit pRBCs & IV vitamin K shortly after transfer. Subsequent development of thrombocytopenia (eric 90s). Anemia & elevated INR stabilized/improved but certainly persisted. Continued to have no s/sx of active/brisk bleeding. Iron panel c/w anemia 2/2 chronic dx/inflammation. Suspect cytopenias & coagulopathy 2/2 liver failure w/ possible contributing suspected malignancy.   - Mgmt/workup of liver failure & LAD as above/below  - Monitor CBC & for e/o active bleeding  - Transfuse PRN for goal Hgb > 7, plts > 50K & INR < 2  - S/p 2u FFP & 4u pRBCs to date (including transfusions at OSH)   - S/p IV vitamin K     Mass of breast  Has had known mass of L breast for years. Excisional biopsy (Dec '22) noted complex sclerosing lesion w/ usual ductal hyperplasia & apocrine metaplasia; no atypica or malignancy on preliminary evaluation. Per further review by [facility], findings c/w ADH w/ assoc necrosis & proliferative fibrocystic changes. US (Jan 2023) w/ post-surgical fluid collection; BI-RADS 2. Doesn't appear to have had any further imaging or workup since then. Family hx of breast ca in one maternal aunt & ovarian cancer in another maternal aunt noted. Diffuse LAD, vickie of L axilla noted on CT, raising c/f malignancy. Imaging reviewed by radiology who advised dedicated L axillary US (R axilla findings stable and breast US unreliable given anasarca).   - Malignancy workup as above    ESRD (end stage renal disease) on dialysis  Hx of end-stage CKD (2/2 diabetic & hypertensive nephropathy). Has been on HD (M/W/F) since 2021. On admission, Cr 4.5, BUN 45, bicarb 20, & Na 134, (all c/w baseline). No uremia or notable electrolyte derangements. Pt w/ b/l LE lymphedema, requiring 3L NC (both c/w baseline). Diffuse anasarca noted which seemingly is above baseline.   -  Nephrology consulted; appreciate recs  - Continued HD for clearance & volume removal   - Epo MWF w/ HD  - Monitor RFP  - Minimize nephrotoxic agents as able & renally-dose meds    Lymphedema  Hx of chronic BLE lymphedema (L>R) at baseline which contributes to pt's difficulty with mobility & recent cellulitis.   - Volume removal via HD  - Wound care  - Elevate as much as tolerated    Leukocytosis  WBC 22.8 on admission w/ neutrophil predominance. Pt afebrile and w/o any other SIRS. No focal s/sx. Recent cellulitis, but stable w/o active flare/infx appearance. Unable to collect UA d/t ESRD. CXR w/o focal consolidation. CRP & procal elevated but ESR nml. CT C/A/P w/ diffuse LAD w/ concern for malignancy; no infectious focus identified though. WBC remained low 20s throughout hospitalization. Suspect leukocytosis reactive iso malignancy.   - Defer abx  - Monitor CBC & fever curve  - Malignancy workup as above    Heart failure with preserved ejection fraction  Hx of HFpEF per 2020 TTE. Although respiratory status stable/at baseline, repeat TTE obtained iso acute illness & diffuse/severe anasarca. TTE (10/30/24) w/ EF 60-65% w/ septal flattening c/w volume overload, biatrial dilation, severe TR, moderate pulmonary HTN (PASP 51mmHg) & CVP 15mmHg. BNP 1459.   - Volume removal via HD  - Deferring lasix as pt anuric  - Low sodium diet w/ 1.2L fluid restriction     History of cerebrovascular accident (CVA) with residual deficit  Suffered CVA of R posterior frontal lobe in July 2024 w/ residual L-sided weakness which has impaired mobility. No acute neuro concerns/focal findings on current admission.   - Holding home DAPT 2/2 anemia/coagulopathy   - Holding home statin i/s/o liver failure   - PT/OT    Impaired mobility  Sacral decubitus ulcer  Due to lymphedema & stroke earlier this year. Per 7/17/24 discharge summary, pt was able to ambulate w/ walker (but dragging L foot) and was discharge home w/ outpatient therapy. Pt seems  to have had progressive decline in mobility since then with pt's boyfriend providing significant assistance w/ mobility & ADLs. Pt seemingly largely bedbound w/ chronic sacral ulcer noted.   - Volume/lymphedema mgmt as above  - Wound care  - Frequent turns  - PT/OT   - Despite pt's overall poor prognosis, would still benefit from IPR as improved mobility & functional status would absolutely affect pt's overall quality of life, and potentially effect cancer treatment options available to her    Essential hypertension  Hx of essential & renovascular HTN. Low-normotensive on arrival to OSH (/43-87) but otherwise HDS.   - Holding home minoxidil & olmesartan  - Resume meds as needed    Type 2 diabetes mellitus, with long-term current use of insulin  Hx of IDDM2 on home regimen of linagliptin, aspart 10u BID, l& antus 70u QHS; last A1c 5.6% (July 2024; improved from 8.7 in 2021). Serum glucose 83 on admission w/ repeat A1c of 4.5%. Likely decreased insulin needs iso ongoing HD.   - Holding home linagliptin while inpatient  - LD-SSI  - CBG AC & HS  - Hypoglycemic protocol     Morbid obesity  Weight 130kg on admission (BMI 46.26 kg/m²). Morbid obesity complicates all aspects of disease management from diagnostic modalities to treatment.   - Weight loss encouraged    Chronic hypoxic respiratory failure  Pt has apparently been on 3L NC at home since 2022. No known underlying COPD or other lung dx. Suspect CHRF 2/2 chronic volume overload iso ESRD & HFpEF, along w/ likely OHS/MELANY.  - Volume mgmt as above   - Supplemental O2 PRN for goal SpO2 > 92%    Gout  Documented hx of gout (uric acid as high as 16 years ago). Pt endorsing L heel pain on admission; uric acid 8.6. Allopurinol started. XR noted extensive soft tissue swelling (c/w known lymphedema & anasarca) along w/ calcaneal enthesophyte at plantar fascia insertion. Suspect pt's foot pain is more likely 2/2 enthesophyte than acute gout.   - Holding further  allopurinol for now    GERD (gastroesophageal reflux disease)  Hx of gastritis per chart review. No EGDs on file. Asymptomatic on admission.   - Continue home PPI at increased BID dose  - PRN Maalox, antiemetics, etc.     Vaginal candidiasis   Profuse vaginal discharge noted on 11/9. Labs c/w candidasis.   - Fluconazole 150mg x1         VTE Risk Mitigation (From admission, onward)           Ordered     IP VTE HIGH RISK PATIENT  Once         10/30/24 0332     Place sequential compression device  Until discontinued         10/30/24 0051                    Discharge Planning   SHASHA: 11/13/2024     Code Status: Full Code   Is the patient medically ready for discharge?:     Reason for patient still in hospital (select all that apply): Patient trending condition, Laboratory test, Consult recommendations, and Pending disposition  Discharge Plan A: Rehab ([facility], [facility] Phone: [phone])   Discharge Delays: None known at this time          Stephany Ordaz MD  Department of Hospital Medicine   [facility] - Stepdown Flex ([place])

## 2024-11-13 NOTE — PROGRESS NOTES
[facility] ([place] [address])  Nephrology  Progress Note    Patient Name: Pilar Fernandez  MRN: [id number]  Admission Date: 10/29/2024  Hospital Length of Stay: 15 days  Attending Provider: Stephany Ordaz MD   Primary Care Physician: Lindsey Singletary FNP  Principal Problem:Acute liver failure    Subjective:     Interval History: Biopsy results pending. HD today.     Review of patient's allergies indicates:   Allergen Reactions    Tramadol Itching and Nausea And Vomiting     Current Facility-Administered Medications   Medication Frequency    0.9%  NaCl infusion (for blood administration) Q24H PRN    acetaminophen tablet 650 mg Q4H PRN    albuterol-ipratropium 2.5 mg-0.5 mg/3 mL nebulizer solution 3 mL Q4H PRN    dextrose 10% bolus 125 mL 125 mL PRN    dextrose 10% bolus 250 mL 250 mL PRN    diphenhydrAMINE capsule 50 mg Q6H PRN    glucagon (human recombinant) injection 1 mg PRN    glucose chewable tablet 16 g PRN    glucose chewable tablet 24 g PRN    hydrocortisone 2.5 % cream BID    insulin aspart U-100 pen 0-5 Units QID (AC + HS) PRN    lactulose 20 gram/30 mL solution Soln 10 g Q6H PRN    lactulose 20 gram/30 mL solution Soln 20 g BID    miconazole 2 % cream BID    mupirocin 2 % ointment BID    naloxone 0.4 mg/mL injection 0.02 mg PRN    ondansetron injection 4 mg Q8H PRN    oxyCODONE immediate release tablet 5 mg Q6H PRN    pantoprazole EC tablet 40 mg BID    simethicone chewable tablet 80 mg QID PRN    sodium chloride 0.9% flush 10 mL Q12H PRN    traZODone tablet 100 mg Nightly PRN    tuberculin injection 5 Units Once    ursodioL capsule 300 mg TID       Objective:     Vital Signs (Most Recent):  Temp: 98.9 °F (37.2 °C) (11/13/24 1154)  Pulse: 77 (11/13/24 1154)  Resp: 18 (11/13/24 1154)  BP: 126/74 (11/13/24 1154)  SpO2: 100 % (11/13/24 1154) Vital Signs (24h Range):  Temp:  [98.3 °F (36.8 °C)-98.9 °F (37.2 °C)] 98.9 °F (37.2 °C)  Pulse:  [72-81] 77  Resp:  [17-18] 18  SpO2:  [93 %-100 %]  100 %  BP: (114-130)/(57-74) 126/74     Weight: 130 kg (286 lb 9.6 oz) (11/06/24 1451)  Body mass index is 46.26 kg/m².  Body surface area is 2.46 meters squared.    I/O last 3 completed shifts:  In: 1050 [P.O.:1050]  Out: -      Physical Exam  Vitals and nursing note reviewed.   Constitutional:       Appearance: She is morbidly obese.   HENT:      Head: Normocephalic.   Eyes:      General: Scleral icterus present.      Comments: Swelling of R eyelid/periorbital area w/o vision changes, pain, discharge or erythema   Cardiovascular:      Rate and Rhythm: Normal rate.      Heart sounds: Normal heart sounds.   Pulmonary:      Effort: Pulmonary effort is normal. No respiratory distress.      Breath sounds: No wheezing, rhonchi or rales.      Comments: Auscultation limited by habitus  Abdominal:      Palpations: Abdomen is soft.   Musculoskeletal:      Right lower leg: Edema present.      Left lower leg: Edema present.      Comments: Extreme 4+ b/l LE pitting edema L>>R (c/w baseline). Diffuse anasarca.    Lymphadenopathy:      Upper Body:      Left upper body: Axillary adenopathy present.   Skin:     General: Skin is warm.      Coloration: Skin is jaundiced.   Neurological:      Mental Status: She is alert and oriented to person, place, and time.   Psychiatric:         Mood and Affect: Mood is anxious. Affect is tearful.         Behavior: Behavior normal.          Significant Labs:  CBC:   Recent Labs   Lab 11/13/24  0625   WBC 16.06*   RBC 2.62*   HGB 8.2*   HCT 26.1*      *   MCH 31.3*   MCHC 31.4*     CMP:   Recent Labs   Lab 11/13/24  0625   GLU 73   CALCIUM 8.5*   ALBUMIN 1.4*   PROT 6.4   *   K 4.5   CO2 21*   CL 95   BUN 44*   CREATININE 5.0*   ALKPHOS 563*   ALT 43   *   BILITOT 17.4*     All labs within the past 24 hours have been reviewed.       Assessment/Plan:     Renal/  ESRD (end stage renal disease) on dialysis  44 year old female hx of ESRD on HD MWF presenting from outside  hospital as transfer for acute anemia and painless jaundice. Last HD session 10/28. Nephrology consulted for ESRD management.     Nephrology History  -Outpatient HD unit: [facility]  -Nephrologist: ?  -HD tx days: MWF  -HD tx time: 4 hrs  -Last HD tx: Monday 10/28  -HD access: LUE AVF  -HD modality: iHD  -Residual urine:   -EDW:  110 kg    Plan/Recommendations  ESRD on HD:    -Continue MWF iHD schedule  -Patient with diffuse body wall edema on imaging and chronic lymphedema, but no respiratory distress, on baseline O2, and no significant pulmonary edema on imaging. -Echo shows EF 60-65%, IVC 15 mm Hg.  -1 L daily fluid restriction  -Will continue iHD thrice weekly unless emergent indication arises  -Strict I&Os  -Pre & post HD weight  Anemia in ESRD  -Hgb goal 10-11, 7.5 today, stable  -Trend hgb, discontinue epo in setting of possible malignancy   Mineral Bone Disease in ESRD  -No indication for phos binders  -Renal diet if not NPO      GI  * Acute liver failure  -management per primary     Other  Anasarca  UF with HD         Thank you for your consult. I will follow-up with patient. Please contact us if you have any additional questions.    Evon Bullard, YAEL  Nephrology  [facility] ([place])   unk

## 2024-11-14 PROBLEM — B37.31 VAGINAL CANDIDIASIS: Status: ACTIVE | Noted: 2024-11-14

## 2024-11-14 PROBLEM — B37.31 VAGINAL CANDIDIASIS: Status: RESOLVED | Noted: 2024-11-14 | Resolved: 2024-11-14

## 2024-11-14 LAB
ADEQUACY: NORMAL
ALBUMIN SERPL BCP-MCNC: 1.4 G/DL (ref 3.5–5.2)
ALP SERPL-CCNC: 540 U/L (ref 40–150)
ALT SERPL W/O P-5'-P-CCNC: 41 U/L (ref 10–44)
ANION GAP SERPL CALC-SCNC: 13 MMOL/L (ref 8–16)
AST SERPL-CCNC: 115 U/L (ref 10–40)
BASOPHILS # BLD AUTO: 0.09 K/UL (ref 0–0.2)
BASOPHILS NFR BLD: 0.5 % (ref 0–1.9)
BILIRUB SERPL-MCNC: 16.1 MG/DL (ref 0.1–1)
BUN SERPL-MCNC: 37 MG/DL (ref 6–20)
CALCIUM SERPL-MCNC: 8.1 MG/DL (ref 8.7–10.5)
CHLORIDE SERPL-SCNC: 95 MMOL/L (ref 95–110)
CO2 SERPL-SCNC: 21 MMOL/L (ref 23–29)
CREAT SERPL-MCNC: 4.5 MG/DL (ref 0.5–1.4)
DIFFERENTIAL METHOD BLD: ABNORMAL
EOSINOPHIL # BLD AUTO: 0.4 K/UL (ref 0–0.5)
EOSINOPHIL NFR BLD: 2.1 % (ref 0–8)
ERYTHROCYTE [DISTWIDTH] IN BLOOD BY AUTOMATED COUNT: 23.9 % (ref 11.5–14.5)
EST. GFR  (NO RACE VARIABLE): 11.6 ML/MIN/1.73 M^2
FINAL PATHOLOGIC DIAGNOSIS: NORMAL
GLUCOSE SERPL-MCNC: 69 MG/DL (ref 70–110)
GROSS: NORMAL
HCT VFR BLD AUTO: 25.5 % (ref 37–48.5)
HGB BLD-MCNC: 8.7 G/DL (ref 12–16)
IMM GRANULOCYTES # BLD AUTO: 0.4 K/UL (ref 0–0.04)
IMM GRANULOCYTES NFR BLD AUTO: 2.4 % (ref 0–0.5)
INR PPP: 1.2 (ref 0.8–1.2)
LYMPHOCYTES # BLD AUTO: 1.2 K/UL (ref 1–4.8)
LYMPHOCYTES NFR BLD: 6.9 % (ref 18–48)
Lab: NORMAL
MAGNESIUM SERPL-MCNC: 2 MG/DL (ref 1.6–2.6)
MCH RBC QN AUTO: 31.5 PG (ref 27–31)
MCHC RBC AUTO-ENTMCNC: 34.1 G/DL (ref 32–36)
MCV RBC AUTO: 92 FL (ref 82–98)
MICROSCOPIC EXAM: NORMAL
MONOCYTES # BLD AUTO: 1.1 K/UL (ref 0.3–1)
MONOCYTES NFR BLD: 6.9 % (ref 4–15)
NEUTROPHILS # BLD AUTO: 13.5 K/UL (ref 1.8–7.7)
NEUTROPHILS NFR BLD: 81.2 % (ref 38–73)
NRBC BLD-RTO: 0 /100 WBC
PHOSPHATE SERPL-MCNC: 4.2 MG/DL (ref 2.7–4.5)
PLATELET # BLD AUTO: 249 K/UL (ref 150–450)
PMV BLD AUTO: 12.2 FL (ref 9.2–12.9)
POCT GLUCOSE: 103 MG/DL (ref 70–110)
POCT GLUCOSE: 114 MG/DL (ref 70–110)
POCT GLUCOSE: 94 MG/DL (ref 70–110)
POTASSIUM SERPL-SCNC: 4.5 MMOL/L (ref 3.5–5.1)
PROT SERPL-MCNC: 6.3 G/DL (ref 6–8.4)
PROTHROMBIN TIME: 13.3 SEC (ref 9–12.5)
RBC # BLD AUTO: 2.76 M/UL (ref 4–5.4)
SARS-COV-2 RNA RESP QL NAA+PROBE: NOT DETECTED
SODIUM SERPL-SCNC: 129 MMOL/L (ref 136–145)
WBC # BLD AUTO: 16.64 K/UL (ref 3.9–12.7)

## 2024-11-14 PROCEDURE — 80053 COMPREHEN METABOLIC PANEL: CPT | Performed by: STUDENT IN AN ORGANIZED HEALTH CARE EDUCATION/TRAINING PROGRAM

## 2024-11-14 PROCEDURE — 83735 ASSAY OF MAGNESIUM: CPT | Performed by: STUDENT IN AN ORGANIZED HEALTH CARE EDUCATION/TRAINING PROGRAM

## 2024-11-14 PROCEDURE — 25000003 PHARM REV CODE 250: Performed by: HOSPITALIST

## 2024-11-14 PROCEDURE — 84100 ASSAY OF PHOSPHORUS: CPT | Performed by: STUDENT IN AN ORGANIZED HEALTH CARE EDUCATION/TRAINING PROGRAM

## 2024-11-14 PROCEDURE — 97530 THERAPEUTIC ACTIVITIES: CPT | Mod: CQ

## 2024-11-14 PROCEDURE — 36415 COLL VENOUS BLD VENIPUNCTURE: CPT | Performed by: STUDENT IN AN ORGANIZED HEALTH CARE EDUCATION/TRAINING PROGRAM

## 2024-11-14 PROCEDURE — 20600001 HC STEP DOWN PRIVATE ROOM

## 2024-11-14 PROCEDURE — 99232 SBSQ HOSP IP/OBS MODERATE 35: CPT | Mod: ,,, | Performed by: NURSE PRACTITIONER

## 2024-11-14 PROCEDURE — 97535 SELF CARE MNGMENT TRAINING: CPT | Mod: CO

## 2024-11-14 PROCEDURE — 97112 NEUROMUSCULAR REEDUCATION: CPT | Mod: CQ

## 2024-11-14 PROCEDURE — 92526 ORAL FUNCTION THERAPY: CPT

## 2024-11-14 PROCEDURE — 97530 THERAPEUTIC ACTIVITIES: CPT | Mod: CO

## 2024-11-14 PROCEDURE — 85610 PROTHROMBIN TIME: CPT | Performed by: STUDENT IN AN ORGANIZED HEALTH CARE EDUCATION/TRAINING PROGRAM

## 2024-11-14 PROCEDURE — 25000003 PHARM REV CODE 250: Performed by: STUDENT IN AN ORGANIZED HEALTH CARE EDUCATION/TRAINING PROGRAM

## 2024-11-14 PROCEDURE — 85025 COMPLETE CBC W/AUTO DIFF WBC: CPT | Performed by: HOSPITALIST

## 2024-11-14 PROCEDURE — 25000003 PHARM REV CODE 250: Performed by: PHYSICIAN ASSISTANT

## 2024-11-14 RX ORDER — URSODIOL 300 MG/1
300 CAPSULE ORAL 3 TIMES DAILY
Qty: 90 CAPSULE | Refills: 11 | Status: SHIPPED | OUTPATIENT
Start: 2024-11-14 | End: 2025-11-14

## 2024-11-14 RX ORDER — SODIUM CHLORIDE 9 MG/ML
INJECTION, SOLUTION INTRAVENOUS ONCE
Status: CANCELLED | OUTPATIENT
Start: 2024-11-15

## 2024-11-14 RX ADMIN — LACTULOSE 20 G: 20 SOLUTION ORAL at 05:11

## 2024-11-14 RX ADMIN — PANTOPRAZOLE SODIUM 40 MG: 40 TABLET, DELAYED RELEASE ORAL at 08:11

## 2024-11-14 RX ADMIN — HYDROCORTISONE: 25 CREAM TOPICAL at 09:11

## 2024-11-14 RX ADMIN — URSODIOL 300 MG: 300 CAPSULE ORAL at 03:11

## 2024-11-14 RX ADMIN — DIPHENHYDRAMINE HYDROCHLORIDE 50 MG: 50 CAPSULE ORAL at 08:11

## 2024-11-14 RX ADMIN — MUPIROCIN: 20 OINTMENT TOPICAL at 08:11

## 2024-11-14 RX ADMIN — URSODIOL 300 MG: 300 CAPSULE ORAL at 09:11

## 2024-11-14 RX ADMIN — MICONAZOLE NITRATE: 20 CREAM TOPICAL at 08:11

## 2024-11-14 RX ADMIN — HYDROCORTISONE: 25 CREAM TOPICAL at 08:11

## 2024-11-14 RX ADMIN — URSODIOL 300 MG: 300 CAPSULE ORAL at 08:11

## 2024-11-14 NOTE — ASSESSMENT & PLAN NOTE
Hx of end-stage CKD (2/2 diabetic & hypertensive nephropathy). Has been on HD (M/W/F) since 2021. On admission, Cr 4.5, BUN 45, bicarb 20, & Na 134, (all c/w baseline). No uremia or notable electrolyte derangements. Pt w/ b/l LE lymphedema, requiring 3L NC (both c/w baseline). Diffuse anasarca noted which seemingly is above baseline.   - Nephrology consulted; appreciate recs  - Continued HD for clearance & volume removal   - Epo MWF w/ HD  - Monitor RFP  - Minimize nephrotoxic agents as able & renally-dose meds

## 2024-11-14 NOTE — ASSESSMENT & PLAN NOTE
Has had known mass of L breast for years. Excisional biopsy (Dec '22) noted complex sclerosing lesion w/ usual ductal hyperplasia & apocrine metaplasia; no atypica or malignancy on preliminary evaluation. Per further review by [facility], findings c/w ADH w/ assoc necrosis & proliferative fibrocystic changes. US (Jan 2023) w/ post-surgical fluid collection; BI-RADS 2. Doesn't appear to have had any further imaging or workup since then. Family hx of breast ca in one maternal aunt & ovarian cancer in another maternal aunt noted. Diffuse LAD, vickie of L axilla noted on CT, raising c/f malignancy. Imaging reviewed by radiology who advised dedicated L axillary US (R axilla findings stable and breast US unreliable given anasarca).   - Malignancy workup as above

## 2024-11-14 NOTE — ASSESSMENT & PLAN NOTE
Direct hyperbilirubinemia w/ painless jaundice  Coagulopathy  Hepatic encephalopathy - resolved  Presented w/ hyperbilirubinemia per outpatient/HD labs w/ acute jaundice & assoc pruritus. On arrival, Tbili 11.7, AST 81, ALT 30, , along w/ INR 2.1 & Hgb 6.4 s/o acute liver failure w/ synthetic dysfunction. Considered hemolytic process, however less likely w/ haptoglobin of 115. CT noted hepatomegaly w/ heterogenous attenuation & few scattered sub-cm hypodensities of unk significance; galbladder surgically absent. Liver US w/ Doppler w/ marked HSM, but normal blood flow and no biliary/ductal dilation. Given absence of dilation, AES deferred consult for time being, and advised primary Hepatology involvement. Etiology unclear- decompensated cirrhosis (2/2 MASLD? W/ MELD 36) considered vs DILI (2/2 clindamycin) vs other infiltrative process (given extensive LAD). Serological workup w/ elevated A1AT (300), abnormal IgG profile, & increased factor VIII activity; AFP, PETH, BRIAN, PETH, ASMA, AMA & ceruloplasmin all nml. Developed hepatic encephalopathy (w/ ammonia 111) which resolved following titration of bowel regimen. 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 Pt's Tbili & LFTs continued to uptrend. MRCP severely limited by habitus, but didn't show any stricture/obstruction; ursodiol initiated. Pt not transplant candidate given co-morbidities, functional status & underlying malignancy. Overall poor prognosis. MELD-Na 35.  - Hepatology following; appreciate recs  - Ursodiol 300mg TID   - Not a transplant candidate given functional status & comorbidities   - Monitor CMP, INR, & CBC  - Minimize hepatotoxic agents as able   - Bowel regimen; titrate PRN   - Palliative Medicine consulted; appreciate recs

## 2024-11-14 NOTE — ASSESSMENT & PLAN NOTE
Sacral decubitus ulcer  Due to lymphedema & stroke earlier this year. Per 7/17/24 discharge summary, pt was able to ambulate w/ walker (but dragging L foot) and was discharge home w/ outpatient therapy. Pt seems to have had progressive decline in mobility since then with pt's boyfriend providing significant assistance w/ mobility & ADLs. Pt seemingly largely bedbound w/ chronic sacral ulcer noted.   - Volume/lymphedema mgmt as above  - Wound care  - Frequent turns  - PT/OT   - Despite pt's overall poor prognosis, would still benefit from IPR/SNF as improved mobility & functional status would absolutely affect pt's overall quality of life, and potentially effect cancer treatment options available to her

## 2024-11-14 NOTE — PROGRESS NOTES
[facility] - Stepdown [facility] ([address] Medicine  Progress Note    Patient Name: Pilar Fernandez  MRN: [id number]  Patient Class: IP- Inpatient   Admission Date: 10/29/2024  Length of Stay: 16 days  Attending Physician: Stephany Ordaz MD  Primary Care Provider: Lindsey Singletary FNP    Subjective:     Principal Problem: Acute liver failure    HPI:  Pilar Fernandez is a 44 y.o. female w/ PMHx of ESRD (on HD), T2DM, CHF, HTN, gastritis, severe BLE lymphedema (L>R), CHRF (on 3L @ home), prior CVA (July '24; on DAPT) w/ impaired mobility & sacral decubitus ulcer, gout, and known L breast mass, who presented to [facility] ED on 10/29/24 w/ painless jaundice w/ assoc pruritus. Pt reports diffuse pruritus for few weeks, with family noticing yellow discoloration of eyes on 10/28. Was previously admitted to [facility] for cellulitis (LLE vs LUE?), for which she received IV abx and was dc'ed 10/26 on clindamycin. Pt reports she had labs done during her [facility] stay, but there was nothing mentioned re: bilirubin or liver function at that time (lab reports from [facility] not available per Care Everywhere). Pt went to routine HD on 10/28 where she was told she had elevated bilirubin and was referred to [facility] ED for further evaluation.     On arrival to [facility] ED, pt afebrile w/ borderline/normotension (/43-87); otherwise HDS. Pt reports chronic lymphedema & leg/sacral wounds (w/ assoc pain) at baseline. Denied fevers, chills, N/V, melena, hematochezia, hematemesis, BRBPR, SOB, palpitations, chest or abdominal pain or other complaints. Initial labs notable for Tbili 11.7, AST 81, ALT 30, , lipase 57, albumin 2.0, bicarb 20, Na 134, Cr 4.5, BUN 45, WBC 22.8, Hgb 6.4, plts 154, INR 2.1, & lactate 1.6. Stool positive for occult blood. CT A/P w/o contrast noted anasarca w/ persistent cardiomegaly, small & improved pericardial effusion & small L pleural effusion; no acute GI or hepatic abnormalities noted.  Received IV zofran, Zosyn, IV protonix 80mg, IV fentanyl, along w/ 2u pRBC & 2u FFP. Given severe hyperbilirubinemia & anemia w/ e/o acute liver failure of unk etiology, [facility] for HLOC.     Overview/Hospital Course:  On [facility], pt received dose of Zosyn & CTX given leukocytosis. Further abx held as low suspicion for infectious process & blood cx negative. GI consulted given c/f possible GI bleed; scope deferred given absence of overt bleeding. AES/Biliary consult deferred after team reviewed images and noted absence of biliary/ductal dilation. Hepatology consulted for evaluation/mgmt of acute liver failure & hyperbilirubinemia; serologic workup ordered. Liver US w/ doppler noted marked hepatosplenomegaly but otherwise nml w/ proper blood flow. Heme/Onc consulted d/t concern for possible hemolysis &/or underlying malignancy driving pt's presentation. Although Britt test positive, detectable haptoglobin made hemolysis unlikely. CT C/A/P showed notable for numerous enlarged LN conglomerates (&/or soft tissue masses) of the L axilla, inguinal regions (L > R), L supraclavicular region, & retroperitoneum- c/f neoplastic vs lymphoproliferative process. CT also noted diffuse anasarca, large pericardial effusion, cardiomegaly w/ pulmonary edema, & diffuse hepatosplenomegaly. Nephrology consulted for continuation of HD w/ volume removal. Midodrine initiated for post-dialysis hypotension & home BP meds held. Required addn'l unit of pRBCs on 10/31 for recurrent anemia & IV vitamin K on 11/1 for elevated INR. Bilirubin & LFTs continued to uptrend. Developed hepatic encephalopathy w/ ammonia lvl of 111, which improved with titration of pt's bowel regimen (switched from miralax to lactulose). BP stabilized and midodrine d/c'ed. After multiple postponements due to limited anesthesia staffing, liver biopsy done on 11/6. Pathology showed cholestatic hepatitis & features of hepatic venous  outflow obstruction. MRCP w/o e/o obstruction/stricture (although very limited by habitus). Ursodiol initiated per Hepatology recs. Bilateral breast US recommended per Heme/Onc, however upon radiology's review of imaging, a dedicated L axilla US obtained instead (as pt's anasarca would make interpretation of breast US difficult). US of L axilla noted abnormal axillary adenopathy w/ at least 6 abnormal LNs (BI-RADS 4); biopsy advised d/t c/f metastatic dx vs lymphoma. Underwent core needle bx of L axillary LN per IR on 11/11. Reviewed current clinical status w/ Heme-Onc who report that pt wouldn't currently qualify for inpatient chemo as pt w/o bulky LAD or obstruction/compression of organs d/t LAD. Additionally, pt's persistent hyperbilirubinemia excludes her from several chemo regimens, and her other co-morbidities (HFpEF & ESRD) exclude her from nearly all the other remaining regimens. Patient and mother updated.       Interval History:    NAEON. Pt reports continued discomfort 2/2 being stuck in hospital bed. Denies overt pain though. Biopsy results still pending. Patient & mother informed that pt not a candidate for IP chemo, and unfortunately unlikely to be candidate for outpatient chemo either. Following [id number] w/ pt's payor, pt's authorization for IPR declined. Pt accepted by [facility] & authorization submitted. Plan to discharge to [facility] tomorrow pending approval.     Informed this afternoon that pt's preliminary biopsy results inconclusive, and appearance thus far s/o reactive process. Further stains/evaluation in process, but pt would likely require additional biopsy (likely excisional) procedure(s) in the future.       Review of Systems   Constitutional:  Negative for chills, diaphoresis and fever.   HENT:  Negative for congestion, rhinorrhea, sore throat and trouble swallowing.    Eyes:  Negative for photophobia and visual disturbance.   Respiratory:  Negative for cough, shortness of breath and wheezing.     Cardiovascular:  Positive for leg swelling. Negative for chest pain and palpitations.   Gastrointestinal:  Negative for abdominal pain, diarrhea, nausea and vomiting.   Genitourinary:  Negative for difficulty urinating, dysuria and hematuria.   Musculoskeletal:  Positive for gait problem. Negative for arthralgias, joint swelling and neck pain.   Skin:  Positive for color change. Negative for rash and wound.   Neurological:  Positive for weakness. Negative for dizziness, light-headedness and headaches.   Psychiatric/Behavioral:  Negative for agitation, confusion and sleep disturbance. The patient is nervous/anxious.      Objective:     Vital Signs (Most Recent):  Temp: 98.7 °F (37.1 °C) (11/09/24 1152)  Pulse: 73 (11/09/24 1152)  Resp: 18 (11/09/24 1152)  BP: (!) 105/54 (11/09/24 1152)  SpO2: 99 % (11/09/24 1152) Vital Signs (24h Range):  Temp:  [97.5 °F (36.4 °C)-98.7 °F (37.1 °C)] 98.7 °F (37.1 °C)  Pulse:  [66-92] 73  Resp:  [18] 18  SpO2:  [92 %-100 %] 99 %  BP: (105-146)/(54-77) 105/54     Weight: 130 kg (286 lb 9.6 oz)  Body mass index is 46.26 kg/m².    Intake/Output Summary (Last 24 hours) at 11/9/2024 1243  Last data filed at 11/8/2024 2200  Gross per 24 hour   Intake 532.5 ml   Output 3650 ml   Net -3117.5 ml         Physical Exam  Constitutional:       General: She is not in acute distress.     Appearance: She is morbidly obese. She is not toxic-appearing or diaphoretic.   HENT:      Head: Normocephalic and atraumatic.      Mouth/Throat:      Mouth: Mucous membranes are moist.   Eyes:      General: Scleral icterus present.      Conjunctiva/sclera: Conjunctivae normal.      Comments: Swelling of R eyelid/periorbital area w/o vision changes, pain, discharge or erythema   Cardiovascular:      Rate and Rhythm: Normal rate and regular rhythm.      Heart sounds: Normal heart sounds.   Pulmonary:      Effort: Pulmonary effort is normal. No respiratory distress.      Breath sounds: Normal breath sounds. No  wheezing, rhonchi or rales.      Comments: Auscultation limited by habitus  Abdominal:      General: Bowel sounds are normal. There is no distension.      Palpations: Abdomen is soft.      Tenderness: There is no abdominal tenderness. There is no guarding.   Musculoskeletal:      Right lower leg: Edema present.      Left lower leg: Edema present.      Comments: Extreme 4+ b/l LE pitting edema L>>R (c/w baseline). Diffuse anasarca.    Lymphadenopathy:      Upper Body:      Left upper body: Axillary adenopathy present.   Skin:     General: Skin is warm and dry.      Coloration: Skin is jaundiced.   Neurological:      Mental Status: She is alert and oriented to person, place, and time. Mental status is at baseline.   Psychiatric:         Mood and Affect: Mood is anxious. Affect is tearful.         Behavior: Behavior normal.           Significant Labs: All pertinent labs within the past 24 hours have been reviewed.    Significant Imaging: I have reviewed all pertinent imaging results/findings within the past 24 hours.    Assessment/Plan:      Acute liver failure  Direct hyperbilirubinemia w/ painless jaundice  Coagulopathy  Hepatic encephalopathy - resolved  Presented w/ hyperbilirubinemia per outpatient/HD labs w/ acute jaundice & assoc pruritus. On arrival, Tbili 11.7, AST 81, ALT 30, , along w/ INR 2.1 & Hgb 6.4 s/o acute liver failure w/ synthetic dysfunction. Considered hemolytic process, however less likely w/ haptoglobin of 115. CT noted hepatomegaly w/ heterogenous attenuation & few scattered sub-cm hypodensities of unk significance; galbladder surgically absent. Liver US w/ Doppler w/ marked HSM, but normal blood flow and no biliary/ductal dilation. Given absence of dilation, AES deferred consult for time being, and advised primary Hepatology involvement. Etiology unclear- decompensated cirrhosis (2/2 MASLD? W/ MELD 36) considered vs DILI (2/2 clindamycin) vs other infiltrative process (given extensive  LAD). Serological workup w/ elevated A1AT (300), abnormal IgG profile, & increased factor VIII activity; AFP, PETH, BRIAN, PETH, ASMA, AMA & ceruloplasmin all nml. Developed hepatic encephalopathy (w/ ammonia 111) which resolved following titration of bowel regimen. 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 Pt's Tbili & LFTs continued to uptrend. MRCP severely limited by habitus, but didn't show any stricture/obstruction; ursodiol initiated. Pt not transplant candidate given co-morbidities, functional status & underlying malignancy. Overall poor prognosis. MELD-Na 35.  - Hepatology following; appreciate recs  - Ursodiol 300mg TID   - Not a transplant candidate given functional status & comorbidities   - Monitor CMP, INR, & CBC  - Minimize hepatotoxic agents as able   - Bowel regimen; titrate PRN   - Palliative Medicine consulted; appreciate recs    Diffuse lymphadenopathy  CT C/A/P on arrival to [facility] noted numerous LN conglomerates &/or soft tissue masses around L axillary soft tissues, b/l inguinal regions (L>R), L supraclavicular soft tissues, & retroperitoneum. Findings highly c/f  neoplastic or lymphoproliferative process. While metastatic breast cancer considered (vickie given hx of breast mass as below) although lymphoma also considered. Malignancy could be contributing to pt's liver failure, vickie if causing obstruction &/or infiltration. Dedicated L axillary US noted abnormal L axillary LAD w/ at least 6 abnormal LN w/ suspicious appearance (BI-RADS 4); biopsy (w/ pathology & flow cytometry) advised. Underwent core needle biopsy of L axillary LN per IR on 11/11.   - Hematology-Oncology consulted; appreciate recs  - S/p IR bx of L axillary LN 11/11; path/cytology pending    - Preliminary results inconclusive, but s/o reactive process  - Likely will require addn'l biopsy procedure(s) in  the future  - Not a candidate for IP chemo, and unfortunately unlikely to be candidate for outpatient chemo either as pt's hyperbilirubinemia, HFpEF, &/or ESRD exclude pt from most tx regimens  - Palliative Medicine consulted; appreciate recs    Acute anemia  Coagulopathy  Thrombocytopenia - resolved  On presentation, Hgb 6.4 (baseline ~10.5) w/ MCV 83, platelets 154, & INR 2.1. Pt denied melena, hematochezia, BRBPR, hematemesis or other sx of active bleeding. Fecal occult positive at OSH ED. Received 2u pRBCs & 2u FFP prior to transfer. Evaluated by GI on arrival who deferred scope for time being given absence of s/sx of brisk bleeding.  Required addn'l unit pRBCs & IV vitamin K shortly after transfer. Subsequent development of thrombocytopenia (eric 90s). Anemia & elevated INR stabilized/improved but certainly persisted. Continued to have no s/sx of active/brisk bleeding. Iron panel c/w anemia 2/2 chronic dx/inflammation. Suspect cytopenias & coagulopathy 2/2 liver failure w/ possible contributing suspected malignancy. Persistent anemia & mildly elevated INR, but overall stable.   - Mgmt/workup of liver failure & LAD as above/below  - Monitor CBC & for e/o active bleeding  - Transfuse PRN for goal Hgb > 7, plts > 50K & INR < 2  - S/p 2u FFP & 4u pRBCs to date (including transfusions at OSH)   - S/p IV vitamin K     Mass of breast  Has had known mass of L breast for years. Excisional biopsy (Dec '22) noted complex sclerosing lesion w/ usual ductal hyperplasia & apocrine metaplasia; no atypica or malignancy on preliminary evaluation. Per further review by [facility], findings c/w ADH w/ assoc necrosis & proliferative fibrocystic changes. US (Jan 2023) w/ post-surgical fluid collection; BI-RADS 2. Doesn't appear to have had any further imaging or workup since then. Family hx of breast ca in one maternal aunt & ovarian cancer in another maternal aunt noted. Diffuse LAD, vickie of L axilla noted on CT, raising c/f  malignancy. Imaging reviewed by radiology who advised dedicated L axillary US (R axilla findings stable and breast US unreliable given anasarca).   - Malignancy workup as above    ESRD (end stage renal disease) on dialysis  Hx of end-stage CKD (2/2 diabetic & hypertensive nephropathy). Has been on HD (M/W/F) since 2021. On admission, Cr 4.5, BUN 45, bicarb 20, & Na 134, (all c/w baseline). No uremia or notable electrolyte derangements. Pt w/ b/l LE lymphedema, requiring 3L NC (both c/w baseline). Diffuse anasarca noted which seemingly is above baseline.   - Nephrology consulted; appreciate recs  - Continued HD for clearance & volume removal   - Epo MWF w/ HD  - Monitor RFP  - Minimize nephrotoxic agents as able & renally-dose meds    Lymphedema  Hx of chronic BLE lymphedema (L>R) at baseline which contributes to pt's difficulty with mobility & recent cellulitis.   - Volume removal via HD  - Wound care  - Elevate as much as tolerated    Leukocytosis  WBC 22.8 on admission w/ neutrophil predominance. Pt afebrile and w/o any other SIRS. No focal s/sx. Recent cellulitis, but stable w/o active flare/infx appearance. Unable to collect UA d/t ESRD. CXR w/o focal consolidation. CRP & procal elevated but ESR nml. CT C/A/P w/ diffuse LAD w/ concern for malignancy; no infectious focus identified though. WBC remained low 20s throughout hospitalization. Suspect leukocytosis reactive iso malignancy.   - Defer abx  - Monitor CBC & fever curve  - Malignancy workup as above    Heart failure with preserved ejection fraction  Hx of HFpEF per 2020 TTE. Although respiratory status stable/at baseline, repeat TTE obtained iso acute illness & diffuse/severe anasarca. TTE (10/30/24) w/ EF 60-65% w/ septal flattening c/w volume overload, biatrial dilation, severe TR, moderate pulmonary HTN (PASP 51mmHg) & CVP 15mmHg. BNP 1459.   - Volume removal via HD  - Deferring lasix as pt anuric  - Low sodium diet w/ 1.2L fluid restriction     History  of cerebrovascular accident (CVA) with residual deficit  Suffered CVA of R posterior frontal lobe in July 2024 w/ residual L-sided weakness which has impaired mobility. No acute neuro concerns/focal findings on current admission.   - Holding home DAPT 2/2 anemia/coagulopathy   - Holding home statin i/s/o liver failure   - PT/OT    Impaired mobility  Sacral decubitus ulcer  Due to lymphedema & stroke earlier this year. Per 7/17/24 discharge summary, pt was able to ambulate w/ walker (but dragging L foot) and was discharge home w/ outpatient therapy. Pt seems to have had progressive decline in mobility since then with pt's boyfriend providing significant assistance w/ mobility & ADLs. Pt seemingly largely bedbound w/ chronic sacral ulcer noted.   - Volume/lymphedema mgmt as above  - Wound care  - Frequent turns  - PT/OT   - Despite pt's overall poor prognosis, would still benefit from IPR/SNF as improved mobility & functional status would absolutely affect pt's overall quality of life, and potentially effect cancer treatment options available to her    Essential hypertension  Hx of essential & renovascular HTN. Low-normotensive on arrival to OSH (/43-87) but otherwise HDS.   - Holding home minoxidil & olmesartan  - Resume meds as needed    Type 2 diabetes mellitus, with long-term current use of insulin  Hx of IDDM2 on home regimen of linagliptin, aspart 10u BID, l& antus 70u QHS; last A1c 5.6% (July 2024; improved from 8.7 in 2021). Serum glucose 83 on admission w/ repeat A1c of 4.5%. Likely decreased insulin needs iso ongoing HD.   - Holding home linagliptin while inpatient  - LD-SSI  - CBG AC & HS  - Hypoglycemic protocol     Morbid obesity  Weight 130kg on admission (BMI 46.26 kg/m²). Morbid obesity complicates all aspects of disease management from diagnostic modalities to treatment.   - Weight loss encouraged    Chronic hypoxic respiratory failure  Pt has apparently been on 3L NC at home since 2022. No  known underlying COPD or other lung dx. Suspect CHRF 2/2 chronic volume overload iso ESRD & HFpEF, along w/ likely OHS/MELANY.  - Volume mgmt as above   - Supplemental O2 PRN for goal SpO2 > 92%    Gout  Documented hx of gout (uric acid as high as 16 years ago). Pt endorsing L heel pain on admission; uric acid 8.6. Allopurinol started. XR noted extensive soft tissue swelling (c/w known lymphedema & anasarca) along w/ calcaneal enthesophyte at plantar fascia insertion. Suspect pt's foot pain is more likely 2/2 enthesophyte than acute gout.   - Holding further allopurinol for now    GERD (gastroesophageal reflux disease)  Hx of gastritis per chart review. No EGDs on file. Asymptomatic on admission.   - Continue home PPI at increased BID dose  - PRN Maalox, antiemetics, etc.     Vaginal candidiasis   Profuse vaginal discharge noted on 11/9. Labs c/w candidasis.   - Fluconazole 150mg x1         VTE Risk Mitigation (From admission, onward)           Ordered     IP VTE HIGH RISK PATIENT  Once         10/30/24 0332     Place sequential compression device  Until discontinued         10/30/24 0051                    Discharge Planning   SHASHA: 11/14/2024     Code Status: Full Code   Is the patient medically ready for discharge?:     Reason for patient still in hospital (select all that apply): Pending disposition  Discharge Plan A: Rehab ([facility] [phone])   Discharge Delays: None known at this time          Stephany Ordaz MD  Department of Hospital Medicine   [facility] - Stepdown Flex ([place]-14)

## 2024-11-14 NOTE — PLAN OF CARE
Discharge Plan A and Plan B have been determined by review of patient's clinical status, future medical and therapeutic needs, and coverage/benefits for post-acute care in coordination with multidisciplinary team members.        11/14/24 0917   Discharge Reassessment   Assessment Type Discharge Planning Reassessment   Did the patient's condition or plan change since previous assessment? No   Discharge Plan discussed with: Patient;Parent(s)   Name(s) and Number(s) Mile Welch (Mother)  [phone] (Mobile)   Communicated SHASHA with patient/caregiver Yes   Discharge Plan A Skilled Nursing Facility   DME Needed Upon Discharge  other (see comments)   Transition of Care Barriers Mobility   Why the patient remains in the hospital Requires continued medical care   Post-Acute Status   Post-Acute Authorization Placement;Home Health;Dialysis   Post-Acute Placement Status Pending payor review/awaiting authorization (if required)  (r: [facility] DUAL COMPLETE [facility])   Home Health Status Discharge Plan Changed   Diaylsis Status Set-up Complete/Auth obtained   Coverage r: [facility] DUAL COMPLETE [facility]   Hospital Resources/Appts/Education Provided Appointments scheduled and added to AVS   Patient choice form signed by patient/caregiver List from CMS Compare;List with quality metrics by geographic area provided   Discharge Delays None known at this time     CM met with patient   to discuss discharge planning.   Patients plan is to dc to SNF.   SHASHA: 11/14/24    0845 am  CM received a message form Halima @ [facility] and authorization is still pending.     0933 am  CM sent updates to [facility] Phone: [phone]     via [facility] [employer] along with COVID results and ppd placement.      12:35 pm  CM received a response from Halima ashley @ [facility] and  auth is still pending      Cortney Harrison RN  Case Management  [facility]  [place]  [phone]

## 2024-11-14 NOTE — TREATMENT PLAN
Hepatology Treatment Plan    Pilar Fernandez is a 44 y.o. female admitted to hospital 10/29/2024 (Hospital Day: 17) due to Acute liver failure.     Interval History    Lymph node biopsy obtained, pending. Pending discharge to [facility].     Objective  Temp:  [98.4 °F (36.9 °C)-99.3 °F (37.4 °C)] 98.8 °F (37.1 °C) (11/14 1113)  Pulse:  [76-82] 79 (11/14 1450)  BP: (106-149)/(55-86) 132/73 (11/14 1113)  Resp:  [16-20] 18 (11/14 1113)  SpO2:  [94 %-100 %] 96 % (11/14 1113)    Laboratory    Lab Results   Component Value Date    WBC 16.64 (H) 11/14/2024    HGB 8.7 (L) 11/14/2024    HCT 25.5 (L) 11/14/2024    MCV 92 11/14/2024     11/14/2024       Lab Results   Component Value Date     (L) 11/14/2024    K 4.5 11/14/2024    CL 95 11/14/2024    CO2 21 (L) 11/14/2024    BUN 37 (H) 11/14/2024    CREATININE 4.5 (H) 11/14/2024    CALCIUM 8.1 (L) 11/14/2024       Lab Results   Component Value Date    ALBUMIN 1.4 (L) 11/14/2024    ALT 41 11/14/2024     (H) 11/14/2024    ALKPHOS 540 (H) 11/14/2024    BILITOT 16.1 (H) 11/14/2024       Lab Results   Component Value Date    INR 1.2 11/14/2024    INR 1.3 (H) 11/13/2024    INR 1.4 (H) 11/12/2024       MELD 3.0: 35 at 11/14/2024  4:18 AM  MELD-Na: 34 at 11/14/2024  4:18 AM  Calculated from:  Serum Creatinine: On dialysis. Using the maximum value.  Serum Sodium: 129 mmol/L at 11/14/2024  4:18 AM  Total Bilirubin: 16.1 mg/dL at 11/14/2024  4:18 AM  Serum Albumin: 1.4 g/dL (Using min of 1.5 g/dL) at 11/14/2024  4:18 AM  INR(ratio): 1.2 at 11/14/2024  4:18 AM  Age at listing (hypothetical): 44 years  Sex: Female at 11/14/2024  4:18 AM      Assessment    Pilar Fernandez is a 44 y.o. female with recent CVA (on DAPT, 7/2024) with ongoing left sided deficits, history of breast mass, HTN, poorly controlled T2DM, ESRD (~6 years) on HD MWF, lymphedema, stage II sacral decubitus ulcer and chronic hypoxic RF on 3 L's NC that presents to [facility] for evaluation of  new onset painless jaundice, first noticed by her mother over the weekend. Hepatology consulted on 10/30 for evaluation of elevated LFT's. Unsure of exactly etiology but obstruction vs DILI was a consideration. US and CT imaging did now show any evidence of biliary obstruction. Due to persistent elevations in T bili, liver biopsy obtained to prognosticate presence/extent of scarring in liver as her imaging is not definitive, but her enzymes and liver function testing is suspicious for underlying liver disease (likely MASLD). PETH, hemochromatosis DNA PCR, BRIAN, AMA, ASMA, ceruloplasmin all negative. IgG elevated to 1792.     Problem List:  Painless Jaundice   Acute Liver Injury/Elevated LFT's/Elevated INR   ESRD with Volume Overload (reportedly on HD for ~6 years)   Anasarca   Chronic Hypoxic Respiratory Failure (on 3 L's O2 since 2022)   Morbid Obesity (BMI 46)   Debility (bed bound now, previously with walker)   History of CVA (7/2024) with left sided weakness and right eye droop on DAPT (ASA/Plavix)   Lymphadenopathy     Recommendations:   - Liver biopsy obtained on 11/06/24; Cholestatic hepatitis with stage 2 fibrosis. Features of hepatic venous outflow obstruction. The histologic differential diagnosis for the cholestatic hepatitis includes sepsis/infection, drug induced liver injury (DILI), and in combination with the ductular reaction, bile duct obstruction.   - Given results of biopsy, recommend obtaining MRI MRCP to definitively rule out an obstructive etiology. If this is negative, then it is possible her acute elevation in LFTs is from a DILI, given recent use of Clindamycin. Clindamycin can cause hepatic injury or both hepatocellular and cholestatic injury. If MRI MRCP negative, continue Ursodiol 300 mg TID (started on 11/10)   - Not a transplant candidate given her functional status, co-morbid conditions and now with possible malignancy given extensive lymphadenopathy.   - S/p LN biopsy on 11/11; f/u  path   - PT/OT recommending high-intensity therapy  - Nephrology managing HD; appreciate volume management per nephro team     Thank you for involving us in the care of Pilar Fernandez. Please call with any additional concerns or questions.    We will sign off at this time. Please secure chat the inpatient hepatology fellow at the time of the patient's discharge so they can arrange for outpatient clinic follow up. Patient is currently pending dispo to SNF.     Angela Crowe DO   Gastroenterology Fellow PGY- V  [facility]   Patient/Caregiver requests family/friend to interpret.

## 2024-11-14 NOTE — ASSESSMENT & PLAN NOTE
44 year old female hx of ESRD on HD MWF presenting from outside hospital as transfer for acute anemia and painless jaundice. Last HD session 10/28. Nephrology consulted for ESRD management.     Nephrology History  -Outpatient HD unit: [facility]  -Nephrologist: ?  -HD tx days: MWF  -HD tx time: 4 hrs  -Last HD tx: Monday 10/28  -HD access: LUE AVF  -HD modality: iHD  -Residual urine:   -EDW:  110 kg    Plan/Recommendations  ESRD on HD:    -Continue MWF iHD schedule  -Patient with diffuse body wall edema on imaging and chronic lymphedema, but no respiratory distress, on baseline O2, and no significant pulmonary edema on imaging. -Echo shows EF 60-65%, IVC 15 mm Hg.  -1 L daily fluid restriction  -Will continue iHD thrice weekly unless emergent indication arises  -Strict I&Os  -Pre & post HD weight  Anemia in ESRD  -Hgb goal 10-11, 7.5 today, stable  -Trend hgb, discontinue epo in setting of possible malignancy   Mineral Bone Disease in ESRD  -Resume home phos binders when diet advances  -Renal diet if not NPO

## 2024-11-14 NOTE — ASSESSMENT & PLAN NOTE
Coagulopathy  Thrombocytopenia - resolved  On presentation, Hgb 6.4 (baseline ~10.5) w/ MCV 83, platelets 154, & INR 2.1. Pt denied melena, hematochezia, BRBPR, hematemesis or other sx of active bleeding. Fecal occult positive at OSH ED. Received 2u pRBCs & 2u FFP prior to transfer. Evaluated by GI on arrival who deferred scope for time being given absence of s/sx of brisk bleeding.  Required addn'l unit pRBCs & IV vitamin K shortly after transfer. Subsequent development of thrombocytopenia (eric 90s). Anemia & elevated INR stabilized/improved but certainly persisted. Continued to have no s/sx of active/brisk bleeding. Iron panel c/w anemia 2/2 chronic dx/inflammation. Suspect cytopenias & coagulopathy 2/2 liver failure w/ possible contributing suspected malignancy. Persistent anemia & mildly elevated INR, but overall stable.   - Mgmt/workup of liver failure & LAD as above/below  - Monitor CBC & for e/o active bleeding  - Transfuse PRN for goal Hgb > 7, plts > 50K & INR < 2  - S/p 2u FFP & 4u pRBCs to date (including transfusions at OSH)   - S/p IV vitamin K    left/arm

## 2024-11-14 NOTE — ASSESSMENT & PLAN NOTE
Hx of HFpEF per 2020 TTE. Although respiratory status stable/at baseline, repeat TTE obtained iso acute illness & diffuse/severe anasarca. TTE (10/30/24) w/ EF 60-65% w/ septal flattening c/w volume overload, biatrial dilation, severe TR, moderate pulmonary HTN (PASP 51mmHg) & CVP 15mmHg. BNP 1459.   - Volume removal via HD  - Deferring lasix as pt anuric  - Low sodium diet w/ 1.2L fluid restriction

## 2024-11-14 NOTE — PT/OT/SLP PROGRESS
Speech Language Pathology Treatment/  Discharge     Patient Name:  Pilar Fernandez   MRN:  [id number]  Admitting Diagnosis: Acute liver failure    Recommendations:                 General Recommendations:  Follow-up not indicated  Diet recommendations:  Regular Diet - IDDSI Level 7, Liquid Diet Level: Thin liquids - IDDSI Level 0   Aspiration Precautions: HOB to 90 degrees and Standard aspiration precautions   General Precautions: Standard, diabetic, fall  Communication strategies:  none    Assessment:     Pilar Fernandez is a 44 y.o. female with an SLP diagnosis of  functional swallow . Recommend HOB be elevated for all intake.     Subjective     Pt awake, agreeable to ST.    Pain/Comfort:  Pain Rating 1: 0/10  Pain Rating Post-Intervention 1: 0/10    Respiratory Status: Room air    Objective:     Has the patient been evaluated by SLP for swallowing?   Yes  Keep patient NPO? No     Pt seen bedside for follow up. Pt reports tolerance of regular diet and thin liquids when sitting upright. HOB elevated to max capacity ~60 degrees prior to PO intake. Pt able to self preset dry, regular textured cracker x 1 and 4 oz water from cup rim. She demonstrated functional mastication and no overt s/sx of airway compromise appreciated. Recommend she continue with a regular diet at this time and thin liquids with HOB elevated for all intake. Recs communicated on white board. Education provided re: role of SLP, diet recs, swallow precs, s/s aspiration and POC.  Pt verbalized understanding and no further SLP needs required.     Goals:   Multidisciplinary Problems       SLP Goals       Not on file              Multidisciplinary Problems (Resolved)          Problem: SLP    Goal Priority Disciplines Outcome   SLP Goal   (Resolved)     SLP Met   Description: Speech Language Pathology Goals  Goals expected to be met by 11/26    1. Pt will tolerate least restrictive PO diet without any overt s/sx of airway compromise.   2. Pt  will sit upright for all PO intake/ meals.                              Plan:       Plan of Care reviewed with:  patient   SLP Follow-Up:  No       Discharge recommendations:  No Therapy Indicated   Barriers to Discharge:  None    Time Tracking:     SLP Treatment Date:   11/14/24  Speech Start Time:  1033  Speech Stop Time:  1040     Speech Total Time (min):  7 min    Billable Minutes: Treatment Swallowing Dysfunction 7    11/14/2024

## 2024-11-14 NOTE — PROGRESS NOTES
11/13/24 1800   Post-Hemodialysis Assessment   Rinseback Volume (mL) 250 mL   Blood Volume Processed (Liters) 52 L   Dialyzer Clearance Moderately streaked   Duration of Treatment 210 minutes   Total UF (mL) 3450 mL   Net Fluid Removal 3250   Patient Response to Treatment rehana well   Post-Hemodialysis Comments see notes     HD TX complete. Pt AAO, VSS, NAD. Net removal 3250 ml. Hemostasis achieved.report given to primary nurse. Pt returned to room via bed escorted by pt transport staff.

## 2024-11-14 NOTE — SUBJECTIVE & OBJECTIVE
Interval History: HD yesterday, tolerated well. Net UF 3.2L. Pending SNF.     Review of patient's allergies indicates:   Allergen Reactions    Tramadol Itching and Nausea And Vomiting     Current Facility-Administered Medications   Medication Frequency    0.9%  NaCl infusion (for blood administration) Q24H PRN    acetaminophen tablet 650 mg Q4H PRN    albuterol-ipratropium 2.5 mg-0.5 mg/3 mL nebulizer solution 3 mL Q4H PRN    dextrose 10% bolus 125 mL 125 mL PRN    dextrose 10% bolus 250 mL 250 mL PRN    diphenhydrAMINE capsule 50 mg Q6H PRN    glucagon (human recombinant) injection 1 mg PRN    glucose chewable tablet 16 g PRN    glucose chewable tablet 24 g PRN    hydrocortisone 2.5 % cream BID    insulin aspart U-100 pen 0-5 Units QID (AC + HS) PRN    lactulose 20 gram/30 mL solution Soln 10 g Q6H PRN    lactulose 20 gram/30 mL solution Soln 20 g BID    miconazole 2 % cream BID    mupirocin 2 % ointment BID    naloxone 0.4 mg/mL injection 0.02 mg PRN    ondansetron injection 4 mg Q8H PRN    oxyCODONE immediate release tablet 5 mg Q6H PRN    pantoprazole EC tablet 40 mg BID    simethicone chewable tablet 80 mg QID PRN    sodium chloride 0.9% flush 10 mL Q12H PRN    traZODone tablet 100 mg Nightly PRN    ursodioL capsule 300 mg TID       Objective:     Vital Signs (Most Recent):  Temp: 98.8 °F (37.1 °C) (11/14/24 1113)  Pulse: 76 (11/14/24 1113)  Resp: 18 (11/14/24 1113)  BP: 132/73 (11/14/24 1113)  SpO2: 96 % (11/14/24 1113) Vital Signs (24h Range):  Temp:  [98.4 °F (36.9 °C)-99.3 °F (37.4 °C)] 98.8 °F (37.1 °C)  Pulse:  [76-82] 76  Resp:  [16-20] 18  SpO2:  [94 %-100 %] 96 %  BP: (106-149)/(55-86) 132/73     Weight: 130 kg (286 lb 9.6 oz) (11/06/24 1451)  Body mass index is 46.26 kg/m².  Body surface area is 2.46 meters squared.    I/O last 3 completed shifts:  In: 370 [P.O.:370]  Out: 3450 [Other:3450]     Physical Exam  Vitals and nursing note reviewed.   Constitutional:       Appearance: She is morbidly obese.    HENT:      Head: Normocephalic.   Eyes:      General: Scleral icterus present.      Comments: Swelling of R eyelid/periorbital area w/o vision changes, pain, discharge or erythema   Cardiovascular:      Rate and Rhythm: Normal rate.      Heart sounds: Normal heart sounds.   Pulmonary:      Effort: Pulmonary effort is normal. No respiratory distress.      Breath sounds: No wheezing, rhonchi or rales.      Comments: Auscultation limited by habitus  Abdominal:      Palpations: Abdomen is soft.   Musculoskeletal:      Right lower leg: Edema present.      Left lower leg: Edema present.      Comments: Extreme 4+ b/l LE pitting edema L>>R (c/w baseline). Diffuse anasarca.    Lymphadenopathy:      Upper Body:      Left upper body: Axillary adenopathy present.   Skin:     General: Skin is warm.      Coloration: Skin is jaundiced.   Neurological:      Mental Status: She is alert and oriented to person, place, and time.   Psychiatric:         Behavior: Behavior normal.          Significant Labs:  CBC:   Recent Labs   Lab 11/14/24  0418   WBC 16.64*   RBC 2.76*   HGB 8.7*   HCT 25.5*      MCV 92   MCH 31.5*   MCHC 34.1     CMP:   Recent Labs   Lab 11/14/24  0418   GLU 69*   CALCIUM 8.1*   ALBUMIN 1.4*   PROT 6.3   *   K 4.5   CO2 21*   CL 95   BUN 37*   CREATININE 4.5*   ALKPHOS 540*   ALT 41   *   BILITOT 16.1*     All labs within the past 24 hours have been reviewed.

## 2024-11-14 NOTE — ASSESSMENT & PLAN NOTE
CT C/A/P on arrival to [facility] noted numerous LN conglomerates &/or soft tissue masses around L axillary soft tissues, b/l inguinal regions (L>R), L supraclavicular soft tissues, & retroperitoneum. Findings highly c/f  neoplastic or lymphoproliferative process. While metastatic breast cancer considered (vickie given hx of breast mass as below) although lymphoma also considered. Malignancy could be contributing to pt's liver failure, vickie if causing obstruction &/or infiltration. Dedicated L axillary US noted abnormal L axillary LAD w/ at least 6 abnormal LN w/ suspicious appearance (BI-RADS 4); biopsy (w/ pathology & flow cytometry) advised. Underwent core needle biopsy of L axillary LN per IR on 11/11.   - Hematology-Oncology consulted; appreciate recs  - S/p IR bx of L axillary LN 11/11; path/cytology pending    - Preliminary results inconclusive, but s/o reactive process  - Likely will require addn'l biopsy procedure(s) in the future  - Not a candidate for IP chemo, and unfortunately unlikely to be candidate for outpatient chemo either as pt's hyperbilirubinemia, HFpEF, &/or ESRD exclude pt from most tx regimens  - Palliative Medicine consulted; appreciate recs

## 2024-11-14 NOTE — CHAPLAIN
"Palliative Care        Patient: Pilar Fernandez  MRN: [id number]  : 1980  Age: 44 y.o.  Hospital Length of Stay: 16  Code Status: Code Status Discussion Note  Attending Provider: Shayne Diaz MD  Principal Problem: Acute liver failure     What prompted this initial visit?:   Visited pall med pt per pall med provider; 15 min visit      Who was present during my visit:   Entered room as pall med provider was finishing up; most of visit was alone with patient     Non-clinical observations of patient/family or room:  Pt awake, alert, somnolent and sad     Feelings (Emotions/Fears/Experiences/Coping):   With drowsy eyes, down-hearted spirit, pt shared that her sister  and the  is today; she is upset about the death and that she cannot be there. Offered my condolences.  Provided compassionate presence, tender touch and listening ear with many long pregnant pauses--- giving her space. When asked, pt said that what gives her strength are her kids, grand kids and God--pt repeated that she's fighting for her kids. When I asked about her grands and how many she just said, "a lot". Did not press pt to talk-- she seemed overwhelmingly numb.    Pt expressed she's uncomfortable; and "not happy with her position." Position?  She said being in the hospital, being uncomfortable and missing her family. Pt kept closing her eyes, told pt she need not talk, just rest as we leaned into the silence together.     Tia (Beliefs/Meaning/Philosophy/Distress):   Pt's EPIC chart says she is Mosque; felt that it was not the timing to conduct a spiritual assessment due to pt's demeanor and spirit. She did welcome me to pray over/with/for her and did so.     Future (Spiritual Care Plan of Action):   Palliative  will continue to follow. Pt welcomed another visit.         To [facility]:   Educated the patient/family regarding the services offered by the Spiritual Care team in my absence as the specialized " Palliative Care  ([id number])  My hours are 7:30a-4:00p M-F, but Spiritual Care Chaplains are available 24/7.  I'm usually the first point of contact for palliative care patients, but any  is able to help.  Evening, overnight and weekends, please dial [phone] which is carried by an in-house Spiritual Care  at all hours.         Rev Greta. Tereza Jones MDiv, [facility]  Board Certified Palliative Care   Palliative Medicine Department, 9th Floor [facility]     My SpectraLink: [email or web address]   Office: [phone]  ** If you call and I don't answer, please leave a voicemail because vmail is forwarded to me  Email: [email or web address]  Work hours: M-F [phone]     The care I provide is shaped by the Code of Ethics of the [employer] Professional Chaplains

## 2024-11-14 NOTE — PROGRESS NOTES
[facility] ([place] [id number])  Nephrology  Progress Note    Patient Name: Pilar Fernandez  MRN: [id number]  Admission Date: 10/29/2024  Hospital Length of Stay: 16 days  Attending Provider: Shayne Diaz MD   Primary Care Physician: Lindsey Singletary FNP  Principal Problem:Acute liver failure    Subjective:     Interval History: HD yesterday, tolerated well. Net UF 3.2L. Pending SNF.     Review of patient's allergies indicates:   Allergen Reactions    Tramadol Itching and Nausea And Vomiting     Current Facility-Administered Medications   Medication Frequency    0.9%  NaCl infusion (for blood administration) Q24H PRN    acetaminophen tablet 650 mg Q4H PRN    albuterol-ipratropium 2.5 mg-0.5 mg/3 mL nebulizer solution 3 mL Q4H PRN    dextrose 10% bolus 125 mL 125 mL PRN    dextrose 10% bolus 250 mL 250 mL PRN    diphenhydrAMINE capsule 50 mg Q6H PRN    glucagon (human recombinant) injection 1 mg PRN    glucose chewable tablet 16 g PRN    glucose chewable tablet 24 g PRN    hydrocortisone 2.5 % cream BID    insulin aspart U-100 pen 0-5 Units QID (AC + HS) PRN    lactulose 20 gram/30 mL solution Soln 10 g Q6H PRN    lactulose 20 gram/30 mL solution Soln 20 g BID    miconazole 2 % cream BID    mupirocin 2 % ointment BID    naloxone 0.4 mg/mL injection 0.02 mg PRN    ondansetron injection 4 mg Q8H PRN    oxyCODONE immediate release tablet 5 mg Q6H PRN    pantoprazole EC tablet 40 mg BID    simethicone chewable tablet 80 mg QID PRN    sodium chloride 0.9% flush 10 mL Q12H PRN    traZODone tablet 100 mg Nightly PRN    ursodioL capsule 300 mg TID       Objective:     Vital Signs (Most Recent):  Temp: 98.8 °F (37.1 °C) (11/14/24 1113)  Pulse: 76 (11/14/24 1113)  Resp: 18 (11/14/24 1113)  BP: 132/73 (11/14/24 1113)  SpO2: 96 % (11/14/24 1113) Vital Signs (24h Range):  Temp:  [98.4 °F (36.9 °C)-99.3 °F (37.4 °C)] 98.8 °F (37.1 °C)  Pulse:  [76-82] 76  Resp:  [16-20] 18  SpO2:  [94 %-100 %] 96 %  BP:  (106-149)/(55-86) 132/73     Weight: 130 kg (286 lb 9.6 oz) (11/06/24 1451)  Body mass index is 46.26 kg/m².  Body surface area is 2.46 meters squared.    I/O last 3 completed shifts:  In: 370 [P.O.:370]  Out: 3450 [Other:3450]     Physical Exam  Vitals and nursing note reviewed.   Constitutional:       Appearance: She is morbidly obese.   HENT:      Head: Normocephalic.   Eyes:      General: Scleral icterus present.      Comments: Swelling of R eyelid/periorbital area w/o vision changes, pain, discharge or erythema   Cardiovascular:      Rate and Rhythm: Normal rate.      Heart sounds: Normal heart sounds.   Pulmonary:      Effort: Pulmonary effort is normal. No respiratory distress.      Breath sounds: No wheezing, rhonchi or rales.      Comments: Auscultation limited by habitus  Abdominal:      Palpations: Abdomen is soft.   Musculoskeletal:      Right lower leg: Edema present.      Left lower leg: Edema present.      Comments: Extreme 4+ b/l LE pitting edema L>>R (c/w baseline). Diffuse anasarca.    Lymphadenopathy:      Upper Body:      Left upper body: Axillary adenopathy present.   Skin:     General: Skin is warm.      Coloration: Skin is jaundiced.   Neurological:      Mental Status: She is alert and oriented to person, place, and time.   Psychiatric:         Behavior: Behavior normal.          Significant Labs:  CBC:   Recent Labs   Lab 11/14/24 0418   WBC 16.64*   RBC 2.76*   HGB 8.7*   HCT 25.5*      MCV 92   MCH 31.5*   MCHC 34.1     CMP:   Recent Labs   Lab 11/14/24 0418   GLU 69*   CALCIUM 8.1*   ALBUMIN 1.4*   PROT 6.3   *   K 4.5   CO2 21*   CL 95   BUN 37*   CREATININE 4.5*   ALKPHOS 540*   ALT 41   *   BILITOT 16.1*     All labs within the past 24 hours have been reviewed.     Assessment/Plan:     Renal/  ESRD (end stage renal disease) on dialysis  44 year old female hx of ESRD on HD MWF presenting from outside hospital as transfer for acute anemia and painless jaundice.  Last HD session 10/28. Nephrology consulted for ESRD management.     Nephrology History  -Outpatient HD unit: [facility]  -Nephrologist: ?  -HD tx days: MWF  -HD tx time: 4 hrs  -Last HD tx: Monday 10/28  -HD access: LUE AVF  -HD modality: iHD  -Residual urine:   -EDW:  110 kg    Plan/Recommendations  ESRD on HD:    -Continue MWF iHD schedule  -Patient with diffuse body wall edema on imaging and chronic lymphedema, but no respiratory distress, on baseline O2, and no significant pulmonary edema on imaging. -Echo shows EF 60-65%, IVC 15 mm Hg.  -1 L daily fluid restriction  -Will continue iHD thrice weekly unless emergent indication arises  -Strict I&Os  -Pre & post HD weight  Anemia in ESRD  -Hgb goal 10-11, 7.5 today, stable  -Trend hgb, discontinue epo in setting of possible malignancy   Mineral Bone Disease in ESRD  -Resume home phos binders when diet advances  -Renal diet if not NPO      GI  * Acute liver failure  -management per primary     Other  Anasarca  UF with HD         Thank you for your consult. I will follow-up with patient. Please contact us if you have any additional questions.    Evon Bullard, YAEL  Nephrology  [facility] ([place])

## 2024-11-14 NOTE — PROGRESS NOTES
[facility] ([address] Medicine  Progress Note    Patient Name: Pilar Fernandez  MRN: [id number]  Patient Class: IP- Inpatient   Admission Date: 10/29/2024  Length of Stay: 16 days  Attending Physician: Shayne Diaz MD  Primary Care Provider: Lindsey Singletary FNP        Subjective:     Principal Problem:Acute liver failure        HPI:  Pilar Fernandez is a 44 year old Black woman with hypertension, hyperlipidemia, diabetes mellitus type 2 (treated with insulin), end stage renal disease on hemodialysis (), heart failure with preserved ejection fraction, chronic hypoxic respiratory failure on supplemental oxygen (3 liters/minute), history of right middle cerebral artery stroke on 2024, peripheral vascular disease, chronic lymphedema, hypothyroidism, anemia with history of blood transfusion, gout, gastroesophageal reflux disease, chronic constipation, sacral decubitus ulcer, history of  sections, history of tubal ligation, history of cholecystectomy. She lives in [place], [place].               She presented to [facility] on 10/29/2024 with gastrointestinal bleeding, symptomatic anemia, jaundice, hyperbilirubinemia. She reported being hospitalized at [facility] recently for leg cellulitis and being told she had hyperbilirubinemia and was advised to follow up with Hepatology at [facility] after being discharged on 10/26/2024. She had several wounds on her sacrum and legs. She had extremely swollen legs. Stool was positive for occult blood. She had leukocytosis. Hemoglobin was 6.4 g/dL from baseline around 10. INR was elevated at 2.1. she was given 2 units of fresh frozen plasma and 2 units of packed red blood cells. Abdomen CT without contrast showed no obvious etiology of liver abnormalities or leukocytosis. She was transferred to [facility] for  further evaluation of her hyperbilirubinemia. She was admitted to Hospital Medicine Team B.    Overview/Hospital Course:  She was put on ceftriaxone. Hepatology and Advanced Endoscopy Service were consulted for hyperbilirubinemia. Gastroenterology was consulted for gastrointestinal bleed. Hematology-Oncology was consulted for anemia with hyperbilirubinemia and positive Britt test. CT showed diffuse anasarca, pericardial effusion, enlarged lymph nodes vs soft tissue densities, hepatosplenomegaly. Hepatology recommended liver biopsy. Gastroenterology recommended outpatient endoscopy, after she is medically optimized, due to lack of overt bleeding. Hematology-Oncology recommended breast ultrasound. Leukocytosis persisted. Sed rate was normal. CRP was elevated. Midodrine was started for post-dialysis hypotension. She developed hepatic encephalopathy with ammonia of 111 umol/L. She was transfused another unit of packed red blood cells in preparation for liver biopsy. She required vitamin K for elevated INR. She desired to sit at the side of the bed but was unable to on her own. Physical and Occupational Therapy were consulted and recommended high intensity therapy. Midodrine was stopped as blood pressures increased to normal. Her home hypertension medications continued to be held. 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 MRCP showed no obstruction or stricture but was limited by body habitus. Hepatology recommended ursodiol. Upon review of imaging, left axilla ultrasound was recommended instead of breast ultrasound as her anasarca would make interpretation of a breast ultrasound difficult. Axilla ultrasound showed abnormal axillary adenopathy with at least 6 abnormal lymph nodes (BI-RADS 4). She underwent left axilla lymph node biopsy on 11/11/2024. Hematology-Oncology reported  that she would not currently for inpatient chemotherapy as she had no bulky lymphadenopathy or obstruction or compression of organs due to lymphadenopathy. Additionally, her persistent hyperbilirubinemia excluded her from several chemotherapy regimens, and her heart failure and end stage renal disease exclude her from nearly all other remaining regimens. She awaited skilled nursing facility.     Interval History:    Awaiting SNF.    Review of Systems   Constitutional:  Negative for chills, diaphoresis and fever.   Respiratory:  Negative for cough, shortness of breath and wheezing.    Cardiovascular:  Positive for leg swelling.   Musculoskeletal:  Positive for gait problem. Negative for joint swelling and neck pain.   Neurological:  Positive for weakness. Negative for dizziness, light-headedness and headaches.     Objective:     Vital Signs (Most Recent):  Temp: 98.7 °F (37.1 °C) (11/09/24 1152)  Pulse: 73 (11/09/24 1152)  Resp: 18 (11/09/24 1152)  BP: (!) 105/54 (11/09/24 1152)  SpO2: 99 % (11/09/24 1152) Vital Signs (24h Range):  Temp:  [97.5 °F (36.4 °C)-98.7 °F (37.1 °C)] 98.7 °F (37.1 °C)  Pulse:  [66-92] 73  Resp:  [18] 18  SpO2:  [92 %-100 %] 99 %  BP: (105-146)/(54-77) 105/54     Weight: 130 kg (286 lb 9.6 oz)  Body mass index is 46.26 kg/m².    Intake/Output Summary (Last 24 hours) at 11/9/2024 1243  Last data filed at 11/8/2024 2200  Gross per 24 hour   Intake 532.5 ml   Output 3650 ml   Net -3117.5 ml         Physical Exam  Constitutional:       General: She is not in acute distress.     Appearance: She is morbidly obese. She is not toxic-appearing or diaphoretic.   Pulmonary:      Effort: Pulmonary effort is normal. No respiratory distress.      Breath sounds: Normal breath sounds.   Musculoskeletal:      Right lower leg: Edema present.      Left lower leg: Edema present.   Neurological:      Mental Status: She is alert and oriented to person, place, and time. Mental status is at baseline.   Psychiatric:          Attention and Perception: Attention normal.         Behavior: Behavior normal. Behavior is not agitated or aggressive.           Significant Labs: All pertinent labs within the past 24 hours have been reviewed.    Significant Imaging: I have reviewed all pertinent imaging results/findings within the past 24 hours.  IR Transjugular Liver Biopsy 11/06/24: Transjugular liver biopsy with 3 core biopsy samples obtained.   The corrected sinusoidal pressure (wedged hepatic vein pressure minus free hepatic vein pressure) is 1 mmHg.  Gradient may be artificially lower due to the presence of veno-venous collaterals throughout the liver.   Elevated central venous pressures as above.  Right atrial pressure measures 18 mm Hg.   Venography as above shows no significant stenosis but significant veno-venous collaterals throughout the liver.   US Soft Tissue Axilla, Left 11/08/24: FINDINGS:   There is left axillary adenopathy with at least 6 abnormal lymph nodes.  These lymph nodes are enlarged with cortical thickening and loss of fatty hilum.  For example 1 abnormal left axillary lymph node measures 7.1 x 1.9 x 5.6 cm, and a 2nd abnormal lymph node measures 5.0 x 1.9 x 4.9 cm.  These abnormal lymph nodes correspond to the left axillary adenopathy visible by recent CT chest October 30, 2024.   Impression:  Abnormal left axillary adenopathy with at least 6 abnormal lymph nodes.  These correspond to the adenopathy visible by CT chest October 30, 2024.  BI-RADS 4: Suspicious.  Recommend percutaneous or surgical biopsy, as clinically indicated.  Differential considerations include metastatic disease and lymphoma, and samples should be sent for both pathology and flow cytometry.   MRI MRCP Abdomen WO Contrast 3D WO Independent WS XPD 11/09/24: FINDINGS:   Exam quality is significantly degraded by patient body habitus.  MRCP images are nondiagnostic.   Small left pleural effusion.  Large pericardial effusion, similar to prior CT.    Liver is enlarged, measuring 27.0 cm.  The gallbladder is surgically absent.  The spleen is enlarged measuring 16.8 cm.  Previously noted cystic pancreatic lesion is not well visualized.   No aneurysm.   Diffuse subcutaneous edema.   Impression:  Significantly limited examination.   Hepatosplenomegaly.   IR Biopsy Lymph Node 11/11/24: Image-guided biopsy of left axillary lymphadenopathy.    Assessment/Plan:      * Acute liver failure  Direct hyperbilirubinemia w/ painless jaundice  Coagulopathy  Hepatic encephalopathy - resolved  Presented w/ hyperbilirubinemia per outpatient/HD labs w/ acute jaundice & assoc pruritus. On arrival, Tbili 11.7, AST 81, ALT 30, , along w/ INR 2.1 & Hgb 6.4 s/o acute liver failure w/ synthetic dysfunction. Considered hemolytic process, however less likely w/ haptoglobin of 115. CT noted hepatomegaly w/ heterogenous attenuation & few scattered sub-cm hypodensities of unk significance; galbladder surgically absent. Liver US w/ Doppler w/ marked HSM, but normal blood flow and no biliary/ductal dilation. Given absence of dilation, AES deferred consult for time being, and advised primary Hepatology involvement. Etiology unclear- decompensated cirrhosis (2/2 MASLD? W/ MELD 36) considered vs DILI (2/2 clindamycin) vs other infiltrative process (given extensive LAD). Serological workup w/ elevated A1AT (300), abnormal IgG profile, & increased factor VIII activity; AFP, PETH, BRIAN, PETH, ASMA, AMA & ceruloplasmin all nml. Developed hepatic encephalopathy (w/ ammonia 111) which resolved following titration of bowel regimen. 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 Pt's Tbili & LFTs continued to uptrend. MRCP severely limited by habitus, but didn't show any stricture/obstruction; ursodiol initiated. Pt not transplant candidate given co-morbidities,  functional status & underlying malignancy. Overall poor prognosis. MELD-Na 35.  - Hepatology following; appreciate recs  - Ursodiol 300mg TID   - Not a transplant candidate given functional status & comorbidities   - Monitor CMP, INR, & CBC  - Minimize hepatotoxic agents as able   - Bowel regimen; titrate PRN   - Palliative Medicine consulted; appreciate recs    Diffuse lymphadenopathy  CT C/A/P on arrival to [facility] noted numerous LN conglomerates &/or soft tissue masses around L axillary soft tissues, b/l inguinal regions (L>R), L supraclavicular soft tissues, & retroperitoneum. Findings highly c/f  neoplastic or lymphoproliferative process. While metastatic breast cancer considered (vickie given hx of breast mass as below) although lymphoma also considered. Malignancy could be contributing to pt's liver failure, vickie if causing obstruction &/or infiltration. Dedicated L axillary US noted abnormal L axillary LAD w/ at least 6 abnormal LN w/ suspicious appearance (BI-RADS 4); biopsy (w/ pathology & flow cytometry) advised. Underwent core needle biopsy of L axillary LN per IR on 11/11.   - Hematology-Oncology consulted; appreciate recs  - S/p IR bx of L axillary LN 11/11; path/cytology pending    - Preliminary results inconclusive, but s/o reactive process  - Likely will require addn'l biopsy procedure(s) in the future  - Not a candidate for IP chemo, and unfortunately unlikely to be candidate for outpatient chemo either as pt's hyperbilirubinemia, HFpEF, &/or ESRD exclude pt from most tx regimens  - Palliative Medicine consulted; appreciate recs    Morbid obesity  Weight 130kg on admission (BMI 46.26 kg/m²). Morbid obesity complicates all aspects of disease management from diagnostic modalities to treatment.   - Weight loss encouraged    Chronic hypoxic respiratory failure  Pt has apparently been on 3L NC at home since 2022. No known underlying COPD or other lung dx. Suspect CHRF 2/2 chronic volume overload iso ESRD &  HFpEF, along w/ likely OHS/MELANY.  - Volume mgmt as above   - Supplemental O2 PRN for goal SpO2 > 92%    History of cerebrovascular accident (CVA) with residual deficit  Suffered CVA of R posterior frontal lobe in July 2024 w/ residual L-sided weakness which has impaired mobility. No acute neuro concerns/focal findings on current admission.   - Holding home DAPT 2/2 anemia/coagulopathy   - Holding home statin i/s/o liver failure   - PT/OT    Impaired mobility  Sacral decubitus ulcer  Due to lymphedema & stroke earlier this year. Per 7/17/24 discharge summary, pt was able to ambulate w/ walker (but dragging L foot) and was discharge home w/ outpatient therapy. Pt seems to have had progressive decline in mobility since then with pt's boyfriend providing significant assistance w/ mobility & ADLs. Pt seemingly largely bedbound w/ chronic sacral ulcer noted.   - Volume/lymphedema mgmt as above  - Wound care  - Frequent turns  - PT/OT   - Despite pt's overall poor prognosis, would still benefit from IPR/SNF as improved mobility & functional status would absolutely affect pt's overall quality of life, and potentially effect cancer treatment options available to her    Sacral decubitus ulcer, stage II  Chronic. Turn frequently. Wound care.      Leukocytosis  WBC 22.8 on admission w/ neutrophil predominance. Pt afebrile and w/o any other SIRS. No focal s/sx. Recent cellulitis, but stable w/o active flare/infx appearance. Unable to collect UA d/t ESRD. CXR w/o focal consolidation. CRP & procal elevated but ESR nml. CT C/A/P w/ diffuse LAD w/ concern for malignancy; no infectious focus identified though. WBC remained low 20s throughout hospitalization. Suspect leukocytosis reactive iso malignancy.   - Defer abx  - Monitor CBC & fever curve  - Malignancy workup as above    Type 2 diabetes mellitus, with long-term current use of insulin  Hx of IDDM2 on home regimen of linagliptin, aspart 10u BID, l& antus 70u QHS; last A1c 5.6%  (July 2024; improved from 8.7 in 2021). Serum glucose 83 on admission w/ repeat A1c of 4.5%. Likely decreased insulin needs iso ongoing HD.   - Holding home linagliptin while inpatient  - LD-SSI  - CBG AC & HS  - Hypoglycemic protocol     Painless jaundice  See primary problem.      Direct hyperbilirubinemia  Hepatic encephalopathy   CT abdomen showed anasarca with hepatosplenomegaly. Appreciate Hepatology. Changed home senna-docusate and polyethylene glycol to lactulose to treat hepatic encephalopathy. Liver biopsy.     Coagulopathy  Goal INR <2. Gave vitamin K.    Acute anemia  Coagulopathy  Thrombocytopenia - resolved  On presentation, Hgb 6.4 (baseline ~10.5) w/ MCV 83, platelets 154, & INR 2.1. Pt denied melena, hematochezia, BRBPR, hematemesis or other sx of active bleeding. Fecal occult positive at OSH ED. Received 2u pRBCs & 2u FFP prior to transfer. Evaluated by GI on arrival who deferred scope for time being given absence of s/sx of brisk bleeding.  Required addn'l unit pRBCs & IV vitamin K shortly after transfer. Subsequent development of thrombocytopenia (eric 90s). Anemia & elevated INR stabilized/improved but certainly persisted. Continued to have no s/sx of active/brisk bleeding. Iron panel c/w anemia 2/2 chronic dx/inflammation. Suspect cytopenias & coagulopathy 2/2 liver failure w/ possible contributing suspected malignancy. Persistent anemia & mildly elevated INR, but overall stable.   - Mgmt/workup of liver failure & LAD as above/below  - Monitor CBC & for e/o active bleeding  - Transfuse PRN for goal Hgb > 7, plts > 50K & INR < 2  - S/p 2u FFP & 4u pRBCs to date (including transfusions at OSH)   - S/p IV vitamin K     Lymphedema  Hx of chronic BLE lymphedema (L>R) at baseline which contributes to pt's difficulty with mobility & recent cellulitis.   - Volume removal via HD  - Wound care  - Elevate as much as tolerated    Mass of breast  Has had known mass of L breast for years. Excisional biopsy  (Dec '22) noted complex sclerosing lesion w/ usual ductal hyperplasia & apocrine metaplasia; no atypica or malignancy on preliminary evaluation. Per further review by [facility], findings c/w ADH w/ assoc necrosis & proliferative fibrocystic changes. US (Jan 2023) w/ post-surgical fluid collection; BI-RADS 2. Doesn't appear to have had any further imaging or workup since then. Family hx of breast ca in one maternal aunt & ovarian cancer in another maternal aunt noted. Diffuse LAD, vickie of L axilla noted on CT, raising c/f malignancy. Imaging reviewed by radiology who advised dedicated L axillary US (R axilla findings stable and breast US unreliable given anasarca).   - Malignancy workup as above    ESRD (end stage renal disease) on dialysis  Hx of end-stage CKD (2/2 diabetic & hypertensive nephropathy). Has been on HD (M/W/F) since 2021. On admission, Cr 4.5, BUN 45, bicarb 20, & Na 134, (all c/w baseline). No uremia or notable electrolyte derangements. Pt w/ b/l LE lymphedema, requiring 3L NC (both c/w baseline). Diffuse anasarca noted which seemingly is above baseline.   - Nephrology consulted; appreciate recs  - Continued HD for clearance & volume removal   - Epo MWF w/ HD  - Monitor RFP  - Minimize nephrotoxic agents as able & renally-dose meds    Heart failure with preserved ejection fraction  Hx of HFpEF per 2020 TTE. Although respiratory status stable/at baseline, repeat TTE obtained iso acute illness & diffuse/severe anasarca. TTE (10/30/24) w/ EF 60-65% w/ septal flattening c/w volume overload, biatrial dilation, severe TR, moderate pulmonary HTN (PASP 51mmHg) & CVP 15mmHg. BNP 1459.   - Volume removal via HD  - Deferring lasix as pt anuric  - Low sodium diet w/ 1.2L fluid restriction     Gout  Documented hx of gout (uric acid as high as 16 years ago). Pt endorsing L heel pain on admission; uric acid 8.6. Allopurinol started. XR noted extensive soft tissue swelling (c/w known lymphedema & anasarca) along w/  calcaneal enthesophyte at plantar fascia insertion. Suspect pt's foot pain is more likely 2/2 enthesophyte than acute gout.   - Holding further allopurinol for now    GERD (gastroesophageal reflux disease)  Hx of gastritis per chart review. No EGDs on file. Asymptomatic on admission.   - Continue home PPI at increased BID dose  - PRN Maalox, antiemetics, etc.     Hyperlipidemia associated with type 2 diabetes mellitus  Holding home atorvastatin    Essential hypertension  Hx of essential & renovascular HTN. Low-normotensive on arrival to OSH (/43-87) but otherwise HDS.   - Holding home minoxidil & olmesartan  - Resume meds as needed      VTE Risk Mitigation (From admission, onward)           Ordered     IP VTE HIGH RISK PATIENT  Once         10/30/24 0332     Place sequential compression device  Until discontinued         10/30/24 0051                    Discharge Planning   SHASHA: 11/14/2024     Code Status: Full Code   Is the patient medically ready for discharge?:     Reason for patient still in hospital (select all that apply): Pending disposition  Discharge Plan A: Rehab ([facility] [phone])   Discharge Delays: None known at this time              Shayne Diaz MD  Department of Hospital Medicine   [facility] - Stepdown Flex ([place]-[facility])

## 2024-11-14 NOTE — PLAN OF CARE
Problem: Acute Kidney Injury/Impairment  Goal: Fluid and Electrolyte Balance  Outcome: Progressing     Problem: Acute Kidney Injury/Impairment  Goal: Improved Oral Intake  Outcome: Progressing  Pt had dialysis yesterday 3.2 L removed , denies need for pain meds, repositioned frequently, had very small amt of soft stool x1, to rehab in am . VSS T-max 99.3

## 2024-11-14 NOTE — PLAN OF CARE
Patient aaox4. Patient complains of no pain. Patient worked with pt/ot today. Patient wound dressing done. Patient turned q2. Patient due medication administered and patient tolerating well. Plan of care reviewed with patient and patient verbalized understanding. Patient safety protocols observed, call light within reach. No other concerns at this time.                                Problem: Adult Inpatient Plan of Care  Goal: Plan of Care Review  Outcome: Progressing  Goal: Patient-Specific Goal (Individualized)  Outcome: Progressing  Goal: Absence of Hospital-Acquired Illness or Injury  Outcome: Progressing  Goal: Optimal Comfort and Wellbeing  Outcome: Progressing  Goal: Readiness for Transition of Care  Outcome: Progressing     Problem: Diabetes Comorbidity  Goal: Blood Glucose Level Within Targeted Range  Outcome: Progressing     Problem: Acute Kidney Injury/Impairment  Goal: Fluid and Electrolyte Balance  Outcome: Progressing  Goal: Improved Oral Intake  Outcome: Progressing  Goal: Effective Renal Function  Outcome: Progressing     Problem: Wound  Goal: Optimal Coping  Outcome: Progressing  Goal: Optimal Functional Ability  Outcome: Progressing  Goal: Absence of Infection Signs and Symptoms  Outcome: Progressing  Goal: Improved Oral Intake  Outcome: Progressing  Goal: Optimal Pain Control and Function  Outcome: Progressing  Goal: Skin Health and Integrity  Outcome: Progressing  Goal: Optimal Wound Healing  Outcome: Progressing     Problem: Skin Injury Risk Increased  Goal: Skin Health and Integrity  Outcome: Progressing     Problem: Gastrointestinal Bleeding  Goal: Optimal Coping with Acute Illness  Outcome: Progressing  Goal: Hemostasis  Outcome: Progressing     Problem: Bariatric Environmental Safety  Goal: Safety Maintained with Care  Outcome: Progressing     Problem: Hemodialysis  Goal: Safe, Effective Therapy Delivery  Outcome: Progressing  Goal: Effective Tissue Perfusion  Outcome:  Progressing  Goal: Absence of Infection Signs and Symptoms  Outcome: Progressing     Problem: Chronic Kidney Disease  Goal: Electrolyte Balance  Outcome: Progressing  Goal: Fluid Balance  Outcome: Progressing     Problem: Fall Injury Risk  Goal: Absence of Fall and Fall-Related Injury  Outcome: Progressing     Problem: Coping Ineffective  Goal: Effective Coping  Outcome: Progressing

## 2024-11-14 NOTE — PT/OT/SLP PROGRESS
"Physical Therapy Co Treatment    Patient Name:  Pilar Fernandez   MRN:  [id number]    Recommendations:     Discharge Recommendations: High Intensity Therapy  Discharge Equipment Recommendations: bedside commode, bath bench, wheelchair, lift device, hospital bed  Barriers to discharge:  evolving clinical status    Assessment:     Pilar Fernandez is a 44 y.o. female admitted with a medical diagnosis of Acute liver failure.  She presents with the following impairments/functional limitations: weakness, impaired endurance, impaired self care skills, impaired functional mobility, gait instability, impaired balance, decreased upper extremity function, decreased lower extremity function, decreased safety awareness, decreased ROM, impaired skin, edema, impaired fine motor, impaired coordination, impaired cardiopulmonary response to activity. Pt tolerated session well and continues to have improved sitting tolerance. Pt requires increased assistance with bed mobility.     Co-treatment performed due to patient's multiple deficits requiring two skilled therapists to appropriately and safely assess patient's strength and endurance while facilitating functional tasks in addition to accommodating for patient's activity tolerance.  Rehab Prognosis: Fair; patient would benefit from acute skilled PT services to address these deficits and reach maximum level of function.    Recent Surgery: * No surgery found *      Plan:     During this hospitalization, patient to be seen 4 x/week to address the identified rehab impairments via gait training, therapeutic activities, therapeutic exercises, neuromuscular re-education and progress toward the following goals:    Plan of Care Expires:  12/02/24    Subjective     Chief Complaint: "I want to sit up on the edge for a while my back is hurting from this bed"  Patient/Family Comments/goals: Improve function   Pain/Comfort:  Pain Rating 1: 0/10      Objective:     Communicated with RN " Carmen prior to session.  Patient found HOB elevated with telemetry, oxygen upon PT entry to room.     General Precautions: Standard, diabetic, fall  Orthopedic Precautions: N/A  Braces: N/A  Respiratory Status: Nasal cannula, flow 3 L/min     Functional Mobility:  Bed mobility performed several times to perform linen change with SW assist.  Bed Mobility:     Rolling Left:  maximal assistance and of 2 persons  Rolling Right: maximal assistance and of 2 persons  Scooting: total assistance and of 2 persons  Supine to Sit: maximal assistance and of 2 persons  Sit to Supine: total assistance and of 2 persons  Balance: EOB ~20 minutes working on midline orientation, postural awareness, trunk control, core activation and seated weight shifting.       AM-PAC 6 CLICK MOBILITY  Turning over in bed (including adjusting bedclothes, sheets and blankets)?: 2  Sitting down on and standing up from a chair with arms (e.g., wheelchair, bedside commode, etc.): 2  Moving from lying on back to sitting on the side of the bed?: 2  Moving to and from a bed to a chair (including a wheelchair)?: 1  Need to walk in hospital room?: 1  Climbing 3-5 steps with a railing?: 1  Basic Mobility Total Score: 9       Treatment & Education:  Pt educated on there purpose, goals and benefits of today's session. Pt informed on progress made and improved tolerance to activity today. Pt was given VC's throughout session for sequencing, hand placements and safety. Pt was encourage to follow commands to improve outcome of activities being performed.     Patient left HOB elevated with all lines intact, call button in reach, and SW present..    GOALS:   Multidisciplinary Problems       Physical Therapy Goals          Problem: Physical Therapy    Goal Priority Disciplines Outcome Interventions   Physical Therapy Goal     PT, PT/OT Progressing    Description: Goals to be met by: 11/16/2024     Patient will increase functional independence with mobility by  performin. Supine to sit with Set-up [place]  2. Sit to supine with Set-up [place]  3. Sit to stand transfer with Contact Guard Assistance  4. Bed to chair transfer with Minimal Assistance using LRAD  5. Gait  x 30 feet with Minimal Assistance using LRAD   6. Ascend/descend 3 stair with no Handrails Moderate Assistance using LRAD   7. Lower extremity exercise program x15 reps per handout, with supervision                         Time Tracking:     PT Received On: 24  PT Start Time: 1323     PT Stop Time: 1414  PT Total Time (min): 51 min     Billable Minutes: Therapeutic Activity 20 and Neuromuscular Re-education 31    Treatment Type: Treatment  PT/PTA: PTA     Number of PTA visits since last PT visit: 2024

## 2024-11-15 PROBLEM — M10.9 GOUT: Chronic | Status: ACTIVE | Noted: 2019-07-26

## 2024-11-15 PROBLEM — I50.30 HEART FAILURE WITH PRESERVED EJECTION FRACTION: Chronic | Status: ACTIVE | Noted: 2020-10-06

## 2024-11-15 PROBLEM — A53.9 SYPHILIS: Status: ACTIVE | Noted: 2024-11-15

## 2024-11-15 PROBLEM — I69.30 HISTORY OF CEREBROVASCULAR ACCIDENT (CVA) WITH RESIDUAL DEFICIT: Chronic | Status: ACTIVE | Noted: 2024-11-09

## 2024-11-15 LAB
ALBUMIN SERPL BCP-MCNC: 1.5 G/DL (ref 3.5–5.2)
ALP SERPL-CCNC: 535 U/L (ref 40–150)
ALT SERPL W/O P-5'-P-CCNC: 41 U/L (ref 10–44)
ANION GAP SERPL CALC-SCNC: 14 MMOL/L (ref 8–16)
AST SERPL-CCNC: 112 U/L (ref 10–40)
BASOPHILS # BLD AUTO: 0.11 K/UL (ref 0–0.2)
BASOPHILS NFR BLD: 0.7 % (ref 0–1.9)
BILIRUB SERPL-MCNC: 18.3 MG/DL (ref 0.1–1)
BUN SERPL-MCNC: 42 MG/DL (ref 6–20)
CALCIUM SERPL-MCNC: 8.4 MG/DL (ref 8.7–10.5)
CHLORIDE SERPL-SCNC: 94 MMOL/L (ref 95–110)
CO2 SERPL-SCNC: 21 MMOL/L (ref 23–29)
CREAT SERPL-MCNC: 4.9 MG/DL (ref 0.5–1.4)
DIFFERENTIAL METHOD BLD: ABNORMAL
EOSINOPHIL # BLD AUTO: 0.4 K/UL (ref 0–0.5)
EOSINOPHIL NFR BLD: 2.6 % (ref 0–8)
ERYTHROCYTE [DISTWIDTH] IN BLOOD BY AUTOMATED COUNT: 23.5 % (ref 11.5–14.5)
EST. GFR  (NO RACE VARIABLE): 10.5 ML/MIN/1.73 M^2
GLUCOSE SERPL-MCNC: 88 MG/DL (ref 70–110)
HCT VFR BLD AUTO: 28.1 % (ref 37–48.5)
HGB BLD-MCNC: 9.1 G/DL (ref 12–16)
IMM GRANULOCYTES # BLD AUTO: 0.33 K/UL (ref 0–0.04)
IMM GRANULOCYTES NFR BLD AUTO: 2.1 % (ref 0–0.5)
INR PPP: 1.2 (ref 0.8–1.2)
LYMPHOCYTES # BLD AUTO: 1.3 K/UL (ref 1–4.8)
LYMPHOCYTES NFR BLD: 8.6 % (ref 18–48)
MAGNESIUM SERPL-MCNC: 2 MG/DL (ref 1.6–2.6)
MCH RBC QN AUTO: 30.4 PG (ref 27–31)
MCHC RBC AUTO-ENTMCNC: 32.4 G/DL (ref 32–36)
MCV RBC AUTO: 94 FL (ref 82–98)
MONOCYTES # BLD AUTO: 1.4 K/UL (ref 0.3–1)
MONOCYTES NFR BLD: 8.8 % (ref 4–15)
NEUTROPHILS # BLD AUTO: 12.1 K/UL (ref 1.8–7.7)
NEUTROPHILS NFR BLD: 77.2 % (ref 38–73)
NRBC BLD-RTO: 0 /100 WBC
PHOSPHATE SERPL-MCNC: 4.7 MG/DL (ref 2.7–4.5)
PLATELET # BLD AUTO: 233 K/UL (ref 150–450)
PMV BLD AUTO: 11.7 FL (ref 9.2–12.9)
POCT GLUCOSE: 123 MG/DL (ref 70–110)
POCT GLUCOSE: 131 MG/DL (ref 70–110)
POCT GLUCOSE: 82 MG/DL (ref 70–110)
POCT GLUCOSE: 90 MG/DL (ref 70–110)
POCT GLUCOSE: 95 MG/DL (ref 70–110)
POTASSIUM SERPL-SCNC: 4.4 MMOL/L (ref 3.5–5.1)
PROT SERPL-MCNC: 6.5 G/DL (ref 6–8.4)
PROTHROMBIN TIME: 12.9 SEC (ref 9–12.5)
RBC # BLD AUTO: 2.99 M/UL (ref 4–5.4)
SODIUM SERPL-SCNC: 129 MMOL/L (ref 136–145)
TB INDURATION 48 - 72 HR READ: NORMAL
TB SKIN TEST 48 - 72 HR READ: NEGATIVE
TREPONEMA PALLIDUM IGG+IGM AB [PRESENCE] IN SERUM OR PLASMA BY IMMUNOASSAY: NONREACTIVE
WBC # BLD AUTO: 15.67 K/UL (ref 3.9–12.7)

## 2024-11-15 PROCEDURE — 80100016 HC MAINTENANCE HEMODIALYSIS

## 2024-11-15 PROCEDURE — 90935 HEMODIALYSIS ONE EVALUATION: CPT | Mod: ,,, | Performed by: NURSE PRACTITIONER

## 2024-11-15 PROCEDURE — 84100 ASSAY OF PHOSPHORUS: CPT | Performed by: STUDENT IN AN ORGANIZED HEALTH CARE EDUCATION/TRAINING PROGRAM

## 2024-11-15 PROCEDURE — 85025 COMPLETE CBC W/AUTO DIFF WBC: CPT | Performed by: HOSPITALIST

## 2024-11-15 PROCEDURE — 25000003 PHARM REV CODE 250: Performed by: STUDENT IN AN ORGANIZED HEALTH CARE EDUCATION/TRAINING PROGRAM

## 2024-11-15 PROCEDURE — 97530 THERAPEUTIC ACTIVITIES: CPT

## 2024-11-15 PROCEDURE — 36415 COLL VENOUS BLD VENIPUNCTURE: CPT | Performed by: HOSPITALIST

## 2024-11-15 PROCEDURE — 36415 COLL VENOUS BLD VENIPUNCTURE: CPT | Performed by: STUDENT IN AN ORGANIZED HEALTH CARE EDUCATION/TRAINING PROGRAM

## 2024-11-15 PROCEDURE — 83735 ASSAY OF MAGNESIUM: CPT | Performed by: STUDENT IN AN ORGANIZED HEALTH CARE EDUCATION/TRAINING PROGRAM

## 2024-11-15 PROCEDURE — 86593 SYPHILIS TEST NON-TREP QUANT: CPT | Performed by: HOSPITALIST

## 2024-11-15 PROCEDURE — 94761 N-INVAS EAR/PLS OXIMETRY MLT: CPT

## 2024-11-15 PROCEDURE — 27000221 HC OXYGEN, UP TO 24 HOURS

## 2024-11-15 PROCEDURE — 80053 COMPREHEN METABOLIC PANEL: CPT | Performed by: STUDENT IN AN ORGANIZED HEALTH CARE EDUCATION/TRAINING PROGRAM

## 2024-11-15 PROCEDURE — 63600175 PHARM REV CODE 636 W HCPCS: Mod: JZ,JG | Performed by: PHYSICIAN ASSISTANT

## 2024-11-15 PROCEDURE — 99900035 HC TECH TIME PER 15 MIN (STAT)

## 2024-11-15 PROCEDURE — 20600001 HC STEP DOWN PRIVATE ROOM

## 2024-11-15 PROCEDURE — 97110 THERAPEUTIC EXERCISES: CPT

## 2024-11-15 PROCEDURE — 85610 PROTHROMBIN TIME: CPT | Performed by: STUDENT IN AN ORGANIZED HEALTH CARE EDUCATION/TRAINING PROGRAM

## 2024-11-15 RX ORDER — LACTULOSE 10 G/15ML
20 SOLUTION ORAL; RECTAL 2 TIMES DAILY
Start: 2024-11-15

## 2024-11-15 RX ORDER — DOXYLAMINE SUCCINATE 25 MG
TABLET ORAL 2 TIMES DAILY
Start: 2024-11-15

## 2024-11-15 RX ADMIN — PANTOPRAZOLE SODIUM 40 MG: 40 TABLET, DELAYED RELEASE ORAL at 08:11

## 2024-11-15 RX ADMIN — MICONAZOLE NITRATE: 20 CREAM TOPICAL at 08:11

## 2024-11-15 RX ADMIN — HYDROCORTISONE: 25 CREAM TOPICAL at 08:11

## 2024-11-15 RX ADMIN — URSODIOL 300 MG: 300 CAPSULE ORAL at 04:11

## 2024-11-15 RX ADMIN — URSODIOL 300 MG: 300 CAPSULE ORAL at 08:11

## 2024-11-15 RX ADMIN — PENICILLIN G BENZATHINE 2.4 MILLION UNITS: 2400000 INJECTION, SUSPENSION INTRAMUSCULAR at 04:11

## 2024-11-15 RX ADMIN — MUPIROCIN: 20 OINTMENT TOPICAL at 08:11

## 2024-11-15 NOTE — PLAN OF CARE
11/14/24 1100   Stool Assessment   Bowel Incontinence Yes   Stool Consistency loose   Stool Color brown, dark   Stool Amount moderate   Stool Source Rectum   Last Bowel Movement 11/14/24      Cortney Harrison RN  Case Management  [facility]  [phone]

## 2024-11-15 NOTE — PLAN OF CARE
Problem: Adult Inpatient Plan of Care  Goal: Plan of Care Review  Outcome: Adequate for Care Transition  Goal: Patient-Specific Goal (Individualized)  Outcome: Adequate for Care Transition  Goal: Absence of Hospital-Acquired Illness or Injury  Outcome: Adequate for Care Transition  Goal: Optimal Comfort and Wellbeing  Outcome: Adequate for Care Transition  Goal: Readiness for Transition of Care  Outcome: Adequate for Care Transition     Problem: Diabetes Comorbidity  Goal: Blood Glucose Level Within Targeted Range  Outcome: Adequate for Care Transition     Problem: Acute Kidney Injury/Impairment  Goal: Fluid and Electrolyte Balance  Outcome: Adequate for Care Transition  Goal: Improved Oral Intake  Outcome: Adequate for Care Transition  Goal: Effective Renal Function  Outcome: Adequate for Care Transition     Problem: Wound  Goal: Optimal Coping  Outcome: Adequate for Care Transition  Goal: Optimal Functional Ability  Outcome: Adequate for Care Transition  Goal: Absence of Infection Signs and Symptoms  Outcome: Adequate for Care Transition  Goal: Improved Oral Intake  Outcome: Adequate for Care Transition  Goal: Optimal Pain Control and Function  Outcome: Adequate for Care Transition  Goal: Skin Health and Integrity  Outcome: Adequate for Care Transition  Goal: Optimal Wound Healing  Outcome: Adequate for Care Transition     Problem: Skin Injury Risk Increased  Goal: Skin Health and Integrity  Outcome: Adequate for Care Transition     Problem: Gastrointestinal Bleeding  Goal: Optimal Coping with Acute Illness  Outcome: Adequate for Care Transition  Goal: Hemostasis  Outcome: Adequate for Care Transition     Problem: Bariatric Environmental Safety  Goal: Safety Maintained with Care  Outcome: Adequate for Care Transition     Problem: Hemodialysis  Goal: Safe, Effective Therapy Delivery  Outcome: Adequate for Care Transition  Goal: Effective Tissue Perfusion  Outcome: Adequate for Care Transition  Goal: Absence of  Infection Signs and Symptoms  Outcome: Adequate for Care Transition     Problem: Chronic Kidney Disease  Goal: Electrolyte Balance  Outcome: Adequate for Care Transition  Goal: Fluid Balance  Outcome: Adequate for Care Transition     Problem: Fall Injury Risk  Goal: Absence of Fall and Fall-Related Injury  Outcome: Adequate for Care Transition     Problem: Coping Ineffective  Goal: Effective Coping  Outcome: Adequate for Care Transition  Patient alert and oriented. Cooperative with care. Dialysis done this shift. 3 liters taken off. Patient given IM pcn for infection. Discharging tonight to healthcare center. Awaiting transportation. Iv to be removed with transport arrives. Patient aware. Report called to care center. Patient comfortable at this time

## 2024-11-15 NOTE — PLAN OF CARE
[facility] - Stepdown Flex ([place]-14)  Discharge Final Note    Primary Care Provider: Lindsey Singletary FNP    Expected Discharge Date: 11/15/2024  Discharge Plan A and Plan B have been determined by review of patient's clinical status, future medical and therapeutic needs, and coverage/benefits for post-acute care in coordination with multidisciplinary team members.     Final Discharge Note (most recent)       Final Note - 11/15/24 1008          Final Note    Assessment Type Final Discharge Note     Anticipated Discharge Disposition Planned Readmission - Skilled Nursing Facility    [facility] Phone: [phone]    What phone number can be called within the next 1-3 days to see how you are doing after discharge? [phone]   Roberto Sharma (Significant other)       Post-Acute Status    Post-Acute Authorization Placement     Post-Acute Placement Status Set-up Complete/Auth obtained   [facility] Phone: [phone]    Home Health Status Discharge Plan Changed     Diaylsis Status Set-up Complete/Auth obtained   [facility]  O:[phone] F: [phone]    Coverage : [facility] DUAL COMPLETE HMO SNP -     Discharge Delays None known at this time                     Important Message from Medicare  Important Message from Medicare regarding Discharge Appeal Rights: Explained to patient/caregiver, Other (comments) (Verbal consent from , CHW Augustine Espinoza witnessed.)     Date IMM was signed: 11/15/24  Time IMM was signed: 1304    Contact Info       Lex Meyers    [phone]       Next Steps: Follow up    [facility]    [address] [place] [address]  [phone]       Next Steps: Follow up    [facility]    [address],   [place] [address]  [phone]       Next Steps: Follow up    [facility]    [address] [place] [address]  [phone]       Next Steps:  Follow up                2:15 pm  CM received a phone call from Nurse Singh at [address] and informed NEREIDA t hat the patient can not be admitted until the patient has been treated  for syphilis, Nurse Singh provided the information of the   with [facility]. Nurse Singh provided the contact number [phone]    2:33 pm -2:45 pm  CM spoke with Nurse Carmela Adame [phone] and verbal consent was provided from the patient to have CM join the conference call.  Nurse Lobo informed the patient that she had blood work completed at [facility] for syphilis and she will need to speak with the patient and her significant other to discuss treatment and what to do next.  The patient will need to be treated with 3 weekly injections of PCN G and the attending will need to order a RPR and syphilis titer. CM updated the patients medical team        3:00 pm  CM called patient flow center and cancelled transportation with [facility]      3:25 pm  CM spoke with Halima and will notify the HD center it see if the patient can receive treatment with her current dialysis and will call CM back with an answer    4:00 pm  CM  received a phone call form Halima Hampton at [facility] and patient is able to  discharge and resume treatment at the her place of HD. CM uploaded updated SNF orders via [facility] and will arrange transportation.  CM updated he patient          CM met with patient and/or family member:  and discussed discharge plans, patient to DC to [facility] . No medications delivered to bedside,  and follow up appointment[s] scheduled.   Transportation provided by [employer] via stretcher with supplemental oxygen of 3 liters     Transportation arranged for 500 pm        JAC Harrison RN  Case Management  [facility]  [phone]

## 2024-11-15 NOTE — PLAN OF CARE
[facility]     Department of Hospital Medicine     [address] [place] [address]     [phone] after hours  [phone] fax       NURSING HOME ORDERS    Patient Name: Pilar Fernandez  YOB: 1980    11/15/2024    Admit to Nursing Home:    Skilled Bed                                     Diagnoses:  Active Hospital Problems    Diagnosis  POA    *Acute liver failure [K72.00]  Yes    Syphilis [A53.9]  Yes    Anasarca [R60.1]  Unknown    Debility [R53.81]  Unknown    Dyspnea [R06.00]  Unknown    Palliative care encounter [Z51.5]  Not Applicable    History of cerebrovascular accident (CVA) with residual deficit [I69.30]  Not Applicable     Chronic    Chronic hypoxic respiratory failure [J96.11]  Yes    Morbid obesity [E66.01]  Yes    Diffuse lymphadenopathy [R59.1]  Yes    Acute anemia [D64.9]  Yes    Coagulopathy [D68.9]  Yes    Direct hyperbilirubinemia [E80.6]  Yes    Painless jaundice [R17]  Yes    Type 2 diabetes mellitus, with long-term current use of insulin [E11.9, Z79.4]  Not Applicable     Chronic    Leukocytosis [D72.829]  Yes    Sacral decubitus ulcer, stage II [L89.152]  Yes    Impaired mobility [Z74.09]  Yes    Mass of breast [N63.0]  Yes    ESRD (end stage renal disease) on dialysis [N18.6, Z99.2]  Not Applicable     Chronic    Heart failure with preserved ejection fraction [I50.30]  Yes     Chronic    Lymphedema [I89.0]  Yes     Chronic    Gout [M10.9]  Yes     Chronic    GERD (gastroesophageal reflux disease) [K21.9]  Yes     Chronic    Essential hypertension [I10]  Yes     Chronic      Resolved Hospital Problems    Diagnosis Date Resolved POA    Vaginal candidiasis [B37.31] 11/14/2024 No    Hepatic encephalopathy [K76.82] 11/08/2024 Yes    ACP (advance care planning) [Z71.89] 10/31/2024 Not Applicable       Patient is homebound due to:  Acute liver failure    Allergies:  Review of patient's allergies indicates:   Allergen  Reactions    Tramadol Itching and Nausea And Vomiting       Vitals:      Routine    Diet: renal diet     Activities:  as tolerated    LABS:  Per facility protocol   CMP, CBC, PT/INR weekly   BMP, magnesium, phosphorus on dialysis days    Nursing Precautions:              -  Suction at bedside          - Fall precautions per nursing home protocol   - Decubitus precautions:        -  for positioning   - Pressure reducing foam mattress   - Turn patient every two hours. Use wedge pillows to anchor patient    CONSULTS:      Physical Therapy to evaluate and treat 5 x a week     Occupational Therapy to evaluate and treat 5 x a week    MISCELLANEOUS CARE:   Oxygen 3 liters/minute nasal cannula    Dialysis MWF    Wound Care:  Sacral / Buttocks: BID / PRN     Apply Triad correctly:      Always cleanse wound before applying Triad.  To remove Triad:   Use pH-balanced wound cleanser to soften Triad  Gently wipe without scrubbing  For complete removal, repeat as needed.     Gently spread Triad evenly over the area of application to the thickness of a dime.    Under abdominal fold / under bilateral breast folds: Intertrigo   Apply Apply interdry(at bedside)  cloth to skin folds q 5 days or prn dislodgement or soiling.                                                    - Before applying Interdry, make sure inner skin folds are free of any cream or ointment.   - Place one side of interdry sheet to  inner skin fold and leave 2 inches of cloth past skin fold for moisture to escape.   - Make sure interdry is not bunched together or overlaping in the inner skin fold.    - Make sure exposed cloth (past skin fold) is not on moist or weeping skin. .            DIABETES CARE:     Check blood sugar:     Fingerstick blood sugar AC and HS   Fingerstick blood sugar every 6 hours if unable to eat      Report CBG < 60 or > 400 to physician.                                          Insulin Sliding Scale          Glucose  Novolog Insulin  Subcutaneous        0 - 60   Orange juice or glucose tablet, hold insulin      No insulin   201-250  2 units   251-300  4 units   301-350  6 units   351-400  8 units   >400   10 units then call physician      Medications:   Reconciled Home Medications:      Medication List        START taking these medications      miconazole 2 % cream  Commonly known as: MICOTIN  Apply topically 2 (two) times daily. Between thighs, under abdomen pannus, under breasts     penicillin G benzathine 1,200,000 unit/2 mL Syrg  Commonly known as: BICILLIN LA  Inject 4 mLs (2.4 Million Units total) into the muscle once a week. On 11/22/2024 and 11/29/2024 for 2 doses  Start taking on: November 22, 2024     ursodioL 300 mg capsule  Commonly known as: ACTIGALL  Take 1 capsule (300 mg total) by mouth 3 (three) times daily.            CHANGE how you take these medications      lactulose 10 gram/15 mL solution  Commonly known as: CHRONULAC  Take 30 mLs (20 g total) by mouth 2 (two) times daily.  What changed:   how much to take  when to take this            CONTINUE taking these medications      aspirin 81 MG EC tablet  Commonly known as: ECOTRIN  Take 1 tablet (81 mg total) by mouth once daily.     atorvastatin 40 MG tablet  Commonly known as: LIPITOR  Take 1 tablet by mouth once daily     clopidogreL 75 mg tablet  Commonly known as: PLAVIX  Take 1 tablet (75 mg total) by mouth once daily.     famotidine 20 MG tablet  Commonly known as: PEPCID  Take 1 tablet (20 mg total) by mouth 2 (two) times daily as needed for Heartburn.     insulin aspart U-100 100 unit/mL (3 mL) Inpn pen  Commonly known as: NovoLOG Flexpen U-100 Insulin  Inject 10 Units into the skin 2 (two) times daily with meals.     linaGLIPtin 5 mg Tab tablet  Commonly known as: TRADJENTA  TAKE 1 TABLET (5 MG TOTAL) BY MOUTH ONCE DAILY.     nebivoloL 20 mg Tab  Commonly known as: BYSTOLIC  Take 1 tablet by mouth once daily.     ondansetron 4 MG Tbdl  Commonly known as:  ZOFRAN-ODT  Take 1 tablet (4 mg total) by mouth every 8 (eight) hours as needed ([place]).     pantoprazole 40 MG tablet  Commonly known as: PROTONIX  Take 1 tablet (40 mg total) by mouth once daily.     traZODone 100 MG tablet  Commonly known as: DESYREL  Take 100 mg by mouth nightly as needed.            STOP taking these medications      amLODIPine 10 MG tablet  Commonly known as: NORVASC     EUTHYROX 50 mcg tablet  Generic drug: levothyroxine     HYDROcodone-acetaminophen 5-325 mg per tablet  Commonly known as: NORCO     LANTUS U-100 INSULIN 100 unit/mL injection  Generic drug: insulin glargine U-100 (Lantus)     minoxidiL 10 MG Tab  Commonly known as: LONITEN     NIFEdipine 60 MG (OSM) 24 hr tablet  Commonly known as: PROCARDIA-XL     polyethylene glycol 17 gram/dose powder  Commonly known as: GLYCOLAX     senna-docusate 8.6-50 mg 8.6-50 mg per tablet  Commonly known as: SENNA WITH DOCUSATE SODIUM     sorbitoL 70 % solution            ASK your doctor about these medications      colchicine 0.6 mg tablet  Commonly known as: COLCRYS  TAKE 1 TABLET BY MOUTH ONCE DAILY AS NEEDED FOR GOUT PAIN                      _________________________________  Shayne Diaz MD  11/15/2024

## 2024-11-15 NOTE — PROGRESS NOTES
0808 Arrived to [facility] via bed    0830 Maintenance HD started via [facility]OR[facility] AVF buttonholes by ALFREDO Arellano RN, some difficulty cannulating arterial buttonhole and having to run lower BFR. Nephrology notified

## 2024-11-15 NOTE — PROGRESS NOTES
[facility] ([address] Medicine  Progress Note    Patient Name: Pilar Fernandez  MRN: [id number]  Patient Class: IP- Inpatient   Admission Date: 10/29/2024  Length of Stay: 17 days  Attending Physician: Shayne Diaz MD  Primary Care Provider: Lindsey Singletary FNP        Subjective:     Principal Problem:Acute liver failure        HPI:  Pilar Fernandez is a 44 year old Black woman with hypertension, hyperlipidemia, diabetes mellitus type 2 (treated with insulin), end stage renal disease on hemodialysis (), heart failure with preserved ejection fraction, chronic hypoxic respiratory failure on supplemental oxygen (3 liters/minute), history of right middle cerebral artery stroke on 2024, peripheral vascular disease, chronic lymphedema, hypothyroidism, anemia with history of blood transfusion, gout, gastroesophageal reflux disease, chronic constipation, sacral decubitus ulcer, history of  sections, history of tubal ligation, history of cholecystectomy. She lives in [place], [place].               She presented to [facility] Emergency Department on 10/29/2024 with gastrointestinal bleeding, symptomatic anemia, jaundice, hyperbilirubinemia. She reported being hospitalized at [facility] recently for leg cellulitis and being told she had hyperbilirubinemia and was advised to follow up with Hepatology at [facility] after being discharged on 10/26/2024. She had several wounds on her sacrum and legs. She had extremely swollen legs. Stool was positive for occult blood. She had leukocytosis. Hemoglobin was 6.4 g/dL from baseline around 10. INR was elevated at 2.1. she was given 2 units of fresh frozen plasma and 2 units of packed red blood cells. Abdomen CT without contrast showed no obvious etiology of liver abnormalities or leukocytosis. She was transferred to [facility] for  further evaluation of her hyperbilirubinemia. She was admitted to Hospital Medicine Team B.    Overview/Hospital Course:  She was put on ceftriaxone. Hepatology and Advanced Endoscopy Service were consulted for hyperbilirubinemia. Gastroenterology was consulted for gastrointestinal bleed. Hematology-Oncology was consulted for anemia with hyperbilirubinemia and positive Britt test. CT showed diffuse anasarca, pericardial effusion, enlarged lymph nodes vs soft tissue densities, hepatosplenomegaly. Hepatology recommended liver biopsy. Gastroenterology recommended outpatient endoscopy, after she is medically optimized, due to lack of overt bleeding. Hematology-Oncology recommended breast ultrasound. Leukocytosis persisted. Sed rate was normal. CRP was elevated. Midodrine was started for post-dialysis hypotension. She developed hepatic encephalopathy with ammonia of 111 umol/L. She was transfused another unit of packed red blood cells in preparation for liver biopsy. She required vitamin K for elevated INR. She desired to sit at the side of the bed but was unable to on her own. Physical and Occupational Therapy were consulted and recommended high intensity therapy. Midodrine was stopped as blood pressures increased to normal. Her home hypertension medications continued to be held. 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 MRCP showed no obstruction or stricture but was limited by body habitus. Hepatology recommended ursodiol. Upon review of imaging, left axilla ultrasound was recommended instead of breast ultrasound as her anasarca would make interpretation of a breast ultrasound difficult. Axilla ultrasound showed abnormal axillary adenopathy with at least 6 abnormal lymph nodes (BI-RADS 4). She underwent left axilla lymph node biopsy on 11/11/2024. Hematology-Oncology reported  that she would not currently for inpatient chemotherapy as she had no bulky lymphadenopathy or obstruction or compression of organs due to lymphadenopathy. Additionally, her persistent hyperbilirubinemia excluded her from several chemotherapy regimens, and her heart failure and end stage renal disease exclude her from nearly all other remaining regimens. She awaited skilled nursing facility and was accepted on 11/15/2024. However, syphilis test was incidentally positive per the [place] [facility], so the nursing facility could not accept her before she started treatement.     Interval History:    Per Case Management, [facility] the nursing facility to hold off on admitting her until she starts treatment of syphilis. Chart review shows no results for RPR or FTA-ABS. Will consult ID for recommendations.     Review of Systems   Constitutional:  Negative for chills, diaphoresis and fever.   Respiratory:  Negative for cough, shortness of breath and wheezing.    Cardiovascular:  Positive for leg swelling.   Musculoskeletal:  Positive for gait problem. Negative for joint swelling and neck pain.   Neurological:  Positive for weakness. Negative for dizziness, light-headedness and headaches.     Objective:     Vital Signs (Most Recent):  Temp: 97.6 °F (36.4 °C) (11/15/24 1248)  Pulse: 78 (11/15/24 1324)  Resp: 17 (11/15/24 1324)  BP: (!) 163/87 (11/15/24 1248)  SpO2: 99 % (11/15/24 1324) Vital Signs (24h Range):  Temp:  [97.6 °F (36.4 °C)-98.4 °F (36.9 °C)] 97.6 °F (36.4 °C)  Pulse:  [75-82] 78  Resp:  [16-20] 17  SpO2:  [95 %-99 %] 99 %  BP: (134-189)/(68-99) 163/87     Weight: 130 kg (286 lb 9.6 oz)  Body mass index is 46.26 kg/m².    Intake/Output Summary (Last 24 hours) at 11/15/2024 1447  Last data filed at 11/15/2024 1215  Gross per 24 hour   Intake 720 ml   Output 3500 ml   Net -2780 ml         Physical Exam  Constitutional:       General: She is not in acute distress.     Appearance: She is  morbidly obese. She is not toxic-appearing or diaphoretic.   Pulmonary:      Effort: Pulmonary effort is normal. No respiratory distress.      Breath sounds: Normal breath sounds.   Musculoskeletal:      Right lower leg: Edema present.      Left lower leg: Edema present.   Neurological:      Mental Status: She is alert and oriented to person, place, and time. Mental status is at baseline.   Psychiatric:         Attention and Perception: Attention normal.         Behavior: Behavior normal. Behavior is not agitated or aggressive.           Significant Labs: All pertinent labs within the past 24 hours have been reviewed.    Significant Imaging: I have reviewed all pertinent imaging results/findings within the past 24 hours.    Assessment/Plan:      * Acute liver failure  Direct hyperbilirubinemia w/ painless jaundice  Coagulopathy  Hepatic encephalopathy - resolved  Presented w/ hyperbilirubinemia per outpatient/HD labs w/ acute jaundice & assoc pruritus. On arrival, Tbili 11.7, AST 81, ALT 30, , along w/ INR 2.1 & Hgb 6.4 s/o acute liver failure w/ synthetic dysfunction. Considered hemolytic process, however less likely w/ haptoglobin of 115. CT noted hepatomegaly w/ heterogenous attenuation & few scattered sub-cm hypodensities of unk significance; galbladder surgically absent. Liver US w/ Doppler w/ marked HSM, but normal blood flow and no biliary/ductal dilation. Given absence of dilation, AES deferred consult for time being, and advised primary Hepatology involvement. Etiology unclear- decompensated cirrhosis (2/2 MASLD? W/ MELD 36) considered vs DILI (2/2 clindamycin) vs other infiltrative process (given extensive LAD). Serological workup w/ elevated A1AT (300), abnormal IgG profile, & increased factor VIII activity; AFP, PETH, BRIAN, PETH, ASMA, AMA & ceruloplasmin all nml. Developed hepatic encephalopathy (w/ ammonia 111) which resolved following titration of bowel regimen. Underwent IR liver bx on 11/6 w/  pathology showing cholestatic hepatitis w/ stage 2 fibrosis & e/o hepatic venous outflow obstruction (although none noted on initial Doppler). Pt's Tbili & LFTs continued to uptrend. MRCP severely limited by habitus, but didn't show any stricture/obstruction; ursodiol initiated. Pt not transplant candidate given co-morbidities, functional status & underlying malignancy. Overall poor prognosis. MELD-Na 35.  - Hepatology following; appreciate recs  - Ursodiol 300mg TID   - Not a transplant candidate given functional status & comorbidities   - Monitor CMP, INR, & CBC  - Minimize hepatotoxic agents as able   - Bowel regimen; titrate PRN   - Palliative Medicine consulted; appreciate recs    Syphilis  Previously unknown. Per Case Management, previously found to be positive at [facility]. Can check Treponema pallidum antibodies. Consult Infectious Disease.      Diffuse lymphadenopathy  CT C/A/P on arrival to [facility] noted numerous LN conglomerates &/or soft tissue masses around L axillary soft tissues, b/l inguinal regions (L>R), L supraclavicular soft tissues, & retroperitoneum. Findings highly c/f  neoplastic or lymphoproliferative process. While metastatic breast cancer considered (vickie given hx of breast mass as below) although lymphoma also considered. Malignancy could be contributing to pt's liver failure, vickie if causing obstruction &/or infiltration. Dedicated L axillary US noted abnormal L axillary LAD w/ at least 6 abnormal LN w/ suspicious appearance (BI-RADS 4); biopsy (w/ pathology & flow cytometry) advised. Underwent core needle biopsy of L axillary LN per IR on 11/11.   - Hematology-Oncology consulted; appreciate recs  - S/p IR bx of L axillary LN 11/11; path/cytology pending    - Preliminary results inconclusive, but s/o reactive process  - Likely will require addn'l biopsy procedure(s) in the future  - Not a candidate for IP chemo, and unfortunately unlikely to be candidate for outpatient chemo  either as pt's hyperbilirubinemia, HFpEF, &/or ESRD exclude pt from most tx regimens  - Palliative Medicine consulted; appreciate recs    Morbid obesity  Weight 130kg on admission (BMI 46.26 kg/m²). Morbid obesity complicates all aspects of disease management from diagnostic modalities to treatment.   - Weight loss encouraged    Chronic hypoxic respiratory failure  Pt has apparently been on 3L NC at home since 2022. No known underlying COPD or other lung dx. Suspect CHRF 2/2 chronic volume overload iso ESRD & HFpEF, along w/ likely OHS/MELANY.  - Volume mgmt as above   - Supplemental O2 PRN for goal SpO2 > 92%    History of cerebrovascular accident (CVA) with residual deficit  Suffered CVA of R posterior frontal lobe in July 2024 w/ residual L-sided weakness which has impaired mobility. No acute neuro concerns/focal findings on current admission.   - Holding home DAPT 2/2 anemia/coagulopathy   - Holding home statin i/s/o liver failure   - PT/OT    Impaired mobility  Sacral decubitus ulcer  Due to lymphedema & stroke earlier this year. Per 7/17/24 discharge summary, pt was able to ambulate w/ walker (but dragging L foot) and was discharge home w/ outpatient therapy. Pt seems to have had progressive decline in mobility since then with pt's boyfriend providing significant assistance w/ mobility & ADLs. Pt seemingly largely bedbound w/ chronic sacral ulcer noted.   - Volume/lymphedema mgmt as above  - Wound care  - Frequent turns  - PT/OT   - Despite pt's overall poor prognosis, would still benefit from IPR/SNF as improved mobility & functional status would absolutely affect pt's overall quality of life, and potentially effect cancer treatment options available to her    Sacral decubitus ulcer, stage II  Chronic. Turn frequently. Wound care.      Leukocytosis  WBC 22.8 on admission w/ neutrophil predominance. Pt afebrile and w/o any other SIRS. No focal s/sx. Recent cellulitis, but stable w/o active flare/infx appearance.  Unable to collect UA d/t ESRD. CXR w/o focal consolidation. CRP & procal elevated but ESR nml. CT C/A/P w/ diffuse LAD w/ concern for malignancy; no infectious focus identified though. WBC remained low 20s throughout hospitalization. Suspect leukocytosis reactive iso malignancy.   - Defer abx  - Monitor CBC & fever curve  - Malignancy workup as above    Type 2 diabetes mellitus, with long-term current use of insulin  Hx of IDDM2 on home regimen of linagliptin, aspart 10u BID, l& antus 70u QHS; last A1c 5.6% (July 2024; improved from 8.7 in 2021). Serum glucose 83 on admission w/ repeat A1c of 4.5%. Likely decreased insulin needs iso ongoing HD.   - Holding home linagliptin while inpatient  - LD-SSI  - CBG AC & HS  - Hypoglycemic protocol     Painless jaundice  See primary problem.      Direct hyperbilirubinemia  Hepatic encephalopathy   CT abdomen showed anasarca with hepatosplenomegaly. Appreciate Hepatology. Changed home senna-docusate and polyethylene glycol to lactulose to treat hepatic encephalopathy. Liver biopsy.     Coagulopathy  Goal INR <2. Gave vitamin K.    Acute anemia  Coagulopathy  Thrombocytopenia - resolved  On presentation, Hgb 6.4 (baseline ~10.5) w/ MCV 83, platelets 154, & INR 2.1. Pt denied melena, hematochezia, BRBPR, hematemesis or other sx of active bleeding. Fecal occult positive at OSH ED. Received 2u pRBCs & 2u FFP prior to transfer. Evaluated by GI on arrival who deferred scope for time being given absence of s/sx of brisk bleeding.  Required addn'l unit pRBCs & IV vitamin K shortly after transfer. Subsequent development of thrombocytopenia (eric 90s). Anemia & elevated INR stabilized/improved but certainly persisted. Continued to have no s/sx of active/brisk bleeding. Iron panel c/w anemia 2/2 chronic dx/inflammation. Suspect cytopenias & coagulopathy 2/2 liver failure w/ possible contributing suspected malignancy. Persistent anemia & mildly elevated INR, but overall stable.   -  Mgmt/workup of liver failure & LAD as above/below  - Monitor CBC & for e/o active bleeding  - Transfuse PRN for goal Hgb > 7, plts > 50K & INR < 2  - S/p 2u FFP & 4u pRBCs to date (including transfusions at OSH)   - S/p IV vitamin K     Lymphedema  Hx of chronic BLE lymphedema (L>R) at baseline which contributes to pt's difficulty with mobility & recent cellulitis.   - Volume removal via HD  - Wound care  - Elevate as much as tolerated    Mass of breast  Has had known mass of L breast for years. Excisional biopsy (Dec '22) noted complex sclerosing lesion w/ usual ductal hyperplasia & apocrine metaplasia; no atypica or malignancy on preliminary evaluation. Per further review by [facility], findings c/w ADH w/ assoc necrosis & proliferative fibrocystic changes. US (Jan 2023) w/ post-surgical fluid collection; BI-RADS 2. Doesn't appear to have had any further imaging or workup since then. Family hx of breast ca in one maternal aunt & ovarian cancer in another maternal aunt noted. Diffuse LAD, vickie of L axilla noted on CT, raising c/f malignancy. Imaging reviewed by radiology who advised dedicated L axillary US (R axilla findings stable and breast US unreliable given anasarca).   - Malignancy workup as above    ESRD (end stage renal disease) on dialysis  Hx of end-stage CKD (2/2 diabetic & hypertensive nephropathy). Has been on HD (M/W/F) since 2021. On admission, Cr 4.5, BUN 45, bicarb 20, & Na 134, (all c/w baseline). No uremia or notable electrolyte derangements. Pt w/ b/l LE lymphedema, requiring 3L NC (both c/w baseline). Diffuse anasarca noted which seemingly is above baseline.   - Nephrology consulted; appreciate recs  - Continued HD for clearance & volume removal   - Epo MWF w/ HD  - Monitor RFP  - Minimize nephrotoxic agents as able & renally-dose meds    Heart failure with preserved ejection fraction  Hx of HFpEF per 2020 TTE. Although respiratory status stable/at baseline, repeat TTE obtained iso acute  illness & diffuse/severe anasarca. TTE (10/30/24) w/ EF 60-65% w/ septal flattening c/w volume overload, biatrial dilation, severe TR, moderate pulmonary HTN (PASP 51mmHg) & CVP 15mmHg. BNP 1459.   - Volume removal via HD  - Deferring lasix as pt anuric  - Low sodium diet w/ 1.2L fluid restriction     Gout  Documented hx of gout (uric acid as high as 16 years ago). Pt endorsing L heel pain on admission; uric acid 8.6. Allopurinol started. XR noted extensive soft tissue swelling (c/w known lymphedema & anasarca) along w/ calcaneal enthesophyte at plantar fascia insertion. Suspect pt's foot pain is more likely 2/2 enthesophyte than acute gout.   - Holding further allopurinol for now    GERD (gastroesophageal reflux disease)  Hx of gastritis per chart review. No EGDs on file. Asymptomatic on admission.   - Continue home PPI at increased BID dose  - PRN Maalox, antiemetics, etc.     Hyperlipidemia associated with type 2 diabetes mellitus  Holding home atorvastatin    Essential hypertension  Hx of essential & renovascular HTN. Low-normotensive on arrival to OSH (/43-87) but otherwise HDS.   - Holding home minoxidil & olmesartan  - Resume meds as needed      VTE Risk Mitigation (From admission, onward)           Ordered     IP VTE HIGH RISK PATIENT  Once         10/30/24 0332     Place sequential compression device  Until discontinued         10/30/24 0051                    Discharge Planning   SHASHA: 11/15/2024     Code Status: Full Code   Is the patient medically ready for discharge?:     Reason for patient still in hospital (select all that apply): Patient new problem  Discharge Plan A: Skilled Nursing Facility   Discharge Delays: None known at this time              Shayne Diaz MD  Department of Hospital Medicine   [facility] - [facility]down Flex ([place]-[facility])

## 2024-11-15 NOTE — PROGRESS NOTES
[facility] NEPHROLOGY [facility]ODIALYSIS NOTE     Patient currently on hemodialysis for removal of uremic toxins and volume.     Patient seen and evaluated on hemodialysis, tolerating treatment, see HD flowsheet for vitals and assessments.    Labs have been reviewed and the dialysate bath has been adjusted.       Assessment/Plan:    -Plan for SNF discharge today   -Patient seen on HD, tolerating treatment well, w/o complaints   -difficulty cannulating arterial today, BFR lowered to 300-350. Would recommend U/S if issues continues  -UF goal of 3L   -Renal diet, if not NPO   -Strict I/O's and daily weights  -Daily renal function panels  -Keep MAP >65 while on HD   -Hgb goal 10-11, hold epo in setting of possible malignancy   -Will continue to follow while inpatient     Evon Bullard DNP-FNP, C  Nephrology  Pager: [phone]

## 2024-11-15 NOTE — ASSESSMENT & PLAN NOTE
Previously unknown. Per Case Management, previously found to be positive at [facility]. Can check Treponema pallidum antibodies. Consult Infectious Disease.     Received call from radiology about abnormal results   Results already were documented by provider     Jacinto Duran RN  02/17/19 1002

## 2024-11-15 NOTE — PROGRESS NOTES
11/15/24 1215   Post-Hemodialysis Assessment   Blood Volume Processed (Liters) 65.5 L   Duration of Treatment 210 minutes   Net Fluid Removal 3000     HD tx completed without issue, blood returned and hemostasis achieved to both cannulation sites. Pt in NAD/VSS. Report called to primary RN.    Back to unit via bed with transport.

## 2024-11-15 NOTE — SUBJECTIVE & OBJECTIVE
Interval History:    Per Case Management, the [place] [facility] told the nursing facility to hold off on admitting her until she starts treatment of syphilis. Chart review shows no results for RPR or FTA-ABS. Will consult ID for recommendations.     Review of Systems   Constitutional:  Negative for chills, diaphoresis and fever.   Respiratory:  Negative for cough, shortness of breath and wheezing.    Cardiovascular:  Positive for leg swelling.   Musculoskeletal:  Positive for gait problem. Negative for joint swelling and neck pain.   Neurological:  Positive for weakness. Negative for dizziness, light-headedness and headaches.     Objective:     Vital Signs (Most Recent):  Temp: 97.6 °F (36.4 °C) (11/15/24 1248)  Pulse: 78 (11/15/24 1324)  Resp: 17 (11/15/24 1324)  BP: (!) 163/87 (11/15/24 1248)  SpO2: 99 % (11/15/24 1324) Vital Signs (24h Range):  Temp:  [97.6 °F (36.4 °C)-98.4 °F (36.9 °C)] 97.6 °F (36.4 °C)  Pulse:  [75-82] 78  Resp:  [16-20] 17  SpO2:  [95 %-99 %] 99 %  BP: (134-189)/(68-99) 163/87     Weight: 130 kg (286 lb 9.6 oz)  Body mass index is 46.26 kg/m².    Intake/Output Summary (Last 24 hours) at 11/15/2024 1447  Last data filed at 11/15/2024 1215  Gross per 24 hour   Intake 720 ml   Output 3500 ml   Net -2780 ml         Physical Exam  Constitutional:       General: She is not in acute distress.     Appearance: She is morbidly obese. She is not toxic-appearing or diaphoretic.   Pulmonary:      Effort: Pulmonary effort is normal. No respiratory distress.      Breath sounds: Normal breath sounds.   Musculoskeletal:      Right lower leg: Edema present.      Left lower leg: Edema present.   Neurological:      Mental Status: She is alert and oriented to person, place, and time. Mental status is at baseline.   Psychiatric:         Attention and Perception: Attention normal.         Behavior: Behavior normal. Behavior is not agitated or aggressive.           Significant Labs: All pertinent labs within the  past 24 hours have been reviewed.    Significant Imaging: I have reviewed all pertinent imaging results/findings within the past 24 hours.

## 2024-11-15 NOTE — PROGRESS NOTES
"Medical Nutrition Therapy     Reason for Assessment: RD follow-up   Dx: Hyperbilirubinemia   Medical Hx: DM, ESRD on HD     General Info: STACEY for HD; Per RN documentation, pt tolerating diet w/ 75% PO intake. Of note, pt scheduled to discharge today. At initial RD assessment, pt appeared nourished w/ no indicators of malnutrition.      Current Diet: Renal w/ 1200 mL FR  % intake of meals: 75%     Ht: 5'6"  Wt: 130kg   BMI: 46.2     Labs: Reviewed  Meds: Reviewed     Overall Physical Appearance: Well nourished     Level of Risk: Low     Nutrition Dx: No nutrition diagnosis at this time.     RD follow-up? Yes     MS Karine, RD, LDN    "

## 2024-11-15 NOTE — PT/OT/SLP PROGRESS
"Occupational Therapy   Treatment    Name: Pilar Fernandez  MRN: [id number]  Admitting Diagnosis:  Acute liver failure       Recommendations:     Discharge Recommendations: High Intensity Therapy  Discharge Equipment Recommendations:  bedside commode, bath bench, wheelchair, lift device, hospital bed (bariatric size)  Barriers to discharge:  Decreased caregiver support  Nursing staff Recommendations: assist of at least 2 for bed mobility and bedlevel ADL care  Assessment:     Pilar Fernandez is a 44 y.o. female with a medical diagnosis of Acute liver failure.  She presents with depressed affect, decreased motivation/effort, but still cooperative to therapist's request to participate in bedlevel therex. Performance deficits affecting function are weakness, impaired joint extensibility, impaired endurance, decreased ROM, impaired muscle length, impaired sensation, decreased upper extremity function, impaired coordination, impaired self care skills, impaired functional mobility, decreased lower extremity function, impaired balance, edema.     Rehab Prognosis:  Good; patient would benefit from acute skilled OT services to address these deficits and reach maximum level of function.       Plan:     Patient to be seen 4 x/week to address the above listed problems via self-care/home management, therapeutic activities, therapeutic exercises, neuromuscular re-education, cognitive retraining, wheelchair management/training  Plan of Care Expires: 11/30/24  Plan of Care Reviewed with: patient    Subjective     Chief Complaint: "I'm stressed"  Patient/Family Comments/goals: to discharge  Pain/Comfort:  Pain Rating 1: 0/10    Objective:     Communicated with: RN prior to session.  Patient found HOB elevated with oxygen, telemetry, Other (comments) (bariatric bed) upon OT entry to room.    General Precautions: Standard, diabetic, fall    Orthopedic Precautions:N/A  Braces: N/A  Respiratory Status: Nasal cannula, flow 2.5 " L/min     Occupational Performance:     Bed Mobility:    Patient completed Rolling/Turning to Left with  total assistance and 3 persons  Patient completed Rolling/Turning to Right with total assistance and 3 persons  Patient completed Scooting/Bridging with total assistance and 4 persons in trendelenburg position.     Activities of Daily Living:  Declined. Earlier Pt was observed to be self feeding.    Therex:  Educated and instructed in participating as much AROM in all extremities as possible. AAROM required for B Ues in all planes within WNL.max cues for pacing and form.   Dependent PROM for B Les in limited ROM 2* bodily habitus and edema swelling.   Applied retrograde massage to B Les for Pt's comfort and to facilitate lymphatic drainage.     [facility] 6 Click ADL: 12    Treatment & Education:  Pt educated on the following topics:  OT 's plan of care and purpose of visit, ADLs, importance of increased activity in hospital setting, bed mobility, and to call for assistance with call button  Understanding was verbalized, however additional teaching warranted.     Patient left supine with all lines intact, call button in reach, and RNs present    GOALS:   Multidisciplinary Problems       Occupational Therapy Goals          Problem: Occupational Therapy    Goal Priority Disciplines Outcome Interventions   Occupational Therapy Goal     OT, PT/OT Progressing    Description: Goals to be met by: 11/30/24     Patient will increase functional independence with ADLs by performing:    UE Dressing with Set-up Assistance.  LE Dressing with Moderate Assistance.  Grooming while seated with Set-up Assistance.  Toileting from bedside commode with Moderate Assistance for hygiene and clothing management.   Supine to sit with Contact Guard Assistance.  Stand pivot transfers with Maximum Assistance.  Toilet transfer to bedside commode with Maximum Assistance.    DME needs:  Pt requires bariatric size for safety and support.    Tub  transfer bench for t/s combo to ensure pt return to hygiene and allow safe and effective bathing in home environment with minimized fall risk due to functional mobility impairments.      Patient has a mobility limitation that significantly impairs their ability to participate in one or more mobility related activities of daily living, including toileting. This deficit can be resolved by using a bedside commode. Patient demonstrates mobility limitations that will cause them to be confined to one room at home without bathroom access for up to 30 days. Using a bedside commode will greatly improve the patient's ability to participate in MRADLs.     Hospital bed- Patient requires a hospital bed for positioning of the body in ways that are not feasible with an ordinary bed. The patient requires special positioning for pain relief, limited mobility, and/or being unable to independently make changes in body position without the use of a hospital bed. Pillows and wedges will not be adequate for resolving these positional issues.     W/c - Patient has a mobility limitation that significantly impairs their ability to participate in one or more mobility related activities of daily living in customary locations in the home. The mobility limitation cannot be sufficiently resolved by the use of a cane or walker. The use of a manual wheelchair will greatly improve the patient's ability to participate in MRADLs. The patient will use the wheelchair on a regular basis at home. They have expressed their willingness to use a manual wheelchair in the home, and have a caregiver who is available and willing to assist with the wheelchair if needed.                           Time Tracking:     OT Date of Treatment: 11/15/24  OT Start Time: 1540  OT Stop Time: 1611  OT Total Time (min): 31 min    Billable Minutes:Therapeutic Activity 15  Therapeutic Exercise 16    OT/MIKE: OT     Number of MIKE visits since last OT visit: 1    11/15/2024

## 2024-11-15 NOTE — CHAPLAIN
Palliative Care        Patient: Pilar Fernandez  MRN: [id number]  : 1980  Age: 44 y.o.  Hospital Length of Stay: 17  Code Status: Code Status Discussion Note  Attending Provider: Shayne Diaz MD  Principal Problem: Acute liver failure       Attempted to visit pallmed patient again today, but she was off the unit for dialysis.  Palliative  will [place] back later.               To [facility]:   Educated the patient/family regarding the services offered by the Spiritual Care team in my absence as the specialized Palliative Care  ([id number])  My hours are 7:30a-4:00p M-F, but Spiritual Care Chaplains are available .  I'm usually the first point of contact for palliative care patients, but any  is able to help.  Evening, overnight and weekends, please dial [phone] which is carried by an in-house Spiritual Care  at all hours.         Rev. Tereza Hernandes MDiv, [facility]  Board Certified Palliative Care   Palliative Medicine Department, 9th Floor [facility] [place]     My SpectraLink: [email or web address]   Office: [phone]  ** If you call and I don't answer, please leave a voicemail because vmail is forwarded to me  Email: [email or web address]  Work hours: M-F [phone]     The care I provide is shaped by the Code of Ethics of the [employer] Cha[employer]

## 2024-11-15 NOTE — NURSING
"Patient remains in stable condition, Restless night patient refused PRN trazodone for sleep and also refused PRN oxy 5mg for c/o generalized pain. Requested to be reposition hourly.Had small incontinent stool, sacrum area applied moderate barrier cream to excoriated areas and wounds, patient is on air mattress. O2 3L N/C remains in progress, resp are even and unlabored. CBG WNL skin warm and dry. Possible d/c to skilled facility today 11/15/24 patient noted with anxiety stated she was "scared". All safety measures remain in place.   "

## 2024-11-15 NOTE — PLAN OF CARE
Problem: Adult Inpatient Plan of Care  Goal: Plan of Care Review  Outcome: Progressing  Goal: Patient-Specific Goal (Individualized)  Outcome: Progressing  Goal: Absence of Hospital-Acquired Illness or Injury  Outcome: Progressing  Goal: Optimal Comfort and Wellbeing  Outcome: Progressing  Goal: Readiness for Transition of Care  Outcome: Progressing     Problem: Diabetes Comorbidity  Goal: Blood Glucose Level Within Targeted Range  Outcome: Progressing     Problem: Acute Kidney Injury/Impairment  Goal: Fluid and Electrolyte Balance  Outcome: Progressing  Goal: Improved Oral Intake  Outcome: Progressing  Goal: Effective Renal Function  Outcome: Progressing     Problem: Wound  Goal: Optimal Coping  Outcome: Progressing  Goal: Optimal Functional Ability  Outcome: Progressing  Goal: Absence of Infection Signs and Symptoms  Outcome: Progressing  Goal: Improved Oral Intake  Outcome: Progressing  Goal: Optimal Pain Control and Function  Outcome: Progressing  Goal: Skin Health and Integrity  Outcome: Progressing  Goal: Optimal Wound Healing  Outcome: Progressing     Problem: Skin Injury Risk Increased  Goal: Skin Health and Integrity  Outcome: Progressing     Problem: Gastrointestinal Bleeding  Goal: Optimal Coping with Acute Illness  Outcome: Progressing  Goal: Hemostasis  Outcome: Progressing     Problem: Bariatric Environmental Safety  Goal: Safety Maintained with Care  Outcome: Progressing     Problem: Hemodialysis  Goal: Safe, Effective Therapy Delivery  Outcome: Progressing  Goal: Effective Tissue Perfusion  Outcome: Progressing  Goal: Absence of Infection Signs and Symptoms  Outcome: Progressing     Problem: Chronic Kidney Disease  Goal: Electrolyte Balance  Outcome: Progressing  Goal: Fluid Balance  Outcome: Progressing     Problem: Fall Injury Risk  Goal: Absence of Fall and Fall-Related Injury  Outcome: Progressing     Problem: Coping Ineffective  Goal: Effective Coping  Outcome: Progressing

## 2024-11-15 NOTE — DISCHARGE SUMMARY
[facility] - Stepdown [facility] ([place] [address])  [facility] Medicine  Discharge Summary      Patient Name: Pilar Fernandez  MRN: [id number]  [facility]: [id number]  Patient Class: IP- Inpatient  Admission Date: 10/29/2024  Hospital Length of Stay: 17 days  Discharge Date and Time: 2024  1:02 AM  Attending Physician: Shayne Diaz MD   Discharging Provider: Shayne Diaz MD  Primary Care Provider: Lindsey Singletary [facility] Medicine Team: [facility] HOSP MED B Shayne Diaz MD  Primary Care Team: [facility] HOSP MED B    HPI:   Pilar Fernandez is a 44 year old Black woman with hypertension, hyperlipidemia, diabetes mellitus type 2 (treated with insulin), end stage renal disease on hemodialysis (), heart failure with preserved ejection fraction, chronic hypoxic respiratory failure on supplemental oxygen (3 liters/minute), history of right middle cerebral artery stroke on 2024, peripheral vascular disease, chronic lymphedema, hypothyroidism, anemia with history of blood transfusion, gout, gastroesophageal reflux disease, chronic constipation, sacral decubitus ulcer, history of  sections, history of tubal ligation, history of cholecystectomy. She lives in [place], [place].               She presented to [facility] Emergency Department on 10/29/2024 with gastrointestinal bleeding, symptomatic anemia, jaundice, hyperbilirubinemia. She reported being hospitalized at [facility] recently for leg cellulitis and being told she had hyperbilirubinemia and was advised to follow up with Hepatology at [facility] after being discharged on 10/26/2024. She had several wounds on her sacrum and legs. She had extremely swollen legs. Stool was positive for occult blood. She had leukocytosis. Hemoglobin was 6.4 g/dL from baseline around 10. INR was elevated at 2.1. she was given 2 units of fresh frozen plasma and 2 units of packed red blood cells.  Abdomen CT without contrast showed no obvious etiology of liver abnormalities or leukocytosis. She was transferred to [facility] for further evaluation of her hyperbilirubinemia. She was admitted to Hospital Medicine Team B.    Hospital Course:   She was put on ceftriaxone. Hepatology and Advanced Endoscopy Service were consulted for hyperbilirubinemia. Gastroenterology was consulted for gastrointestinal bleed. Hematology-Oncology was consulted for anemia with hyperbilirubinemia and positive Britt test. CT showed diffuse anasarca, pericardial effusion, enlarged lymph nodes vs soft tissue densities, hepatosplenomegaly. Hepatology recommended liver biopsy. Gastroenterology recommended outpatient endoscopy, after she is medically optimized, due to lack of overt bleeding. Hematology-Oncology recommended breast ultrasound. Leukocytosis persisted. Sed rate was normal. CRP was elevated. Midodrine was started for post-dialysis hypotension. She developed hepatic encephalopathy with ammonia of 111 umol/L. She was transfused another unit of packed red blood cells in preparation for liver biopsy. She required vitamin K for elevated INR. She desired to sit at the side of the bed but was unable to on her own. Physical and Occupational Therapy were consulted and recommended high intensity therapy. Midodrine was stopped as blood pressures increased to normal. Her home hypertension medications continued to be held. 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 MRCP showed no obstruction or stricture but was limited by body habitus. Hepatology recommended ursodiol. Upon review of imaging, left axilla ultrasound was recommended instead of breast ultrasound as her anasarca would make interpretation of a breast ultrasound difficult. Axilla ultrasound showed abnormal axillary  adenopathy with at least 6 abnormal lymph nodes (BI-RADS 4). She underwent left axilla lymph node biopsy on 11/11/2024. Hematology-Oncology reported that she would not currently for inpatient chemotherapy as she had no bulky lymphadenopathy or obstruction or compression of organs due to lymphadenopathy. Additionally, her persistent hyperbilirubinemia excluded her from several chemotherapy regimens, and her heart failure and end stage renal disease exclude her from nearly all other remaining regimens. She awaited skilled nursing facility and was accepted on 11/15/2024. However, syphilis test was incidentally positive per the [place] [employer], so the nursing facility could not accept her before she started treatement. It was found that she had been found to have syphilis at another hospital previously. She was given the 1st dose of intramuscular penicillin G and was accepted to the nursing facility where she will get the 2nd and 3rd doses.     Goals of Care Treatment Preferences:  Code Status: Full Code    Health care agent: Roberto Sharma (s.o.)  Health care agent number: [phone]          What is most important right now is to focus on remaining as independent as possible, symptom/pain control, extending life as long as possible, even it it means sacrificing quality.  Accordingly, we have decided that the best plan to meet the patient's goals includes continuing with treatment.      SDOH Screening:  The patient was screened for utility difficulties, food insecurity, transport difficulties, housing insecurity, and interpersonal safety and there were no concerns identified this admission.     Consults:   Consults (From admission, onward)          Status Ordering Provider     Inpatient consult to Palliative Care  Once        Provider:  (Not yet assigned)    Completed LIANNA NEGRO     Inpatient consult to Interventional Radiology  Once        Provider:  (Not yet assigned)    Completed MARKY  LIANNA FUENTES     Inpatient consult to Midline team  Once        Provider:  (Not yet assigned)    Completed CHIOMA CHRISTENSEN     Inpatient consult to Pediatric Physical Medicine Rehab  Once        Provider:  (Not yet assigned)    Acknowledged CHIOMA CHRISTENSEN     Inpatient consult to Interventional Radiology  Once        Provider:  (Not yet assigned)    Completed CHIOMA CHRISTENSEN     Inpatient consult to Hematology/Oncology  Once        Provider:  (Not yet assigned)    Completed LIANNA NEGRO     Inpatient consult to Hepatology  Once        Provider:  (Not yet assigned)    Completed LIANNA NEGRO     Inpatient consult to Gastroenterology  Once        Provider:  (Not yet assigned)    Completed MIGUEL JEFF     Inpatient consult to Nephrology  Once        Provider:  (Not yet assigned)    Completed MIGUEL JEFF            No new Assessment & Plan notes have been filed under this hospital service since the last note was generated.  Service: Hospital Medicine    Final Active Diagnoses:    Diagnosis Date Noted POA    PRINCIPAL PROBLEM:  Acute liver failure [K72.00] 11/09/2024 Yes    Syphilis [A53.9] 11/15/2024 Yes    Anasarca [R60.1] 11/11/2024 Unknown    Debility [R53.81] 11/11/2024 Unknown    Dyspnea [R06.00] 11/11/2024 Unknown    Palliative care encounter [Z51.5] 11/11/2024 Not Applicable    History of cerebrovascular accident (CVA) with residual deficit [I69.30] 11/09/2024 Not Applicable     Chronic    Chronic hypoxic respiratory failure [J96.11] 11/09/2024 Yes    Morbid obesity [E66.01] 11/09/2024 Yes    Diffuse lymphadenopathy [R59.1] 11/09/2024 Yes    Acute anemia [D64.9] 10/30/2024 Yes    Coagulopathy [D68.9] 10/30/2024 Yes    Direct hyperbilirubinemia [E80.6] 10/30/2024 Yes    Painless jaundice [R17] 10/30/2024 Yes    Type 2 diabetes mellitus, with long-term current use of insulin [E11.9, Z79.4] 10/30/2024 Not Applicable     Chronic    Leukocytosis [D72.829] 10/30/2024 Yes    Sacral decubitus ulcer, stage II  [L89.152] 10/30/2024 Yes    Impaired mobility [Z74.09] 10/30/2024 Yes    Mass of breast [N63.0] 12/30/2022 Yes    ESRD (end stage renal disease) on dialysis [N18.6, Z99.2] 10/06/2020 Not Applicable     Chronic    Heart failure with preserved ejection fraction [I50.30] 10/06/2020 Yes     Chronic    Lymphedema [I89.0] 2020 Yes     Chronic    Gout [M10.9] 07/26/2019 Yes     Chronic    GERD (gastroesophageal reflux disease) [K21.9] 03/17/2016 Yes     Chronic    Essential hypertension [I10] 01/25/2016 Yes     Chronic      Problems Resolved During this Admission:    Diagnosis Date Noted Date Resolved POA    Vaginal candidiasis [B37.31] 11/14/2024 11/14/2024 No    Hepatic encephalopathy [K76.82] 11/01/2024 11/08/2024 Yes    ACP (advance care planning) [Z71.89] 10/30/2024 10/31/2024 Not Applicable       Discharged Condition: good    Disposition: Skilled Nursing Facility    Follow Up:   Follow-up Information       [facility] up.    Contact information:  [phone]             [facility] Follow up.    Contact information:  [address] [place] [address]  [phone]             [facility] Follow up.    Contact information:  [address],   [place] [address]  [phone]             [facility] Follow up.    Contact information:  [address] [place] [address]  [phone]                         Patient Instructions:      Ambulatory Referral/Consult to Suspected Oncology Diagnosis Clinic   Standing Status: Future   Referral Priority: Routine Referral Type: Consultation   Referral Reason: Specialty Services Required   Requested Specialty: Hematology and Oncology   Number of Visits Requested: 1     Diet renal     Change dressing (specify)   Order Comments: Sacral / Buttocks: BID / PRN     Apply Triad correctly:      Always cleanse wound before applying Triad.  To remove Triad:   Use pH-balanced wound cleanser to soften Triad  Gently wipe without  scrubbing  For complete removal, repeat as needed.     Gently spread Triad evenly over the area of application to the thickness of a dime.    Under abdominal fold / under bilateral breast folds: Intertrigo   Apply Apply interdry(at bedside)  cloth to skin folds q 5 days or prn dislodgement or soiling.                                                    - Before applying Interdry, make sure inner skin folds are free of any cream or ointment.   - Place one side of interdry sheet to  inner skin fold and leave 2 inches of cloth past skin fold for moisture to escape.   - Make sure interdry is not bunched together or overlaping in the inner skin fold.    - Make sure exposed cloth (past skin fold) is not on moist or weeping skin. .     Notify your health care provider if you experience any of the following:  increased confusion or weakness     Activity as tolerated       Significant Diagnostic Studies:   US Liver with Doppler 10/30/24: FINDINGS:   Liver: Enlarged measuring 25.9 cm. Homogeneous echotexture. No focal hepatic lesions.   Gallbladder: The gallbladder is surgically absent.   Biliary system: The common duct is not dilated, measuring 4 mm.  No intrahepatic ductal dilatation.   Spleen: Enlarged with a homogeneous echotexture, measuring 16.5 x 6.0 cm.   Pancreas: The visualized portions of pancreas appear normal.   Miscellaneous: No upper abdominal ascites.  Left pleural effusion noted.   Inferior vena cava: Normal in appearance.   Main hepatic artery: Patent with normal waveforms.   Left, right anterior, right posterior hepatic arteries: Patent with normal waveforms.   Left, middle, and right hepatic veins: Patent.   Main portal vein: Patent with proper directional flow.   Left portal vein:  Patent with proper directional flow.   Anterior right portal vein:  Patent with proper directional flow.   Posterior right portal vein: Patent with proper directional flow.   Splenic vein: Patent with proper directional flow at  midline and the splenic hilum.   SMV:  Patent with proper directional flow.   Impression:  Satisfactory liver Doppler evaluation.   Marked hepatosplenomegaly.   CT Chest Abdomen Pelvis With IV Contrast (XPD) Routine Oral Contrast 10/30/24: FINDINGS:   Structures at the base of the neck are unremarkable.   Peripherally calcified right thyroid nodule measuring 6 mm.   Numerous enlarged soft tissue densities about the left axillary soft tissues, for example measuring approximately 2.9 x 2.4 cm (series 2, image 39).  Additional enlarged left axillary lymph nodes, for example a left axillary lymph node measuring 2.0 cm (series 2, image 36).  Enlarged left supraclavicular lymph node measuring 1.5 cm (series 2, image 3)   Evaluation is limited secondary to diffuse body wall anasarca throughout the thoracic soft tissues.   The heart is enlarged in size with large volume pericardial effusion.  Coronary artery calcification in a multi vessel distribution.   Pulmonary vasculature is limited in evaluation without evidence for pulmonary artery embolism or abnormality of the pulmonary veins.   Prominent right paratracheal lymph node measuring 1.0 cm (series 2, image 23).   Airways are patent.  Lungs are symmetrically expanded.  Prominence of the pulmonary vasculature, scattered interlobular septal thickening, and diffuse ground-glass attenuation throughout both lungs.  Small left pleural effusion with associated compressive atelectasis.   The liver is enlarged in size measuring 27.5 cm, demonstrating heterogeneous attenuation.  Few scattered subcentimeter hepatic hypodensities too small for characterization.   Gallbladder is surgically absent.  No intrahepatic or extrahepatic ductal dilatation.   Spleen is enlarged in size measuring 17.3 cm.  No definite focal splenic lesions.  Few splenic calcifications.   Adrenal glands are unremarkable.  2.1 cm cystic focus about the uncinate process, similar from comparison imaging.   Kidneys  are normal size and location.  No hydronephrosis or nephrolithiasis.  Right subcentimeter renal hypodensity too small for characterization.  Ureters are normal in course and caliber.  Urinary bladder is unremarkable.   Uterus demonstrates no significant abnormality.  Intrauterine device is visualized within the endometrial cavity.   Stomach and duodenum are unremarkable.  This of small bowel demonstrate no evidence for obstructive or inflammatory process.  Appendix not well visualized.  Liquid and aerated stool throughout the visualized colon and rectum.   Scattered mesenteric edema.  Layering fluid in the pelvis.  No free intra-abdominal air.  Multiple enlarged retroperitoneal lymph nodes, for example a left periaortic lymph node measuring 1.7 cm   Moderate calcific atherosclerosis of the visualized aorta.   Diffuse body wall anasarca throughout the visualized abdomen and pelvis.  Enlarged left external iliac lymph node measuring 1.9 cm (series 3, image 168).  Multiple enlarged left greater than right inguinal lymph nodes, for example measuring approximately 2.5 cm (series 3, image 209).  Additional soft tissue density about the proximal left thigh, partially visualized (series 3, image 218).   Osseous structures demonstrate no evidence for acute fracture or osseous destructive lesion.   Impression:  Diffuse body wall anasarca throughout the visualized chest, abdomen, pelvis, and proximal thighs.   Numerous enlarged lymph node conglomerates and/or soft tissue masses about the left axillary soft tissues, left greater than right inguinal regions, left supraclavicular soft tissues, and retroperitoneum as above.  Findings remain nonspecific, however remain concerning for neoplastic or lymphoproliferative process.  Consider further evaluation with tissue sampling, noting left axillary soft tissue lesion proximity to the skin surface (for example series 2, image 39).   Large volume pericardial effusion.  Correlation is  advised   Cardiomegaly with pulmonary vascular congestion, interlobular septal thickening, and scattered ground-glass attenuation, in keeping with evolving pulmonary edema.   Small left pleural effusion with associated compressive atelectasis.   Diffuse hepatosplenomegaly.  No obvious focal hepatic lesions, noting limitations from diffuse body wall anasarca.   Age advanced coronary artery and aortic atherosclerosis.   X-Ray Calcaneus 2 View Left 10/30/24: FINDINGS:   Visualized osseous structures appear intact, with no definite evidence of recent or healing fracture, lytic destructive process, or other significant abnormality identified.  A large plantar calcaneal spur is again noted.  Arterial vascular calcification in the soft tissues about the ankle/proximal foot is incidentally seen, as are extremely abundant soft tissues.   IR Transjugular Liver Biopsy 11/06/24: Transjugular liver biopsy with 3 core biopsy samples obtained.   The corrected sinusoidal pressure (wedged hepatic vein pressure minus free hepatic vein pressure) is 1 mmHg.  Gradient may be artificially lower due to the presence of veno-venous collaterals throughout the liver.   Elevated central venous pressures as above.  Right atrial pressure measures 18 mm Hg.   Venography as above shows no significant stenosis but significant veno-venous collaterals throughout the liver.   US Soft Tissue Axilla, Left 11/08/24: FINDINGS:   There is left axillary adenopathy with at least 6 abnormal lymph nodes.  These lymph nodes are enlarged with cortical thickening and loss of fatty hilum.  For example 1 abnormal left axillary lymph node measures 7.1 x 1.9 x 5.6 cm, and a 2nd abnormal lymph node measures 5.0 x 1.9 x 4.9 cm.  These abnormal lymph nodes correspond to the left axillary adenopathy visible by recent CT chest October 30, 2024.   Impression:  Abnormal left axillary adenopathy with at least 6 abnormal lymph nodes.  These correspond to the adenopathy  visible by CT chest October 30, 2024.  BI-RADS 4: Suspicious.  Recommend percutaneous or surgical biopsy, as clinically indicated.  Differential considerations include metastatic disease and lymphoma, and samples should be sent for both pathology and flow cytometry.   MRI MRCP Abdomen WO Contrast 3D WO Independent WS XPD 11/09/24: FINDINGS:   Exam quality is significantly degraded by patient body habitus.  MRCP images are nondiagnostic.   Small left pleural effusion.  Large pericardial effusion, similar to prior CT.   Liver is enlarged, measuring 27.0 cm.  The gallbladder is surgically absent.  The spleen is enlarged measuring 16.8 cm.  Previously noted cystic pancreatic lesion is not well visualized.   No aneurysm.   Diffuse subcutaneous edema.   Impression:  Significantly limited examination.   Hepatosplenomegaly.   IR Biopsy Lymph Node 11/11/24: Image-guided biopsy of left axillary lymphadenopathy.    Medications:  Reconciled Home Medications:      Medication List        START taking these medications      miconazole 2 % cream  Commonly known as: MICOTIN  Apply topically 2 (two) times daily. Between thighs, under abdomen pannus, under breasts     penicillin G benzathine 1,200,000 unit/2 mL Syrg  Commonly known as: BICILLIN LA  Inject 4 mLs (2.4 Million Units total) into the muscle once a week. On 11/22/2024 and 11/29/2024 for 2 doses  Start taking on: November 22, 2024     ursodioL 300 mg capsule  Commonly known as: ACTIGALL  Take 1 capsule (300 mg total) by mouth 3 (three) times daily.            CHANGE how you take these medications      lactulose 10 gram/15 mL solution  Commonly known as: CHRONULAC  Take 30 mLs (20 g total) by mouth 2 (two) times daily.  What changed:   how much to take  when to take this            CONTINUE taking these medications      aspirin 81 MG EC tablet  Commonly known as: ECOTRIN  Take 1 tablet (81 mg total) by mouth once daily.     atorvastatin 40 MG tablet  Commonly known as:  LIPITOR  Take 1 tablet by mouth once daily     clopidogreL 75 mg tablet  Commonly known as: PLAVIX  Take 1 tablet (75 mg total) by mouth once daily.     famotidine 20 MG tablet  Commonly known as: PEPCID  Take 1 tablet (20 mg total) by mouth 2 (two) times daily as needed for Heartburn.     insulin aspart U-100 100 unit/mL (3 mL) Inpn pen  Commonly known as: NovoLOG Flexpen U-100 Insulin  Inject 10 Units into the skin 2 (two) times daily with meals.     linaGLIPtin 5 mg Tab tablet  Commonly known as: TRADJENTA  TAKE 1 TABLET (5 MG TOTAL) BY MOUTH ONCE DAILY.     nebivoloL 20 mg Tab  Commonly known as: BYSTOLIC  Take 1 tablet by mouth once daily.     ondansetron 4 MG Tbdl  Commonly known as: ZOFRAN-ODT  Take 1 tablet (4 mg total) by mouth every 8 (eight) hours as needed ([place]).     pantoprazole 40 MG tablet  Commonly known as: PROTONIX  Take 1 tablet (40 mg total) by mouth once daily.     traZODone 100 MG tablet  Commonly known as: DESYREL  Take 100 mg by mouth nightly as needed.            STOP taking these medications      amLODIPine 10 MG tablet  Commonly known as: NORVASC     EUTHYROX 50 mcg tablet  Generic drug: levothyroxine     HYDROcodone-acetaminophen 5-325 mg per tablet  Commonly known as: NORCO     LANTUS U-100 INSULIN 100 unit/mL injection  Generic drug: insulin glargine U-100 (Lantus)     minoxidiL 10 MG Tab  Commonly known as: LONITEN     NIFEdipine 60 MG (OSM) 24 hr tablet  Commonly known as: PROCARDIA-XL     polyethylene glycol 17 gram/dose powder  Commonly known as: GLYCOLAX     senna-docusate 8.6-50 mg 8.6-50 mg per tablet  Commonly known as: SENNA WITH DOCUSATE SODIUM     sorbitoL 70 % solution            ASK your doctor about these medications      colchicine 0.6 mg tablet  Commonly known as: COLCRYS  TAKE 1 TABLET BY MOUTH ONCE DAILY AS NEEDED FOR GOUT PAIN              Indwelling Lines/Drains at time of discharge:   Lines/Drains/Airways       Drain  Duration                  Hemodialysis AV  Fistula Left upper arm -- days         Hemodialysis AV Fistula 08/05/19 Left forearm 1929 days                    Time spent on the discharge of patient: 35 minutes         Shayne Diaz MD  Department of Hospital Medicine  [facility] - Stepdown Flex ([place]

## 2024-11-15 NOTE — PT/OT/SLP PROGRESS
Occupational Therapy   Treatment    Name: Pilar Frenandez  MRN: [id number]  Admitting Diagnosis:  Acute liver failure       Recommendations:     Discharge Recommendations: High Intensity Therapy  Discharge Equipment Recommendations:  bedside commode, bath bench, wheelchair, lift device, hospital bed  Barriers to discharge:  Decreased caregiver support, Other (Comment) (patient requires increased level of assistance)    Assessment:     Pilar Fernandez is a 44 y.o. female with a medical diagnosis of Acute liver failure.  She presents with the fallowing performance deficits affecting function are weakness, impaired endurance, impaired self care skills, impaired functional mobility, gait instability, impaired balance, pain, impaired cardiopulmonary response to activity, decreased safety awareness, decreased lower extremity function, edema, decreased upper extremity function, impaired skin. Patient agreeable to tx session, patient is demonstrating progress with static sitting balance and tolerance at EOB. However; patient continues to have limited activity tolerance due to pain and weakness from recent hospitalization. Patient requires significant assistance with functional bed mobility, transfer, and ADLs. Today's session was extensive due bed not working properly and attempted to fix it in order to mobilize patient safely. Patient would benefit from High intensity therapy intervention to address over all functional decline with mobility task, endurance, and ADLs in order to return to PLOF.     Rehab Prognosis:  Good; patient would benefit from acute skilled OT services to address these deficits and reach maximum level of function.       Plan:     Patient to be seen 4 x/week to address the above listed problems via self-care/home management, therapeutic activities, therapeutic exercises, neuromuscular re-education  Plan of Care Expires: 11/30/24  Plan of Care Reviewed with: patient    Subjective     Chief  "Complaint: " I want to sit up, I am tired of being in bed"  Patient/Family Comments/goals: to return to PLOF  Pain/Comfort:  Pain Rating 1: 0/10    Objective:     Communicated with: Nurse prior to session.  Patient found HOB elevated with telemetry, oxygen upon OT entry to room.  Co-treated with PT due to patient complexity deficits requiring two skilled therapist to appropriately and safely mobilized patient while facilitating functional task in addition to accommodating for patient's activity tolerance.    General Precautions: Standard, diabetic, fall    Orthopedic Precautions:N/A  Braces: N/A  Respiratory Status: Nasal cannula, flow 3 L/min     Occupational Performance:     Bed Mobility:    Patient completed Rolling/Turning to Left with  maximal assistance of 2 persons   Patient completed Rolling/Turning to Right with maximal assistance of 2 persons   Patient completed Scooting to EOB with maximal assistance of 2 persons   Patient completed Scooting to HOB with total assistance of 2 persons via drawsheet   Patient completed Supine to Sit with maximal assistance of 2 persons   Patient completed Sit to Supine with total assistance of 2 persons   Patient was able to sit up at EOB for ~20 mins with SBA for static sitting balance     Activities of Daily Living:  Bathing: maximal assistance to sponge bath UB body and back   Upper Body Dressing: moderate assistance to min to don/doff gown   Lower Body Dressing: total assistance to don socks       AMPAC 6 Click ADL: 12    Treatment & Education:  Discussed OT POC and progress  Educated patient on the importance to continue to perform exercises in order to reduce stiffness and promote joint mobility and blood flow  Addressed patient's questions and concerns within [facility] scope of practice      Patient left HOB elevated with all lines intact, call button in reach, nurse notified, and SW present    GOALS:   Multidisciplinary Problems       Occupational Therapy Goals          " Problem: Occupational Therapy    Goal Priority Disciplines Outcome Interventions   Occupational Therapy Goal     OT, PT/OT Progressing    Description: Goals to be met by: 11/30/24     Patient will increase functional independence with ADLs by performing:    UE Dressing with Set-up Assistance.  LE Dressing with Moderate Assistance.  Grooming while seated with Set-up Assistance.  Toileting from bedside commode with Moderate Assistance for hygiene and clothing management.   Supine to sit with Contact Guard Assistance.  Stand pivot transfers with Maximum Assistance.  Toilet transfer to bedside commode with Maximum Assistance.    DME needs:   Tub transfer bench for t/s combo to ensure pt return to hygiene and allow safe and effective bathing in home environment with minimized fall risk due to functional mobility impairments.      Patient has a mobility limitation that significantly impairs their ability to participate in one or more mobility related activities of daily living, including toileting. This deficit can be resolved by using a bedside commode. Patient demonstrates mobility limitations that will cause them to be confined to one room at home without bathroom access for up to 30 days. Using a bedside commode will greatly improve the patient's ability to participate in MRADLs.                          Time Tracking:     OT Date of Treatment: 11/14/24  OT Start Time: 1425  OT Stop Time: 1514  OT Total Time (min): 49 min    Billable Minutes:Self Care/Home Management 29  Therapeutic Activity 20    OT/MIKE: MIKE     Number of MIKE visits since last OT visit: 1 11/14/2024

## 2024-11-16 VITALS
SYSTOLIC BLOOD PRESSURE: 148 MMHG | TEMPERATURE: 98 F | OXYGEN SATURATION: 98 % | DIASTOLIC BLOOD PRESSURE: 77 MMHG | WEIGHT: 286.63 LBS | HEIGHT: 66 IN | BODY MASS INDEX: 46.06 KG/M2 | RESPIRATION RATE: 20 BRPM | HEART RATE: 86 BPM

## 2024-11-16 PROCEDURE — 25000003 PHARM REV CODE 250: Performed by: HOSPITALIST

## 2024-11-16 RX ADMIN — DIPHENHYDRAMINE HYDROCHLORIDE 50 MG: 50 CAPSULE ORAL at 12:11

## 2024-11-16 NOTE — NURSING
Patient left in stable condition @0102 via [facility] via stretcher with all belongings. Skin warm dry resp even unlabored no distress noted no complaints reported.

## 2024-11-18 ENCOUNTER — HOSPITAL ENCOUNTER (EMERGENCY)
Facility: HOSPITAL | Age: 44
End: 2024-11-18
Attending: EMERGENCY MEDICINE
Payer: MEDICARE

## 2024-11-18 VITALS
TEMPERATURE: 99 F | OXYGEN SATURATION: 99 % | BODY MASS INDEX: 41.45 KG/M2 | SYSTOLIC BLOOD PRESSURE: 139 MMHG | RESPIRATION RATE: 16 BRPM | DIASTOLIC BLOOD PRESSURE: 79 MMHG | WEIGHT: 257.94 LBS | HEART RATE: 81 BPM | HEIGHT: 66 IN

## 2024-11-18 DIAGNOSIS — Z99.2 ESRD (END STAGE RENAL DISEASE) ON DIALYSIS: Primary | ICD-10-CM

## 2024-11-18 DIAGNOSIS — N18.6 ESRD (END STAGE RENAL DISEASE) ON DIALYSIS: Primary | ICD-10-CM

## 2024-11-18 DIAGNOSIS — G89.29 OTHER CHRONIC PAIN: ICD-10-CM

## 2024-11-18 LAB
ALBUMIN SERPL BCP-MCNC: 1.5 G/DL (ref 3.5–5.2)
ALP SERPL-CCNC: 542 U/L (ref 55–135)
ALT SERPL W/O P-5'-P-CCNC: 52 U/L (ref 10–44)
ANION GAP SERPL CALC-SCNC: 9 MMOL/L (ref 3–11)
AST SERPL-CCNC: 167 U/L (ref 10–40)
BILIRUB SERPL-MCNC: 16.9 MG/DL (ref 0.1–1)
BUN SERPL-MCNC: 38 MG/DL (ref 6–20)
CALCIUM SERPL-MCNC: 8.6 MG/DL (ref 8.7–10.5)
CHLORIDE SERPL-SCNC: 93 MMOL/L (ref 95–110)
CO2 SERPL-SCNC: 27 MMOL/L (ref 23–29)
CREAT SERPL-MCNC: 4.7 MG/DL (ref 0.5–1.4)
EST. GFR  (NO RACE VARIABLE): 11.1 ML/MIN/1.73 M^2
GLUCOSE SERPL-MCNC: 77 MG/DL (ref 70–110)
PHOSPHATE SERPL-MCNC: 4.1 MG/DL (ref 2.7–4.5)
POTASSIUM SERPL-SCNC: 4.1 MMOL/L (ref 3.5–5.1)
PROT SERPL-MCNC: 7 G/DL (ref 6–8.4)
SODIUM SERPL-SCNC: 129 MMOL/L (ref 136–145)

## 2024-11-18 PROCEDURE — 99283 EMERGENCY DEPT VISIT LOW MDM: CPT

## 2024-11-18 PROCEDURE — 84100 ASSAY OF PHOSPHORUS: CPT | Mod: 91 | Performed by: EMERGENCY MEDICINE

## 2024-11-18 PROCEDURE — 36415 COLL VENOUS BLD VENIPUNCTURE: CPT | Performed by: EMERGENCY MEDICINE

## 2024-11-18 PROCEDURE — 80053 COMPREHEN METABOLIC PANEL: CPT | Mod: 91 | Performed by: EMERGENCY MEDICINE

## 2024-11-18 PROCEDURE — 25000003 PHARM REV CODE 250: Performed by: EMERGENCY MEDICINE

## 2024-11-18 RX ORDER — OXYCODONE HYDROCHLORIDE 5 MG/1
5 TABLET ORAL
Status: COMPLETED | OUTPATIENT
Start: 2024-11-18 | End: 2024-11-18

## 2024-11-18 RX ADMIN — OXYCODONE HYDROCHLORIDE 5 MG: 5 TABLET ORAL at 05:11

## 2024-11-18 NOTE — ED NOTES
Pt is a dialysis patient with an AV fistula to L forearm and is not allowed to have any sticks to L arm.

## 2024-11-18 NOTE — ED PROVIDER NOTES
Encounter Date: 2024       History     Chief Complaint   Patient presents with    Abnormal Lab     Patient to ER from dialysis for low K and phosphorus      45 yo female with ESRD here from HD with report of critically low K and Phos on labs today. Patient reports chronic leg pain. No new features.       Review of patient's allergies indicates:   Allergen Reactions    Tramadol Itching and Nausea And Vomiting     Past Medical History:   Diagnosis Date    Anemia     Anemia     Breast mass     Chronic constipation     CKD (chronic kidney disease)     Diabetes mellitus     Dialysis patient     MON, TUE, THURS, SAT.    Embolic stroke involving right middle cerebral artery 2024    Encounter for blood transfusion     Erosive gastritis 2016    GERD (gastroesophageal reflux disease)     Gout     High cholesterol     Hypertension     Knee pain     Thyroid disease     Unspecified cataract 2022     Past Surgical History:   Procedure Laterality Date    AV FISTULA PLACEMENT Left 2019    Procedure: creation av fistula;  Surgeon: Buck Fernandez MD;  Location: [facility];  Service: Cardiovascular;  Laterality: Left;  Left Radialcephalic: PACU     SECTION  , , ,     CHOLECYSTECTOMY      COLONOSCOPY N/A 10/23/2017    Procedure: COLONOSCOPY;  Surgeon: Bri Harry MD;  Location: [facility];  Service: Endoscopy;  Laterality: N/A;    EXCISIONAL BIOPSY Left 2022    Procedure: EXCISIONAL BIOPSY;  Surgeon: Mick Juarez MD;  Location: [facility];  Service: General;  Laterality: Left;    TUBAL LIGATION      UPPER GASTROINTESTINAL ENDOSCOPY  2016    Erosive Gastritis-Dr Bailey     Family History   Problem Relation Name Age of Onset    Thyroid disease Mother      Diabetes Father      Ovarian cancer Maternal Aunt Malathi     Breast cancer Maternal Aunt Ada      Social History     Tobacco Use    Smoking status: Never     Passive exposure: Never    Smokeless tobacco: Never   Substance  Use Topics    Alcohol use: No     Alcohol/week: 0.0 standard drinks of alcohol    Drug use: No     Review of Systems   Constitutional: Negative.    All other systems reviewed and are negative.      Physical Exam     Initial Vitals [11/18/24 1513]   BP Pulse Resp Temp SpO2   129/72 74 18 98 °F (36.7 °C) 100 %      MAP       --         Physical Exam    Nursing note and vitals reviewed.  Constitutional: She is not diaphoretic. No distress.   HENT:   Head: Normocephalic and atraumatic.   Eyes: EOM are normal. Pupils are equal, round, and reactive to light. Scleral icterus is present.   Neck: Neck supple.   Normal range of motion.  Cardiovascular:  Normal rate, regular rhythm and intact distal pulses.           Pulmonary/Chest: Breath sounds normal. No respiratory distress. She has no wheezes. She has no rales.   Abdominal: Abdomen is soft. Bowel sounds are normal. She exhibits no distension. There is no abdominal tenderness. There is no rebound.   Musculoskeletal:         General: No tenderness or edema. Normal range of motion.      Cervical back: Normal range of motion and neck supple.     Neurological: She is alert and oriented to person, place, and time. She has normal strength and normal reflexes. GCS score is 15. GCS eye subscore is 4. GCS verbal subscore is 5. GCS motor subscore is 6.   Skin: Skin is warm and dry. Capillary refill takes less than 2 seconds.         ED Course   Procedures  Labs Reviewed   COMPREHENSIVE METABOLIC PANEL - Abnormal       Result Value    Sodium 129 (*)     Potassium 4.1      Chloride 93 (*)     CO2 27      Glucose 77      BUN 38 (*)     Creatinine 4.7 (*)     Calcium 8.6 (*)     Total Protein 7.0      Albumin 1.5 (*)     Total Bilirubin 16.9 (*)     Alkaline Phosphatase 542 (*)      (*)     ALT 52 (*)     eGFR 11.1 (*)     Anion Gap 9     PHOSPHORUS    Phosphorus 4.1            Imaging Results    None          Medications   oxyCODONE immediate release tablet 5 mg (5 mg Oral  Given 11/18/24 1702)     Medical Decision Making  Discussed with Dr Fong, recommends d/c back to NH. Stable for next HD session W. Critical lab abnormalities on today's labs appear to be erroneous.     Problems Addressed:  ESRD (end stage renal disease) on dialysis: chronic illness or injury    Amount and/or Complexity of Data Reviewed  Labs: ordered. Decision-making details documented in ED Course.    Risk  Prescription drug management.                                      Clinical Impression:  Final diagnoses:  [N18.6, Z99.2] ESRD (end stage renal disease) on dialysis (Primary)  [G89.29] Other chronic pain          ED Disposition Condition    Discharge Stable          ED Prescriptions    None       Follow-up Information       Follow up With Specialties Details Why Contact Info    [facility] Schedule an appointment as soon as possible for a visit   [address] [place] [id number]  [phone]               Nilton Conway MD  11/18/24 1651       Nilton Conway MD  11/18/24 [id number]

## 2024-11-18 NOTE — ED NOTES
Pt was sent from dialysis before finishing be dialyzed due to reported low phosphorus and potassium levels. Pt is asymptomatic upon arrival to ED.

## 2024-11-18 NOTE — ED NOTES
Delay in getting patient into bed due to problem with Nancie Lift. Attempting to locate one from another unit. Patient has 2 nurse aids at bedside from nursing home.

## 2024-11-29 ENCOUNTER — HOSPITAL ENCOUNTER (EMERGENCY)
Facility: HOSPITAL | Age: 44
Discharge: ANOTHER HOSPITAL | End: 2024-11-30
Attending: STUDENT IN AN ORGANIZED HEALTH CARE EDUCATION/TRAINING PROGRAM
Payer: MEDICARE

## 2024-11-29 DIAGNOSIS — E80.6 HYPERBILIRUBINEMIA: ICD-10-CM

## 2024-11-29 DIAGNOSIS — N18.6 ESRD (END STAGE RENAL DISEASE): ICD-10-CM

## 2024-11-29 DIAGNOSIS — R79.89 ELEVATED BRAIN NATRIURETIC PEPTIDE (BNP) LEVEL: ICD-10-CM

## 2024-11-29 DIAGNOSIS — R07.9 CHEST PAIN: ICD-10-CM

## 2024-11-29 DIAGNOSIS — K92.2 GASTROINTESTINAL HEMORRHAGE, UNSPECIFIED GASTROINTESTINAL HEMORRHAGE TYPE: Primary | ICD-10-CM

## 2024-11-29 DIAGNOSIS — R74.01 TRANSAMINITIS: ICD-10-CM

## 2024-11-29 PROCEDURE — 36430 TRANSFUSION BLD/BLD COMPNT: CPT

## 2024-11-29 PROCEDURE — 96376 TX/PRO/DX INJ SAME DRUG ADON: CPT

## 2024-11-29 PROCEDURE — 99285 EMERGENCY DEPT VISIT HI MDM: CPT | Mod: 25

## 2024-11-30 ENCOUNTER — HOSPITAL ENCOUNTER (INPATIENT)
Facility: HOSPITAL | Age: 44
LOS: 5 days | Discharge: HOME HEALTH | DRG: 811 | End: 2024-12-05
Attending: FAMILY MEDICINE | Admitting: FAMILY MEDICINE
Payer: MEDICARE

## 2024-11-30 VITALS
SYSTOLIC BLOOD PRESSURE: 156 MMHG | WEIGHT: 204.13 LBS | TEMPERATURE: 99 F | RESPIRATION RATE: 18 BRPM | DIASTOLIC BLOOD PRESSURE: 66 MMHG | HEART RATE: 66 BPM | BODY MASS INDEX: 32.95 KG/M2 | OXYGEN SATURATION: 100 %

## 2024-11-30 DIAGNOSIS — N93.9 VAGINAL BLEEDING: Primary | ICD-10-CM

## 2024-11-30 DIAGNOSIS — R07.9 CHEST PAIN: ICD-10-CM

## 2024-11-30 DIAGNOSIS — K92.2 LOWER GI BLEED: ICD-10-CM

## 2024-11-30 DIAGNOSIS — N18.6 ESRD (END STAGE RENAL DISEASE) ON DIALYSIS: Chronic | ICD-10-CM

## 2024-11-30 DIAGNOSIS — Z99.2 ESRD (END STAGE RENAL DISEASE) ON DIALYSIS: Chronic | ICD-10-CM

## 2024-11-30 PROBLEM — N63.0 MASS OF BREAST: Chronic | Status: ACTIVE | Noted: 2022-12-30

## 2024-11-30 PROBLEM — K76.89 HEPATIC DYSFUNCTION: Chronic | Status: ACTIVE | Noted: 2024-11-09

## 2024-11-30 PROBLEM — E80.6 DIRECT HYPERBILIRUBINEMIA: Chronic | Status: ACTIVE | Noted: 2024-10-30

## 2024-11-30 PROBLEM — J96.11 CHRONIC HYPOXIC RESPIRATORY FAILURE: Chronic | Status: ACTIVE | Noted: 2024-11-09

## 2024-11-30 PROBLEM — R53.81 DEBILITY: Chronic | Status: ACTIVE | Noted: 2024-11-11

## 2024-11-30 PROBLEM — K76.89 HEPATIC DYSFUNCTION: Status: ACTIVE | Noted: 2024-11-09

## 2024-11-30 PROBLEM — E66.01 MORBID OBESITY: Chronic | Status: ACTIVE | Noted: 2024-11-09

## 2024-11-30 LAB
ABO + RH BLD: NORMAL
ALBUMIN SERPL BCP-MCNC: 1.6 G/DL (ref 3.5–5.2)
ALBUMIN SERPL BCP-MCNC: 1.8 G/DL (ref 3.5–5.2)
ALP SERPL-CCNC: 356 U/L (ref 55–135)
ALP SERPL-CCNC: 397 U/L (ref 55–135)
ALT SERPL W/O P-5'-P-CCNC: 67 U/L (ref 10–44)
ALT SERPL W/O P-5'-P-CCNC: 74 U/L (ref 10–44)
ANION GAP SERPL CALC-SCNC: 9 MMOL/L (ref 3–11)
ANION GAP SERPL CALC-SCNC: 9 MMOL/L (ref 3–11)
AST SERPL-CCNC: 122 U/L (ref 10–40)
AST SERPL-CCNC: 153 U/L (ref 10–40)
BASOPHILS # BLD AUTO: 0.1 K/UL (ref 0–0.2)
BASOPHILS NFR BLD: 0.6 % (ref 0–1.9)
BILIRUB SERPL-MCNC: 6.4 MG/DL (ref 0.1–1)
BILIRUB SERPL-MCNC: 7.7 MG/DL (ref 0.1–1)
BLD GP AB SCN CELLS X3 SERPL QL: NORMAL
BLD PROD TYP BPU: NORMAL
BLOOD UNIT EXPIRATION DATE: NORMAL
BLOOD UNIT TYPE CODE: 5100
BLOOD UNIT TYPE: NORMAL
BUN SERPL-MCNC: 31 MG/DL (ref 6–20)
BUN SERPL-MCNC: 36 MG/DL (ref 6–20)
CALCIUM SERPL-MCNC: 8.8 MG/DL (ref 8.7–10.5)
CALCIUM SERPL-MCNC: 9.2 MG/DL (ref 8.7–10.5)
CHLORIDE SERPL-SCNC: 101 MMOL/L (ref 95–110)
CHLORIDE SERPL-SCNC: 99 MMOL/L (ref 95–110)
CO2 SERPL-SCNC: 29 MMOL/L (ref 23–29)
CO2 SERPL-SCNC: 31 MMOL/L (ref 23–29)
CODING SYSTEM: NORMAL
CREAT SERPL-MCNC: 3.8 MG/DL (ref 0.5–1.4)
CREAT SERPL-MCNC: 4.1 MG/DL (ref 0.5–1.4)
CROSSMATCH INTERPRETATION: NORMAL
DIFFERENTIAL METHOD BLD: ABNORMAL
DISPENSE STATUS: NORMAL
EOSINOPHIL # BLD AUTO: 0.7 K/UL (ref 0–0.5)
EOSINOPHIL NFR BLD: 3.9 % (ref 0–8)
ERYTHROCYTE [DISTWIDTH] IN BLOOD BY AUTOMATED COUNT: 19.7 % (ref 11.5–14.5)
ERYTHROCYTE [DISTWIDTH] IN BLOOD BY AUTOMATED COUNT: 21.1 % (ref 11.5–14.5)
EST. GFR  (NO RACE VARIABLE): 13 ML/MIN/1.73 M^2
EST. GFR  (NO RACE VARIABLE): 14.3 ML/MIN/1.73 M^2
FOLATE SERPL-MCNC: 3.1 NG/ML (ref 4–24)
GLUCOSE SERPL-MCNC: 103 MG/DL (ref 70–110)
GLUCOSE SERPL-MCNC: 74 MG/DL (ref 70–110)
HAPTOGLOB SERPL-MCNC: <10 MG/DL (ref 30–250)
HCT VFR BLD AUTO: 19.4 % (ref 37–48.5)
HCT VFR BLD AUTO: 20.1 % (ref 37–48.5)
HGB BLD-MCNC: 6.5 G/DL (ref 12–16)
HGB BLD-MCNC: 6.6 G/DL (ref 12–16)
IMM GRANULOCYTES # BLD AUTO: 0.1 K/UL (ref 0–0.04)
IMM GRANULOCYTES NFR BLD AUTO: 0.6 % (ref 0–0.5)
INR PPP: 1.2 (ref 0.8–1.2)
IRON SERPL-MCNC: 112 UG/DL (ref 30–160)
LDH SERPL L TO P-CCNC: 519 U/L (ref 110–260)
LYMPHOCYTES # BLD AUTO: 1.8 K/UL (ref 1–4.8)
LYMPHOCYTES NFR BLD: 10.3 % (ref 18–48)
MCH RBC QN AUTO: 30.8 PG (ref 27–31)
MCH RBC QN AUTO: 31.3 PG (ref 27–31)
MCHC RBC AUTO-ENTMCNC: 32.8 G/DL (ref 32–36)
MCHC RBC AUTO-ENTMCNC: 33.5 G/DL (ref 32–36)
MCV RBC AUTO: 92 FL (ref 82–98)
MCV RBC AUTO: 95 FL (ref 82–98)
MONOCYTES # BLD AUTO: 1.7 K/UL (ref 0.3–1)
MONOCYTES NFR BLD: 9.6 % (ref 4–15)
NEUTROPHILS # BLD AUTO: 13.2 K/UL (ref 1.8–7.7)
NEUTROPHILS NFR BLD: 75 % (ref 38–73)
NRBC BLD-RTO: 0 /100 WBC
NT-PROBNP SERPL-MCNC: ABNORMAL PG/ML (ref 5–450)
NUM UNITS TRANS PACKED RBC: NORMAL
OB PNL STL: POSITIVE
OHS QRS DURATION: 90 MS
OHS QTC CALCULATION: 493 MS
PLATELET # BLD AUTO: 200 K/UL (ref 150–450)
PLATELET # BLD AUTO: 212 K/UL (ref 150–450)
PMV BLD AUTO: 11 FL (ref 9.2–12.9)
PMV BLD AUTO: 11.1 FL (ref 9.2–12.9)
POCT GLUCOSE: 95 MG/DL (ref 70–110)
POTASSIUM SERPL-SCNC: 3.4 MMOL/L (ref 3.5–5.1)
POTASSIUM SERPL-SCNC: 3.7 MMOL/L (ref 3.5–5.1)
PROT SERPL-MCNC: 7.2 G/DL (ref 6–8.4)
PROT SERPL-MCNC: 7.9 G/DL (ref 6–8.4)
PROTHROMBIN TIME: 12.7 SEC (ref 9–12.5)
RBC # BLD AUTO: 2.11 M/UL (ref 4–5.4)
RBC # BLD AUTO: 2.11 M/UL (ref 4–5.4)
SATURATED IRON: 63 % (ref 20–50)
SODIUM SERPL-SCNC: 139 MMOL/L (ref 136–145)
SODIUM SERPL-SCNC: 139 MMOL/L (ref 136–145)
SPECIMEN OUTDATE: NORMAL
TOTAL IRON BINDING CAPACITY: 179 UG/DL (ref 250–450)
TRANSFERRIN SERPL-MCNC: 121 MG/DL (ref 200–375)
TROPONIN I SERPL DL<=0.01 NG/ML-MCNC: 435.7 PG/ML (ref 0–60)
TROPONIN I SERPL DL<=0.01 NG/ML-MCNC: 477.5 PG/ML (ref 0–60)
TROPONIN I SERPL DL<=0.01 NG/ML-MCNC: 501.3 PG/ML (ref 0–60)
VIT B12 SERPL-MCNC: >2000 PG/ML (ref 210–950)
WBC # BLD AUTO: 17.14 K/UL (ref 3.9–12.7)
WBC # BLD AUTO: 17.63 K/UL (ref 3.9–12.7)

## 2024-11-30 PROCEDURE — 85610 PROTHROMBIN TIME: CPT | Performed by: STUDENT IN AN ORGANIZED HEALTH CARE EDUCATION/TRAINING PROGRAM

## 2024-11-30 PROCEDURE — 63600175 PHARM REV CODE 636 W HCPCS: Performed by: STUDENT IN AN ORGANIZED HEALTH CARE EDUCATION/TRAINING PROGRAM

## 2024-11-30 PROCEDURE — 84484 ASSAY OF TROPONIN QUANT: CPT | Mod: 91 | Performed by: STUDENT IN AN ORGANIZED HEALTH CARE EDUCATION/TRAINING PROGRAM

## 2024-11-30 PROCEDURE — 25000003 PHARM REV CODE 250: Performed by: STUDENT IN AN ORGANIZED HEALTH CARE EDUCATION/TRAINING PROGRAM

## 2024-11-30 PROCEDURE — 36415 COLL VENOUS BLD VENIPUNCTURE: CPT | Performed by: STUDENT IN AN ORGANIZED HEALTH CARE EDUCATION/TRAINING PROGRAM

## 2024-11-30 PROCEDURE — 85027 COMPLETE CBC AUTOMATED: CPT | Performed by: STUDENT IN AN ORGANIZED HEALTH CARE EDUCATION/TRAINING PROGRAM

## 2024-11-30 PROCEDURE — 80053 COMPREHEN METABOLIC PANEL: CPT | Mod: 91 | Performed by: STUDENT IN AN ORGANIZED HEALTH CARE EDUCATION/TRAINING PROGRAM

## 2024-11-30 PROCEDURE — 85025 COMPLETE CBC W/AUTO DIFF WBC: CPT | Performed by: STUDENT IN AN ORGANIZED HEALTH CARE EDUCATION/TRAINING PROGRAM

## 2024-11-30 PROCEDURE — 83615 LACTATE (LD) (LDH) ENZYME: CPT | Performed by: STUDENT IN AN ORGANIZED HEALTH CARE EDUCATION/TRAINING PROGRAM

## 2024-11-30 PROCEDURE — 93005 ELECTROCARDIOGRAM TRACING: CPT

## 2024-11-30 PROCEDURE — 86901 BLOOD TYPING SEROLOGIC RH(D): CPT | Performed by: STUDENT IN AN ORGANIZED HEALTH CARE EDUCATION/TRAINING PROGRAM

## 2024-11-30 PROCEDURE — 86920 COMPATIBILITY TEST SPIN: CPT | Performed by: STUDENT IN AN ORGANIZED HEALTH CARE EDUCATION/TRAINING PROGRAM

## 2024-11-30 PROCEDURE — 20600001 HC STEP DOWN PRIVATE ROOM

## 2024-11-30 PROCEDURE — 80053 COMPREHEN METABOLIC PANEL: CPT | Performed by: STUDENT IN AN ORGANIZED HEALTH CARE EDUCATION/TRAINING PROGRAM

## 2024-11-30 PROCEDURE — 96365 THER/PROPH/DIAG IV INF INIT: CPT

## 2024-11-30 PROCEDURE — 86880 COOMBS TEST DIRECT: CPT | Performed by: STUDENT IN AN ORGANIZED HEALTH CARE EDUCATION/TRAINING PROGRAM

## 2024-11-30 PROCEDURE — 82607 VITAMIN B-12: CPT | Performed by: STUDENT IN AN ORGANIZED HEALTH CARE EDUCATION/TRAINING PROGRAM

## 2024-11-30 PROCEDURE — 82746 ASSAY OF FOLIC ACID SERUM: CPT | Performed by: STUDENT IN AN ORGANIZED HEALTH CARE EDUCATION/TRAINING PROGRAM

## 2024-11-30 PROCEDURE — 82272 OCCULT BLD FECES 1-3 TESTS: CPT | Performed by: STUDENT IN AN ORGANIZED HEALTH CARE EDUCATION/TRAINING PROGRAM

## 2024-11-30 PROCEDURE — 84466 ASSAY OF TRANSFERRIN: CPT | Performed by: STUDENT IN AN ORGANIZED HEALTH CARE EDUCATION/TRAINING PROGRAM

## 2024-11-30 PROCEDURE — 83010 ASSAY OF HAPTOGLOBIN QUANT: CPT | Performed by: STUDENT IN AN ORGANIZED HEALTH CARE EDUCATION/TRAINING PROGRAM

## 2024-11-30 PROCEDURE — 83880 ASSAY OF NATRIURETIC PEPTIDE: CPT | Performed by: STUDENT IN AN ORGANIZED HEALTH CARE EDUCATION/TRAINING PROGRAM

## 2024-11-30 PROCEDURE — P9016 RBC LEUKOCYTES REDUCED: HCPCS | Performed by: STUDENT IN AN ORGANIZED HEALTH CARE EDUCATION/TRAINING PROGRAM

## 2024-11-30 PROCEDURE — 86850 RBC ANTIBODY SCREEN: CPT | Mod: 91 | Performed by: STUDENT IN AN ORGANIZED HEALTH CARE EDUCATION/TRAINING PROGRAM

## 2024-11-30 PROCEDURE — 82728 ASSAY OF FERRITIN: CPT | Performed by: STUDENT IN AN ORGANIZED HEALTH CARE EDUCATION/TRAINING PROGRAM

## 2024-11-30 PROCEDURE — 93010 ELECTROCARDIOGRAM REPORT: CPT | Mod: ,,, | Performed by: INTERNAL MEDICINE

## 2024-11-30 PROCEDURE — 96366 THER/PROPH/DIAG IV INF ADDON: CPT

## 2024-11-30 RX ORDER — GLUCAGON 1 MG
1 KIT INJECTION
Status: DISCONTINUED | OUTPATIENT
Start: 2024-11-30 | End: 2024-12-02

## 2024-11-30 RX ORDER — METOPROLOL TARTRATE 50 MG/1
100 TABLET ORAL 2 TIMES DAILY
Status: DISCONTINUED | OUTPATIENT
Start: 2024-11-30 | End: 2024-12-05 | Stop reason: HOSPADM

## 2024-11-30 RX ORDER — URSODIOL 300 MG/1
300 CAPSULE ORAL 3 TIMES DAILY
Status: DISCONTINUED | OUTPATIENT
Start: 2024-12-01 | End: 2024-12-05 | Stop reason: HOSPADM

## 2024-11-30 RX ORDER — HYDROCODONE BITARTRATE AND ACETAMINOPHEN 500; 5 MG/1; MG/1
TABLET ORAL
Status: DISCONTINUED | OUTPATIENT
Start: 2024-11-30 | End: 2024-11-30 | Stop reason: HOSPADM

## 2024-11-30 RX ORDER — TRAZODONE HYDROCHLORIDE 100 MG/1
100 TABLET ORAL NIGHTLY PRN
Status: DISCONTINUED | OUTPATIENT
Start: 2024-11-30 | End: 2024-12-05 | Stop reason: HOSPADM

## 2024-11-30 RX ORDER — ACETAMINOPHEN 325 MG/1
650 TABLET ORAL EVERY 8 HOURS PRN
Status: DISCONTINUED | OUTPATIENT
Start: 2024-11-30 | End: 2024-12-05 | Stop reason: HOSPADM

## 2024-11-30 RX ORDER — PANTOPRAZOLE SODIUM 40 MG/10ML
40 INJECTION, POWDER, LYOPHILIZED, FOR SOLUTION INTRAVENOUS EVERY 12 HOURS
Status: DISCONTINUED | OUTPATIENT
Start: 2024-12-01 | End: 2024-12-02

## 2024-11-30 RX ORDER — PANTOPRAZOLE SODIUM 40 MG/10ML
80 INJECTION, POWDER, LYOPHILIZED, FOR SOLUTION INTRAVENOUS
Status: COMPLETED | OUTPATIENT
Start: 2024-11-30 | End: 2024-11-30

## 2024-11-30 RX ORDER — ACETAMINOPHEN 325 MG/1
650 TABLET ORAL EVERY 4 HOURS PRN
Status: DISCONTINUED | OUTPATIENT
Start: 2024-11-30 | End: 2024-12-05 | Stop reason: HOSPADM

## 2024-11-30 RX ORDER — IBUPROFEN 200 MG
16 TABLET ORAL
Status: DISCONTINUED | OUTPATIENT
Start: 2024-11-30 | End: 2024-12-02

## 2024-11-30 RX ORDER — IBUPROFEN 200 MG
24 TABLET ORAL
Status: DISCONTINUED | OUTPATIENT
Start: 2024-11-30 | End: 2024-12-02

## 2024-11-30 RX ORDER — LIDOCAINE HYDROCHLORIDE 20 MG/ML
15 SOLUTION OROPHARYNGEAL ONCE
Status: COMPLETED | OUTPATIENT
Start: 2024-11-30 | End: 2024-11-30

## 2024-11-30 RX ORDER — ALUMINUM HYDROXIDE, MAGNESIUM HYDROXIDE, AND SIMETHICONE 1200; 120; 1200 MG/30ML; MG/30ML; MG/30ML
30 SUSPENSION ORAL ONCE
Status: COMPLETED | OUTPATIENT
Start: 2024-11-30 | End: 2024-11-30

## 2024-11-30 RX ORDER — LACTULOSE 10 G/15ML
20 SOLUTION ORAL 2 TIMES DAILY
Status: DISCONTINUED | OUTPATIENT
Start: 2024-11-30 | End: 2024-12-02

## 2024-11-30 RX ORDER — NALOXONE HCL 0.4 MG/ML
0.02 VIAL (ML) INJECTION
Status: DISCONTINUED | OUTPATIENT
Start: 2024-11-30 | End: 2024-12-05 | Stop reason: HOSPADM

## 2024-11-30 RX ORDER — ATORVASTATIN CALCIUM 40 MG/1
40 TABLET, FILM COATED ORAL DAILY
Status: DISCONTINUED | OUTPATIENT
Start: 2024-12-01 | End: 2024-11-30

## 2024-11-30 RX ORDER — POLYETHYLENE GLYCOL 3350 17 G/17G
17 POWDER, FOR SOLUTION ORAL DAILY PRN
Status: DISCONTINUED | OUTPATIENT
Start: 2024-11-30 | End: 2024-12-05 | Stop reason: HOSPADM

## 2024-11-30 RX ORDER — ONDANSETRON 8 MG/1
8 TABLET, ORALLY DISINTEGRATING ORAL EVERY 8 HOURS PRN
Status: DISCONTINUED | OUTPATIENT
Start: 2024-11-30 | End: 2024-12-05 | Stop reason: HOSPADM

## 2024-11-30 RX ORDER — PANTOPRAZOLE SODIUM 40 MG/10ML
80 INJECTION, POWDER, LYOPHILIZED, FOR SOLUTION INTRAVENOUS ONCE
Status: COMPLETED | OUTPATIENT
Start: 2024-11-30 | End: 2024-11-30

## 2024-11-30 RX ORDER — ALUMINUM HYDROXIDE, MAGNESIUM HYDROXIDE, AND SIMETHICONE 1200; 120; 1200 MG/30ML; MG/30ML; MG/30ML
30 SUSPENSION ORAL 4 TIMES DAILY PRN
Status: DISCONTINUED | OUTPATIENT
Start: 2024-11-30 | End: 2024-12-05 | Stop reason: HOSPADM

## 2024-11-30 RX ORDER — TALC
6 POWDER (GRAM) TOPICAL NIGHTLY PRN
Status: DISCONTINUED | OUTPATIENT
Start: 2024-11-30 | End: 2024-12-05 | Stop reason: HOSPADM

## 2024-11-30 RX ORDER — SIMETHICONE 80 MG
1 TABLET,CHEWABLE ORAL 4 TIMES DAILY PRN
Status: DISCONTINUED | OUTPATIENT
Start: 2024-11-30 | End: 2024-12-05 | Stop reason: HOSPADM

## 2024-11-30 RX ORDER — INSULIN ASPART 100 [IU]/ML
0-5 INJECTION, SOLUTION INTRAVENOUS; SUBCUTANEOUS
Status: DISCONTINUED | OUTPATIENT
Start: 2024-11-30 | End: 2024-12-02

## 2024-11-30 RX ORDER — SODIUM CHLORIDE 0.9 % (FLUSH) 0.9 %
1-10 SYRINGE (ML) INJECTION EVERY 12 HOURS PRN
Status: DISCONTINUED | OUTPATIENT
Start: 2024-11-30 | End: 2024-12-05 | Stop reason: HOSPADM

## 2024-11-30 RX ADMIN — PANTOPRAZOLE SODIUM 8 MG/HR: 40 INJECTION, POWDER, FOR SOLUTION INTRAVENOUS at 05:11

## 2024-11-30 RX ADMIN — ALUMINUM HYDROXIDE, MAGNESIUM HYDROXIDE, AND DIMETHICONE 30 ML: 200; 20; 200 SUSPENSION ORAL at 12:11

## 2024-11-30 RX ADMIN — PANTOPRAZOLE SODIUM 8 MG/HR: 40 INJECTION, POWDER, FOR SOLUTION INTRAVENOUS at 09:11

## 2024-11-30 RX ADMIN — LACTULOSE 20 G: 20 SOLUTION ORAL at 10:11

## 2024-11-30 RX ADMIN — PANTOPRAZOLE SODIUM 8 MG/HR: 40 INJECTION, POWDER, FOR SOLUTION INTRAVENOUS at 03:11

## 2024-11-30 RX ADMIN — LIDOCAINE HYDROCHLORIDE 15 ML: 20 SOLUTION ORAL at 12:11

## 2024-11-30 RX ADMIN — PANTOPRAZOLE SODIUM 80 MG: 40 INJECTION, POWDER, LYOPHILIZED, FOR SOLUTION INTRAVENOUS at 04:11

## 2024-11-30 RX ADMIN — PANTOPRAZOLE SODIUM 80 MG: 40 INJECTION, POWDER, LYOPHILIZED, FOR SOLUTION INTRAVENOUS at 11:11

## 2024-11-30 RX ADMIN — PANTOPRAZOLE SODIUM 8 MG/HR: 40 INJECTION, POWDER, FOR SOLUTION INTRAVENOUS at 07:11

## 2024-11-30 RX ADMIN — METOPROLOL TARTRATE 100 MG: 50 TABLET, FILM COATED ORAL at 11:11

## 2024-11-30 NOTE — ED PROVIDER NOTES
"  History  Chief Complaint   Patient presents with    Chest Pain     Pt arrived via EMS reporting chest pain and abd pain x 2 hours. Loss of appetite x 1 day with nausea. Dialysis pt with dialysis today. Pt from [facility] for rehab x 1 month. AAOx3. 4L home o2     44-year-old female with history of hypertension, hyperlipidemia, DM, ESRD, CHF,  COPD on 3L, CVA, peripheral vascular disease, chronic lymphedema, hypothyroidism, gout, and gastroesophageal reflux disease whe presents for evaluation of midsternal chest pain described stabbing.  Pt also reporting pain  at the top of her abdomen, epigastric area.  Sx acute in onset a hours prior to arrival.  Patient also reports pain at the top of her stomach, Pepcid taken without relief.  All other systems reviewed and notable nausea, otherwise unremarkable.  Patient did go dialysis today, had 5-1/2 L removed.    After further chart review it was noted that patient was just discharged from outside facility after extensive workup for GI bleed, anemia, jaundice, and hyperbilirubinemia.  Hemoglobin was 6.4 from baseline of10.  She was transferred to [facility] from [facility] for further evaluation. See Hospital course from prior discharge summary below:    "She was put on ceftriaxone. Hepatology and Advanced Endoscopy Service were consulted for hyperbilirubinemia. Gastroenterology was consulted for gastrointestinal bleed. Hematology-Oncology was consulted for anemia with hyperbilirubinemia and positive Britt test. CT showed diffuse anasarca, pericardial effusion, enlarged lymph nodes vs soft tissue densities, hepatosplenomegaly. Hepatology recommended liver biopsy. Gastroenterology recommended outpatient endoscopy, after she is medically optimized, due to lack of overt bleeding. Hematology-Oncology recommended breast ultrasound. Leukocytosis persisted. Sed rate was normal. CRP was elevated. Midodrine was started for post-dialysis hypotension. She developed hepatic " "encephalopathy with ammonia of 111 umol/L. She was transfused another unit of packed red blood cells in preparation for liver biopsy. She required vitamin K for elevated INR. She desired to sit at the side of the bed but was unable to on her own. Physical and Occupational Therapy were consulted and recommended high intensity therapy. Midodrine was stopped as blood pressures increased to normal. Her home hypertension medications continued to be held. 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 MRCP showed no obstruction or stricture but was limited by body habitus. Hepatology recommended ursodiol. Upon review of imaging, left axilla ultrasound was recommended instead of breast ultrasound as her anasarca would make interpretation of a breast ultrasound difficult. Axilla ultrasound showed abnormal axillary adenopathy with at least 6 abnormal lymph nodes (BI-RADS 4). She underwent left axilla lymph node biopsy on 11/11/2024. Hematology-Oncology reported that she would not currently for inpatient chemotherapy as she had no bulky lymphadenopathy or obstruction or compression of organs due to lymphadenopathy. Additionally, her persistent hyperbilirubinemia excluded her from several chemotherapy regimens, and her heart failure and end stage renal disease exclude her from nearly all other remaining regimens. She awaited skilled nursing facility and was accepted on 11/15/2024. However, syphilis test was incidentally positive per the [place] [employer], so the nursing facility could not accept her before she started treatement. It was found that she had been found to have syphilis at another hospital previously. She was given the 1st dose of intramuscular penicillin G and was accepted to the nursing facility where she will get the 2nd and 3rd doses."           Past Medical " History:   Diagnosis Date    Anemia     Anemia     Breast mass     Chronic constipation     CKD (chronic kidney disease)     Diabetes mellitus     Dialysis patient     GRZEGORZ BARRIOS THURS, SAT.    Embolic stroke involving right middle cerebral artery 2024    Encounter for blood transfusion     Erosive gastritis 2016    GERD (gastroesophageal reflux disease)     Gout     High cholesterol     Hypertension     Knee pain     Thyroid disease     Unspecified cataract 2022       Past Surgical History:   Procedure Laterality Date    AV FISTULA PLACEMENT Left 2019    Procedure: creation av fistula;  Surgeon: Buck Fernandez MD;  Location: [facility];  Service: Cardiovascular;  Laterality: Left;  Left Radialcephalic: PACU     SECTION  , , ,     CHOLECYSTECTOMY      COLONOSCOPY N/A 10/23/2017    Procedure: COLONOSCOPY;  Surgeon: Bri Harry MD;  Location: [facility];  Service: Endoscopy;  Laterality: N/A;    EXCISIONAL BIOPSY Left 2022    Procedure: EXCISIONAL BIOPSY;  Surgeon: Mick Juarez MD;  Location: formerly [facility];  Service: General;  Laterality: Left;    TUBAL LIGATION      UPPER GASTROINTESTINAL ENDOSCOPY  2016    Erosive Gastritis-Dr Bailey       Family History   Problem Relation Name Age of Onset    Thyroid disease Mother      Diabetes Father      Ovarian cancer Maternal Aunt Malathi     Breast cancer Maternal Aunt Ada        Social History     Tobacco Use    Smoking status: Never     Passive exposure: Never    Smokeless tobacco: Never   Substance Use Topics    Alcohol use: No     Alcohol/week: 0.0 standard drinks of alcohol    Drug use: No       ROS  Review of Systems   Cardiovascular:  Positive for chest pain.   Gastrointestinal:  Positive for abdominal pain and nausea.       Physical Exam  BP (!) 160/84   Pulse (!) 58   Temp 98 °F (36.7 °C)   Resp 18   Wt 92.6 kg (204 lb 2.3 oz)   LMP  (LMP Unknown) Comment: hcg neg   SpO2 100%   Breastfeeding No   BMI  32.95 kg/m²   Physical Exam    Constitutional: She appears well-developed and well-nourished. She is Obese . She is cooperative.   HENT:   Head: Normocephalic and atraumatic.   Eyes: Conjunctivae, EOM and lids are normal. Pupils are equal, round, and reactive to light. Scleral icterus is present.   Neck: Phonation normal.   Normal range of motion.  Cardiovascular:  Normal rate, regular rhythm and intact distal pulses.           Pulmonary/Chest: She has rhonchi.   Abdominal: She exhibits distension. There is abdominal tenderness.   Genitourinary: Rectum:      Guaiac result positive.   Guaiac positive stool. : Acceptable.   Genitourinary Comments: Multiple sacral decubitus wounds     Musculoskeletal:         General: Edema (Two to 3+ pitting lower extremity edema) present.      Cervical back: Normal range of motion.     Neurological: She is alert and oriented to person, place, and time.   Skin: Skin is warm and dry.   Psychiatric: She has a normal mood and affect. Her speech is normal and behavior is normal.               Labs Reviewed   COMPREHENSIVE METABOLIC PANEL - Abnormal       Result Value    Sodium 139      Potassium 3.4 (*)     Chloride 99      CO2 31 (*)     Glucose 103      BUN 31 (*)     Creatinine 3.8 (*)     Calcium 9.2      Total Protein 7.9      Albumin 1.8 (*)     Total Bilirubin 7.7 (*)     Alkaline Phosphatase 397 (*)      (*)     ALT 74 (*)     eGFR 14.3 (*)     Anion Gap 9     CBC W/ AUTO DIFFERENTIAL - Abnormal    WBC 17.63 (*)     RBC 2.11 (*)     Hemoglobin 6.6 (*)     Hematocrit 20.1 (*)     MCV 95      MCH 31.3 (*)     MCHC 32.8      RDW 21.1 (*)     Platelets 212      MPV 11.1      Immature Granulocytes 0.6 (*)     Gran # (ANC) 13.2 (*)     Immature Grans (Abs) 0.10 (*)     Lymph # 1.8      Mono # 1.7 (*)     Eos # 0.7 (*)     Baso # 0.10      nRBC 0      Gran % 75.0 (*)     Lymph % 10.3 (*)     Mono % 9.6      Eosinophil % 3.9      Basophil % 0.6      Differential  Method Automated     NT-PRO NATRIURETIC PEPTIDE - Abnormal    NT-proBNP 785599 (*)    TROPONIN I HIGH SENSITIVITY - Abnormal    Troponin I High Sensitivity 501.3 (*)    OCCULT BLOOD X 1, STOOL - Abnormal    Occult Blood Positive (*)    TROPONIN I HIGH SENSITIVITY - Abnormal    Troponin I High Sensitivity 477.5 (*)    TYPE & SCREEN    Group & Rh O POS      Indirect Britt NEG      Specimen Outdate 12/03/2024 23:59     PREPARE RBC SOFT    UNIT NUMBER [id number]      Product Code [id number]      DISPENSE STATUS ISSUED      CODING SYSTEM LPLV528      Unit Blood Type Code 5100      Unit Blood Type O POS      Unit Expiration [id number]      CROSSMATCH INTERPRETATION Compatible       EKG Readings: (Independently Interpreted)   Initial Reading: No STEMI. Rhythm: Normal Sinus Rhythm. Heart Rate: 68. Ectopy: PACs.     Type of Interpretation: ED Physician (Independently Interpreted).  Radiology Procedure Done: Portable CXR.  Interpretation: Cardiomegaly, possible left-sided effusion.        Imaging Results              X-Ray Chest 1 View (In process)                                 Procedures            Attending Attestation:         Attending Critical Care:   Critical Care Times:   Direct Patient Care (initial evaluation, reassessments, and time considering the case)................................................................30 minutes.   Additional History from reviewing old medical records or taking additional history from the family, EMS, PCP, etc.......................20 minutes.   Ordering, Reviewing, and Interpreting Diagnostic Studies...............................................................................................................10 minutes.   Documentation..................................................................................................................................................................................15 minutes.   Consultation with other Physicians.  .................................................................................................................................................10 minutes.   ==============================================================  Total Critical Care Time - exclusive of procedural time: 85 minutes.  ==============================================================  Critical care was necessary to treat or prevent imminent or life-threatening deterioration of the following conditions: GI bleeding.   Critical care was time spent personally by me on the following activities: obtaining history from patient or relative, examination of patient, review of old charts, review of x-rays / CT sent with the patient, ordering lab, x-rays, and/or EKG, development of treatment plan with patient or relative, ordering and performing treatments and interventions, evaluation of patient's response to treatment, discussions with primary provider, interpretation of cardiac measurements, discussion with consultants and re-evaluation of patient's conition.           Medical Decision Making  44-year-old female with multiple medical comorbidities now reporting chest pain and abdominal discomfort.  After review of prior no patient with new onset liver failure likely due to cholestatic hepatitis and possibly breastoncology.   Will perform cardiac workup and obtain labs.  Will give medication for symptom relief.  See ED course for updates.    Amount and/or Complexity of Data Reviewed  Labs: ordered. Decision-making details documented in ED Course.  Radiology: ordered. Decision-making details documented in ED Course.    Risk  OTC drugs.  Prescription drug management.               ED Course as of 11/30/24 0459   Sat Nov 30, 2024   0028 X-Ray Chest 1 View  Cardiomegaly.  Possible left-sided effusion. [NA]   0114 Troponin I High Sensitivity(!): 501.3 [NA]   0114 BUN(!): 31 [NA]   0114 Creatinine(!): 3.8 [NA]   0119 BILIRUBIN TOTAL(!): 7.7 [NA]   0119 ALP(!):  397 [NA]   0119 AST(!): 153 [NA]   0119 ALT(!): 74 [NA]   0119 eGFR(!): 14.3 [NA]   0119 WBC(!): 17.63 [NA]   0119 Hemoglobin(!): 6.6 [NA]   0119 Hematocrit(!): 20.1 [NA]   0119 Platelet Count: 212 [NA]   0138 NT-proBNP(!): 603042 [NA]   0230 Cardiology consulted, spoke with Dennis Larose, noted patient has stress test that showed an inferior infarct but no definitive ischemia.  Last angiogram in 2017 with normal.  Suspect troponin elevation due to anemia the followed     [NA]   0256 Occult Blood(!): Positive [NA]   0333 Spoke with Dr. Green, GI @[facility], will transfer for GI evaluation [NA]   0455 Troponin I High Sensitivity(!): 477.5  Spoke with [facility] medicine , Dr. Hutchison, pt accepted for transfer [NA]      ED Course User Index  [NA] Macey Gurrola MD       DISCHARGE NOTE:       Pilar Fernandez's  results have been reviewed with her.  She has been counseled regarding her diagnosis, treatment, and plan.  She verbally conveys understanding and agreement of the signs, symptoms, diagnosis, treatment and prognosis and additionally agrees to follow up as discussed.  She also agrees with the care-plan and conveys that all of her questions have been answered.  I have also provided discharge instructions for her that include: educational information regarding their diagnosis and treatment, and list of reasons why they would want to return to the ED prior to their follow-up appointment, should her condition change. She has been provided with education for proper emergency department utilization.      CLINICAL IMPRESSION:         1. Gastrointestinal hemorrhage, unspecified gastrointestinal hemorrhage type    2. Chest pain    3. Hyperbilirubinemia    4. Transaminitis    5. Elevated brain natriuretic peptide (BNP) level    6. ESRD (end stage renal disease)              PLAN:   1. Transfer  2.   New Prescriptions    No medications on file     3. No follow-up provider specified.        Macey Gurrola  MD SAV  11/30/24 1064

## 2024-11-30 NOTE — ED NOTES
Called to bedside and pt is assuring me she is on her period and is not bleeding from her rectum, she would like to her transfer cancelled to [facility], and be sent to [facility], where she has been before. Dr Conway updated.

## 2024-11-30 NOTE — ED NOTES
Called the [facility] and spoke with carmenza, she will get in touch with her NP

## 2024-11-30 NOTE — CONSULTS
[place] - Emergency Department  Cardiology  Consult Note    Patient Name: Pilar Fernandez  MRN: [id number]  Admission Date: 11/29/2024  Hospital Length of Stay: 0 days  Code Status: Prior   Attending Provider: Macey Gurrola MD   Consulting Provider: Ankur Larose NP  Primary Care Physician: Lindsey Singletary FNP  Principal Problem:<principal problem not specified>    Patient information was obtained from patient, past medical records, ER records, and primary team.     Consults  Subjective:     Chief Complaint:    Telecardiology Consult  [facility] ER  > trop  Dr Aviles  > 30 minutes including review of past and current medical records, provider and patient interview    HPI:   44-year-old female known to Dr. Starks.  She has a history of congestive heart failure with a borderline preserved EF of 45%, CVA with left-sided weakness, dyslipidemia, hypertension, ESRD on hemodialysis.  She was last seen in the clinic July 30.  At that visit it was recommended she have a Holter monitor and CAMERON.  However she no showed for multiple follow-up visits.  This testing was never done.  Earlier this year in April she had a nuclear stress test that showed an inferior infarct.  There was no ischemia.  Last coronary angiogram was in 2017 and showed normal coronary anatomy.  Recently had a workup for GI bleeding at [facility]  her H&H was 6 and 10 at that time.  She was transfused.  She had extensive GI workup that could be found in the discharge summary from [facility].    She was brought into the ER this evening from [facility] rehab.  She was brought into a stabbing chest discomfort.  EKG done showed no acute ST elevation.  Lab work showed severe anemia of 6 and 20.  BNP was 137,631.  Troponin was elevated at 501.    Past Medical History:   Diagnosis Date    Anemia     Anemia     Breast mass     Chronic constipation     CKD (chronic kidney disease)     Diabetes mellitus     Dialysis patient     MON, TUE, THURS, SAT.     Embolic stroke involving right middle cerebral artery 2024    Encounter for blood transfusion     Erosive gastritis 2016    GERD (gastroesophageal reflux disease)     Gout     High cholesterol     Hypertension     Knee pain     Thyroid disease     Unspecified cataract 2022       Past Surgical History:   Procedure Laterality Date    AV FISTULA PLACEMENT Left 2019    Procedure: creation av fistula;  Surgeon: Buck Fernandez MD;  Location: [facility];  Service: Cardiovascular;  Laterality: Left;  Left Radialcephalic: PACU     SECTION  , , ,     CHOLECYSTECTOMY      COLONOSCOPY N/A 10/23/2017    Procedure: COLONOSCOPY;  Surgeon: Bri Harry MD;  Location: [facility];  Service: Endoscopy;  Laterality: N/A;    EXCISIONAL BIOPSY Left 2022    Procedure: EXCISIONAL BIOPSY;  Surgeon: Mick Juarez MD;  Location: [facility];  Service: General;  Laterality: Left;    TUBAL LIGATION      UPPER GASTROINTESTINAL ENDOSCOPY  2016    Erosive Gastritis-Dr Bailey       Review of patient's allergies indicates:   Allergen Reactions    Tramadol Itching and Nausea And Vomiting       No current facility-administered medications on file prior to encounter.     Current Outpatient Medications on File Prior to Encounter   Medication Sig    aspirin (ECOTRIN) 81 MG EC tablet Take 1 tablet (81 mg total) by mouth once daily.    atorvastatin (LIPITOR) 40 MG tablet Take 1 tablet by mouth once daily    clopidogreL (PLAVIX) 75 mg tablet Take 1 tablet (75 mg total) by mouth once daily.    colchicine (COLCRYS) 0.6 mg tablet TAKE 1 TABLET BY MOUTH ONCE DAILY AS NEEDED FOR GOUT PAIN    famotidine (PEPCID) 20 MG tablet Take 1 tablet (20 mg total) by mouth 2 (two) times daily as needed for Heartburn.    hydrOXYzine HCL (ATARAX) 25 MG tablet Take 1 tablet (25 mg total) by mouth 2 (two) times daily as needed for Itching or Anxiety.    insulin aspart U-100 (NOVOLOG FLEXPEN U-100 INSULIN) 100 unit/mL (3 mL)  InPn pen Inject 10 Units into the skin 2 (two) times daily with meals.    lactulose (CHRONULAC) 10 gram/15 mL solution Take 30 mLs (20 g total) by mouth 2 (two) times daily.    linaGLIPtin (TRADJENTA) 5 mg Tab tablet TAKE 1 TABLET (5 MG TOTAL) BY MOUTH ONCE DAILY.    miconazole (MICOTIN) 2 % cream Apply topically 2 (two) times daily. Between thighs, under abdomen pannus, under breasts    nebivoloL (BYSTOLIC) 20 mg Tab Take 1 tablet by mouth once daily.    ondansetron (ZOFRAN-ODT) 4 MG TbDL Take 1 tablet (4 mg total) by mouth every 8 (eight) hours as needed ([place]).    oxyCODONE (ROXICODONE) 5 MG immediate release tablet Take 1 tablet (5 mg total) by mouth every 12 (twelve) hours as needed for Pain.    pantoprazole (PROTONIX) 40 MG tablet Take 1 tablet (40 mg total) by mouth once daily.    penicillin G benzathine (BICILLIN LA) 1,200,000 unit/2 mL Syrg Inject 4 mLs (2.4 Million Units total) into the muscle once a week. On 11/22/2024 and 11/29/2024 for 2 doses    traZODone (DESYREL) 100 MG tablet Take 100 mg by mouth nightly as needed.    ursodioL (ACTIGALL) 300 mg capsule Take 1 capsule (300 mg total) by mouth 3 (three) times daily.     Family History       Problem Relation (Age of Onset)    Breast cancer Maternal Aunt    Diabetes Father    Ovarian cancer Maternal Aunt    Thyroid disease Mother          Tobacco Use    Smoking status: Never     Passive exposure: Never    Smokeless tobacco: Never   Substance and Sexual Activity    Alcohol use: No     Alcohol/week: 0.0 standard drinks of alcohol    Drug use: No    Sexual activity: Yes     Partners: Male     Birth control/protection: None, Surgical     Review of Systems   Constitutional: Positive for decreased appetite and malaise/fatigue.   Eyes: Negative.    Cardiovascular:  Positive for chest pain.   Respiratory:  Positive for shortness of breath.    Endocrine: Negative.    Musculoskeletal:  Positive for joint pain.   Gastrointestinal:  Positive for abdominal pain.    Neurological: Negative.    Psychiatric/Behavioral: Negative.       Objective:     Vital Signs (Most Recent):  Temp: 97.7 °F (36.5 °C) (11/30/24 0004)  Pulse: 62 (11/30/24 0124)  Resp: 18 (11/30/24 0006)  BP: (!) 149/70 (11/30/24 0124)  SpO2: 97 % (11/30/24 0127) Vital Signs (24h Range):  Temp:  [97.7 °F (36.5 °C)] 97.7 °F (36.5 °C)  Pulse:  [57-62] 62  Resp:  [18] 18  SpO2:  [97 %-98 %] 97 %  BP: (149-176)/(70-87) 149/70     Weight: 92.6 kg (204 lb 2.3 oz)  Body mass index is 32.95 kg/m².    SpO2: 97 %       No intake or output data in the 24 hours ending 11/30/24 0147    Lines/Drains/Airways       Peripheral Intravenous Line  Duration                  Hemodialysis AV Fistula Left upper arm -- days         Hemodialysis AV Fistula 08/05/19 Left forearm 1944 days         Peripheral IV - Single Lumen 11/30/24 0031 20 G Posterior;Right Hand <1 day                    Physical Exam  HENT:      Nose: Nose normal.      Mouth/Throat:      Mouth: Mucous membranes are moist.   Eyes:      Extraocular Movements: Extraocular movements intact.   Cardiovascular:      Rate and Rhythm: Normal rate and regular rhythm.      Pulses: Normal pulses.   Pulmonary:      Effort: Pulmonary effort is normal.   Abdominal:      General: Abdomen is flat. Bowel sounds are normal.   Musculoskeletal:         General: Normal range of motion.   Skin:     General: Skin is warm and dry.   Neurological:      Mental Status: She is alert.   Psychiatric:         Mood and Affect: Mood normal.         Thought Content: Thought content normal.         Significant Labs:   Recent Lab Results         11/30/24  0035   11/29/24  1131        Albumin 1.8   1.8          428       ALT 74   79       Anion Gap 9   11          166       Baso # 0.10   0.09       Basophil % 0.6   0.4       BILIRUBIN TOTAL 7.7  Comment: For infants and newborns, interpretation of results should be based  on gestational age, weight and in agreement with  clinical  observations.    Premature Infant recommended reference ranges:  Up to 24 hours.............<8.0 mg/dL  Up to 48 hours............<12.0 mg/dL  3-5 days..................<15.0 mg/dL  6-29 days.................<15.0 mg/dL    For patients on Eltrombopag therapy, use of [place] TBIL is   not   recommended.     8.3  Comment: For infants and newborns, interpretation of results should be based  on gestational age, weight and in agreement with clinical  observations.    Premature Infant recommended reference ranges:  Up to 24 hours.............<8.0 mg/dL  Up to 48 hours............<12.0 mg/dL  3-5 days..................<15.0 mg/dL  6-29 days.................<15.0 mg/dL    For patients on Eltrombopag therapy, use of [employer] TBIL is   not   recommended.  Specimen markedly icteric         BUN 31   24       Calcium 9.2   8.4       Chloride 99   99       CO2 31   29       Creatinine 3.8   2.9       Differential Method Automated   Automated       eGFR 14.3   19.7       Eos # 0.7   0.5       Eos % 3.9   2.3       Free T4   0.62  Comment: ATTENTION: The use of Biotin Supplements may interfere with this   assay.         Glucose 103   111       Gran # (ANC) 13.2   16.4       Gran % 75.0   78.6       Hematocrit 20.1   20.4       Hemoglobin 6.6   6.8       Immature Grans (Abs) 0.10  Comment: Mild elevation in immature granulocytes is non specific and   can be seen in a variety of conditions including stress response,   acute inflammation, trauma and pregnancy. Correlation with other   laboratory and clinical findings is essential.     0.14  Comment: Mild elevation in immature granulocytes is non specific and   can be seen in a variety of conditions including stress response,   acute inflammation, trauma and pregnancy. Correlation with other   laboratory and clinical findings is essential.         Immature Granulocytes 0.6   0.7       Lymph # 1.8   1.8       Lymph % 10.3   8.7       MCH 31.3   31.6       MCHC  32.8   33.3       MCV 95   95       Mono # 1.7   1.9       Mono % 9.6   9.3       MPV 11.1   12.4       nRBC 0   0       NT-proBNP 648653         Platelet Count 212   209       Potassium 3.4   3.0       PROTEIN TOTAL 7.9   8.2       RBC 2.11   2.15       RDW 21.1   21.0       Sodium 139   139       Troponin I High Sensitivity 501.3         TSH   18.500  Comment: ATTENTION: The use of Biotin Supplements may interfere with this   assay.         WBC 17.63   20.93               Significant Imaging:   EKG was faxed - no LATOYA or ischemic changes noted  Assessment and Plan:     Impression:  Elevated troponin probable type II leak from severe anemia  Severe anemia  - 6/10  History of heart failure with borderline LV function of 45%  -Nonischemic  Pulmonary hypertension  Dyslipidemia  COPD  CVA July 2024 right MCA  - No etiology at this time ( no palpitations )   Hepititis    Plan:  No ischemic changes on her EKG  Suspect her troponin bump is secondary to severe anemia.  She had left heart catheterization in 2017 that showed no coronary artery disease she has had follow-up nuclear scans that show previous inferior infarct and no ischemia the last being April of this year  Continue to cycle cardiac biomarkers  From a cardiology standpoint we would like to continue her aspirin and Plavix if it is safe to do so from a GI standpoint  If she is transferred we will happily follow with her while she is in-house.    Thank you again for the consultation should you have any further questions or concerns please do not hesitate to call  There are no hospital problems to display for this patient.      VTE Risk Mitigation (From admission, onward)      None            Thank you for your consult.     Ankur Larose NP  Cardiology   [place] - Emergency Department

## 2024-11-30 NOTE — ED NOTES
Patient had a bowel movement while changing patient noted blood appeared to be coming from vagina instead of rectally

## 2024-11-30 NOTE — PROVIDER TRANSFER
Outside Transfer Acceptance Note / Regional Referral Center    Referring facility: [facility]   Referring provider: DNANA MENDOZA DUSTIN A ALOZIE, NNANNA T.  Accepting facility: [facility]  Accepting provider: JUAN SHARMA AKACHI  Admitting provider: ange  Reason for transfer: Higher Level of Care   Transfer diagnosis: lower GI bleed  Transfer specialty requested: Gastroenterology  Gastroenterology  Transfer specialty notified: Yes  Transfer level: NUMBER 1-5: 2  Bed type requested: step down  Isolation status: No active isolations   Admission class or status: IP- Inpatient  IP- Inpatient      Narrative     Patient is a 44-year-old female with a past medical history of CHF (EF 45%), hepatic encephalopathy, ESRD on HD, MWF, anemia, IDDM, CVA, GERD, dyslipidemia, hypertension, lap shar, COPD on 3 L at home, PVD, hypothyroidism that presents to [facility] emergency department because of epigastric pain.  Blood pressure is low normal, otherwise stable vitals.  White count 17, H&H 6.6/20.1 (from 10.3/30.9 on 11/18/2024).  MCV 95, platelets 212.  CMP shows sodium 139, potassium 3.4, CO2 31, creatinine 3.8 (consistent with ESRD), glucose 103.  Total bili and alk phos both elevated.  AST/ALT ratio 153/74.  ,000.  Troponin elevated but trending down, 501, 477.  TSH elevated to 18.5.  Low T4 of 0.60.  Positive occult.  Chest x-ray shows no acute findings.  Patient received IV fluids, fentanyl, morphine, Zosyn, Protonix, 1 unit of packed red blood cells, 2 units of fresh frozen plasma in the emergency department.  Per ERP, cardiology was consulted because of elevated troponin but suspect that elevated troponin secondary to severe anemia.  ERP also discussed case with Dr. Green, MARIANA @[facility] who agrees to consult on patient.  I will therefore accept patient for  transfer to our facility.    Patient recently discharged from [facility] 11/16/2024.  During that stay, patient was evaluated for anemia secondary lower GI bleed versus hemolytic anemia versus supratherapeutic INR.  No scope was performed.  CT chest abdomen pelvis shows evidence of anasarca, diffuse lymphadenopathy, large pericardial effusion, cardiomegaly.  No significant intra-abdominal process.  MRCP 11/9/2024 showed a large pericardial effusion, surgically absent gallbladder and cystic pancreatic lesions.  No mention of biliary obstruction. Patient received blood transfusion and eventually discharged home.        Objective     Vitals:    Recent Labs: All pertinent labs within the past 24 hours have been reviewed.  Recent imaging: see chart   Airway:     Vent settings:         IV access:        Peripheral IV - Single Lumen 11/30/24 0031 20 G Posterior;Right Hand (Active)   Site Assessment Clean;Dry;Intact;No redness 11/30/24 0031   Extremity Assessment Distal to IV No warmth;No abnormal discoloration;No redness;No swelling 11/30/24 0031   Line Status Saline locked;Blood return noted 11/30/24 0031   Dressing Status Clean;Dry;Intact 11/30/24 0031   Dressing Intervention First dressing 11/30/24 0031            Peripheral IV - Single Lumen 11/30/24 0441 18 G 1 3/4 in Anterior;Proximal;Right Forearm (Active)   Site Assessment Clean;Dry;Intact;No redness 11/30/24 0441   Extremity Assessment Distal to IV No warmth;No abnormal discoloration;No redness;No swelling 11/30/24 0441   Line Status Blood return noted;Saline locked 11/30/24 0441   Dressing Status Intact;Dry;Clean 11/30/24 0441   Dressing Intervention First dressing 11/30/24 0441            Hemodialysis AV Fistula 08/05/19 Left forearm (Active)            Hemodialysis AV Fistula Left upper arm (Active)     Infusions: see chart  Allergies:   Review of patient's allergies indicates:   Allergen Reactions    Tramadol Itching and Nausea And Vomiting      NPO:  No    Anticoagulation:   Anticoagulants       None             Instructions      Jovan Boogie-  Admit to Hospital Medicine  Upon patient arrival to floor, please send SecureChat to [facility] or call extension [phone] (if no answer, do NOT leave a callback number after the beep, rather please send a SecureChat to [facility]), for Hospital Medicine admit team assignment and for additional admit orders for the patient.  Do not page the attending physician associated with the patient on arrival (this physician may not be on duty at the time of arrival).  Rather, always send a SecureChat to [facility] or call [phone] to reach the triage physician for orders and team assignment.

## 2024-11-30 NOTE — PROVIDER TRANSFER
Outside Transfer Acceptance Note / Regional Referral Center    Referring facility: [facility]   Referring provider: DANNA MENDOZA DUSTIN A ALOZIE, NNANNA T.  Accepting facility: [facility]  Accepting provider: JUAN SHARMA AKACHI  Admitting provider: Kianna  Reason for transfer:  GI consultation  Transfer diagnosis:   Transfer specialty requested: Gastroenterology  Gastroenterology  Transfer specialty notified: Yes  Transfer level: NUMBER 1-5: 2  Bed type requested: step down  Isolation status: No active isolations   Admission class or status: IP- Inpatient  IP- Inpatient      Narrative     Patient is a 44-year-old female with a past medical history of hepatic encephalopathy, ESRD on HD, MWF, anemia, IDDM, CVA, GERD, dyslipidemia, hypertension, lap shar, COPD on 3 L at home, PVD, hypothyroidism that presents to [facility] emergency department because of epigastric pain.  Blood pressure is low normal, otherwise stable vitals.  White count 17, H&H 6.6/20.1 (from 10.3/30.9 on 11/18/2024).  MCV 95, platelets 212.  CMP shows sodium 139, potassium 3.4, CO2 31, creatinine 3.8 (consistent with ESRD), glucose 103.  Total bili and alk phos both elevated.  AST/ALT ratio 153/74.  ,000.  Troponin elevated but trending down, 501, 477.  TSH elevated to 18.5.  Low T4 of 0.60.  Positive occult.  Chest x-ray shows no acute findings.  Patient received IV fluids, fentanyl, morphine, Zosyn, Protonix, 1 unit of packed red blood cells, 2 units of fresh frozen plasma in the emergency department.  Per ERP, he discussed case with Dr. Green, GI @[facility] who agrees to consult on patient.  I will therefore accept patient for transfer to our facility.    Patient recently discharged from [facility] 11/16/2024.  During that stay, patient was evaluated for anemia secondary lower GI  bleed versus hemolytic anemia versus supratherapeutic INR.  No scope was performed.  Patient received blood transfusion and eventually discharged home.        Objective     Vitals: Temp: 98 °F (36.7 °C) (11/30/24 0437)  Pulse: (!) 58 (11/30/24 0437)  Resp: 18 (11/30/24 0351)  BP: (!) 160/84 (11/30/24 0437)  SpO2: 100 % (11/30/24 0437)  Recent Labs: All pertinent labs within the past 24 hours have been reviewed.  Recent imaging: see chart   Airway:     Vent settings:         IV access:        Peripheral IV - Single Lumen 11/30/24 0031 20 G Posterior;Right Hand (Active)   Site Assessment Clean;Dry;Intact;No redness 11/30/24 0031   Extremity Assessment Distal to IV No warmth;No abnormal discoloration;No redness;No swelling 11/30/24 0031   Line Status Saline locked;Blood return noted 11/30/24 0031   Dressing Status Clean;Dry;Intact 11/30/24 0031   Dressing Intervention First dressing 11/30/24 0031            Peripheral IV - Single Lumen 11/30/24 0441 18 G 1 3/4 in Anterior;Proximal;Right Forearm (Active)   Site Assessment Clean;Dry;Intact;No redness 11/30/24 0441   Extremity Assessment Distal to IV No warmth;No abnormal discoloration;No redness;No swelling 11/30/24 0441   Line Status Blood return noted;Saline locked 11/30/24 0441   Dressing Status Intact;Dry;Clean 11/30/24 0441   Dressing Intervention First dressing 11/30/24 0441            Hemodialysis AV Fistula 08/05/19 Left forearm (Active)            Hemodialysis AV Fistula Left upper arm (Active)     Infusions: see chart  Allergies:   Review of patient's allergies indicates:   Allergen Reactions    Tramadol Itching and Nausea And Vomiting      NPO: No    Anticoagulation:   Anticoagulants       None             Instructions      Jovan Boogie-  Admit to Hospital Medicine  Upon patient arrival to floor, please send SecureChat to [facility] or call extension [phone] (if no answer, do NOT leave a callback number after the beep, rather please send a SecureChat to [facility]),  for Hospital Medicine admit team assignment and for additional admit orders for the patient.  Do not page the attending physician associated with the patient on arrival (this physician may not be on duty at the time of arrival).  Rather, always send a SecureChat to [facility] or call [phone] to reach the triage physician for orders and team assignment.

## 2024-11-30 NOTE — ED NOTES
NEUROLOGICAL:   Patient is awake , alert , and oriented x 4 .   Moves all extremities without difficulty.   Patient reports no neuro complaints..  GCS 15    CARDIOVASCULAR:   S1 and S2 present and rate regular     On palpation 2+ edema noted, noted to none, left, right , and leg(s).   Patient reports weakness .     RESPIRATORY:   Airway Clear, Open, and Patent.  Respirations are even and unlabored.   Breath sounds clear  to all lung fields.   Patient reports no respiratory complaints.     GASTROINTESTINAL:   Abdomen is soft  and non-tender x 4 quadrants. Bowel sounds are normoactive to all quadrants .   Patient reports dark stools.     SKIN:   Skin appears warm , dry , good turgor, color normal for race, and intact.

## 2024-12-01 PROBLEM — R79.89 ELEVATED TROPONIN: Status: ACTIVE | Noted: 2024-12-01

## 2024-12-01 LAB
ABO + RH BLD: NORMAL
ALBUMIN SERPL BCP-MCNC: 1.7 G/DL (ref 3.5–5.2)
ALP SERPL-CCNC: 343 U/L (ref 40–150)
ALT SERPL W/O P-5'-P-CCNC: 55 U/L (ref 10–44)
ANION GAP SERPL CALC-SCNC: 11 MMOL/L (ref 8–16)
AST SERPL-CCNC: 89 U/L (ref 10–40)
BASOPHILS # BLD AUTO: 0.14 K/UL (ref 0–0.2)
BASOPHILS NFR BLD: 0.9 % (ref 0–1.9)
BILIRUB SERPL-MCNC: 7 MG/DL (ref 0.1–1)
BLD GP AB SCN CELLS X3 SERPL QL: NORMAL
BLD PROD TYP BPU: NORMAL
BLD PROD TYP BPU: NORMAL
BLOOD UNIT EXPIRATION DATE: NORMAL
BLOOD UNIT EXPIRATION DATE: NORMAL
BLOOD UNIT TYPE CODE: 5100
BLOOD UNIT TYPE CODE: 5100
BLOOD UNIT TYPE: NORMAL
BLOOD UNIT TYPE: NORMAL
BUN SERPL-MCNC: 37 MG/DL (ref 6–20)
CALCIUM SERPL-MCNC: 8.9 MG/DL (ref 8.7–10.5)
CHLORIDE SERPL-SCNC: 100 MMOL/L (ref 95–110)
CO2 SERPL-SCNC: 24 MMOL/L (ref 23–29)
CODING SYSTEM: NORMAL
CODING SYSTEM: NORMAL
CREAT SERPL-MCNC: 4.4 MG/DL (ref 0.5–1.4)
CROSSMATCH INTERPRETATION: NORMAL
CROSSMATCH INTERPRETATION: NORMAL
DAT IGG-SP REAG RBC-IMP: NORMAL
DIFFERENTIAL METHOD BLD: ABNORMAL
DISPENSE STATUS: NORMAL
DISPENSE STATUS: NORMAL
EOSINOPHIL # BLD AUTO: 0.9 K/UL (ref 0–0.5)
EOSINOPHIL NFR BLD: 6 % (ref 0–8)
ERYTHROCYTE [DISTWIDTH] IN BLOOD BY AUTOMATED COUNT: 19.7 % (ref 11.5–14.5)
ERYTHROCYTE [DISTWIDTH] IN BLOOD BY AUTOMATED COUNT: 20.6 % (ref 11.5–14.5)
ERYTHROCYTE [DISTWIDTH] IN BLOOD BY AUTOMATED COUNT: 21.2 % (ref 11.5–14.5)
EST. GFR  (NO RACE VARIABLE): 12 ML/MIN/1.73 M^2
FERRITIN SERPL-MCNC: 7149 NG/ML (ref 20–300)
GLUCOSE SERPL-MCNC: 72 MG/DL (ref 70–110)
HCT VFR BLD AUTO: 18.1 % (ref 37–48.5)
HCT VFR BLD AUTO: 19.2 % (ref 37–48.5)
HCT VFR BLD AUTO: 25.8 % (ref 37–48.5)
HGB BLD-MCNC: 6 G/DL (ref 12–16)
HGB BLD-MCNC: 6.6 G/DL (ref 12–16)
HGB BLD-MCNC: 8.3 G/DL (ref 12–16)
IMM GRANULOCYTES # BLD AUTO: 0.18 K/UL (ref 0–0.04)
IMM GRANULOCYTES NFR BLD AUTO: 1.2 % (ref 0–0.5)
LYMPHOCYTES # BLD AUTO: 2 K/UL (ref 1–4.8)
LYMPHOCYTES NFR BLD: 12.9 % (ref 18–48)
MAGNESIUM SERPL-MCNC: 1.9 MG/DL (ref 1.6–2.6)
MCH RBC QN AUTO: 30.6 PG (ref 27–31)
MCH RBC QN AUTO: 30.9 PG (ref 27–31)
MCH RBC QN AUTO: 32.2 PG (ref 27–31)
MCHC RBC AUTO-ENTMCNC: 32.2 G/DL (ref 32–36)
MCHC RBC AUTO-ENTMCNC: 33.1 G/DL (ref 32–36)
MCHC RBC AUTO-ENTMCNC: 34.4 G/DL (ref 32–36)
MCV RBC AUTO: 93 FL (ref 82–98)
MCV RBC AUTO: 94 FL (ref 82–98)
MCV RBC AUTO: 95 FL (ref 82–98)
MONOCYTES # BLD AUTO: 1.6 K/UL (ref 0.3–1)
MONOCYTES NFR BLD: 10 % (ref 4–15)
NEUTROPHILS # BLD AUTO: 10.7 K/UL (ref 1.8–7.7)
NEUTROPHILS NFR BLD: 69 % (ref 38–73)
NRBC BLD-RTO: 0 /100 WBC
PHOSPHATE SERPL-MCNC: 5.4 MG/DL (ref 2.7–4.5)
PLATELET # BLD AUTO: 184 K/UL (ref 150–450)
PLATELET # BLD AUTO: 189 K/UL (ref 150–450)
PLATELET # BLD AUTO: 192 K/UL (ref 150–450)
PMV BLD AUTO: 11 FL (ref 9.2–12.9)
PMV BLD AUTO: 11.1 FL (ref 9.2–12.9)
PMV BLD AUTO: 11.6 FL (ref 9.2–12.9)
POCT GLUCOSE: 104 MG/DL (ref 70–110)
POCT GLUCOSE: 81 MG/DL (ref 70–110)
POCT GLUCOSE: 81 MG/DL (ref 70–110)
POTASSIUM SERPL-SCNC: 3.7 MMOL/L (ref 3.5–5.1)
PROT SERPL-MCNC: 7.1 G/DL (ref 6–8.4)
RBC # BLD AUTO: 1.94 M/UL (ref 4–5.4)
RBC # BLD AUTO: 2.05 M/UL (ref 4–5.4)
RBC # BLD AUTO: 2.71 M/UL (ref 4–5.4)
SODIUM SERPL-SCNC: 135 MMOL/L (ref 136–145)
SPECIMEN OUTDATE: NORMAL
TRANS ERYTHROCYTES VOL PATIENT: NORMAL ML
TRANS ERYTHROCYTES VOL PATIENT: NORMAL ML
WBC # BLD AUTO: 15.51 K/UL (ref 3.9–12.7)
WBC # BLD AUTO: 16.78 K/UL (ref 3.9–12.7)
WBC # BLD AUTO: 16.95 K/UL (ref 3.9–12.7)

## 2024-12-01 PROCEDURE — 63600175 PHARM REV CODE 636 W HCPCS: Performed by: STUDENT IN AN ORGANIZED HEALTH CARE EDUCATION/TRAINING PROGRAM

## 2024-12-01 PROCEDURE — 85027 COMPLETE CBC AUTOMATED: CPT | Mod: 91 | Performed by: STUDENT IN AN ORGANIZED HEALTH CARE EDUCATION/TRAINING PROGRAM

## 2024-12-01 PROCEDURE — 25000003 PHARM REV CODE 250

## 2024-12-01 PROCEDURE — 99223 1ST HOSP IP/OBS HIGH 75: CPT | Mod: ,,, | Performed by: INTERNAL MEDICINE

## 2024-12-01 PROCEDURE — 36430 TRANSFUSION BLD/BLD COMPNT: CPT

## 2024-12-01 PROCEDURE — 25000003 PHARM REV CODE 250: Performed by: STUDENT IN AN ORGANIZED HEALTH CARE EDUCATION/TRAINING PROGRAM

## 2024-12-01 PROCEDURE — P9021 RED BLOOD CELLS UNIT: HCPCS | Performed by: STUDENT IN AN ORGANIZED HEALTH CARE EDUCATION/TRAINING PROGRAM

## 2024-12-01 PROCEDURE — 80053 COMPREHEN METABOLIC PANEL: CPT | Performed by: STUDENT IN AN ORGANIZED HEALTH CARE EDUCATION/TRAINING PROGRAM

## 2024-12-01 PROCEDURE — 85025 COMPLETE CBC W/AUTO DIFF WBC: CPT | Performed by: STUDENT IN AN ORGANIZED HEALTH CARE EDUCATION/TRAINING PROGRAM

## 2024-12-01 PROCEDURE — 86920 COMPATIBILITY TEST SPIN: CPT

## 2024-12-01 PROCEDURE — 20600001 HC STEP DOWN PRIVATE ROOM

## 2024-12-01 PROCEDURE — 86920 COMPATIBILITY TEST SPIN: CPT | Performed by: STUDENT IN AN ORGANIZED HEALTH CARE EDUCATION/TRAINING PROGRAM

## 2024-12-01 PROCEDURE — 83735 ASSAY OF MAGNESIUM: CPT | Performed by: STUDENT IN AN ORGANIZED HEALTH CARE EDUCATION/TRAINING PROGRAM

## 2024-12-01 PROCEDURE — P9021 RED BLOOD CELLS UNIT: HCPCS

## 2024-12-01 PROCEDURE — 84100 ASSAY OF PHOSPHORUS: CPT | Performed by: STUDENT IN AN ORGANIZED HEALTH CARE EDUCATION/TRAINING PROGRAM

## 2024-12-01 PROCEDURE — 36415 COLL VENOUS BLD VENIPUNCTURE: CPT | Performed by: STUDENT IN AN ORGANIZED HEALTH CARE EDUCATION/TRAINING PROGRAM

## 2024-12-01 PROCEDURE — 30233N1 TRANSFUSION OF NONAUTOLOGOUS RED BLOOD CELLS INTO PERIPHERAL VEIN, PERCUTANEOUS APPROACH: ICD-10-PCS | Performed by: INTERNAL MEDICINE

## 2024-12-01 RX ORDER — HYDROCODONE BITARTRATE AND ACETAMINOPHEN 500; 5 MG/1; MG/1
TABLET ORAL
Status: DISCONTINUED | OUTPATIENT
Start: 2024-12-01 | End: 2024-12-05 | Stop reason: HOSPADM

## 2024-12-01 RX ORDER — DIPHENHYDRAMINE HCL 25 MG
25 CAPSULE ORAL ONCE
Status: COMPLETED | OUTPATIENT
Start: 2024-12-01 | End: 2024-12-01

## 2024-12-01 RX ORDER — MEDROXYPROGESTERONE ACETATE 10 MG/1
20 TABLET ORAL 3 TIMES DAILY
Status: CANCELLED | OUTPATIENT
Start: 2024-12-01

## 2024-12-01 RX ORDER — MEDROXYPROGESTERONE ACETATE 10 MG/1
20 TABLET ORAL 3 TIMES DAILY
Status: DISCONTINUED | OUTPATIENT
Start: 2024-12-01 | End: 2024-12-05 | Stop reason: HOSPADM

## 2024-12-01 RX ADMIN — DIPHENHYDRAMINE HYDROCHLORIDE 25 MG: 25 CAPSULE ORAL at 09:12

## 2024-12-01 RX ADMIN — MEDROXYPROGESTERONE ACETATE 20 MG: 10 TABLET ORAL at 08:12

## 2024-12-01 RX ADMIN — URSODIOL 300 MG: 300 CAPSULE ORAL at 09:12

## 2024-12-01 RX ADMIN — MEDROXYPROGESTERONE ACETATE 20 MG: 10 TABLET ORAL at 04:12

## 2024-12-01 RX ADMIN — METOPROLOL TARTRATE 100 MG: 50 TABLET, FILM COATED ORAL at 09:12

## 2024-12-01 RX ADMIN — PANTOPRAZOLE SODIUM 40 MG: 40 INJECTION, POWDER, LYOPHILIZED, FOR SOLUTION INTRAVENOUS at 08:12

## 2024-12-01 RX ADMIN — METOPROLOL TARTRATE 100 MG: 50 TABLET, FILM COATED ORAL at 08:12

## 2024-12-01 RX ADMIN — PANTOPRAZOLE SODIUM 40 MG: 40 INJECTION, POWDER, LYOPHILIZED, FOR SOLUTION INTRAVENOUS at 09:12

## 2024-12-01 RX ADMIN — URSODIOL 300 MG: 300 CAPSULE ORAL at 03:12

## 2024-12-01 NOTE — ASSESSMENT & PLAN NOTE
- Noted at OSH in setting of severe anemia  - Elevated by cardiology at OSH where they rec'd to continue her Aspirin and Plavix if it is safe to do so from a GI standpoint given her cardiac and CVA hx

## 2024-12-01 NOTE — PROGRESS NOTES
"[facility] ([place] [id number])  [facility] Medicine  Progress Note    Patient Name: Pilar Fernandez  MRN: [id number]  Patient Class: IP- Inpatient   Admission Date: 11/30/2024  Length of Stay: 1 days  Attending Physician: Ivan Pecaock MD  Primary Care Provider: Lindsey Singletary FNP        Subjective:     Principal Problem:Vaginal bleeding        HPI:  Pilar Fernandez is a 44 y.o. female with complicated medical history including ESRD on HD, history of CVA with debility, chronic anemia, respiratory failure on 3L NC oxygen, HFpEF, PVD, recent admissions for acute on chronic anemia requiring transfusion and acute cholestatic hepatitis who presents from an OSH for acute anemia.     She reports that she was doing fairly well at SNF until last night, when she developed "upper stomach" pain described as a cramping. This worsened with lying flat, and improved with sitting up. Denies CP or SOB, stating that it is mostly in her epigastric region. She also noted a "clot" of blood in her underwear today followed by seeing her IUD. Her abdominal pain resolved after this. She does not have menstrual cycles, but noted the vaginal bleeding only today. She reports "normal" BMs, not every day, but has not had any dark or bloody stool to her knowledge. She makes little urine but denies any hematuria.     On evaluation at OSH, recurrent anemia noted, therefore she was transferred here for GI evaluation. She was transfused 1U RBC.       Overview/Hospital Course:  No notes on file    Interval History:   Admitted overnight. Hgb 6.6 from 6.0 after 1u RBC. Confirmed with patient she's having vaginal bleeding and not rectal bleeding. Gyn consulted.         Review of Systems   Constitutional: Negative.    Respiratory: Negative.     Cardiovascular: Negative.    Gastrointestinal: Negative.    Genitourinary:  Positive for vaginal bleeding. Negative for vaginal pain.     Objective:     Vital Signs (Most Recent):  Temp: " 97.5 °F (36.4 °C) (12/01/24 1327)  Pulse: 66 (12/01/24 1327)  Resp: 14 (12/01/24 1327)  BP: (!) 108/53 (12/01/24 1327)  SpO2: 100 % (12/01/24 1327) Vital Signs (24h Range):  Temp:  [97.5 °F (36.4 °C)-98.9 °F (37.2 °C)] 97.5 °F (36.4 °C)  Pulse:  [61-82] 66  Resp:  [12-21] 14  SpO2:  [98 %-100 %] 100 %  BP: (101-181)/(53-82) 108/53     Weight: 88.3 kg (194 lb 10.7 oz)  Body mass index is 33.41 kg/m².    Intake/Output Summary (Last 24 hours) at 12/1/2024 1449  Last data filed at 12/1/2024 0900  Gross per 24 hour   Intake 406.83 ml   Output 150 ml   Net 256.83 ml      Physical Exam  Vitals and nursing note reviewed.   Constitutional:       General: She is not in acute distress.     Appearance: She is well-developed. She is ill-appearing (chronically). She is not diaphoretic.   HENT:      Head: Normocephalic and atraumatic.   Eyes:      General: Scleral icterus present.      Conjunctiva/sclera: Conjunctivae normal.   Neck:      Vascular: No JVD.   Cardiovascular:      Rate and Rhythm: Normal rate and regular rhythm.   Pulmonary:      Effort: Pulmonary effort is normal. No respiratory distress.   Abdominal:      General: There is no distension.      Tenderness: There is no abdominal tenderness. There is no guarding.   Genitourinary:     Comments: Mild blood in Purewick canister  Musculoskeletal:      Comments: AVF in LUE   Skin:     Coloration: Skin is jaundiced. Skin is not pale.   Neurological:      Mental Status: She is alert and oriented to person, place, and time.      Motor: No abnormal muscle tone.   Psychiatric:         Mood and Affect: Mood normal.         Behavior: Behavior normal.         MELD 3.0: 31 at 12/1/2024 11:30 AM  MELD-Na: 30 at 12/1/2024 11:30 AM  Calculated from:  Serum Creatinine: On dialysis. Using the maximum value.  Serum Sodium: 135 mmol/L at 12/1/2024 11:30 AM  Total Bilirubin: 7 mg/dL at 12/1/2024 11:30 AM  Serum Albumin: 1.7 g/dL at 12/1/2024 11:30 AM  INR(ratio): 1.2 at 11/30/2024 10:23  PM  Age at listing (hypothetical): 44 years  Sex: Female at 12/1/2024 11:30 AM      Significant Labs:  CBC:  Recent Labs   Lab 11/30/24  1829 12/01/24  0224 12/01/24  1130   WBC 17.14* 15.51* 16.95*   HGB 6.5* 6.0* 6.6*   HCT 19.4* 18.1* 19.2*    184 192     CMP:  Recent Labs   Lab 11/30/24  0035 11/30/24  1829 12/01/24  1130    139 135*   K 3.4* 3.7 3.7   CL 99 101 100   CO2 31* 29 24    74 72   BUN 31* 36* 37*   CREATININE 3.8* 4.1* 4.4*   CALCIUM 9.2 8.8 8.9   PROT 7.9 7.2 7.1   ALBUMIN 1.8* 1.6* 1.7*   BILITOT 7.7* 6.4* 7.0*   ALKPHOS 397* 356* 343*   * 122* 89*   ALT 74* 67* 55*   ANIONGAP 9 9 11     PTINR:  Recent Labs   Lab 11/30/24  2223   INR 1.2         Assessment/Plan:      * Vaginal bleeding  RN noted blood clot in diaper on presentation, followed by patient passing her IUD. Patient denies having menstrual cycles, and bleeding occurred just today on presentation. While this could be a source of blood loss  - Gyn consultation  -- TVUS  -- Provera 20mg TID x7 days then transitioning to Provera 20mg daily thereafter   -- To formally see 12/2    Acute on chronic anemia  Patient with chronic multifactorial anemia, requiring multiple transfusions. Recent admission with anemia as well, without obvious cause. Suspect multifactorial from ESRD (reports she no longer gets Epo with HD), possible LILIAN, possible GIB. Although she denies any signs of melena or hematochezia, FOBT positive at OSH    - Iron panel c/w AoCD  - Elevated B12, LDH  - Low Folate, Haptoglobin   - Normal INR  - DAPT negative   - LDH/haptoglobin c/w possible hemolytic anemia/delayed hemolysis given recent transfusions, but INR normal  - Given 1U RBC at OSH  - Trend CBC and transfuse to maintain Hgb 7 or greater, o  - Hold ASA + Plavix for now.   - Holding GI consult given no significant GI symptoms on admission    ESRD (end stage renal disease) on dialysis  On HD MWF, with last session yesterday. Makes little urine.    Nephrology consulted for routine HD.     Elevated troponin  - Noted at OSH in setting of severe anemia  - Elevated by cardiology at OSH where they rec'd to continue her Aspirin and Plavix if it is safe to do so from a GI standpoint given her cardiac and CVA hx    Debility  Return to SNF upon discharge.     Morbid obesity  Body mass index is 33.41 kg/m².; chronic comorbidity affecting medical decision-making. Patient would greatly benefit from weight loss through proper diet and increased physical activity.        Chronic hypoxic respiratory failure  Reportedly with chronic hypoxemia on 3L NC, however with sats 100% on this on presentation. Wean to maintain sats 90% or greater.     History of cerebrovascular accident (CVA) with residual deficit  Holding ASA + Plavix for now given concern for bleeding, and statin given hepatitis. Resume as able. Continue BP and glucose control.       Hepatic dysfunction  Stable from prior, felt secondary to cholestatic hepatitis with fibrosis per biopsy. Continue Ursodiol per hepatology.       Direct hyperbilirubinemia  Stable from prior, felt secondary to cholestatic hepatitis. Continue ursodiol.       Mass of breast  Recent lymph node biopsy negative for malignancy. Continue outpatient follow up, and may need excisional biopsy if high suspicion for malignancy.       Heart failure with preserved ejection fraction  Patient with known diastolic CHF. Currently without evidence of volume overload on exam, and not in acute exacerbation. Will continue home GDMT with Metoprolol and volume removal with HD.  Appropriate diet ordered. Monitor volume status.     Peripheral vascular disease  Holding home ASA, Plavix, and statin given concern for bleeding and recent cholestatic hepatitis.      Hyperlipidemia associated with type 2 diabetes mellitus  Hold statin given recent hepatitis.     Hypertension associated with diabetes  Chronic, and currently controlled. Latest blood pressure and vitals  reviewed-   While in the hospital, will manage blood pressure as follows; Continue home antihypertensive regimen.    Will utilize p.r.n. blood pressure medication only if patient's blood pressure greater than 220/110 and she develops symptoms such as worsening chest pain or shortness of breath.      Type 2 diabetes mellitus with hyperglycemia, with long-term current use of insulin  Most recent A1c 4.5%, indicating excellent chronic control likely due to ESRD. Will hold home oral agents while inpatient. Start low-dose insulin; titrate to maintain adequate glucose control. Diabetic diet ordered.         VTE Risk Mitigation (From admission, onward)           Ordered     Reason for No Pharmacological VTE Prophylaxis  Once        Question:  Reasons:  Answer:  Active Bleeding    11/30/24 2201     IP VTE HIGH RISK PATIENT  Once         11/30/24 2201     Place sequential compression device  Until discontinued         11/30/24 2201                    Discharge Planning   SHASHA:      Code Status: Full Code   Is the patient medically ready for discharge?:     Reason for patient still in hospital (select all that apply): Patient trending condition, Laboratory test, Treatment, Consult recommendations, and Pending disposition  Discharge Plan A: Home with family                  Ivan Peacock MD  Department of Hospital Medicine   [facility] ([place])

## 2024-12-01 NOTE — HPI
"Pilar Fernandez is a 44 y.o. female with complicated medical history including ESRD on HD, history of CVA with debility, chronic anemia, respiratory failure on 3L NC oxygen, HFpEF, PVD, recent admissions for acute on chronic anemia requiring transfusion and acute cholestatic hepatitis who presents from an OSH for acute anemia.     She reports that she was doing fairly well at SNF until last night, when she developed "upper stomach" pain described as a cramping. This worsened with lying flat, and improved with sitting up. Denies CP or SOB, stating that it is mostly in her epigastric region. She also noted a "clot" of blood in her underwear today followed by seeing her IUD. Her abdominal pain resolved after this. She does not have menstrual cycles, but noted the vaginal bleeding only today. She reports "normal" BMs, not every day, but has not had any dark or bloody stool to her knowledge. She makes little urine but denies any hematuria.     On evaluation at OSH, recurrent anemia noted, therefore she was transferred here for GI evaluation. She was transfused 1U RBC.     "

## 2024-12-01 NOTE — SUBJECTIVE & OBJECTIVE
Past Medical History:   Diagnosis Date    Anemia     Anemia     Breast mass     Chronic constipation     CKD (chronic kidney disease)     Diabetes mellitus     Dialysis patient     GRZEGORZ BARRIOS, THURS, SAT.    Embolic stroke involving right middle cerebral artery 2024    Encounter for blood transfusion     Erosive gastritis 2016    GERD (gastroesophageal reflux disease)     Gout     High cholesterol     Hypertension     Knee pain     Thyroid disease     Unspecified cataract 2022       Past Surgical History:   Procedure Laterality Date    AV FISTULA PLACEMENT Left 2019    Procedure: creation av fistula;  Surgeon: Buck Fernandez MD;  Location: [facility];  Service: Cardiovascular;  Laterality: Left;  Left Radialcephalic: PACU     SECTION  , , ,     CHOLECYSTECTOMY      COLONOSCOPY N/A 10/23/2017    Procedure: COLONOSCOPY;  Surgeon: Bri Harry MD;  Location: [facility];  Service: Endoscopy;  Laterality: N/A;    EXCISIONAL BIOPSY Left 2022    Procedure: EXCISIONAL BIOPSY;  Surgeon: Mick Juarez MD;  Location: [facility];  Service: General;  Laterality: Left;    TUBAL LIGATION      UPPER GASTROINTESTINAL ENDOSCOPY  2016    Erosive Gastritis-Dr Bailey       Review of patient's allergies indicates:   Allergen Reactions    Tramadol Itching and Nausea And Vomiting       Current Facility-Administered Medications on File Prior to Encounter   Medication    [COMPLETED] aluminum-magnesium hydroxide-simethicone 200-200-20 mg/5 mL suspension 30 mL    And    [COMPLETED] LIDOcaine viscous HCl 2% oral solution 15 mL    [COMPLETED] pantoprazole injection 80 mg    [DISCONTINUED] 0.9%  NaCl infusion (for blood administration)    [DISCONTINUED] pantoprazole (PROTONIX) 40 mg in 0.9% NaCl 100 mL IVPB (MB+)     Current Outpatient Medications on File Prior to Encounter   Medication Sig    aspirin (ECOTRIN) 81 MG EC tablet Take 1 tablet (81 mg total) by mouth once daily.    atorvastatin  (LIPITOR) 40 MG tablet Take 1 tablet by mouth once daily    clopidogreL (PLAVIX) 75 mg tablet Take 1 tablet (75 mg total) by mouth once daily.    colchicine (COLCRYS) 0.6 mg tablet TAKE 1 TABLET BY MOUTH ONCE DAILY AS NEEDED FOR GOUT PAIN    famotidine (PEPCID) 20 MG tablet Take 1 tablet (20 mg total) by mouth 2 (two) times daily as needed for Heartburn.    hydrOXYzine HCL (ATARAX) 25 MG tablet Take 1 tablet (25 mg total) by mouth 2 (two) times daily as needed for Itching or Anxiety.    insulin aspart U-100 (NOVOLOG FLEXPEN U-100 INSULIN) 100 unit/mL (3 mL) InPn pen Inject 10 Units into the skin 2 (two) times daily with meals.    lactulose (CHRONULAC) 10 gram/15 mL solution Take 30 mLs (20 g total) by mouth 2 (two) times daily.    linaGLIPtin (TRADJENTA) 5 mg Tab tablet TAKE 1 TABLET (5 MG TOTAL) BY MOUTH ONCE DAILY.    miconazole (MICOTIN) 2 % cream Apply topically 2 (two) times daily. Between thighs, under abdomen pannus, under breasts    nebivoloL (BYSTOLIC) 20 mg Tab Take 1 tablet by mouth once daily.    ondansetron (ZOFRAN-ODT) 4 MG TbDL Take 1 tablet (4 mg total) by mouth every 8 (eight) hours as needed ([place]).    oxyCODONE (ROXICODONE) 5 MG immediate release tablet Take 1 tablet (5 mg total) by mouth every 12 (twelve) hours as needed for Pain.    pantoprazole (PROTONIX) 40 MG tablet Take 1 tablet (40 mg total) by mouth once daily.    penicillin G benzathine (BICILLIN LA) 1,200,000 unit/2 mL Syrg Inject 4 mLs (2.4 Million Units total) into the muscle once a week. On 11/22/2024 and 11/29/2024 for 2 doses    traZODone (DESYREL) 100 MG tablet Take 100 mg by mouth nightly as needed.    ursodioL (ACTIGALL) 300 mg capsule Take 1 capsule (300 mg total) by mouth 3 (three) times daily.     Family History       Problem Relation (Age of Onset)    Breast cancer Maternal Aunt    Diabetes Father    Ovarian cancer Maternal Aunt    Thyroid disease Mother          Tobacco Use    Smoking status: Never     Passive exposure:  Never    Smokeless tobacco: Never   Substance and Sexual Activity    Alcohol use: No     Alcohol/week: 0.0 standard drinks of alcohol    Drug use: No    Sexual activity: Yes     Partners: Male     Birth control/protection: None, Surgical     Review of Systems   All other systems reviewed and are negative.    Objective:     Vital Signs (Most Recent):  Temp: 98.4 °F (36.9 °C) (11/30/24 2115)  Pulse: 68 (11/30/24 2115)  Resp: 20 (11/30/24 2115)  BP: 137/62 (11/30/24 2115)  SpO2: 99 % (11/30/24 2115) Vital Signs (24h Range):  Temp:  [97.7 °F (36.5 °C)-98.9 °F (37.2 °C)] 98.4 °F (36.9 °C)  Pulse:  [55-79] 68  Resp:  [11-20] 20  SpO2:  [94 %-100 %] 99 %  BP: (137-190)/(62-87) 137/62     Weight: 88.3 kg (194 lb 10.7 oz)  Body mass index is 33.41 kg/m².     Physical Exam  Vitals and nursing note reviewed.   Constitutional:       General: She is not in acute distress.     Appearance: She is well-developed. She is ill-appearing (chronically). She is not diaphoretic.   HENT:      Head: Normocephalic and atraumatic.   Eyes:      General: Scleral icterus present.      Conjunctiva/sclera: Conjunctivae normal.   Neck:      Vascular: No JVD.   Cardiovascular:      Rate and Rhythm: Normal rate and regular rhythm.   Pulmonary:      Effort: Pulmonary effort is normal. No respiratory distress.   Abdominal:      General: There is no distension.      Tenderness: There is no abdominal tenderness. There is no guarding.   Genitourinary:     Comments: Mild blood in Purewick canister  Musculoskeletal:      Comments: AVF in LUE   Skin:     Coloration: Skin is jaundiced. Skin is not pale.   Neurological:      Mental Status: She is alert and oriented to person, place, and time.      Motor: No abnormal muscle tone.   Psychiatric:         Mood and Affect: Mood normal.         Behavior: Behavior normal.                Significant Labs: All pertinent labs within the past 24 hours have been reviewed.  CBC:   Recent Labs   Lab 11/29/24  1131  11/30/24  0035 11/30/24  1829   WBC 20.93* 17.63* 17.14*   HGB 6.8* 6.6* 6.5*   HCT 20.4* 20.1* 19.4*    212 200     CMP:   Recent Labs   Lab 11/29/24  1131 11/30/24  0035 11/30/24  1829    139 139   K 3.0* 3.4* 3.7   CL 99 99 101   CO2 29 31* 29   * 103 74   BUN 24* 31* 36*   CREATININE 2.9* 3.8* 4.1*   CALCIUM 8.4* 9.2 8.8   PROT 8.2 7.9 7.2   ALBUMIN 1.8* 1.8* 1.6*   BILITOT 8.3* 7.7* 6.4*   ALKPHOS 428* 397* 356*   * 153* 122*   ALT 79* 74* 67*   ANIONGAP 11 9 9       Significant Imaging: I have reviewed all pertinent imaging results/findings within the past 24 hours.

## 2024-12-01 NOTE — ASSESSMENT & PLAN NOTE
Holding ASA + Plavix for now given concern for bleeding, and statin given hepatitis. Resume as able. Continue BP and glucose control.

## 2024-12-01 NOTE — ASSESSMENT & PLAN NOTE
RN noted blood clot in diaper on presentation, followed by patient passing her IUD. Patient denies having menstrual cycles, and bleeding occurred just today on presentation. While this could be a source of blood loss, it would likely not explain her degree of anemia. Monitor. May consider Gyn consultation.

## 2024-12-01 NOTE — ASSESSMENT & PLAN NOTE
Reportedly with chronic hypoxemia on 3L NC, however with sats 100% on this on presentation. Wean to maintain sats 90% or greater.

## 2024-12-01 NOTE — ASSESSMENT & PLAN NOTE
Body mass index is 33.41 kg/m².; chronic comorbidity affecting medical decision-making. Patient would greatly benefit from weight loss through proper diet and increased physical activity.

## 2024-12-01 NOTE — PLAN OF CARE
Patient aaox4. Patient complaints of no pain. Patient turned q2. Patient transfused with 2 units of RBCs with no reaction. Patient placed on renal diet and medroxyprogesterone. Patient is still bleeding per vagina with clot. Patient schedule for US transvaginal. Patient had 4 bowel movement. Patient due medication administered and patient tolerating well. Plan of care reviewed with patient and patient verbalized understanding. Patient safety measures ongoing, fall precaution in place, call light within reach, bed pad changed when needed. Patient made comfortable in bed. No other concerns at this time.        Problem: Adult Inpatient Plan of Care  Goal: Plan of Care Review  Outcome: Progressing  Goal: Patient-Specific Goal (Individualized)  Outcome: Progressing  Goal: Absence of Hospital-Acquired Illness or Injury  Outcome: Progressing  Goal: Optimal Comfort and Wellbeing  Outcome: Progressing  Goal: Readiness for Transition of Care  Outcome: Progressing     Problem: Diabetes Comorbidity  Goal: Blood Glucose Level Within Targeted Range  Outcome: Progressing     Problem: Wound  Goal: Optimal Coping  Outcome: Progressing  Goal: Optimal Functional Ability  Outcome: Progressing  Goal: Absence of Infection Signs and Symptoms  Outcome: Progressing  Goal: Improved Oral Intake  Outcome: Progressing  Goal: Optimal Pain Control and Function  Outcome: Progressing  Goal: Skin Health and Integrity  Outcome: Progressing  Goal: Optimal Wound Healing  Outcome: Progressing     Problem: Skin Injury Risk Increased  Goal: Skin Health and Integrity  Outcome: Progressing     Problem: Gastrointestinal Bleeding  Goal: Optimal Coping with Acute Illness  Outcome: Progressing  Goal: Hemostasis  Outcome: Progressing     Problem: Fall Injury Risk  Goal: Absence of Fall and Fall-Related Injury  Outcome: Progressing

## 2024-12-01 NOTE — PLAN OF CARE
Pt admitted overnight from [place]. Passing vaginal blood clots, MD notified and photos in chart. VSS. Pt receiving 1 unit of blood at this time. Pt c/o IV pain with transfusion - assessed by me, Yolanda, RN & Lay RN - all 3 also attempted to start new access without success. MD notified. Consult PICC team for lien placement per MD. Sacral wounds photos updated in chart. Significant other at bedside. AAOx4. Bed lowest with wheels locked. Side rails up x2.       Problem: Adult Inpatient Plan of Care  Goal: Plan of Care Review  Outcome: Progressing  Goal: Patient-Specific Goal (Individualized)  Outcome: Progressing  Goal: Absence of Hospital-Acquired Illness or Injury  Outcome: Progressing  Goal: Optimal Comfort and Wellbeing  Outcome: Progressing  Goal: Readiness for Transition of Care  Outcome: Progressing     Problem: Diabetes Comorbidity  Goal: Blood Glucose Level Within Targeted Range  Outcome: Progressing     Problem: Wound  Goal: Optimal Coping  Outcome: Progressing  Goal: Optimal Functional Ability  Outcome: Progressing  Goal: Absence of Infection Signs and Symptoms  Outcome: Progressing  Goal: Improved Oral Intake  Outcome: Progressing  Goal: Optimal Pain Control and Function  Outcome: Progressing  Goal: Skin Health and Integrity  Outcome: Progressing  Goal: Optimal Wound Healing  Outcome: Progressing     Problem: Skin Injury Risk Increased  Goal: Skin Health and Integrity  Outcome: Progressing     Problem: Gastrointestinal Bleeding  Goal: Optimal Coping with Acute Illness  Outcome: Progressing  Goal: Hemostasis  Outcome: Progressing

## 2024-12-01 NOTE — ASSESSMENT & PLAN NOTE
Chronic, and currently controlled. Latest blood pressure and vitals reviewed-   While in the hospital, will manage blood pressure as follows; Continue home antihypertensive regimen.    Will utilize p.r.n. blood pressure medication only if patient's blood pressure greater than 220/110 and she develops symptoms such as worsening chest pain or shortness of breath.

## 2024-12-01 NOTE — ASSESSMENT & PLAN NOTE
44 y.o. woman with a PMHx of ESRD on HD (M,W,F), history of CVA with debility, chronic anemia, respiratory failure on 3L NC oxygen, HFpEF, PVD, recent admissions for acute on chronic anemia requiring transfusion and acute cholestatic hepatitis presenting from OSH for further evaluation of acute anemia and vaginal bleeding. Last dialyzed Friday (11/29), 5.5L removed.     Nephrology consulted for routine dialysis while inpatient.     Plan:  - Labs are stable at this time. Will plan for regular dialysis schedule starting tomorrow, 12/2  - Avoid nephrotoxins and renally dose meds for GFR listed above  - Daily BMPs

## 2024-12-01 NOTE — ASSESSMENT & PLAN NOTE
RN noted blood clot in diaper on presentation, followed by patient passing her IUD. Patient denies having menstrual cycles, and bleeding occurred just today on presentation. While this could be a source of blood loss  - Gyn consultation  -- TVUS  -- Provera 20mg TID x7 days then transitioning to Provera 20mg daily thereafter   -- To formally see 12/2

## 2024-12-01 NOTE — CARE UPDATE
Care Update:     GYN Team consulted for patient admitted with acute vaginal bleeding. History obtained from attending physician.     Pilar Fernandez is a 44 y.o.  F with a complicated medical history including ESRD on HD, history of CVA with debility, chronic anemia, respiratory failure on 3L NC oxygen, HFpEF, PVD who presented to the ED for acute onset vaginal bleeding. Patient had a history of heavy menstrual cycles and had an IUD placed 5 years ago. She reports that she began to have vaginal bleeding yesterday and her IUD was expulsed at that time. She reports that she has been amenorrheic since her IUD was placed.    Temp:  [97.5 °F (36.4 °C)-98.9 °F (37.2 °C)] 97.5 °F (36.4 °C)  Pulse:  [61-82] 66  Resp:  [12-21] 14  SpO2:  [98 %-100 %] 100 %  BP: (101-181)/(53-82) 108/53     Recent Labs   Lab 24  1829 24  0224 24  1130   WBC 17.14* 15.51* 16.95*   HGB 6.5* 6.0* 6.6*   HCT 19.4* 18.1* 19.2*   MCV 92 93 94    184 192        Acute Vaginal Bleeding  - VSS  - Patients initial H/H on admit: 6.8/20.4  - Patient had received 1u pRBCs   - Current H/H: 6.6/19.2  - In the setting of a history of CVA would not recommend IV estrogen  - Can consider Provera 20mg TID x7 days then transitioning to Provera 20mg daily thereafter  - Can consider a TVUS to evaluate complete expulsion of the IUD and the evaluate the endometrial stripe  - GYN team to see and evaluate the patient in the AM    Maryanne Campbell MD  Obstetrics and Gynecology - PGY2

## 2024-12-01 NOTE — H&P
"  [facility] - Stepdown Flex ([address] Medicine  History & Physical    Patient Name: Pilar Fernandez  MRN: [id number]  Patient Class: IP- Inpatient  Admission Date: 11/30/2024  Attending Physician: Ivan Peacock MD   Primary Care Provider: Lindsey Singletary FNP         Patient information was obtained from patient, caregiver / friend, and ER records.     Subjective:     Principal Problem:Acute on chronic anemia    Chief Complaint: abdominal pain     HPI: Pilar Fernandez is a 44 y.o. female with complicated medical history including ESRD on HD, history of CVA with debility, chronic anemia, respiratory failure on 3L NC oxygen, HFpEF, PVD, recent admissions for acute on chronic anemia requiring transfusion and acute cholestatic hepatitis who presents from an OSH for acute anemia.     She reports that she was doing fairly well at SNF until last night, when she developed "upper stomach" pain described as a cramping. This worsened with lying flat, and improved with sitting up. Denies CP or SOB, stating that it is mostly in her epigastric region. She also noted a "clot" of blood in her underwear today followed by seeing her IUD. She does not have menstrual cycles, but noted the vaginal bleeding only today. She reports "normal" BMs, not every day, but has not had any dark or bloody stool to her knowledge. She makes little urine but denies any hematuria.     On evaluation at OSH, recurrent anemia noted, therefore she was transferred here for GI evaluation. She was transfused 1U RBC.       Past Medical History:   Diagnosis Date    Anemia     Anemia     Breast mass     Chronic constipation     CKD (chronic kidney disease)     Diabetes mellitus     Dialysis patient     MON, TUE, THURS, SAT.    Embolic stroke involving right middle cerebral artery 7/13/2024    Encounter for blood transfusion     Erosive gastritis 03/2016    GERD (gastroesophageal reflux disease)     Gout     High cholesterol     " Hypertension     Knee pain     Thyroid disease     Unspecified cataract 2022       Past Surgical History:   Procedure Laterality Date    AV FISTULA PLACEMENT Left 2019    Procedure: creation av fistula;  Surgeon: Buck Fernandez MD;  Location: [facility];  Service: Cardiovascular;  Laterality: Left;  Left Radialcephalic: PACU     SECTION  , , ,     CHOLECYSTECTOMY      COLONOSCOPY N/A 10/23/2017    Procedure: COLONOSCOPY;  Surgeon: Bri Harry MD;  Location: [facility];  Service: Endoscopy;  Laterality: N/A;    EXCISIONAL BIOPSY Left 2022    Procedure: EXCISIONAL BIOPSY;  Surgeon: Mick Juarez MD;  Location: [facility];  Service: General;  Laterality: Left;    TUBAL LIGATION      UPPER GASTROINTESTINAL ENDOSCOPY  2016    Erosive Gastritis-Dr Bailey       Review of patient's allergies indicates:   Allergen Reactions    Tramadol Itching and Nausea And Vomiting       Current Facility-Administered Medications on File Prior to Encounter   Medication    [COMPLETED] aluminum-magnesium hydroxide-simethicone 200-200-20 mg/5 mL suspension 30 mL    And    [COMPLETED] LIDOcaine viscous HCl 2% oral solution 15 mL    [COMPLETED] pantoprazole injection 80 mg    [DISCONTINUED] 0.9%  NaCl infusion (for blood administration)    [DISCONTINUED] pantoprazole (PROTONIX) 40 mg in 0.9% NaCl 100 mL IVPB (MB+)     Current Outpatient Medications on File Prior to Encounter   Medication Sig    aspirin (ECOTRIN) 81 MG EC tablet Take 1 tablet (81 mg total) by mouth once daily.    atorvastatin (LIPITOR) 40 MG tablet Take 1 tablet by mouth once daily    clopidogreL (PLAVIX) 75 mg tablet Take 1 tablet (75 mg total) by mouth once daily.    colchicine (COLCRYS) 0.6 mg tablet TAKE 1 TABLET BY MOUTH ONCE DAILY AS NEEDED FOR GOUT PAIN    famotidine (PEPCID) 20 MG tablet Take 1 tablet (20 mg total) by mouth 2 (two) times daily as needed for Heartburn.    hydrOXYzine HCL (ATARAX) 25 MG tablet Take 1 tablet  (25 mg total) by mouth 2 (two) times daily as needed for Itching or Anxiety.    insulin aspart U-100 (NOVOLOG FLEXPEN U-100 INSULIN) 100 unit/mL (3 mL) InPn pen Inject 10 Units into the skin 2 (two) times daily with meals.    lactulose (CHRONULAC) 10 gram/15 mL solution Take 30 mLs (20 g total) by mouth 2 (two) times daily.    linaGLIPtin (TRADJENTA) 5 mg Tab tablet TAKE 1 TABLET (5 MG TOTAL) BY MOUTH ONCE DAILY.    miconazole (MICOTIN) 2 % cream Apply topically 2 (two) times daily. Between thighs, under abdomen pannus, under breasts    nebivoloL (BYSTOLIC) 20 mg Tab Take 1 tablet by mouth once daily.    ondansetron (ZOFRAN-ODT) 4 MG TbDL Take 1 tablet (4 mg total) by mouth every 8 (eight) hours as needed ([place]).    oxyCODONE (ROXICODONE) 5 MG immediate release tablet Take 1 tablet (5 mg total) by mouth every 12 (twelve) hours as needed for Pain.    pantoprazole (PROTONIX) 40 MG tablet Take 1 tablet (40 mg total) by mouth once daily.    penicillin G benzathine (BICILLIN LA) 1,200,000 unit/2 mL Syrg Inject 4 mLs (2.4 Million Units total) into the muscle once a week. On 11/22/2024 and 11/29/2024 for 2 doses    traZODone (DESYREL) 100 MG tablet Take 100 mg by mouth nightly as needed.    ursodioL (ACTIGALL) 300 mg capsule Take 1 capsule (300 mg total) by mouth 3 (three) times daily.     Family History       Problem Relation (Age of Onset)    Breast cancer Maternal Aunt    Diabetes Father    Ovarian cancer Maternal Aunt    Thyroid disease Mother          Tobacco Use    Smoking status: Never     Passive exposure: Never    Smokeless tobacco: Never   Substance and Sexual Activity    Alcohol use: No     Alcohol/week: 0.0 standard drinks of alcohol    Drug use: No    Sexual activity: Yes     Partners: Male     Birth control/protection: None, Surgical     Review of Systems   All other systems reviewed and are negative.    Objective:     Vital Signs (Most Recent):  Temp: 98.4 °F (36.9 °C) (11/30/24 2115)  Pulse: 68 (11/30/24  2115)  Resp: 20 (11/30/24 2115)  BP: 137/62 (11/30/24 2115)  SpO2: 99 % (11/30/24 2115) Vital Signs (24h Range):  Temp:  [97.7 °F (36.5 °C)-98.9 °F (37.2 °C)] 98.4 °F (36.9 °C)  Pulse:  [55-79] 68  Resp:  [11-20] 20  SpO2:  [94 %-100 %] 99 %  BP: (137-190)/(62-87) 137/62     Weight: 88.3 kg (194 lb 10.7 oz)  Body mass index is 33.41 kg/m².     Physical Exam  Vitals and nursing note reviewed.   Constitutional:       General: She is not in acute distress.     Appearance: She is well-developed. She is ill-appearing (chronically). She is not diaphoretic.   HENT:      Head: Normocephalic and atraumatic.   Eyes:      General: Scleral icterus present.      Conjunctiva/sclera: Conjunctivae normal.   Neck:      Vascular: No JVD.   Cardiovascular:      Rate and Rhythm: Normal rate and regular rhythm.   Pulmonary:      Effort: Pulmonary effort is normal. No respiratory distress.   Abdominal:      General: There is no distension.      Tenderness: There is no abdominal tenderness. There is no guarding.   Genitourinary:     Comments: Mild blood in Purewick canister  Musculoskeletal:      Comments: AVF in LUE   Skin:     Coloration: Skin is jaundiced. Skin is not pale.   Neurological:      Mental Status: She is alert and oriented to person, place, and time.      Motor: No abnormal muscle tone.   Psychiatric:         Mood and Affect: Mood normal.         Behavior: Behavior normal.                Significant Labs: All pertinent labs within the past 24 hours have been reviewed.  CBC:   Recent Labs   Lab 11/29/24  1131 11/30/24  0035 11/30/24  1829   WBC 20.93* 17.63* 17.14*   HGB 6.8* 6.6* 6.5*   HCT 20.4* 20.1* 19.4*    212 200     CMP:   Recent Labs   Lab 11/29/24  1131 11/30/24  0035 11/30/24  1829    139 139   K 3.0* 3.4* 3.7   CL 99 99 101   CO2 29 31* 29   * 103 74   BUN 24* 31* 36*   CREATININE 2.9* 3.8* 4.1*   CALCIUM 8.4* 9.2 8.8   PROT 8.2 7.9 7.2   ALBUMIN 1.8* 1.8* 1.6*   BILITOT 8.3* 7.7* 6.4*    ALKPHOS 428* 397* 356*   * 153* 122*   ALT 79* 74* 67*   ANIONGAP 11 9 9       Significant Imaging: I have reviewed all pertinent imaging results/findings within the past 24 hours.  Assessment/Plan:     * Acute on chronic anemia  Patient with chronic multifactorial anemia, requiring multiple transfusions. Recent admission with anemia as well, without obvious cause. Suspect multifactorial from ESRD (reports she no longer gets Epo with HD), possible LILIAN, possible GIB. Although she denies any signs of melena or hematochezia, FOBT positive at OSH; will consult GI for recommendations. Start IV PPI. Check iron panel, ferritin, B12, folate, although with recent transfusion may not be entirely accurate. Check DAPT and LDH/haptoglobin to rule out hemolytic anemia/delayed hemolysis given recent transfusions. Check INR. Given 1U RBC at OSH; monitor, and transfuse to maintain Hgb 7 or greater, or for hemodynamic instability. Hold ASA + Plavix for now.     Vaginal bleeding  RN noted blood clot in diaper on presentation, followed by patient passing her IUD. Patient denies having menstrual cycles, and bleeding occurred just today on presentation. While this could be a source of blood loss, it would likely not explain her degree of anemia. Monitor. May consider Gyn consultation.       Debility  Return to SNF upon discharge.     Morbid obesity  Body mass index is 33.41 kg/m².; chronic comorbidity affecting medical decision-making. Patient would greatly benefit from weight loss through proper diet and increased physical activity.        Chronic hypoxic respiratory failure  Reportedly with chronic hypoxemia on 3L NC, however with sats 100% on this on presentation. Wean to maintain sats 90% or greater.     History of cerebrovascular accident (CVA) with residual deficit  Holding ASA + Plavix for now given concern for bleeding, and statin given hepatitis. Resume as able. Continue BP and glucose control.       Hepatic  dysfunction  Stable from prior, felt secondary to cholestatic hepatitis with fibrosis per biopsy. Continue Ursodiol per hepatology.       Direct hyperbilirubinemia  Stable from prior, felt secondary to cholestatic hepatitis. Continue ursodiol.       Mass of breast  Recent lymph node biopsy negative for malignancy. Continue outpatient follow up, and may need excisional biopsy if high suspicion for malignancy.       ESRD (end stage renal disease) on dialysis  On HD MWF, with last session yesterday. Makes little urine. Continue routine HD.     Heart failure with preserved ejection fraction  Patient with known diastolic CHF. Currently without evidence of volume overload on exam, and not in acute exacerbation. Will continue home GDMT with Metoprolol and volume removal with HD.  Appropriate diet ordered. Monitor volume status.     Peripheral vascular disease  Holding home ASA, Plavix, and statin given concern for bleeding and recent cholestatic hepatitis.      Hyperlipidemia associated with type 2 diabetes mellitus  Hold statin given recent hepatitis.     Hypertension associated with diabetes  Chronic, and currently controlled. Latest blood pressure and vitals reviewed-   While in the hospital, will manage blood pressure as follows; Continue home antihypertensive regimen.    Will utilize p.r.n. blood pressure medication only if patient's blood pressure greater than 220/110 and she develops symptoms such as worsening chest pain or shortness of breath.      Type 2 diabetes mellitus with hyperglycemia, with long-term current use of insulin  Most recent A1c 4.5%, indicating excellent chronic control likely due to ESRD. Will hold home oral agents while inpatient. Start low-dose insulin; titrate to maintain adequate glucose control. Diabetic diet ordered.         VTE Risk Mitigation (From admission, onward)           Ordered     Reason for No Pharmacological VTE Prophylaxis  Once        Question:  Reasons:  Answer:  Active  Bleeding    11/30/24 2201     IP VTE HIGH RISK PATIENT  Once         11/30/24 2201     Place sequential compression device  Until discontinued         11/30/24 2201                   Advance Care Planning   I spent less than 16 minutes discussing advance care planning.   Patient is FULL CODE on discussion with her.  Patient's surrogate decision maker is her boyfriend at bedside.                Devendra Carmichael MD  Department of Hospital Medicine  [facility] - Stepdown Flex ([place]-14)

## 2024-12-01 NOTE — ASSESSMENT & PLAN NOTE
Most recent A1c 4.5%, indicating excellent chronic control likely due to ESRD. Will hold home oral agents while inpatient. Start low-dose insulin; titrate to maintain adequate glucose control. Diabetic diet ordered.

## 2024-12-01 NOTE — ASSESSMENT & PLAN NOTE
On HD MWF, with last session yesterday. Makes little urine.   Nephrology consulted for routine HD.

## 2024-12-01 NOTE — ASSESSMENT & PLAN NOTE
Patient with chronic multifactorial anemia, requiring multiple transfusions. Recent admission with anemia as well, without obvious cause. Suspect multifactorial from ESRD (reports she no longer gets Epo with HD), possible LILIAN, possible GIB. Although she denies any signs of melena or hematochezia, FOBT positive at OSH    - Iron panel c/w AoCD  - Elevated B12, LDH  - Low Folate, Haptoglobin   - Normal INR  - DAPT negative   - LDH/haptoglobin c/w possible hemolytic anemia/delayed hemolysis given recent transfusions, but INR normal  - Given 1U RBC at OSH  - Trend CBC and transfuse to maintain Hgb 7 or greater, o  - Hold ASA + Plavix for now.   - Holding GI consult given no significant GI symptoms on admission

## 2024-12-01 NOTE — PROGRESS NOTES
12/01/24 0159   Provider Notification   Reason for Communication Other (Comment)  (second vaginal blood clot expelled. see media.)   Provider Name CRYSTAL Henderson MD   Provider Role Other (Comment)   Method of Communication Secure Message   Response No new orders   Notification Time 0159       See Media for photo

## 2024-12-01 NOTE — PLAN OF CARE
Patient is alert and oriented with no communication barriers. Patient stays at home with her significant other. Patient uses a standard walker, wheelchair and glucometer. Patient is not on coumadin, but currently has dialysis on F at [place] [phone]. Patient is unsure if she will be returning to [place]. Patient's family can provide transportation home at discharge Cm will continue to monitor..   12/01/24 1311   Discharge Assessment   Assessment Type Discharge Planning Assessment   Confirmed/corrected address, phone number and insurance Yes   Confirmed Demographics Correct on Facesheet   Source of Information health record;patient   Reason For Admission Acute or chronic anemia   People in Home significant other   Facility Arrived From: [facility]   Do you expect to return to your current living situation? Yes   Do you have help at home or someone to help you manage your care at home? Yes   Who are your caregiver(s) and their phone number(s)? Mile Welch-mother [phone]   Prior to hospitilization cognitive status: Alert/Oriented   Current cognitive status: Alert/Oriented   Walking or Climbing Stairs Difficulty yes   Walking or Climbing Stairs ambulation difficulty, requires equipment   Mobility Management Wheelchair and standard walker   Dressing/Bathing Difficulty no   Equipment Currently Used at Home wheelchair;walker, standard;glucometer   Readmission within 30 days? Yes   Patient currently being followed by outpatient case management? No   Do you currently have service(s) that help you manage your care at home? No   Do you take prescription medications? Yes   Do you have prescription coverage? Yes   Coverage [facility] Medicare   Do you have any problems affording any of your prescribed medications? No   Is the patient taking medications as prescribed? yes   Who is going to help you get home at discharge? Mother-Mile Welch   How do you get to doctors appointments? family or  friend will provide   Are you on dialysis? Yes   Dialysis Name and Scheduled days [facility] [phone]   Do you take coumadin? No   Discharge Plan A Home with family   Discharge Plan B Home   DME Needed Upon Discharge  none   Transition of Care Barriers None     [facility] - Stepdown Flex ([place]-14)  Initial Discharge Assessment       Primary Care Provider: Lindsey Singletary FNP    Admission Diagnosis: Lower GI bleed [K92.2]    Admission Date: 11/30/2024  Expected Discharge Date:     Transition of Care Barriers: (P) None    Payor: [place] [facility] MCARE / Plan: [facility] DUAL COMPLETE HMO SNP / Product Type: Medicare Advantage /     Extended Emergency Contact Information  Primary Emergency Contact: Roberto Sharma  Mobile Phone: [phone]  Relation: Significant other  Secondary Emergency Contact: Mile Welch  Mobile Phone: [phone]  Relation: Mother    Discharge Plan A: (P) Home with family  Discharge Plan B: (P) Home      [facility]  [address] [facility] [address]  Phone: [phone] Fax: [phone]    [facility] [address] [place] [address] [address] [facility]  [address] [facility] [address]  Phone: [phone] Fax: [phone]    [facility] [place], [place] - [address]  [address]  [facility] [place] [address]  Phone: [phone] Fax: [phone]      Initial Assessment (most recent)       Adult Discharge Assessment - 12/01/24 1311          Discharge Assessment    Assessment Type Discharge Planning Assessment     Confirmed/corrected address, phone number and insurance Yes     Confirmed Demographics Correct on Facesheet     Source of Information health record;patient     Reason For Admission Acute or chronic anemia     People in Home significant other     Facility Arrived From: [facility]     Do you expect to return to your current living situation? Yes     Do you have help at home or  someone to help you manage your care at home? Yes     Who are your caregiver(s) and their phone number(s)? Mile Welch-mother [phone] (P)      Prior to hospitilization cognitive status: Alert/Oriented (P)      Current cognitive status: Alert/Oriented (P)      Walking or Climbing Stairs Difficulty yes (P)      Walking or Climbing Stairs ambulation difficulty, requires equipment (P)      Mobility Management Wheelchair and standard walker (P)      Dressing/Bathing Difficulty no (P)      Equipment Currently Used at Home wheelchair;walker, standard;glucometer (P)      Readmission within 30 days? Yes (P)      Patient currently being followed by outpatient case management? No (P)      Do you currently have service(s) that help you manage your care at home? No (P)      Do you take prescription medications? Yes (P)      Do you have prescription coverage? Yes (P)      Coverage [facility] Medicare (P)      Do you have any problems affording any of your prescribed medications? No (P)      Is the patient taking medications as prescribed? yes (P)      Who is going to help you get home at discharge? Mother-Mile Welch (P)      How do you get to doctors appointments? family or friend will provide (P)      Are you on dialysis? Yes (P)      Dialysis Name and Scheduled days [facility] [phone] (P)      Do you take coumadin? No (P)      Discharge Plan A Home with family (P)      Discharge Plan B Home (P)      DME Needed Upon Discharge  none (P)      Transition of Care Barriers None (P)

## 2024-12-01 NOTE — ASSESSMENT & PLAN NOTE
Patient with known diastolic CHF. Currently without evidence of volume overload on exam, and not in acute exacerbation. Will continue home GDMT with Metoprolol and volume removal with HD.  Appropriate diet ordered. Monitor volume status.

## 2024-12-01 NOTE — PROGRESS NOTES
12/01/24 0318   Critical Value Communication   Date Result Received 12/01/24   Time Result Received 0318   Resulting Department of Critical Value lab   Critical Test #1 Hematocrit   Critical Test #1 Result 6.0   Critical Test #2 Hemoglobin 18.1   Name of Notified Physician/Designee CRYSTAL Henderson MD   Date Notified 12/01/24   Time Notified 0318   Read Back Verification Yes   Provider Notification   Reason for Communication Other (Comment)  (critical lab - H/H 6.0/18.1)   Provider Name CRYSTAL Henderson MD   Method of Communication Secure Message   Notification Time 0318

## 2024-12-01 NOTE — ASSESSMENT & PLAN NOTE
Stable from prior, felt secondary to cholestatic hepatitis with fibrosis per biopsy. Continue Ursodiol per hepatology.

## 2024-12-01 NOTE — HPI
Pilar Fernandez is a 44 y.o. woman with a PMHx of ESRD on HD (M,W,F), history of CVA with debility, chronic anemia, respiratory failure on 3L NC oxygen, HFpEF, PVD, recent admissions for acute on chronic anemia requiring transfusion and acute cholestatic hepatitis who presents from an OSH for acute anemia.      Patient initially presented for abdominal pain described as cramping. She noticed bleeding and blood clots from the vagina followed by seeing her IUD. Since then, her abdominal pain has improved. Denies any chest pain or shortness of breath. Reports normal bowel movements. She still produces some urine and denies any hematuria. She was transferred her for GI evaluation. Patient reports her last dialysis session was last Friday (11/29) and 5.5L was removed. She tolerated the session without any complications.     Nephrology consulted for routine dialysis while inpatient.

## 2024-12-01 NOTE — ASSESSMENT & PLAN NOTE
Recent lymph node biopsy negative for malignancy. Continue outpatient follow up, and may need excisional biopsy if high suspicion for malignancy.

## 2024-12-01 NOTE — CONSULTS
[facility] ([place] [id number])  Nephrology  Consult Note    Patient Name: Pilar Fernandez  MRN: [id number]  Admission Date: 11/30/2024  Hospital Length of Stay: 1 days  Attending Provider: Ivan Peacock MD   Primary Care Physician: Lindsey Singletary FNP  Principal Problem:Acute on chronic anemia    Inpatient consult to Nephrology  Consult performed by: Anoop Yung MD  Consult ordered by: Ivan Peacock MD        Subjective:     HPI: Pilar Fernandez is a 44 y.o. woman with a PMHx of ESRD on HD (M,W,F), history of CVA with debility, chronic anemia, respiratory failure on 3L NC oxygen, HFpEF, PVD, recent admissions for acute on chronic anemia requiring transfusion and acute cholestatic hepatitis who presents from an OSH for acute anemia.      Patient initially presented for abdominal pain described as cramping. She noticed bleeding and blood clots from the vagina followed by seeing her IUD. Since then, her abdominal pain has improved. Denies any chest pain or shortness of breath. Reports normal bowel movements. She still produces some urine and denies any hematuria. She was transferred her for GI evaluation. Patient reports her last dialysis session was last Friday (11/29) and 5.5L was removed. She tolerated the session without any complications.     Nephrology consulted for routine dialysis while inpatient.        Past Medical History:   Diagnosis Date    Anemia     Anemia     Breast mass     Chronic constipation     CKD (chronic kidney disease)     Diabetes mellitus     Dialysis patient     MON, TUE, THURS, SAT.    Embolic stroke involving right middle cerebral artery 7/13/2024    Encounter for blood transfusion     Erosive gastritis 03/2016    GERD (gastroesophageal reflux disease)     Gout     High cholesterol     Hypertension     Knee pain     Thyroid disease     Unspecified cataract 09/2022       Past Surgical History:   Procedure Laterality Date    AV FISTULA PLACEMENT Left  2019    Procedure: creation av fistula;  Surgeon: Buck Fernandez MD;  Location: [facility] OR;  Service: Cardiovascular;  Laterality: Left;  Left Radialcephalic: PACU     SECTION  , , ,     CHOLECYSTECTOMY      COLONOSCOPY N/A 10/23/2017    Procedure: COLONOSCOPY;  Surgeon: Bri Harry MD;  Location: [facility];  Service: Endoscopy;  Laterality: N/A;    EXCISIONAL BIOPSY Left 2022    Procedure: EXCISIONAL BIOPSY;  Surgeon: Mick Juarez MD;  Location: [facility] OR;  Service: General;  Laterality: Left;    TUBAL LIGATION      UPPER GASTROINTESTINAL ENDOSCOPY  2016    Erosive Gastritis-Dr Bailey       Review of patient's allergies indicates:   Allergen Reactions    Tramadol Itching and Nausea And Vomiting     Current Facility-Administered Medications   Medication Frequency    0.9%  NaCl infusion (for blood administration) Q24H PRN    acetaminophen tablet 650 mg Q8H PRN    acetaminophen tablet 650 mg Q4H PRN    aluminum-magnesium hydroxide-simethicone 200-200-20 mg/5 mL suspension 30 mL QID PRN    glucagon (human recombinant) injection 1 mg PRN    glucagon (human recombinant) injection 1 mg PRN    glucose chewable tablet 16 g PRN    glucose chewable tablet 16 g PRN    glucose chewable tablet 24 g PRN    glucose chewable tablet 24 g PRN    insulin aspart U-100 pen 0-5 Units QID (AC + HS) PRN    lactulose 20 gram/30 mL solution Soln 20 g BID    melatonin tablet 6 mg Nightly PRN    metoprolol tartrate (LOPRESSOR) tablet 100 mg BID    naloxone 0.4 mg/mL injection 0.02 mg PRN    ondansetron disintegrating tablet 8 mg Q8H PRN    pantoprazole injection 40 mg Q12H    polyethylene glycol packet 17 g Daily PRN    simethicone chewable tablet 80 mg QID PRN    sodium chloride 0.9% flush 1-10 mL Q12H PRN    traZODone tablet 100 mg Nightly PRN    ursodioL capsule 300 mg TID     Family History       Problem Relation (Age of Onset)    Breast cancer Maternal Aunt    Diabetes Father    Ovarian  cancer Maternal Aunt    Thyroid disease Mother          Tobacco Use    Smoking status: Never     Passive exposure: Never    Smokeless tobacco: Never   Substance and Sexual Activity    Alcohol use: No     Alcohol/week: 0.0 standard drinks of alcohol    Drug use: No    Sexual activity: Yes     Partners: Male     Birth control/protection: None, Surgical     Review of Systems  Objective:     Vital Signs (Most Recent):  Temp: 98.1 °F (36.7 °C) (12/01/24 0900)  Pulse: 68 (12/01/24 0900)  Resp: 16 (12/01/24 0900)  BP: (!) 144/53 (12/01/24 0900)  SpO2: 100 % (12/01/24 0900) Vital Signs (24h Range):  Temp:  [97.8 °F (36.6 °C)-98.9 °F (37.2 °C)] 98.1 °F (36.7 °C)  Pulse:  [55-82] 68  Resp:  [11-21] 16  SpO2:  [94 %-100 %] 100 %  BP: (101-189)/(53-82) 144/53     Weight: 88.3 kg (194 lb 10.7 oz) (11/30/24 2115)  Body mass index is 33.41 kg/m².  Body surface area is 2 meters squared.    I/O last 3 completed shifts:  In: -   Out: 150 [Urine:150]     Physical Exam  Vitals and nursing note reviewed.   Constitutional:       General: She is not in acute distress.     Appearance: She is obese.   HENT:      Head: Normocephalic and atraumatic.   Eyes:      General: Scleral icterus present.      Extraocular Movements: Extraocular movements intact.   Cardiovascular:      Rate and Rhythm: Normal rate and regular rhythm.      Heart sounds: Normal heart sounds.   Pulmonary:      Effort: Pulmonary effort is normal. No respiratory distress.      Comments: 3L NC  Abdominal:      General: Abdomen is flat.      Palpations: Abdomen is soft.      Tenderness: There is no abdominal tenderness.   Musculoskeletal:      Right lower leg: Edema present.      Left lower leg: Edema present.   Skin:     General: Skin is warm and dry.      Comments: AV fistula to the left forearm with palpable thrill.    Neurological:      Mental Status: She is alert and oriented to person, place, and time.   Psychiatric:         Mood and Affect: Mood normal.           Significant Labs:  All labs within the past 24 hours have been reviewed.    Assessment/Plan:     Renal/  ESRD (end stage renal disease) on dialysis  44 y.o. woman with a PMHx of ESRD on HD (M,W,F), history of CVA with debility, chronic anemia, respiratory failure on 3L NC oxygen, HFpEF, PVD, recent admissions for acute on chronic anemia requiring transfusion and acute cholestatic hepatitis presenting from OSH for further evaluation of acute anemia and vaginal bleeding. Last dialyzed Friday (11/29), 5.5L removed.     Nephrology consulted for routine dialysis while inpatient.     Plan:  - Labs are stable at this time. Will plan for regular dialysis schedule starting tomorrow, 12/2  - Avoid nephrotoxins and renally dose meds for GFR listed above  - Daily BMPs    Endocrine  Type 2 diabetes mellitus with hyperglycemia, with long-term current use of insulin  Management per primary team.        Thank you for your consult. I will follow-up with patient. Please contact us if you have any additional questions.    Anoop Yung MD  Nephrology  [facility] ([place])

## 2024-12-01 NOTE — ASSESSMENT & PLAN NOTE
Holding home ASA, Plavix, and statin given concern for bleeding and recent cholestatic hepatitis.

## 2024-12-01 NOTE — SUBJECTIVE & OBJECTIVE
Past Medical History:   Diagnosis Date    Anemia     Anemia     Breast mass     Chronic constipation     CKD (chronic kidney disease)     Diabetes mellitus     Dialysis patient     GRZEGORZ BARRIOS THURS, SAT.    Embolic stroke involving right middle cerebral artery 2024    Encounter for blood transfusion     Erosive gastritis 2016    GERD (gastroesophageal reflux disease)     Gout     High cholesterol     Hypertension     Knee pain     Thyroid disease     Unspecified cataract 2022       Past Surgical History:   Procedure Laterality Date    AV FISTULA PLACEMENT Left 2019    Procedure: creation av fistula;  Surgeon: Buck Fernandez MD;  Location: [facility];  Service: Cardiovascular;  Laterality: Left;  Left Radialcephalic: PACU     SECTION  , , ,     CHOLECYSTECTOMY      COLONOSCOPY N/A 10/23/2017    Procedure: COLONOSCOPY;  Surgeon: Bri Harry MD;  [facility];  Service: Endoscopy;  Laterality: N/A;    EXCISIONAL BIOPSY Left 2022    Procedure: EXCISIONAL BIOPSY;  Surgeon: Mick Juarez MD;  Location: [facility];  Service: General;  Laterality: Left;    TUBAL LIGATION      UPPER GASTROINTESTINAL ENDOSCOPY  2016    Erosive Gastritis-Dr Bailey       Review of patient's allergies indicates:   Allergen Reactions    Tramadol Itching and Nausea And Vomiting     Current Facility-Administered Medications   Medication Frequency    0.9%  NaCl infusion (for blood administration) Q24H PRN    acetaminophen tablet 650 mg Q8H PRN    acetaminophen tablet 650 mg Q4H PRN    aluminum-magnesium hydroxide-simethicone 200-200-20 mg/5 mL suspension 30 mL QID PRN    glucagon (human recombinant) injection 1 mg PRN    glucagon (human recombinant) injection 1 mg PRN    glucose chewable tablet 16 g PRN    glucose chewable tablet 16 g PRN    glucose chewable tablet 24 g PRN    glucose chewable tablet 24 g PRN    insulin aspart U-100 pen 0-5 Units QID (AC + HS) PRN    lactulose 20  gram/30 mL solution Soln 20 g BID    melatonin tablet 6 mg Nightly PRN    metoprolol tartrate (LOPRESSOR) tablet 100 mg BID    naloxone 0.4 mg/mL injection 0.02 mg PRN    ondansetron disintegrating tablet 8 mg Q8H PRN    pantoprazole injection 40 mg Q12H    polyethylene glycol packet 17 g Daily PRN    simethicone chewable tablet 80 mg QID PRN    sodium chloride 0.9% flush 1-10 mL Q12H PRN    traZODone tablet 100 mg Nightly PRN    ursodioL capsule 300 mg TID     Family History       Problem Relation (Age of Onset)    Breast cancer Maternal Aunt    Diabetes Father    Ovarian cancer Maternal Aunt    Thyroid disease Mother          Tobacco Use    Smoking status: Never     Passive exposure: Never    Smokeless tobacco: Never   Substance and Sexual Activity    Alcohol use: No     Alcohol/week: 0.0 standard drinks of alcohol    Drug use: No    Sexual activity: Yes     Partners: Male     Birth control/protection: None, Surgical     Review of Systems  Objective:     Vital Signs (Most Recent):  Temp: 98.1 °F (36.7 °C) (12/01/24 0900)  Pulse: 68 (12/01/24 0900)  Resp: 16 (12/01/24 0900)  BP: (!) 144/53 (12/01/24 0900)  SpO2: 100 % (12/01/24 0900) Vital Signs (24h Range):  Temp:  [97.8 °F (36.6 °C)-98.9 °F (37.2 °C)] 98.1 °F (36.7 °C)  Pulse:  [55-82] 68  Resp:  [11-21] 16  SpO2:  [94 %-100 %] 100 %  BP: (101-189)/(53-82) 144/53     Weight: 88.3 kg (194 lb 10.7 oz) (11/30/24 2115)  Body mass index is 33.41 kg/m².  Body surface area is 2 meters squared.    I/O last 3 completed shifts:  In: -   Out: 150 [Urine:150]     Physical Exam  Vitals and nursing note reviewed.   Constitutional:       General: She is not in acute distress.     Appearance: She is obese.   HENT:      Head: Normocephalic and atraumatic.   Eyes:      General: Scleral icterus present.      Extraocular Movements: Extraocular movements intact.   Cardiovascular:      Rate and Rhythm: Normal rate and regular rhythm.      Heart sounds: Normal heart sounds.    Pulmonary:      Effort: Pulmonary effort is normal. No respiratory distress.      Comments: 3L NC  Abdominal:      General: Abdomen is flat.      Palpations: Abdomen is soft.      Tenderness: There is no abdominal tenderness.   Musculoskeletal:      Right lower leg: Edema present.      Left lower leg: Edema present.   Skin:     General: Skin is warm and dry.      Comments: AV fistula to the left forearm with palpable thrill.    Neurological:      Mental Status: She is alert and oriented to person, place, and time.   Psychiatric:         Mood and Affect: Mood normal.          Significant Labs:  All labs within the past 24 hours have been reviewed.

## 2024-12-01 NOTE — SUBJECTIVE & OBJECTIVE
Interval History:   Admitted overnight. Hgb 6.6 from 6.0 after 1u RBC. Confirmed with patient she's having vaginal bleeding and not rectal bleeding. Gyn consulted.         Review of Systems   Constitutional: Negative.    Respiratory: Negative.     Cardiovascular: Negative.    Gastrointestinal: Negative.    Genitourinary:  Positive for vaginal bleeding. Negative for vaginal pain.     Objective:     Vital Signs (Most Recent):  Temp: 97.5 °F (36.4 °C) (12/01/24 1327)  Pulse: 66 (12/01/24 1327)  Resp: 14 (12/01/24 1327)  BP: (!) 108/53 (12/01/24 1327)  SpO2: 100 % (12/01/24 1327) Vital Signs (24h Range):  Temp:  [97.5 °F (36.4 °C)-98.9 °F (37.2 °C)] 97.5 °F (36.4 °C)  Pulse:  [61-82] 66  Resp:  [12-21] 14  SpO2:  [98 %-100 %] 100 %  BP: (101-181)/(53-82) 108/53     Weight: 88.3 kg (194 lb 10.7 oz)  Body mass index is 33.41 kg/m².    Intake/Output Summary (Last 24 hours) at 12/1/2024 1449  Last data filed at 12/1/2024 0900  Gross per 24 hour   Intake 406.83 ml   Output 150 ml   Net 256.83 ml      Physical Exam  Vitals and nursing note reviewed.   Constitutional:       General: She is not in acute distress.     Appearance: She is well-developed. She is ill-appearing (chronically). She is not diaphoretic.   HENT:      Head: Normocephalic and atraumatic.   Eyes:      General: Scleral icterus present.      Conjunctiva/sclera: Conjunctivae normal.   Neck:      Vascular: No JVD.   Cardiovascular:      Rate and Rhythm: Normal rate and regular rhythm.   Pulmonary:      Effort: Pulmonary effort is normal. No respiratory distress.   Abdominal:      General: There is no distension.      Tenderness: There is no abdominal tenderness. There is no guarding.   Genitourinary:     Comments: Mild blood in Purewick canister  Musculoskeletal:      Comments: AVF in LUE   Skin:     Coloration: Skin is jaundiced. Skin is not pale.   Neurological:      Mental Status: She is alert and oriented to person, place, and time.      Motor: No abnormal  muscle tone.   Psychiatric:         Mood and Affect: Mood normal.         Behavior: Behavior normal.         MELD 3.0: 31 at 12/1/2024 11:30 AM  MELD-Na: 30 at 12/1/2024 11:30 AM  Calculated from:  Serum Creatinine: On dialysis. Using the maximum value.  Serum Sodium: 135 mmol/L at 12/1/2024 11:30 AM  Total Bilirubin: 7 mg/dL at 12/1/2024 11:30 AM  Serum Albumin: 1.7 g/dL at 12/1/2024 11:30 AM  INR(ratio): 1.2 at 11/30/2024 10:23 PM  Age at listing (hypothetical): 44 years  Sex: Female at 12/1/2024 11:30 AM      Significant Labs:  CBC:  Recent Labs   Lab 11/30/24  1829 12/01/24  0224 12/01/24  1130   WBC 17.14* 15.51* 16.95*   HGB 6.5* 6.0* 6.6*   HCT 19.4* 18.1* 19.2*    184 192     CMP:  Recent Labs   Lab 11/30/24  0035 11/30/24  1829 12/01/24  1130    139 135*   K 3.4* 3.7 3.7   CL 99 101 100   CO2 31* 29 24    74 72   BUN 31* 36* 37*   CREATININE 3.8* 4.1* 4.4*   CALCIUM 9.2 8.8 8.9   PROT 7.9 7.2 7.1   ALBUMIN 1.8* 1.6* 1.7*   BILITOT 7.7* 6.4* 7.0*   ALKPHOS 397* 356* 343*   * 122* 89*   ALT 74* 67* 55*   ANIONGAP 9 9 11     PTINR:  Recent Labs   Lab 11/30/24 2223   INR 1.2

## 2024-12-01 NOTE — NURSING
MD notified of pt arrival to [id number]     SBAR IN Report: Mother    Verbal report received from Jhony Ardon RN on this patient, who is now being transferred from L&D for routine post - op. The patient is wearing a green \"Anesthesia-Duramorph\" band. Report consisted of patient's Situation, Background, Assessment and Recommendations (SBAR).  ID bands were compared with the identification form, and verified with the patient and transferring nurse. Information from the SBAR, Intake/Output, MAR and Recent Results and the [facility] Report was reviewed with the transferring nurse; opportunity for questions and clarification provided.

## 2024-12-01 NOTE — ASSESSMENT & PLAN NOTE
Patient with chronic multifactorial anemia, requiring multiple transfusions. Recent admission with anemia as well, without obvious cause. Suspect multifactorial from ESRD (reports she no longer gets Epo with HD), possible LILIAN, possible GIB. Although she denies any signs of melena or hematochezia, FOBT positive at OSH; will consult GI for recommendations. Start IV PPI. Check iron panel, ferritin, B12, folate, although with recent transfusion may not be entirely accurate. Check DAPT and LDH/haptoglobin to rule out hemolytic anemia/delayed hemolysis given recent transfusions. Check INR. Given 1U RBC at OSH; monitor, and transfuse to maintain Hgb 7 or greater, or for hemodynamic instability. Hold ASA + Plavix for now.

## 2024-12-01 NOTE — PROGRESS NOTES
11/30/24 2223   Provider Notification   Reason for Communication Other (Comment)  (vaginal blood clot)   Provider Name ADELE Carmichael MD   Provider Role Other (Comment)   Method of Communication Face to face   Response At bedside   Notification Time 2223       See Media for photos.

## 2024-12-01 NOTE — PLAN OF CARE
12/01/24 1336   Readmission   Was this a planned readmission? No   Why were you hospitalized in the last 30 days? Anemia and stomach pain   Why were you readmitted? Alarmed about signs/symptoms   When you left the hospital how did you feel? Good   When you left the hospital where did you go? Other  ([facility])   Did patient/caregiver refused recommended DC plan? No   Tell me about what happened between when you left the hospital and the day you returned. Severe stomach pain   When did you start not feeling well? Yesterday   Did you try to manage your symptoms your self? No   Did you call anyone? Yes   Who did you call? Other (comments)  ([facility] Staff)   Did you try to see or did see a doctor or nurse before you came? No   Did you have  a follow-up appointment on discharge? Yes   Did you go? Yes

## 2024-12-02 LAB
ALBUMIN SERPL BCP-MCNC: 1.8 G/DL (ref 3.5–5.2)
ALP SERPL-CCNC: 327 U/L (ref 40–150)
ALT SERPL W/O P-5'-P-CCNC: 50 U/L (ref 10–44)
ANION GAP SERPL CALC-SCNC: 14 MMOL/L (ref 8–16)
AST SERPL-CCNC: 81 U/L (ref 10–40)
BASOPHILS # BLD AUTO: 0.17 K/UL (ref 0–0.2)
BASOPHILS NFR BLD: 1.2 % (ref 0–1.9)
BILIRUB SERPL-MCNC: 6.9 MG/DL (ref 0.1–1)
BUN SERPL-MCNC: 42 MG/DL (ref 6–20)
CALCIUM SERPL-MCNC: 8.6 MG/DL (ref 8.7–10.5)
CHLORIDE SERPL-SCNC: 99 MMOL/L (ref 95–110)
CO2 SERPL-SCNC: 23 MMOL/L (ref 23–29)
CREAT SERPL-MCNC: 4.9 MG/DL (ref 0.5–1.4)
DIFFERENTIAL METHOD BLD: ABNORMAL
EOSINOPHIL # BLD AUTO: 1 K/UL (ref 0–0.5)
EOSINOPHIL NFR BLD: 6.6 % (ref 0–8)
ERYTHROCYTE [DISTWIDTH] IN BLOOD BY AUTOMATED COUNT: 20 % (ref 11.5–14.5)
EST. GFR  (NO RACE VARIABLE): 10.5 ML/MIN/1.73 M^2
GLUCOSE SERPL-MCNC: 102 MG/DL (ref 70–110)
HCT VFR BLD AUTO: 23.9 % (ref 37–48.5)
HGB BLD-MCNC: 7.8 G/DL (ref 12–16)
IMM GRANULOCYTES # BLD AUTO: 0.25 K/UL (ref 0–0.04)
IMM GRANULOCYTES NFR BLD AUTO: 1.7 % (ref 0–0.5)
LYMPHOCYTES # BLD AUTO: 2 K/UL (ref 1–4.8)
LYMPHOCYTES NFR BLD: 13.7 % (ref 18–48)
MAGNESIUM SERPL-MCNC: 1.9 MG/DL (ref 1.6–2.6)
MCH RBC QN AUTO: 31.3 PG (ref 27–31)
MCHC RBC AUTO-ENTMCNC: 32.6 G/DL (ref 32–36)
MCV RBC AUTO: 96 FL (ref 82–98)
MONOCYTES # BLD AUTO: 1.3 K/UL (ref 0.3–1)
MONOCYTES NFR BLD: 9.1 % (ref 4–15)
NEUTROPHILS # BLD AUTO: 9.8 K/UL (ref 1.8–7.7)
NEUTROPHILS NFR BLD: 67.7 % (ref 38–73)
NRBC BLD-RTO: 0 /100 WBC
PHOSPHATE SERPL-MCNC: 5.7 MG/DL (ref 2.7–4.5)
PLATELET # BLD AUTO: 170 K/UL (ref 150–450)
PMV BLD AUTO: 10.7 FL (ref 9.2–12.9)
POCT GLUCOSE: 118 MG/DL (ref 70–110)
POCT GLUCOSE: 129 MG/DL (ref 70–110)
POCT GLUCOSE: 136 MG/DL (ref 70–110)
POCT GLUCOSE: 138 MG/DL (ref 70–110)
POTASSIUM SERPL-SCNC: 3.6 MMOL/L (ref 3.5–5.1)
PROT SERPL-MCNC: 7.2 G/DL (ref 6–8.4)
RBC # BLD AUTO: 2.49 M/UL (ref 4–5.4)
SODIUM SERPL-SCNC: 136 MMOL/L (ref 136–145)
WBC # BLD AUTO: 14.43 K/UL (ref 3.9–12.7)

## 2024-12-02 PROCEDURE — 63600175 PHARM REV CODE 636 W HCPCS: Performed by: STUDENT IN AN ORGANIZED HEALTH CARE EDUCATION/TRAINING PROGRAM

## 2024-12-02 PROCEDURE — 85025 COMPLETE CBC W/AUTO DIFF WBC: CPT | Performed by: STUDENT IN AN ORGANIZED HEALTH CARE EDUCATION/TRAINING PROGRAM

## 2024-12-02 PROCEDURE — 90935 HEMODIALYSIS ONE EVALUATION: CPT | Mod: ,,, | Performed by: NURSE PRACTITIONER

## 2024-12-02 PROCEDURE — 25000003 PHARM REV CODE 250

## 2024-12-02 PROCEDURE — 36415 COLL VENOUS BLD VENIPUNCTURE: CPT | Performed by: STUDENT IN AN ORGANIZED HEALTH CARE EDUCATION/TRAINING PROGRAM

## 2024-12-02 PROCEDURE — 20600001 HC STEP DOWN PRIVATE ROOM

## 2024-12-02 PROCEDURE — 5A1D70Z PERFORMANCE OF URINARY FILTRATION, INTERMITTENT, LESS THAN 6 HOURS PER DAY: ICD-10-PCS | Performed by: INTERNAL MEDICINE

## 2024-12-02 PROCEDURE — 25000003 PHARM REV CODE 250: Performed by: NURSE PRACTITIONER

## 2024-12-02 PROCEDURE — 83735 ASSAY OF MAGNESIUM: CPT | Performed by: STUDENT IN AN ORGANIZED HEALTH CARE EDUCATION/TRAINING PROGRAM

## 2024-12-02 PROCEDURE — 84100 ASSAY OF PHOSPHORUS: CPT | Performed by: STUDENT IN AN ORGANIZED HEALTH CARE EDUCATION/TRAINING PROGRAM

## 2024-12-02 PROCEDURE — 25000003 PHARM REV CODE 250: Performed by: INTERNAL MEDICINE

## 2024-12-02 PROCEDURE — 80053 COMPREHEN METABOLIC PANEL: CPT | Performed by: STUDENT IN AN ORGANIZED HEALTH CARE EDUCATION/TRAINING PROGRAM

## 2024-12-02 PROCEDURE — 80100016 HC MAINTENANCE HEMODIALYSIS

## 2024-12-02 PROCEDURE — 25000003 PHARM REV CODE 250: Performed by: STUDENT IN AN ORGANIZED HEALTH CARE EDUCATION/TRAINING PROGRAM

## 2024-12-02 RX ORDER — HYDRALAZINE HYDROCHLORIDE 25 MG/1
25 TABLET, FILM COATED ORAL EVERY 6 HOURS PRN
Status: DISCONTINUED | OUTPATIENT
Start: 2024-12-02 | End: 2024-12-05 | Stop reason: HOSPADM

## 2024-12-02 RX ORDER — GLUCAGON 1 MG
1 KIT INJECTION
Status: DISCONTINUED | OUTPATIENT
Start: 2024-12-02 | End: 2024-12-05 | Stop reason: HOSPADM

## 2024-12-02 RX ORDER — OXYCODONE HYDROCHLORIDE 10 MG/1
10 TABLET ORAL EVERY 6 HOURS PRN
Status: DISCONTINUED | OUTPATIENT
Start: 2024-12-02 | End: 2024-12-05 | Stop reason: HOSPADM

## 2024-12-02 RX ORDER — SODIUM CHLORIDE 9 MG/ML
INJECTION, SOLUTION INTRAVENOUS ONCE
Status: COMPLETED | OUTPATIENT
Start: 2024-12-02 | End: 2024-12-02

## 2024-12-02 RX ORDER — NIFEDIPINE 30 MG/1
30 TABLET, EXTENDED RELEASE ORAL DAILY
Status: DISCONTINUED | OUTPATIENT
Start: 2024-12-02 | End: 2024-12-02

## 2024-12-02 RX ORDER — CLONIDINE HYDROCHLORIDE 0.1 MG/1
0.1 TABLET ORAL EVERY 6 HOURS PRN
Status: DISCONTINUED | OUTPATIENT
Start: 2024-12-02 | End: 2024-12-05 | Stop reason: HOSPADM

## 2024-12-02 RX ORDER — PANTOPRAZOLE SODIUM 40 MG/1
40 TABLET, DELAYED RELEASE ORAL DAILY
Status: DISCONTINUED | OUTPATIENT
Start: 2024-12-03 | End: 2024-12-05 | Stop reason: HOSPADM

## 2024-12-02 RX ORDER — LOPERAMIDE HYDROCHLORIDE 2 MG/1
2 CAPSULE ORAL 4 TIMES DAILY PRN
Status: DISCONTINUED | OUTPATIENT
Start: 2024-12-02 | End: 2024-12-02

## 2024-12-02 RX ORDER — IBUPROFEN 200 MG
24 TABLET ORAL
Status: DISCONTINUED | OUTPATIENT
Start: 2024-12-02 | End: 2024-12-05 | Stop reason: HOSPADM

## 2024-12-02 RX ORDER — OXYCODONE HYDROCHLORIDE 5 MG/1
5 TABLET ORAL EVERY 6 HOURS PRN
Status: DISCONTINUED | OUTPATIENT
Start: 2024-12-02 | End: 2024-12-05 | Stop reason: HOSPADM

## 2024-12-02 RX ORDER — LACTULOSE 10 G/15ML
20 SOLUTION ORAL 2 TIMES DAILY
Status: DISCONTINUED | OUTPATIENT
Start: 2024-12-03 | End: 2024-12-05 | Stop reason: HOSPADM

## 2024-12-02 RX ORDER — IBUPROFEN 200 MG
16 TABLET ORAL
Status: DISCONTINUED | OUTPATIENT
Start: 2024-12-02 | End: 2024-12-05 | Stop reason: HOSPADM

## 2024-12-02 RX ORDER — NIFEDIPINE 30 MG/1
30 TABLET, EXTENDED RELEASE ORAL ONCE
Status: COMPLETED | OUTPATIENT
Start: 2024-12-02 | End: 2024-12-02

## 2024-12-02 RX ORDER — LOPERAMIDE HYDROCHLORIDE 2 MG/1
4 CAPSULE ORAL ONCE
Status: COMPLETED | OUTPATIENT
Start: 2024-12-02 | End: 2024-12-02

## 2024-12-02 RX ORDER — NIFEDIPINE 30 MG/1
60 TABLET, EXTENDED RELEASE ORAL DAILY
Status: DISCONTINUED | OUTPATIENT
Start: 2024-12-03 | End: 2024-12-03

## 2024-12-02 RX ORDER — FOLIC ACID 1 MG/1
1 TABLET ORAL DAILY
Status: DISCONTINUED | OUTPATIENT
Start: 2024-12-02 | End: 2024-12-05 | Stop reason: HOSPADM

## 2024-12-02 RX ORDER — SEVELAMER CARBONATE 800 MG/1
800 TABLET, FILM COATED ORAL
Status: DISCONTINUED | OUTPATIENT
Start: 2024-12-02 | End: 2024-12-05 | Stop reason: HOSPADM

## 2024-12-02 RX ORDER — INSULIN ASPART 100 [IU]/ML
0-10 INJECTION, SOLUTION INTRAVENOUS; SUBCUTANEOUS
Status: DISCONTINUED | OUTPATIENT
Start: 2024-12-02 | End: 2024-12-05 | Stop reason: HOSPADM

## 2024-12-02 RX ADMIN — PANTOPRAZOLE SODIUM 40 MG: 40 INJECTION, POWDER, LYOPHILIZED, FOR SOLUTION INTRAVENOUS at 09:12

## 2024-12-02 RX ADMIN — NIFEDIPINE 30 MG: 30 TABLET, FILM COATED, EXTENDED RELEASE ORAL at 09:12

## 2024-12-02 RX ADMIN — SODIUM CHLORIDE: 9 INJECTION, SOLUTION INTRAVENOUS at 03:12

## 2024-12-02 RX ADMIN — METOPROLOL TARTRATE 100 MG: 50 TABLET, FILM COATED ORAL at 07:12

## 2024-12-02 RX ADMIN — METOPROLOL TARTRATE 100 MG: 50 TABLET, FILM COATED ORAL at 09:12

## 2024-12-02 RX ADMIN — MEDROXYPROGESTERONE ACETATE 20 MG: 10 TABLET ORAL at 09:12

## 2024-12-02 RX ADMIN — FOLIC ACID 1 MG: 1 TABLET ORAL at 11:12

## 2024-12-02 RX ADMIN — MEDROXYPROGESTERONE ACETATE 20 MG: 10 TABLET ORAL at 07:12

## 2024-12-02 RX ADMIN — SEVELAMER CARBONATE 800 MG: 800 TABLET, FILM COATED ORAL at 08:12

## 2024-12-02 RX ADMIN — MEDROXYPROGESTERONE ACETATE 20 MG: 10 TABLET ORAL at 03:12

## 2024-12-02 RX ADMIN — LOPERAMIDE HYDROCHLORIDE 4 MG: 2 CAPSULE ORAL at 12:12

## 2024-12-02 RX ADMIN — OXYCODONE HYDROCHLORIDE 10 MG: 10 TABLET ORAL at 11:12

## 2024-12-02 RX ADMIN — URSODIOL 300 MG: 300 CAPSULE ORAL at 09:12

## 2024-12-02 RX ADMIN — CLONIDINE HYDROCHLORIDE 0.1 MG: 0.1 TABLET ORAL at 07:12

## 2024-12-02 RX ADMIN — URSODIOL 300 MG: 300 CAPSULE ORAL at 03:12

## 2024-12-02 NOTE — PLAN OF CARE
Problem: Adult Inpatient Plan of Care  Goal: Plan of Care Review  Outcome: Progressing  Goal: Patient-Specific Goal (Individualized)  Outcome: Progressing  Goal: Absence of Hospital-Acquired Illness or Injury  Outcome: Progressing  Goal: Optimal Comfort and Wellbeing  Outcome: Progressing  Goal: Readiness for Transition of Care  Outcome: Progressing     Problem: Diabetes Comorbidity  Goal: Blood Glucose Level Within Targeted Range  Outcome: Progressing     Problem: Wound  Goal: Optimal Coping  Outcome: Progressing  Goal: Optimal Functional Ability  Outcome: Progressing  Goal: Absence of Infection Signs and Symptoms  Outcome: Progressing  Goal: Improved Oral Intake  Outcome: Progressing  Goal: Optimal Pain Control and Function  Outcome: Progressing  Goal: Skin Health and Integrity  Outcome: Progressing  Goal: Optimal Wound Healing  Outcome: Progressing     Problem: Skin Injury Risk Increased  Goal: Skin Health and Integrity  Outcome: Progressing     Problem: Gastrointestinal Bleeding  Goal: Optimal Coping with Acute Illness  Outcome: Progressing  Goal: Hemostasis  Outcome: Progressing     Problem: Fall Injury Risk  Goal: Absence of Fall and Fall-Related Injury  Outcome: Progressing

## 2024-12-02 NOTE — NURSING
SHIFT NOTE:    Patient slept well this shift. Pain managed well with PRN's. IV patent.  Skin care performed. Repositioned q2h when tolerated. Transvaginal US completed early this morning.  Two large sized clots passed during night.  Couple loose blood tinged from vaginal bleeding stools Safety precautions in place. Call light in reach. No other concerns noted at this time.

## 2024-12-02 NOTE — PROGRESS NOTES
1440 Patient arrived IZABEL via bed, AOX4.    1500 HD treatment started via LORA buttonhole fistula with good blood return; VSS and recorded, NADN.

## 2024-12-02 NOTE — PROGRESS NOTES
[facility] HEMODIALYSIS NOTE     Patient currently on hemodialysis for removal of uremic toxins and volume.     Patient seen and evaluated on hemodialysis, tolerating treatment, see HD flowsheet for vitals and assessments.    Labs have been reviewed and the dialysate bath has been adjusted.       Assessment/Plan:    -  -Patient seen on HD, tolerating treatment well, w/o complaints   -UF goal of 2-3  -Renal diet, if not NPO   -Strict I/O's and daily weights  -Daily renal function panels  -Keep MAP >65 while on HD   -Hgb goal 10-11, hold epo in setting of HTN   -start sevelamer 800 TIDWM   -Will continue to follow while inpatient     Evon Bullard DNP-FNP, C  Nephrology  Pager: [phone]

## 2024-12-02 NOTE — PLAN OF CARE
Problem: Hemodialysis  Goal: Safe, Effective Therapy Delivery  12/2/2024 1550 by Hans Herrera RN  Outcome: Progressing  12/2/2024 1550 by Hans Herrera RN  Outcome: Progressing  Goal: Effective Tissue Perfusion  12/2/2024 1550 by Hans Herrera RN  Outcome: Progressing  12/2/2024 1550 by Hans Herrera RN  Outcome: Progressing     Problem: Chronic Kidney Disease  Goal: Electrolyte Balance  Outcome: Progressing  Goal: Fluid Balance  Outcome: Progressing

## 2024-12-02 NOTE — HOSPITAL COURSE
44 y.o. female with complicated medical history including ESRD on HD, history of CVA with debility, chronic anemia, respiratory failure on 3L NC oxygen, HFpEF, PVD, recent admissions for acute on chronic anemia requiring transfusion presents from OSH for acute anemia due to vaginal bleeding. Gynecology consulted and recommended starting provera 20mg TID. Patient has required multiple blood tranfusions, vaginal bleeding gradually improved. Patient insisted on being discharged 12/5 as she reported not having any more ongoing bleeding. She has an appointment with Gyn on 12/12 and with PCP within a week of discharge to follow hemoglobin levels per our discussion. She continued to have leukocytosis around 18-20k which she has had for more than a month and has been understood to be reactive given negative infectious workup and no new systemic symptoms.       - Provera 20mg TID x7 days  (till 12/8) then transition to Provera 20mg daily thereafter   - Gyne ok to resume asa/plavix on dc  - H/H 7.9 on discharge - h/h with PCP as outpatient  - Gyne follow up on 12/12

## 2024-12-02 NOTE — CONSULTS
Consult Note  Gynecology    Consult Requested By: Ivan Peacock MD  Reason for Consult: Vaginal bleeding    SUBJECTIVE:     History of Present Illness:  Pilar Fernandez is a 44 y.o.  with complicated medical history including ESRD on HD, history of CVA with debility, chronic anemia, respiratory failure on 3L NC oxygen, HFpEF, PVD who presents with vaginal bleeding. Patient with vaginal bleeding for the past 2 days, with moderate cramping and expulsion of IUD yesterday. No IUD was visualized on TVUS.     She has trouble quantifying the bleeding, but states that it started out like a heavy period and she has passed several large clots since then. She believes the bleeding has slowed down since passing the IUD. She reports that she has not had a menstrual cycle for 5 years since having the IUD placed. The IUD was inserted for heavy menstrual bleeding.     She is sexually active with one partner. She endorses a history of syphilis for which she has had three doses of IM PCN G.     Review of patient's allergies indicates:   Allergen Reactions    Tramadol Itching and Nausea And Vomiting     Past Medical History:   Diagnosis Date    Anemia     Anemia     Breast mass     Chronic constipation     CKD (chronic kidney disease)     Diabetes mellitus     Dialysis patient     MON, TUE, THURS, SAT.    Embolic stroke involving right middle cerebral artery 2024    Encounter for blood transfusion     Erosive gastritis 2016    GERD (gastroesophageal reflux disease)     Gout     High cholesterol     Hypertension     Knee pain     Thyroid disease     Unspecified cataract 2022     Past Surgical History:   Procedure Laterality Date    AV FISTULA PLACEMENT Left 2019    Procedure: creation av fistula;  Surgeon: Buck Fernandez MD;  Location: [facility];  Service: Cardiovascular;  Laterality: Left;  Left Radialcephalic: PACU     SECTION  , , ,     CHOLECYSTECTOMY      COLONOSCOPY N/A  10/23/2017    Procedure: COLONOSCOPY;  Surgeon: Bri Harry MD;  Location: [facility];  Service: Endoscopy;  Laterality: N/A;    EXCISIONAL BIOPSY Left 2022    Procedure: EXCISIONAL BIOPSY;  Surgeon: Mick Juarez MD;  Location: [facility];  Service: General;  Laterality: Left;    TUBAL LIGATION      UPPER GASTROINTESTINAL ENDOSCOPY  2016    Erosive Gastritis-Dr Bailey     OB History          5    Para   5    Term   5       0    AB   0    Living   4         SAB   0    IAB   0    Ectopic   0    Multiple   0    Live Births   4               Family History   Problem Relation Name Age of Onset    Thyroid disease Mother      Diabetes Father      Ovarian cancer Maternal Aunt Malathi     Breast cancer Maternal Aunt Ada      Social History     Socioeconomic History    Marital status: Single    Years of education: 11   Occupational History    Occupation:    Tobacco Use    Smoking status: Never     Passive exposure: Never    Smokeless tobacco: Never   Substance and Sexual Activity    Alcohol use: No     Alcohol/week: 0.0 standard drinks of alcohol    Drug use: No    Sexual activity: Yes     Partners: Male     Birth control/protection: None, Surgical     Social Drivers of Health     Financial Resource Strain: Patient Declined (2024)    Overall Financial Resource Strain (CARDIA)     Difficulty of Paying Living Expenses: Patient declined   Food Insecurity: Patient Declined (2024)    Hunger Vital Sign     Worried About Running Out of Food in the Last Year: Patient declined     Ran Out of Food in the Last Year: Patient declined   Transportation Needs: Patient Declined (2024)    TRANSPORTATION NEEDS     Transportation : Patient declined   Physical Activity: Insufficiently Active (10/30/2024)    Exercise Vital Sign     Days of Exercise per Week: 4 days     Minutes of Exercise per Session: 30 min   Stress: Patient Declined (2024)    [facility]  [facility]upational [facility] - Occupational Stress Questionnaire     Feeling of Stress : Patient declined   Housing Stability: Patient Declined (12/1/2024)    Housing Stability Vital Sign     Unable to Pay for Housing in the Last Year: Patient declined     Homeless in the Last Year: Patient declined     Current Facility-Administered Medications   Medication    0.9%  NaCl infusion (for blood administration)    0.9%  NaCl infusion (for blood administration)    acetaminophen tablet 650 mg    acetaminophen tablet 650 mg    aluminum-magnesium hydroxide-simethicone 200-200-20 mg/5 mL suspension 30 mL    dextrose 10% bolus 125 mL 125 mL    dextrose 10% bolus 250 mL 250 mL    folic acid tablet 1 mg    glucagon (human recombinant) injection 1 mg    glucose chewable tablet 16 g    glucose chewable tablet 24 g    insulin aspart U-100 pen 0-10 Units    loperamide capsule 4 mg    medroxyPROGESTERone tablet 20 mg    melatonin tablet 6 mg    metoprolol tartrate (LOPRESSOR) tablet 100 mg    naloxone 0.4 mg/mL injection 0.02 mg    ondansetron disintegrating tablet 8 mg    oxyCODONE immediate release tablet 5 mg    oxyCODONE immediate release tablet Tab 10 mg    [START ON 12/3/2024] pantoprazole EC tablet 40 mg    polyethylene glycol packet 17 g    simethicone chewable tablet 80 mg    sodium chloride 0.9% flush 1-10 mL    traZODone tablet 100 mg    ursodioL capsule 300 mg       Review of Systems:  Constitutional: no significant weight change, fever, fatigue  Eyes:  No vision changes  Cardiovascular: No chest pain  Respiratory: No shortness of breath or cough  Gastrointestinal: No diarrhea, bloody stool, nausea/vomiting, constipation  Genitourinary: No blood in urine, painful urination, urgency of urination, frequency of urination. Vaginal bleeding.   Skin/Breast: No painful breasts, nipple discharge, masses  Neurological: No headache  Endocrine: No hot flushes  Psychiatric: No depression or anxiety     OBJECTIVE:     Vital Signs  Temp:  [97.5  °F (36.4 °C)-97.9 °F (36.6 °C)] 97.9 °F (36.6 °C)  Pulse:  [57-75] 71  Resp:  [13-25] 18  SpO2:  [99 %-100 %] 99 %  BP: (100-184)/() 184/78    Physical Exam:  Gen: A&Ox3, NAD, obese  CV: RRR  Pulm: LCTAB  Abd: Soft, non-distended, non-tender to palpation without rebound or guarding  Ext: PPP, no peripheral edema  External genitalia: WNL  Urethra and Meatus: WNL  Vagina: Moist, pink, normal ruggae, large clot present in vaginal vault  Cervix: Large clot evacuated from vaginal vault, and slow ooze present from cervical os. No active bleeding from os. No CMT  Uterus: mobile  Adnexa: No masses, no TTP     Laboratory  Recent Labs   Lab 12/01/24  1130 12/01/24  1652 12/02/24  0542   WBC 16.95* 16.78* 14.43*   HGB 6.6* 8.3* 7.8*   HCT 19.2* 25.8* 23.9*   MCV 94 95 96    189 170      Recent Labs   Lab 11/30/24  1829 12/01/24  1130 12/02/24  0542    135* 136   K 3.7 3.7 3.6    100 99   CO2 29 24 23   BUN 36* 37* 42*   CREATININE 4.1* 4.4* 4.9*   GLU 74 72 102   PROT 7.2 7.1 7.2   BILITOT 6.4* 7.0* 6.9*   ALKPHOS 356* 343* 327*   ALT 67* 55* 50*   * 89* 81*   MG  --  1.9 1.9   PHOS  --  5.4* 5.7*                Diagnostic Results:  Narrative & Impression  EXAMINATION:  US PELVIS COMP WITH TRANSVAG NON-OB (XPD)     CLINICAL HISTORY:  evaluate complete expulsion of the IUD and the evaluate the endometrial stripe;     TECHNIQUE:  Transabdominal sonography of the pelvis was performed, followed by transvaginal sonography to better evaluate the uterus and ovaries.     COMPARISON:  CT chest abdomen pelvis 10/30/2024     FINDINGS:  Uterus:     Size: 13.9 x 4.3 x 5.9 cm     Masses: 3.6 x 2.3 x 2.5 cm hypoechoic lesion, likely a leiomyoma.     Endometrium: Measures up to 6 mm near the fundus.  No IUD is visualized.  Large amount of heterogeneously echogenic products noted within the cervix.  Notably blood products expelled during the examination and limits transvaginal evaluation.     Right ovary:      Size: 2.8 x 1.9 x 2.8 cm     Appearance: Normal     Vascular flow: Normal.     Left ovary:     Size: 2.7 x 1.9 x 1.9 cm     Appearance: Normal     Vascular Flow: Normal.     Free Fluid:     None.     Impression:     Heterogeneous echogenic products within the cervix limits transvaginal evaluation.  Blood products were expelled during the examination and consistent with heterogeneous products within the cervix.  No IUD was visualized.  Follow-up examination is recommended.  MRI may be beneficial for further evaluation as clinically indicated.     Small uterine leiomyoma.     Electronically signed by resident: Angela Doe  Date:                                            12/02/2024  Time:                                           06:11     Electronically signed by:Bobby Gutierrez MD  Date:                                            12/02/2024  Time:                                           08:09    ASSESSMENT/PLAN:     Active Hospital Problems    Diagnosis  POA    *Vaginal bleeding [N93.9]  Yes    Elevated troponin [R79.89]  Yes    Debility [R53.81]  Yes     Chronic    History of cerebrovascular accident (CVA) with residual deficit [I69.30]  Not Applicable     Chronic    Hepatic dysfunction [K76.89]  Yes     Chronic    Morbid obesity [E66.01]  Yes     Chronic    Chronic hypoxic respiratory failure [J96.11]  Yes     Chronic    Acute on chronic anemia [D64.9]  Yes    Direct hyperbilirubinemia [E80.6]  Yes     Chronic    Mass of breast [N63.0]  Yes     Chronic    Heart failure with preserved ejection fraction [I50.30]  Yes     Chronic    ESRD (end stage renal disease) on dialysis [N18.6, Z99.2]  Not Applicable     Chronic    Peripheral vascular disease [I73.9]  Yes     Chronic    Type 2 diabetes mellitus with hyperglycemia, with long-term current use of insulin [E11.65, Z79.4]  Not Applicable     Chronic    Hypertension associated with diabetes [E11.59, I15.2]  Yes     Chronic    Hyperlipidemia associated with  type 2 diabetes mellitus [E11.69, E78.5]  Yes     Chronic      Resolved Hospital Problems   No resolved problems to display.       Pilar Fernandez is a 44 y.o.  who presents with vaginal bleeding.    1. Acute vaginal bleeding   - VSS  - H/H 6.8/20.4 > 2u pRBC >>    - In the setting of a history of CVA would not recommend IV estrogen  - Recommend Provera 20mg TID x7 days then transitioning to Provera 20mg daily thereafter  - Follow up in Gyn clinic in 1 week for endometrial sampling and surgical work-up    Val Garza  Obstetrics & Gynecology

## 2024-12-03 PROBLEM — D62 ACUTE BLOOD LOSS ANEMIA: Status: ACTIVE | Noted: 2024-12-03

## 2024-12-03 LAB
ALBUMIN SERPL BCP-MCNC: 1.8 G/DL (ref 3.5–5.2)
ALP SERPL-CCNC: 340 U/L (ref 40–150)
ALT SERPL W/O P-5'-P-CCNC: 48 U/L (ref 10–44)
ANION GAP SERPL CALC-SCNC: 13 MMOL/L (ref 8–16)
ANISOCYTOSIS BLD QL SMEAR: ABNORMAL
ANISOCYTOSIS BLD QL SMEAR: SLIGHT
AST SERPL-CCNC: 77 U/L (ref 10–40)
BASOPHILS # BLD AUTO: 0.11 K/UL (ref 0–0.2)
BASOPHILS # BLD AUTO: 0.18 K/UL (ref 0–0.2)
BASOPHILS NFR BLD: 0.8 % (ref 0–1.9)
BASOPHILS NFR BLD: 0.9 % (ref 0–1.9)
BILIRUB SERPL-MCNC: 6.9 MG/DL (ref 0.1–1)
BLD PROD TYP BPU: NORMAL
BLD PROD TYP BPU: NORMAL
BLOOD UNIT EXPIRATION DATE: NORMAL
BLOOD UNIT EXPIRATION DATE: NORMAL
BLOOD UNIT TYPE CODE: 5100
BLOOD UNIT TYPE CODE: 5100
BLOOD UNIT TYPE: NORMAL
BLOOD UNIT TYPE: NORMAL
BUN SERPL-MCNC: 35 MG/DL (ref 6–20)
CALCIUM SERPL-MCNC: 8.5 MG/DL (ref 8.7–10.5)
CHLORIDE SERPL-SCNC: 100 MMOL/L (ref 95–110)
CO2 SERPL-SCNC: 21 MMOL/L (ref 23–29)
CODING SYSTEM: NORMAL
CODING SYSTEM: NORMAL
CREAT SERPL-MCNC: 4.1 MG/DL (ref 0.5–1.4)
CROSSMATCH INTERPRETATION: NORMAL
CROSSMATCH INTERPRETATION: NORMAL
DIFFERENTIAL METHOD BLD: ABNORMAL
DIFFERENTIAL METHOD BLD: ABNORMAL
DISPENSE STATUS: NORMAL
DISPENSE STATUS: NORMAL
EOSINOPHIL # BLD AUTO: 0.9 K/UL (ref 0–0.5)
EOSINOPHIL # BLD AUTO: 1.3 K/UL (ref 0–0.5)
EOSINOPHIL NFR BLD: 6.3 % (ref 0–8)
EOSINOPHIL NFR BLD: 6.7 % (ref 0–8)
ERYTHROCYTE [DISTWIDTH] IN BLOOD BY AUTOMATED COUNT: 20 % (ref 11.5–14.5)
ERYTHROCYTE [DISTWIDTH] IN BLOOD BY AUTOMATED COUNT: 21.8 % (ref 11.5–14.5)
EST. GFR  (NO RACE VARIABLE): 13 ML/MIN/1.73 M^2
GLUCOSE SERPL-MCNC: 122 MG/DL (ref 70–110)
HCT VFR BLD AUTO: 17.8 % (ref 37–48.5)
HCT VFR BLD AUTO: 22.6 % (ref 37–48.5)
HGB BLD-MCNC: 5.9 G/DL (ref 12–16)
HGB BLD-MCNC: 7.3 G/DL (ref 12–16)
HYPOCHROMIA BLD QL SMEAR: ABNORMAL
HYPOCHROMIA BLD QL SMEAR: ABNORMAL
IMM GRANULOCYTES # BLD AUTO: 0.38 K/UL (ref 0–0.04)
IMM GRANULOCYTES # BLD AUTO: 0.5 K/UL (ref 0–0.04)
IMM GRANULOCYTES NFR BLD AUTO: 2.5 % (ref 0–0.5)
IMM GRANULOCYTES NFR BLD AUTO: 2.7 % (ref 0–0.5)
INR PPP: 1.1 (ref 0.8–1.2)
LYMPHOCYTES # BLD AUTO: 2 K/UL (ref 1–4.8)
LYMPHOCYTES # BLD AUTO: 2.9 K/UL (ref 1–4.8)
LYMPHOCYTES NFR BLD: 13.7 % (ref 18–48)
LYMPHOCYTES NFR BLD: 14.3 % (ref 18–48)
MAGNESIUM SERPL-MCNC: 1.9 MG/DL (ref 1.6–2.6)
MCH RBC QN AUTO: 31.6 PG (ref 27–31)
MCH RBC QN AUTO: 31.6 PG (ref 27–31)
MCHC RBC AUTO-ENTMCNC: 32.3 G/DL (ref 32–36)
MCHC RBC AUTO-ENTMCNC: 33.1 G/DL (ref 32–36)
MCV RBC AUTO: 95 FL (ref 82–98)
MCV RBC AUTO: 98 FL (ref 82–98)
MONOCYTES # BLD AUTO: 1.2 K/UL (ref 0.3–1)
MONOCYTES # BLD AUTO: 2.1 K/UL (ref 0.3–1)
MONOCYTES NFR BLD: 10.3 % (ref 4–15)
MONOCYTES NFR BLD: 8.2 % (ref 4–15)
NEUTROPHILS # BLD AUTO: 13.1 K/UL (ref 1.8–7.7)
NEUTROPHILS # BLD AUTO: 9.8 K/UL (ref 1.8–7.7)
NEUTROPHILS NFR BLD: 65.3 % (ref 38–73)
NEUTROPHILS NFR BLD: 68.3 % (ref 38–73)
NRBC BLD-RTO: 0 /100 WBC
NRBC BLD-RTO: 0 /100 WBC
NUM UNITS TRANS PACKED RBC: NORMAL
NUM UNITS TRANS PACKED RBC: NORMAL
OVALOCYTES BLD QL SMEAR: ABNORMAL
PHOSPHATE SERPL-MCNC: 4.7 MG/DL (ref 2.7–4.5)
PLATELET # BLD AUTO: 123 K/UL (ref 150–450)
PLATELET # BLD AUTO: 168 K/UL (ref 150–450)
PLATELET BLD QL SMEAR: ABNORMAL
PLATELET BLD QL SMEAR: ABNORMAL
PMV BLD AUTO: 11.2 FL (ref 9.2–12.9)
PMV BLD AUTO: 11.8 FL (ref 9.2–12.9)
POCT GLUCOSE: 134 MG/DL (ref 70–110)
POCT GLUCOSE: 152 MG/DL (ref 70–110)
POIKILOCYTOSIS BLD QL SMEAR: SLIGHT
POIKILOCYTOSIS BLD QL SMEAR: SLIGHT
POLYCHROMASIA BLD QL SMEAR: ABNORMAL
POLYCHROMASIA BLD QL SMEAR: ABNORMAL
POTASSIUM SERPL-SCNC: 4 MMOL/L (ref 3.5–5.1)
PROT SERPL-MCNC: 6.9 G/DL (ref 6–8.4)
PROTHROMBIN TIME: 12.4 SEC (ref 9–12.5)
RBC # BLD AUTO: 1.87 M/UL (ref 4–5.4)
RBC # BLD AUTO: 2.31 M/UL (ref 4–5.4)
SODIUM SERPL-SCNC: 134 MMOL/L (ref 136–145)
SPHEROCYTES BLD QL SMEAR: ABNORMAL
TARGETS BLD QL SMEAR: ABNORMAL
WBC # BLD AUTO: 14.33 K/UL (ref 3.9–12.7)
WBC # BLD AUTO: 20.08 K/UL (ref 3.9–12.7)

## 2024-12-03 PROCEDURE — 85610 PROTHROMBIN TIME: CPT | Performed by: INTERNAL MEDICINE

## 2024-12-03 PROCEDURE — 80053 COMPREHEN METABOLIC PANEL: CPT | Performed by: STUDENT IN AN ORGANIZED HEALTH CARE EDUCATION/TRAINING PROGRAM

## 2024-12-03 PROCEDURE — 25000003 PHARM REV CODE 250: Performed by: NURSE PRACTITIONER

## 2024-12-03 PROCEDURE — 85025 COMPLETE CBC W/AUTO DIFF WBC: CPT | Performed by: STUDENT IN AN ORGANIZED HEALTH CARE EDUCATION/TRAINING PROGRAM

## 2024-12-03 PROCEDURE — 36430 TRANSFUSION BLD/BLD COMPNT: CPT

## 2024-12-03 PROCEDURE — 84100 ASSAY OF PHOSPHORUS: CPT | Performed by: STUDENT IN AN ORGANIZED HEALTH CARE EDUCATION/TRAINING PROGRAM

## 2024-12-03 PROCEDURE — P9016 RBC LEUKOCYTES REDUCED: HCPCS | Performed by: INTERNAL MEDICINE

## 2024-12-03 PROCEDURE — 20600001 HC STEP DOWN PRIVATE ROOM

## 2024-12-03 PROCEDURE — 83735 ASSAY OF MAGNESIUM: CPT | Performed by: STUDENT IN AN ORGANIZED HEALTH CARE EDUCATION/TRAINING PROGRAM

## 2024-12-03 PROCEDURE — 25000003 PHARM REV CODE 250: Performed by: INTERNAL MEDICINE

## 2024-12-03 PROCEDURE — 25000003 PHARM REV CODE 250

## 2024-12-03 PROCEDURE — 25000003 PHARM REV CODE 250: Performed by: STUDENT IN AN ORGANIZED HEALTH CARE EDUCATION/TRAINING PROGRAM

## 2024-12-03 PROCEDURE — 36415 COLL VENOUS BLD VENIPUNCTURE: CPT | Performed by: INTERNAL MEDICINE

## 2024-12-03 PROCEDURE — 85025 COMPLETE CBC W/AUTO DIFF WBC: CPT | Mod: 91 | Performed by: INTERNAL MEDICINE

## 2024-12-03 PROCEDURE — 86920 COMPATIBILITY TEST SPIN: CPT | Performed by: INTERNAL MEDICINE

## 2024-12-03 PROCEDURE — 99232 SBSQ HOSP IP/OBS MODERATE 35: CPT | Mod: ,,, | Performed by: NURSE PRACTITIONER

## 2024-12-03 RX ORDER — NIFEDIPINE 30 MG/1
60 TABLET, EXTENDED RELEASE ORAL DAILY
Status: DISCONTINUED | OUTPATIENT
Start: 2024-12-04 | End: 2024-12-05 | Stop reason: HOSPADM

## 2024-12-03 RX ORDER — NIFEDIPINE 30 MG/1
60 TABLET, EXTENDED RELEASE ORAL 2 TIMES DAILY
Status: DISCONTINUED | OUTPATIENT
Start: 2024-12-03 | End: 2024-12-03

## 2024-12-03 RX ORDER — SODIUM CHLORIDE 9 MG/ML
INJECTION, SOLUTION INTRAVENOUS ONCE
Status: CANCELLED | OUTPATIENT
Start: 2024-12-04

## 2024-12-03 RX ORDER — HYDROCODONE BITARTRATE AND ACETAMINOPHEN 500; 5 MG/1; MG/1
TABLET ORAL
Status: DISCONTINUED | OUTPATIENT
Start: 2024-12-03 | End: 2024-12-05 | Stop reason: HOSPADM

## 2024-12-03 RX ORDER — LOSARTAN POTASSIUM 50 MG/1
100 TABLET ORAL DAILY
Status: DISCONTINUED | OUTPATIENT
Start: 2024-12-03 | End: 2024-12-04

## 2024-12-03 RX ADMIN — METOPROLOL TARTRATE 100 MG: 50 TABLET, FILM COATED ORAL at 09:12

## 2024-12-03 RX ADMIN — LACTULOSE 20 G: 20 SOLUTION ORAL at 09:12

## 2024-12-03 RX ADMIN — MEDROXYPROGESTERONE ACETATE 20 MG: 10 TABLET ORAL at 09:12

## 2024-12-03 RX ADMIN — SEVELAMER CARBONATE 800 MG: 800 TABLET, FILM COATED ORAL at 04:12

## 2024-12-03 RX ADMIN — FOLIC ACID 1 MG: 1 TABLET ORAL at 09:12

## 2024-12-03 RX ADMIN — NIFEDIPINE 60 MG: 30 TABLET, FILM COATED, EXTENDED RELEASE ORAL at 09:12

## 2024-12-03 RX ADMIN — SEVELAMER CARBONATE 800 MG: 800 TABLET, FILM COATED ORAL at 09:12

## 2024-12-03 RX ADMIN — MEDROXYPROGESTERONE ACETATE 20 MG: 10 TABLET ORAL at 04:12

## 2024-12-03 RX ADMIN — OXYCODONE HYDROCHLORIDE 10 MG: 10 TABLET ORAL at 12:12

## 2024-12-03 RX ADMIN — PANTOPRAZOLE SODIUM 40 MG: 40 TABLET, DELAYED RELEASE ORAL at 09:12

## 2024-12-03 RX ADMIN — URSODIOL 300 MG: 300 CAPSULE ORAL at 09:12

## 2024-12-03 RX ADMIN — SEVELAMER CARBONATE 800 MG: 800 TABLET, FILM COATED ORAL at 12:12

## 2024-12-03 RX ADMIN — LOSARTAN POTASSIUM 100 MG: 50 TABLET, FILM COATED ORAL at 06:12

## 2024-12-03 RX ADMIN — URSODIOL 300 MG: 300 CAPSULE ORAL at 04:12

## 2024-12-03 NOTE — SUBJECTIVE & OBJECTIVE
Interval History: NAEON. Appropriate response to transfusion. Pt states vaginal bleeding improving.     Review of Systems   Constitutional: Negative.    Respiratory: Negative.     Cardiovascular: Negative.    Gastrointestinal: Negative.    Genitourinary:  Positive for vaginal bleeding. Negative for vaginal pain.     Objective:     Vital Signs (Most Recent):  Temp: 97.8 °F (36.6 °C) (12/02/24 1932)  Pulse: 63 (12/02/24 1932)  Resp: 18 (12/02/24 1932)  BP: (!) 198/95 (12/02/24 1932)  SpO2: 100 % (12/02/24 1932) Vital Signs (24h Range):  Temp:  [96.9 °F (36.1 °C)-98 °F (36.7 °C)] 97.8 °F (36.6 °C)  Pulse:  [58-75] 63  Resp:  [13-25] 18  SpO2:  [95 %-100 %] 100 %  BP: (111-198)/() 198/95     Weight: 88.3 kg (194 lb 10.7 oz)  Body mass index is 33.41 kg/m².    Intake/Output Summary (Last 24 hours) at 12/2/2024 2051  Last data filed at 12/2/2024 1830  Gross per 24 hour   Intake 0 ml   Output 3325 ml   Net -3325 ml      Physical Exam  Vitals and nursing note reviewed.   Constitutional:       General: She is not in acute distress.     Appearance: She is well-developed. She is ill-appearing (chronically). She is not diaphoretic.   HENT:      Head: Normocephalic and atraumatic.   Eyes:      General: Scleral icterus present.      Conjunctiva/sclera: Conjunctivae normal.   Neck:      Vascular: No JVD.   Cardiovascular:      Rate and Rhythm: Normal rate and regular rhythm.   Pulmonary:      Effort: Pulmonary effort is normal. No respiratory distress.   Abdominal:      General: There is no distension.      Tenderness: There is no abdominal tenderness. There is no guarding.   Genitourinary:     Comments: Mild blood in Purewick canister  Musculoskeletal:      Comments: AVF in LUE   Skin:     Coloration: Skin is jaundiced. Skin is not pale.   Neurological:      Mental Status: She is alert and oriented to person, place, and time.      Motor: No abnormal muscle tone.   Psychiatric:         Mood and Affect: Mood normal.          Behavior: Behavior normal.         MELD 3.0: 30 at 12/2/2024  5:42 AM  MELD-Na: 29 at 12/2/2024  5:42 AM  Calculated from:  Serum Creatinine: On dialysis. Using the maximum value.  Serum Sodium: 136 mmol/L at 12/2/2024  5:42 AM  Total Bilirubin: 6.9 mg/dL at 12/2/2024  5:42 AM  Serum Albumin: 1.8 g/dL at 12/2/2024  5:42 AM  INR(ratio): 1.2 at 11/30/2024 10:23 PM  Age at listing (hypothetical): 44 years  Sex: Female at 12/2/2024  5:42 AM      Significant Labs:  CBC:  Recent Labs   Lab 12/01/24  1130 12/01/24  1652 12/02/24  0542   WBC 16.95* 16.78* 14.43*   HGB 6.6* 8.3* 7.8*   HCT 19.2* 25.8* 23.9*    189 170     CMP:  Recent Labs   Lab 12/01/24  1130 12/02/24  0542   * 136   K 3.7 3.6    99   CO2 24 23   GLU 72 102   BUN 37* 42*   CREATININE 4.4* 4.9*   CALCIUM 8.9 8.6*   PROT 7.1 7.2   ALBUMIN 1.7* 1.8*   BILITOT 7.0* 6.9*   ALKPHOS 343* 327*   AST 89* 81*   ALT 55* 50*   ANIONGAP 11 14     PTINR:  Recent Labs   Lab 11/30/24  2223   INR 1.2

## 2024-12-03 NOTE — PROGRESS NOTES
"[facility] ([place] [id number])  [facility] Medicine  Progress Note    Patient Name: Pilar Fernandez  MRN: [id number]  Patient Class: IP- Inpatient   Admission Date: 11/30/2024  Length of Stay: 3 days  Attending Physician: Willie العلي MD  Primary Care Provider: Lindsey Singletary FNP        Subjective     Principal Problem:Vaginal bleeding        HPI:  Pilar Fernandez is a 44 y.o. female with complicated medical history including ESRD on HD, history of CVA with debility, chronic anemia, respiratory failure on 3L NC oxygen, HFpEF, PVD, recent admissions for acute on chronic anemia requiring transfusion and acute cholestatic hepatitis who presents from an OSH for acute anemia.     She reports that she was doing fairly well at SNF until last night, when she developed "upper stomach" pain described as a cramping. This worsened with lying flat, and improved with sitting up. Denies CP or SOB, stating that it is mostly in her epigastric region. She also noted a "clot" of blood in her underwear today followed by seeing her IUD. Her abdominal pain resolved after this. She does not have menstrual cycles, but noted the vaginal bleeding only today. She reports "normal" BMs, not every day, but has not had any dark or bloody stool to her knowledge. She makes little urine but denies any hematuria.     On evaluation at OSH, recurrent anemia noted, therefore she was transferred here for GI evaluation. She was transfused 1U RBC.       Overview/Hospital Course:  Gynecology consulted for vaginal bleeding.  Started on Provera per gynecology initial recommendations    Interval History: NAEON. Hb 5.9 today with report of two large blood clots this AM, although vaginal bleeding appears to be improving overall.     Review of Systems   Constitutional: Negative.    Respiratory: Negative.     Cardiovascular: Negative.    Gastrointestinal: Negative.    Genitourinary:  Positive for vaginal bleeding. Negative for vaginal " pain.     Objective:     Vital Signs (Most Recent):  Temp: 97.9 °F (36.6 °C) (12/03/24 1650)  Pulse: 63 (12/03/24 1650)  Resp: 15 (12/03/24 1650)  BP: (!) 177/70 (12/03/24 1650)  SpO2: 100 % (12/03/24 1650) Vital Signs (24h Range):  Temp:  [97.6 °F (36.4 °C)-98.6 °F (37 °C)] 97.9 °F (36.6 °C)  Pulse:  [60-77] 63  Resp:  [13-21] 15  SpO2:  [92 %-100 %] 100 %  BP: (104-198)/() 177/70     Weight: 88.3 kg (194 lb 10.7 oz)  Body mass index is 33.41 kg/m².    Intake/Output Summary (Last 24 hours) at 12/3/2024 1721  Last data filed at 12/3/2024 1517  Gross per 24 hour   Intake 460.42 ml   Output 3000 ml   Net -2539.58 ml      Physical Exam  Vitals and nursing note reviewed.   Constitutional:       General: She is not in acute distress.     Appearance: She is well-developed. She is ill-appearing (chronically). She is not diaphoretic.   HENT:      Head: Normocephalic and atraumatic.   Eyes:      General: Scleral icterus present.      Conjunctiva/sclera: Conjunctivae normal.   Neck:      Vascular: No JVD.   Cardiovascular:      Rate and Rhythm: Normal rate and regular rhythm.   Pulmonary:      Effort: Pulmonary effort is normal. No respiratory distress.   Abdominal:      General: There is no distension.      Tenderness: There is no abdominal tenderness. There is no guarding.   Genitourinary:     Comments: Mild blood in Purewick canister  Musculoskeletal:      Comments: AVF in LUE   Skin:     Coloration: Skin is jaundiced. Skin is not pale.   Neurological:      Mental Status: She is alert and oriented to person, place, and time.      Motor: No abnormal muscle tone.   Psychiatric:         Mood and Affect: Mood normal.         Behavior: Behavior normal.         MELD 3.0: 30 at 12/3/2024  4:53 AM  MELD-Na: 29 at 12/3/2024  4:53 AM  Calculated from:  Serum Creatinine: On dialysis. Using the maximum value.  Serum Sodium: 134 mmol/L at 12/3/2024  4:53 AM  Total Bilirubin: 6.9 mg/dL at 12/3/2024  4:53 AM  Serum Albumin: 1.8  g/dL at 12/3/2024  4:53 AM  INR(ratio): 1.1 at 12/3/2024  4:53 AM  Age at listing (hypothetical): 44 years  Sex: Female at 12/3/2024  4:53 AM      Significant Labs:  CBC:  Recent Labs   Lab 12/02/24  0542 12/03/24  0453 12/03/24  1607   WBC 14.43* 14.33* 20.08*   HGB 7.8* 5.9* 7.3*   HCT 23.9* 17.8* 22.6*    123* 168     CMP:  Recent Labs   Lab 12/02/24  0542 12/03/24  0453    134*   K 3.6 4.0   CL 99 100   CO2 23 21*    122*   BUN 42* 35*   CREATININE 4.9* 4.1*   CALCIUM 8.6* 8.5*   PROT 7.2 6.9   ALBUMIN 1.8* 1.8*   BILITOT 6.9* 6.9*   ALKPHOS 327* 340*   AST 81* 77*   ALT 50* 48*   ANIONGAP 14 13     PTINR:  Recent Labs   Lab 12/03/24 0453   INR 1.1         Assessment and Plan     * Vaginal bleeding  RN noted blood clot in diaper on presentation, followed by patient passing her IUD. Patient denies having menstrual cycles, and bleeding occurred just today on presentation. While this could be a source of blood loss  -- Gyn consulted, appreciate recs  -- TVUS reviewed  -- Provera 20mg TID x7 days then transition to Provera 20mg daily thereafter   -- Gyn recommending follow up in Gyn clinic in 1 week for endometrial sampling and surgical work-  -- Transfuse additional 2u pRBC today  -- Trend H/H      Acute blood loss anemia  Anemia is likely due to acute blood loss which was from vaginal bleeding . Most recent hemoglobin and hematocrit are listed below.  Recent Labs     12/02/24  0542 12/03/24 0453 12/03/24  1607   HGB 7.8* 5.9* 7.3*   HCT 23.9* 17.8* 22.6*     Plan  - Monitor serial CBC: Every 8 hours  - Transfuse PRBC if patient becomes hemodynamically unstable, symptomatic or H/H drops below 7/21.  - Patient has received 4 units of PRBCs total thus far  - Patient's anemia is currently worsening. Will continue current treatment    Elevated troponin  - Noted at OSH in setting of severe anemia  - Elevated by cardiology at OSH where they rec'd to continue her Aspirin and Plavix if it is safe to  do so from a GI standpoint given her cardiac and CVA hx    Debility  Return to SNF upon discharge.     Morbid obesity  Body mass index is 33.41 kg/m².; chronic comorbidity affecting medical decision-making. Patient would greatly benefit from weight loss through proper diet and increased physical activity.        Chronic hypoxic respiratory failure  Reportedly with chronic hypoxemia on 3L NC, however with sats 100% on this on presentation. Wean to maintain sats 90% or greater.     History of cerebrovascular accident (CVA) with residual deficit  Holding ASA + Plavix for now given concern for bleeding, and statin given hepatitis. Resume as able. Continue BP and glucose control.       Hepatic dysfunction  Stable from prior, felt secondary to cholestatic hepatitis with fibrosis per biopsy. Continue Ursodiol per hepatology.       Direct hyperbilirubinemia  Stable from prior, felt secondary to cholestatic hepatitis. Continue ursodiol.       Acute on chronic anemia  Patient with chronic multifactorial anemia, requiring multiple transfusions. Recent admission with anemia as well, without obvious cause. Suspect multifactorial from ESRD (reports she no longer gets Epo with HD), possible LILIAN, possible GIB. Although she denies any signs of melena or hematochezia, FOBT positive at OSH    - Iron panel c/w AoCD  - Elevated B12, LDH  - Low Folate, Haptoglobin   - Normal INR  - DAPT negative   - LDH/haptoglobin c/w possible hemolytic anemia/delayed hemolysis given recent transfusions, but INR normal  - Given 1U RBC at OSH  - Trend CBC and transfuse to maintain Hgb 7 or greater, o  - Hold ASA + Plavix for now.   - Holding GI consult given no significant GI symptoms on admission    Mass of breast  Recent lymph node biopsy negative for malignancy. Continue outpatient follow up, and may need excisional biopsy if high suspicion for malignancy.       ESRD (end stage renal disease) on dialysis  On HD MWF, with last session yesterday. Makes  little urine.   Nephrology consulted for routine HD.     Heart failure with preserved ejection fraction  Patient with known diastolic CHF. Currently without evidence of volume overload on exam, and not in acute exacerbation. Will continue home GDMT with Metoprolol and volume removal with HD.  Appropriate diet ordered. Monitor volume status.     Peripheral vascular disease  Holding home ASA, Plavix, and statin given concern for bleeding and recent cholestatic hepatitis.      Hyperlipidemia associated with type 2 diabetes mellitus  Hold statin given recent hepatitis.     Hypertension associated with diabetes  Chronic, and currently controlled. Latest blood pressure and vitals reviewed-   While in the hospital, will manage blood pressure as follows; Continue home antihypertensive regimen.    Will utilize p.r.n. blood pressure medication only if patient's blood pressure greater than 220/110 and she develops symptoms such as worsening chest pain or shortness of breath.      Type 2 diabetes mellitus with hyperglycemia, with long-term current use of insulin  Most recent A1c 4.5%, indicating excellent chronic control likely due to ESRD. Will hold home oral agents while inpatient. Start low-dose insulin; titrate to maintain adequate glucose control. Diabetic diet ordered.         VTE Risk Mitigation (From admission, onward)           Ordered     Reason for No Pharmacological VTE Prophylaxis  Once        Question:  Reasons:  Answer:  Active Bleeding    11/30/24 2201     IP VTE HIGH RISK PATIENT  Once         11/30/24 2201     Place sequential compression device  Until discontinued         11/30/24 2201                    Discharge Planning   SHASHA: 12/5/2024     Code Status: Full Code   Medical Readiness for Discharge Date:   Discharge Plan A: Home with family                        Willie العلي MD  Department of Hospital Medicine   [facility] ([place]

## 2024-12-03 NOTE — PLAN OF CARE
I have reviewed the chart of Pilar Fernandez who is hospitalized for the following:    Active Hospital Problems    Diagnosis    *Vaginal bleeding    Acute blood loss anemia  - in setting of vaginal bleeding, transfusing 2u PRBC with HD today  - exacerbating patient's known anemia of chronic disease    Elevated troponin    Debility    History of cerebrovascular accident (CVA) with residual deficit    Hepatic dysfunction    Morbid obesity    Chronic hypoxic respiratory failure    Acute on chronic anemia    Direct hyperbilirubinemia    Mass of breast    Heart failure with preserved ejection fraction    ESRD (end stage renal disease) on dialysis    Peripheral vascular disease    Type 2 diabetes mellitus with hyperglycemia, with long-term current use of insulin    Hypertension associated with diabetes    Hyperlipidemia associated with type 2 diabetes mellitus        Isabel Pettit PA-C  [facility]

## 2024-12-03 NOTE — ASSESSMENT & PLAN NOTE
Stable from prior, felt secondary to cholestatic hepatitis. Continue ursodiol.      Wt Readings from Last 3 Encounters:   09/07/22 1026 52.6 kg (116 lb) (47 %, Z= -0.07)*   08/08/22 1521 53.5 kg (118 lb) (52 %, Z= 0.04)*   07/18/22 1543 55.3 kg (122 lb) (59 %, Z= 0.24)*     * Growth percentiles are based on [facility] (Girls, 2-20 Years) data.   There is approximately 5 pounds weight loss from two months ago. Patient remains active with club sports, softball exercises with start of season.   Recommendations:   Stay physically active. As tolerated alternate resistance training with stretching and cardio. Goal of 150 minutes per week of moderate intensity activity or 7,500 - 10,000 steps per day. Follow the Mediterranean Diet. Include whole fresh fruits, vegetables, olive oil, seeds, nuts, whole grains, cold water fish, salmon, mackerel and lean cuts of meat.  Do not drink sugary/diet carbonated beverages. Decrease portion sizes slightly which will result in an approximately 500-calorie deficit. Avoid fast or fried and processed food, especially canned foods. Avoid refined carbohydrates, white starchy foods, flour, white potato, bread, muffins, and cakes. Consider substituting one meal a day with a meal replacement such as Slim fast, lean cuisine, or weight watcher's. Follow a healthy diet that includes enough calcium, vitamin D and proteins for bone health.

## 2024-12-03 NOTE — PLAN OF CARE
Problem: Adult Inpatient Plan of Care  Goal: Plan of Care Review  Outcome: Progressing  Goal: Patient-Specific Goal (Individualized)  Outcome: Progressing  Goal: Absence of Hospital-Acquired Illness or Injury  Outcome: Progressing  Goal: Optimal Comfort and Wellbeing  Outcome: Progressing  Goal: Readiness for Transition of Care  Outcome: Progressing     Problem: Diabetes Comorbidity  Goal: Blood Glucose Level Within Targeted Range  Outcome: Progressing     Problem: Wound  Goal: Optimal Coping  Outcome: Progressing  Goal: Optimal Functional Ability  Outcome: Progressing  Goal: Absence of Infection Signs and Symptoms  Outcome: Progressing  Goal: Improved Oral Intake  Outcome: Progressing  Goal: Optimal Pain Control and Function  Outcome: Progressing  Goal: Skin Health and Integrity  Outcome: Progressing  Goal: Optimal Wound Healing  Outcome: Progressing     Problem: Skin Injury Risk Increased  Goal: Skin Health and Integrity  Outcome: Progressing     Problem: Gastrointestinal Bleeding  Goal: Optimal Coping with Acute Illness  Outcome: Progressing  Goal: Hemostasis  Outcome: Progressing     Problem: Fall Injury Risk  Goal: Absence of Fall and Fall-Related Injury  Outcome: Progressing     Problem: Hemodialysis  Goal: Safe, Effective Therapy Delivery  Outcome: Progressing  Goal: Effective Tissue Perfusion  Outcome: Progressing  Goal: Absence of Infection Signs and Symptoms  Outcome: Progressing     Problem: Chronic Kidney Disease  Goal: Electrolyte Balance  Outcome: Progressing  Goal: Fluid Balance  Outcome: Progressing

## 2024-12-03 NOTE — PROGRESS NOTES
"[facility] ([place] [id number])  [facility] Medicine  Progress Note    Patient Name: Pilar Fernandez  MRN: [id number]  Patient Class: IP- Inpatient   Admission Date: 11/30/2024  Length of Stay: 2 days  Attending Physician: Willie العلي MD  Primary Care Provider: Lindsey Singletary FNP        Subjective     Principal Problem:Vaginal bleeding        HPI:  Pilar Fernandez is a 44 y.o. female with complicated medical history including ESRD on HD, history of CVA with debility, chronic anemia, respiratory failure on 3L NC oxygen, HFpEF, PVD, recent admissions for acute on chronic anemia requiring transfusion and acute cholestatic hepatitis who presents from an OSH for acute anemia.     She reports that she was doing fairly well at SNF until last night, when she developed "upper stomach" pain described as a cramping. This worsened with lying flat, and improved with sitting up. Denies CP or SOB, stating that it is mostly in her epigastric region. She also noted a "clot" of blood in her underwear today followed by seeing her IUD. Her abdominal pain resolved after this. She does not have menstrual cycles, but noted the vaginal bleeding only today. She reports "normal" BMs, not every day, but has not had any dark or bloody stool to her knowledge. She makes little urine but denies any hematuria.     On evaluation at OSH, recurrent anemia noted, therefore she was transferred here for GI evaluation. She was transfused 1U RBC.       Overview/Hospital Course:  Gynecology consulted for vaginal bleeding.  Started on Provera per gynecology initial recommendations    Interval History: NAEON. Appropriate response to transfusion. Pt states vaginal bleeding improving.     Review of Systems   Constitutional: Negative.    Respiratory: Negative.     Cardiovascular: Negative.    Gastrointestinal: Negative.    Genitourinary:  Positive for vaginal bleeding. Negative for vaginal pain.     Objective:     Vital Signs (Most " Recent):  Temp: 97.8 °F (36.6 °C) (12/02/24 1932)  Pulse: 63 (12/02/24 1932)  Resp: 18 (12/02/24 1932)  BP: (!) 198/95 (12/02/24 1932)  SpO2: 100 % (12/02/24 1932) Vital Signs (24h Range):  Temp:  [96.9 °F (36.1 °C)-98 °F (36.7 °C)] 97.8 °F (36.6 °C)  Pulse:  [58-75] 63  Resp:  [13-25] 18  SpO2:  [95 %-100 %] 100 %  BP: (111-198)/() 198/95     Weight: 88.3 kg (194 lb 10.7 oz)  Body mass index is 33.41 kg/m².    Intake/Output Summary (Last 24 hours) at 12/2/2024 2051  Last data filed at 12/2/2024 1830  Gross per 24 hour   Intake 0 ml   Output 3325 ml   Net -3325 ml      Physical Exam  Vitals and nursing note reviewed.   Constitutional:       General: She is not in acute distress.     Appearance: She is well-developed. She is ill-appearing (chronically). She is not diaphoretic.   HENT:      Head: Normocephalic and atraumatic.   Eyes:      General: Scleral icterus present.      Conjunctiva/sclera: Conjunctivae normal.   Neck:      Vascular: No JVD.   Cardiovascular:      Rate and Rhythm: Normal rate and regular rhythm.   Pulmonary:      Effort: Pulmonary effort is normal. No respiratory distress.   Abdominal:      General: There is no distension.      Tenderness: There is no abdominal tenderness. There is no guarding.   Genitourinary:     Comments: Mild blood in Purewick canister  Musculoskeletal:      Comments: AVF in LUE   Skin:     Coloration: Skin is jaundiced. Skin is not pale.   Neurological:      Mental Status: She is alert and oriented to person, place, and time.      Motor: No abnormal muscle tone.   Psychiatric:         Mood and Affect: Mood normal.         Behavior: Behavior normal.         MELD 3.0: 30 at 12/2/2024  5:42 AM  MELD-Na: 29 at 12/2/2024  5:42 AM  Calculated from:  Serum Creatinine: On dialysis. Using the maximum value.  Serum Sodium: 136 mmol/L at 12/2/2024  5:42 AM  Total Bilirubin: 6.9 mg/dL at 12/2/2024  5:42 AM  Serum Albumin: 1.8 g/dL at 12/2/2024  5:42 AM  INR(ratio): 1.2 at  11/30/2024 10:23 PM  Age at listing (hypothetical): 44 years  Sex: Female at 12/2/2024  5:42 AM      Significant Labs:  CBC:  Recent Labs   Lab 12/01/24  1130 12/01/24  1652 12/02/24  0542   WBC 16.95* 16.78* 14.43*   HGB 6.6* 8.3* 7.8*   HCT 19.2* 25.8* 23.9*    189 170     CMP:  Recent Labs   Lab 12/01/24  1130 12/02/24  0542   * 136   K 3.7 3.6    99   CO2 24 23   GLU 72 102   BUN 37* 42*   CREATININE 4.4* 4.9*   CALCIUM 8.9 8.6*   PROT 7.1 7.2   ALBUMIN 1.7* 1.8*   BILITOT 7.0* 6.9*   ALKPHOS 343* 327*   AST 89* 81*   ALT 55* 50*   ANIONGAP 11 14     PTINR:  Recent Labs   Lab 11/30/24  2223   INR 1.2         Assessment and Plan     * Vaginal bleeding  RN noted blood clot in diaper on presentation, followed by patient passing her IUD. Patient denies having menstrual cycles, and bleeding occurred just today on presentation. While this could be a source of blood loss  -- Gyn consulted, appreciate recs  -- TVUS reviewed  -- Provera 20mg TID x7 days then transition to Provera 20mg daily thereafter   -- Gyn recommending follow up in Gyn clinic in 1 week for endometrial sampling and surgical work-  -- Trend H/H      Elevated troponin  - Noted at OSH in setting of severe anemia  - Elevated by cardiology at OSH where they rec'd to continue her Aspirin and Plavix if it is safe to do so from a GI standpoint given her cardiac and CVA hx    Debility  Return to SNF upon discharge.     Morbid obesity  Body mass index is 33.41 kg/m².; chronic comorbidity affecting medical decision-making. Patient would greatly benefit from weight loss through proper diet and increased physical activity.        Chronic hypoxic respiratory failure  Reportedly with chronic hypoxemia on 3L NC, however with sats 100% on this on presentation. Wean to maintain sats 90% or greater.     History of cerebrovascular accident (CVA) with residual deficit  Holding ASA + Plavix for now given concern for bleeding, and statin given hepatitis.  Resume as able. Continue BP and glucose control.       Hepatic dysfunction  Stable from prior, felt secondary to cholestatic hepatitis with fibrosis per biopsy. Continue Ursodiol per hepatology.       Direct hyperbilirubinemia  Stable from prior, felt secondary to cholestatic hepatitis. Continue ursodiol.       Acute on chronic anemia  Patient with chronic multifactorial anemia, requiring multiple transfusions. Recent admission with anemia as well, without obvious cause. Suspect multifactorial from ESRD (reports she no longer gets Epo with HD), possible LILIAN, possible GIB. Although she denies any signs of melena or hematochezia, FOBT positive at OSH    - Iron panel c/w AoCD  - Elevated B12, LDH  - Low Folate, Haptoglobin   - Normal INR  - DAPT negative   - LDH/haptoglobin c/w possible hemolytic anemia/delayed hemolysis given recent transfusions, but INR normal  - Given 1U RBC at OSH  - Trend CBC and transfuse to maintain Hgb 7 or greater, o  - Hold ASA + Plavix for now.   - Holding GI consult given no significant GI symptoms on admission    Mass of breast  Recent lymph node biopsy negative for malignancy. Continue outpatient follow up, and may need excisional biopsy if high suspicion for malignancy.       ESRD (end stage renal disease) on dialysis  On HD MWF, with last session yesterday. Makes little urine.   Nephrology consulted for routine HD.     Heart failure with preserved ejection fraction  Patient with known diastolic CHF. Currently without evidence of volume overload on exam, and not in acute exacerbation. Will continue home GDMT with Metoprolol and volume removal with HD.  Appropriate diet ordered. Monitor volume status.     Peripheral vascular disease  Holding home ASA, Plavix, and statin given concern for bleeding and recent cholestatic hepatitis.      Hyperlipidemia associated with type 2 diabetes mellitus  Hold statin given recent hepatitis.     Hypertension associated with diabetes  Chronic, and  currently controlled. Latest blood pressure and vitals reviewed-   While in the hospital, will manage blood pressure as follows; Continue home antihypertensive regimen.    Will utilize p.r.n. blood pressure medication only if patient's blood pressure greater than 220/110 and she develops symptoms such as worsening chest pain or shortness of breath.      Type 2 diabetes mellitus with hyperglycemia, with long-term current use of insulin  Most recent A1c 4.5%, indicating excellent chronic control likely due to ESRD. Will hold home oral agents while inpatient. Start low-dose insulin; titrate to maintain adequate glucose control. Diabetic diet ordered.         VTE Risk Mitigation (From admission, onward)           Ordered     Reason for No Pharmacological VTE Prophylaxis  Once        Question:  Reasons:  Answer:  Active Bleeding    11/30/24 2201     IP VTE HIGH RISK PATIENT  Once         11/30/24 2201     Place sequential compression device  Until discontinued         11/30/24 2201                    Discharge Planning   SHASHA:      Code Status: Full Code   Is the patient medically ready for discharge?:      Discharge Plan A: Home with family                        Willie العلي MD  Department of Hospital Medicine   [facility] ([place])

## 2024-12-03 NOTE — PLAN OF CARE
Problem: Adult Inpatient Plan of Care  Goal: Plan of Care Review  Outcome: Progressing  Flowsheets (Taken 12/2/2024 1839)  Plan of Care Reviewed With: patient  Goal: Patient-Specific Goal (Individualized)  Outcome: Progressing  Goal: Absence of Hospital-Acquired Illness or Injury  Outcome: Progressing  Intervention: Identify and Manage Fall Risk  Flowsheets (Taken 12/2/2024 1839)  Safety Promotion/Fall Prevention:   assistive device/personal item within reach   side rails raised x 2  Intervention: Prevent Skin Injury  Flowsheets (Taken 12/2/2024 1839)  Body Position:   position changed independently   weight shifting  Skin Protection: incontinence pads utilized  Device Skin Pressure Protection: tubing/devices free from skin contact  Intervention: Prevent and Manage VTE (Venous Thromboembolism) Risk  Flowsheets (Taken 12/2/2024 1839)  VTE Prevention/Management: bleeding precautions maintained  Intervention: Prevent Infection  Flowsheets (Taken 12/2/2024 1839)  Infection Prevention: single patient room provided      Patient alert and oriented x4; care plan discussed with patient; pain managed via oral pain medication; call light and personal belongings within reach; free from falls/injuries during the shift; patient passed 4 large blood clots during the shift; taken for HD treatment; blood pressure monitored, nifedipine scheduled; patient able to express needs, no needs at this time; will continue with poc.    Wound care consult placed; immersion bed ordered.

## 2024-12-03 NOTE — ASSESSMENT & PLAN NOTE
RN noted blood clot in diaper on presentation, followed by patient passing her IUD. Patient denies having menstrual cycles, and bleeding occurred just today on presentation. While this could be a source of blood loss  -- Gyn consulted, appreciate recs  -- TVUS reviewed  -- Provera 20mg TID x7 days then transition to Provera 20mg daily thereafter   -- Gyn recommending follow up in Gyn clinic in 1 week for endometrial sampling and surgical work-  -- Trend H/H

## 2024-12-03 NOTE — ASSESSMENT & PLAN NOTE
44 y.o. woman with a PMHx of ESRD on HD (M,W,F), history of CVA with debility, chronic anemia, respiratory failure on 3L NC oxygen, HFpEF, PVD, recent admissions for acute on chronic anemia requiring transfusion and acute cholestatic hepatitis presenting from OSH for further evaluation of acute anemia and vaginal bleeding. Last dialyzed Friday (11/29), 5.5L removed.     Nephrology consulted for routine dialysis while inpatient.     Plan:  - Will continue MWF dialysis schedule while in patient.   - Avoid nephrotoxins and renally dose meds for GFR listed above  - Daily BMPs

## 2024-12-03 NOTE — ASSESSMENT & PLAN NOTE
RN noted blood clot in diaper on presentation, followed by patient passing her IUD. Patient denies having menstrual cycles, and bleeding occurred just today on presentation. While this could be a source of blood loss  -- Gyn consulted, appreciate recs  -- TVUS reviewed  -- Provera 20mg TID x7 days then transition to Provera 20mg daily thereafter   -- Gyn recommending follow up in Gyn clinic in 1 week for endometrial sampling and surgical work-  -- Transfuse additional 2u pRBC today  -- Trend H/H

## 2024-12-03 NOTE — PROGRESS NOTES
[facility] ([place] [id number])  Nephrology  Progress Note    Patient Name: Pilar Fernandez  MRN: [id number]  Admission Date: 11/30/2024  Hospital Length of Stay: 3 days  Attending Provider: Willie العلي MD   Primary Care Physician: Lindsey Singletary FNP  Principal Problem:Vaginal bleeding    Subjective:     Interval History: HD completed yesterday with 3 L removed. No distress on exam. Hgb 5.9 from 7.8. Plans for 2 units pRBCs.   GYN following.     Review of patient's allergies indicates:   Allergen Reactions    Tramadol Itching and Nausea And Vomiting     Current Facility-Administered Medications   Medication Frequency    0.9%  NaCl infusion (for blood administration) Q24H PRN    0.9%  NaCl infusion (for blood administration) Q24H PRN    0.9%  NaCl infusion (for blood administration) Q24H PRN    acetaminophen tablet 650 mg Q8H PRN    acetaminophen tablet 650 mg Q4H PRN    aluminum-magnesium hydroxide-simethicone 200-200-20 mg/5 mL suspension 30 mL QID PRN    cloNIDine tablet 0.1 mg Q6H PRN    dextrose 10% bolus 125 mL 125 mL PRN    dextrose 10% bolus 250 mL 250 mL PRN    folic acid tablet 1 mg Daily    glucagon (human recombinant) injection 1 mg PRN    glucose chewable tablet 16 g PRN    glucose chewable tablet 24 g PRN    hydrALAZINE tablet 25 mg Q6H PRN    insulin aspart U-100 pen 0-10 Units QID (AC + HS) PRN    lactulose 20 gram/30 mL solution Soln 20 g BID    medroxyPROGESTERone tablet 20 mg TID    melatonin tablet 6 mg Nightly PRN    metoprolol tartrate (LOPRESSOR) tablet 100 mg BID    naloxone 0.4 mg/mL injection 0.02 mg PRN    NIFEdipine 24 hr tablet 60 mg Daily    ondansetron disintegrating tablet 8 mg Q8H PRN    oxyCODONE immediate release tablet 5 mg Q6H PRN    oxyCODONE immediate release tablet Tab 10 mg Q6H PRN    pantoprazole EC tablet 40 mg Daily    polyethylene glycol packet 17 g Daily PRN    sevelamer carbonate tablet 800 mg TID WM    simethicone chewable tablet 80 mg QID PRN     sodium chloride 0.9% flush 1-10 mL Q12H PRN    traZODone tablet 100 mg Nightly PRN    ursodioL capsule 300 mg TID       Objective:     Vital Signs (Most Recent):  Temp: 98 °F (36.7 °C) (12/03/24 1100)  Pulse: 66 (12/03/24 1111)  Resp: 18 (12/03/24 1100)  BP: (!) 142/61 (12/03/24 1100)  SpO2: 98 % (12/03/24 1111) Vital Signs (24h Range):  Temp:  [96.9 °F (36.1 °C)-98.6 °F (37 °C)] 98 °F (36.7 °C)  Pulse:  [60-75] 66  Resp:  [14-20] 18  SpO2:  [92 %-100 %] 98 %  BP: (142-198)/() 142/61     Weight: 88.3 kg (194 lb 10.7 oz) (11/30/24 2115)  Body mass index is 33.41 kg/m².  Body surface area is 2 meters squared.    I/O last 3 completed shifts:  In: 0   Out: 3325 [Urine:325; Other:3000]     Physical Exam  Vitals and nursing note reviewed.   Constitutional:       General: She is not in acute distress.     Appearance: She is obese.   HENT:      Head: Normocephalic and atraumatic.   Eyes:      General: Scleral icterus present.      Extraocular Movements: Extraocular movements intact.   Cardiovascular:      Rate and Rhythm: Normal rate and regular rhythm.      Heart sounds: Normal heart sounds.   Pulmonary:      Effort: Pulmonary effort is normal. No respiratory distress.      Comments: 3L NC  Abdominal:      General: Abdomen is flat.      Palpations: Abdomen is soft.      Tenderness: There is no abdominal tenderness.   Musculoskeletal:      Right lower leg: Edema present.      Left lower leg: Edema present.   Skin:     General: Skin is warm and dry.      Comments: AV fistula to the left forearm with palpable thrill.    Neurological:      Mental Status: She is alert and oriented to person, place, and time.   Psychiatric:         Mood and Affect: Mood normal.          Significant Labs:  CBC:   Recent Labs   Lab 12/03/24  0453   WBC 14.33*   RBC 1.87*   HGB 5.9*   HCT 17.8*   *   MCV 95   MCH 31.6*   MCHC 33.1     CMP:   Recent Labs   Lab 12/03/24  0453   *   CALCIUM 8.5*   ALBUMIN 1.8*   PROT 6.9   *    K 4.0   CO2 21*      BUN 35*   CREATININE 4.1*   ALKPHOS 340*   ALT 48*   AST 77*   BILITOT 6.9*     All labs within the past 24 hours have been reviewed.     Assessment/Plan:     Renal/  * Vaginal bleeding  - defer to GYN    ESRD (end stage renal disease) on dialysis  44 y.o. woman with a PMHx of ESRD on HD (M,W,F), history of CVA with debility, chronic anemia, respiratory failure on 3L NC oxygen, HFpEF, PVD, recent admissions for acute on chronic anemia requiring transfusion and acute cholestatic hepatitis presenting from OSH for further evaluation of acute anemia and vaginal bleeding. Last dialyzed Friday (11/29), 5.5L removed.     Nephrology consulted for routine dialysis while inpatient.     Plan:  - Will continue MWF dialysis schedule while in patient.   - Avoid nephrotoxins and renally dose meds for GFR listed above  - Daily BMPs        Thank you for your consult. I will follow-up with patient. Please contact us if you have any additional questions.    Theodora Bhakta, YAEL, FNP-C  Nephrology  [facility] ([place]

## 2024-12-03 NOTE — ASSESSMENT & PLAN NOTE
Anemia is likely due to acute blood loss which was from vaginal bleeding . Most recent hemoglobin and hematocrit are listed below.  Recent Labs     12/02/24  0542 12/03/24  0453 12/03/24  1607   HGB 7.8* 5.9* 7.3*   HCT 23.9* 17.8* 22.6*     Plan  - Monitor serial CBC: Every 8 hours  - Transfuse PRBC if patient becomes hemodynamically unstable, symptomatic or H/H drops below 7/21.  - Patient has received 4 units of PRBCs total thus far  - Patient's anemia is currently worsening. Will continue current treatment

## 2024-12-03 NOTE — SUBJECTIVE & OBJECTIVE
Interval History: HD completed yesterday with 3 L removed. No distress on exam. Hgb 5.9 from 7.8. Plans for 2 units pRBCs.   GYN following.     Review of patient's allergies indicates:   Allergen Reactions    Tramadol Itching and Nausea And Vomiting     Current Facility-Administered Medications   Medication Frequency    0.9%  NaCl infusion (for blood administration) Q24H PRN    0.9%  NaCl infusion (for blood administration) Q24H PRN    0.9%  NaCl infusion (for blood administration) Q24H PRN    acetaminophen tablet 650 mg Q8H PRN    acetaminophen tablet 650 mg Q4H PRN    aluminum-magnesium hydroxide-simethicone 200-200-20 mg/5 mL suspension 30 mL QID PRN    cloNIDine tablet 0.1 mg Q6H PRN    dextrose 10% bolus 125 mL 125 mL PRN    dextrose 10% bolus 250 mL 250 mL PRN    folic acid tablet 1 mg Daily    glucagon (human recombinant) injection 1 mg PRN    glucose chewable tablet 16 g PRN    glucose chewable tablet 24 g PRN    hydrALAZINE tablet 25 mg Q6H PRN    insulin aspart U-100 pen 0-10 Units QID (AC + HS) PRN    lactulose 20 gram/30 mL solution Soln 20 g BID    medroxyPROGESTERone tablet 20 mg TID    melatonin tablet 6 mg Nightly PRN    metoprolol tartrate (LOPRESSOR) tablet 100 mg BID    naloxone 0.4 mg/mL injection 0.02 mg PRN    NIFEdipine 24 hr tablet 60 mg Daily    ondansetron disintegrating tablet 8 mg Q8H PRN    oxyCODONE immediate release tablet 5 mg Q6H PRN    oxyCODONE immediate release tablet Tab 10 mg Q6H PRN    pantoprazole EC tablet 40 mg Daily    polyethylene glycol packet 17 g Daily PRN    sevelamer carbonate tablet 800 mg TID WM    simethicone chewable tablet 80 mg QID PRN    sodium chloride 0.9% flush 1-10 mL Q12H PRN    traZODone tablet 100 mg Nightly PRN    ursodioL capsule 300 mg TID       Objective:     Vital Signs (Most Recent):  Temp: 98 °F (36.7 °C) (12/03/24 1100)  Pulse: 66 (12/03/24 1111)  Resp: 18 (12/03/24 1100)  BP: (!) 142/61 (12/03/24 1100)  SpO2: 98 % (12/03/24 1111) Vital Signs  (24h Range):  Temp:  [96.9 °F (36.1 °C)-98.6 °F (37 °C)] 98 °F (36.7 °C)  Pulse:  [60-75] 66  Resp:  [14-20] 18  SpO2:  [92 %-100 %] 98 %  BP: (142-198)/() 142/61     Weight: 88.3 kg (194 lb 10.7 oz) (11/30/24 2115)  Body mass index is 33.41 kg/m².  Body surface area is 2 meters squared.    I/O last 3 completed shifts:  In: 0   Out: 3325 [Urine:325; Other:3000]     Physical Exam  Vitals and nursing note reviewed.   Constitutional:       General: She is not in acute distress.     Appearance: She is obese.   HENT:      Head: Normocephalic and atraumatic.   Eyes:      General: Scleral icterus present.      Extraocular Movements: Extraocular movements intact.   Cardiovascular:      Rate and Rhythm: Normal rate and regular rhythm.      Heart sounds: Normal heart sounds.   Pulmonary:      Effort: Pulmonary effort is normal. No respiratory distress.      Comments: 3L NC  Abdominal:      General: Abdomen is flat.      Palpations: Abdomen is soft.      Tenderness: There is no abdominal tenderness.   Musculoskeletal:      Right lower leg: Edema present.      Left lower leg: Edema present.   Skin:     General: Skin is warm and dry.      Comments: AV fistula to the left forearm with palpable thrill.    Neurological:      Mental Status: She is alert and oriented to person, place, and time.   Psychiatric:         Mood and Affect: Mood normal.          Significant Labs:  CBC:   Recent Labs   Lab 12/03/24  0453   WBC 14.33*   RBC 1.87*   HGB 5.9*   HCT 17.8*   *   MCV 95   MCH 31.6*   MCHC 33.1     CMP:   Recent Labs   Lab 12/03/24  0453   *   CALCIUM 8.5*   ALBUMIN 1.8*   PROT 6.9   *   K 4.0   CO2 21*      BUN 35*   CREATININE 4.1*   ALKPHOS 340*   ALT 48*   AST 77*   BILITOT 6.9*     All labs within the past 24 hours have been reviewed.

## 2024-12-03 NOTE — NURSING
Patient slept well this shift. Pain managed well with PRN's. IV in right wrist and right arm patene. Skin care performed. Repositioned q2h when tolerated. Safety precautions in place. Call light in reach. No other concerns noted at this time.

## 2024-12-03 NOTE — SUBJECTIVE & OBJECTIVE
Interval History: NAEON. Hb 5.9 today with report of two large blood clots this AM, although vaginal bleeding appears to be improving overall.     Review of Systems   Constitutional: Negative.    Respiratory: Negative.     Cardiovascular: Negative.    Gastrointestinal: Negative.    Genitourinary:  Positive for vaginal bleeding. Negative for vaginal pain.     Objective:     Vital Signs (Most Recent):  Temp: 97.9 °F (36.6 °C) (12/03/24 1650)  Pulse: 63 (12/03/24 1650)  Resp: 15 (12/03/24 1650)  BP: (!) 177/70 (12/03/24 1650)  SpO2: 100 % (12/03/24 1650) Vital Signs (24h Range):  Temp:  [97.6 °F (36.4 °C)-98.6 °F (37 °C)] 97.9 °F (36.6 °C)  Pulse:  [60-77] 63  Resp:  [13-21] 15  SpO2:  [92 %-100 %] 100 %  BP: (104-198)/() 177/70     Weight: 88.3 kg (194 lb 10.7 oz)  Body mass index is 33.41 kg/m².    Intake/Output Summary (Last 24 hours) at 12/3/2024 1721  Last data filed at 12/3/2024 1517  Gross per 24 hour   Intake 460.42 ml   Output 3000 ml   Net -2539.58 ml      Physical Exam  Vitals and nursing note reviewed.   Constitutional:       General: She is not in acute distress.     Appearance: She is well-developed. She is ill-appearing (chronically). She is not diaphoretic.   HENT:      Head: Normocephalic and atraumatic.   Eyes:      General: Scleral icterus present.      Conjunctiva/sclera: Conjunctivae normal.   Neck:      Vascular: No JVD.   Cardiovascular:      Rate and Rhythm: Normal rate and regular rhythm.   Pulmonary:      Effort: Pulmonary effort is normal. No respiratory distress.   Abdominal:      General: There is no distension.      Tenderness: There is no abdominal tenderness. There is no guarding.   Genitourinary:     Comments: Mild blood in Purewick canister  Musculoskeletal:      Comments: AVF in LUE   Skin:     Coloration: Skin is jaundiced. Skin is not pale.   Neurological:      Mental Status: She is alert and oriented to person, place, and time.      Motor: No abnormal muscle tone.    Psychiatric:         Mood and Affect: Mood normal.         Behavior: Behavior normal.         MELD 3.0: 30 at 12/3/2024  4:53 AM  MELD-Na: 29 at 12/3/2024  4:53 AM  Calculated from:  Serum Creatinine: On dialysis. Using the maximum value.  Serum Sodium: 134 mmol/L at 12/3/2024  4:53 AM  Total Bilirubin: 6.9 mg/dL at 12/3/2024  4:53 AM  Serum Albumin: 1.8 g/dL at 12/3/2024  4:53 AM  INR(ratio): 1.1 at 12/3/2024  4:53 AM  Age at listing (hypothetical): 44 years  Sex: Female at 12/3/2024  4:53 AM      Significant Labs:  CBC:  Recent Labs   Lab 12/02/24  0542 12/03/24 0453 12/03/24  1607   WBC 14.43* 14.33* 20.08*   HGB 7.8* 5.9* 7.3*   HCT 23.9* 17.8* 22.6*    123* 168     CMP:  Recent Labs   Lab 12/02/24  0542 12/03/24  0453    134*   K 3.6 4.0   CL 99 100   CO2 23 21*    122*   BUN 42* 35*   CREATININE 4.9* 4.1*   CALCIUM 8.6* 8.5*   PROT 7.2 6.9   ALBUMIN 1.8* 1.8*   BILITOT 6.9* 6.9*   ALKPHOS 327* 340*   AST 81* 77*   ALT 50* 48*   ANIONGAP 14 13     PTINR:  Recent Labs   Lab 12/03/24 0453   INR 1.1

## 2024-12-03 NOTE — PROGRESS NOTES
1830 HD treatment completed, 3L of UF fluid removed for 3.5hrs; pt tolerated well. LORA buttonhole fistula were flushed and pressure gauze dressing applied; VSS and recorded, NADN. Report called to primary nurse Prema SCOTT.    1910 Pt left [facility] via bed by patient transport and nursing staff.

## 2024-12-04 LAB
ABO + RH BLD: NORMAL
ALBUMIN SERPL BCP-MCNC: 1.7 G/DL (ref 3.5–5.2)
ALP SERPL-CCNC: 313 U/L (ref 40–150)
ALT SERPL W/O P-5'-P-CCNC: 40 U/L (ref 10–44)
ANION GAP SERPL CALC-SCNC: 11 MMOL/L (ref 8–16)
AST SERPL-CCNC: 67 U/L (ref 10–40)
BASOPHILS # BLD AUTO: 0.09 K/UL (ref 0–0.2)
BASOPHILS # BLD AUTO: 0.12 K/UL (ref 0–0.2)
BASOPHILS # BLD AUTO: 0.13 K/UL (ref 0–0.2)
BASOPHILS NFR BLD: 0.5 % (ref 0–1.9)
BASOPHILS NFR BLD: 0.6 % (ref 0–1.9)
BASOPHILS NFR BLD: 0.7 % (ref 0–1.9)
BILIRUB SERPL-MCNC: 6.4 MG/DL (ref 0.1–1)
BLD GP AB SCN CELLS X3 SERPL QL: NORMAL
BLD PROD TYP BPU: NORMAL
BLOOD UNIT EXPIRATION DATE: NORMAL
BLOOD UNIT TYPE CODE: 5100
BLOOD UNIT TYPE: NORMAL
BUN SERPL-MCNC: 41 MG/DL (ref 6–20)
CALCIUM SERPL-MCNC: 8.3 MG/DL (ref 8.7–10.5)
CHLORIDE SERPL-SCNC: 101 MMOL/L (ref 95–110)
CO2 SERPL-SCNC: 21 MMOL/L (ref 23–29)
CODING SYSTEM: NORMAL
CREAT SERPL-MCNC: 4.7 MG/DL (ref 0.5–1.4)
CROSSMATCH INTERPRETATION: NORMAL
DIFFERENTIAL METHOD BLD: ABNORMAL
DISPENSE STATUS: NORMAL
EOSINOPHIL # BLD AUTO: 0.9 K/UL (ref 0–0.5)
EOSINOPHIL # BLD AUTO: 1 K/UL (ref 0–0.5)
EOSINOPHIL # BLD AUTO: 1 K/UL (ref 0–0.5)
EOSINOPHIL NFR BLD: 5.3 % (ref 0–8)
EOSINOPHIL NFR BLD: 5.3 % (ref 0–8)
EOSINOPHIL NFR BLD: 5.4 % (ref 0–8)
ERYTHROCYTE [DISTWIDTH] IN BLOOD BY AUTOMATED COUNT: 20.4 % (ref 11.5–14.5)
ERYTHROCYTE [DISTWIDTH] IN BLOOD BY AUTOMATED COUNT: 21 % (ref 11.5–14.5)
ERYTHROCYTE [DISTWIDTH] IN BLOOD BY AUTOMATED COUNT: 21 % (ref 11.5–14.5)
EST. GFR  (NO RACE VARIABLE): 11.1 ML/MIN/1.73 M^2
GLUCOSE SERPL-MCNC: 122 MG/DL (ref 70–110)
HCT VFR BLD AUTO: 20.5 % (ref 37–48.5)
HCT VFR BLD AUTO: 20.6 % (ref 37–48.5)
HCT VFR BLD AUTO: 21.8 % (ref 37–48.5)
HGB BLD-MCNC: 7 G/DL (ref 12–16)
HGB BLD-MCNC: 7 G/DL (ref 12–16)
HGB BLD-MCNC: 7.3 G/DL (ref 12–16)
IMM GRANULOCYTES # BLD AUTO: 0.33 K/UL (ref 0–0.04)
IMM GRANULOCYTES # BLD AUTO: 0.44 K/UL (ref 0–0.04)
IMM GRANULOCYTES # BLD AUTO: 0.58 K/UL (ref 0–0.04)
IMM GRANULOCYTES NFR BLD AUTO: 1.9 % (ref 0–0.5)
IMM GRANULOCYTES NFR BLD AUTO: 2.3 % (ref 0–0.5)
IMM GRANULOCYTES NFR BLD AUTO: 3 % (ref 0–0.5)
LYMPHOCYTES # BLD AUTO: 1.9 K/UL (ref 1–4.8)
LYMPHOCYTES # BLD AUTO: 2.9 K/UL (ref 1–4.8)
LYMPHOCYTES # BLD AUTO: 3 K/UL (ref 1–4.8)
LYMPHOCYTES NFR BLD: 10.7 % (ref 18–48)
LYMPHOCYTES NFR BLD: 15.2 % (ref 18–48)
LYMPHOCYTES NFR BLD: 15.6 % (ref 18–48)
MAGNESIUM SERPL-MCNC: 1.8 MG/DL (ref 1.6–2.6)
MCH RBC QN AUTO: 30.4 PG (ref 27–31)
MCH RBC QN AUTO: 30.7 PG (ref 27–31)
MCH RBC QN AUTO: 31.3 PG (ref 27–31)
MCHC RBC AUTO-ENTMCNC: 33.5 G/DL (ref 32–36)
MCHC RBC AUTO-ENTMCNC: 34 G/DL (ref 32–36)
MCHC RBC AUTO-ENTMCNC: 34.1 G/DL (ref 32–36)
MCV RBC AUTO: 90 FL (ref 82–98)
MCV RBC AUTO: 91 FL (ref 82–98)
MCV RBC AUTO: 92 FL (ref 82–98)
MONOCYTES # BLD AUTO: 1.7 K/UL (ref 0.3–1)
MONOCYTES # BLD AUTO: 2 K/UL (ref 0.3–1)
MONOCYTES # BLD AUTO: 2 K/UL (ref 0.3–1)
MONOCYTES NFR BLD: 10.4 % (ref 4–15)
MONOCYTES NFR BLD: 10.6 % (ref 4–15)
MONOCYTES NFR BLD: 9.5 % (ref 4–15)
NEUTROPHILS # BLD AUTO: 12.4 K/UL (ref 1.8–7.7)
NEUTROPHILS # BLD AUTO: 12.5 K/UL (ref 1.8–7.7)
NEUTROPHILS # BLD AUTO: 12.6 K/UL (ref 1.8–7.7)
NEUTROPHILS NFR BLD: 65 % (ref 38–73)
NEUTROPHILS NFR BLD: 65.9 % (ref 38–73)
NEUTROPHILS NFR BLD: 72.1 % (ref 38–73)
NRBC BLD-RTO: 0 /100 WBC
PHOSPHATE SERPL-MCNC: 5.2 MG/DL (ref 2.7–4.5)
PLATELET # BLD AUTO: 139 K/UL (ref 150–450)
PLATELET # BLD AUTO: 148 K/UL (ref 150–450)
PLATELET # BLD AUTO: 150 K/UL (ref 150–450)
PMV BLD AUTO: 11 FL (ref 9.2–12.9)
PMV BLD AUTO: 11.2 FL (ref 9.2–12.9)
PMV BLD AUTO: 11.4 FL (ref 9.2–12.9)
POCT GLUCOSE: 106 MG/DL (ref 70–110)
POCT GLUCOSE: 130 MG/DL (ref 70–110)
POCT GLUCOSE: 176 MG/DL (ref 70–110)
POCT GLUCOSE: 274 MG/DL (ref 70–110)
POTASSIUM SERPL-SCNC: 4.3 MMOL/L (ref 3.5–5.1)
PROT SERPL-MCNC: 6.5 G/DL (ref 6–8.4)
RBC # BLD AUTO: 2.24 M/UL (ref 4–5.4)
RBC # BLD AUTO: 2.28 M/UL (ref 4–5.4)
RBC # BLD AUTO: 2.4 M/UL (ref 4–5.4)
SODIUM SERPL-SCNC: 133 MMOL/L (ref 136–145)
SPECIMEN OUTDATE: NORMAL
TRANS ERYTHROCYTES VOL PATIENT: NORMAL ML
WBC # BLD AUTO: 17.46 K/UL (ref 3.9–12.7)
WBC # BLD AUTO: 18.89 K/UL (ref 3.9–12.7)
WBC # BLD AUTO: 19.19 K/UL (ref 3.9–12.7)

## 2024-12-04 PROCEDURE — 94761 N-INVAS EAR/PLS OXIMETRY MLT: CPT

## 2024-12-04 PROCEDURE — 83735 ASSAY OF MAGNESIUM: CPT | Performed by: STUDENT IN AN ORGANIZED HEALTH CARE EDUCATION/TRAINING PROGRAM

## 2024-12-04 PROCEDURE — 63600175 PHARM REV CODE 636 W HCPCS: Performed by: INTERNAL MEDICINE

## 2024-12-04 PROCEDURE — 86901 BLOOD TYPING SEROLOGIC RH(D): CPT

## 2024-12-04 PROCEDURE — 36415 COLL VENOUS BLD VENIPUNCTURE: CPT | Performed by: INTERNAL MEDICINE

## 2024-12-04 PROCEDURE — 99900035 HC TECH TIME PER 15 MIN (STAT)

## 2024-12-04 PROCEDURE — 90935 HEMODIALYSIS ONE EVALUATION: CPT | Mod: ,,, | Performed by: NURSE PRACTITIONER

## 2024-12-04 PROCEDURE — 85025 COMPLETE CBC W/AUTO DIFF WBC: CPT | Performed by: INTERNAL MEDICINE

## 2024-12-04 PROCEDURE — 27000221 HC OXYGEN, UP TO 24 HOURS

## 2024-12-04 PROCEDURE — 36430 TRANSFUSION BLD/BLD COMPNT: CPT

## 2024-12-04 PROCEDURE — 25000003 PHARM REV CODE 250: Performed by: INTERNAL MEDICINE

## 2024-12-04 PROCEDURE — 84100 ASSAY OF PHOSPHORUS: CPT | Performed by: STUDENT IN AN ORGANIZED HEALTH CARE EDUCATION/TRAINING PROGRAM

## 2024-12-04 PROCEDURE — 86920 COMPATIBILITY TEST SPIN: CPT | Performed by: INTERNAL MEDICINE

## 2024-12-04 PROCEDURE — 25000003 PHARM REV CODE 250

## 2024-12-04 PROCEDURE — 80100016 HC MAINTENANCE HEMODIALYSIS

## 2024-12-04 PROCEDURE — 80053 COMPREHEN METABOLIC PANEL: CPT | Performed by: STUDENT IN AN ORGANIZED HEALTH CARE EDUCATION/TRAINING PROGRAM

## 2024-12-04 PROCEDURE — 25000003 PHARM REV CODE 250: Performed by: STUDENT IN AN ORGANIZED HEALTH CARE EDUCATION/TRAINING PROGRAM

## 2024-12-04 PROCEDURE — P9021 RED BLOOD CELLS UNIT: HCPCS | Performed by: INTERNAL MEDICINE

## 2024-12-04 PROCEDURE — 20600001 HC STEP DOWN PRIVATE ROOM

## 2024-12-04 PROCEDURE — 63600175 PHARM REV CODE 636 W HCPCS: Mod: JG | Performed by: NURSE PRACTITIONER

## 2024-12-04 PROCEDURE — 25000003 PHARM REV CODE 250: Performed by: NURSE PRACTITIONER

## 2024-12-04 RX ORDER — HYDROCODONE BITARTRATE AND ACETAMINOPHEN 500; 5 MG/1; MG/1
TABLET ORAL
Status: DISCONTINUED | OUTPATIENT
Start: 2024-12-04 | End: 2024-12-05 | Stop reason: HOSPADM

## 2024-12-04 RX ORDER — MEDROXYPROGESTERONE ACETATE 10 MG/1
TABLET ORAL
Qty: 180 TABLET | Refills: 0 | Status: ON HOLD | OUTPATIENT
Start: 2024-12-04 | End: 2025-02-12

## 2024-12-04 RX ORDER — LOSARTAN POTASSIUM 50 MG/1
50 TABLET ORAL DAILY
Status: DISCONTINUED | OUTPATIENT
Start: 2024-12-05 | End: 2024-12-04

## 2024-12-04 RX ORDER — LOSARTAN POTASSIUM 50 MG/1
100 TABLET ORAL DAILY
Status: DISCONTINUED | OUTPATIENT
Start: 2024-12-05 | End: 2024-12-05 | Stop reason: HOSPADM

## 2024-12-04 RX ORDER — HYDRALAZINE HYDROCHLORIDE 50 MG/1
50 TABLET, FILM COATED ORAL EVERY 12 HOURS
Status: DISCONTINUED | OUTPATIENT
Start: 2024-12-04 | End: 2024-12-05 | Stop reason: HOSPADM

## 2024-12-04 RX ADMIN — URSODIOL 300 MG: 300 CAPSULE ORAL at 09:12

## 2024-12-04 RX ADMIN — FOLIC ACID 1 MG: 1 TABLET ORAL at 09:12

## 2024-12-04 RX ADMIN — NIFEDIPINE 60 MG: 30 TABLET, FILM COATED, EXTENDED RELEASE ORAL at 09:12

## 2024-12-04 RX ADMIN — MEDROXYPROGESTERONE ACETATE 20 MG: 10 TABLET ORAL at 09:12

## 2024-12-04 RX ADMIN — URSODIOL 300 MG: 300 CAPSULE ORAL at 04:12

## 2024-12-04 RX ADMIN — METOPROLOL TARTRATE 100 MG: 50 TABLET, FILM COATED ORAL at 08:12

## 2024-12-04 RX ADMIN — URSODIOL 300 MG: 300 CAPSULE ORAL at 08:12

## 2024-12-04 RX ADMIN — METOPROLOL TARTRATE 100 MG: 50 TABLET, FILM COATED ORAL at 09:12

## 2024-12-04 RX ADMIN — SEVELAMER CARBONATE 800 MG: 800 TABLET, FILM COATED ORAL at 04:12

## 2024-12-04 RX ADMIN — HYDRALAZINE HYDROCHLORIDE 50 MG: 50 TABLET ORAL at 08:12

## 2024-12-04 RX ADMIN — INSULIN ASPART 1 UNITS: 100 INJECTION, SOLUTION INTRAVENOUS; SUBCUTANEOUS at 11:12

## 2024-12-04 RX ADMIN — MEDROXYPROGESTERONE ACETATE 20 MG: 10 TABLET ORAL at 04:12

## 2024-12-04 RX ADMIN — PANTOPRAZOLE SODIUM 40 MG: 40 TABLET, DELAYED RELEASE ORAL at 09:12

## 2024-12-04 RX ADMIN — SEVELAMER CARBONATE 800 MG: 800 TABLET, FILM COATED ORAL at 09:12

## 2024-12-04 RX ADMIN — EPOETIN ALFA-EPBX 8800 UNITS: 10000 INJECTION, SOLUTION INTRAVENOUS; SUBCUTANEOUS at 01:12

## 2024-12-04 NOTE — PLAN OF CARE
Problem: Adult Inpatient Plan of Care  Goal: Plan of Care Review  Outcome: Progressing  Flowsheets (Taken 12/3/2024 1903)  Plan of Care Reviewed With: patient  Goal: Patient-Specific Goal (Individualized)  Outcome: Progressing  Goal: Absence of Hospital-Acquired Illness or Injury  Outcome: Progressing  Intervention: Identify and Manage Fall Risk  Flowsheets (Taken 12/3/2024 1906)  Safety Promotion/Fall Prevention:   assistive device/personal item within reach   side rails raised x 2  Intervention: Prevent Skin Injury  Flowsheets (Taken 12/3/2024 1906)  Body Position: weight shifting  Device Skin Pressure Protection: tubing/devices free from skin contact      Patient alert and oriented x4; care plan discussed with patient; 1 large and 1 small blood clot passed in the AM; small blood clot in the evening; 500 mL of bloody urine in purewick; 3 bloody purvi pads changed; 2 u PRBC's transfused, patient tolerated well; patient free from falls/injuries during the shift; call light and personal belongings within reach; patient able to express needs, no needs at this time; will continue with poc.

## 2024-12-04 NOTE — ASSESSMENT & PLAN NOTE
RN noted blood clot in diaper on presentation, followed by patient passing her IUD. Patient denies having menstrual cycles, and bleeding occurred just today on presentation. While this could be a source of blood loss  -- Gyn consulted, appreciate recs  -- TVUS reviewed  -- Provera 20mg TID x7 days then transition to Provera 20mg daily thereafter   -- Gyn recommending follow up in Gyn clinic in 1 week for endometrial sampling and surgical work-  -- Transfused additional 2u pRBC 12/4. Will transfuse 1u today.  -- Trend H/H.

## 2024-12-04 NOTE — PROGRESS NOTES
"[facility] ([place] [id number])  [facility]  Progress Note    Patient Name: Pilar Fernandez  MRN: [id number]  Patient Class: IP- Inpatient   Admission Date: 11/30/2024  Length of Stay: 4 days  Attending Physician: Willie العلي MD  Primary Care Provider: Lindsey Singletary FNP        Subjective     Principal Problem:Vaginal bleeding        HPI:  iPlar Fernandez is a 44 y.o. female with complicated medical history including ESRD on HD, history of CVA with debility, chronic anemia, respiratory failure on 3L NC oxygen, HFpEF, PVD, recent admissions for acute on chronic anemia requiring transfusion and acute cholestatic hepatitis who presents from an OSH for acute anemia.     She reports that she was doing fairly well at SNF until last night, when she developed "upper stomach" pain described as a cramping. This worsened with lying flat, and improved with sitting up. Denies CP or SOB, stating that it is mostly in her epigastric region. She also noted a "clot" of blood in her underwear today followed by seeing her IUD. Her abdominal pain resolved after this. She does not have menstrual cycles, but noted the vaginal bleeding only today. She reports "normal" BMs, not every day, but has not had any dark or bloody stool to her knowledge. She makes little urine but denies any hematuria.     On evaluation at OSH, recurrent anemia noted, therefore she was transferred here for GI evaluation. She was transfused 1U RBC.       Overview/Hospital Course:  Gynecology consulted for vaginal bleeding.  Started on Provera per gynecology initial recommendations    Interval History: NAEON. Hb improved following transfusion 2u pRBC yesterday, Hb 7 this AM.  Report of 1 medium size blood clot today, vaginal bleeding improving overall.     Review of Systems   Constitutional: Negative.    Respiratory: Negative.     Cardiovascular: Negative.    Gastrointestinal: Negative.    Genitourinary:  Positive for vaginal " bleeding. Negative for vaginal pain.     Objective:     Vital Signs (Most Recent):  Temp: 99.3 °F (37.4 °C) (12/04/24 0650)  Pulse: 83 (12/04/24 1412)  Resp: 18 (12/04/24 0650)  BP: (!) 157/74 (12/04/24 1412)  SpO2: 100 % (12/04/24 0650) Vital Signs (24h Range):  Temp:  [97.6 °F (36.4 °C)-99.3 °F (37.4 °C)] 99.3 °F (37.4 °C)  Pulse:  [62-83] 83  Resp:  [13-20] 18  SpO2:  [96 %-100 %] 100 %  BP: (104-188)/() 157/74     Weight: 88.3 kg (194 lb 10.7 oz)  Body mass index is 33.41 kg/m².    Intake/Output Summary (Last 24 hours) at 12/4/2024 1532  Last data filed at 12/4/2024 1412  Gross per 24 hour   Intake 1082.5 ml   Output 3600 ml   Net -2517.5 ml      Physical Exam  Vitals and nursing note reviewed.   Constitutional:       General: She is not in acute distress.     Appearance: She is well-developed. She is ill-appearing (chronically). She is not diaphoretic.   HENT:      Head: Normocephalic and atraumatic.   Eyes:      General: Scleral icterus present.      Conjunctiva/sclera: Conjunctivae normal.   Neck:      Vascular: No JVD.   Cardiovascular:      Rate and Rhythm: Normal rate and regular rhythm.   Pulmonary:      Effort: Pulmonary effort is normal. No respiratory distress.   Abdominal:      General: There is no distension.      Tenderness: There is no abdominal tenderness. There is no guarding.   Genitourinary:     Comments: Mild blood in Purewick canister  Musculoskeletal:      Comments: AVF in LUE   Skin:     Coloration: Skin is jaundiced. Skin is not pale.   Neurological:      Mental Status: She is alert and oriented to person, place, and time.      Motor: No abnormal muscle tone.   Psychiatric:         Mood and Affect: Mood normal.         Behavior: Behavior normal.         MELD 3.0: 30 at 12/4/2024  3:20 AM  MELD-Na: 30 at 12/4/2024  3:20 AM  Calculated from:  Serum Creatinine: On dialysis. Using the maximum value.  Serum Sodium: 133 mmol/L at 12/4/2024  3:20 AM  Total Bilirubin: 6.4 mg/dL at 12/4/2024   3:20 AM  Serum Albumin: 1.7 g/dL at 12/4/2024  3:20 AM  INR(ratio): 1.1 at 12/3/2024  4:53 AM  Age at listing (hypothetical): 44 years  Sex: Female at 12/4/2024  3:20 AM      Significant Labs:  CBC:  Recent Labs   Lab 12/03/24  2342 12/04/24  0801 12/04/24  1357   WBC 18.89* 19.19* 17.46*   HGB 7.3* 7.0* 7.0*   HCT 21.8* 20.5* 20.6*   * 148* 150     CMP:  Recent Labs   Lab 12/03/24  0453 12/04/24  0320   * 133*   K 4.0 4.3    101   CO2 21* 21*   * 122*   BUN 35* 41*   CREATININE 4.1* 4.7*   CALCIUM 8.5* 8.3*   PROT 6.9 6.5   ALBUMIN 1.8* 1.7*   BILITOT 6.9* 6.4*   ALKPHOS 340* 313*   AST 77* 67*   ALT 48* 40   ANIONGAP 13 11     PTINR:  Recent Labs   Lab 12/03/24  0453   INR 1.1         Assessment and Plan     * Vaginal bleeding  RN noted blood clot in diaper on presentation, followed by patient passing her IUD. Patient denies having menstrual cycles, and bleeding occurred just today on presentation. While this could be a source of blood loss  -- Gyn consulted, appreciate recs  -- TVUS reviewed  -- Provera 20mg TID x7 days then transition to Provera 20mg daily thereafter   -- Gyn recommending follow up in Gyn clinic in 1 week for endometrial sampling and surgical work-  -- Transfused additional 2u pRBC 12/4. Will transfuse 1u today.  -- Trend H/H.       Acute blood loss anemia  Anemia is likely due to acute blood loss which was from vaginal bleeding . Most recent hemoglobin and hematocrit are listed below.  Recent Labs     12/03/24  2342 12/04/24  0801 12/04/24  1357   HGB 7.3* 7.0* 7.0*   HCT 21.8* 20.5* 20.6*       Plan  - Monitor serial CBC: Every 8 hours  - Transfuse PRBC if patient becomes hemodynamically unstable, symptomatic or H/H drops below 7/21.  - Patient has received 4 units of PRBCs total thus far  - Patient's anemia is currently worsening. Will continue current treatment    Elevated troponin  - Noted at OSH in setting of severe anemia  - Elevated by cardiology at OSH where  they rec'd to continue her Aspirin and Plavix if it is safe to do so from a GI standpoint given her cardiac and CVA hx    Debility  Return to SNF upon discharge.     Morbid obesity  Body mass index is 33.41 kg/m².; chronic comorbidity affecting medical decision-making. Patient would greatly benefit from weight loss through proper diet and increased physical activity.        Chronic hypoxic respiratory failure  Reportedly with chronic hypoxemia on 3L NC, however with sats 100% on this on presentation. Wean to maintain sats 90% or greater.     History of cerebrovascular accident (CVA) with residual deficit  Holding ASA + Plavix for now given concern for bleeding, and statin given hepatitis. Resume as able. Continue BP and glucose control.       Hepatic dysfunction  Stable from prior, felt secondary to cholestatic hepatitis with fibrosis per biopsy. Continue Ursodiol per hepatology.       Direct hyperbilirubinemia  Stable from prior, felt secondary to cholestatic hepatitis. Continue ursodiol.       Acute on chronic anemia  Patient with chronic multifactorial anemia, requiring multiple transfusions. Recent admission with anemia as well, without obvious cause. Suspect multifactorial from ESRD (reports she no longer gets Epo with HD), possible LILIAN, possible GIB. Although she denies any signs of melena or hematochezia, FOBT positive at OSH    - Iron panel c/w AoCD  - Elevated B12, LDH  - Low Folate, Haptoglobin   - Normal INR  - DAPT negative   - LDH/haptoglobin c/w possible hemolytic anemia/delayed hemolysis given recent transfusions, but INR normal  - Given 1U RBC at OSH  - Trend CBC and transfuse to maintain Hgb 7 or greater, o  - Hold ASA + Plavix for now.   - Holding GI consult given no significant GI symptoms on admission    Mass of breast  Recent lymph node biopsy negative for malignancy. Continue outpatient follow up, and may need excisional biopsy if high suspicion for malignancy.       ESRD (end stage renal  disease) on dialysis  On HD MWF, with last session yesterday. Makes little urine.   Nephrology consulted for routine HD.     Heart failure with preserved ejection fraction  Patient with known diastolic CHF. Currently without evidence of volume overload on exam, and not in acute exacerbation. Will continue home GDMT with Metoprolol and volume removal with HD.  Appropriate diet ordered. Monitor volume status.     Peripheral vascular disease  Holding home ASA, Plavix, and statin given concern for bleeding and recent cholestatic hepatitis.      Hyperlipidemia associated with type 2 diabetes mellitus  Hold statin given recent hepatitis.     Hypertension associated with diabetes  Chronic, and currently controlled. Latest blood pressure and vitals reviewed-   While in the hospital, will manage blood pressure as follows; Continue home antihypertensive regimen.    Will utilize p.r.n. blood pressure medication only if patient's blood pressure greater than 220/110 and she develops symptoms such as worsening chest pain or shortness of breath.      Type 2 diabetes mellitus with hyperglycemia, with long-term current use of insulin  Most recent A1c 4.5%, indicating excellent chronic control likely due to ESRD. Will hold home oral agents while inpatient. Start low-dose insulin; titrate to maintain adequate glucose control. Diabetic diet ordered.         VTE Risk Mitigation (From admission, onward)           Ordered     Reason for No Pharmacological VTE Prophylaxis  Once        Question:  Reasons:  Answer:  Active Bleeding    11/30/24 2201     IP VTE HIGH RISK PATIENT  Once         11/30/24 2201     Place sequential compression device  Until discontinued         11/30/24 2201                    Discharge Planning   SHASHA: 12/5/2024     Code Status: Full Code   Medical Readiness for Discharge Date:   Discharge Plan A: Home with family                        Willie العلي MD  Department of Hospital Medicine   [facility]  ([facility])

## 2024-12-04 NOTE — NURSING
3 hour dialysis complete.  Blood returned.  Needle removed from LFA fistula.  Pressure held x 5 minutes.  Hemostasis achieved.  +thrill +bruit    Net UF 2.5L.  Tolerated well.    Epo given.    Transported from [facility] to room 09838 via stretcher by transporter.

## 2024-12-04 NOTE — SUBJECTIVE & OBJECTIVE
Interval History: NAEON. Hb improved following transfusion 2u pRBC yesterday, Hb 7 this AM.  Report of 1 medium size blood clot today, vaginal bleeding improving overall.     Review of Systems   Constitutional: Negative.    Respiratory: Negative.     Cardiovascular: Negative.    Gastrointestinal: Negative.    Genitourinary:  Positive for vaginal bleeding. Negative for vaginal pain.     Objective:     Vital Signs (Most Recent):  Temp: 99.3 °F (37.4 °C) (12/04/24 0650)  Pulse: 83 (12/04/24 1412)  Resp: 18 (12/04/24 0650)  BP: (!) 157/74 (12/04/24 1412)  SpO2: 100 % (12/04/24 0650) Vital Signs (24h Range):  Temp:  [97.6 °F (36.4 °C)-99.3 °F (37.4 °C)] 99.3 °F (37.4 °C)  Pulse:  [62-83] 83  Resp:  [13-20] 18  SpO2:  [96 %-100 %] 100 %  BP: (104-188)/() 157/74     Weight: 88.3 kg (194 lb 10.7 oz)  Body mass index is 33.41 kg/m².    Intake/Output Summary (Last 24 hours) at 12/4/2024 1532  Last data filed at 12/4/2024 1412  Gross per 24 hour   Intake 1082.5 ml   Output 3600 ml   Net -2517.5 ml      Physical Exam  Vitals and nursing note reviewed.   Constitutional:       General: She is not in acute distress.     Appearance: She is well-developed. She is ill-appearing (chronically). She is not diaphoretic.   HENT:      Head: Normocephalic and atraumatic.   Eyes:      General: Scleral icterus present.      Conjunctiva/sclera: Conjunctivae normal.   Neck:      Vascular: No JVD.   Cardiovascular:      Rate and Rhythm: Normal rate and regular rhythm.   Pulmonary:      Effort: Pulmonary effort is normal. No respiratory distress.   Abdominal:      General: There is no distension.      Tenderness: There is no abdominal tenderness. There is no guarding.   Genitourinary:     Comments: Mild blood in Purewick canister  Musculoskeletal:      Comments: AVF in LUE   Skin:     Coloration: Skin is jaundiced. Skin is not pale.   Neurological:      Mental Status: She is alert and oriented to person, place, and time.      Motor: No  abnormal muscle tone.   Psychiatric:         Mood and Affect: Mood normal.         Behavior: Behavior normal.         MELD 3.0: 30 at 12/4/2024  3:20 AM  MELD-Na: 30 at 12/4/2024  3:20 AM  Calculated from:  Serum Creatinine: On dialysis. Using the maximum value.  Serum Sodium: 133 mmol/L at 12/4/2024  3:20 AM  Total Bilirubin: 6.4 mg/dL at 12/4/2024  3:20 AM  Serum Albumin: 1.7 g/dL at 12/4/2024  3:20 AM  INR(ratio): 1.1 at 12/3/2024  4:53 AM  Age at listing (hypothetical): 44 years  Sex: Female at 12/4/2024  3:20 AM      Significant Labs:  CBC:  Recent Labs   Lab 12/03/24  2342 12/04/24  0801 12/04/24  1357   WBC 18.89* 19.19* 17.46*   HGB 7.3* 7.0* 7.0*   HCT 21.8* 20.5* 20.6*   * 148* 150     CMP:  Recent Labs   Lab 12/03/24  0453 12/04/24  0320   * 133*   K 4.0 4.3    101   CO2 21* 21*   * 122*   BUN 35* 41*   CREATININE 4.1* 4.7*   CALCIUM 8.5* 8.3*   PROT 6.9 6.5   ALBUMIN 1.8* 1.7*   BILITOT 6.9* 6.4*   ALKPHOS 340* 313*   AST 77* 67*   ALT 48* 40   ANIONGAP 13 11     PTINR:  Recent Labs   Lab 12/03/24  0453   INR 1.1

## 2024-12-04 NOTE — PROGRESS NOTES
[facility] NOTE     Patient currently on hemodialysis for removal of uremic toxins .     Patient seen and evaluated on hemodialysis, tolerating treatment, see HD flowsheet for vitals and assessments.      No Hypotension, chest pain, shortness of breath, cramping, nausea or vomiting.     Target UF: 2-2.5 L as tolerated, keep MAP >65. -160s mm Hg.   Hgb remain low, Hgb 7.0 today, remain with vaginal bleeding today.   Continue Sevelamer.   Consider renal diet restrictions; please limit daily intake to 32 oz per day.   No lab stick/BP intake on access site  Continue to monitor intake and output, daily weights   Please avoid gadolinium, fleets, phos-based laxatives, NSAIDs  Will follow closely and continue dialysis treatments while in-patient    ADELE Bhakta DNP, APRN, FNP-C  Department of Nephrology  [facility]  Pager: [phone]

## 2024-12-04 NOTE — NURSING
Dialysis started to LFA fistula with buttonhole needles.  Tolerated well.   S/P ERCP with pancreatoscopy, removal of PD stents, irrigation of pancreatic duct and removal of PD stones and pancreatic duct biopsies. And placement of two 7x11 mm Pancreatic stents.   S/P EUS with EUS guided celiac plexus block.     PLAN   Clear liquids today   Await pathology results  Follow up visit in two weeks  f/u official report.

## 2024-12-04 NOTE — ASSESSMENT & PLAN NOTE
Anemia is likely due to acute blood loss which was from vaginal bleeding . Most recent hemoglobin and hematocrit are listed below.  Recent Labs     12/03/24  2342 12/04/24  0801 12/04/24  1357   HGB 7.3* 7.0* 7.0*   HCT 21.8* 20.5* 20.6*       Plan  - Monitor serial CBC: Every 8 hours  - Transfuse PRBC if patient becomes hemodynamically unstable, symptomatic or H/H drops below 7/21.  - Patient has received 4 units of PRBCs total thus far  - Patient's anemia is currently worsening. Will continue current treatment

## 2024-12-04 NOTE — NURSING
Patient slept well this shift. No request for PRN pain medication this shift and no voiced complaints of pain by patient.  Continues to have vaginal bleeding that is not as much as previous night.  Large clot passed from vagina once this shift. Iv patent in right arm and Skin care performed. Repositioned q2h when tolerated. Safety precautions in place. Call light in reach. No other concerns noted at this time.

## 2024-12-05 VITALS
HEART RATE: 77 BPM | BODY MASS INDEX: 33.24 KG/M2 | WEIGHT: 194.69 LBS | RESPIRATION RATE: 18 BRPM | TEMPERATURE: 98 F | DIASTOLIC BLOOD PRESSURE: 81 MMHG | HEIGHT: 64 IN | OXYGEN SATURATION: 97 % | SYSTOLIC BLOOD PRESSURE: 177 MMHG

## 2024-12-05 LAB
ALBUMIN SERPL BCP-MCNC: 1.8 G/DL (ref 3.5–5.2)
ALP SERPL-CCNC: 322 U/L (ref 40–150)
ALT SERPL W/O P-5'-P-CCNC: 41 U/L (ref 10–44)
ANION GAP SERPL CALC-SCNC: 10 MMOL/L (ref 8–16)
ANISOCYTOSIS BLD QL SMEAR: SLIGHT
AST SERPL-CCNC: 60 U/L (ref 10–40)
BASOPHILS # BLD AUTO: 0.08 K/UL (ref 0–0.2)
BASOPHILS # BLD AUTO: 0.15 K/UL (ref 0–0.2)
BASOPHILS NFR BLD: 0.4 % (ref 0–1.9)
BASOPHILS NFR BLD: 0.8 % (ref 0–1.9)
BILIRUB SERPL-MCNC: 6.6 MG/DL (ref 0.1–1)
BUN SERPL-MCNC: 33 MG/DL (ref 6–20)
CALCIUM SERPL-MCNC: 8.1 MG/DL (ref 8.7–10.5)
CHLORIDE SERPL-SCNC: 97 MMOL/L (ref 95–110)
CO2 SERPL-SCNC: 24 MMOL/L (ref 23–29)
CREAT SERPL-MCNC: 3.8 MG/DL (ref 0.5–1.4)
DIFFERENTIAL METHOD BLD: ABNORMAL
DIFFERENTIAL METHOD BLD: ABNORMAL
EOSINOPHIL # BLD AUTO: 0.9 K/UL (ref 0–0.5)
EOSINOPHIL # BLD AUTO: 1 K/UL (ref 0–0.5)
EOSINOPHIL NFR BLD: 4.6 % (ref 0–8)
EOSINOPHIL NFR BLD: 5.1 % (ref 0–8)
ERYTHROCYTE [DISTWIDTH] IN BLOOD BY AUTOMATED COUNT: 20.2 % (ref 11.5–14.5)
ERYTHROCYTE [DISTWIDTH] IN BLOOD BY AUTOMATED COUNT: 21.1 % (ref 11.5–14.5)
EST. GFR  (NO RACE VARIABLE): 14.3 ML/MIN/1.73 M^2
GLUCOSE SERPL-MCNC: 162 MG/DL (ref 70–110)
HCT VFR BLD AUTO: 23.9 % (ref 37–48.5)
HCT VFR BLD AUTO: 26.1 % (ref 37–48.5)
HGB BLD-MCNC: 7.9 G/DL (ref 12–16)
HGB BLD-MCNC: 8.7 G/DL (ref 12–16)
HYPOCHROMIA BLD QL SMEAR: ABNORMAL
IMM GRANULOCYTES # BLD AUTO: 0.49 K/UL (ref 0–0.04)
IMM GRANULOCYTES # BLD AUTO: 0.57 K/UL (ref 0–0.04)
IMM GRANULOCYTES NFR BLD AUTO: 2.6 % (ref 0–0.5)
IMM GRANULOCYTES NFR BLD AUTO: 3 % (ref 0–0.5)
LYMPHOCYTES # BLD AUTO: 2.4 K/UL (ref 1–4.8)
LYMPHOCYTES # BLD AUTO: 2.7 K/UL (ref 1–4.8)
LYMPHOCYTES NFR BLD: 12.9 % (ref 18–48)
LYMPHOCYTES NFR BLD: 14.2 % (ref 18–48)
MAGNESIUM SERPL-MCNC: 1.7 MG/DL (ref 1.6–2.6)
MCH RBC QN AUTO: 30.5 PG (ref 27–31)
MCH RBC QN AUTO: 30.7 PG (ref 27–31)
MCHC RBC AUTO-ENTMCNC: 33.1 G/DL (ref 32–36)
MCHC RBC AUTO-ENTMCNC: 33.3 G/DL (ref 32–36)
MCV RBC AUTO: 92 FL (ref 82–98)
MCV RBC AUTO: 92 FL (ref 82–98)
MONOCYTES # BLD AUTO: 2.1 K/UL (ref 0.3–1)
MONOCYTES # BLD AUTO: 2.1 K/UL (ref 0.3–1)
MONOCYTES NFR BLD: 11.1 % (ref 4–15)
MONOCYTES NFR BLD: 11.1 % (ref 4–15)
NEUTROPHILS # BLD AUTO: 12.4 K/UL (ref 1.8–7.7)
NEUTROPHILS # BLD AUTO: 12.7 K/UL (ref 1.8–7.7)
NEUTROPHILS NFR BLD: 65.8 % (ref 38–73)
NEUTROPHILS NFR BLD: 68.4 % (ref 38–73)
NRBC BLD-RTO: 0 /100 WBC
NRBC BLD-RTO: 0 /100 WBC
OVALOCYTES BLD QL SMEAR: ABNORMAL
PHOSPHATE SERPL-MCNC: 3.6 MG/DL (ref 2.7–4.5)
PLATELET # BLD AUTO: 131 K/UL (ref 150–450)
PLATELET # BLD AUTO: 142 K/UL (ref 150–450)
PLATELET BLD QL SMEAR: ABNORMAL
PMV BLD AUTO: 10.7 FL (ref 9.2–12.9)
PMV BLD AUTO: 11.4 FL (ref 9.2–12.9)
POCT GLUCOSE: 158 MG/DL (ref 70–110)
POCT GLUCOSE: 159 MG/DL (ref 70–110)
POIKILOCYTOSIS BLD QL SMEAR: SLIGHT
POTASSIUM SERPL-SCNC: 4 MMOL/L (ref 3.5–5.1)
PROT SERPL-MCNC: 7 G/DL (ref 6–8.4)
RBC # BLD AUTO: 2.59 M/UL (ref 4–5.4)
RBC # BLD AUTO: 2.83 M/UL (ref 4–5.4)
SODIUM SERPL-SCNC: 131 MMOL/L (ref 136–145)
SPHEROCYTES BLD QL SMEAR: ABNORMAL
TARGETS BLD QL SMEAR: ABNORMAL
WBC # BLD AUTO: 18.61 K/UL (ref 3.9–12.7)
WBC # BLD AUTO: 18.87 K/UL (ref 3.9–12.7)

## 2024-12-05 PROCEDURE — 36415 COLL VENOUS BLD VENIPUNCTURE: CPT | Performed by: INTERNAL MEDICINE

## 2024-12-05 PROCEDURE — 25000003 PHARM REV CODE 250

## 2024-12-05 PROCEDURE — 84100 ASSAY OF PHOSPHORUS: CPT | Performed by: STUDENT IN AN ORGANIZED HEALTH CARE EDUCATION/TRAINING PROGRAM

## 2024-12-05 PROCEDURE — 25000003 PHARM REV CODE 250: Performed by: INTERNAL MEDICINE

## 2024-12-05 PROCEDURE — 25000003 PHARM REV CODE 250: Performed by: NURSE PRACTITIONER

## 2024-12-05 PROCEDURE — 36415 COLL VENOUS BLD VENIPUNCTURE: CPT | Performed by: STUDENT IN AN ORGANIZED HEALTH CARE EDUCATION/TRAINING PROGRAM

## 2024-12-05 PROCEDURE — 80053 COMPREHEN METABOLIC PANEL: CPT | Performed by: STUDENT IN AN ORGANIZED HEALTH CARE EDUCATION/TRAINING PROGRAM

## 2024-12-05 PROCEDURE — 83735 ASSAY OF MAGNESIUM: CPT | Performed by: STUDENT IN AN ORGANIZED HEALTH CARE EDUCATION/TRAINING PROGRAM

## 2024-12-05 PROCEDURE — 85025 COMPLETE CBC W/AUTO DIFF WBC: CPT | Mod: 91 | Performed by: INTERNAL MEDICINE

## 2024-12-05 PROCEDURE — 25000003 PHARM REV CODE 250: Performed by: STUDENT IN AN ORGANIZED HEALTH CARE EDUCATION/TRAINING PROGRAM

## 2024-12-05 RX ORDER — FAMOTIDINE 20 MG/1
10 TABLET, FILM COATED ORAL DAILY PRN
Qty: 20 TABLET | Refills: 0 | Status: ON HOLD | OUTPATIENT
Start: 2024-12-05 | End: 2025-12-05

## 2024-12-05 RX ORDER — NIFEDIPINE 60 MG/1
60 TABLET, EXTENDED RELEASE ORAL DAILY
Qty: 30 TABLET | Refills: 11 | Status: ON HOLD | OUTPATIENT
Start: 2024-12-06 | End: 2025-12-06

## 2024-12-05 RX ORDER — SODIUM CHLORIDE 9 MG/ML
INJECTION, SOLUTION INTRAVENOUS ONCE
Status: CANCELLED | OUTPATIENT
Start: 2024-12-06

## 2024-12-05 RX ORDER — LOSARTAN POTASSIUM 100 MG/1
100 TABLET ORAL DAILY
Qty: 90 TABLET | Refills: 3 | Status: ON HOLD | OUTPATIENT
Start: 2024-12-06 | End: 2025-12-06

## 2024-12-05 RX ADMIN — MEDROXYPROGESTERONE ACETATE 20 MG: 10 TABLET ORAL at 08:12

## 2024-12-05 RX ADMIN — URSODIOL 300 MG: 300 CAPSULE ORAL at 08:12

## 2024-12-05 RX ADMIN — SEVELAMER CARBONATE 800 MG: 800 TABLET, FILM COATED ORAL at 08:12

## 2024-12-05 RX ADMIN — NIFEDIPINE 60 MG: 30 TABLET, FILM COATED, EXTENDED RELEASE ORAL at 08:12

## 2024-12-05 RX ADMIN — METOPROLOL TARTRATE 100 MG: 50 TABLET, FILM COATED ORAL at 08:12

## 2024-12-05 RX ADMIN — INSULIN ASPART 2 UNITS: 100 INJECTION, SOLUTION INTRAVENOUS; SUBCUTANEOUS at 12:12

## 2024-12-05 RX ADMIN — LOSARTAN POTASSIUM 100 MG: 50 TABLET, FILM COATED ORAL at 08:12

## 2024-12-05 RX ADMIN — INSULIN ASPART 2 UNITS: 100 INJECTION, SOLUTION INTRAVENOUS; SUBCUTANEOUS at 08:12

## 2024-12-05 RX ADMIN — HYDRALAZINE HYDROCHLORIDE 50 MG: 50 TABLET ORAL at 08:12

## 2024-12-05 RX ADMIN — FOLIC ACID 1 MG: 1 TABLET ORAL at 08:12

## 2024-12-05 RX ADMIN — OXYCODONE HYDROCHLORIDE 10 MG: 10 TABLET ORAL at 03:12

## 2024-12-05 RX ADMIN — PANTOPRAZOLE SODIUM 40 MG: 40 TABLET, DELAYED RELEASE ORAL at 08:12

## 2024-12-05 RX ADMIN — SEVELAMER CARBONATE 800 MG: 800 TABLET, FILM COATED ORAL at 12:12

## 2024-12-05 NOTE — PLAN OF CARE
Pt is current with [facility].     [facility] - [facility] ([place])  Discharge Reassessment    Primary Care Provider: Lindsey Singletary FNP    Expected Discharge Date: 12/5/2024    Reassessment (most recent)       Discharge Reassessment - 12/05/24 1316          Discharge Reassessment    Assessment Type Discharge Planning Reassessment     Did the patient's condition or plan change since previous assessment? No     Discharge Plan discussed with: Patient     Communicated SAHSHA with patient/caregiver Yes     Discharge Plan A Home Health     Discharge Plan B Home with family     DME Needed Upon Discharge  none     Transition of Care Barriers None     Why the patient remains in the hospital Requires continued medical care        Post-Acute Status    Post-Acute Authorization Home Health     Home Health Status Set-up Complete/Auth obtained     Coverage [facility]     Hospital Resources/Appts/Education Provided Appointment suggestion unavailable     Discharge Delays None known at this time                       Discharge Plan A and Plan B have been determined by review of patient's clinical status, future medical and therapeutic needs, and coverage/benefits for post-acute care in coordination with multidisciplinary team members.    Shani Damon MSW, CSW

## 2024-12-05 NOTE — DISCHARGE SUMMARY
"[facility] - Stepdown Critical access hospital ([place] [address])  [facility] Medicine  Discharge Summary      Patient Name: Pilar Fernandez  MRN: [id number]  [facility]: [id number]  Patient Class: IP- Inpatient  Admission Date: 11/30/2024  Hospital Length of Stay: 5 days  Discharge Date and Time:  12/05/2024 12:50 PM  Attending Physician: Chepe Bueno MD   Discharging Provider: Chepe Bueno MD  Primary Care Provider: Lindsey Singletary [facility] Medicine Team: [facility] Chepe Bueno MD  Primary Care Team: [facility]    HPI:   Pilar Fernandez is a 44 y.o. female with complicated medical history including ESRD on HD, history of CVA with debility, chronic anemia, respiratory failure on 3L NC oxygen, HFpEF, PVD, recent admissions for acute on chronic anemia requiring transfusion and acute cholestatic hepatitis who presents from an OSH for acute anemia.     She reports that she was doing fairly well at SNF until last night, when she developed "upper stomach" pain described as a cramping. This worsened with lying flat, and improved with sitting up. Denies CP or SOB, stating that it is mostly in her epigastric region. She also noted a "clot" of blood in her underwear today followed by seeing her IUD. Her abdominal pain resolved after this. She does not have menstrual cycles, but noted the vaginal bleeding only today. She reports "normal" BMs, not every day, but has not had any dark or bloody stool to her knowledge. She makes little urine but denies any hematuria.     On evaluation at OSH, recurrent anemia noted, therefore she was transferred here for GI evaluation. She was transfused 1U RBC.       * No surgery found *      Hospital Course:   44 y.o. female with complicated medical history including ESRD on HD, history of CVA with debility, chronic anemia, respiratory failure on 3L NC oxygen, HFpEF, PVD, recent admissions for acute on chronic anemia requiring transfusion presents from OSH for acute anemia due to vaginal bleeding. " Gynecology consulted and recommended starting provera 20mg TID. Patient has required multiple blood tranfusions, vaginal bleeding gradually improved. Patient insisted on being discharged 12/5 as she reported not having any more ongoing bleeding. She has an appointment with Gyn on 12/12 and with PCP within a week of discharge to follow hemoglobin levels per our discussion. She continued to have leukocytosis around 18-20k which she has had for more than a month and has been understood to be reactive given negative infectious workup and no new systemic symptoms.       - Provera 20mg TID x7 days  (till 12/8) then transition to Provera 20mg daily thereafter   - Gyne ok to resume asa/plavix on dc  - H/H 7.9 on discharge - h/h with PCP as outpatient  - PCP to also follow BP and adjust medications as needed  - Gyne follow up on 12/12     Goals of Care Treatment Preferences:  Code Status: Full Code    Health care agent: Roberto Sharma (s.o.)  Health care agent number: [phone]       [place]: Yes  What is most important right now is to focus on remaining as independent as possible, symptom/pain control, extending life as long as possible, even it it means sacrificing quality.  Accordingly, we have decided that the best plan to meet the patient's goals includes continuing with treatment.      SDOH Screening:  The patient declined to be screened for utility difficulties, food insecurity, transport difficulties, housing insecurity, and interpersonal safety, so no concerns could be identified this admission.     Consults:   Consults (From admission, onward)          Status Ordering Provider     Inpatient consult to Gynecology  Once        Provider:  (Not yet assigned)    Completed GAGE LEONARD     Inpatient consult to Nephrology  Once        Provider:  (Not yet assigned)    Completed GAGE LEONARD     Inpatient consult to Midline team  Once        Provider:  (Not yet assigned)    Completed KATERYNA FLETCHER             * Vaginal bleeding  RN noted blood clot in diaper on presentation, followed by patient passing her IUD. Patient denies having menstrual cycles, and bleeding occurred just today on presentation. While this could be a source of blood loss  -- Gyn consulted, appreciate recs  -- TVUS reviewed  -- Provera 20mg TID x7 days then transition to Provera 20mg daily thereafter   -- Gyn recommending follow up in Gyn clinic in 1 week for endometrial sampling and surgical work-  -- Transfused additional 2u pRBC 12/4. Will transfuse 1u today.  -- Trend H/H.       Acute blood loss anemia  Anemia is likely due to acute blood loss which was from vaginal bleeding . Most recent hemoglobin and hematocrit are listed below.  Recent Labs     12/03/24  2342 12/04/24  0801 12/04/24  1357   HGB 7.3* 7.0* 7.0*   HCT 21.8* 20.5* 20.6*       Plan  - Monitor serial CBC: Every 8 hours  - Transfuse PRBC if patient becomes hemodynamically unstable, symptomatic or H/H drops below 7/21.  - Patient has received 4 units of PRBCs total thus far  - Patient's anemia is currently worsening. Will continue current treatment    Elevated troponin  - Noted at OSH in setting of severe anemia  - Elevated by cardiology at OSH where they rec'd to continue her Aspirin and Plavix if it is safe to do so from a GI standpoint given her cardiac and CVA hx    Debility  Return to SNF upon discharge.     Morbid obesity  Body mass index is 33.41 kg/m².; chronic comorbidity affecting medical decision-making. Patient would greatly benefit from weight loss through proper diet and increased physical activity.        Chronic hypoxic respiratory failure  Reportedly with chronic hypoxemia on 3L NC, however with sats 100% on this on presentation. Wean to maintain sats 90% or greater.     History of cerebrovascular accident (CVA) with residual deficit  Holding ASA + Plavix for now given concern for bleeding, and statin given hepatitis. Resume as able. Continue BP and glucose  control.       Hepatic dysfunction  Stable from prior, felt secondary to cholestatic hepatitis with fibrosis per biopsy. Continue Ursodiol per hepatology.       Direct hyperbilirubinemia  Stable from prior, felt secondary to cholestatic hepatitis. Continue ursodiol.       Acute on chronic anemia  Patient with chronic multifactorial anemia, requiring multiple transfusions. Recent admission with anemia as well, without obvious cause. Suspect multifactorial from ESRD (reports she no longer gets Epo with HD), possible LILIAN, possible GIB. Although she denies any signs of melena or hematochezia, FOBT positive at OSH    - Iron panel c/w AoCD  - Elevated B12, LDH  - Low Folate, Haptoglobin   - Normal INR  - DAPT negative   - LDH/haptoglobin c/w possible hemolytic anemia/delayed hemolysis given recent transfusions, but INR normal  - Given 1U RBC at OSH  - Trend CBC and transfuse to maintain Hgb 7 or greater, o  - Hold ASA + Plavix for now.   - Holding GI consult given no significant GI symptoms on admission    Mass of breast  Recent lymph node biopsy negative for malignancy. Continue outpatient follow up, and may need excisional biopsy if high suspicion for malignancy.       ESRD (end stage renal disease) on dialysis  On HD MWF, with last session yesterday. Makes little urine.   Nephrology consulted for routine HD.     Heart failure with preserved ejection fraction  Patient with known diastolic CHF. Currently without evidence of volume overload on exam, and not in acute exacerbation. Will continue home GDMT with Metoprolol and volume removal with HD.  Appropriate diet ordered. Monitor volume status.     Peripheral vascular disease  Holding home ASA, Plavix, and statin given concern for bleeding and recent cholestatic hepatitis.      Hyperlipidemia associated with type 2 diabetes mellitus  Hold statin given recent hepatitis.     Hypertension associated with diabetes  Chronic, and currently controlled. Latest blood pressure and  vitals reviewed-   While in the hospital, will manage blood pressure as follows; Continue home antihypertensive regimen.    Will utilize p.r.n. blood pressure medication only if patient's blood pressure greater than 220/110 and she develops symptoms such as worsening chest pain or shortness of breath.      Type 2 diabetes mellitus with hyperglycemia, with long-term current use of insulin  Most recent A1c 4.5%, indicating excellent chronic control likely due to ESRD. Will hold home oral agents while inpatient. Start low-dose insulin; titrate to maintain adequate glucose control. Diabetic diet ordered.         Final Active Diagnoses:    Diagnosis Date Noted POA    PRINCIPAL PROBLEM:  Vaginal bleeding [N93.9] 05/20/2016 Yes    Acute blood loss anemia [D62] 12/03/2024 Yes    Elevated troponin [R79.89] 12/01/2024 Yes    Debility [R53.81] 11/11/2024 Yes     Chronic    History of cerebrovascular accident (CVA) with residual deficit [I69.30] 11/09/2024 Not Applicable     Chronic    Hepatic dysfunction [K76.89] 11/09/2024 Yes     Chronic    Morbid obesity [E66.01] 11/09/2024 Yes     Chronic    Chronic hypoxic respiratory failure [J96.11] 11/09/2024 Yes     Chronic    Acute on chronic anemia [D64.9] 10/30/2024 Yes    Direct hyperbilirubinemia [E80.6] 10/30/2024 Yes     Chronic    Mass of breast [N63.0] 12/30/2022 Yes     Chronic    Heart failure with preserved ejection fraction [I50.30] 10/06/2020 Yes     Chronic    ESRD (end stage renal disease) on dialysis [N18.6, Z99.2] 10/06/2020 Not Applicable     Chronic    Peripheral vascular disease [I73.9] 08/05/2019 Yes     Chronic    Type 2 diabetes mellitus with hyperglycemia, with long-term current use of insulin [E11.65, Z79.4] 01/25/2016 Not Applicable     Chronic    Hypertension associated with diabetes [E11.59, I15.2] 01/25/2016 Yes     Chronic    Hyperlipidemia associated with type 2 diabetes mellitus [E11.69, E78.5] 01/25/2016 Yes     Chronic      Problems Resolved During  this Admission:       Discharged Condition: stable    Disposition:     Follow Up:    Patient Instructions:   No discharge procedures on file.    Significant Diagnostic Studies: Labs: All labs within the past 24 hours have been reviewed    Pending Diagnostic Studies:       Procedure Component Value Units Date/Time    CBC auto differential [[id number]]     Order Status: Sent Lab Status: No result     Specimen: Blood            Medications:  Reconciled Home Medications:      Medication List        START taking these medications      losartan 100 MG tablet  Commonly known as: COZAAR  Take 1 tablet (100 mg total) by mouth once daily.  Start taking on: December 6, 2024     medroxyPROGESTERone 10 MG tablet  Commonly known as: PROVERA  Take 2 tablets (20 mg total) by mouth 3 (three) times daily for 10 days, THEN 2 tablets (20 mg total) once daily.  Start taking on: December 4, 2024     NIFEdipine 60 MG (OSM) 24 hr tablet  Commonly known as: PROCARDIA-XL  Take 1 tablet (60 mg total) by mouth once daily.  Start taking on: December 6, 2024            CHANGE how you take these medications      famotidine 20 MG tablet  Commonly known as: PEPCID  Take 0.5 tablets (10 mg total) by mouth daily as needed for Heartburn.  What changed:   how much to take  when to take this            CONTINUE taking these medications      aspirin 81 MG EC tablet  Commonly known as: ECOTRIN  Take 1 tablet (81 mg total) by mouth once daily.     atorvastatin 40 MG tablet  Commonly known as: LIPITOR  Take 1 tablet by mouth once daily     clopidogreL 75 mg tablet  Commonly known as: PLAVIX  Take 1 tablet (75 mg total) by mouth once daily.     colchicine 0.6 mg tablet  Commonly known as: COLCRYS  TAKE 1 TABLET BY MOUTH ONCE DAILY AS NEEDED FOR GOUT PAIN     hydrOXYzine HCL 25 MG tablet  Commonly known as: ATARAX  Take 1 tablet (25 mg total) by mouth 2 (two) times daily as needed for Itching or Anxiety.     insulin aspart U-100 100 unit/mL (3 mL) Inpn  pen  Commonly known as: NovoLOG Flexpen U-100 Insulin  Inject 10 Units into the skin 2 (two) times daily with meals.     lactulose 10 gram/15 mL solution  Commonly known as: CHRONULAC  Take 30 mLs (20 g total) by mouth 2 (two) times daily.     linaGLIPtin 5 mg Tab tablet  Commonly known as: TRADJENTA  TAKE 1 TABLET (5 MG TOTAL) BY MOUTH ONCE DAILY.     miconazole 2 % cream  Commonly known as: MICOTIN  Apply topically 2 (two) times daily. Between thighs, under abdomen pannus, under breasts     nebivoloL 20 mg Tab  Commonly known as: BYSTOLIC  Take 1 tablet by mouth once daily.     ondansetron 4 MG Tbdl  Commonly known as: ZOFRAN-ODT  Take 1 tablet (4 mg total) by mouth every 8 (eight) hours as needed ([place]).     oxyCODONE 5 MG immediate release tablet  Commonly known as: ROXICODONE  Take 1 tablet (5 mg total) by mouth every 12 (twelve) hours as needed for Pain.     pantoprazole 40 MG tablet  Commonly known as: PROTONIX  Take 1 tablet (40 mg total) by mouth once daily.     traZODone 100 MG tablet  Commonly known as: DESYREL  Take 100 mg by mouth nightly as needed.     ursodioL 300 mg capsule  Commonly known as: ACTIGALL  Take 1 capsule (300 mg total) by mouth 3 (three) times daily.            STOP taking these medications      penicillin G benzathine 1,200,000 unit/2 mL Syrg  Commonly known as: BICILLIN [place]              Indwelling Lines/Drains at time of discharge:   Lines/Drains/Airways       Drain  Duration                  Hemodialysis AV Fistula Left forearm -- days         Hemodialysis AV Fistula Left upper arm -- days         Hemodialysis AV Fistula 08/05/19 Left forearm 1949 days    Female External Urinary Catheter w/ Suction 12/02/24 0500 3 days                    Time spent on the discharge of patient: 45 minutes         Chepe Bueno MD  Department of Hospital Medicine  [facility] - Stepdown Flex ([place] [address])

## 2024-12-05 NOTE — PLAN OF CARE
Problem: Adult Inpatient Plan of Care  Goal: Plan of Care Review  Outcome: Progressing  Flowsheets (Taken 12/4/2024 1848)  Plan of Care Reviewed With: patient  Goal: Patient-Specific Goal (Individualized)  Outcome: Progressing  Goal: Absence of Hospital-Acquired Illness or Injury  Outcome: Progressing  Intervention: Identify and Manage Fall Risk  Flowsheets (Taken 12/4/2024 1848)  Safety Promotion/Fall Prevention:   assistive device/personal item within reach   side rails raised x 2  Intervention: Prevent Skin Injury  Flowsheets (Taken 12/4/2024 1848)  Body Position: position changed independently  Skin Protection: incontinence pads utilized  Device Skin Pressure Protection: tubing/devices free from skin contact  Intervention: Prevent and Manage VTE (Venous Thromboembolism) Risk  Flowsheets (Taken 12/4/2024 1848)  VTE Prevention/Management: bleeding precautions maintained        Patient alert and oriented x4; care plan discussed with patient; free from falls/injuries during the shift; call light and personal belongings within reach; 1 u PRBC's given; HD treatment completed; patient able to express needs, no needs at this time; will continue with poc.

## 2024-12-05 NOTE — PLAN OF CARE
Pt passed 1 lge blood clot last evening, medicated x1 with oxycodone for generalized discomfort, incont of soft stool x2 and small amt of urine

## 2024-12-05 NOTE — PLAN OF CARE
[facility] - [facility] ([place])  Discharge Reassessment    Primary Care Provider: Lindsey Singletary FNP    Expected Discharge Date: 12/5/2024    Reassessment (most recent)       Discharge Reassessment - 12/05/24 0936          Discharge Reassessment    Assessment Type Discharge Planning Reassessment     Did the patient's condition or plan change since previous assessment? No     Discharge Plan discussed with: Patient     Communicated SHASHA with patient/caregiver Yes     Discharge Plan A Home with family     Discharge Plan B Home     DME Needed Upon Discharge  none     Transition of Care Barriers None     Why the patient remains in the hospital Requires continued medical care        Post-Acute Status    Post-Acute Authorization Other     Coverage [facility]     Hospital Resources/Appts/Education Provided Appointment suggestion unavailable     Discharge Delays None known at this time                       Discharge Plan A and Plan B have been determined by review of patient's clinical status, future medical and therapeutic needs, and coverage/benefits for post-acute care in coordination with multidisciplinary team members.    Shani Damon MSW, CSW

## 2024-12-05 NOTE — PLAN OF CARE
[facility]   Heart Transplant Clinic  [address] [place] [address]   [phone] after hours        HOME  HEALTH ORDERS      Admit to Home Health    Diagnosis:   Patient Active Problem List   Diagnosis    Iron deficiency anemia    Type 2 diabetes mellitus with hyperglycemia, with long-term current use of insulin    Hypertension associated with diabetes    Hyperlipidemia associated with type 2 diabetes mellitus    GERD (gastroesophageal reflux disease)    Family history of cardiovascular disease    Cardiomyopathy    Constipation    SOB (shortness of breath)    Vaginal bleeding    Gout    Peripheral vascular disease    Chronic kidney disease    Heart failure with preserved ejection fraction    ESRD (end stage renal disease) on dialysis    Hypothyroidism    Age-related nuclear cataract, right eye    Cortical age-related cataract, right eye    Mass of breast    Lymphedema    Acute on chronic anemia    Coagulopathy    Direct hyperbilirubinemia    Painless jaundice    Type 2 diabetes mellitus, with long-term current use of insulin    Leukocytosis    Sacral decubitus ulcer, stage II    Impaired mobility    Pleural effusion    Hepatic dysfunction    History of cerebrovascular accident (CVA) with residual deficit    Chronic hypoxic respiratory failure    Morbid obesity    Diffuse lymphadenopathy    Anasarca    Debility    Dyspnea    Palliative care encounter    Syphilis    Elevated troponin    Acute blood loss anemia       Patient is homebound due to:   Diet: Low Sodium  Acitivities: As Tolerated    Nursing:   SN to complete comprehensive assessment including routine vital signs. Instruct on disease process and s/s of complications to report to MD. Review/verify medication list sent home with the patient at time of discharge  and instruct patient/caregiver as needed. Frequency may be adjusted depending on start of care date.    Notify MD if SBP > 160 or < 90; DBP > 90  or < 50; HR > 120 or < 50; Temp > 101; Weight gain >3lbs in 1 day or 5lbs in 1 week.    LABS:  SN to perform labs: Weekly bmp, mag, cbc.       CONSULTS:      Physical Therapy to evaluate and treat. Evaluate for home safety and equipment needs; Establish/upgrade home exercise program. Perform / instruct on therapeutic exercises, gait training, transfer training, and Range of Motion.  Occupational Therapy to evaluate and treat. Evaluate home environment for safety and equipment needs. Perform/Instruct on transfers, ADL training, ROM, and therapeutic exercises.

## 2024-12-05 NOTE — NURSING
Pt aaox4, no c/o pain. Pt denied having bleeding during previous shift and this shift and would like to go home. Pt talked with MD on the phone for discharge planning. Pt d/c per md ordered. Okay to d/c from CMS stand point. Pharmacy delivered at the bedside. Ivs, tele removed per ordered. D/c paper and education given to pt, who stated no question at the time. Pt's family came  the pt to go home with home transportation. Family did not bring pt her home O2, pt stated she would be fine and would like to go home with family. Pt left with all her belongings with her family.

## 2024-12-06 NOTE — PLAN OF CARE
[facility] ([place])  Discharge Final Note    Primary Care Provider: Lindsey Singletary FNP    Expected Discharge Date: 12/5/2024    Patient discharged [facility] via personal [facility].     Patient's bedside nurse and pt notified of the above.    Discharge Plan A and Plan B have been determined by review of patient's clinical status, future medical and therapeutic needs, and coverage/benefits for post-acute care in coordination with multidisciplinary team members.        Final Discharge Note (most recent)       Final Note - 12/06/24 0931          Final Note    Assessment Type Final Discharge Note     Anticipated Discharge Disposition Home-Health Care [facility]     What phone number can be called within the next 1-3 days to see how you are doing after discharge? [phone]     Hospital Resources/Appts/Education Provided Appointment suggestion unavailable        Post-Acute Status    Post-Acute Authorization Home Health     Home Health Status Set-up Complete/Auth obtained     Coverage [facility]     Discharge Delays None known at this time                     Important Message from Medicare  Important Message from Medicare regarding Discharge Appeal Rights: Explained to patient/caregiver, Signed/date by patient/caregiver     Date IMM was signed: 12/05/24  Time IMM was signed: 1325        Future Appointments   Date Time Provider Department Center   12/12/2024  3:00 PM GYN RESIDENT, [facility] OBALISSON Jarquin CHW scheduled post-discharge follow-up appointment and information added to AVS.     Shani Damon MSW, CSW

## 2024-12-12 ENCOUNTER — HOSPITAL ENCOUNTER (INPATIENT)
Facility: HOSPITAL | Age: 44
LOS: 5 days | Discharge: HOME HEALTH | DRG: 637 | End: 2024-12-18
Attending: EMERGENCY MEDICINE | Admitting: INTERNAL MEDICINE
Payer: MEDICARE

## 2024-12-12 DIAGNOSIS — D72.829 LEUKOCYTOSIS, UNSPECIFIED TYPE: ICD-10-CM

## 2024-12-12 DIAGNOSIS — E16.2 HYPOGLYCEMIA: ICD-10-CM

## 2024-12-12 DIAGNOSIS — R78.81 STAPHYLOCOCCUS AUREUS BACTEREMIA: ICD-10-CM

## 2024-12-12 DIAGNOSIS — L02.419 THIGH ABSCESS: ICD-10-CM

## 2024-12-12 DIAGNOSIS — B95.61 STAPHYLOCOCCUS AUREUS BACTEREMIA: ICD-10-CM

## 2024-12-12 DIAGNOSIS — L89.152 SACRAL DECUBITUS ULCER, STAGE II: ICD-10-CM

## 2024-12-12 DIAGNOSIS — E80.6 HYPERBILIRUBINEMIA: ICD-10-CM

## 2024-12-12 DIAGNOSIS — R53.81 DEBILITY: Chronic | ICD-10-CM

## 2024-12-12 DIAGNOSIS — R41.82 ALTERED MENTAL STATUS, UNSPECIFIED ALTERED MENTAL STATUS TYPE: Primary | ICD-10-CM

## 2024-12-12 LAB
ALBUMIN SERPL BCP-MCNC: 1.8 G/DL (ref 3.5–5.2)
ALP SERPL-CCNC: 508 U/L (ref 55–135)
ALT SERPL W/O P-5'-P-CCNC: 64 U/L (ref 10–44)
AMMONIA PLAS-SCNC: 22 UMOL/L (ref 10–50)
AMPHET+METHAMPHET UR QL: NEGATIVE
ANION GAP SERPL CALC-SCNC: 3 MMOL/L (ref 3–11)
ANION GAP SERPL CALC-SCNC: 7 MMOL/L (ref 3–11)
AST SERPL-CCNC: 113 U/L (ref 10–40)
BARBITURATES UR QL SCN>200 NG/ML: NEGATIVE
BASOPHILS # BLD AUTO: 0.13 K/UL (ref 0–0.2)
BASOPHILS NFR BLD: 0.7 % (ref 0–1.9)
BENZODIAZ UR QL SCN>200 NG/ML: NEGATIVE
BILIRUB SERPL-MCNC: 5.4 MG/DL (ref 0.1–1)
BUN SERPL-MCNC: 35 MG/DL (ref 6–20)
BUN SERPL-MCNC: 40 MG/DL (ref 6–20)
BZE UR QL SCN: NEGATIVE
CALCIUM SERPL-MCNC: 8.7 MG/DL (ref 8.7–10.5)
CALCIUM SERPL-MCNC: 8.7 MG/DL (ref 8.7–10.5)
CANNABINOIDS UR QL SCN: NEGATIVE
CHLORIDE SERPL-SCNC: 100 MMOL/L (ref 95–110)
CHLORIDE SERPL-SCNC: 102 MMOL/L (ref 95–110)
CO2 SERPL-SCNC: 28 MMOL/L (ref 23–29)
CO2 SERPL-SCNC: 29 MMOL/L (ref 23–29)
CREAT SERPL-MCNC: 3.5 MG/DL (ref 0.5–1.4)
CREAT SERPL-MCNC: 3.6 MG/DL (ref 0.5–1.4)
CREAT UR-MCNC: <13 MG/DL (ref 15–325)
DIFFERENTIAL METHOD BLD: ABNORMAL
EOSINOPHIL # BLD AUTO: 0.3 K/UL (ref 0–0.5)
EOSINOPHIL NFR BLD: 1.4 % (ref 0–8)
ERYTHROCYTE [DISTWIDTH] IN BLOOD BY AUTOMATED COUNT: 19.9 % (ref 11.5–14.5)
EST. GFR  (NO RACE VARIABLE): 15.2 ML/MIN/1.73 M^2
EST. GFR  (NO RACE VARIABLE): 15.7 ML/MIN/1.73 M^2
ESTIMATED AVG GLUCOSE: 82 MG/DL (ref 68–131)
FIO2: 32 %
GLUCOSE SERPL-MCNC: 175 MG/DL (ref 70–110)
GLUCOSE SERPL-MCNC: 73 MG/DL (ref 70–110)
HBA1C MFR BLD: 4.5 % (ref 4–5.6)
HCT VFR BLD AUTO: 28.3 % (ref 37–48.5)
HGB BLD-MCNC: 8.9 G/DL (ref 12–16)
IMM GRANULOCYTES # BLD AUTO: 0.12 K/UL (ref 0–0.04)
IMM GRANULOCYTES NFR BLD AUTO: 0.6 % (ref 0–0.5)
LPM: 3
LYMPHOCYTES # BLD AUTO: 1.9 K/UL (ref 1–4.8)
LYMPHOCYTES NFR BLD: 9.9 % (ref 18–48)
MAGNESIUM SERPL-MCNC: 1.7 MG/DL (ref 1.6–2.6)
MCH RBC QN AUTO: 30.6 PG (ref 27–31)
MCHC RBC AUTO-ENTMCNC: 31.4 G/DL (ref 32–36)
MCV RBC AUTO: 97 FL (ref 82–98)
METHADONE UR QL SCN>300 NG/ML: NEGATIVE
MODIFIED ALLEN'S TEST: ABNORMAL
MONOCYTES # BLD AUTO: 2 K/UL (ref 0.3–1)
MONOCYTES NFR BLD: 10.7 % (ref 4–15)
NEUTROPHILS # BLD AUTO: 14.6 K/UL (ref 1.8–7.7)
NEUTROPHILS NFR BLD: 76.7 % (ref 38–73)
NRBC BLD-RTO: 0 /100 WBC
O2DEVICE: ABNORMAL
OPIATES UR QL SCN: NEGATIVE
PCO2 BLDA: 48 MMHG (ref 35–45)
PCP UR QL SCN>25 NG/ML: NEGATIVE
PH SMN: 7.39 [PH] (ref 7.34–7.45)
PLATELET # BLD AUTO: 254 K/UL (ref 150–450)
PMV BLD AUTO: 10.6 FL (ref 9.2–12.9)
PO2 BLDA: 97.3 MMHG (ref 80–100)
POC BASE DEFICIT: 4 MMOL/L (ref -2–2)
POC HCO3: 27.5 MMOL/L (ref 24–28)
POC PERFORMED BY: ABNORMAL
POC SATURATED O2: 98.5 % (ref 95–100)
POC TEMPERATURE: 37 C
POCT GLUCOSE: 20 MG/DL (ref 70–110)
POCT GLUCOSE: 30 MG/DL (ref 70–110)
POCT GLUCOSE: 31 MG/DL (ref 70–110)
POCT GLUCOSE: 39 MG/DL (ref 70–110)
POCT GLUCOSE: 48 MG/DL (ref 70–110)
POCT GLUCOSE: 53 MG/DL (ref 70–110)
POCT GLUCOSE: 55 MG/DL (ref 70–110)
POCT GLUCOSE: 60 MG/DL (ref 70–110)
POCT GLUCOSE: 61 MG/DL (ref 70–110)
POCT GLUCOSE: 63 MG/DL (ref 70–110)
POCT GLUCOSE: 64 MG/DL (ref 70–110)
POCT GLUCOSE: 73 MG/DL (ref 70–110)
POCT GLUCOSE: 90 MG/DL (ref 70–110)
POTASSIUM SERPL-SCNC: 4.4 MMOL/L (ref 3.5–5.1)
POTASSIUM SERPL-SCNC: 4.6 MMOL/L (ref 3.5–5.1)
PROT SERPL-MCNC: 8.1 G/DL (ref 6–8.4)
RBC # BLD AUTO: 2.91 M/UL (ref 4–5.4)
SODIUM SERPL-SCNC: 134 MMOL/L (ref 136–145)
SODIUM SERPL-SCNC: 135 MMOL/L (ref 136–145)
SPECIMEN SOURCE: ABNORMAL
TOXICOLOGY INFORMATION: ABNORMAL
WBC # BLD AUTO: 19.04 K/UL (ref 3.9–12.7)

## 2024-12-12 PROCEDURE — 36415 COLL VENOUS BLD VENIPUNCTURE: CPT | Mod: XB | Performed by: INTERNAL MEDICINE

## 2024-12-12 PROCEDURE — G0378 HOSPITAL OBSERVATION PER HR: HCPCS

## 2024-12-12 PROCEDURE — 85025 COMPLETE CBC W/AUTO DIFF WBC: CPT | Performed by: EMERGENCY MEDICINE

## 2024-12-12 PROCEDURE — 25000003 PHARM REV CODE 250

## 2024-12-12 PROCEDURE — 63600175 PHARM REV CODE 636 W HCPCS: Performed by: INTERNAL MEDICINE

## 2024-12-12 PROCEDURE — 36415 COLL VENOUS BLD VENIPUNCTURE: CPT | Performed by: EMERGENCY MEDICINE

## 2024-12-12 PROCEDURE — 99900035 HC TECH TIME PER 15 MIN (STAT)

## 2024-12-12 PROCEDURE — 94799 UNLISTED PULMONARY SVC/PX: CPT

## 2024-12-12 PROCEDURE — 25000003 PHARM REV CODE 250: Performed by: EMERGENCY MEDICINE

## 2024-12-12 PROCEDURE — 80053 COMPREHEN METABOLIC PANEL: CPT | Performed by: EMERGENCY MEDICINE

## 2024-12-12 PROCEDURE — 27000221 HC OXYGEN, UP TO 24 HOURS

## 2024-12-12 PROCEDURE — 25500020 PHARM REV CODE 255: Performed by: EMERGENCY MEDICINE

## 2024-12-12 PROCEDURE — 82140 ASSAY OF AMMONIA: CPT | Performed by: EMERGENCY MEDICINE

## 2024-12-12 PROCEDURE — 83735 ASSAY OF MAGNESIUM: CPT | Performed by: EMERGENCY MEDICINE

## 2024-12-12 PROCEDURE — 25000003 PHARM REV CODE 250: Performed by: INTERNAL MEDICINE

## 2024-12-12 PROCEDURE — 94761 N-INVAS EAR/PLS OXIMETRY MLT: CPT | Mod: XB

## 2024-12-12 PROCEDURE — 99900031 HC PATIENT EDUCATION (STAT)

## 2024-12-12 PROCEDURE — 96375 TX/PRO/DX INJ NEW DRUG ADDON: CPT

## 2024-12-12 PROCEDURE — 99291 CRITICAL CARE FIRST HOUR: CPT

## 2024-12-12 PROCEDURE — 80307 DRUG TEST PRSMV CHEM ANLYZR: CPT | Performed by: EMERGENCY MEDICINE

## 2024-12-12 PROCEDURE — 82962 GLUCOSE BLOOD TEST: CPT

## 2024-12-12 PROCEDURE — 63600175 PHARM REV CODE 636 W HCPCS: Performed by: EMERGENCY MEDICINE

## 2024-12-12 PROCEDURE — 96374 THER/PROPH/DIAG INJ IV PUSH: CPT

## 2024-12-12 PROCEDURE — 96376 TX/PRO/DX INJ SAME DRUG ADON: CPT

## 2024-12-12 PROCEDURE — 82803 BLOOD GASES ANY COMBINATION: CPT

## 2024-12-12 PROCEDURE — 80048 BASIC METABOLIC PNL TOTAL CA: CPT | Mod: XB | Performed by: INTERNAL MEDICINE

## 2024-12-12 PROCEDURE — 36600 WITHDRAWAL OF ARTERIAL BLOOD: CPT

## 2024-12-12 PROCEDURE — 83036 HEMOGLOBIN GLYCOSYLATED A1C: CPT | Performed by: EMERGENCY MEDICINE

## 2024-12-12 RX ORDER — GLUCAGON 1 MG
1 KIT INJECTION
Status: DISCONTINUED | OUTPATIENT
Start: 2024-12-12 | End: 2024-12-18 | Stop reason: HOSPADM

## 2024-12-12 RX ORDER — IBUPROFEN 200 MG
24 TABLET ORAL
Status: DISCONTINUED | OUTPATIENT
Start: 2024-12-12 | End: 2024-12-12 | Stop reason: SDUPTHER

## 2024-12-12 RX ORDER — IBUPROFEN 200 MG
24 TABLET ORAL
Status: DISCONTINUED | OUTPATIENT
Start: 2024-12-12 | End: 2024-12-18 | Stop reason: HOSPADM

## 2024-12-12 RX ORDER — DEXTROSE MONOHYDRATE 100 MG/ML
INJECTION, SOLUTION INTRAVENOUS CONTINUOUS
Status: DISCONTINUED | OUTPATIENT
Start: 2024-12-12 | End: 2024-12-16

## 2024-12-12 RX ORDER — LABETALOL HCL 20 MG/4 ML
10 SYRINGE (ML) INTRAVENOUS
Status: COMPLETED | OUTPATIENT
Start: 2024-12-12 | End: 2024-12-12

## 2024-12-12 RX ORDER — ONDANSETRON HYDROCHLORIDE 2 MG/ML
4 INJECTION, SOLUTION INTRAVENOUS EVERY 8 HOURS PRN
Status: DISCONTINUED | OUTPATIENT
Start: 2024-12-12 | End: 2024-12-18 | Stop reason: HOSPADM

## 2024-12-12 RX ORDER — NALOXONE HCL 0.4 MG/ML
1.2 VIAL (ML) INJECTION
Status: COMPLETED | OUTPATIENT
Start: 2024-12-12 | End: 2024-12-12

## 2024-12-12 RX ORDER — METOPROLOL TARTRATE 100 MG/1
100 TABLET ORAL 2 TIMES DAILY
Status: DISCONTINUED | OUTPATIENT
Start: 2024-12-12 | End: 2024-12-18 | Stop reason: HOSPADM

## 2024-12-12 RX ORDER — DEXTROSE MONOHYDRATE AND SODIUM CHLORIDE 5; .9 G/100ML; G/100ML
INJECTION, SOLUTION INTRAVENOUS CONTINUOUS
Status: DISCONTINUED | OUTPATIENT
Start: 2024-12-12 | End: 2024-12-12

## 2024-12-12 RX ORDER — HYDRALAZINE HYDROCHLORIDE 50 MG/1
50 TABLET, FILM COATED ORAL EVERY 12 HOURS
Status: DISCONTINUED | OUTPATIENT
Start: 2024-12-12 | End: 2024-12-18 | Stop reason: HOSPADM

## 2024-12-12 RX ORDER — LACTULOSE 10 G/15ML
20 SOLUTION ORAL 2 TIMES DAILY
Status: DISCONTINUED | OUTPATIENT
Start: 2024-12-12 | End: 2024-12-18 | Stop reason: HOSPADM

## 2024-12-12 RX ORDER — DEXTROSE 50 % IN WATER (D50W) INTRAVENOUS SYRINGE
25
Status: COMPLETED | OUTPATIENT
Start: 2024-12-12 | End: 2024-12-12

## 2024-12-12 RX ORDER — DEXTROSE MONOHYDRATE 100 MG/ML
INJECTION, SOLUTION INTRAVENOUS
Status: COMPLETED | OUTPATIENT
Start: 2024-12-12 | End: 2024-12-12

## 2024-12-12 RX ORDER — IBUPROFEN 200 MG
16 TABLET ORAL
Status: DISCONTINUED | OUTPATIENT
Start: 2024-12-12 | End: 2024-12-12 | Stop reason: SDUPTHER

## 2024-12-12 RX ORDER — GLUCAGON 1 MG
1 KIT INJECTION
Status: DISCONTINUED | OUTPATIENT
Start: 2024-12-12 | End: 2024-12-12 | Stop reason: SDUPTHER

## 2024-12-12 RX ORDER — IBUPROFEN 200 MG
16 TABLET ORAL
Status: DISCONTINUED | OUTPATIENT
Start: 2024-12-12 | End: 2024-12-18 | Stop reason: HOSPADM

## 2024-12-12 RX ORDER — HYDRALAZINE HYDROCHLORIDE 20 MG/ML
10 INJECTION INTRAMUSCULAR; INTRAVENOUS EVERY 6 HOURS PRN
Status: DISCONTINUED | OUTPATIENT
Start: 2024-12-12 | End: 2024-12-18 | Stop reason: HOSPADM

## 2024-12-12 RX ORDER — FAMOTIDINE 10 MG/ML
20 INJECTION INTRAVENOUS DAILY
Status: DISCONTINUED | OUTPATIENT
Start: 2024-12-12 | End: 2024-12-15

## 2024-12-12 RX ADMIN — DEXTROSE MONOHYDRATE 250 ML: 100 INJECTION, SOLUTION INTRAVENOUS at 07:12

## 2024-12-12 RX ADMIN — HYDRALAZINE HYDROCHLORIDE 50 MG: 50 TABLET ORAL at 09:12

## 2024-12-12 RX ADMIN — DEXTROSE MONOHYDRATE: 100 INJECTION, SOLUTION INTRAVENOUS at 05:12

## 2024-12-12 RX ADMIN — LABETALOL HYDROCHLORIDE 10 MG: 5 INJECTION, SOLUTION INTRAVENOUS at 12:12

## 2024-12-12 RX ADMIN — DEXTROSE MONOHYDRATE 25 G: 25 INJECTION, SOLUTION INTRAVENOUS at 01:12

## 2024-12-12 RX ADMIN — IOHEXOL 100 ML: 350 INJECTION, SOLUTION INTRAVENOUS at 09:12

## 2024-12-12 RX ADMIN — NALXONE HYDROCHLORIDE 1.2 MG: 0.4 INJECTION INTRAMUSCULAR; INTRAVENOUS; SUBCUTANEOUS at 09:12

## 2024-12-12 RX ADMIN — DEXTROSE MONOHYDRATE 25 G: 25 INJECTION, SOLUTION INTRAVENOUS at 09:12

## 2024-12-12 RX ADMIN — DEXTROSE 250 ML: 10 SOLUTION INTRAVENOUS at 05:12

## 2024-12-12 RX ADMIN — DEXTROSE: 10 SOLUTION INTRAVENOUS at 12:12

## 2024-12-12 RX ADMIN — METOPROLOL TARTRATE 100 MG: 100 TABLET, FILM COATED ORAL at 09:12

## 2024-12-12 RX ADMIN — DEXTROSE AND SODIUM CHLORIDE: 5; 900 INJECTION, SOLUTION INTRAVENOUS at 03:12

## 2024-12-12 RX ADMIN — FAMOTIDINE 20 MG: 10 INJECTION, SOLUTION INTRAVENOUS at 03:12

## 2024-12-12 NOTE — Clinical Note
Diagnosis: Altered mental status, unspecified altered mental status type [[id number]]   Future Attending Provider: MIN ESPOSITO [[id number]]   Reason for IP Medical Treatment  (Clinical interventions that can only be accomplished in the IP setting? ) :: Altered mental status, hypoglycemic episodes   Plans for Post-Acute care--if anticipated (pick the single best option):: A. No post acute care anticipated at this time   Special Needs:: No Special Needs [1]

## 2024-12-12 NOTE — ED PROVIDER NOTES
Encounter Date: 12/12/2024       History     Chief Complaint   Patient presents with    Altered Mental Status     Per EMS, pt was found unresponsive by family at home and had a CBG of 48. Police gave pt 8mg narcan IN before EMS arrival. Gave pt 250 ml bag of D10 and had CBG of 116 on second check. Pt is increasingly responsive and opening eyes upon arrival. Pt normally on 3 L NC at home , but was placed on 4L during transport.     44-year-old female history of end-stage renal disease on dialysis, diabetes on insulin, CVA in the past, hypertension presents to the emergency department with altered mental status, last known well was sometime last night before going to bed, no in knows what time she went to bed.  Family found her this morning unresponsive.  EMS arrived, CBG was 45, 8 mg of intranasal Narcan administered, 250 mL of D10 administered as well by EMS personnel with response.  Here in the emergency department patient does open her eyes to verbal stimuli, does not follow commands.  Not talking.  On chronic opiates as well as benzos    Family reports she normally talks, walks with a walker, does Monday Wednesday Friday dialysis, did go to dialysis yesterday    See previous note from transfer:    44-year-old female with history of hypertension, hyperlipidemia, DM, ESRD, CHF,  COPD on 3L, CVA, peripheral vascular disease, chronic lymphedema, hypothyroidism, gout, and gastroesophageal reflux disease whe presents for evaluation of midsternal chest pain described stabbing.  Pt also reporting pain  at the top of her abdomen, epigastric area.  Sx acute in onset a hours prior to arrival.  Patient also reports pain at the top of her stomach, Pepcid taken without relief.  All other systems reviewed and notable nausea, otherwise unremarkable.  Patient did go dialysis today, had 5-1/2 L removed.     After further chart review it was noted that patient was just discharged from outside facility after extensive workup for GI  "bleed, anemia, jaundice, and hyperbilirubinemia.  Hemoglobin was 6.4 from baseline of10.  She was transferred to [facility] from [facility] for further evaluation. See Hospital course from prior discharge summary below:     "She was put on ceftriaxone. Hepatology and [facility] were consulted for hyperbilirubinemia. Gastroenterology was consulted for gastrointestinal bleed. Hematology-Oncology was consulted for anemia with hyperbilirubinemia and positive Britt test. CT showed diffuse anasarca, pericardial effusion, enlarged lymph nodes vs soft tissue densities, hepatosplenomegaly. Hepatology recommended liver biopsy. Gastroenterology recommended outpatient endoscopy, after she is medically optimized, due to lack of overt bleeding. Hematology-Oncology recommended breast ultrasound. Leukocytosis persisted. Sed rate was normal. CRP was elevated. Midodrine was started for post-dialysis hypotension. She developed hepatic encephalopathy with ammonia of 111 umol/L. She was transfused another unit of packed red blood cells in preparation for liver biopsy. She required vitamin K for elevated INR. She desired to sit at the side of the bed but was unable to on her own. Physical and Occupational Therapy were consulted and recommended high intensity therapy. Midodrine was stopped as blood pressures increased to normal. Her home hypertension medications continued to be held. 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 MRCP showed no obstruction or stricture but was limited by body habitus. Hepatology recommended ursodiol. Upon review of imaging, left axilla ultrasound was recommended instead of breast ultrasound as her anasarca would make interpretation of a breast ultrasound difficult. Axilla ultrasound showed abnormal axillary adenopathy with at least 6 abnormal lymph " "nodes (BI-RADS 4). She underwent left axilla lymph node biopsy on 2024. Hematology-Oncology reported that she would not currently for inpatient chemotherapy as she had no bulky lymphadenopathy or obstruction or compression of organs due to lymphadenopathy. Additionally, her persistent hyperbilirubinemia excluded her from several chemotherapy regimens, and her heart failure and end stage renal disease exclude her from nearly all other remaining regimens. She awaited skilled nursing facility and was accepted on 11/15/2024. However, syphilis test was incidentally positive per the [place] [employer], so the nursing facility could not accept her before she started treatement. It was found that she had been found to have syphilis at another hospital previously. She was given the 1st dose of intramuscular penicillin G and was accepted to the nursing facility where she will get the 2nd and 3rd doses."               Review of patient's allergies indicates:   Allergen Reactions    Tramadol Itching and Nausea And Vomiting     Past Medical History:   Diagnosis Date    Anemia     Anemia     Breast mass     Chronic constipation     CKD (chronic kidney disease)     Diabetes mellitus     Dialysis patient     MON, TUE, THURS, SAT.    Embolic stroke involving right middle cerebral artery 2024    Encounter for blood transfusion     Erosive gastritis 2016    GERD (gastroesophageal reflux disease)     Gout     High cholesterol     Hypertension     Knee pain     Thyroid disease     Unspecified cataract 2022     Past Surgical History:   Procedure Laterality Date    AV FISTULA PLACEMENT Left 2019    Procedure: creation av fistula;  Surgeon: Buck Fernandez MD;  [facility];  Service: Cardiovascular;  Laterality: Left;  Left Radialcephalic: PACU     SECTION  , , ,     CHOLECYSTECTOMY      COLONOSCOPY N/A 10/23/2017    Procedure: COLONOSCOPY;  Surgeon: Bri Harry MD; "  Location: [facility];  Service: Endoscopy;  Laterality: N/A;    EXCISIONAL BIOPSY Left 12/30/2022    Procedure: EXCISIONAL BIOPSY;  Surgeon: Mick Juarez MD;  Location: [facility];  Service: General;  Laterality: Left;    TUBAL LIGATION      UPPER GASTROINTESTINAL ENDOSCOPY  03/31/2016    Erosive Gastritis-Dr Bailey     Family History   Problem Relation Name Age of Onset    Thyroid disease Mother      Diabetes Father      Ovarian cancer Maternal Aunt Malathi     Breast cancer Maternal Aunt Ada      Social History     Tobacco Use    Smoking status: Never     Passive exposure: Never    Smokeless tobacco: Never   Substance Use Topics    Alcohol use: No     Alcohol/week: 0.0 standard drinks of alcohol    Drug use: No     Review of Systems   Unable to perform ROS: Mental status change   All other systems reviewed and are negative.      Physical Exam     Initial Vitals   BP Pulse Resp Temp SpO2   12/12/24 0921 12/12/24 0921 12/12/24 0921 12/12/24 0936 12/12/24 0921   (!) 204/111 73 18 (!) 93.3 °F (34.1 °C) 100 %      MAP       --                Physical Exam    Nursing note and vitals reviewed.  Constitutional: She appears well-developed and well-nourished. She is not diaphoretic. No distress.   HENT:   Head: Normocephalic and atraumatic. Mouth/Throat: Oropharynx is clear and moist.   Eyes: EOM are normal. Pupils are equal, round, and reactive to light.   Icteric sclera   Neck: Neck supple. No tracheal deviation present. No JVD present.   Normal range of motion.  Cardiovascular:  Normal rate, regular rhythm and normal heart sounds.           Pulmonary/Chest: Breath sounds normal. No stridor. No respiratory distress.   Abdominal: Abdomen is soft. Bowel sounds are normal.   Musculoskeletal:         General: Edema present.      Cervical back: Normal range of motion and neck supple.     Neurological: She is alert.   On arrival patient does open eyes to verbal stimuli, not following commands   Skin: Skin is warm. Capillary  refill takes less than 2 seconds.         ED Course   Critical Care    Date/Time: 12/12/2024 12:01 PM    Performed by: Bao Barroso MD  Authorized by: Estiven Duvall,   Direct patient critical care time: 20 minutes  Additional history critical care time: 15 minutes  Ordering / reviewing critical care time: 10 minutes  Documentation critical care time: 15 minutes  Consulting other physicians critical care time: 10 minutes  Consult with family critical care time: 10 minutes  Total critical care time (exclusive of procedural time) : 80 minutes  Critical care was necessary to treat or prevent imminent or life-threatening deterioration of the following conditions: Altered mental status, CVA workup with CT head and CTA of head and neck, discussion with vascular Neurology.  Critical care was time spent personally by me on the following activities: review of old charts, re-evaluation of patient's condition, pulse oximetry, ordering and review of radiographic studies, ordering and review of laboratory studies, ordering and performing treatments and interventions, obtaining history from patient or surrogate, examination of patient, evaluation of patient's response to treatment, discussions with consultants and development of treatment plan with patient or surrogate.        Labs Reviewed   CBC W/ AUTO DIFFERENTIAL - Abnormal       Result Value    WBC 19.04 (*)     RBC 2.91 (*)     Hemoglobin 8.9 (*)     Hematocrit 28.3 (*)     MCV 97      MCH 30.6      MCHC 31.4 (*)     RDW 19.9 (*)     Platelets 254      MPV 10.6      Immature Granulocytes 0.6 (*)     Gran # (ANC) 14.6 (*)     Immature Grans (Abs) 0.12 (*)     Lymph # 1.9      Mono # 2.0 (*)     Eos # 0.3      Baso # 0.13      nRBC 0      Gran % 76.7 (*)     Lymph % 9.9 (*)     Mono % 10.7      Eosinophil % 1.4      Basophil % 0.7      Differential Method Automated     COMPREHENSIVE METABOLIC PANEL - Abnormal    Sodium 134 (*)     Potassium 4.4      Chloride 102       CO2 29      Glucose 175 (*)     BUN 35 (*)     Creatinine 3.5 (*)     Calcium 8.7      Total Protein 8.1      Albumin 1.8 (*)     Total Bilirubin 5.4 (*)     Alkaline Phosphatase 508 (*)      (*)     ALT 64 (*)     eGFR 15.7 (*)     Anion Gap 3     DRUG SCREEN PANEL, URINE EMERGENCY - Abnormal    Benzodiazepines Negative      Methadone metabolites Negative      Cocaine (Metab.) Negative      Opiate Scrn, Ur Negative      Barbiturate Screen, Ur Negative      Amphetamine Screen, Ur Negative      THC Negative      Phencyclidine Negative      Creatinine, Urine <13.0 (*)     Toxicology Information SEE COMMENT      Narrative:     Preferred Collection Type->Urine, Clean Catch  Specimen Source->Urine   POCT GLUCOSE - Abnormal    POCT Glucose 64 (*)    MAGNESIUM    Magnesium 1.7     AMMONIA    Ammonia 22     POCT GLUCOSE    POCT Glucose 73     POCT GLUCOSE MONITORING CONTINUOUS          Imaging Results              CT Head Without Contrast (Final result)  Result time 12/12/24 10:27:14      Final result by Olegario Quiñones MD (12/12/24 10:27:14)                   Impression:      No acute intracranial process detected.      Electronically signed by: Olegario Quiñones MD  Date:    12/12/2024  Time:    10:27               Narrative:    EXAMINATION:  CT HEAD WITHOUT CONTRAST    CLINICAL HISTORY:  Altered level of consciousness mental status change, unknown cause;    TECHNIQUE:  Axial CT images were obtained from the skull base to the vertex without contrast. Iterative reconstruction technique was used.  CT/Cardiac nuclear examinations in the past 12 months: 4    COMPARISON:  MRI brain 07/16/2024 and CT head 07/13/2024    FINDINGS:  No ventricular or basal cistern effacement.  No evidence of acute intracranial hemorrhage, midline shift, mass, or mass effect.  No detected extra-axial fluid collections.  Punctate bilateral basal ganglia calcifications.  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  Trace mucosal  thickening right maxillary sinus.  Mastoid air cells are clear.  Calvarium is intact.                                       CTA Head and Neck (xpd) (Final result)  Result time 12/12/24 10:37:17   Procedure changed from CTA Brain     Final result by Olegario Quiñones MD (12/12/24 10:37:17)                   Impression:      1. Motion artifact through portions of the neck with no significant carotid artery stenosis identified.  Mild carotid atherosclerotic calcifications are present.  2. No detected evidence for intracranial aneurysm, stenosis, or vaso occlusive disease within the anterior or posterior circulation.  3. Diffuse ground-glass densities within the imaged upper lungs with fluid in the right major fissure.  Pattern is suspect for pulmonary edema.  4. Diffuse anasarca.      Electronically signed by: Olegario Quiñones MD  Date:    12/12/2024  Time:    10:37               Narrative:    EXAMINATION:  CTA HEAD AND NECK (XPD)    CLINICAL HISTORY:  Altered level of consciousness stroke, follow up;    TECHNIQUE:  Helical CT images obtained from the skull vertex to the thoracic inlet after intravenous administration of contrast according to CTA protocol.  Maximum intensity projection images were evaluated in multiple planes along with the source images. Iterative reconstruction technique was used. NASCET criteria utilized for stenoses measurements.  CT/Cardiac nuclear examinations in the past 12 months: 5    COMPARISON:  CTA head 07/13/2024    FINDINGS:  Vasculature: Visualized portions of the aortic arch are widely patent.  The right subclavian artery is obscured by contrast bolus.  The brachiocephalic artery and left subclavian artery are widely patent.  The origin of the right vertebral artery is obscured by artifact related to contrast bolus.  Visualized portions of bilateral vertebral arteries appear patent.  Streak artifact through the proximal aspect of the right common carotid artery.  Allowing for this, bilateral  common carotid arteries appear widely patent.  Motion artifact through the right carotid bulb.  The right external carotid artery and its branches appear widely patent.  No definite focal stenosis of the cervical right internal carotid artery.  Mild atherosclerotic calcification at the left carotid bulb.  The left external carotid artery and its branches are widely patent.  Cervical portions of the left internal carotid artery are obscured partially by motion artifact with no definite focal stenosis identified.    Petrous and cavernous portions of bilateral internal carotid arteries are patent.  Mild circumferential atherosclerotic calcification of bilateral carotid siphons without a detected significant focal stenosis.  Bilateral middle and anterior cerebral arteries are patent.  No detected evidence for aneurysm, stenosis, or occlusion within the anterior or posterior circulation.    Bilateral vertebral arteries supply a widely patent basilar artery.  Intact bilateral superior cerebellar and posterior cerebral arteries are seen arising from the distal basilar artery.  Posterior communicating arteries are not seen which may be related to congenital absence or hypoplasia.    Remaining structures:    Neck: Motion artifact through the imaged upper lungs which demonstrate diffuse ground-glass attenuation and trace fluid in the right major fissure.  Clustered subcentimeter calcifications in the right upper lobe.  Subcentimeter calcified nodule within the right thyroid lobe.  Bilateral submandibular and parotid glands are symmetric and unremarkable.  Mucosal surfaces of the nasopharynx and oropharynx are relatively symmetric.  Epiglottis is normal in appearance.  No detected cervical mass or evidence of cervical adenopathy.  There is diffuse anasarca.    Head: No midline shift or mass effect detected.  No enhancing intracranial mass is identified.                                       Medications   dextrose 10 % infusion  (has no administration in time range)   dextrose 50 % in water (D50W) injection 25 g (25 g Intravenous Given 12/12/24 0933)   naloxone 0.4 mg/mL injection 1.2 mg (1.2 mg Intravenous Given 12/12/24 0934)   iohexoL (OMNIPAQUE 350) injection 100 mL (100 mLs Intravenous Given 12/12/24 0956)     Medical Decision Making             ED Course as of 12/12/24 1202   u Dec 12, 2024   1002 Patient with chronically elevated white blood cell count [SD]   1152 Discussed case with vascular Neurology, no need for transfer, no LV 0, does not believe this is a vascular issue.  Would treat the hypoglycemia with D10 which has previously been ordered.  Admit here with neuro checks [SD]      ED Course User Index  [SD] Bao Barroso MD               Medical Decision Making:   Differential Diagnosis:   Hypoglycemic episode, opiate abuse, benzo abuse, CVA, ICH             Clinical Impression:  Final diagnoses:  [R41.82] Altered mental status, unspecified altered mental status type (Primary)  [E80.6] Hyperbilirubinemia  [D72.829] Leukocytosis, unspecified type  [E16.2] Hypoglycemia          ED Disposition Condition    Admit Stable                Bao Barroso MD  12/12/24 1200

## 2024-12-13 LAB
ALBUMIN SERPL BCP-MCNC: 1.9 G/DL (ref 3.5–5.2)
ALP SERPL-CCNC: 485 U/L (ref 55–135)
ALT SERPL W/O P-5'-P-CCNC: 61 U/L (ref 10–44)
ANION GAP SERPL CALC-SCNC: 11 MMOL/L (ref 3–11)
AST SERPL-CCNC: 97 U/L (ref 10–40)
BASOPHILS # BLD AUTO: ABNORMAL K/UL (ref 0–0.2)
BASOPHILS NFR BLD: 0 % (ref 0–1.9)
BILIRUB SERPL-MCNC: 5.6 MG/DL (ref 0.1–1)
BUN SERPL-MCNC: 43 MG/DL (ref 6–20)
CALCIUM SERPL-MCNC: 8.8 MG/DL (ref 8.7–10.5)
CHLORIDE SERPL-SCNC: 95 MMOL/L (ref 95–110)
CO2 SERPL-SCNC: 25 MMOL/L (ref 23–29)
CREAT SERPL-MCNC: 3.9 MG/DL (ref 0.5–1.4)
DIFFERENTIAL METHOD BLD: ABNORMAL
EOSINOPHIL # BLD AUTO: ABNORMAL K/UL (ref 0–0.5)
EOSINOPHIL NFR BLD: 2 % (ref 0–8)
ERYTHROCYTE [DISTWIDTH] IN BLOOD BY AUTOMATED COUNT: 18.8 % (ref 11.5–14.5)
EST. GFR  (NO RACE VARIABLE): 13.8 ML/MIN/1.73 M^2
GLUCOSE SERPL-MCNC: 79 MG/DL (ref 70–110)
HCT VFR BLD AUTO: 27 % (ref 37–48.5)
HGB BLD-MCNC: 8.7 G/DL (ref 12–16)
IMM GRANULOCYTES # BLD AUTO: ABNORMAL K/UL (ref 0–0.04)
IMM GRANULOCYTES NFR BLD AUTO: ABNORMAL % (ref 0–0.5)
LACTATE SERPL-SCNC: 2.1 MMOL/L (ref 0.5–2.2)
LYMPHOCYTES # BLD AUTO: ABNORMAL K/UL (ref 1–4.8)
LYMPHOCYTES NFR BLD: 5 % (ref 18–48)
MAGNESIUM SERPL-MCNC: 1.6 MG/DL (ref 1.6–2.6)
MCH RBC QN AUTO: 30.3 PG (ref 27–31)
MCHC RBC AUTO-ENTMCNC: 32.2 G/DL (ref 32–36)
MCV RBC AUTO: 94 FL (ref 82–98)
MONOCYTES # BLD AUTO: ABNORMAL K/UL (ref 0.3–1)
MONOCYTES NFR BLD: 7 % (ref 4–15)
NEUTROPHILS NFR BLD: 82 % (ref 38–73)
NEUTS BAND NFR BLD MANUAL: 4 %
NRBC BLD-RTO: 0 /100 WBC
PLATELET # BLD AUTO: 298 K/UL (ref 150–450)
PLATELET BLD QL SMEAR: ABNORMAL
PMV BLD AUTO: 11.2 FL (ref 9.2–12.9)
POCT GLUCOSE: 107 MG/DL (ref 70–110)
POCT GLUCOSE: 110 MG/DL (ref 70–110)
POCT GLUCOSE: 111 MG/DL (ref 70–110)
POCT GLUCOSE: 130 MG/DL (ref 70–110)
POCT GLUCOSE: 151 MG/DL (ref 70–110)
POCT GLUCOSE: 49 MG/DL (ref 70–110)
POCT GLUCOSE: 50 MG/DL (ref 70–110)
POCT GLUCOSE: 84 MG/DL (ref 70–110)
POTASSIUM SERPL-SCNC: 5.3 MMOL/L (ref 3.5–5.1)
PROT SERPL-MCNC: 8.4 G/DL (ref 6–8.4)
RBC # BLD AUTO: 2.87 M/UL (ref 4–5.4)
SODIUM SERPL-SCNC: 131 MMOL/L (ref 136–145)
WBC # BLD AUTO: 28.53 K/UL (ref 3.9–12.7)

## 2024-12-13 PROCEDURE — 97166 OT EVAL MOD COMPLEX 45 MIN: CPT

## 2024-12-13 PROCEDURE — 25000003 PHARM REV CODE 250: Performed by: INTERNAL MEDICINE

## 2024-12-13 PROCEDURE — 85027 COMPLETE CBC AUTOMATED: CPT

## 2024-12-13 PROCEDURE — 85007 BL SMEAR W/DIFF WBC COUNT: CPT | Mod: NCS

## 2024-12-13 PROCEDURE — G0257 UNSCHED DIALYSIS ESRD PT HOS: HCPCS

## 2024-12-13 PROCEDURE — 80053 COMPREHEN METABOLIC PANEL: CPT

## 2024-12-13 PROCEDURE — 25000003 PHARM REV CODE 250

## 2024-12-13 PROCEDURE — 99900035 HC TECH TIME PER 15 MIN (STAT)

## 2024-12-13 PROCEDURE — 27000221 HC OXYGEN, UP TO 24 HOURS

## 2024-12-13 PROCEDURE — 99900031 HC PATIENT EDUCATION (STAT)

## 2024-12-13 PROCEDURE — 97162 PT EVAL MOD COMPLEX 30 MIN: CPT

## 2024-12-13 PROCEDURE — 83735 ASSAY OF MAGNESIUM: CPT

## 2024-12-13 PROCEDURE — 87040 BLOOD CULTURE FOR BACTERIA: CPT | Performed by: INTERNAL MEDICINE

## 2024-12-13 PROCEDURE — 5A1D70Z PERFORMANCE OF URINARY FILTRATION, INTERMITTENT, LESS THAN 6 HOURS PER DAY: ICD-10-PCS | Performed by: INTERNAL MEDICINE

## 2024-12-13 PROCEDURE — 87150 DNA/RNA AMPLIFIED PROBE: CPT | Mod: 59 | Performed by: INTERNAL MEDICINE

## 2024-12-13 PROCEDURE — 97530 THERAPEUTIC ACTIVITIES: CPT

## 2024-12-13 PROCEDURE — 94761 N-INVAS EAR/PLS OXIMETRY MLT: CPT

## 2024-12-13 PROCEDURE — 25000003 PHARM REV CODE 250: Performed by: EMERGENCY MEDICINE

## 2024-12-13 PROCEDURE — 83605 ASSAY OF LACTIC ACID: CPT | Performed by: INTERNAL MEDICINE

## 2024-12-13 PROCEDURE — G0378 HOSPITAL OBSERVATION PER HR: HCPCS

## 2024-12-13 PROCEDURE — 36415 COLL VENOUS BLD VENIPUNCTURE: CPT

## 2024-12-13 RX ORDER — URSODIOL 300 MG/1
300 CAPSULE ORAL 3 TIMES DAILY
Status: DISCONTINUED | OUTPATIENT
Start: 2024-12-13 | End: 2024-12-18 | Stop reason: HOSPADM

## 2024-12-13 RX ORDER — GUAIFENESIN AND DEXTROMETHORPHAN HYDROBROMIDE 10; 100 MG/5ML; MG/5ML
5 SYRUP ORAL EVERY 6 HOURS PRN
Status: DISCONTINUED | OUTPATIENT
Start: 2024-12-13 | End: 2024-12-18 | Stop reason: HOSPADM

## 2024-12-13 RX ORDER — CLOPIDOGREL BISULFATE 75 MG/1
75 TABLET ORAL DAILY
Status: DISCONTINUED | OUTPATIENT
Start: 2024-12-13 | End: 2024-12-13

## 2024-12-13 RX ORDER — LOSARTAN POTASSIUM 50 MG/1
100 TABLET ORAL DAILY
Status: DISCONTINUED | OUTPATIENT
Start: 2024-12-13 | End: 2024-12-18 | Stop reason: HOSPADM

## 2024-12-13 RX ORDER — ASPIRIN 81 MG/1
81 TABLET ORAL DAILY
Status: DISCONTINUED | OUTPATIENT
Start: 2024-12-13 | End: 2024-12-18 | Stop reason: HOSPADM

## 2024-12-13 RX ORDER — MEDROXYPROGESTERONE ACETATE 10 MG/1
20 TABLET ORAL DAILY
Status: DISCONTINUED | OUTPATIENT
Start: 2024-12-13 | End: 2024-12-18 | Stop reason: HOSPADM

## 2024-12-13 RX ORDER — HYDROXYZINE HYDROCHLORIDE 25 MG/1
25 TABLET, FILM COATED ORAL 2 TIMES DAILY PRN
Status: DISCONTINUED | OUTPATIENT
Start: 2024-12-13 | End: 2024-12-18 | Stop reason: HOSPADM

## 2024-12-13 RX ORDER — SODIUM CHLORIDE 9 MG/ML
INJECTION, SOLUTION INTRAVENOUS ONCE
Status: CANCELLED | OUTPATIENT
Start: 2024-12-13 | End: 2024-12-13

## 2024-12-13 RX ORDER — ACETAMINOPHEN 325 MG/1
650 TABLET ORAL 2 TIMES DAILY PRN
Status: DISCONTINUED | OUTPATIENT
Start: 2024-12-14 | End: 2024-12-17

## 2024-12-13 RX ADMIN — LOSARTAN POTASSIUM 100 MG: 50 TABLET, FILM COATED ORAL at 09:12

## 2024-12-13 RX ADMIN — HYDRALAZINE HYDROCHLORIDE 50 MG: 50 TABLET ORAL at 09:12

## 2024-12-13 RX ADMIN — LACTULOSE 20 G: 20 SOLUTION ORAL at 09:12

## 2024-12-13 RX ADMIN — METOPROLOL TARTRATE 100 MG: 100 TABLET, FILM COATED ORAL at 09:12

## 2024-12-13 RX ADMIN — DEXTROSE MONOHYDRATE: 100 INJECTION, SOLUTION INTRAVENOUS at 04:12

## 2024-12-13 RX ADMIN — METOPROLOL TARTRATE 100 MG: 100 TABLET, FILM COATED ORAL at 08:12

## 2024-12-13 RX ADMIN — ASPIRIN 81 MG: 81 TABLET, COATED ORAL at 09:12

## 2024-12-13 RX ADMIN — URSODIOL 300 MG: 300 CAPSULE ORAL at 09:12

## 2024-12-13 RX ADMIN — HYDRALAZINE HYDROCHLORIDE 50 MG: 50 TABLET ORAL at 08:12

## 2024-12-13 RX ADMIN — FAMOTIDINE 20 MG: 10 INJECTION, SOLUTION INTRAVENOUS at 09:12

## 2024-12-13 RX ADMIN — MEDROXYPROGESTERONE ACETATE 20 MG: 10 TABLET ORAL at 10:12

## 2024-12-13 NOTE — SUBJECTIVE & OBJECTIVE
Past Medical History:   Diagnosis Date    Anemia     Anemia     Breast mass     Chronic constipation     CKD (chronic kidney disease)     Diabetes mellitus     Dialysis patient     GRZEGORZ BARRIOS, THURS, SAT.    Embolic stroke involving right middle cerebral artery 2024    Encounter for blood transfusion     Erosive gastritis 2016    GERD (gastroesophageal reflux disease)     Gout     High cholesterol     Hypertension     Knee pain     Thyroid disease     Unspecified cataract 2022       Past Surgical History:   Procedure Laterality Date    AV FISTULA PLACEMENT Left 2019    Procedure: creation av fistula;  Surgeon: Buck Fernandez MD;  Location: [facility];  Service: Cardiovascular;  Laterality: Left;  Left Radialcephalic: PACU     SECTION  , , ,     CHOLECYSTECTOMY      COLONOSCOPY N/A 10/23/2017    Procedure: COLONOSCOPY;  Surgeon: Bri Harry MD;  Location: [facility];  Service: Endoscopy;  Laterality: N/A;    EXCISIONAL BIOPSY Left 2022    Procedure: EXCISIONAL BIOPSY;  Surgeon: Mick Juarez MD;  Location: [facility];  Service: General;  Laterality: Left;    TUBAL LIGATION      UPPER GASTROINTESTINAL ENDOSCOPY  2016    Erosive Gastritis-Dr Bailey       Review of patient's allergies indicates:   Allergen Reactions    Tramadol Itching and Nausea And Vomiting       No current facility-administered medications on file prior to encounter.     Current Outpatient Medications on File Prior to Encounter   Medication Sig    aspirin (ECOTRIN) 81 MG EC tablet Take 1 tablet (81 mg total) by mouth once daily.    atorvastatin (LIPITOR) 40 MG tablet Take 1 tablet by mouth once daily    clopidogreL (PLAVIX) 75 mg tablet Take 1 tablet (75 mg total) by mouth once daily.    colchicine (COLCRYS) 0.6 mg tablet TAKE 1 TABLET BY MOUTH ONCE DAILY AS NEEDED FOR GOUT PAIN    famotidine (PEPCID) 20 MG tablet Take 0.5 tablets (10 mg total) by mouth daily as needed for Heartburn.     hydrOXYzine HCL (ATARAX) 25 MG tablet Take 1 tablet (25 mg total) by mouth 2 (two) times daily as needed for Itching or Anxiety.    insulin aspart U-100 (NOVOLOG FLEXPEN U-100 INSULIN) 100 unit/mL (3 mL) InPn pen Inject 10 Units into the skin 2 (two) times daily with meals.    lactulose (CHRONULAC) 10 gram/15 mL solution Take 30 mLs (20 g total) by mouth 2 (two) times daily.    linaGLIPtin (TRADJENTA) 5 mg Tab tablet TAKE 1 TABLET (5 MG TOTAL) BY MOUTH ONCE DAILY.    losartan (COZAAR) 100 MG tablet Take 1 tablet (100 mg total) by mouth once daily.    medroxyPROGESTERone (PROVERA) 10 MG tablet Take 2 tablets (20 mg total) by mouth 3 (three) times daily for 10 days, THEN 2 tablets (20 mg total) once daily.    miconazole (MICOTIN) 2 % cream Apply topically 2 (two) times daily. Between thighs, under abdomen pannus, under breasts    nebivoloL (BYSTOLIC) 20 mg Tab Take 1 tablet by mouth once daily.    NIFEdipine (PROCARDIA-XL) 60 MG (OSM) 24 hr tablet Take 1 tablet (60 mg total) by mouth once daily.    ondansetron (ZOFRAN-ODT) 4 MG TbDL Take 1 tablet (4 mg total) by mouth every 8 (eight) hours as needed ([place]).    oxyCODONE (ROXICODONE) 5 MG immediate release tablet Take 1 tablet (5 mg total) by mouth every 12 (twelve) hours as needed for Pain.    pantoprazole (PROTONIX) 40 MG tablet Take 1 tablet (40 mg total) by mouth once daily.    traZODone (DESYREL) 100 MG tablet Take 100 mg by mouth nightly as needed.    ursodioL (ACTIGALL) 300 mg capsule Take 1 capsule (300 mg total) by mouth 3 (three) times daily.     Family History       Problem Relation (Age of Onset)    Breast cancer Maternal Aunt    Diabetes Father    Ovarian cancer Maternal Aunt    Thyroid disease Mother          Tobacco Use    Smoking status: Never     Passive exposure: Never    Smokeless tobacco: Never   Substance and Sexual Activity    Alcohol use: No     Alcohol/week: 0.0 standard drinks of alcohol    Drug use: No    Sexual activity: Yes     Partners:  Male     Birth control/protection: None, Surgical     Review of Systems   Constitutional:  Negative for activity change, appetite change, chills, diaphoresis, fatigue and fever.   HENT:  Negative for congestion, ear pain, postnasal drip, rhinorrhea, sinus pressure, sinus pain, sore throat and trouble swallowing.    Eyes:  Negative for photophobia, pain and visual disturbance.   Respiratory:  Negative for cough, chest tightness, shortness of breath and wheezing.    Cardiovascular:  Positive for leg swelling. Negative for chest pain and palpitations.   Gastrointestinal:  Negative for abdominal distention, abdominal pain, blood in stool, constipation, diarrhea, nausea and vomiting.   Endocrine: Negative for polydipsia, polyphagia and polyuria.   Genitourinary:  Negative for decreased urine volume, difficulty urinating, dysuria, flank pain, frequency, hematuria and urgency.   Musculoskeletal:  Negative for arthralgias and myalgias.   Skin:  Positive for wound. Negative for color change and rash.        Positive for itching   Allergic/Immunologic: Negative.    Neurological:  Negative for dizziness, seizures, syncope, speech difficulty, weakness, light-headedness and headaches.   Hematological:  Bruises/bleeds easily.   Psychiatric/Behavioral:  Negative for agitation, confusion, sleep disturbance and suicidal ideas. The patient is not nervous/anxious.      Objective:     Vital Signs (Most Recent):  Temp: 99.2 °F (37.3 °C) (12/13/24 0735)  Pulse: 89 (12/13/24 0735)  Resp: 20 (12/13/24 0735)  BP: (!) 167/103 (12/13/24 0735)  SpO2: 97 % (12/13/24 0735) Vital Signs (24h Range):  Temp:  [93.3 °F (34.1 °C)-100.2 °F (37.9 °C)] 99.2 °F (37.3 °C)  Pulse:  [] 89  Resp:  [12-20] 20  SpO2:  [96 %-100 %] 97 %  BP: (149-209)/() 167/103     Weight: 89.8 kg (198 lb)  Body mass index is 33.99 kg/m².     Physical Exam  Vitals and nursing note reviewed.   Constitutional:       General: She is not in acute distress.      Appearance: Normal appearance. She is obese. She is not ill-appearing.   HENT:      Head: Normocephalic and atraumatic.      Nose: Nose normal. No congestion or rhinorrhea.      Mouth/Throat:      Mouth: Mucous membranes are moist.      Pharynx: Oropharynx is clear. No oropharyngeal exudate or posterior oropharyngeal erythema.   Eyes:      General: No scleral icterus.     Extraocular Movements: Extraocular movements intact.      Conjunctiva/sclera: Conjunctivae normal.      Pupils: Pupils are equal, round, and reactive to light.   Cardiovascular:      Rate and Rhythm: Normal rate and regular rhythm.      Pulses: Normal pulses.      Heart sounds: Normal heart sounds. No murmur heard.  Pulmonary:      Effort: Pulmonary effort is normal. No respiratory distress.      Breath sounds: Normal breath sounds. No wheezing, rhonchi or rales.   Abdominal:      General: Bowel sounds are normal. There is no distension.      Palpations: Abdomen is soft.      Tenderness: There is no abdominal tenderness. There is no guarding or rebound.   Musculoskeletal:         General: Normal range of motion.      Cervical back: Normal range of motion and neck supple. No tenderness.      Right lower leg: 3+ Edema present.      Left lower le+ Edema present.   Lymphadenopathy:      Cervical: No cervical adenopathy.   Skin:     General: Skin is warm and dry.          Neurological:      General: No focal deficit present.      Mental Status: She is alert and oriented to person, place, and time.      Cranial Nerves: No cranial nerve deficit.      Motor: No weakness.   Psychiatric:         Mood and Affect: Mood normal.         Behavior: Behavior normal.         Thought Content: Thought content normal.         Judgment: Judgment normal.              CRANIAL NERVES     CN III, IV, VI   Pupils are equal, round, and reactive to light.       Significant Labs: All pertinent labs within the past 24 hours have been reviewed.  Recent Lab Results  (Last 5  results in the past 24 hours)        12/13/24  0533   12/13/24  0412   12/13/24  0409   12/12/24  2359   12/12/24  2143        Albumin 1.9                              ALT 61               Anion Gap 11               AST 97               Bands 4.0               Baso # CANCELED  Comment: Result canceled by the ancillary.               Basophil % 0.0               BILIRUBIN TOTAL 5.6  Comment: For infants and newborns, interpretation of results should be based  on gestational age, weight and in agreement with clinical  observations.    Premature Infant recommended reference ranges:  Up to 24 hours.............<8.0 mg/dL  Up to 48 hours............<12.0 mg/dL  3-5 days..................<15.0 mg/dL  6-29 days.................<15.0 mg/dL    For patients on Eltrombopag therapy, use of Dimension [place] TBIL is   not   recommended.                 BUN 43               Calcium 8.8               Chloride 95               CO2 25               Creatinine 3.9               Differential Method Manual  Comment: CORRECTED RESULT; previously reported as Automated on 12/13/2024 at   06:56.    [C]               eGFR 13.8               Eos # CANCELED  Comment: Result canceled by the ancillary.               Eos % 2.0               Glucose 79               Gran % 82.0               Hematocrit 27.0               Hemoglobin 8.7               Immature Grans (Abs) CANCELED  Comment: Mild elevation in immature granulocytes is non specific and   can be seen in a variety of conditions including stress response,   acute inflammation, trauma and pregnancy. Correlation with other   laboratory and clinical findings is essential.    Result canceled by the ancillary.                 Immature Granulocytes CANCELED  Comment: Result canceled by the ancillary.               Lymph # CANCELED  Comment: Result canceled by the ancillary.               Lymph % 5.0               MCH 30.3               MCHC 32.2               MCV 94               Mono #  CANCELED  Comment: Result canceled by the ancillary.               Mono % 7.0               MPV 11.2               nRBC 0               Platelet Estimate Appears normal               Platelet Count 298               POCT Glucose   50   49   110   60       Potassium 5.3               PROTEIN TOTAL 8.4               RBC 2.87               RDW 18.8               Sodium 131               WBC 28.53                                       [C] - Corrected Result               Significant Imaging: I have reviewed all pertinent imaging results/findings within the past 24 hours.

## 2024-12-13 NOTE — ASSESSMENT & PLAN NOTE
Patient currently without evidence of volume overload.  No acute exacerbation noted.  Continue home medications and volume removal with HD.  Monitor volume status closely.

## 2024-12-13 NOTE — PLAN OF CARE
12/13/24 1511   CORRALES Message   Medicare Outpatient and Observation Notification regarding financial responsibility Given to patient/caregiver;Explained to patient/caregiver;Signed/date by patient/caregiver   Date CORRALES was signed 12/13/24   Time CORRALES was signed 1500

## 2024-12-13 NOTE — HPI
ED HPI:    Chief Complaint   Patient presents with    Altered Mental Status       Per EMS, pt was found unresponsive by family at home and had a CBG of 48. Police gave pt 8mg narcan IN before EMS arrival. Gave pt 250 ml bag of D10 and had CBG of 116 on second check. Pt is increasingly responsive and opening eyes upon arrival. Pt normally on 3 L NC at home , but was placed on 4L during transport.      44-year-old female history of end-stage renal disease on dialysis, diabetes on insulin, CVA in the past, hypertension presents to the emergency department with altered mental status, last known well was sometime last night before going to bed, no in knows what time she went to bed.  Family found her this morning unresponsive.  EMS arrived, CBG was 45, 8 mg of intranasal Narcan administered, 250 mL of D10 administered as well by EMS personnel with response.  Here in the emergency department patient does open her eyes to verbal stimuli, does not follow commands.  Not talking.  On chronic opiates as well as benzos     Family reports she normally talks, walks with a walker, does Monday Wednesday Friday dialysis, did go to dialysis yesterday     See previous note from transfer:     44-year-old female with history of hypertension, hyperlipidemia, DM, ESRD, CHF,  COPD on 3L, CVA, peripheral vascular disease, chronic lymphedema, hypothyroidism, gout, and gastroesophageal reflux disease whe presents for evaluation of midsternal chest pain described stabbing.  Pt also reporting pain  at the top of her abdomen, epigastric area.  Sx acute in onset a hours prior to arrival.  Patient also reports pain at the top of her stomach, Pepcid taken without relief.  All other systems reviewed and notable nausea, otherwise unremarkable.  Patient did go dialysis today, had 5-1/2 L removed.     After further chart review it was noted that patient was just discharged from outside facility after extensive workup for GI bleed, anemia, jaundice, and  "hyperbilirubinemia.  Hemoglobin was 6.4 from baseline of10.  She was transferred to [facility] from [facility] for further evaluation. See Hospital course from prior discharge summary below:     "She was put on ceftriaxone. Hepatology and [facility] Endoscopy Service were consulted for hyperbilirubinemia. Gastroenterology was consulted for gastrointestinal bleed. Hematology-Oncology was consulted for anemia with hyperbilirubinemia and positive Britt test. CT showed diffuse anasarca, pericardial effusion, enlarged lymph nodes vs soft tissue densities, hepatosplenomegaly. Hepatology recommended liver biopsy. Gastroenterology recommended outpatient endoscopy, after she is medically optimized, due to lack of overt bleeding. Hematology-Oncology recommended breast ultrasound. Leukocytosis persisted. Sed rate was normal. CRP was elevated. Midodrine was started for post-dialysis hypotension. She developed hepatic encephalopathy with ammonia of 111 umol/L. She was transfused another unit of packed red blood cells in preparation for liver biopsy. She required vitamin K for elevated INR. She desired to sit at the side of the bed but was unable to on her own. Physical and Occupational Therapy were consulted and recommended high intensity therapy. Midodrine was stopped as blood pressures increased to normal. Her home hypertension medications continued to be held. 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 MRCP showed no obstruction or stricture but was limited by body habitus. Hepatology recommended ursodiol. Upon review of imaging, left axilla ultrasound was recommended instead of breast ultrasound as her anasarca would make interpretation of a breast ultrasound difficult. Axilla ultrasound showed abnormal axillary adenopathy with at least 6 abnormal lymph nodes (BI-RADS 4). She underwent " "left axilla lymph node biopsy on 11/11/2024. Hematology-Oncology reported that she would not currently for inpatient chemotherapy as she had no bulky lymphadenopathy or obstruction or compression of organs due to lymphadenopathy. Additionally, her persistent hyperbilirubinemia excluded her from several chemotherapy regimens, and her heart failure and end stage renal disease exclude her from nearly all other remaining regimens. She awaited skilled nursing facility and was accepted on 11/15/2024. However, syphilis test was incidentally positive per the [place] [facility], so the nursing facility could not accept her before she started treatement. It was found that she had been found to have syphilis at another hospital previously. She was given the 1st dose of intramuscular penicillin G and was accepted to the nursing facility where she will get the 2nd and 3rd doses."     IM HPI:  Patient more awake and alert this morning.  Patient states the night prior to admission she took 60 units Lantus at bedtime.  She states she woke up in the middle of the night unable to sleep and took 2 Tylenol p.m..  Patient denies use of short-acting insulin.  She states in addition to Lantus she also takes Tradjenta.  Patient denies fever, chills, CP, SOB, nausea, vomiting, diarrhea.  Patient states she has been eating well.  Blood sugars remain low this morning despite continued D10 IV at 75 mils per hour.  We will transition to regular diet as patient is more awake and alert.  Continue close monitoring of glucose levels.  Patient reports hitting her left thigh on wheelchair a few days prior to admission.  Soft tissue swelling with drainage noted.  Question hematoma versus abscess.  We will obtain lower extremity soft tissue ultrasound today.  Patient afebrile.  White count elevated.  Review of patient's chart shows white blood cell counts around 18-20 K for more than a month and has previously been understood to be reactive " given negative infectious workup and no clinical symptoms.  White count today 28.53.  Lactic acid ordered.  Patient with known history end-stage renal disease on dialysis.  Nephrology has been consulted.  We will plan to dialyze patient today per Nephrology.  We will continue current treatment plan and monitoring.

## 2024-12-13 NOTE — PLAN OF CARE
Renal  Notified of pt admit with hypoglycemia and known h/o ESRD- known tome. Pt dialyzed in outpt clinic earlier this week - plan for HD in am.  Cont D10 infusion overnight for persistent hypoglycemia noted this evening- pt alert and awake per nurse report and tolerating oral intake.

## 2024-12-13 NOTE — ASSESSMENT & PLAN NOTE
White count 28.53 today.  This appears to be above patient's baseline of 18-20 K. patient denies recent fever and is afebrile today.  Lactic acid ordered.  Ultrasound soft tissue left lower extremity ordered to rule out abscess.  Continue close monitoring with daily labs.

## 2024-12-13 NOTE — PLAN OF CARE
Problem: Infection  Goal: Absence of Infection Signs and Symptoms  Outcome: Progressing     Problem: Adult Inpatient Plan of Care  Goal: Plan of Care Review  Outcome: Progressing  Goal: Patient-Specific Goal (Individualized)  Outcome: Progressing  Goal: Absence of Hospital-Acquired Illness or Injury  Outcome: Progressing  Goal: Optimal Comfort and Wellbeing  Outcome: Progressing  Goal: Readiness for Transition of Care  Outcome: Progressing     Problem: Diabetes Comorbidity  Goal: Blood Glucose Level Within Targeted Range  Outcome: Progressing     Problem: Wound  Goal: Optimal Coping  Outcome: Progressing  Goal: Optimal Functional Ability  Outcome: Progressing  Goal: Absence of Infection Signs and Symptoms  Outcome: Progressing  Goal: Improved Oral Intake  Outcome: Progressing  Goal: Optimal Pain Control and Function  Outcome: Progressing  Goal: Skin Health and Integrity  Outcome: Progressing  Goal: Optimal Wound Healing  Outcome: Progressing     Problem: Skin Injury Risk Increased  Goal: Skin Health and Integrity  Outcome: Progressing     Problem: Hemodialysis  Goal: Safe, Effective Therapy Delivery  Outcome: Progressing  Goal: Effective Tissue Perfusion  Outcome: Progressing  Goal: Absence of Infection Signs and Symptoms  Outcome: Progressing     Problem: Functional Deficit  Goal: Improved Balance and Postural Control  Outcome: Progressing  Goal: Optimal Coordination  Outcome: Progressing  Goal: Improved Muscle Strength  Outcome: Progressing  Goal: Optimal Range of Motion  Outcome: Progressing

## 2024-12-13 NOTE — ASSESSMENT & PLAN NOTE
Bilirubin 5.6 this morning.  This appears to be decreased from previous admission.  Continue monitoring with daily labs.

## 2024-12-13 NOTE — PLAN OF CARE
[facility]  Initial Discharge Assessment       Primary Care Provider: Lindsey Singletary FNP    Admission Diagnosis: Hyperbilirubinemia [E80.6]  Hypoglycemia [E16.2]  Altered mental status, unspecified altered mental status type [R41.82]  Leukocytosis, unspecified type [D72.829]    Admission Date: 12/12/2024  Expected Discharge Date:     Transition of Care Barriers: None    Payor: [facility] MCARE / Plan: [facility] DUAL COMPLETE HMO SNP / Product Type: Medicare Advantage /     Extended Emergency Contact Information  Primary Emergency Contact: Roberto Sharma  Mobile Phone: [phone]  Relation: Significant other  Secondary Emergency Contact: Mile Weclh  Mobile Phone: [phone]  Relation: Mother    Discharge Plan A: Home with family, Home  Discharge Plan B: Home with family, Home Health      [facility]  [address] [address]  Phone: [phone] Fax: [phone]    [facility] [address] [place] [address] [address]  [address] [facility] [address] [facility] [address]  Phone: [phone] Fax: [phone]    [facility] - [place], [place] - [address]  [address]  [facility] [place] [address]  Phone: [phone] Fax: [phone]      Initial Assessment (most recent)       Adult Discharge Assessment - 12/13/24 1037          Discharge Assessment    Assessment Type Discharge Planning Assessment     Confirmed/corrected address, phone number and insurance Yes     Confirmed Demographics Correct on Facesheet     Source of Information patient     When was your last doctors appointment? --   Unable to determine    Reason For Admission Hypoglycemia     People in Home significant other     Do you expect to return to your current living situation? Yes     Do you have help at home or someone to help you manage your care at home? Yes     Who are your caregiver(s) and their phone number(s)? Amadou Ferrell (caregiver) and Mile (mother)  [phone]     Prior to hospitilization cognitive status: Alert/Oriented     Current cognitive status: Alert/Oriented     Walking or Climbing Stairs Difficulty yes     Walking or Climbing Stairs ambulation difficulty, requires equipment;ambulation difficulty, assistance 1 person;stair climbing difficulty, assistance 1 person;transferring difficulty, assistance 1 person     Mobility Management rolling walker and wheelchair     Dressing/Bathing Difficulty yes     Dressing/Bathing bathing difficulty, assistance 1 person;dressing difficulty, assistance 1 person     Dressing/Bathing Management shower chair     Do you have any problems with: Errands/Grocery;Needs other help     Specify other help The patient has family who is able to assist her with her care     Home Accessibility stairs to enter home     Number of Stairs, Main Entrance three     Stair Railings, Main Entrance railing on left side (ascending)     Equipment Currently Used at Home walker, rolling;wheelchair;shower chair;oxygen;glucometer     Readmission within 30 days? Yes     Patient currently being followed by outpatient case management? Unable to determine (comments)     Do you currently have service(s) that help you manage your care at home? Yes     Name and Contact number of [phone] and [facility]     Is the pt/caregiver preference to resume services with current agency Yes     Do you take prescription medications? Yes     Do you have prescription coverage? Yes     Coverage [facility] DUAL COMPLETE HMO SNP     Do you have any problems affording any of your prescribed medications? TBD     Who is going to help you get home at discharge? family     How do you get to doctors appointments? family or friend will provide     Are you on dialysis? Yes     Dialysis Name and Scheduled days [facility]     Discharge Plan A Home with family;Home     Discharge Plan B Home with  family;Home Health     Discharge Plan discussed with: Patient;Parent(s);Adult children     Name(s) and Number(s) Mile (mother) [phone]     Transition of Care Barriers None        Financial Resource Strain    How hard is it for you to pay for the very basics like food, housing, medical care, and heating? Not very hard        Housing Stability    In the last 12 months, was there a time when you were not able to pay the mortgage or rent on time? No     At any time in the past 12 months, were you homeless or living in a shelter (including now)? No        Transportation Needs    Has the lack of transportation kept you from medical appointments, meetings, work or from getting things needed for daily living? No        Food Insecurity    Within the past 12 months, you worried that your food would run out before you got the money to buy more. Never true     Within the past 12 months, the food you bought just didn't last and you didn't have money to get more. Never true        Stress    Do you feel stress - tense, restless, nervous, or anxious, or unable to sleep at night because your mind is troubled all the time - these days? Not at all        Social Isolation    How often do you feel lonely or isolated from those around you?  Never        Utilities    In the past 12 months has the electric, gas, oil, or water company threatened to shut off services in your home? No        Health Literacy    How often do you need to have someone help you when you read instructions, pamphlets, or other written material from your doctor or pharmacy? Never                     Readmission Assessment (most recent)       Readmission Assessment - 12/13/24 1147          Readmission    Was this a planned readmission? No     Why were you hospitalized in the last 30 days? Vaginal bleeding     Why were you readmitted? Alarmed about signs/symptoms                 Initial discharge assessment is completed. Spoke to the patient in her room. The  patient had family at her bedside during the assessment. She gave me permission to complete the assessment with her family present. The patient is from home. She reports she lives with her significant other, but her family assist her with her care.     The patient [facility] with [facility], and she is plans to resume home health with this agency. The patient does require DME for her care. PT/OT is ordered to see this patient to determine her needs for discharge.     The patient did inquire about a gas card. I informed her that I would contact her insurance agency about her community benefits. I spoke to Constantin with [employer], and she expressed that the patient does not have a gas card benefit. She does get a U card that is equivalent to cash that she can use in the community.

## 2024-12-13 NOTE — H&P
[facility] Medicine  History & Physical    Patient Name: Pilar Fernandez  MRN: [id number]  Patient Class: OP- Observation  Admission Date: 12/12/2024  Attending Physician: Debra Fong MD   Primary Care Provider: Lindsey Singletary FNP         Patient information was obtained from patient, past medical records, and ER records.     Subjective:     Principal Problem:Hypoglycemia    Chief Complaint:   Chief Complaint   Patient presents with    Altered Mental Status     Per EMS, pt was found unresponsive by family at home and had a CBG of 48. Police gave pt 8mg narcan IN before EMS arrival. Gave pt 250 ml bag of D10 and had CBG of 116 on second check. Pt is increasingly responsive and opening eyes upon arrival. Pt normally on 3 L NC at home , but was placed on 4L during transport.        HPI: ED HPI:    Chief Complaint   Patient presents with    Altered Mental Status       Per EMS, pt was found unresponsive by family at home and had a CBG of 48. Police gave pt 8mg narcan IN before EMS arrival. Gave pt 250 ml bag of D10 and had CBG of 116 on second check. Pt is increasingly responsive and opening eyes upon arrival. Pt normally on 3 L NC at home , but was placed on 4L during transport.      44-year-old female history of end-stage renal disease on dialysis, diabetes on insulin, CVA in the past, hypertension presents to the emergency department with altered mental status, last known well was sometime last night before going to bed, no in knows what time she went to bed.  Family found her this morning unresponsive.  EMS arrived, CBG was 45, 8 mg of intranasal Narcan administered, 250 mL of D10 administered as well by EMS personnel with response.  Here in the emergency department patient does open her eyes to verbal stimuli, does not follow commands.  Not talking.  On chronic opiates as well as benzos     Family reports she normally talks, walks with a walker, does Monday Wednesday Friday  "dialysis, did go to dialysis yesterday     See previous note from transfer:     44-year-old female with history of hypertension, hyperlipidemia, DM, ESRD, CHF,  COPD on 3L, CVA, peripheral vascular disease, chronic lymphedema, hypothyroidism, gout, and gastroesophageal reflux disease whe presents for evaluation of midsternal chest pain described stabbing.  Pt also reporting pain  at the top of her abdomen, epigastric area.  Sx acute in onset a hours prior to arrival.  Patient also reports pain at the top of her stomach, Pepcid taken without relief.  All other systems reviewed and notable nausea, otherwise unremarkable.  Patient did go dialysis today, had 5-1/2 L removed.     After further chart review it was noted that patient was just discharged from outside facility after extensive workup for GI bleed, anemia, jaundice, and hyperbilirubinemia.  Hemoglobin was 6.4 from baseline of10.  She was transferred to [facility] from [facility] for further evaluation. See Hospital course from prior discharge summary below:     "She was put on ceftriaxone. Hepatology and Advanced Endoscopy Service were consulted for hyperbilirubinemia. Gastroenterology was consulted for gastrointestinal bleed. Hematology-Oncology was consulted for anemia with hyperbilirubinemia and positive Britt test. CT showed diffuse anasarca, pericardial effusion, enlarged lymph nodes vs soft tissue densities, hepatosplenomegaly. Hepatology recommended liver biopsy. Gastroenterology recommended outpatient endoscopy, after she is medically optimized, due to lack of overt bleeding. Hematology-Oncology recommended breast ultrasound. Leukocytosis persisted. Sed rate was normal. CRP was elevated. Midodrine was started for post-dialysis hypotension. She developed hepatic encephalopathy with ammonia of 111 umol/L. She was transfused another unit of packed red blood cells in preparation for liver biopsy. She required vitamin K for elevated INR. She desired to sit " "at the side of the bed but was unable to on her own. Physical and Occupational Therapy were consulted and recommended high intensity therapy. Midodrine was stopped as blood pressures increased to normal. Her home hypertension medications continued to be held. 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 MRCP showed no obstruction or stricture but was limited by body habitus. Hepatology recommended ursodiol. Upon review of imaging, left axilla ultrasound was recommended instead of breast ultrasound as her anasarca would make interpretation of a breast ultrasound difficult. Axilla ultrasound showed abnormal axillary adenopathy with at least 6 abnormal lymph nodes (BI-RADS 4). She underwent left axilla lymph node biopsy on 11/11/2024. Hematology-Oncology reported that she would not currently for inpatient chemotherapy as she had no bulky lymphadenopathy or obstruction or compression of organs due to lymphadenopathy. Additionally, her persistent hyperbilirubinemia excluded her from several chemotherapy regimens, and her heart failure and end stage renal disease exclude her from nearly all other remaining regimens. She awaited skilled nursing facility and was accepted on 11/15/2024. However, syphilis test was incidentally positive per the [place] [employer], so the nursing facility could not accept her before she started treatement. It was found that she had been found to have syphilis at another hospital previously. She was given the 1st dose of intramuscular penicillin G and was accepted to the nursing facility where she will get the 2nd and 3rd doses."     IM HPI:  Patient more awake and alert this morning.  Patient states the night prior to admission she took 60 units Lantus at bedtime.  She states she woke up in the middle of the night unable to sleep and took 2 " Tylenol p.m..  Patient denies use of short-acting insulin.  She states in addition to Lantus she also takes Tradjenta.  Patient denies fever, chills, CP, SOB, nausea, vomiting, diarrhea.  Patient states she has been eating well.  Blood sugars remain low this morning despite continued D10 IV at 75 mils per hour.  We will transition to regular diet as patient is more awake and alert.  Continue close monitoring of glucose levels.  Patient reports hitting her left thigh on wheelchair a few days prior to admission.  Soft tissue swelling with drainage noted.  Question hematoma versus abscess.  We will obtain lower extremity soft tissue ultrasound today.  Patient afebrile.  White count elevated.  Review of patient's chart shows white blood cell counts around 18-20 K for more than a month and has previously been understood to be reactive given negative infectious workup and no clinical symptoms.  White count today 28.53.  Lactic acid ordered.  Patient with known history end-stage renal disease on dialysis.  Nephrology has been consulted.  We will plan to dialyze patient today per Nephrology.  We will continue current treatment plan and monitoring.    Past Medical History:   Diagnosis Date    Anemia     Anemia     Breast mass     Chronic constipation     CKD (chronic kidney disease)     Diabetes mellitus     Dialysis patient     MON, TUE, THURS, SAT.    Embolic stroke involving right middle cerebral artery 2024    Encounter for blood transfusion     Erosive gastritis 2016    GERD (gastroesophageal reflux disease)     Gout     High cholesterol     Hypertension     Knee pain     Thyroid disease     Unspecified cataract 2022       Past Surgical History:   Procedure Laterality Date    AV FISTULA PLACEMENT Left 2019    Procedure: creation av fistula;  Surgeon: Buck Fernandez MD;  Location: [facility];  Service: Cardiovascular;  Laterality: Left;  Left Radialcephalic: PACU     SECTION  , , ,      CHOLECYSTECTOMY      COLONOSCOPY N/A 10/23/2017    Procedure: COLONOSCOPY;  Surgeon: Bri Harry MD;  Location: [facility];  Service: Endoscopy;  Laterality: N/A;    EXCISIONAL BIOPSY Left 12/30/2022    Procedure: EXCISIONAL BIOPSY;  Surgeon: Mick Juarez MD;  Location: [facility];  Service: General;  Laterality: Left;    TUBAL LIGATION      UPPER GASTROINTESTINAL ENDOSCOPY  03/31/2016    Erosive Gastritis-Dr Bailey       Review of patient's allergies indicates:   Allergen Reactions    Tramadol Itching and Nausea And Vomiting       No current facility-administered medications on file prior to encounter.     Current Outpatient Medications on File Prior to Encounter   Medication Sig    aspirin (ECOTRIN) 81 MG EC tablet Take 1 tablet (81 mg total) by mouth once daily.    atorvastatin (LIPITOR) 40 MG tablet Take 1 tablet by mouth once daily    clopidogreL (PLAVIX) 75 mg tablet Take 1 tablet (75 mg total) by mouth once daily.    colchicine (COLCRYS) 0.6 mg tablet TAKE 1 TABLET BY MOUTH ONCE DAILY AS NEEDED FOR GOUT PAIN    famotidine (PEPCID) 20 MG tablet Take 0.5 tablets (10 mg total) by mouth daily as needed for Heartburn.    hydrOXYzine HCL (ATARAX) 25 MG tablet Take 1 tablet (25 mg total) by mouth 2 (two) times daily as needed for Itching or Anxiety.    insulin aspart U-100 (NOVOLOG FLEXPEN U-100 INSULIN) 100 unit/mL (3 mL) InPn pen Inject 10 Units into the skin 2 (two) times daily with meals.    lactulose (CHRONULAC) 10 gram/15 mL solution Take 30 mLs (20 g total) by mouth 2 (two) times daily.    linaGLIPtin (TRADJENTA) 5 mg Tab tablet TAKE 1 TABLET (5 MG TOTAL) BY MOUTH ONCE DAILY.    losartan (COZAAR) 100 MG tablet Take 1 tablet (100 mg total) by mouth once daily.    medroxyPROGESTERone (PROVERA) 10 MG tablet Take 2 tablets (20 mg total) by mouth 3 (three) times daily for 10 days, THEN 2 tablets (20 mg total) once daily.    miconazole (MICOTIN) 2 % cream Apply topically 2 (two) times daily. Between  thighs, under abdomen pannus, under breasts    nebivoloL (BYSTOLIC) 20 mg Tab Take 1 tablet by mouth once daily.    NIFEdipine (PROCARDIA-XL) 60 MG (OSM) 24 hr tablet Take 1 tablet (60 mg total) by mouth once daily.    ondansetron (ZOFRAN-ODT) 4 MG TbDL Take 1 tablet (4 mg total) by mouth every 8 (eight) hours as needed ([place]).    oxyCODONE (ROXICODONE) 5 MG immediate release tablet Take 1 tablet (5 mg total) by mouth every 12 (twelve) hours as needed for Pain.    pantoprazole (PROTONIX) 40 MG tablet Take 1 tablet (40 mg total) by mouth once daily.    traZODone (DESYREL) 100 MG tablet Take 100 mg by mouth nightly as needed.    ursodioL (ACTIGALL) 300 mg capsule Take 1 capsule (300 mg total) by mouth 3 (three) times daily.     Family History       Problem Relation (Age of Onset)    Breast cancer Maternal Aunt    Diabetes Father    Ovarian cancer Maternal Aunt    Thyroid disease Mother          Tobacco Use    Smoking status: Never     Passive exposure: Never    Smokeless tobacco: Never   Substance and Sexual Activity    Alcohol use: No     Alcohol/week: 0.0 standard drinks of alcohol    Drug use: No    Sexual activity: Yes     Partners: Male     Birth control/protection: None, Surgical     Review of Systems   Constitutional:  Negative for activity change, appetite change, chills, diaphoresis, fatigue and fever.   HENT:  Negative for congestion, ear pain, postnasal drip, rhinorrhea, sinus pressure, sinus pain, sore throat and trouble swallowing.    Eyes:  Negative for photophobia, pain and visual disturbance.   Respiratory:  Negative for cough, chest tightness, shortness of breath and wheezing.    Cardiovascular:  Positive for leg swelling. Negative for chest pain and palpitations.   Gastrointestinal:  Negative for abdominal distention, abdominal pain, blood in stool, constipation, diarrhea, nausea and vomiting.   Endocrine: Negative for polydipsia, polyphagia and polyuria.   Genitourinary:  Negative for decreased  urine volume, difficulty urinating, dysuria, flank pain, frequency, hematuria and urgency.   Musculoskeletal:  Negative for arthralgias and myalgias.   Skin:  Positive for wound. Negative for color change and rash.        Positive for itching   Allergic/Immunologic: Negative.    Neurological:  Negative for dizziness, seizures, syncope, speech difficulty, weakness, light-headedness and headaches.   Hematological:  Bruises/bleeds easily.   Psychiatric/Behavioral:  Negative for agitation, confusion, sleep disturbance and suicidal ideas. The patient is not nervous/anxious.      Objective:     Vital Signs (Most Recent):  Temp: 99.2 °F (37.3 °C) (12/13/24 0735)  Pulse: 89 (12/13/24 0735)  Resp: 20 (12/13/24 0735)  BP: (!) 167/103 (12/13/24 0735)  SpO2: 97 % (12/13/24 0735) Vital Signs (24h Range):  Temp:  [93.3 °F (34.1 °C)-100.2 °F (37.9 °C)] 99.2 °F (37.3 °C)  Pulse:  [] 89  Resp:  [12-20] 20  SpO2:  [96 %-100 %] 97 %  BP: (149-209)/() 167/103     Weight: 89.8 kg (198 lb)  Body mass index is 33.99 kg/m².     Physical Exam  Vitals and nursing note reviewed.   Constitutional:       General: She is not in acute distress.     Appearance: Normal appearance. She is obese. She is not ill-appearing.   HENT:      Head: Normocephalic and atraumatic.      Nose: Nose normal. No congestion or rhinorrhea.      Mouth/Throat:      Mouth: Mucous membranes are moist.      Pharynx: Oropharynx is clear. No oropharyngeal exudate or posterior oropharyngeal erythema.   Eyes:      General: No scleral icterus.     Extraocular Movements: Extraocular movements intact.      Conjunctiva/sclera: Conjunctivae normal.      Pupils: Pupils are equal, round, and reactive to light.   Cardiovascular:      Rate and Rhythm: Normal rate and regular rhythm.      Pulses: Normal pulses.      Heart sounds: Normal heart sounds. No murmur heard.  Pulmonary:      Effort: Pulmonary effort is normal. No respiratory distress.      Breath sounds: Normal  breath sounds. No wheezing, rhonchi or rales.   Abdominal:      General: Bowel sounds are normal. There is no distension.      Palpations: Abdomen is soft.      Tenderness: There is no abdominal tenderness. There is no guarding or rebound.   Musculoskeletal:         General: Normal range of motion.      Cervical back: Normal range of motion and neck supple. No tenderness.      Right lower leg: 3+ Edema present.      Left lower le+ Edema present.   Lymphadenopathy:      Cervical: No cervical adenopathy.   Skin:     General: Skin is warm and dry.          Neurological:      General: No focal deficit present.      Mental Status: She is alert and oriented to person, place, and time.      Cranial Nerves: No cranial nerve deficit.      Motor: No weakness.   Psychiatric:         Mood and Affect: Mood normal.         Behavior: Behavior normal.         Thought Content: Thought content normal.         Judgment: Judgment normal.              CRANIAL NERVES     CN III, IV, VI   Pupils are equal, round, and reactive to light.       Significant Labs: All pertinent labs within the past 24 hours have been reviewed.  Recent Lab Results  (Last 5 results in the past 24 hours)        24  0533   24  0412   24  0409   24  2359   24  2143        Albumin 1.9                              ALT 61               Anion Gap 11               AST 97               Bands 4.0               Baso # CANCELED  Comment: Result canceled by the ancillary.               Basophil % 0.0               BILIRUBIN TOTAL 5.6  Comment: For infants and newborns, interpretation of results should be based  on gestational age, weight and in agreement with clinical  observations.    Premature Infant recommended reference ranges:  Up to 24 hours.............<8.0 mg/dL  Up to 48 hours............<12.0 mg/dL  3-5 days..................<15.0 mg/dL  6-29 days.................<15.0 mg/dL    For patients on Eltrombopag therapy, use of  Dimension [place] TBIL is   not   recommended.                 BUN 43               Calcium 8.8               Chloride 95               CO2 25               Creatinine 3.9               Differential Method Manual  Comment: CORRECTED RESULT; previously reported as Automated on 12/13/2024 at   06:56.    [C]               eGFR 13.8               Eos # CANCELED  Comment: Result canceled by the ancillary.               Eos % 2.0               Glucose 79               Gran % 82.0               Hematocrit 27.0               Hemoglobin 8.7               Immature Grans (Abs) CANCELED  Comment: Mild elevation in immature granulocytes is non specific and   can be seen in a variety of conditions including stress response,   acute inflammation, trauma and pregnancy. Correlation with other   laboratory and clinical findings is essential.    Result canceled by the ancillary.                 Immature Granulocytes CANCELED  Comment: Result canceled by the ancillary.               Lymph # CANCELED  Comment: Result canceled by the ancillary.               Lymph % 5.0               MCH 30.3               MCHC 32.2               MCV 94               Mono # CANCELED  Comment: Result canceled by the ancillary.               Mono % 7.0               MPV 11.2               nRBC 0               Platelet Estimate Appears normal               Platelet Count 298               POCT Glucose   50   49   110   60       Potassium 5.3               PROTEIN TOTAL 8.4               RBC 2.87               RDW 18.8               Sodium 131               WBC 28.53                                       [C] - Corrected Result               Significant Imaging: I have reviewed all pertinent imaging results/findings within the past 24 hours.  Assessment/Plan:     * Hypoglycemia  Patient more alert this morning.  Blood sugars remain low.  Continue D10 continuous IV fluid.  We will transition to regular diet.  Continue close monitoring.      Altered mental  status  Patient awake and alert this morning.  Believe altered mental status on presentation secondary to hypoglycemia.  Continue to treat underlying condition.      Debility  Patient with Acute on chronic debility due to  chronic illness . The patient's latest AMPAC (Activity Measure for Post Acute Care) Score is listed below.    AM-PAC Score - How much help does the patient need for each activity listed       Plan  - Progressive mobility protocol initated  - PT/OT consulted  - Fall precautions in place            Chronic hypoxic respiratory failure  Patient with reported chronic hypoxemia requiring home O2 via nasal cannula.  O2 sats currently greater than 95% on 3 L O2 via nasal cannula.    History of cerebrovascular accident (CVA) with residual deficit  At baseline.  Continue BP control.      Hepatic dysfunction  Appears at baseline.  Continue home medications.      Leukocytosis  White count 28.53 today.  This appears to be above patient's baseline of 18-20 K. patient denies recent fever and is afebrile today.  Lactic acid ordered.  Ultrasound soft tissue left lower extremity ordered to rule out abscess.  Continue close monitoring with daily labs.      Hyperbilirubinemia  Bilirubin 5.6 this morning.  This appears to be decreased from previous admission.  Continue monitoring with daily labs.      ESRD (end stage renal disease) on dialysis  Creatine stable for now. BMP reviewed- noted Estimated Creatinine Clearance: 19.8 mL/min (A) (based on SCr of 3.9 mg/dL (H)). according to latest data. Based on current GFR, CKD stage is end stage.  Monitor UOP and serial BMP and adjust therapy as needed. Renally dose meds. Avoid nephrotoxic medications and procedures.  Continue dialysis per renal.    Heart failure with preserved ejection fraction  Patient currently without evidence of volume overload.  No acute exacerbation noted.  Continue home medications and volume removal with HD.  Monitor volume status closely.      GERD  (gastroesophageal reflux disease)  Continue famotidine.      Hyperlipidemia associated with type 2 diabetes mellitus  Holding statin at this time secondary to hepatic dysfunction.      Hypertension associated with diabetes  Resume home medications.  Monitor blood blood pressures closely.  Adjust medications as needed.        VTE Risk Mitigation (From admission, onward)           Ordered     IP VTE HIGH RISK PATIENT  Once         12/12/24 1509     Place sequential compression device  Until discontinued         12/12/24 1509     Place KADIE hose  Until discontinued         12/12/24 1509                         On 12/13/2024, patient should be placed in hospital observation services under my care in collaboration with Dr. Fong.           Aron Haynes NP  Department of Hospital Medicine  [facility] Surg

## 2024-12-13 NOTE — ASSESSMENT & PLAN NOTE
Patient awake and alert this morning.  Believe altered mental status on presentation secondary to hypoglycemia.  Continue to treat underlying condition.

## 2024-12-13 NOTE — ASSESSMENT & PLAN NOTE
Patient more alert this morning.  Blood sugars remain low.  Continue D10 continuous IV fluid.  We will transition to regular diet.  Continue close monitoring.

## 2024-12-13 NOTE — PT/OT/SLP EVAL
Physical Therapy Evaluation and Treatment    Patient Name: Pilar Fernandez   MRN: [id number]  Recent Surgery: * No surgery found *      Recommendations:     Discharge Recommendations: Moderate Intensity Therapy   Discharge Equipment Recommendations: none   Barriers to discharge: Increased level of assist and Ongoing medical treatment    Assessment:     Pilar Fernandez is a 44 y.o. female admitted with a medical diagnosis of Hypoglycemia. She presents with the following impairments/functional limitations: weakness, impaired endurance, impaired self care skills, impaired functional mobility, gait instability, impaired balance, decreased lower extremity function, decreased safety awareness, decreased ROM, edema, impaired cardiopulmonary response to activity that are causing the pt to have decreased independence and safety with all gait and mobility tasks.  Pt requires skilled PT treatment to increase independence and safety with all gait and mobility tasks to return to home.    Rehab Prognosis: Fair; patient would benefit from acute PT services to address these deficits and reach maximum level of function.    Plan:     During this hospitalization, patient to be seen 5 x/week to address the above listed problems via gait training, therapeutic activities, therapeutic exercises    Plan of Care Expires: 12/20/24    Subjective     Chief Complaint: fatigue  Patient Comments/Goals: none stated  Pain/Comfort:  Pain Rating 1: 0/10    Social History:  Living Environment: Patient lives with their significant other in a single story home  Prior Level of Function: Prior to admission, patient was modified independent  Equipment Used at Home: walker, rolling, wheelchair, shower chair, oxygen, glucometer  DME owned (not currently used): none  Assistance Upon Discharge: significant other    Objective:     Communicated with pt prior to session. Patient found sitting edge of bed with peripheral IV, oxygen upon PT entry to  room.    General Precautions: Standard, fall   Orthopedic Precautions: N/A   Braces: N/A    Respiratory Status: Nasal cannula, flow 3 L/min    Exams:  Cognition: Patient is oriented to Person, Place, Time, Situation  RLE ROM: WFL  RLE Strength: Deficits: 3/5 grossly  LLE ROM: Deficits: to all AROM  LLE Strength: Deficits: 2/5 grossly  Fine Motor Coordination:    -       Intact  Gross Motor Coordination: WFL  Postural Exam: Patient presented with the following abnormalities:    -       Rounded shoulders  -       Forward head  Sensation:    -       Intact  Skin Integrity/Edema:     -       Skin integrity: Visible skin intact  -       Edema: Severe BLEs    Functional Mobility:  Gait belt applied - No  Transfers  Sit to Stand: contact guard assistance with no AD  Bed to Chair: contact guard assistance with no AD using Step Transfer  Gait  Patient ambulated 5' with no AD and contact guard assistance. Patient demonstrates occasional unsteady gait.  Balance  Sitting: supervision  Standing: contact guard assistance    Therapeutic Activities and Exercises:   Patient educated on role of acute care PT and PT POC  Sit to stand x 4  Static standing 2 x 2 min  Transfer to  x 2  Gait 5' side-stepping    AM-PAC 6 CLICK MOBILITY  Total Score:15    Patient left HOB elevated with all lines intact and call button in reach.    GOALS:   Multidisciplinary Problems       Physical Therapy Goals          Problem: Physical Therapy    Goal Priority Disciplines Outcome Interventions   Physical Therapy Goal     PT, PT/OT Progressing    Description: Patient will increase functional independence with mobility by performin. Supine to sit with Modified Webb  2. Sit to supine with Modified Webb  3. Rolling to Left and Right with Modified Webb.  4. Sit to stand transfer with Supervision  5. Bed to chair transfer with Supervision using Rolling Walker  6. Gait  x 50 feet with Stand-by Assistance using Rolling Walker.    7. Lower extremity exercise program x10 reps per handout, with independence                         History:     Past Medical History:   Diagnosis Date    Anemia     Anemia     Breast mass     Chronic constipation     CKD (chronic kidney disease)     Diabetes mellitus     Dialysis patient     [facility]    Embolic stroke involving right middle cerebral artery 2024    Encounter for blood transfusion     Erosive gastritis 2016    GERD (gastroesophageal reflux disease)     Gout     High cholesterol     Hypertension     Knee pain     Thyroid disease     Unspecified cataract 2022       Past Surgical History:   Procedure Laterality Date    AV FISTULA PLACEMENT Left 2019    Procedure: creation av fistula;  Surgeon: Buck Fernandez MD;  Location: [facility];  Service: Cardiovascular;  Laterality: Left;  Left Radialcephalic: PACU     SECTION  , , ,     CHOLECYSTECTOMY      COLONOSCOPY N/A 10/23/2017    Procedure: COLONOSCOPY;  Surgeon: Bri Harry MD;  Location: [facility];  Service: Endoscopy;  Laterality: N/A;    EXCISIONAL BIOPSY Left 2022    Procedure: EXCISIONAL BIOPSY;  Surgeon: Mick Juarez MD;  Location: [facility];  Service: General;  Laterality: Left;    TUBAL LIGATION      UPPER GASTROINTESTINAL ENDOSCOPY  2016    Erosive Gastritis-Dr Bailey       Time Tracking:     PT Received On: 24  PT Start Time: 1230  PT Stop Time: 1253  PT Total Time (min): 23 min     Billable Minutes: Evaluation 15 and Therapeutic Activity 8    2024

## 2024-12-13 NOTE — ASSESSMENT & PLAN NOTE
Creatine stable for now. BMP reviewed- noted Estimated Creatinine Clearance: 19.8 mL/min (A) (based on SCr of 3.9 mg/dL (H)). according to latest data. Based on current GFR, CKD stage is end stage.  Monitor UOP and serial BMP and adjust therapy as needed. Renally dose meds. Avoid nephrotoxic medications and procedures.  Continue dialysis per renal.

## 2024-12-13 NOTE — PT/OT/SLP PROGRESS
Occupational Therapy      Patient Name:  Pilar Fernandez   MRN:  [id number]    Patient not seen today secondary to unavailable due to currently undergoing dialysis. Will follow-up when available.    12/13/2024

## 2024-12-13 NOTE — PLAN OF CARE
Problem: Infection  Goal: Absence of Infection Signs and Symptoms  Outcome: Progressing     Problem: Adult Inpatient Plan of Care  Goal: Plan of Care Review  Outcome: Progressing  Goal: Patient-Specific Goal (Individualized)  Outcome: Progressing  Goal: Absence of Hospital-Acquired Illness or Injury  Outcome: Progressing  Goal: Optimal Comfort and Wellbeing  Outcome: Progressing  Goal: Readiness for Transition of Care  Outcome: Progressing     Problem: Diabetes Comorbidity  Goal: Blood Glucose Level Within Targeted Range  Outcome: Progressing     Problem: Wound  Goal: Optimal Coping  Outcome: Progressing  Goal: Optimal Functional Ability  Outcome: Progressing  Goal: Absence of Infection Signs and Symptoms  Outcome: Progressing  Goal: Improved Oral Intake  Outcome: Progressing  Goal: Optimal Pain Control and Function  Outcome: Progressing  Goal: Skin Health and Integrity  Outcome: Progressing  Goal: Optimal Wound Healing  Outcome: Progressing     Problem: Skin Injury Risk Increased  Goal: Skin Health and Integrity  Outcome: Progressing     Problem: Hemodialysis  Goal: Safe, Effective Therapy Delivery  Outcome: Progressing  Goal: Effective Tissue Perfusion  Outcome: Progressing  Goal: Absence of Infection Signs and Symptoms  Outcome: Progressing   Plan of care reviewed with patient. Pt is free of falls and injury. NADN. BP elevated. Afebrile. Pt tolerating meds well. D10  infusing at 75mL/hr continuous. BS have been low throughout the shift. Pt was given orange juice and doctor was notified. Questions and concerns addressed.

## 2024-12-13 NOTE — PLAN OF CARE
POC reviewed w/ pt and purposeful rounding ongoing. Meds administered per MAR. D10 continuous infusion initiated and maintained per orders. Blood glucose monitored and PRN dextrose boluses administered per orders for blood sugar <50. VSS on 4L NC. Pt remains AAOx4 and tolerating clear liquids. Beir hugger in use and pt temp remains stable. Dr Fong notified of low blood sugar levels and elevated BP and PRN orders were added. Call bell w/ in reach, pt denies needs at this time.

## 2024-12-13 NOTE — CONSULTS
[facility]  Adult Nutrition  Diet Education Progress Note     SUMMARY     Pilar Fernandez  1980      Diet: Renal Consistent CHO  Intake: 75 - 100 %  Tolerance: tolerating by mouth      Diet Education & Materials Provided: Carbohydrate Counting for People with Diabetes; Carbohydrate Counting for People with Diabetes; Chronic Kidney Disease Stage 3-5 Nutrition Therapy; Potassium Content of Foods; Phosphorus Content of Foods  Level of understanding: Fair  Theoretical stage of change: contemplation      Nutrition Problem  Food and nutrition related knowledge deficit    Related to (etiology):   CKD/dialysis and diabetes    Signs and Symptoms (as evidenced by):   No prior nutrition education     Interventions/Recommendations (treatment strategy):  RD provided in depth Renal diet education to pt.   RD provided in depth Renal diet education handouts to pt.   RD provided in depth Consistent CHO diet education to pt.   RD provided in depth Consistent CHO diet education handouts to pt.    Nutrition Diagnosis Status:   New      Follow-up: No      Yomi Temple RD  12/13/2024

## 2024-12-13 NOTE — ASSESSMENT & PLAN NOTE
Patient with reported chronic hypoxemia requiring home O2 via nasal cannula.  O2 sats currently greater than 95% on 3 L O2 via nasal cannula.

## 2024-12-14 LAB
ALBUMIN SERPL BCP-MCNC: 1.8 G/DL (ref 3.5–5.2)
ALP SERPL-CCNC: 502 U/L (ref 55–135)
ALT SERPL W/O P-5'-P-CCNC: 56 U/L (ref 10–44)
ANION GAP SERPL CALC-SCNC: 8 MMOL/L (ref 3–11)
AST SERPL-CCNC: 81 U/L (ref 10–40)
BASOPHILS # BLD AUTO: 0.1 K/UL (ref 0–0.2)
BASOPHILS NFR BLD: 0.6 % (ref 0–1.9)
BILIRUB SERPL-MCNC: 5.4 MG/DL (ref 0.1–1)
BUN SERPL-MCNC: 38 MG/DL (ref 6–20)
CALCIUM SERPL-MCNC: 8.8 MG/DL (ref 8.7–10.5)
CHLORIDE SERPL-SCNC: 95 MMOL/L (ref 95–110)
CO2 SERPL-SCNC: 29 MMOL/L (ref 23–29)
CREAT SERPL-MCNC: 3.7 MG/DL (ref 0.5–1.4)
DIFFERENTIAL METHOD BLD: ABNORMAL
EOSINOPHIL # BLD AUTO: 0.8 K/UL (ref 0–0.5)
EOSINOPHIL NFR BLD: 5 % (ref 0–8)
ERYTHROCYTE [DISTWIDTH] IN BLOOD BY AUTOMATED COUNT: 18.6 % (ref 11.5–14.5)
EST. GFR  (NO RACE VARIABLE): 14.7 ML/MIN/1.73 M^2
GLUCOSE SERPL-MCNC: 159 MG/DL (ref 70–110)
HCT VFR BLD AUTO: 27.5 % (ref 37–48.5)
HGB BLD-MCNC: 8.9 G/DL (ref 12–16)
IMM GRANULOCYTES # BLD AUTO: 0.14 K/UL (ref 0–0.04)
IMM GRANULOCYTES NFR BLD AUTO: 0.8 % (ref 0–0.5)
LYMPHOCYTES # BLD AUTO: 1.7 K/UL (ref 1–4.8)
LYMPHOCYTES NFR BLD: 10 % (ref 18–48)
MAGNESIUM SERPL-MCNC: 1.8 MG/DL (ref 1.6–2.6)
MCH RBC QN AUTO: 30.4 PG (ref 27–31)
MCHC RBC AUTO-ENTMCNC: 32.4 G/DL (ref 32–36)
MCV RBC AUTO: 94 FL (ref 82–98)
MONOCYTES # BLD AUTO: 1.6 K/UL (ref 0.3–1)
MONOCYTES NFR BLD: 9.4 % (ref 4–15)
NEUTROPHILS # BLD AUTO: 12.4 K/UL (ref 1.8–7.7)
NEUTROPHILS NFR BLD: 74.2 % (ref 38–73)
NRBC BLD-RTO: 0 /100 WBC
PLATELET # BLD AUTO: 245 K/UL (ref 150–450)
PMV BLD AUTO: 11.5 FL (ref 9.2–12.9)
POCT GLUCOSE: 131 MG/DL (ref 70–110)
POCT GLUCOSE: 150 MG/DL (ref 70–110)
POCT GLUCOSE: 238 MG/DL (ref 70–110)
POCT GLUCOSE: 284 MG/DL (ref 70–110)
POTASSIUM SERPL-SCNC: 5.6 MMOL/L (ref 3.5–5.1)
PROT SERPL-MCNC: 8.1 G/DL (ref 6–8.4)
RBC # BLD AUTO: 2.93 M/UL (ref 4–5.4)
SODIUM SERPL-SCNC: 132 MMOL/L (ref 136–145)
WBC # BLD AUTO: 16.71 K/UL (ref 3.9–12.7)

## 2024-12-14 PROCEDURE — 21400001 HC TELEMETRY ROOM

## 2024-12-14 PROCEDURE — 25000003 PHARM REV CODE 250: Performed by: INTERNAL MEDICINE

## 2024-12-14 PROCEDURE — 99900035 HC TECH TIME PER 15 MIN (STAT)

## 2024-12-14 PROCEDURE — 25000003 PHARM REV CODE 250: Performed by: EMERGENCY MEDICINE

## 2024-12-14 PROCEDURE — 25000003 PHARM REV CODE 250

## 2024-12-14 PROCEDURE — 27000221 HC OXYGEN, UP TO 24 HOURS

## 2024-12-14 PROCEDURE — 83735 ASSAY OF MAGNESIUM: CPT

## 2024-12-14 PROCEDURE — 94761 N-INVAS EAR/PLS OXIMETRY MLT: CPT

## 2024-12-14 PROCEDURE — 99900031 HC PATIENT EDUCATION (STAT)

## 2024-12-14 PROCEDURE — 36415 COLL VENOUS BLD VENIPUNCTURE: CPT

## 2024-12-14 PROCEDURE — 80053 COMPREHEN METABOLIC PANEL: CPT

## 2024-12-14 PROCEDURE — 85025 COMPLETE CBC W/AUTO DIFF WBC: CPT

## 2024-12-14 RX ADMIN — HYDRALAZINE HYDROCHLORIDE 50 MG: 50 TABLET ORAL at 08:12

## 2024-12-14 RX ADMIN — FAMOTIDINE 20 MG: 10 INJECTION, SOLUTION INTRAVENOUS at 09:12

## 2024-12-14 RX ADMIN — URSODIOL 300 MG: 300 CAPSULE ORAL at 09:12

## 2024-12-14 RX ADMIN — URSODIOL 300 MG: 300 CAPSULE ORAL at 08:12

## 2024-12-14 RX ADMIN — METOPROLOL TARTRATE 100 MG: 100 TABLET, FILM COATED ORAL at 08:12

## 2024-12-14 RX ADMIN — ASPIRIN 81 MG: 81 TABLET, COATED ORAL at 08:12

## 2024-12-14 RX ADMIN — LACTULOSE 20 G: 20 SOLUTION ORAL at 08:12

## 2024-12-14 RX ADMIN — LOSARTAN POTASSIUM 100 MG: 50 TABLET, FILM COATED ORAL at 08:12

## 2024-12-14 RX ADMIN — MEDROXYPROGESTERONE ACETATE 20 MG: 10 TABLET ORAL at 08:12

## 2024-12-14 RX ADMIN — URSODIOL 300 MG: 300 CAPSULE ORAL at 03:12

## 2024-12-14 NOTE — PT/OT/SLP EVAL
Occupational Therapy   Evaluation    Name: Pilar Fernandez  MRN: [id number]  Admitting Diagnosis: Hypoglycemia  Recent Surgery: * No surgery found *      Recommendations:     Discharge Recommendations: Low Intensity Therapy  Discharge Equipment Recommendations:  none  Barriers to discharge:  Other (Comment) (Medical status)    Assessment:     Pilar Fernandez is a 44 y.o. female with a medical diagnosis of Hypoglycemia.  She presents with functional deficits impacting independence with ADL's including functional mobility. Performance deficits affecting function: weakness, impaired endurance, impaired self care skills, impaired functional mobility, impaired balance, decreased lower extremity function, decreased safety awareness, pain, decreased ROM, edema, impaired cardiopulmonary response to activity, impaired joint extensibility, impaired muscle length.      Rehab Prognosis: Fair; patient would benefit from acute skilled OT services to address these deficits and reach maximum level of function.       Plan:     Patient to be seen 5 x/week to address the above listed problems via self-care/home management, therapeutic activities, therapeutic exercises  Plan of Care Expires: 12/20/24  Plan of Care Reviewed with: patient    Subjective     Chief Complaint: bilateral lower leg edema  Patient/Family Comments/goals: Pt would like to return home with her significant other.    Occupational Profile:  Living Environment: Pt lives with her significant other in a [facility] with 3 steps to enter/exit.  Previous level of function: Minimal Assistance from significant other  Roles and Routines: ADL's  Equipment Used at Home: walker, rolling, shower chair, wheelchair, oxygen  Assistance upon Discharge: Significant other    Pain/Comfort:  Pain Rating 1: 9/10  Location - Side 1: Left  Location 1: leg  Pain Addressed 1: Reposition, Distraction, Nurse notified  Pain Rating Post-Intervention 1: 8/10    Patients cultural, spiritual,  Muslim conflicts given the current situation:      Objective:     Communicated with: nurse prior to session.  Patient found sitting edge of bed with peripheral IV, oxygen upon OT entry to room.    General Precautions: Standard, fall  Orthopedic Precautions: N/A  Braces: N/A  Respiratory Status: Room air    Occupational Performance:    Bed Mobility:    Pt did not perform on this date.    Functional Mobility/Transfers:  Patient completed Sit <> Stand Transfer with contact guard assistance  with  rolling walker   Patient completed Bed <> Chair Transfer using Step Transfer technique with contact guard assistance with rolling walker  Patient completed Toilet Transfer Step Transfer technique with contact guard assistance with  rolling walker and bedside commode  Functional Mobility: Pt ambulated 16' between surfaces requiring CGA utilizing RW.    Activities of Daily Living:  Feeding:  independence    Grooming: setup assistance    Bathing: moderate assistance    Upper Body Dressing: minimum assistance    Lower Body Dressing: maximal assistance    Toileting: minimum assistance      Cognitive/Visual Perceptual:  Cognitive/Psychosocial Skills:  -       Oriented to: Person, Place, and Situation   -       Follows Commands/attention:Follows multistep  commands  -       Communication: clear/fluent  -       Memory: No Deficits noted  -       Safety awareness/insight to disability: impaired   -       Mood/Affect/Coping skills/emotional control: Cooperative and Pleasant    Physical Exam:  Postural examination/scapula alignment: -       Rounded shoulders  -       Forward head  Edema:  Severe bilateral lower legs  Sensation: -       Intact  bilateral UE's  Dominant hand: -       Right  Upper Extremity Range of Motion:  -       Right Upper Extremity: WFL  -       Left Upper Extremity: WFL  Upper Extremity Strength: -       Right Upper Extremity: WFL  -       Left Upper Extremity: WFL  Fine Motor Coordination: -       Intact  Gross  motor coordination: WFL    AMPA 6 Click ADL:  AMPAC Total Score: 19    Treatment & Education:  Pt was provided education / instruction regarding role of OT and established OT POC.    Patient left HOB elevated with all lines intact, call button in reach, and nurse notified    GOALS:   Goals to be met by: 24     Patient will increase functional independence with ADLs by performing:    UE Dressing with Set-up Assistance.  LE Dressing with Minimal Assistance.  Grooming while seated at sink with Modified Premium.  Toileting from toilet with Set-up Assistance for hygiene and clothing management.   Bathing from  shower chair/bench with Supervision.  Toilet transfer to toilet with Supervision.      History:     Past Medical History:   Diagnosis Date    Anemia     Anemia     Breast mass     Chronic constipation     CKD (chronic kidney disease)     Diabetes mellitus     Dialysis patient     [facility], [facility], [facility].    Embolic stroke involving right middle cerebral artery 2024    Encounter for blood transfusion     Erosive gastritis 2016    GERD (gastroesophageal reflux disease)     Gout     High cholesterol     Hypertension     Knee pain     Thyroid disease     Unspecified cataract 2022         Past Surgical History:   Procedure Laterality Date    AV FISTULA PLACEMENT Left 2019    Procedure: creation av fistula;  Surgeon: Buck Fernandez MD;  Location: [facility];  Service: Cardiovascular;  Laterality: Left;  Left Radialcephalic: PACU     SECTION  , , ,     CHOLECYSTECTOMY      COLONOSCOPY N/A 10/23/2017    Procedure: COLONOSCOPY;  Surgeon: Bri Harry MD;  Location: [facility];  Service: Endoscopy;  Laterality: N/A;    EXCISIONAL BIOPSY Left 2022    Procedure: EXCISIONAL BIOPSY;  Surgeon: Mick Juarez MD;  Location: [facility];  Service: General;  Laterality: Left;    TUBAL LIGATION      UPPER GASTROINTESTINAL ENDOSCOPY  2016    Erosive Gastritis-Dr Bailey        Time Tracking:     OT Date of Treatment: 12/13/24  OT Start Time: 1742  OT Stop Time: 1811  OT Total Time (min): 29 min    Billable Minutes:Evaluation 29 12/13/2024

## 2024-12-14 NOTE — ASSESSMENT & PLAN NOTE
White count 16.7 today.  This appears to be above patient's baseline of 18-20 K. patient denies recent fever and is afebrile today.  Lactic acid ordered.  Ultrasound soft tissue left lower extremity ordered to rule out abscess.  Continue close monitoring with daily labs.

## 2024-12-14 NOTE — PLAN OF CARE
Problem: Occupational Therapy  Goal: Occupational Therapy Goal  Description: Goals to be met by: 12/20/24     Patient will increase functional independence with ADLs by performing:    UE Dressing with Set-up Assistance.  LE Dressing with Minimal Assistance.  Grooming while seated at sink with Modified Evangeline.  Toileting from toilet with Set-up Assistance for hygiene and clothing management.   Bathing from  shower chair/bench with Supervision.  Toilet transfer to toilet with Supervision.    Outcome: Established OT POC

## 2024-12-14 NOTE — ASSESSMENT & PLAN NOTE
Creatine stable for now. BMP reviewed- noted Estimated Creatinine Clearance: 20.8 mL/min (A) (based on SCr of 3.7 mg/dL (H)). according to latest data. Based on current GFR, CKD stage is end stage.  Monitor UOP and serial BMP and adjust therapy as needed. Renally dose meds. Avoid nephrotoxic medications and procedures.  Continue dialysis per renal.

## 2024-12-14 NOTE — PROGRESS NOTES
[facility] Medicine  Progress Note    Patient Name: Pilar Fernandez  MRN: [id number]  Patient Class: OP- Observation   Admission Date: 12/12/2024  Length of Stay: 1 days  Attending Physician: Debra Fong MD  Primary Care Provider: Lindsey Singletary FNP        Subjective     Principal Problem:Hypoglycemia        HPI:  ED HPI:    Chief Complaint   Patient presents with    Altered Mental Status       Per EMS, pt was found unresponsive by family at home and had a CBG of 48. Police gave pt 8mg narcan IN before EMS arrival. Gave pt 250 ml bag of D10 and had CBG of 116 on second check. Pt is increasingly responsive and opening eyes upon arrival. Pt normally on 3 L NC at home , but was placed on 4L during transport.      44-year-old female history of end-stage renal disease on dialysis, diabetes on insulin, CVA in the past, hypertension presents to the emergency department with altered mental status, last known well was sometime last night before going to bed, no in knows what time she went to bed.  Family found her this morning unresponsive.  EMS arrived, CBG was 45, 8 mg of intranasal Narcan administered, 250 mL of D10 administered as well by EMS personnel with response.  Here in the emergency department patient does open her eyes to verbal stimuli, does not follow commands.  Not talking.  On chronic opiates as well as benzos     Family reports she normally talks, walks with a walker, does Monday Wednesday Friday dialysis, did go to dialysis yesterday     See previous note from transfer:     44-year-old female with history of hypertension, hyperlipidemia, DM, ESRD, CHF,  COPD on 3L, CVA, peripheral vascular disease, chronic lymphedema, hypothyroidism, gout, and gastroesophageal reflux disease whe presents for evaluation of midsternal chest pain described stabbing.  Pt also reporting pain  at the top of her abdomen, epigastric area.  Sx acute in onset a hours prior to arrival.  Patient also  "reports pain at the top of her stomach, Pepcid taken without relief.  All other systems reviewed and notable nausea, otherwise unremarkable.  Patient did go dialysis today, had 5-1/2 L removed.     After further chart review it was noted that patient was just discharged from outside facility after extensive workup for GI bleed, anemia, jaundice, and hyperbilirubinemia.  Hemoglobin was 6.4 from baseline of10.  She was transferred to [facility] from [facility] for further evaluation. See Hospital course from prior discharge summary below:     "She was put on ceftriaxone. Hepatology and Advanced Endoscopy Service were consulted for hyperbilirubinemia. Gastroenterology was consulted for gastrointestinal bleed. Hematology-Oncology was consulted for anemia with hyperbilirubinemia and positive Britt test. CT showed diffuse anasarca, pericardial effusion, enlarged lymph nodes vs soft tissue densities, hepatosplenomegaly. Hepatology recommended liver biopsy. Gastroenterology recommended outpatient endoscopy, after she is medically optimized, due to lack of overt bleeding. Hematology-Oncology recommended breast ultrasound. Leukocytosis persisted. Sed rate was normal. CRP was elevated. Midodrine was started for post-dialysis hypotension. She developed hepatic encephalopathy with ammonia of 111 umol/L. She was transfused another unit of packed red blood cells in preparation for liver biopsy. She required vitamin K for elevated INR. She desired to sit at the side of the bed but was unable to on her own. Physical and Occupational Therapy were consulted and recommended high intensity therapy. Midodrine was stopped as blood pressures increased to normal. Her home hypertension medications continued to be held. 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 MRCP showed no obstruction or stricture " "but was limited by body habitus. Hepatology recommended ursodiol. Upon review of imaging, left axilla ultrasound was recommended instead of breast ultrasound as her anasarca would make interpretation of a breast ultrasound difficult. Axilla ultrasound showed abnormal axillary adenopathy with at least 6 abnormal lymph nodes (BI-RADS 4). She underwent left axilla lymph node biopsy on 11/11/2024. Hematology-Oncology reported that she would not currently for inpatient chemotherapy as she had no bulky lymphadenopathy or obstruction or compression of organs due to lymphadenopathy. Additionally, her persistent hyperbilirubinemia excluded her from several chemotherapy regimens, and her heart failure and end stage renal disease exclude her from nearly all other remaining regimens. She awaited skilled nursing facility and was accepted on 11/15/2024. However, syphilis test was incidentally positive per the [place] [employer], so the nursing facility could not accept her before she started treatement. It was found that she had been found to have syphilis at another hospital previously. She was given the 1st dose of intramuscular penicillin G and was accepted to the nursing facility where she will get the 2nd and 3rd doses."     IM HPI:  Patient more awake and alert this morning.  Patient states the night prior to admission she took 60 units Lantus at bedtime.  She states she woke up in the middle of the night unable to sleep and took 2 Tylenol p.m..  Patient denies use of short-acting insulin.  She states in addition to Lantus she also takes Tradjenta.  Patient denies fever, chills, CP, SOB, nausea, vomiting, diarrhea.  Patient states she has been eating well.  Blood sugars remain low this morning despite continued D10 IV at 75 mils per hour.  We will transition to regular diet as patient is more awake and alert.  Continue close monitoring of glucose levels.  Patient reports hitting her left thigh on wheelchair a " few days prior to admission.  Soft tissue swelling with drainage noted.  Question hematoma versus abscess.  We will obtain lower extremity soft tissue ultrasound today.  Patient afebrile.  White count elevated.  Review of patient's chart shows white blood cell counts around 18-20 K for more than a month and has previously been understood to be reactive given negative infectious workup and no clinical symptoms.  White count today 28.53.  Lactic acid ordered.  Patient with known history end-stage renal disease on dialysis.  Nephrology has been consulted.  We will plan to dialyze patient today per Nephrology.  We will continue current treatment plan and monitoring.    Overview/Hospital Course:  12/14/24: Pt seen this am, s/p HD on 12/13, overall doing better.  this am. Pending soft tissue US to be done sometime this weekend for left thigh wound, minimal drainage today not currently on abx. Afebrile with improving leukocytosis. Patient requesting something to help with leg pain.     Interval History: Patient seen and examined this morning. No acute events overnight. BG better controlled. Patient endorses some left leg pain but denies fever, rash chills, nausea, vomiting, chest pain, SOB, abdominal pain, headaches, frequency, dysuria, hematuria, diarrhea or constipation.       Review of Systems   Constitutional:  Negative for activity change, appetite change, chills, diaphoresis, fatigue and fever.   HENT:  Negative for congestion, ear pain, postnasal drip, rhinorrhea, sinus pressure, sinus pain, sore throat and trouble swallowing.    Eyes:  Negative for photophobia, pain and visual disturbance.   Respiratory:  Negative for cough, chest tightness, shortness of breath and wheezing.    Cardiovascular:  Positive for leg swelling. Negative for chest pain and palpitations.   Gastrointestinal:  Negative for abdominal distention, abdominal pain, blood in stool, constipation, diarrhea, nausea and vomiting.   Endocrine:  Negative for polydipsia, polyphagia and polyuria.   Genitourinary:  Negative for decreased urine volume, difficulty urinating, dysuria, flank pain, frequency, hematuria and urgency.   Musculoskeletal:  Negative for arthralgias and myalgias.   Skin:  Positive for wound. Negative for color change and rash.        Positive for itching   Allergic/Immunologic: Negative.    Neurological:  Negative for dizziness, seizures, syncope, speech difficulty, weakness, light-headedness and headaches.   Hematological:  Bruises/bleeds easily.   Psychiatric/Behavioral:  Negative for agitation, confusion, sleep disturbance and suicidal ideas. The patient is not nervous/anxious.      Objective:     Vital Signs (Most Recent):  Temp: 99.3 °F (37.4 °C) (12/14/24 0744)  Pulse: 83 (12/14/24 0744)  Resp: 18 (12/14/24 0729)  BP: (!) 187/98 (12/14/24 0744)  SpO2: 98 % (12/14/24 0744) Vital Signs (24h Range):  Temp:  [97.8 °F (36.6 °C)-99.3 °F (37.4 °C)] 99.3 °F (37.4 °C)  Pulse:  [70-90] 83  Resp:  [16-20] 18  SpO2:  [97 %-99 %] 98 %  BP: (160-187)/() 187/98     Weight: 89.8 kg (198 lb)  Body mass index is 33.99 kg/m².    Intake/Output Summary (Last 24 hours) at 12/14/2024 0904  Last data filed at 12/14/2024 0516  Gross per 24 hour   Intake 3030.18 ml   Output 2401 ml   Net 629.18 ml         Physical Exam  Vitals and nursing note reviewed.   Constitutional:       General: She is not in acute distress.     Appearance: Normal appearance. She is obese. She is not ill-appearing.   HENT:      Head: Normocephalic and atraumatic.      Nose: Nose normal. No congestion or rhinorrhea.      Mouth/Throat:      Mouth: Mucous membranes are moist.      Pharynx: Oropharynx is clear. No oropharyngeal exudate or posterior oropharyngeal erythema.   Eyes:      General: No scleral icterus.     Extraocular Movements: Extraocular movements intact.      Conjunctiva/sclera: Conjunctivae normal.      Pupils: Pupils are equal, round, and reactive to light.    Cardiovascular:      Rate and Rhythm: Normal rate and regular rhythm.      Pulses: Normal pulses.      Heart sounds: Normal heart sounds. No murmur heard.  Pulmonary:      Effort: Pulmonary effort is normal. No respiratory distress.      Breath sounds: Normal breath sounds. No wheezing, rhonchi or rales.   Abdominal:      General: Bowel sounds are normal. There is no distension.      Palpations: Abdomen is soft.      Tenderness: There is no abdominal tenderness. There is no guarding or rebound.   Musculoskeletal:         General: Normal range of motion.      Cervical back: Normal range of motion and neck supple. No tenderness.      Right lower leg: 3+ Edema present.      Left lower le+ Edema present.   Lymphadenopathy:      Cervical: No cervical adenopathy.   Skin:     General: Skin is warm and dry.          Neurological:      General: No focal deficit present.      Mental Status: She is alert and oriented to person, place, and time.      Cranial Nerves: No cranial nerve deficit.      Motor: No weakness.   Psychiatric:         Mood and Affect: Mood normal.         Behavior: Behavior normal.         Thought Content: Thought content normal.         Judgment: Judgment normal.             Recent Labs   Lab 24  0942 24  0533 24  0536   WBC 19.04* 28.53* 16.71*   HGB 8.9* 8.7* 8.9*   HCT 28.3* 27.0* 27.5*   MCV 97 94 94   RBC 2.91* 2.87* 2.93*   MCH 30.6 30.3 30.4   MCHC 31.4* 32.2 32.4   RDW 19.9* 18.8* 18.6*    298 245   MPV 10.6 11.2 11.5   GRAN 76.7*  14.6* 82.0* 74.2*  12.4*   LYMPH 9.9*  1.9 5.0*  CANCELED 10.0*  1.7   MONO 10.7  2.0* 7.0  CANCELED 9.4  1.6*   EOSINOPHIL 1.4 2.0 5.0   BASOPHIL 0.7 0.0 0.6       Recent Labs   Lab 24  0942 24  1753 24  0532 24  0533 24  0536   * 135*  --  131* 132*   K 4.4 4.6  --  5.3* 5.6*    100  --  95 95   CO2 29 28  --  25 29   BUN 35* 40*  --  43* 38*   CREATININE 3.5* 3.6*  --  3.9* 3.7*   GLU  "175* 73  --  79 159*   CALCIUM 8.7 8.7  --  8.8 8.8   PROT 8.1  --   --  8.4 8.1   ALBUMIN 1.8*  --   --  1.9* 1.8*   ALKPHOS 508*  --   --  485* 502*   BILITOT 5.4*  --   --  5.6* 5.4*   ALT 64*  --   --  61* 56*   *  --   --  97* 81*   ANIONGAP 3 7  --  11 8   MG 1.7  --  1.6  --  1.8       No results for input(s): "COLORU", "APPEARANCEUA", "PHUR", "SPECGRAV", "PROTEINUA", "GLUCUA", "KETONESU", "BILIRUBINUA", "OCCULTUA", "UROBILINOGEN", "NITRITE", "LEUKOCYTESUR", "RBCUA", "WBCUA", "BACTERIA", "SQUAMEPITHEL", "HYALINECASTS", "GRANULARCAST", "MICROCMT" in the last 168 hours.    Invalid input(s): "SPECIMENU", "AMORPHOUSU"    Recent Labs   Lab 12/12/24  0949   PH 7.390   PCO2 48.0*   PO2 97.3   HCO3 27.5   POCSATURATED 98.5       No results for input(s): "TROPONINI", "CPK", "CPKMB" in the last 168 hours.    No results for input(s): "PT", "INR", "APTT" in the last 168 hours.    Lab Results   Component Value Date    HGBA1C 4.5 12/12/2024       No results for input(s): "TSH", "B5WCTVV", "Z7AWJBT", "THYROIDAB", "FREET4" in the last 168 hours.    Microbiology Results (last 7 days)       Procedure Component Value Units Date/Time    Blood culture [[id number]] Collected: 12/13/24 0956    Order Status: Completed Specimen: Blood from Peripheral, Hand, Right Updated: 12/14/24 0315     Blood Culture, Routine No Growth to date    Blood culture [[id number]] Collected: 12/13/24 1010    Order Status: Completed Specimen: Blood from Peripheral, Wrist, Right Updated: 12/14/24 0315     Blood Culture, Routine No Growth to date            CTA Head and Neck (xpd)    Result Date: 12/12/2024  EXAMINATION: CTA HEAD AND NECK (XPD) CLINICAL HISTORY: Altered level of consciousness stroke, follow up; TECHNIQUE: Helical CT images obtained from the skull vertex to the thoracic inlet after intravenous administration of contrast according to CTA protocol.  Maximum intensity projection images were evaluated in multiple planes along with the " source images. Iterative reconstruction technique was used. NASCET criteria utilized for stenoses measurements.  CT/Cardiac nuclear examinations in the past 12 months: 5 COMPARISON: CTA head 07/13/2024 FINDINGS: Vasculature: Visualized portions of the aortic arch are widely patent.  The right subclavian artery is obscured by contrast bolus.  The brachiocephalic artery and left subclavian artery are widely patent.  The origin of the right vertebral artery is obscured by artifact related to contrast bolus.  Visualized portions of bilateral vertebral arteries appear patent.  Streak artifact through the proximal aspect of the right common carotid artery.  Allowing for this, bilateral common carotid arteries appear widely patent.  Motion artifact through the right carotid bulb.  The right external carotid artery and its branches appear widely patent.  No definite focal stenosis of the cervical right internal carotid artery.  Mild atherosclerotic calcification at the left carotid bulb.  The left external carotid artery and its branches are widely patent.  Cervical portions of the left internal carotid artery are obscured partially by motion artifact with no definite focal stenosis identified. Petrous and cavernous portions of bilateral internal carotid arteries are patent.  Mild circumferential atherosclerotic calcification of bilateral carotid siphons without a detected significant focal stenosis.  Bilateral middle and anterior cerebral arteries are patent.  No detected evidence for aneurysm, stenosis, or occlusion within the anterior or posterior circulation. Bilateral vertebral arteries supply a widely patent basilar artery.  Intact bilateral superior cerebellar and posterior cerebral arteries are seen arising from the distal basilar artery.  Posterior communicating arteries are not seen which may be related to congenital absence or hypoplasia. Remaining structures: Neck: Motion artifact through the imaged upper lungs  which demonstrate diffuse ground-glass attenuation and trace fluid in the right major fissure.  Clustered subcentimeter calcifications in the right upper lobe.  Subcentimeter calcified nodule within the right thyroid lobe.  Bilateral submandibular and parotid glands are symmetric and unremarkable.  Mucosal surfaces of the nasopharynx and oropharynx are relatively symmetric.  Epiglottis is normal in appearance.  No detected cervical mass or evidence of cervical adenopathy.  There is diffuse anasarca. Head: No midline shift or mass effect detected.  No enhancing intracranial mass is identified.     1. Motion artifact through portions of the neck with no significant carotid artery stenosis identified.  Mild carotid atherosclerotic calcifications are present. 2. No detected evidence for intracranial aneurysm, stenosis, or vaso occlusive disease within the anterior or posterior circulation. 3. Diffuse ground-glass densities within the imaged upper lungs with fluid in the right major fissure.  Pattern is suspect for pulmonary edema. 4. Diffuse anasarca. Electronically signed by: Olegario Quiñones MD Date:    12/12/2024 Time:    10:37    CT Head Without Contrast    Result Date: 12/12/2024  EXAMINATION: CT HEAD WITHOUT CONTRAST CLINICAL HISTORY: Altered level of consciousness mental status change, unknown cause; TECHNIQUE: Axial CT images were obtained from the skull base to the vertex without contrast. Iterative reconstruction technique was used.  CT/Cardiac nuclear examinations in the past 12 months: 4 COMPARISON: MRI brain 07/16/2024 and CT head 07/13/2024 FINDINGS: No ventricular or basal cistern effacement.  No evidence of acute intracranial hemorrhage, midline shift, mass, or mass effect.  No detected extra-axial fluid collections.  Punctate bilateral basal ganglia calcifications.  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  Trace mucosal thickening right maxillary sinus.  Mastoid air cells are clear.   Calvarium is intact.     No acute intracranial process detected. Electronically signed by: Olegario Quiñones MD Date:    12/12/2024 Time:    10:27    US Pelvis Comp with Transvag NON-OB (xpd)    Result Date: 12/2/2024  EXAMINATION: US PELVIS COMP WITH TRANSVAG NON-OB (XPD) CLINICAL HISTORY: evaluate complete expulsion of the IUD and the evaluate the endometrial stripe; TECHNIQUE: Transabdominal sonography of the pelvis was performed, followed by transvaginal sonography to better evaluate the uterus and ovaries. COMPARISON: CT chest abdomen pelvis 10/30/2024 FINDINGS: Uterus: Size: 13.9 x 4.3 x 5.9 cm Masses: 3.6 x 2.3 x 2.5 cm hypoechoic lesion, likely a leiomyoma. Endometrium: Measures up to 6 mm near the fundus.  No IUD is visualized.  Large amount of heterogeneously echogenic products noted within the cervix.  Notably blood products expelled during the examination and limits transvaginal evaluation. Right ovary: Size: 2.8 x 1.9 x 2.8 cm Appearance: Normal Vascular flow: Normal. Left ovary: Size: 2.7 x 1.9 x 1.9 cm Appearance: Normal Vascular Flow: Normal. Free Fluid: None.     Heterogeneous echogenic products within the cervix limits transvaginal evaluation.  Blood products were expelled during the examination and consistent with heterogeneous products within the cervix.  No IUD was visualized.  Follow-up examination is recommended.  MRI may be beneficial for further evaluation as clinically indicated. Small uterine leiomyoma. Electronically signed by resident: Angela Doe Date:    12/02/2024 Time:    06:11 Electronically signed by: Bobby Gutierrez MD Date:    12/02/2024 Time:    08:09    X-Ray Chest 1 View    Result Date: 11/30/2024  EXAMINATION: XR CHEST 1 VIEW CLINICAL HISTORY: Chest pain, unspecified COMPARISON: Portable chest 10/29/2024. FINDINGS: Frontal chest radiograph demonstrates clear lungs.  Small amount of left pleural fluid.  Moderate enlargement of cardiac silhouette.  No osseous abnormality.      1. Moderate enlargement of cardiac silhouette, may reflect chamber enlargement. 2. Small left pleural effusion. Electronically signed by: Jorge Burdick MD Date:    11/30/2024 Time:    13:12      Assessment and Plan     * Hypoglycemia  Patient alert this am. BG better, now at 159. Continue D10 continuous IV fluid.  We will transition to regular diet.  Continue close monitoring.      Altered mental status  Patient awake and alert this morning.  Believe altered mental status on presentation secondary to hypoglycemia.  Continue to treat underlying condition.      Debility  Patient with Acute on chronic debility due to  chronic illness . The patient's latest AMPAC (Activity Measure for Post Acute Care) Score is listed below.    AM-PAC Score - How much help does the patient need for each activity listed  Basic Mobility Total Score: 15  Turning over in bed (including adjusting bedclothes, sheets and blankets)?: A little  Sitting down on and standing up from a chair with arms (e.g., wheelchair, bedside commode, etc.): A little  Moving from lying on back to sitting on the side of the bed?: A little  Moving to and from a bed to a chair (including a wheelchair)?: A little  Need to walk in hospital room?: A lot  Climbing 3-5 steps with a railing?: Unable    Plan  - Progressive mobility protocol initated  - PT/OT consulted  - Fall precautions in place            Chronic hypoxic respiratory failure  Patient with reported chronic hypoxemia requiring home O2 via nasal cannula.  O2 sats currently greater than 95% on 3 L O2 via nasal cannula.    History of cerebrovascular accident (CVA) with residual deficit  At baseline.  Continue BP control.      Hepatic dysfunction  Appears at baseline.  Continue home medications.      Leukocytosis  White count 16.7 today.  This appears to be above patient's baseline of 18-20 K. patient denies recent fever and is afebrile today.  Lactic acid ordered.  Ultrasound soft tissue left lower  extremity ordered to rule out abscess.  Continue close monitoring with daily labs.      Hyperbilirubinemia  Bilirubin 5.4 this morning.  This appears to be decreased from previous admission.  Continue monitoring with daily labs.      ESRD (end stage renal disease) on dialysis  Creatine stable for now. BMP reviewed- noted Estimated Creatinine Clearance: 20.8 mL/min (A) (based on SCr of 3.7 mg/dL (H)). according to latest data. Based on current GFR, CKD stage is end stage.  Monitor UOP and serial BMP and adjust therapy as needed. Renally dose meds. Avoid nephrotoxic medications and procedures.  Continue dialysis per renal.    Heart failure with preserved ejection fraction  Patient currently without evidence of volume overload.  No acute exacerbation noted.  Continue home medications and volume removal with HD.  Monitor volume status closely.      GERD (gastroesophageal reflux disease)  Continue famotidine.      Hyperlipidemia associated with type 2 diabetes mellitus  Holding statin at this time secondary to hepatic dysfunction.      Hypertension associated with diabetes  Resume home medications.  Monitor blood blood pressures closely.  Adjust medications as needed.        VTE Risk Mitigation (From admission, onward)           Ordered     IP VTE HIGH RISK PATIENT  Once         12/12/24 1509     Place sequential compression device  Until discontinued         12/12/24 1509     Place KADIE hose  Until discontinued         12/12/24 1509                    Discharge Planning   SHASHA:      Code Status: Full Code   Medical Readiness for Discharge Date:   Discharge Plan A: Home with family, Home                Please place Justification for DME        Russell Cosby MD  Department of Hospital Medicine   [facility]

## 2024-12-14 NOTE — HOSPITAL COURSE
12/14/24: Pt seen this am, s/p HD on 12/13, overall doing better.  this am. Pending soft tissue US to be done sometime this weekend for left thigh wound, minimal drainage today not currently on abx. Afebrile with improving leukocytosis. Patient requesting something to help with leg pain.   12/15/24: Patient afebrile with persistent leukocytosis, blood cultures prelim result today show gram +ve cocci in clusters resembling staph. She does have this left thigh wound that may be the source. TTE order placed as well and started on Vanc pending culture sensitization. Labs worsening, will likely require HD today or Monday.   12/16 KD:  Afebrile WBC 15.22-cont trending down.  1st-BC positive, 2nd & 3rd-BC- neg . Continue IV Rocephin.  Awaiting results of Doppler ultrasound to right upper thigh to r/o infection.  Echo today.   12/17 KD: Pt. Afebrile, awaiting GS consult for left thigh wound recs. wound culture today. WBC 13.58 today (12/16/24 15.22). H&H 8.0/23.8 today (12/16/24 8.9/26.8). Echo- cardiac dysfunction noted, f/u with cardiology after hospital d/c.

## 2024-12-14 NOTE — ASSESSMENT & PLAN NOTE
Patient alert this am. BG better, now at 159. Continue D10 continuous IV fluid.  We will transition to regular diet.  Continue close monitoring.

## 2024-12-14 NOTE — ASSESSMENT & PLAN NOTE
Patient with Acute on chronic debility due to  chronic illness . The patient's latest AMPAC (Activity Measure for Post Acute Care) Score is listed below.    AM-PAC Score - How much help does the patient need for each activity listed  Basic Mobility Total Score: 15  Turning over in bed (including adjusting bedclothes, sheets and blankets)?: A little  Sitting down on and standing up from a chair with arms (e.g., wheelchair, bedside commode, etc.): A little  Moving from lying on back to sitting on the side of the bed?: A little  Moving to and from a bed to a chair (including a wheelchair)?: A little  Need to walk in hospital room?: A lot  Climbing 3-5 steps with a railing?: Unable    Plan  - Progressive mobility protocol initated  - PT/OT consulted  - Fall precautions in place

## 2024-12-14 NOTE — SUBJECTIVE & OBJECTIVE
Interval History: Patient seen and examined this morning. No acute events overnight. BG better controlled. Patient endorses some left leg pain but denies fever, rash chills, nausea, vomiting, chest pain, SOB, abdominal pain, headaches, frequency, dysuria, hematuria, diarrhea or constipation.       Review of Systems   Constitutional:  Negative for activity change, appetite change, chills, diaphoresis, fatigue and fever.   HENT:  Negative for congestion, ear pain, postnasal drip, rhinorrhea, sinus pressure, sinus pain, sore throat and trouble swallowing.    Eyes:  Negative for photophobia, pain and visual disturbance.   Respiratory:  Negative for cough, chest tightness, shortness of breath and wheezing.    Cardiovascular:  Positive for leg swelling. Negative for chest pain and palpitations.   Gastrointestinal:  Negative for abdominal distention, abdominal pain, blood in stool, constipation, diarrhea, nausea and vomiting.   Endocrine: Negative for polydipsia, polyphagia and polyuria.   Genitourinary:  Negative for decreased urine volume, difficulty urinating, dysuria, flank pain, frequency, hematuria and urgency.   Musculoskeletal:  Negative for arthralgias and myalgias.   Skin:  Positive for wound. Negative for color change and rash.        Positive for itching   Allergic/Immunologic: Negative.    Neurological:  Negative for dizziness, seizures, syncope, speech difficulty, weakness, light-headedness and headaches.   Hematological:  Bruises/bleeds easily.   Psychiatric/Behavioral:  Negative for agitation, confusion, sleep disturbance and suicidal ideas. The patient is not nervous/anxious.      Objective:     Vital Signs (Most Recent):  Temp: 99.3 °F (37.4 °C) (12/14/24 0744)  Pulse: 83 (12/14/24 0744)  Resp: 18 (12/14/24 0729)  BP: (!) 187/98 (12/14/24 0744)  SpO2: 98 % (12/14/24 0744) Vital Signs (24h Range):  Temp:  [97.8 °F (36.6 °C)-99.3 °F (37.4 °C)] 99.3 °F (37.4 °C)  Pulse:  [70-90] 83  Resp:  [16-20] 18  SpO2:   [97 %-99 %] 98 %  BP: (160-187)/() 187/98     Weight: 89.8 kg (198 lb)  Body mass index is 33.99 kg/m².    Intake/Output Summary (Last 24 hours) at 2024 0904  Last data filed at 2024 0516  Gross per 24 hour   Intake 3030.18 ml   Output 2401 ml   Net 629.18 ml         Physical Exam  Vitals and nursing note reviewed.   Constitutional:       General: She is not in acute distress.     Appearance: Normal appearance. She is obese. She is not ill-appearing.   HENT:      Head: Normocephalic and atraumatic.      Nose: Nose normal. No congestion or rhinorrhea.      Mouth/Throat:      Mouth: Mucous membranes are moist.      Pharynx: Oropharynx is clear. No oropharyngeal exudate or posterior oropharyngeal erythema.   Eyes:      General: No scleral icterus.     Extraocular Movements: Extraocular movements intact.      Conjunctiva/sclera: Conjunctivae normal.      Pupils: Pupils are equal, round, and reactive to light.   Cardiovascular:      Rate and Rhythm: Normal rate and regular rhythm.      Pulses: Normal pulses.      Heart sounds: Normal heart sounds. No murmur heard.  Pulmonary:      Effort: Pulmonary effort is normal. No respiratory distress.      Breath sounds: Normal breath sounds. No wheezing, rhonchi or rales.   Abdominal:      General: Bowel sounds are normal. There is no distension.      Palpations: Abdomen is soft.      Tenderness: There is no abdominal tenderness. There is no guarding or rebound.   Musculoskeletal:         General: Normal range of motion.      Cervical back: Normal range of motion and neck supple. No tenderness.      Right lower leg: 3+ Edema present.      Left lower le+ Edema present.   Lymphadenopathy:      Cervical: No cervical adenopathy.   Skin:     General: Skin is warm and dry.          Neurological:      General: No focal deficit present.      Mental Status: She is alert and oriented to person, place, and time.      Cranial Nerves: No cranial nerve deficit.      Motor:  "No weakness.   Psychiatric:         Mood and Affect: Mood normal.         Behavior: Behavior normal.         Thought Content: Thought content normal.         Judgment: Judgment normal.             Recent Labs   Lab 12/12/24  0942 12/13/24  0533 12/14/24  0536   WBC 19.04* 28.53* 16.71*   HGB 8.9* 8.7* 8.9*   HCT 28.3* 27.0* 27.5*   MCV 97 94 94   RBC 2.91* 2.87* 2.93*   MCH 30.6 30.3 30.4   MCHC 31.4* 32.2 32.4   RDW 19.9* 18.8* 18.6*    298 245   MPV 10.6 11.2 11.5   GRAN 76.7*  14.6* 82.0* 74.2*  12.4*   LYMPH 9.9*  1.9 5.0*  CANCELED 10.0*  1.7   MONO 10.7  2.0* 7.0  CANCELED 9.4  1.6*   EOSINOPHIL 1.4 2.0 5.0   BASOPHIL 0.7 0.0 0.6       Recent Labs   Lab 12/12/24  0942 12/12/24  1753 12/13/24  0532 12/13/24  0533 12/14/24  0536   * 135*  --  131* 132*   K 4.4 4.6  --  5.3* 5.6*    100  --  95 95   CO2 29 28  --  25 29   BUN 35* 40*  --  43* 38*   CREATININE 3.5* 3.6*  --  3.9* 3.7*   * 73  --  79 159*   CALCIUM 8.7 8.7  --  8.8 8.8   PROT 8.1  --   --  8.4 8.1   ALBUMIN 1.8*  --   --  1.9* 1.8*   ALKPHOS 508*  --   --  485* 502*   BILITOT 5.4*  --   --  5.6* 5.4*   ALT 64*  --   --  61* 56*   *  --   --  97* 81*   ANIONGAP 3 7  --  11 8   MG 1.7  --  1.6  --  1.8       No results for input(s): "COLORU", "APPEARANCEUA", "PHUR", "SPECGRAV", "PROTEINUA", "GLUCUA", "KETONESU", "BILIRUBINUA", "OCCULTUA", "UROBILINOGEN", "NITRITE", "LEUKOCYTESUR", "RBCUA", "WBCUA", "BACTERIA", "SQUAMEPITHEL", "HYALINECASTS", "GRANULARCAST", "MICROCMT" in the last 168 hours.    Invalid input(s): "SPECIMENU", "AMORPHOUSU"    Recent Labs   Lab 12/12/24  0949   PH 7.390   PCO2 48.0*   PO2 97.3   HCO3 27.5   POCSATURATED 98.5       No results for input(s): "TROPONINI", "CPK", "CPKMB" in the last 168 hours.    No results for input(s): "PT", "INR", "APTT" in the last 168 hours.    Lab Results   Component Value Date    HGBA1C 4.5 12/12/2024       No results for input(s): "TSH", "O3ZRMXR", "A8MAQBU", " ""THYROIDAB", "FREET4" in the last 168 hours.    Microbiology Results (last 7 days)       Procedure Component Value Units Date/Time    Blood culture [[id number]] Collected: 12/13/24 0956    Order Status: Completed Specimen: Blood from Peripheral, Hand, Right Updated: 12/14/24 0315     Blood Culture, Routine No Growth to date    Blood culture [[id number]] Collected: 12/13/24 1010    Order Status: Completed Specimen: Blood from Peripheral, Wrist, Right Updated: 12/14/24 0315     Blood Culture, Routine No Growth to date            CTA Head and Neck (xpd)    Result Date: 12/12/2024  EXAMINATION: CTA HEAD AND NECK (XPD) CLINICAL HISTORY: Altered level of consciousness stroke, follow up; TECHNIQUE: Helical CT images obtained from the skull vertex to the thoracic inlet after intravenous administration of contrast according to CTA protocol.  Maximum intensity projection images were evaluated in multiple planes along with the source images. Iterative reconstruction technique was used. NASCET criteria utilized for stenoses measurements.  CT/Cardiac nuclear examinations in the past 12 months: 5 COMPARISON: CTA head 07/13/2024 FINDINGS: Vasculature: Visualized portions of the aortic arch are widely patent.  The right subclavian artery is obscured by contrast bolus.  The brachiocephalic artery and left subclavian artery are widely patent.  The origin of the right vertebral artery is obscured by artifact related to contrast bolus.  Visualized portions of bilateral vertebral arteries appear patent.  Streak artifact through the proximal aspect of the right common carotid artery.  Allowing for this, bilateral common carotid arteries appear widely patent.  Motion artifact through the right carotid bulb.  The right external carotid artery and its branches appear widely patent.  No definite focal stenosis of the cervical right internal carotid artery.  Mild atherosclerotic calcification at the left carotid bulb.  The left external " carotid artery and its branches are widely patent.  Cervical portions of the left internal carotid artery are obscured partially by motion artifact with no definite focal stenosis identified. Petrous and cavernous portions of bilateral internal carotid arteries are patent.  Mild circumferential atherosclerotic calcification of bilateral carotid siphons without a detected significant focal stenosis.  Bilateral middle and anterior cerebral arteries are patent.  No detected evidence for aneurysm, stenosis, or occlusion within the anterior or posterior circulation. Bilateral vertebral arteries supply a widely patent basilar artery.  Intact bilateral superior cerebellar and posterior cerebral arteries are seen arising from the distal basilar artery.  Posterior communicating arteries are not seen which may be related to congenital absence or hypoplasia. Remaining structures: Neck: Motion artifact through the imaged upper lungs which demonstrate diffuse ground-glass attenuation and trace fluid in the right major fissure.  Clustered subcentimeter calcifications in the right upper lobe.  Subcentimeter calcified nodule within the right thyroid lobe.  Bilateral submandibular and parotid glands are symmetric and unremarkable.  Mucosal surfaces of the nasopharynx and oropharynx are relatively symmetric.  Epiglottis is normal in appearance.  No detected cervical mass or evidence of cervical adenopathy.  There is diffuse anasarca. Head: No midline shift or mass effect detected.  No enhancing intracranial mass is identified.     1. Motion artifact through portions of the neck with no significant carotid artery stenosis identified.  Mild carotid atherosclerotic calcifications are present. 2. No detected evidence for intracranial aneurysm, stenosis, or vaso occlusive disease within the anterior or posterior circulation. 3. Diffuse ground-glass densities within the imaged upper lungs with fluid in the right major fissure.  Pattern is  suspect for pulmonary edema. 4. Diffuse anasarca. Electronically signed by: Olegario Quiñones MD Date:    12/12/2024 Time:    10:37    CT Head Without Contrast    Result Date: 12/12/2024  EXAMINATION: CT HEAD WITHOUT CONTRAST CLINICAL HISTORY: Altered level of consciousness mental status change, unknown cause; TECHNIQUE: Axial CT images were obtained from the skull base to the vertex without contrast. Iterative reconstruction technique was used.  CT/Cardiac nuclear examinations in the past 12 months: 4 COMPARISON: MRI brain 07/16/2024 and CT head 07/13/2024 FINDINGS: No ventricular or basal cistern effacement.  No evidence of acute intracranial hemorrhage, midline shift, mass, or mass effect.  No detected extra-axial fluid collections.  Punctate bilateral basal ganglia calcifications.  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  Trace mucosal thickening right maxillary sinus.  Mastoid air cells are clear.  Calvarium is intact.     No acute intracranial process detected. Electronically signed by: Olegario Quiñones MD Date:    12/12/2024 Time:    10:27    US Pelvis Comp with Transvag NON-OB (xpd)    Result Date: 12/2/2024  EXAMINATION: US PELVIS COMP WITH TRANSVAG NON-OB (XPD) CLINICAL HISTORY: evaluate complete expulsion of the IUD and the evaluate the endometrial stripe; TECHNIQUE: Transabdominal sonography of the pelvis was performed, followed by transvaginal sonography to better evaluate the uterus and ovaries. COMPARISON: CT chest abdomen pelvis 10/30/2024 FINDINGS: Uterus: Size: 13.9 x 4.3 x 5.9 cm Masses: 3.6 x 2.3 x 2.5 cm hypoechoic lesion, likely a leiomyoma. Endometrium: Measures up to 6 mm near the fundus.  No IUD is visualized.  Large amount of heterogeneously echogenic products noted within the cervix.  Notably blood products expelled during the examination and limits transvaginal evaluation. Right ovary: Size: 2.8 x 1.9 x 2.8 cm Appearance: Normal Vascular flow: Normal. Left ovary: Size: 2.7 x 1.9 x  1.9 cm Appearance: Normal Vascular Flow: Normal. Free Fluid: None.     Heterogeneous echogenic products within the cervix limits transvaginal evaluation.  Blood products were expelled during the examination and consistent with heterogeneous products within the cervix.  No IUD was visualized.  Follow-up examination is recommended.  MRI may be beneficial for further evaluation as clinically indicated. Small uterine leiomyoma. Electronically signed by resident: Angela Doe Date:    12/02/2024 Time:    06:11 Electronically signed by: Bobby Gutierrez MD Date:    12/02/2024 Time:    08:09    X-Ray Chest 1 View    Result Date: 11/30/2024  EXAMINATION: XR CHEST 1 VIEW CLINICAL HISTORY: Chest pain, unspecified COMPARISON: Portable chest 10/29/2024. FINDINGS: Frontal chest radiograph demonstrates clear lungs.  Small amount of left pleural fluid.  Moderate enlargement of cardiac silhouette.  No osseous abnormality.     1. Moderate enlargement of cardiac silhouette, may reflect chamber enlargement. 2. Small left pleural effusion. Electronically signed by: Jorge Burdick MD Date:    11/30/2024 Time:    13:12

## 2024-12-15 PROBLEM — R78.81 BACTEREMIA: Status: ACTIVE | Noted: 2024-12-15

## 2024-12-15 LAB
ALBUMIN SERPL BCP-MCNC: 1.8 G/DL (ref 3.5–5.2)
ALP SERPL-CCNC: 495 U/L (ref 55–135)
ALT SERPL W/O P-5'-P-CCNC: 45 U/L (ref 10–44)
ANION GAP SERPL CALC-SCNC: 8 MMOL/L (ref 3–11)
AST SERPL-CCNC: 64 U/L (ref 10–40)
BASOPHILS # BLD AUTO: 0.15 K/UL (ref 0–0.2)
BASOPHILS NFR BLD: 0.9 % (ref 0–1.9)
BILIRUB SERPL-MCNC: 4.8 MG/DL (ref 0.1–1)
BUN SERPL-MCNC: 49 MG/DL (ref 6–20)
CALCIUM SERPL-MCNC: 9.1 MG/DL (ref 8.7–10.5)
CHLORIDE SERPL-SCNC: 94 MMOL/L (ref 95–110)
CO2 SERPL-SCNC: 28 MMOL/L (ref 23–29)
CREAT SERPL-MCNC: 4.3 MG/DL (ref 0.5–1.4)
DIFFERENTIAL METHOD BLD: ABNORMAL
EOSINOPHIL # BLD AUTO: 1 K/UL (ref 0–0.5)
EOSINOPHIL NFR BLD: 6 % (ref 0–8)
ERYTHROCYTE [DISTWIDTH] IN BLOOD BY AUTOMATED COUNT: 17.5 % (ref 11.5–14.5)
EST. GFR  (NO RACE VARIABLE): 12.3 ML/MIN/1.73 M^2
GLUCOSE SERPL-MCNC: 174 MG/DL (ref 70–110)
GLUCOSE SERPL-MCNC: 242 MG/DL (ref 70–110)
HCT VFR BLD AUTO: 26.3 % (ref 37–48.5)
HGB BLD-MCNC: 8.9 G/DL (ref 12–16)
IMM GRANULOCYTES # BLD AUTO: 0.17 K/UL (ref 0–0.04)
IMM GRANULOCYTES NFR BLD AUTO: 1 % (ref 0–0.5)
LYMPHOCYTES # BLD AUTO: 1.6 K/UL (ref 1–4.8)
LYMPHOCYTES NFR BLD: 9.2 % (ref 18–48)
MAGNESIUM SERPL-MCNC: 1.8 MG/DL (ref 1.6–2.6)
MCH RBC QN AUTO: 31.2 PG (ref 27–31)
MCHC RBC AUTO-ENTMCNC: 33.8 G/DL (ref 32–36)
MCV RBC AUTO: 92 FL (ref 82–98)
MONOCYTES # BLD AUTO: 1.9 K/UL (ref 0.3–1)
MONOCYTES NFR BLD: 11 % (ref 4–15)
MRSA ID BY PCR: NEGATIVE
NEUTROPHILS # BLD AUTO: 12.4 K/UL (ref 1.8–7.7)
NEUTROPHILS NFR BLD: 71.9 % (ref 38–73)
NRBC BLD-RTO: 0 /100 WBC
PLATELET # BLD AUTO: 290 K/UL (ref 150–450)
PMV BLD AUTO: 11 FL (ref 9.2–12.9)
POCT GLUCOSE: 171 MG/DL (ref 70–110)
POCT GLUCOSE: 231 MG/DL (ref 70–110)
POCT GLUCOSE: 293 MG/DL (ref 70–110)
POTASSIUM SERPL-SCNC: 5.5 MMOL/L (ref 3.5–5.1)
PROT SERPL-MCNC: 8.1 G/DL (ref 6–8.4)
RBC # BLD AUTO: 2.85 M/UL (ref 4–5.4)
SODIUM SERPL-SCNC: 130 MMOL/L (ref 136–145)
STAPH AUREUS ID BY PCR: NEGATIVE
WBC # BLD AUTO: 17.2 K/UL (ref 3.9–12.7)

## 2024-12-15 PROCEDURE — 25000003 PHARM REV CODE 250: Performed by: STUDENT IN AN ORGANIZED HEALTH CARE EDUCATION/TRAINING PROGRAM

## 2024-12-15 PROCEDURE — 36415 COLL VENOUS BLD VENIPUNCTURE: CPT

## 2024-12-15 PROCEDURE — 25000003 PHARM REV CODE 250

## 2024-12-15 PROCEDURE — 83735 ASSAY OF MAGNESIUM: CPT

## 2024-12-15 PROCEDURE — 99900035 HC TECH TIME PER 15 MIN (STAT)

## 2024-12-15 PROCEDURE — 63600175 PHARM REV CODE 636 W HCPCS: Performed by: STUDENT IN AN ORGANIZED HEALTH CARE EDUCATION/TRAINING PROGRAM

## 2024-12-15 PROCEDURE — 21400001 HC TELEMETRY ROOM

## 2024-12-15 PROCEDURE — 85025 COMPLETE CBC W/AUTO DIFF WBC: CPT

## 2024-12-15 PROCEDURE — 25000003 PHARM REV CODE 250: Performed by: INTERNAL MEDICINE

## 2024-12-15 PROCEDURE — 99900031 HC PATIENT EDUCATION (STAT)

## 2024-12-15 PROCEDURE — 27000221 HC OXYGEN, UP TO 24 HOURS

## 2024-12-15 PROCEDURE — 87040 BLOOD CULTURE FOR BACTERIA: CPT | Performed by: STUDENT IN AN ORGANIZED HEALTH CARE EDUCATION/TRAINING PROGRAM

## 2024-12-15 PROCEDURE — 80053 COMPREHEN METABOLIC PANEL: CPT

## 2024-12-15 PROCEDURE — 63600175 PHARM REV CODE 636 W HCPCS

## 2024-12-15 PROCEDURE — 94761 N-INVAS EAR/PLS OXIMETRY MLT: CPT

## 2024-12-15 PROCEDURE — 36415 COLL VENOUS BLD VENIPUNCTURE: CPT | Performed by: STUDENT IN AN ORGANIZED HEALTH CARE EDUCATION/TRAINING PROGRAM

## 2024-12-15 RX ORDER — FAMOTIDINE 20 MG/1
20 TABLET, FILM COATED ORAL EVERY OTHER DAY
Status: DISCONTINUED | OUTPATIENT
Start: 2024-12-16 | End: 2024-12-18 | Stop reason: HOSPADM

## 2024-12-15 RX ORDER — VANCOMYCIN 1.75 GRAM/500 ML IN 0.9 % SODIUM CHLORIDE INTRAVENOUS
20 ONCE
Status: COMPLETED | OUTPATIENT
Start: 2024-12-15 | End: 2024-12-15

## 2024-12-15 RX ORDER — HYDROCODONE BITARTRATE AND ACETAMINOPHEN 5; 325 MG/1; MG/1
1 TABLET ORAL EVERY 8 HOURS PRN
Status: DISCONTINUED | OUTPATIENT
Start: 2024-12-15 | End: 2024-12-17

## 2024-12-15 RX ORDER — MUPIROCIN 20 MG/G
OINTMENT TOPICAL 2 TIMES DAILY
Status: DISCONTINUED | OUTPATIENT
Start: 2024-12-15 | End: 2024-12-18 | Stop reason: HOSPADM

## 2024-12-15 RX ADMIN — URSODIOL 300 MG: 300 CAPSULE ORAL at 09:12

## 2024-12-15 RX ADMIN — HYDROCODONE BITARTRATE AND ACETAMINOPHEN 1 TABLET: 5; 325 TABLET ORAL at 09:12

## 2024-12-15 RX ADMIN — HYDRALAZINE HYDROCHLORIDE 10 MG: 20 INJECTION INTRAMUSCULAR; INTRAVENOUS at 05:12

## 2024-12-15 RX ADMIN — GUAIFENESIN AND DEXTROMETHORPHAN 5 ML: 20; 200 SYRUP ORAL at 10:12

## 2024-12-15 RX ADMIN — LACTULOSE 20 G: 20 SOLUTION ORAL at 09:12

## 2024-12-15 RX ADMIN — LOSARTAN POTASSIUM 100 MG: 50 TABLET, FILM COATED ORAL at 09:12

## 2024-12-15 RX ADMIN — METOPROLOL TARTRATE 100 MG: 100 TABLET, FILM COATED ORAL at 09:12

## 2024-12-15 RX ADMIN — HYDRALAZINE HYDROCHLORIDE 50 MG: 50 TABLET ORAL at 09:12

## 2024-12-15 RX ADMIN — GUAIFENESIN AND DEXTROMETHORPHAN 5 ML: 20; 200 SYRUP ORAL at 09:12

## 2024-12-15 RX ADMIN — URSODIOL 300 MG: 300 CAPSULE ORAL at 03:12

## 2024-12-15 RX ADMIN — ASPIRIN 81 MG: 81 TABLET, COATED ORAL at 09:12

## 2024-12-15 RX ADMIN — MUPIROCIN: 20 OINTMENT TOPICAL at 09:12

## 2024-12-15 RX ADMIN — MEDROXYPROGESTERONE ACETATE 20 MG: 10 TABLET ORAL at 09:12

## 2024-12-15 RX ADMIN — Medication 1750 MG: at 11:12

## 2024-12-15 NOTE — PROGRESS NOTES
Pharmacokinetic Initial Assessment: IV Vancomycin    Assessment/Plan:    Initiate intravenous vancomycin with loading dose of 1750 mg once with subsequent doses when random concentrations are less than 20 mcg/mL  Desired empiric serum trough concentration is 15 to 20 mcg/mL  Draw vancomycin random level on 12/16/2024 at 0430.  Pharmacy will continue to follow and monitor vancomycin.      Please contact pharmacy at extension [phone] with any questions regarding this assessment.     Thank you for the consult,   Staci Boyle       Patient brief summary:  Pilar Fernandez is a 44 y.o. female initiated on antimicrobial therapy with IV Vancomycin for treatment of suspected bacteremia    Drug Allergies:   Review of patient's allergies indicates:   Allergen Reactions    Tramadol Itching and Nausea And Vomiting       Actual Body Weight:   89.8 kg    BMI: 33.99 kg/m2    Renal Function:   Estimated Creatinine Clearance: 17.9 mL/min (A) (based on SCr of 4.3 mg/dL (H)).,     Dialysis Method (if applicable):  intermittent HD - MWF    CBC (last 72 hours):  Recent Labs   Lab Result Units 12/12/24  0942 12/13/24  0533 12/14/24  0536 12/15/24  0548   WBC K/uL 19.04* 28.53* 16.71* 17.20*   Hemoglobin g/dL 8.9* 8.7* 8.9* 8.9*   Hemoglobin A1C % 4.5  --   --   --    Hematocrit % 28.3* 27.0* 27.5* 26.3*   Platelets K/uL 254 298 245 290   Gran % % 76.7* 82.0* 74.2* 71.9   Lymph % % 9.9* 5.0* 10.0* 9.2*   Mono % % 10.7 7.0 9.4 11.0   Eosinophil % % 1.4 2.0 5.0 6.0   Basophil % % 0.7 0.0 0.6 0.9   Differential Method  Automated Manual Automated Automated       Metabolic Panel (last 72 hours):  Recent Labs   Lab Result Units 12/12/24  0942 12/12/24  1026 12/12/24  1753 12/13/24  0532 12/13/24  0533 12/14/24  0536 12/15/24  0548   Sodium mmol/L 134*  --  135*  --  131* 132* 130*   Potassium mmol/L 4.4  --  4.6  --  5.3* 5.6* 5.5*   Chloride mmol/L 102  --  100  --  95 95 94*   CO2 mmol/L 29  --  28  --  25 29 28   Glucose mg/dL 175*   "--  73  --  79 159* 174*   BUN mg/dL 35*  --  40*  --  43* 38* 49*   Creatinine mg/dL 3.5*  --  3.6*  --  3.9* 3.7* 4.3*   Creatinine, Urine mg/dL  --  <13.0*  --   --   --   --   --    Albumin g/dL 1.8*  --   --   --  1.9* 1.8* 1.8*   Total Bilirubin mg/dL 5.4*  --   --   --  5.6* 5.4* 4.8*   Alkaline Phosphatase U/L 508*  --   --   --  485* 502* 495*   AST U/L 113*  --   --   --  97* 81* 64*   ALT U/L 64*  --   --   --  61* 56* 45*   Magnesium mg/dL 1.7  --   --  1.6  --  1.8 1.8       Drug levels (last 3 results):  No results for input(s): "VANCOMYCINRA", "VANCORANDOM", "VANCOMYCINPE", "VANCOPEAK", "VANCOMYCINTR", "VANCOTROUGH" in the last 72 hours.    Microbiologic Results:  Microbiology Results (last 7 days)       Procedure Component Value Units Date/Time    Blood culture [[id number]]     Order Status: Sent Specimen: Blood     Blood culture [[id number]]     Order Status: Sent Specimen: Blood     Blood culture [[id number]] Collected: 12/13/24 1010    Order Status: Completed Specimen: Blood from Peripheral, Wrist, Right Updated: 12/15/24 0720     Blood Culture, Routine Gram stain peds bottle: Gram positive cocci in clusters resembling Staph      Results called to and read back by: TYLER Jernigan 12/15/2024  07:20    MRSA/SA Rapid ID by PCR from Blood culture [[id number]] Collected: 12/13/24 1010    Order Status: No result Updated: 12/15/24 0642    Blood culture [[id number]] Collected: 12/13/24 0956    Order Status: Completed Specimen: Blood from Peripheral, Hand, Right Updated: 12/14/24 2212     Blood Culture, Routine No Growth to date      No Growth to date            "

## 2024-12-15 NOTE — SUBJECTIVE & OBJECTIVE
Interval History: Patient seen and examined by me    Review of Systems   Constitutional:  Negative for activity change, appetite change, chills, diaphoresis, fatigue and fever.   HENT:  Negative for congestion, ear pain, postnasal drip, rhinorrhea, sinus pressure, sinus pain, sore throat and trouble swallowing.    Eyes:  Negative for photophobia, pain and visual disturbance.   Respiratory:  Negative for cough, chest tightness, shortness of breath and wheezing.    Cardiovascular:  Positive for leg swelling. Negative for chest pain and palpitations.   Gastrointestinal:  Negative for abdominal distention, abdominal pain, blood in stool, constipation, diarrhea, nausea and vomiting.   Endocrine: Negative for polydipsia, polyphagia and polyuria.   Genitourinary:  Negative for decreased urine volume, difficulty urinating, dysuria, flank pain, frequency, hematuria and urgency.   Musculoskeletal:  Negative for arthralgias and myalgias.   Skin:  Positive for wound. Negative for color change and rash.        Positive for itching   Allergic/Immunologic: Negative.    Neurological:  Negative for dizziness, seizures, syncope, speech difficulty, weakness, light-headedness and headaches.   Hematological:  Bruises/bleeds easily.   Psychiatric/Behavioral:  Negative for agitation, confusion, sleep disturbance and suicidal ideas. The patient is not nervous/anxious.      Objective:     Vital Signs (Most Recent):  Temp: 98.6 °F (37 °C) (12/15/24 0424)  Pulse: 78 (12/15/24 0725)  Resp: 18 (12/15/24 0725)  BP: (!) 184/91 (12/15/24 0514)  SpO2: 99 % (12/15/24 0725) Vital Signs (24h Range):  Temp:  [97.8 °F (36.6 °C)-98.6 °F (37 °C)] 98.6 °F (37 °C)  Pulse:  [76-88] 78  Resp:  [16-20] 18  SpO2:  [98 %-100 %] 99 %  BP: (152-198)/() 184/91     Weight: 89.8 kg (198 lb)  Body mass index is 33.99 kg/m².    Intake/Output Summary (Last 24 hours) at 12/15/2024 0811  Last data filed at 12/15/2024 0507  Gross per 24 hour   Intake 500 ml   Output  --   Net 500 ml         Physical Exam  Vitals and nursing note reviewed.   Constitutional:       General: She is not in acute distress.     Appearance: Normal appearance. She is obese. She is not ill-appearing.   HENT:      Head: Normocephalic and atraumatic.      Nose: Nose normal. No congestion or rhinorrhea.      Mouth/Throat:      Mouth: Mucous membranes are moist.      Pharynx: Oropharynx is clear. No oropharyngeal exudate or posterior oropharyngeal erythema.   Eyes:      General: No scleral icterus.     Extraocular Movements: Extraocular movements intact.      Conjunctiva/sclera: Conjunctivae normal.      Pupils: Pupils are equal, round, and reactive to light.   Cardiovascular:      Rate and Rhythm: Normal rate and regular rhythm.      Pulses: Normal pulses.      Heart sounds: Normal heart sounds. No murmur heard.  Pulmonary:      Effort: Pulmonary effort is normal. No respiratory distress.      Breath sounds: Normal breath sounds. No wheezing, rhonchi or rales.   Abdominal:      General: Bowel sounds are normal. There is no distension.      Palpations: Abdomen is soft.      Tenderness: There is no abdominal tenderness. There is no guarding or rebound.   Musculoskeletal:         General: Normal range of motion.      Cervical back: Normal range of motion and neck supple. No tenderness.      Right lower leg: 3+ Edema present.      Left lower le+ Edema present.   Lymphadenopathy:      Cervical: No cervical adenopathy.   Skin:     General: Skin is warm and dry.          Neurological:      General: No focal deficit present.      Mental Status: She is alert and oriented to person, place, and time.      Cranial Nerves: No cranial nerve deficit.      Motor: No weakness.   Psychiatric:         Mood and Affect: Mood normal.         Behavior: Behavior normal.         Thought Content: Thought content normal.         Judgment: Judgment normal.             Recent Labs   Lab 24  0942 24  0533 24  0536  "12/15/24  0548   WBC 19.04* 28.53* 16.71* 17.20*   HGB 8.9* 8.7* 8.9* 8.9*   HCT 28.3* 27.0* 27.5* 26.3*   MCV 97 94 94 92   RBC 2.91* 2.87* 2.93* 2.85*   MCH 30.6 30.3 30.4 31.2*   MCHC 31.4* 32.2 32.4 33.8   RDW 19.9* 18.8* 18.6* 17.5*    298 245 290   MPV 10.6 11.2 11.5 11.0   GRAN 76.7*  14.6* 82.0* 74.2*  12.4* 71.9  12.4*   LYMPH 9.9*  1.9 5.0*  CANCELED 10.0*  1.7 9.2*  1.6   MONO 10.7  2.0* 7.0  CANCELED 9.4  1.6* 11.0  1.9*   EOSINOPHIL 1.4 2.0 5.0 6.0   BASOPHIL 0.7 0.0 0.6 0.9       Recent Labs   Lab 12/12/24  0942 12/12/24  1753 12/13/24  0532 12/13/24  0533 12/14/24  0536 12/15/24  0548   * 135*  --  131* 132* 130*   K 4.4 4.6  --  5.3* 5.6* 5.5*    100  --  95 95 94*   CO2 29 28  --  25 29 28   BUN 35* 40*  --  43* 38* 49*   CREATININE 3.5* 3.6*  --  3.9* 3.7* 4.3*   * 73  --  79 159* 174*   CALCIUM 8.7 8.7  --  8.8 8.8 9.1   PROT 8.1  --   --  8.4 8.1 8.1   ALBUMIN 1.8*  --   --  1.9* 1.8* 1.8*   ALKPHOS 508*  --   --  485* 502* 495*   BILITOT 5.4*  --   --  5.6* 5.4* 4.8*   ALT 64*  --   --  61* 56* 45*   *  --   --  97* 81* 64*   ANIONGAP 3 7  --  11 8 8   MG 1.7  --  1.6  --  1.8 1.8       No results for input(s): "COLORU", "APPEARANCEUA", "PHUR", "SPECGRAV", "PROTEINUA", "GLUCUA", "KETONESU", "BILIRUBINUA", "OCCULTUA", "UROBILINOGEN", "NITRITE", "LEUKOCYTESUR", "RBCUA", "WBCUA", "BACTERIA", "SQUAMEPITHEL", "HYALINECASTS", "GRANULARCAST", "MICROCMT" in the last 168 hours.    Invalid input(s): "SPECIMENU", "AMORPHOUSU"    Recent Labs   Lab 12/12/24  0949   PH 7.390   PCO2 48.0*   PO2 97.3   HCO3 27.5   POCSATURATED 98.5       No results for input(s): "TROPONINI", "CPK", "CPKMB" in the last 168 hours.    No results for input(s): "PT", "INR", "APTT" in the last 168 hours.    Lab Results   Component Value Date    HGBA1C 4.5 12/12/2024       No results for input(s): "TSH", "S0JIBBF", "J4ZIJNN", "THYROIDAB", "FREET4" in the last 168 hours.    Microbiology " Results (last 7 days)       Procedure Component Value Units Date/Time    Blood culture [[id number]]     Order Status: Sent Specimen: Blood     Blood culture [[id number]]     Order Status: Sent Specimen: Blood     Blood culture [[id number]] Collected: 12/13/24 1010    Order Status: Completed Specimen: Blood from Peripheral, Wrist, Right Updated: 12/15/24 0720     Blood Culture, Routine Gram stain peds bottle: Gram positive cocci in clusters resembling Staph      Results called to and read back by: TYLER Jernigan 12/15/2024  07:20    MRSA/SA Rapid ID by PCR from Blood culture [[id number]] Collected: 12/13/24 1010    Order Status: No result Updated: 12/15/24 0642    Blood culture [[id number]] Collected: 12/13/24 0956    Order Status: Completed Specimen: Blood from Peripheral, Hand, Right Updated: 12/14/24 2212     Blood Culture, Routine No Growth to date      No Growth to date            CTA Head and Neck (xpd)    Result Date: 12/12/2024  EXAMINATION: CTA HEAD AND NECK (XPD) CLINICAL HISTORY: Altered level of consciousness stroke, follow up; TECHNIQUE: Helical CT images obtained from the skull vertex to the thoracic inlet after intravenous administration of contrast according to CTA protocol.  Maximum intensity projection images were evaluated in multiple planes along with the source images. Iterative reconstruction technique was used. NASCET criteria utilized for stenoses measurements.  CT/Cardiac nuclear examinations in the past 12 months: 5 COMPARISON: CTA head 07/13/2024 FINDINGS: Vasculature: Visualized portions of the aortic arch are widely patent.  The right subclavian artery is obscured by contrast bolus.  The brachiocephalic artery and left subclavian artery are widely patent.  The origin of the right vertebral artery is obscured by artifact related to contrast bolus.  Visualized portions of bilateral vertebral arteries appear patent.  Streak artifact through the proximal aspect of the right common carotid artery.   Allowing for this, bilateral common carotid arteries appear widely patent.  Motion artifact through the right carotid bulb.  The right external carotid artery and its branches appear widely patent.  No definite focal stenosis of the cervical right internal carotid artery.  Mild atherosclerotic calcification at the left carotid bulb.  The left external carotid artery and its branches are widely patent.  Cervical portions of the left internal carotid artery are obscured partially by motion artifact with no definite focal stenosis identified. Petrous and cavernous portions of bilateral internal carotid arteries are patent.  Mild circumferential atherosclerotic calcification of bilateral carotid siphons without a detected significant focal stenosis.  Bilateral middle and anterior cerebral arteries are patent.  No detected evidence for aneurysm, stenosis, or occlusion within the anterior or posterior circulation. Bilateral vertebral arteries supply a widely patent basilar artery.  Intact bilateral superior cerebellar and posterior cerebral arteries are seen arising from the distal basilar artery.  Posterior communicating arteries are not seen which may be related to congenital absence or hypoplasia. Remaining structures: Neck: Motion artifact through the imaged upper lungs which demonstrate diffuse ground-glass attenuation and trace fluid in the right major fissure.  Clustered subcentimeter calcifications in the right upper lobe.  Subcentimeter calcified nodule within the right thyroid lobe.  Bilateral submandibular and parotid glands are symmetric and unremarkable.  Mucosal surfaces of the nasopharynx and oropharynx are relatively symmetric.  Epiglottis is normal in appearance.  No detected cervical mass or evidence of cervical adenopathy.  There is diffuse anasarca. Head: No midline shift or mass effect detected.  No enhancing intracranial mass is identified.     1. Motion artifact through portions of the neck with no  significant carotid artery stenosis identified.  Mild carotid atherosclerotic calcifications are present. 2. No detected evidence for intracranial aneurysm, stenosis, or vaso occlusive disease within the anterior or posterior circulation. 3. Diffuse ground-glass densities within the imaged upper lungs with fluid in the right major fissure.  Pattern is suspect for pulmonary edema. 4. Diffuse anasarca. Electronically signed by: Olegario Quiñones MD Date:    12/12/2024 Time:    10:37    CT Head Without Contrast    Result Date: 12/12/2024  EXAMINATION: CT HEAD WITHOUT CONTRAST CLINICAL HISTORY: Altered level of consciousness mental status change, unknown cause; TECHNIQUE: Axial CT images were obtained from the skull base to the vertex without contrast. Iterative reconstruction technique was used.  CT/Cardiac nuclear examinations in the past 12 months: 4 COMPARISON: MRI brain 07/16/2024 and CT head 07/13/2024 FINDINGS: No ventricular or basal cistern effacement.  No evidence of acute intracranial hemorrhage, midline shift, mass, or mass effect.  No detected extra-axial fluid collections.  Punctate bilateral basal ganglia calcifications.  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  Trace mucosal thickening right maxillary sinus.  Mastoid air cells are clear.  Calvarium is intact.     No acute intracranial process detected. Electronically signed by: Olegario Quiñones MD Date:    12/12/2024 Time:    10:27    US Pelvis Comp with Transvag NON-OB (xpd)    Result Date: 12/2/2024  EXAMINATION: US PELVIS COMP WITH TRANSVAG NON-OB (XPD) CLINICAL HISTORY: evaluate complete expulsion of the IUD and the evaluate the endometrial stripe; TECHNIQUE: Transabdominal sonography of the pelvis was performed, followed by transvaginal sonography to better evaluate the uterus and ovaries. COMPARISON: CT chest abdomen pelvis 10/30/2024 FINDINGS: Uterus: Size: 13.9 x 4.3 x 5.9 cm Masses: 3.6 x 2.3 x 2.5 cm hypoechoic lesion, likely a  leiomyoma. Endometrium: Measures up to 6 mm near the fundus.  No IUD is visualized.  Large amount of heterogeneously echogenic products noted within the cervix.  Notably blood products expelled during the examination and limits transvaginal evaluation. Right ovary: Size: 2.8 x 1.9 x 2.8 cm Appearance: Normal Vascular flow: Normal. Left ovary: Size: 2.7 x 1.9 x 1.9 cm Appearance: Normal Vascular Flow: Normal. Free Fluid: None.     Heterogeneous echogenic products within the cervix limits transvaginal evaluation.  Blood products were expelled during the examination and consistent with heterogeneous products within the cervix.  No IUD was visualized.  Follow-up examination is recommended.  MRI may be beneficial for further evaluation as clinically indicated. Small uterine leiomyoma. Electronically signed by resident: Angela Doe Date:    12/02/2024 Time:    06:11 Electronically signed by: Bobby Gutierrez MD Date:    12/02/2024 Time:    08:09    X-Ray Chest 1 View    Result Date: 11/30/2024  EXAMINATION: XR CHEST 1 VIEW CLINICAL HISTORY: Chest pain, unspecified COMPARISON: Portable chest 10/29/2024. FINDINGS: Frontal chest radiograph demonstrates clear lungs.  Small amount of left pleural fluid.  Moderate enlargement of cardiac silhouette.  No osseous abnormality.     1. Moderate enlargement of cardiac silhouette, may reflect chamber enlargement. 2. Small left pleural effusion. Electronically signed by: Jorge Burdick MD Date:    11/30/2024 Time:    13:12

## 2024-12-15 NOTE — PROGRESS NOTES
Pharmacist Renal Dose Adjustment Note    Pilar Fernandez is a 44 y.o. female being treated with the medication famotidine    Patient Data:    Vital Signs (Most Recent):  Temp: 98.6 °F (37 °C) (12/15/24 0424)  Pulse: 83 (12/15/24 0716)  Resp: 20 (12/15/24 0424)  BP: (!) 184/91 (12/15/24 0514)  SpO2: 98 % (12/15/24 0424) Vital Signs (72h Range):  Temp:  [93.3 °F (34.1 °C)-100.2 °F (37.9 °C)]   Pulse:  []   Resp:  [12-20]   BP: (149-209)/()   SpO2:  [96 %-100 %]      Recent Labs   Lab 12/13/24  0533 12/14/24  0536 12/15/24  0548   CREATININE 3.9* 3.7* 4.3*     Serum creatinine: 4.3 mg/dL (H) 12/15/24 0548  Estimated creatinine clearance: 17.9 mL/min (A)    Medication: famotidine dose:  20 mg frequency QD will be changed to medication: famotidine dose: 20 mg frequency: every other day due to crcl < 30 ml/min    Pharmacist's Name: Staci Boyle  Pharmacist's Extension: [phone]

## 2024-12-15 NOTE — ASSESSMENT & PLAN NOTE
Blood cultures prelim show gram +ve cocci in clusters resembling staph  - afebrile with persistent leukocytosis  - Potential left thigh wound as the source, will look for others    Plan  - repeat BCx  - Start vanc  - Echo ordered

## 2024-12-15 NOTE — ASSESSMENT & PLAN NOTE
Resume home medications.  Monitor blood blood pressures closely.  Adjust medications as needed.     Medical Necessity Clause: This procedure was medically necessary because the lesions that were treated were: Show Applicator Variable?: Yes Spray Paint Text: The liquid nitrogen was applied to the skin utilizing a spray paint frosting technique. Render Note In Bullet Format When Appropriate: No Number Of Freeze-Thaw Cycles: 2 freeze-thaw cycles Post-Care Instructions: I reviewed with the patient in detail post-care instructions. Patient is to wear sunprotection, and avoid picking at any of the treated lesions. Pt may apply Aquaphor or Cetaphil ointment to crusted or scabbing areas. Pt was cautioned on risk of scarring, inadequate response, & the need for additional treatment s/p LN2 today. Detail Level: Detailed Application Tool (Optional): Liquid Nitrogen Sprayer Duration Of Freeze Thaw-Cycle (Seconds): 4 Medical Necessity Information: It is in your best interest to select a reason for this procedure from the list below. All of these items fulfill various CMS LCD requirements except the new and changing color options. Consent: The patient's consent was obtained including but not limited to risks of crusting, scabbing, blistering, scarring, darker or lighter pigmentary change, recurrence, incomplete removal and infection.

## 2024-12-15 NOTE — PROGRESS NOTES
Pharmacist Intervention IV to PO Note    Pilar Fernandez is a 44 y.o. female being treated with IV medication famotidine    Patient Data:    Vital Signs (Most Recent):  Temp: 98.6 °F (37 °C) (12/15/24 0424)  Pulse: 83 (12/15/24 0716)  Resp: 20 (12/15/24 0424)  BP: (!) 184/91 (12/15/24 0514)  SpO2: 98 % (12/15/24 0424) Vital Signs (72h Range):  Temp:  [93.3 °F (34.1 °C)-100.2 °F (37.9 °C)]   Pulse:  []   Resp:  [12-20]   BP: (149-209)/()   SpO2:  [96 %-100 %]      CBC:  Recent Labs   Lab 12/13/24  0533 12/14/24  0536 12/15/24  0548   WBC 28.53* 16.71* 17.20*   RBC 2.87* 2.93* 2.85*   HGB 8.7* 8.9* 8.9*   HCT 27.0* 27.5* 26.3*    245 290   MCV 94 94 92   MCH 30.3 30.4 31.2*   MCHC 32.2 32.4 33.8     CMP:     Recent Labs   Lab 12/13/24 0533 12/14/24  0536 12/15/24  0548   GLU 79 159* 174*   CALCIUM 8.8 8.8 9.1   ALBUMIN 1.9* 1.8* 1.8*   PROT 8.4 8.1 8.1   * 132* 130*   K 5.3* 5.6* 5.5*   CO2 25 29 28   CL 95 95 94*   BUN 43* 38* 49*   CREATININE 3.9* 3.7* 4.3*   ALKPHOS 485* 502* 495*   ALT 61* 56* 45*   AST 97* 81* 64*   BILITOT 5.6* 5.4* 4.8*       Dietary Orders:  Diet Orders            Diet Adult Regular Renal: Regular starting at 12/13 0830            Based on the following criteria, this patient qualifies for intravenous to oral conversion:  [x] The patients gastrointestinal tract is functioning (tolerating medications via oral or enteral route for 24 hours and tolerating food or enteral feeds for 24 hours).  [x] The patient is hemodynamically stable for 24 hours (heart rate <100 beats per minute, systolic blood pressure >99 mm Hg, and respiratory rate <20 breaths per minute).  [x] The patient shows clinical improvement (afebrile for at least 24 hours and white blood cell count downtrending or normalized). Additionally, the patient must be non-neutropenic (absolute neutrophil count >500 cells/mm3).  [x] For antimicrobials, the patient has received IV therapy for at least 24  hours.    IV medication famotidine will be changed to oral medication  famotidine    Pharmacist's Name: Staci Boyle  Pharmacist's Extension: [phone]

## 2024-12-15 NOTE — PROGRESS NOTES
[facility] Medicine  Progress Note    Patient Name: Pilar Fernandez  MRN: [id number]  Patient Class: IP- Inpatient   Admission Date: 12/12/2024  Length of Stay: 2 days  Attending Physician: Debra Fong MD  Primary Care Provider: Lindsey Singletary FNP        Subjective     Principal Problem:Hypoglycemia        HPI:  ED HPI:    Chief Complaint   Patient presents with    Altered Mental Status       Per EMS, pt was found unresponsive by family at home and had a CBG of 48. Police gave pt 8mg narcan IN before EMS arrival. Gave pt 250 ml bag of D10 and had CBG of 116 on second check. Pt is increasingly responsive and opening eyes upon arrival. Pt normally on 3 L NC at home , but was placed on 4L during transport.      44-year-old female history of end-stage renal disease on dialysis, diabetes on insulin, CVA in the past, hypertension presents to the emergency department with altered mental status, last known well was sometime last night before going to bed, no in knows what time she went to bed.  Family found her this morning unresponsive.  EMS arrived, CBG was 45, 8 mg of intranasal Narcan administered, 250 mL of D10 administered as well by EMS personnel with response.  Here in the emergency department patient does open her eyes to verbal stimuli, does not follow commands.  Not talking.  On chronic opiates as well as benzos     Family reports she normally talks, walks with a walker, does Monday Wednesday Friday dialysis, did go to dialysis yesterday     See previous note from transfer:     44-year-old female with history of hypertension, hyperlipidemia, DM, ESRD, CHF,  COPD on 3L, CVA, peripheral vascular disease, chronic lymphedema, hypothyroidism, gout, and gastroesophageal reflux disease whe presents for evaluation of midsternal chest pain described stabbing.  Pt also reporting pain  at the top of her abdomen, epigastric area.  Sx acute in onset a hours prior to arrival.  Patient also  "reports pain at the top of her stomach, Pepcid taken without relief.  All other systems reviewed and notable nausea, otherwise unremarkable.  Patient did go dialysis today, had 5-1/2 L removed.     After further chart review it was noted that patient was just discharged from outside facility after extensive workup for GI bleed, anemia, jaundice, and hyperbilirubinemia.  Hemoglobin was 6.4 from baseline of10.  She was transferred to [facility] from [facility] for further evaluation. See Hospital course from prior discharge summary below:     "She was put on ceftriaxone. Hepatology and Advanced Endoscopy Service were consulted for hyperbilirubinemia. Gastroenterology was consulted for gastrointestinal bleed. Hematology-Oncology was consulted for anemia with hyperbilirubinemia and positive Britt test. CT showed diffuse anasarca, pericardial effusion, enlarged lymph nodes vs soft tissue densities, hepatosplenomegaly. Hepatology recommended liver biopsy. Gastroenterology recommended outpatient endoscopy, after she is medically optimized, due to lack of overt bleeding. Hematology-Oncology recommended breast ultrasound. Leukocytosis persisted. Sed rate was normal. CRP was elevated. Midodrine was started for post-dialysis hypotension. She developed hepatic encephalopathy with ammonia of 111 umol/L. She was transfused another unit of packed red blood cells in preparation for liver biopsy. She required vitamin K for elevated INR. She desired to sit at the side of the bed but was unable to on her own. Physical and Occupational Therapy were consulted and recommended high intensity therapy. Midodrine was stopped as blood pressures increased to normal. Her home hypertension medications continued to be held. 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 MRCP showed no obstruction or stricture " "but was limited by body habitus. Hepatology recommended ursodiol. Upon review of imaging, left axilla ultrasound was recommended instead of breast ultrasound as her anasarca would make interpretation of a breast ultrasound difficult. Axilla ultrasound showed abnormal axillary adenopathy with at least 6 abnormal lymph nodes (BI-RADS 4). She underwent left axilla lymph node biopsy on 11/11/2024. Hematology-Oncology reported that she would not currently for inpatient chemotherapy as she had no bulky lymphadenopathy or obstruction or compression of organs due to lymphadenopathy. Additionally, her persistent hyperbilirubinemia excluded her from several chemotherapy regimens, and her heart failure and end stage renal disease exclude her from nearly all other remaining regimens. She awaited skilled nursing facility and was accepted on 11/15/2024. However, syphilis test was incidentally positive per the [place] [employer], so the nursing facility could not accept her before she started treatement. It was found that she had been found to have syphilis at another hospital previously. She was given the 1st dose of intramuscular penicillin G and was accepted to the nursing facility where she will get the 2nd and 3rd doses."     IM HPI:  Patient more awake and alert this morning.  Patient states the night prior to admission she took 60 units Lantus at bedtime.  She states she woke up in the middle of the night unable to sleep and took 2 Tylenol p.m..  Patient denies use of short-acting insulin.  She states in addition to Lantus she also takes Tradjenta.  Patient denies fever, chills, CP, SOB, nausea, vomiting, diarrhea.  Patient states she has been eating well.  Blood sugars remain low this morning despite continued D10 IV at 75 mils per hour.  We will transition to regular diet as patient is more awake and alert.  Continue close monitoring of glucose levels.  Patient reports hitting her left thigh on wheelchair a " few days prior to admission.  Soft tissue swelling with drainage noted.  Question hematoma versus abscess.  We will obtain lower extremity soft tissue ultrasound today.  Patient afebrile.  White count elevated.  Review of patient's chart shows white blood cell counts around 18-20 K for more than a month and has previously been understood to be reactive given negative infectious workup and no clinical symptoms.  White count today 28.53.  Lactic acid ordered.  Patient with known history end-stage renal disease on dialysis.  Nephrology has been consulted.  We will plan to dialyze patient today per Nephrology.  We will continue current treatment plan and monitoring.    Overview/Hospital Course:  12/14/24: Pt seen this am, s/p HD on 12/13, overall doing better.  this am. Pending soft tissue US to be done sometime this weekend for left thigh wound, minimal drainage today not currently on abx. Afebrile with improving leukocytosis. Patient requesting something to help with leg pain.   12/15/24: Patient afebrile with persistent leukocytosis, blood cultures prelim result today show gram +ve cocci in clusters resembling staph. She does have this left thigh wound that may be the source. TTE order placed as well and started on Vanc pending culture sensitization. Labs worsening, will likely require HD today or Monday.     Interval History: Patient seen and examined by me    Review of Systems   Constitutional:  Negative for activity change, appetite change, chills, diaphoresis, fatigue and fever.   HENT:  Negative for congestion, ear pain, postnasal drip, rhinorrhea, sinus pressure, sinus pain, sore throat and trouble swallowing.    Eyes:  Negative for photophobia, pain and visual disturbance.   Respiratory:  Negative for cough, chest tightness, shortness of breath and wheezing.    Cardiovascular:  Positive for leg swelling. Negative for chest pain and palpitations.   Gastrointestinal:  Negative for abdominal distention,  abdominal pain, blood in stool, constipation, diarrhea, nausea and vomiting.   Endocrine: Negative for polydipsia, polyphagia and polyuria.   Genitourinary:  Negative for decreased urine volume, difficulty urinating, dysuria, flank pain, frequency, hematuria and urgency.   Musculoskeletal:  Negative for arthralgias and myalgias.   Skin:  Positive for wound. Negative for color change and rash.        Positive for itching   Allergic/Immunologic: Negative.    Neurological:  Negative for dizziness, seizures, syncope, speech difficulty, weakness, light-headedness and headaches.   Hematological:  Bruises/bleeds easily.   Psychiatric/Behavioral:  Negative for agitation, confusion, sleep disturbance and suicidal ideas. The patient is not nervous/anxious.      Objective:     Vital Signs (Most Recent):  Temp: 98.6 °F (37 °C) (12/15/24 0424)  Pulse: 78 (12/15/24 0725)  Resp: 18 (12/15/24 0725)  BP: (!) 184/91 (12/15/24 0514)  SpO2: 99 % (12/15/24 0725) Vital Signs (24h Range):  Temp:  [97.8 °F (36.6 °C)-98.6 °F (37 °C)] 98.6 °F (37 °C)  Pulse:  [76-88] 78  Resp:  [16-20] 18  SpO2:  [98 %-100 %] 99 %  BP: (152-198)/() 184/91     Weight: 89.8 kg (198 lb)  Body mass index is 33.99 kg/m².    Intake/Output Summary (Last 24 hours) at 12/15/2024 0811  Last data filed at 12/15/2024 0507  Gross per 24 hour   Intake 500 ml   Output --   Net 500 ml         Physical Exam  Vitals and nursing note reviewed.   Constitutional:       General: She is not in acute distress.     Appearance: Normal appearance. She is obese. She is not ill-appearing.   HENT:      Head: Normocephalic and atraumatic.      Nose: Nose normal. No congestion or rhinorrhea.      Mouth/Throat:      Mouth: Mucous membranes are moist.      Pharynx: Oropharynx is clear. No oropharyngeal exudate or posterior oropharyngeal erythema.   Eyes:      General: No scleral icterus.     Extraocular Movements: Extraocular movements intact.      Conjunctiva/sclera: Conjunctivae  normal.      Pupils: Pupils are equal, round, and reactive to light.   Cardiovascular:      Rate and Rhythm: Normal rate and regular rhythm.      Pulses: Normal pulses.      Heart sounds: Normal heart sounds. No murmur heard.  Pulmonary:      Effort: Pulmonary effort is normal. No respiratory distress.      Breath sounds: Normal breath sounds. No wheezing, rhonchi or rales.   Abdominal:      General: Bowel sounds are normal. There is no distension.      Palpations: Abdomen is soft.      Tenderness: There is no abdominal tenderness. There is no guarding or rebound.   Musculoskeletal:         General: Normal range of motion.      Cervical back: Normal range of motion and neck supple. No tenderness.      Right lower leg: 3+ Edema present.      Left lower le+ Edema present.   Lymphadenopathy:      Cervical: No cervical adenopathy.   Skin:     General: Skin is warm and dry.          Neurological:      General: No focal deficit present.      Mental Status: She is alert and oriented to person, place, and time.      Cranial Nerves: No cranial nerve deficit.      Motor: No weakness.   Psychiatric:         Mood and Affect: Mood normal.         Behavior: Behavior normal.         Thought Content: Thought content normal.         Judgment: Judgment normal.             Recent Labs   Lab 24  0942 24  0533 24  0536 12/15/24  0548   WBC 19.04* 28.53* 16.71* 17.20*   HGB 8.9* 8.7* 8.9* 8.9*   HCT 28.3* 27.0* 27.5* 26.3*   MCV 97 94 94 92   RBC 2.91* 2.87* 2.93* 2.85*   MCH 30.6 30.3 30.4 31.2*   MCHC 31.4* 32.2 32.4 33.8   RDW 19.9* 18.8* 18.6* 17.5*    298 245 290   MPV 10.6 11.2 11.5 11.0   GRAN 76.7*  14.6* 82.0* 74.2*  12.4* 71.9  12.4*   LYMPH 9.9*  1.9 5.0*  CANCELED 10.0*  1.7 9.2*  1.6   MONO 10.7  2.0* 7.0  CANCELED 9.4  1.6* 11.0  1.9*   EOSINOPHIL 1.4 2.0 5.0 6.0   BASOPHIL 0.7 0.0 0.6 0.9       Recent Labs   Lab 24  0942 24  1753 24  0532 24  0533  "12/14/24  0536 12/15/24  0548   * 135*  --  131* 132* 130*   K 4.4 4.6  --  5.3* 5.6* 5.5*    100  --  95 95 94*   CO2 29 28  --  25 29 28   BUN 35* 40*  --  43* 38* 49*   CREATININE 3.5* 3.6*  --  3.9* 3.7* 4.3*   * 73  --  79 159* 174*   CALCIUM 8.7 8.7  --  8.8 8.8 9.1   PROT 8.1  --   --  8.4 8.1 8.1   ALBUMIN 1.8*  --   --  1.9* 1.8* 1.8*   ALKPHOS 508*  --   --  485* 502* 495*   BILITOT 5.4*  --   --  5.6* 5.4* 4.8*   ALT 64*  --   --  61* 56* 45*   *  --   --  97* 81* 64*   ANIONGAP 3 7  --  11 8 8   MG 1.7  --  1.6  --  1.8 1.8       No results for input(s): "COLORU", "APPEARANCEUA", "PHUR", "SPECGRAV", "PROTEINUA", "GLUCUA", "KETONESU", "BILIRUBINUA", "OCCULTUA", "UROBILINOGEN", "NITRITE", "LEUKOCYTESUR", "RBCUA", "WBCUA", "BACTERIA", "SQUAMEPITHEL", "HYALINECASTS", "GRANULARCAST", "MICROCMT" in the last 168 hours.    Invalid input(s): "SPECIMENU", "AMORPHOUSU"    Recent Labs   Lab 12/12/24  0949   PH 7.390   PCO2 48.0*   PO2 97.3   HCO3 27.5   POCSATURATED 98.5       No results for input(s): "TROPONINI", "CPK", "CPKMB" in the last 168 hours.    No results for input(s): "PT", "INR", "APTT" in the last 168 hours.    Lab Results   Component Value Date    HGBA1C 4.5 12/12/2024       No results for input(s): "TSH", "T3YLFZB", "I8QNBZL", "THYROIDAB", "FREET4" in the last 168 hours.    Microbiology Results (last 7 days)       Procedure Component Value Units Date/Time    Blood culture [[id number]]     Order Status: Sent Specimen: Blood     Blood culture [[id number]]     Order Status: Sent Specimen: Blood     Blood culture [[id number]] Collected: 12/13/24 1010    Order Status: Completed Specimen: Blood from Peripheral, Wrist, Right Updated: 12/15/24 0720     Blood Culture, Routine Gram stain peds bottle: Gram positive cocci in clusters resembling Staph      Results called to and read back by: TYLER Jernigan 12/15/2024  07:20    MRSA/SA Rapid ID by PCR from Blood culture [[id number]] Collected: " 12/13/24 1010    Order Status: No result Updated: 12/15/24 0642    Blood culture [[id number]] Collected: 12/13/24 0956    Order Status: Completed Specimen: Blood from Peripheral, Hand, Right Updated: 12/14/24 2212     Blood Culture, Routine No Growth to date      No Growth to date            CTA Head and Neck (xpd)    Result Date: 12/12/2024  EXAMINATION: CTA HEAD AND NECK (XPD) CLINICAL HISTORY: Altered level of consciousness stroke, follow up; TECHNIQUE: Helical CT images obtained from the skull vertex to the thoracic inlet after intravenous administration of contrast according to CTA protocol.  Maximum intensity projection images were evaluated in multiple planes along with the source images. Iterative reconstruction technique was used. NASCET criteria utilized for stenoses measurements.  CT/Cardiac nuclear examinations in the past 12 months: 5 COMPARISON: CTA head 07/13/2024 FINDINGS: Vasculature: Visualized portions of the aortic arch are widely patent.  The right subclavian artery is obscured by contrast bolus.  The brachiocephalic artery and left subclavian artery are widely patent.  The origin of the right vertebral artery is obscured by artifact related to contrast bolus.  Visualized portions of bilateral vertebral arteries appear patent.  Streak artifact through the proximal aspect of the right common carotid artery.  Allowing for this, bilateral common carotid arteries appear widely patent.  Motion artifact through the right carotid bulb.  The right external carotid artery and its branches appear widely patent.  No definite focal stenosis of the cervical right internal carotid artery.  Mild atherosclerotic calcification at the left carotid bulb.  The left external carotid artery and its branches are widely patent.  Cervical portions of the left internal carotid artery are obscured partially by motion artifact with no definite focal stenosis identified. Petrous and cavernous portions of bilateral internal  carotid arteries are patent.  Mild circumferential atherosclerotic calcification of bilateral carotid siphons without a detected significant focal stenosis.  Bilateral middle and anterior cerebral arteries are patent.  No detected evidence for aneurysm, stenosis, or occlusion within the anterior or posterior circulation. Bilateral vertebral arteries supply a widely patent basilar artery.  Intact bilateral superior cerebellar and posterior cerebral arteries are seen arising from the distal basilar artery.  Posterior communicating arteries are not seen which may be related to congenital absence or hypoplasia. Remaining structures: Neck: Motion artifact through the imaged upper lungs which demonstrate diffuse ground-glass attenuation and trace fluid in the right major fissure.  Clustered subcentimeter calcifications in the right upper lobe.  Subcentimeter calcified nodule within the right thyroid lobe.  Bilateral submandibular and parotid glands are symmetric and unremarkable.  Mucosal surfaces of the nasopharynx and oropharynx are relatively symmetric.  Epiglottis is normal in appearance.  No detected cervical mass or evidence of cervical adenopathy.  There is diffuse anasarca. Head: No midline shift or mass effect detected.  No enhancing intracranial mass is identified.     1. Motion artifact through portions of the neck with no significant carotid artery stenosis identified.  Mild carotid atherosclerotic calcifications are present. 2. No detected evidence for intracranial aneurysm, stenosis, or vaso occlusive disease within the anterior or posterior circulation. 3. Diffuse ground-glass densities within the imaged upper lungs with fluid in the right major fissure.  Pattern is suspect for pulmonary edema. 4. Diffuse anasarca. Electronically signed by: Olegario Quiñones MD Date:    12/12/2024 Time:    10:37    CT Head Without Contrast    Result Date: 12/12/2024  EXAMINATION: CT HEAD WITHOUT CONTRAST CLINICAL HISTORY: Altered  level of consciousness mental status change, unknown cause; TECHNIQUE: Axial CT images were obtained from the skull base to the vertex without contrast. Iterative reconstruction technique was used.  CT/Cardiac nuclear examinations in the past 12 months: 4 COMPARISON: MRI brain 07/16/2024 and CT head 07/13/2024 FINDINGS: No ventricular or basal cistern effacement.  No evidence of acute intracranial hemorrhage, midline shift, mass, or mass effect.  No detected extra-axial fluid collections.  Punctate bilateral basal ganglia calcifications.  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  Trace mucosal thickening right maxillary sinus.  Mastoid air cells are clear.  Calvarium is intact.     No acute intracranial process detected. Electronically signed by: Olegario Quiñones MD Date:    12/12/2024 Time:    10:27    US Pelvis Comp with Transvag NON-OB (xpd)    Result Date: 12/2/2024  EXAMINATION: US PELVIS COMP WITH TRANSVAG NON-OB (XPD) CLINICAL HISTORY: evaluate complete expulsion of the IUD and the evaluate the endometrial stripe; TECHNIQUE: Transabdominal sonography of the pelvis was performed, followed by transvaginal sonography to better evaluate the uterus and ovaries. COMPARISON: CT chest abdomen pelvis 10/30/2024 FINDINGS: Uterus: Size: 13.9 x 4.3 x 5.9 cm Masses: 3.6 x 2.3 x 2.5 cm hypoechoic lesion, likely a leiomyoma. Endometrium: Measures up to 6 mm near the fundus.  No IUD is visualized.  Large amount of heterogeneously echogenic products noted within the cervix.  Notably blood products expelled during the examination and limits transvaginal evaluation. Right ovary: Size: 2.8 x 1.9 x 2.8 cm Appearance: Normal Vascular flow: Normal. Left ovary: Size: 2.7 x 1.9 x 1.9 cm Appearance: Normal Vascular Flow: Normal. Free Fluid: None.     Heterogeneous echogenic products within the cervix limits transvaginal evaluation.  Blood products were expelled during the examination and consistent with heterogeneous  products within the cervix.  No IUD was visualized.  Follow-up examination is recommended.  MRI may be beneficial for further evaluation as clinically indicated. Small uterine leiomyoma. Electronically signed by resident: Angela Doe Date:    12/02/2024 Time:    06:11 Electronically signed by: Bobby Gutierrez MD Date:    12/02/2024 Time:    08:09    X-Ray Chest 1 View    Result Date: 11/30/2024  EXAMINATION: XR CHEST 1 VIEW CLINICAL HISTORY: Chest pain, unspecified COMPARISON: Portable chest 10/29/2024. FINDINGS: Frontal chest radiograph demonstrates clear lungs.  Small amount of left pleural fluid.  Moderate enlargement of cardiac silhouette.  No osseous abnormality.     1. Moderate enlargement of cardiac silhouette, may reflect chamber enlargement. 2. Small left pleural effusion. Electronically signed by: Jorge Burdick MD Date:    11/30/2024 Time:    13:12      Assessment and Plan     * Hypoglycemia  Patient alert this am. BG better, now at 159. Continue D10 continuous IV fluid.  We will transition to regular diet.  Continue close monitoring.      Bacteremia  Blood cultures prelim show gram +ve cocci in clusters resembling staph  - afebrile with persistent leukocytosis  - Potential left thigh wound as the source, will look for others    Plan  - repeat BCx  - Start vanc  - Echo ordered      Altered mental status  Patient awake and alert this morning.  Believe altered mental status on presentation secondary to hypoglycemia.  Continue to treat underlying condition.      Debility  Patient with Acute on chronic debility due to  chronic illness . The patient's latest AMPAC (Activity Measure for Post Acute Care) Score is listed below.    AM-PAC Score - How much help does the patient need for each activity listed  Basic Mobility Total Score: 15  Turning over in bed (including adjusting bedclothes, sheets and blankets)?: A little  Sitting down on and standing up from a chair with arms (e.g., wheelchair, bedside  commode, etc.): A little  Moving from lying on back to sitting on the side of the bed?: A little  Moving to and from a bed to a chair (including a wheelchair)?: A little  Need to walk in hospital room?: A lot  Climbing 3-5 steps with a railing?: Unable    Plan  - Progressive mobility protocol initated  - PT/OT consulted  - Fall precautions in place            Chronic hypoxic respiratory failure  Patient with reported chronic hypoxemia requiring home O2 via nasal cannula.  O2 sats currently greater than 95% on 3 L O2 via nasal cannula.    History of cerebrovascular accident (CVA) with residual deficit  At baseline.  Continue BP control.      Hepatic dysfunction  Appears at baseline.  Continue home medications.      Leukocytosis  White count 16.7 today.  This appears to be above patient's baseline of 18-20 K. patient denies recent fever and is afebrile today.  Lactic acid ordered.  Ultrasound soft tissue left lower extremity ordered to rule out abscess.  Continue close monitoring with daily labs.      Hyperbilirubinemia  Bilirubin 5.4 this morning.  This appears to be decreased from previous admission.  Continue monitoring with daily labs.      ESRD (end stage renal disease) on dialysis  Creatine stable for now. BMP reviewed- noted Estimated Creatinine Clearance: 20.8 mL/min (A) (based on SCr of 3.7 mg/dL (H)). according to latest data. Based on current GFR, CKD stage is end stage.  Monitor UOP and serial BMP and adjust therapy as needed. Renally dose meds. Avoid nephrotoxic medications and procedures.  Continue dialysis per renal.    Heart failure with preserved ejection fraction  Patient currently without evidence of volume overload.  No acute exacerbation noted.  Continue home medications and volume removal with HD.  Monitor volume status closely.      GERD (gastroesophageal reflux disease)  Continue famotidine.      Hyperlipidemia associated with type 2 diabetes mellitus  Holding statin at this time secondary to  hepatic dysfunction.      Hypertension associated with diabetes  Resume home medications.  Monitor blood blood pressures closely.  Adjust medications as needed.        VTE Risk Mitigation (From admission, onward)           Ordered     IP VTE HIGH RISK PATIENT  Once         12/12/24 1509     Place sequential compression device  Until discontinued         12/12/24 1509     Place KADIE hose  Until discontinued         12/12/24 1509                    Discharge Planning   SHASHA:      Code Status: Full Code   Medical Readiness for Discharge Date:   Discharge Plan A: Home with family, Home                Please place Justification for DME        Russell Cosby MD  Department of Hospital Medicine   [facility]

## 2024-12-16 ENCOUNTER — CLINICAL SUPPORT (OUTPATIENT)
Dept: CARDIOLOGY | Facility: HOSPITAL | Age: 44
End: 2024-12-16
Attending: EMERGENCY MEDICINE
Payer: MEDICARE

## 2024-12-16 PROBLEM — Z86.2 HISTORY OF ANEMIA DUE TO CKD: Status: ACTIVE | Noted: 2019-08-05

## 2024-12-16 PROBLEM — N25.81 SECONDARY HYPERPARATHYROIDISM: Status: ACTIVE | Noted: 2024-12-16

## 2024-12-16 LAB
ALBUMIN SERPL BCP-MCNC: 1.9 G/DL (ref 3.5–5.2)
ALP SERPL-CCNC: 539 U/L (ref 55–135)
ALT SERPL W/O P-5'-P-CCNC: 48 U/L (ref 10–44)
ANION GAP SERPL CALC-SCNC: 10 MMOL/L (ref 3–11)
AORTIC ROOT ANNULUS: 3.21 CM
AORTIC VALVE CUSP SEPERATION: 1.95 CM
APTT PPP: 30.8 SEC (ref 21–32)
AST SERPL-CCNC: 71 U/L (ref 10–40)
AV INDEX (PROSTH): 0.78
AV MEAN GRADIENT: 3.7 MMHG
AV PEAK GRADIENT: 9 MMHG
AV VALVE AREA BY VELOCITY RATIO: 2.3 CM²
AV VALVE AREA: 2.5 CM²
AV VELOCITY RATIO: 0.73
BASOPHILS # BLD AUTO: 0.13 K/UL (ref 0–0.2)
BASOPHILS NFR BLD: 0.9 % (ref 0–1.9)
BILIRUB SERPL-MCNC: 5.2 MG/DL (ref 0.1–1)
BSA FOR ECHO PROCEDURE: 2.01 M2
BUN SERPL-MCNC: 57 MG/DL (ref 6–20)
CALCIUM SERPL-MCNC: 9.1 MG/DL (ref 8.7–10.5)
CHLORIDE SERPL-SCNC: 91 MMOL/L (ref 95–110)
CO2 SERPL-SCNC: 25 MMOL/L (ref 23–29)
CREAT SERPL-MCNC: 4.9 MG/DL (ref 0.5–1.4)
CV ECHO LV RWT: 0.74 CM
DIFFERENTIAL METHOD BLD: ABNORMAL
DOP CALC AO PEAK VEL: 1.5 M/S
DOP CALC AO VTI: 27.1 CM
DOP CALC LVOT AREA: 3.1 CM2
DOP CALC LVOT DIAMETER: 2 CM
DOP CALC LVOT PEAK VEL: 1.1 M/S
DOP CALC LVOT STROKE VOLUME: 66.6 CM3
DOP CALC RVOT PEAK VEL: 0.56 M/S
DOP CALC RVOT VTI: 14.9 CM
DOP CALCLVOT PEAK VEL VTI: 21.2 CM
E WAVE DECELERATION TIME: 118.09 MSEC
E/A RATIO: 1.79
E/E' RATIO: 19.54 M/S
ECHO LV POSTERIOR WALL: 1.6 CM (ref 0.6–1.1)
EOSINOPHIL # BLD AUTO: 1.1 K/UL (ref 0–0.5)
EOSINOPHIL NFR BLD: 6.9 % (ref 0–8)
ERYTHROCYTE [DISTWIDTH] IN BLOOD BY AUTOMATED COUNT: 17.3 % (ref 11.5–14.5)
EST. GFR  (NO RACE VARIABLE): 10.5 ML/MIN/1.73 M^2
FRACTIONAL SHORTENING: 34.9 % (ref 28–44)
GLUCOSE SERPL-MCNC: 264 MG/DL (ref 70–110)
HCT VFR BLD AUTO: 26.8 % (ref 37–48.5)
HGB BLD-MCNC: 8.9 G/DL (ref 12–16)
IMM GRANULOCYTES # BLD AUTO: 0.17 K/UL (ref 0–0.04)
IMM GRANULOCYTES NFR BLD AUTO: 1.1 % (ref 0–0.5)
INR PPP: 1.1 (ref 0.8–1.2)
INTERVENTRICULAR SEPTUM: 1.7 CM (ref 0.6–1.1)
IVC DIAMETER: 2.9 CM
LA MAJOR: 6.82 CM
LEFT ATRIUM SIZE: 4.75 CM
LEFT INTERNAL DIMENSION IN SYSTOLE: 2.8 CM (ref 2.1–4)
LEFT VENTRICLE DIASTOLIC VOLUME INDEX: 42.19 ML/M2
LEFT VENTRICLE DIASTOLIC VOLUME: 82.27 ML
LEFT VENTRICLE MASS INDEX: 153.7 G/M2
LEFT VENTRICLE SYSTOLIC VOLUME INDEX: 15 ML/M2
LEFT VENTRICLE SYSTOLIC VOLUME: 29.34 ML
LEFT VENTRICULAR INTERNAL DIMENSION IN DIASTOLE: 4.3 CM (ref 3.5–6)
LEFT VENTRICULAR MASS: 299.7 G
LV LATERAL E/E' RATIO: 18.14 M/S
LV SEPTAL E/E' RATIO: 21.17 M/S
LVED V (TEICH): 82.27 ML
LVES V (TEICH): 29.34 ML
LVOT MG: 2.09 MMHG
LVOT MV: 0.67 CM/S
LYMPHOCYTES # BLD AUTO: 1.4 K/UL (ref 1–4.8)
LYMPHOCYTES NFR BLD: 9.2 % (ref 18–48)
MAGNESIUM SERPL-MCNC: 1.8 MG/DL (ref 1.6–2.6)
MCH RBC QN AUTO: 30.4 PG (ref 27–31)
MCHC RBC AUTO-ENTMCNC: 33.2 G/DL (ref 32–36)
MCV RBC AUTO: 92 FL (ref 82–98)
MONOCYTES # BLD AUTO: 1.6 K/UL (ref 0.3–1)
MONOCYTES NFR BLD: 10.6 % (ref 4–15)
MV PEAK A VEL: 0.71 M/S
MV PEAK E VEL: 1.27 M/S
MV STENOSIS PRESSURE HALF TIME: 34.25 MS
MV VALVE AREA P 1/2 METHOD: 6.42 CM2
NEUTROPHILS # BLD AUTO: 10.9 K/UL (ref 1.8–7.7)
NEUTROPHILS NFR BLD: 71.3 % (ref 38–73)
NRBC BLD-RTO: 0 /100 WBC
PHOSPHATE SERPL-MCNC: 5.6 MG/DL (ref 2.7–4.5)
PISA MRMAX VEL: 2.85 M/S
PISA TR MAX VEL: 3.32 M/S
PLATELET # BLD AUTO: 344 K/UL (ref 150–450)
PMV BLD AUTO: 11.1 FL (ref 9.2–12.9)
POCT GLUCOSE: 242 MG/DL (ref 70–110)
POCT GLUCOSE: 286 MG/DL (ref 70–110)
POCT GLUCOSE: 310 MG/DL (ref 70–110)
POTASSIUM SERPL-SCNC: 5.7 MMOL/L (ref 3.5–5.1)
PROT SERPL-MCNC: 8.3 G/DL (ref 6–8.4)
PROTHROMBIN TIME: 12.3 SEC (ref 9–12.5)
PV MEAN GRADIENT: 1 MMHG
RA MAJOR: 6.24 CM
RA PRESSURE ESTIMATED: 15 MMHG
RBC # BLD AUTO: 2.93 M/UL (ref 4–5.4)
RIGHT VENTRICULAR END-DIASTOLIC DIMENSION: 3.75 CM
RV TB RVSP: 18 MMHG
SODIUM SERPL-SCNC: 126 MMOL/L (ref 136–145)
TDI LATERAL: 0.07 M/S
TDI SEPTAL: 0.06 M/S
TDI: 0.07 M/S
TR MAX PG: 44 MMHG
TRICUSPID ANNULAR PLANE SYSTOLIC EXCURSION: 2 CM
TV REST PULMONARY ARTERY PRESSURE: 59 MMHG
VANCOMYCIN SERPL-MCNC: 20.1 UG/ML
WBC # BLD AUTO: 15.22 K/UL (ref 3.9–12.7)
Z-SCORE OF LEFT VENTRICULAR DIMENSION IN END DIASTOLE: -2.57
Z-SCORE OF LEFT VENTRICULAR DIMENSION IN END SYSTOLE: -1.58

## 2024-12-16 PROCEDURE — 63600175 PHARM REV CODE 636 W HCPCS: Performed by: INTERNAL MEDICINE

## 2024-12-16 PROCEDURE — 25000003 PHARM REV CODE 250: Performed by: INTERNAL MEDICINE

## 2024-12-16 PROCEDURE — 25000003 PHARM REV CODE 250

## 2024-12-16 PROCEDURE — 84100 ASSAY OF PHOSPHORUS: CPT

## 2024-12-16 PROCEDURE — 85610 PROTHROMBIN TIME: CPT | Performed by: SURGERY

## 2024-12-16 PROCEDURE — 85730 THROMBOPLASTIN TIME PARTIAL: CPT | Performed by: SURGERY

## 2024-12-16 PROCEDURE — 99900035 HC TECH TIME PER 15 MIN (STAT)

## 2024-12-16 PROCEDURE — 99900031 HC PATIENT EDUCATION (STAT)

## 2024-12-16 PROCEDURE — 97110 THERAPEUTIC EXERCISES: CPT

## 2024-12-16 PROCEDURE — 93306 TTE W/DOPPLER COMPLETE: CPT

## 2024-12-16 PROCEDURE — 85025 COMPLETE CBC W/AUTO DIFF WBC: CPT

## 2024-12-16 PROCEDURE — 27000221 HC OXYGEN, UP TO 24 HOURS

## 2024-12-16 PROCEDURE — 80100016 HC MAINTENANCE HEMODIALYSIS

## 2024-12-16 PROCEDURE — 83735 ASSAY OF MAGNESIUM: CPT

## 2024-12-16 PROCEDURE — 80202 ASSAY OF VANCOMYCIN: CPT | Performed by: INTERNAL MEDICINE

## 2024-12-16 PROCEDURE — 21400001 HC TELEMETRY ROOM

## 2024-12-16 PROCEDURE — 80053 COMPREHEN METABOLIC PANEL: CPT

## 2024-12-16 PROCEDURE — 94761 N-INVAS EAR/PLS OXIMETRY MLT: CPT

## 2024-12-16 PROCEDURE — 36415 COLL VENOUS BLD VENIPUNCTURE: CPT | Performed by: SURGERY

## 2024-12-16 PROCEDURE — 25000003 PHARM REV CODE 250: Performed by: STUDENT IN AN ORGANIZED HEALTH CARE EDUCATION/TRAINING PROGRAM

## 2024-12-16 RX ORDER — NIFEDIPINE 30 MG/1
60 TABLET, EXTENDED RELEASE ORAL DAILY
Status: DISCONTINUED | OUTPATIENT
Start: 2024-12-16 | End: 2024-12-18 | Stop reason: HOSPADM

## 2024-12-16 RX ADMIN — URSODIOL 300 MG: 300 CAPSULE ORAL at 09:12

## 2024-12-16 RX ADMIN — LOSARTAN POTASSIUM 100 MG: 50 TABLET, FILM COATED ORAL at 09:12

## 2024-12-16 RX ADMIN — NIFEDIPINE 60 MG: 30 TABLET, FILM COATED, EXTENDED RELEASE ORAL at 07:12

## 2024-12-16 RX ADMIN — HYDROCODONE BITARTRATE AND ACETAMINOPHEN 1 TABLET: 5; 325 TABLET ORAL at 09:12

## 2024-12-16 RX ADMIN — LACTULOSE 20 G: 20 SOLUTION ORAL at 09:12

## 2024-12-16 RX ADMIN — MUPIROCIN: 20 OINTMENT TOPICAL at 09:12

## 2024-12-16 RX ADMIN — FAMOTIDINE 20 MG: 20 TABLET, FILM COATED ORAL at 09:12

## 2024-12-16 RX ADMIN — MEDROXYPROGESTERONE ACETATE 20 MG: 10 TABLET ORAL at 09:12

## 2024-12-16 RX ADMIN — GUAIFENESIN AND DEXTROMETHORPHAN 5 ML: 20; 200 SYRUP ORAL at 09:12

## 2024-12-16 RX ADMIN — HYDRALAZINE HYDROCHLORIDE 50 MG: 50 TABLET ORAL at 09:12

## 2024-12-16 RX ADMIN — VANCOMYCIN HYDROCHLORIDE 500 MG: 500 INJECTION, POWDER, LYOPHILIZED, FOR SOLUTION INTRAVENOUS at 01:12

## 2024-12-16 RX ADMIN — ASPIRIN 81 MG: 81 TABLET, COATED ORAL at 09:12

## 2024-12-16 RX ADMIN — METOPROLOL TARTRATE 100 MG: 100 TABLET, FILM COATED ORAL at 09:12

## 2024-12-16 NOTE — ASSESSMENT & PLAN NOTE
Patient alert this am. BG better. Continue D10 continuous IV fluid.    We will transition to Renal diet.  Continue close monitoring.  Will refer to Endo as outpt for poss insulin pump after d/c.

## 2024-12-16 NOTE — PROGRESS NOTES
Pharmacokinetic Assessment Follow Up: IV Vancomycin    Vancomycin serum concentration assessment(s):    The random level was drawn correctly and can be used to guide therapy at this time. The measurement is within the desired definitive target range of 15 to 20 mcg/mL.    Vancomycin Regimen Plan:    Redose with 500 mg of vancomycin after dialysis.      Drug levels (last 3 results):  Recent Labs   Lab Result Units 12/16/24  0602   Vancomycin, Random ug/mL 20.1       Pharmacy will continue to follow and monitor vancomycin.    Please contact pharmacy at extension [phone] for questions regarding this assessment.    Thank you for the consult,   ANDREA ALVARENGA       Patient brief summary:  Pilar Fernandez is a 44 y.o. female initiated on antimicrobial therapy with IV Vancomycin for treatment of bacteremia    The patient's current regimen is pulse dose    Drug Allergies:   Review of patient's allergies indicates:   Allergen Reactions    Tramadol Itching and Nausea And Vomiting       Actual Body Weight:   89.8 kg    Renal Function:   Estimated Creatinine Clearance: 15.7 mL/min (A) (based on SCr of 4.9 mg/dL (H)).,     Dialysis Method (if applicable):  intermittent HD    CBC (last 72 hours):  Recent Labs   Lab Result Units 12/14/24  0536 12/15/24  0548 12/16/24  0601   WBC K/uL 16.71* 17.20* 15.22*   Hemoglobin g/dL 8.9* 8.9* 8.9*   Hematocrit % 27.5* 26.3* 26.8*   Platelets K/uL 245 290 344   Gran % % 74.2* 71.9 71.3   Lymph % % 10.0* 9.2* 9.2*   Mono % % 9.4 11.0 10.6   Eosinophil % % 5.0 6.0 6.9   Basophil % % 0.6 0.9 0.9   Differential Method  Automated Automated Automated       Metabolic Panel (last 72 hours):  Recent Labs   Lab Result Units 12/14/24  0536 12/15/24  0548 12/16/24  0601 12/16/24  0602   Sodium mmol/L 132* 130* 126*  --    Potassium mmol/L 5.6* 5.5* 5.7*  --    Chloride mmol/L 95 94* 91*  --    CO2 mmol/L 29 28 25  --    Glucose mg/dL 159* 174* 264*  --    BUN mg/dL 38* 49* 57*  --    Creatinine mg/dL  3.7* 4.3* 4.9*  --    Albumin g/dL 1.8* 1.8* 1.9*  --    Total Bilirubin mg/dL 5.4* 4.8* 5.2*  --    Alkaline Phosphatase U/L 502* 495* 539*  --    AST U/L 81* 64* 71*  --    ALT U/L 56* 45* 48*  --    Magnesium mg/dL 1.8 1.8  --  1.8       Vancomycin Administrations:  vancomycin given in the last 96 hours                     vancomycin 1750 mg in 0.9% sodium chloride 500 mL IVPB (mg) 1,750 mg New Bag 12/15/24 1124                    Microbiologic Results:  Microbiology Results (last 7 days)       Procedure Component Value Units Date/Time    Blood culture [[id number]] Collected: 12/13/24 0956    Order Status: Completed Specimen: Blood from Peripheral, Hand, Right Updated: 12/15/24 2212     Blood Culture, Routine No Growth to date      No Growth to date      No Growth to date    Blood culture [[id number]] Collected: 12/15/24 0843    Order Status: Completed Specimen: Blood from Peripheral, Hand, Right Updated: 12/15/24 2115     Blood Culture, Routine No Growth to date    Blood culture [[id number]] Collected: 12/15/24 0858    Order Status: Completed Specimen: Blood from Peripheral, Antecubital, Right Updated: 12/15/24 2115     Blood Culture, Routine No Growth to date    MRSA/SA Rapid ID by PCR from Blood culture [[id number]] Collected: 12/13/24 1010    Order Status: Completed Updated: 12/15/24 0920     Staph aureus ID by PCR Negative     Methicillin Resistant ID by PCR Negative    Blood culture [[id number]] Collected: 12/13/24 1010    Order Status: Completed Specimen: Blood from Peripheral, Wrist, Right Updated: 12/15/24 0720     Blood Culture, Routine Gram stain peds bottle: Gram positive cocci in clusters resembling Staph      Results called to and read back by: TYLER Jernigan 12/15/2024  07:20

## 2024-12-16 NOTE — ASSESSMENT & PLAN NOTE
Bilirubin 5.4 this morning.  This appears to be decreased from previous admission.  Continue monitoring with daily labs.

## 2024-12-16 NOTE — PROGRESS NOTES
[facility] Medicine  Progress Note    Patient Name: Pilar Fernandez  MRN: [id number]  Patient Class: IP- Inpatient   Admission Date: 12/12/2024  Length of Stay: 3 days  Attending Physician: Debra Fong MD  Primary Care Provider: Lindsey Singletary FNP        Subjective     Principal Problem:Hypoglycemia        HPI:  ED HPI:    Chief Complaint   Patient presents with    Altered Mental Status       Per EMS, pt was found unresponsive by family at home and had a CBG of 48. Police gave pt 8mg narcan IN before EMS arrival. Gave pt 250 ml bag of D10 and had CBG of 116 on second check. Pt is increasingly responsive and opening eyes upon arrival. Pt normally on 3 L NC at home , but was placed on 4L during transport.      44-year-old female history of end-stage renal disease on dialysis, diabetes on insulin, CVA in the past, hypertension presents to the emergency department with altered mental status, last known well was sometime last night before going to bed, no in knows what time she went to bed.  Family found her this morning unresponsive.  EMS arrived, CBG was 45, 8 mg of intranasal Narcan administered, 250 mL of D10 administered as well by EMS personnel with response.  Here in the emergency department patient does open her eyes to verbal stimuli, does not follow commands.  Not talking.  On chronic opiates as well as benzos     Family reports she normally talks, walks with a walker, does Monday Wednesday Friday dialysis, did go to dialysis yesterday     See previous note from transfer:     44-year-old female with history of hypertension, hyperlipidemia, DM, ESRD, CHF,  COPD on 3L, CVA, peripheral vascular disease, chronic lymphedema, hypothyroidism, gout, and gastroesophageal reflux disease whe presents for evaluation of midsternal chest pain described stabbing.  Pt also reporting pain  at the top of her abdomen, epigastric area.  Sx acute in onset a hours prior to arrival.  Patient also  "reports pain at the top of her stomach, Pepcid taken without relief.  All other systems reviewed and notable nausea, otherwise unremarkable.  Patient did go dialysis today, had 5-1/2 L removed.     After further chart review it was noted that patient was just discharged from outside facility after extensive workup for GI bleed, anemia, jaundice, and hyperbilirubinemia.  Hemoglobin was 6.4 from baseline of10.  She was transferred to [facility] from [facility] for further evaluation. See Hospital course from prior discharge summary below:     "She was put on ceftriaxone. Hepatology and Advanced Endoscopy Service were consulted for hyperbilirubinemia. Gastroenterology was consulted for gastrointestinal bleed. Hematology-Oncology was consulted for anemia with hyperbilirubinemia and positive Britt test. CT showed diffuse anasarca, pericardial effusion, enlarged lymph nodes vs soft tissue densities, hepatosplenomegaly. Hepatology recommended liver biopsy. Gastroenterology recommended outpatient endoscopy, after she is medically optimized, due to lack of overt bleeding. Hematology-Oncology recommended breast ultrasound. Leukocytosis persisted. Sed rate was normal. CRP was elevated. Midodrine was started for post-dialysis hypotension. She developed hepatic encephalopathy with ammonia of 111 umol/L. She was transfused another unit of packed red blood cells in preparation for liver biopsy. She required vitamin K for elevated INR. She desired to sit at the side of the bed but was unable to on her own. Physical and Occupational Therapy were consulted and recommended high intensity therapy. Midodrine was stopped as blood pressures increased to normal. Her home hypertension medications continued to be held. 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 MRCP showed no obstruction or stricture " "but was limited by body habitus. Hepatology recommended ursodiol. Upon review of imaging, left axilla ultrasound was recommended instead of breast ultrasound as her anasarca would make interpretation of a breast ultrasound difficult. Axilla ultrasound showed abnormal axillary adenopathy with at least 6 abnormal lymph nodes (BI-RADS 4). She underwent left axilla lymph node biopsy on 11/11/2024. Hematology-Oncology reported that she would not currently for inpatient chemotherapy as she had no bulky lymphadenopathy or obstruction or compression of organs due to lymphadenopathy. Additionally, her persistent hyperbilirubinemia excluded her from several chemotherapy regimens, and her heart failure and end stage renal disease exclude her from nearly all other remaining regimens. She awaited skilled nursing facility and was accepted on 11/15/2024. However, syphilis test was incidentally positive per the [place] [employer], so the nursing facility could not accept her before she started treatement. It was found that she had been found to have syphilis at another hospital previously. She was given the 1st dose of intramuscular penicillin G and was accepted to the nursing facility where she will get the 2nd and 3rd doses."     IM HPI:  Patient more awake and alert this morning.  Patient states the night prior to admission she took 60 units Lantus at bedtime.  She states she woke up in the middle of the night unable to sleep and took 2 Tylenol p.m..  Patient denies use of short-acting insulin.  She states in addition to Lantus she also takes Tradjenta.  Patient denies fever, chills, CP, SOB, nausea, vomiting, diarrhea.  Patient states she has been eating well.  Blood sugars remain low this morning despite continued D10 IV at 75 mils per hour.  We will transition to regular diet as patient is more awake and alert.  Continue close monitoring of glucose levels.  Patient reports hitting her left thigh on wheelchair a " few days prior to admission.  Soft tissue swelling with drainage noted.  Question hematoma versus abscess.  We will obtain lower extremity soft tissue ultrasound today.  Patient afebrile.  White count elevated.  Review of patient's chart shows white blood cell counts around 18-20 K for more than a month and has previously been understood to be reactive given negative infectious workup and no clinical symptoms.  White count today 28.53.  Lactic acid ordered.  Patient with known history end-stage renal disease on dialysis.  Nephrology has been consulted.  We will plan to dialyze patient today per Nephrology.  We will continue current treatment plan and monitoring.    Overview/Hospital Course:  12/14/24: Pt seen this am, s/p HD on 12/13, overall doing better.  this am. Pending soft tissue US to be done sometime this weekend for left thigh wound, minimal drainage today not currently on abx. Afebrile with improving leukocytosis. Patient requesting something to help with leg pain.   12/15/24: Patient afebrile with persistent leukocytosis, blood cultures prelim result today show gram +ve cocci in clusters resembling staph. She does have this left thigh wound that may be the source. TTE order placed as well and started on Vanc pending culture sensitization. Labs worsening, will likely require HD today or Monday.   12/6 KD:  Afebrile WBC 15.22-cont trending down.  1st-BC positive, 2nd & 3rd-BC- neg . Continue IV Rocephin.  Awaiting results of Doppler ultrasound to right upper thigh to r/o infection.  Echo today.     Past Medical History:   Diagnosis Date    Anemia     Anemia     Breast mass     Chronic constipation     CKD (chronic kidney disease)     Diabetes mellitus     Dialysis patient     MON, TUE, THURS, SAT.    Embolic stroke involving right middle cerebral artery 7/13/2024    Encounter for blood transfusion     Erosive gastritis 03/2016    GERD (gastroesophageal reflux disease)     Gout     High cholesterol      Hypertension     Knee pain     Thyroid disease     Unspecified cataract 2022       Past Surgical History:   Procedure Laterality Date    AV FISTULA PLACEMENT Left 2019    Procedure: creation av fistula;  Surgeon: Buck Fernandez MD;  Location: [facility];  Service: Cardiovascular;  Laterality: Left;  Left Radialcephalic: PACU     SECTION  , , ,     CHOLECYSTECTOMY      COLONOSCOPY N/A 10/23/2017    Procedure: COLONOSCOPY;  Surgeon: Bri Harry MD;  Location: [facility];  Service: Endoscopy;  Laterality: N/A;    EXCISIONAL BIOPSY Left 2022    Procedure: EXCISIONAL BIOPSY;  Surgeon: Mick Juarez MD;  Location: [facility];  Service: General;  Laterality: Left;    TUBAL LIGATION      UPPER GASTROINTESTINAL ENDOSCOPY  2016    Erosive Gastritis-Dr Bailey       Review of patient's allergies indicates:   Allergen Reactions    Tramadol Itching and Nausea And Vomiting       No current facility-administered medications on file prior to encounter.     Current Outpatient Medications on File Prior to Encounter   Medication Sig    aspirin (ECOTRIN) 81 MG EC tablet Take 1 tablet (81 mg total) by mouth once daily.    atorvastatin (LIPITOR) 40 MG tablet Take 1 tablet by mouth once daily    clopidogreL (PLAVIX) 75 mg tablet Take 1 tablet (75 mg total) by mouth once daily.    colchicine (COLCRYS) 0.6 mg tablet TAKE 1 TABLET BY MOUTH ONCE DAILY AS NEEDED FOR GOUT PAIN    famotidine (PEPCID) 20 MG tablet Take 0.5 tablets (10 mg total) by mouth daily as needed for Heartburn.    hydrOXYzine HCL (ATARAX) 25 MG tablet Take 1 tablet (25 mg total) by mouth 2 (two) times daily as needed for Itching or Anxiety.    insulin aspart U-100 (NOVOLOG FLEXPEN U-100 INSULIN) 100 unit/mL (3 mL) InPn pen Inject 10 Units into the skin 2 (two) times daily with meals.    lactulose (CHRONULAC) 10 gram/15 mL solution Take 30 mLs (20 g total) by mouth 2 (two) times daily.    linaGLIPtin (TRADJENTA) 5 mg Tab  tablet TAKE 1 TABLET (5 MG TOTAL) BY MOUTH ONCE DAILY.    losartan (COZAAR) 100 MG tablet Take 1 tablet (100 mg total) by mouth once daily.    medroxyPROGESTERone (PROVERA) 10 MG tablet Take 2 tablets (20 mg total) by mouth 3 (three) times daily for 10 days, THEN 2 tablets (20 mg total) once daily.    miconazole (MICOTIN) 2 % cream Apply topically 2 (two) times daily. Between thighs, under abdomen pannus, under breasts    nebivoloL (BYSTOLIC) 20 mg Tab Take 1 tablet by mouth once daily.    NIFEdipine (PROCARDIA-XL) 60 MG (OSM) 24 hr tablet Take 1 tablet (60 mg total) by mouth once daily.    ondansetron (ZOFRAN-ODT) 4 MG TbDL Take 1 tablet (4 mg total) by mouth every 8 (eight) hours as needed ([place]).    oxyCODONE (ROXICODONE) 5 MG immediate release tablet Take 1 tablet (5 mg total) by mouth every 12 (twelve) hours as needed for Pain.    pantoprazole (PROTONIX) 40 MG tablet Take 1 tablet (40 mg total) by mouth once daily.    traZODone (DESYREL) 100 MG tablet Take 100 mg by mouth nightly as needed.    ursodioL (ACTIGALL) 300 mg capsule Take 1 capsule (300 mg total) by mouth 3 (three) times daily.     Family History       Problem Relation (Age of Onset)    Breast cancer Maternal Aunt    Diabetes Father    Ovarian cancer Maternal Aunt    Thyroid disease Mother          Tobacco Use    Smoking status: Never     Passive exposure: Never    Smokeless tobacco: Never   Substance and Sexual Activity    Alcohol use: No     Alcohol/week: 0.0 standard drinks of alcohol    Drug use: No    Sexual activity: Yes     Partners: Male     Birth control/protection: None, Surgical     Review of Systems   Constitutional:  Positive for activity change, appetite change and fatigue.   Respiratory:  Negative for shortness of breath.    Genitourinary:  Positive for decreased urine volume.   Skin:  Positive for wound.        Right upper extremity excoriated skin w/ drainage   Neurological:  Positive for weakness.   All other systems reviewed and  "are negative.    Objective:     Vital Signs (Most Recent):  Temp: 98.5 °F (36.9 °C) (12/16/24 0754)  Pulse: 85 (12/16/24 0754)  Resp: 18 (12/16/24 0754)  BP: (!) 163/89 (12/16/24 0754)  SpO2: 95 % (12/16/24 0754) Vital Signs (24h Range):  Temp:  [98.4 °F (36.9 °C)-98.6 °F (37 °C)] 98.5 °F (36.9 °C)  Pulse:  [76-85] 85  Resp:  [16-20] 18  SpO2:  [95 %-99 %] 95 %  BP: (163-192)/() 163/89     Weight: 89.8 kg (198 lb)  Body mass index is 33.99 kg/m².     Physical Exam  Vitals reviewed.   Constitutional:       Appearance: Normal appearance.   HENT:      Head: Normocephalic and atraumatic.   Cardiovascular:      Rate and Rhythm: Normal rate and regular rhythm.   Pulmonary:      Effort: Pulmonary effort is normal.      Breath sounds: Normal breath sounds.   Abdominal:      General: Bowel sounds are normal.      Palpations: Abdomen is soft.   Musculoskeletal:         General: No swelling.   Skin:     General: Skin is warm and dry.      Comments: Right upper extremity excoriated skin w/ drainage     Neurological:      General: No focal deficit present.      Mental Status: She is alert and oriented to person, place, and time.   Psychiatric:         Mood and Affect: Mood normal.         Behavior: Behavior normal.         Thought Content: Thought content normal.         Judgment: Judgment normal.          Significant Labs: All pertinent labs within the past 24 hours have been reviewed.  CBC:   Recent Labs   Lab 12/15/24  0548 12/16/24  0601   WBC 17.20* 15.22*   HGB 8.9* 8.9*   HCT 26.3* 26.8*    344     CMP:   Recent Labs   Lab 12/15/24  0548 12/16/24  0601   * 126*   K 5.5* 5.7*   CL 94* 91*   CO2 28 25   * 264*   BUN 49* 57*   CREATININE 4.3* 4.9*   CALCIUM 9.1 9.1   PROT 8.1 8.3   ALBUMIN 1.8* 1.9*   BILITOT 4.8* 5.2*   ALKPHOS 495* 539*   AST 64* 71*   ALT 45* 48*   ANIONGAP 8 10     Cardiac Markers: No results for input(s): "CKMB", "MYOGLOBIN", "BNP", "TROPISTAT" in the last 48 hours.  Magnesium: " "  Recent Labs   Lab 12/15/24  0548 12/16/24  0602   MG 1.8 1.8     Prealbumin: No results for input(s): "PREALBUMIN" in the last 48 hours.    Significant Imaging: I have reviewed all pertinent imaging results/findings within the past 24 hours.    Assessment and Plan     * Hypoglycemia  Patient alert this am. BG better. Continue D10 continuous IV fluid.    We will transition to Renal diet.  Continue close monitoring.  Will refer to Endo as outpt for poss insulin pump after d/c.  12/16 KD:  Decrease cont. D10 to 30cc/hr (glucose 264mg/dl)    Secondary hyperparathyroidism        Bacteremia  Blood cultures prelim show gram +ve cocci in clusters resembling staph  - afebrile with persistent leukocytosis  - Potential left thigh wound as the source, will look for others    Plan  - repeat BCx  - Start vanc  - Echo ordered  12/16 KD: Afebrile WBC 15.22-cont trending down.  1st-BC positive, 2nd & 3rd-BC- neg . Continue IV Rocephin.  Awaiting results of Doppler ultrasound to right upper thigh to r/o infection.  Echo today.       Altered mental status  Patient awake and alert this morning.  Believe altered mental status on presentation secondary to hypoglycemia.  Continue to treat underlying condition.      Debility  Patient with Acute on chronic debility due to  chronic illness . The patient's latest AMPAC (Activity Measure for Post Acute Care) Score is listed below.    AM-PAC Score - How much help does the patient need for each activity listed  Basic Mobility Total Score: 15  Turning over in bed (including adjusting bedclothes, sheets and blankets)?: A little  Sitting down on and standing up from a chair with arms (e.g., wheelchair, bedside commode, etc.): A little  Moving from lying on back to sitting on the side of the bed?: A little  Moving to and from a bed to a chair (including a wheelchair)?: A little  Need to walk in hospital room?: A lot  Climbing 3-5 steps with a railing?: Unable    Plan  - Progressive mobility " protocol initated  - PT/OT consulted  - Fall precautions in place            Chronic hypoxic respiratory failure  Patient with reported chronic hypoxemia requiring home O2 via nasal cannula.  O2 sats currently greater than 95% on 3 L O2 via nasal cannula.    History of cerebrovascular accident (CVA) with residual deficit  At baseline.  Continue BP control.  12/16 KD: Echo today. Awaiting results of Doppler ultrasound to right upper thigh to r/o infection/clot      Hepatic dysfunction  Appears at baseline.  Continue home medications.      Leukocytosis  White count 16.7 today.  This appears to be above patient's baseline of 18-20 K. patient denies recent fever and is afebrile today.  Lactic acid ordered.  Ultrasound soft tissue left lower extremity ordered to rule out abscess.  Continue close monitoring with daily labs.  12/16 KD: Afebrile WBC 15.22-cont trending down.  1st-BC positive, 2nd & 3rd-BC- neg . Continue IV Rocephin.  Awaiting results of Doppler ultrasound to right upper thigh to r/o infection.  Echo today.       Hyperbilirubinemia  Bilirubin 5.4 this morning.  This appears to be decreased from previous admission.  Continue monitoring with daily labs.      ESRD (end stage renal disease) on dialysis  Creatine stable for now. BMP reviewed- noted Estimated Creatinine Clearance: 15.7 mL/min (A) (based on SCr of 4.9 mg/dL (H)). according to latest data. Based on current GFR, CKD stage is end stage.  Monitor UOP and serial BMP and adjust therapy as needed. Renally dose meds. Avoid nephrotoxic medications and procedures.  Continue dialysis per renal.    Heart failure with preserved ejection fraction  Patient currently without evidence of volume overload.  No acute exacerbation noted.  Continue home medications and volume removal with HD.  Monitor volume status closely.      History of anemia due to CKD  Anemia - due to CKD  Continue to monitor Hgb/Hct levels- evaluate for acute blood loss if Hgb drops acutely from  baseline.  Add/titrate TOÑA supp therapy if pt on dialysis to reach therapeutic Hgb goal 9-11 mg/dl.  Check iron stores - ferritin and iron saturation and replace if needed or Indicated with IV iron supp.      GERD (gastroesophageal reflux disease)  Continue famotidine.      Hyperlipidemia associated with type 2 diabetes mellitus  Holding statin at this time secondary to hepatic dysfunction.      Hypertension associated with diabetes  Resume home medications.  Monitor blood blood pressures closely.  Adjust medications as needed.        VTE Risk Mitigation (From admission, onward)           Ordered     IP VTE HIGH RISK PATIENT  Once         12/12/24 1509     Place sequential compression device  Until discontinued         12/12/24 1509     Place KADIE hose  Until discontinued         12/12/24 1509                    Discharge Planning   SHASHA:      Code Status: Full Code   Medical Readiness for Discharge Date: Treatment  Discharge Plan A: Home with family, Home                Please place Justification for DME        TY COATS NP  Department of Hospital Medicine   [facility]

## 2024-12-16 NOTE — ASSESSMENT & PLAN NOTE
Anemia - due to CKD  Continue to monitor Hgb/Hct levels- evaluate for acute blood loss if Hgb drops acutely from baseline.  Add/titrate TOÑA supp therapy if pt on dialysis to reach therapeutic Hgb goal 9-11 mg/dl.  Check iron stores - ferritin and iron saturation and replace if needed or Indicated with IV iron supp.

## 2024-12-16 NOTE — SUBJECTIVE & OBJECTIVE
Past Medical History:   Diagnosis Date    Anemia     Anemia     Breast mass     Chronic constipation     CKD (chronic kidney disease)     Diabetes mellitus     Dialysis patient     GRZEGORZ BARRIOS, THURS, SAT.    Embolic stroke involving right middle cerebral artery 2024    Encounter for blood transfusion     Erosive gastritis 2016    GERD (gastroesophageal reflux disease)     Gout     High cholesterol     Hypertension     Knee pain     Thyroid disease     Unspecified cataract 2022       Past Surgical History:   Procedure Laterality Date    AV FISTULA PLACEMENT Left 2019    Procedure: creation av fistula;  Surgeon: Buck Fernandez MD;  Location: [facility];  Service: Cardiovascular;  Laterality: Left;  Left Radialcephalic: PACU     SECTION  , , ,     CHOLECYSTECTOMY      COLONOSCOPY N/A 10/23/2017    Procedure: COLONOSCOPY;  Surgeon: Bri Harry MD;  Location: [facility];  Service: Endoscopy;  Laterality: N/A;    EXCISIONAL BIOPSY Left 2022    Procedure: EXCISIONAL BIOPSY;  Surgeon: Mick Juarez MD;  Location: [facility];  Service: General;  Laterality: Left;    TUBAL LIGATION      UPPER GASTROINTESTINAL ENDOSCOPY  2016    Erosive Gastritis-Dr Bailey       Review of patient's allergies indicates:   Allergen Reactions    Tramadol Itching and Nausea And Vomiting       No current facility-administered medications on file prior to encounter.     Current Outpatient Medications on File Prior to Encounter   Medication Sig    aspirin (ECOTRIN) 81 MG EC tablet Take 1 tablet (81 mg total) by mouth once daily.    atorvastatin (LIPITOR) 40 MG tablet Take 1 tablet by mouth once daily    clopidogreL (PLAVIX) 75 mg tablet Take 1 tablet (75 mg total) by mouth once daily.    colchicine (COLCRYS) 0.6 mg tablet TAKE 1 TABLET BY MOUTH ONCE DAILY AS NEEDED FOR GOUT PAIN    famotidine (PEPCID) 20 MG tablet Take 0.5 tablets (10 mg total) by mouth daily as needed for Heartburn.     hydrOXYzine HCL (ATARAX) 25 MG tablet Take 1 tablet (25 mg total) by mouth 2 (two) times daily as needed for Itching or Anxiety.    insulin aspart U-100 (NOVOLOG FLEXPEN U-100 INSULIN) 100 unit/mL (3 mL) InPn pen Inject 10 Units into the skin 2 (two) times daily with meals.    lactulose (CHRONULAC) 10 gram/15 mL solution Take 30 mLs (20 g total) by mouth 2 (two) times daily.    linaGLIPtin (TRADJENTA) 5 mg Tab tablet TAKE 1 TABLET (5 MG TOTAL) BY MOUTH ONCE DAILY.    losartan (COZAAR) 100 MG tablet Take 1 tablet (100 mg total) by mouth once daily.    medroxyPROGESTERone (PROVERA) 10 MG tablet Take 2 tablets (20 mg total) by mouth 3 (three) times daily for 10 days, THEN 2 tablets (20 mg total) once daily.    miconazole (MICOTIN) 2 % cream Apply topically 2 (two) times daily. Between thighs, under abdomen pannus, under breasts    nebivoloL (BYSTOLIC) 20 mg Tab Take 1 tablet by mouth once daily.    NIFEdipine (PROCARDIA-XL) 60 MG (OSM) 24 hr tablet Take 1 tablet (60 mg total) by mouth once daily.    ondansetron (ZOFRAN-ODT) 4 MG TbDL Take 1 tablet (4 mg total) by mouth every 8 (eight) hours as needed ([place]).    oxyCODONE (ROXICODONE) 5 MG immediate release tablet Take 1 tablet (5 mg total) by mouth every 12 (twelve) hours as needed for Pain.    pantoprazole (PROTONIX) 40 MG tablet Take 1 tablet (40 mg total) by mouth once daily.    traZODone (DESYREL) 100 MG tablet Take 100 mg by mouth nightly as needed.    ursodioL (ACTIGALL) 300 mg capsule Take 1 capsule (300 mg total) by mouth 3 (three) times daily.     Family History       Problem Relation (Age of Onset)    Breast cancer Maternal Aunt    Diabetes Father    Ovarian cancer Maternal Aunt    Thyroid disease Mother          Tobacco Use    Smoking status: Never     Passive exposure: Never    Smokeless tobacco: Never   Substance and Sexual Activity    Alcohol use: No     Alcohol/week: 0.0 standard drinks of alcohol    Drug use: No    Sexual activity: Yes     Partners:  Male     Birth control/protection: None, Surgical     Review of Systems   Constitutional:  Positive for activity change, appetite change and fatigue.   Respiratory:  Negative for shortness of breath.    Genitourinary:  Positive for decreased urine volume.   Skin:  Positive for wound.        Right upper extremity excoriated skin w/ drainage   Neurological:  Positive for weakness.   All other systems reviewed and are negative.    Objective:     Vital Signs (Most Recent):  Temp: 98.5 °F (36.9 °C) (12/16/24 0754)  Pulse: 85 (12/16/24 0754)  Resp: 18 (12/16/24 0754)  BP: (!) 163/89 (12/16/24 0754)  SpO2: 95 % (12/16/24 0754) Vital Signs (24h Range):  Temp:  [98.4 °F (36.9 °C)-98.6 °F (37 °C)] 98.5 °F (36.9 °C)  Pulse:  [76-85] 85  Resp:  [16-20] 18  SpO2:  [95 %-99 %] 95 %  BP: (163-192)/() 163/89     Weight: 89.8 kg (198 lb)  Body mass index is 33.99 kg/m².     Physical Exam  Vitals reviewed.   Constitutional:       Appearance: Normal appearance.   HENT:      Head: Normocephalic and atraumatic.   Cardiovascular:      Rate and Rhythm: Normal rate and regular rhythm.   Pulmonary:      Effort: Pulmonary effort is normal.      Breath sounds: Normal breath sounds.   Abdominal:      General: Bowel sounds are normal.      Palpations: Abdomen is soft.   Musculoskeletal:         General: No swelling.   Skin:     General: Skin is warm and dry.      Comments: Right upper extremity excoriated skin w/ drainage     Neurological:      General: No focal deficit present.      Mental Status: She is alert and oriented to person, place, and time.   Psychiatric:         Mood and Affect: Mood normal.         Behavior: Behavior normal.         Thought Content: Thought content normal.         Judgment: Judgment normal.          Significant Labs: All pertinent labs within the past 24 hours have been reviewed.  CBC:   Recent Labs   Lab 12/15/24  0548 12/16/24  0601   WBC 17.20* 15.22*   HGB 8.9* 8.9*   HCT 26.3* 26.8*    344  "    CMP:   Recent Labs   Lab 12/15/24  0548 12/16/24  0601   * 126*   K 5.5* 5.7*   CL 94* 91*   CO2 28 25   * 264*   BUN 49* 57*   CREATININE 4.3* 4.9*   CALCIUM 9.1 9.1   PROT 8.1 8.3   ALBUMIN 1.8* 1.9*   BILITOT 4.8* 5.2*   ALKPHOS 495* 539*   AST 64* 71*   ALT 45* 48*   ANIONGAP 8 10     Cardiac Markers: No results for input(s): "CKMB", "MYOGLOBIN", "BNP", "TROPISTAT" in the last 48 hours.  Magnesium:   Recent Labs   Lab 12/15/24  0548 12/16/24  0602   MG 1.8 1.8     Prealbumin: No results for input(s): "PREALBUMIN" in the last 48 hours.    Significant Imaging: I have reviewed all pertinent imaging results/findings within the past 24 hours.  "

## 2024-12-16 NOTE — CONSULTS
[facility] Medicine  Consult Note    Patient Name: Pilar Fernandez  MRN: [id number]  Admission Date: 12/12/2024  Hospital Length of Stay: 3 days  Attending Physician: Debra Fong MD   Primary Care Provider: Lindsey Singletary FNP           Patient information was obtained from patient and ER records.     Consults  Subjective:     Principal Problem: Hypoglycemia    Chief Complaint:   Chief Complaint   Patient presents with    Altered Mental Status     Per EMS, pt was found unresponsive by family at home and had a CBG of 48. Police gave pt 8mg narcan IN before EMS arrival. Gave pt 250 ml bag of D10 and had CBG of 116 on second check. Pt is increasingly responsive and opening eyes upon arrival. Pt normally on 3 L NC at home , but was placed on 4L during transport.        HPI: ED HPI:    Chief Complaint   Patient presents with    Altered Mental Status       Per EMS, pt was found unresponsive by family at home and had a CBG of 48. Police gave pt 8mg narcan IN before EMS arrival. Gave pt 250 ml bag of D10 and had CBG of 116 on second check. Pt is increasingly responsive and opening eyes upon arrival. Pt normally on 3 L NC at home , but was placed on 4L during transport.      44-year-old female history of end-stage renal disease on dialysis, diabetes on insulin, CVA in the past, hypertension presents to the emergency department with altered mental status, last known well was sometime last night before going to bed, no in knows what time she went to bed.  Family found her this morning unresponsive.  EMS arrived, CBG was 45, 8 mg of intranasal Narcan administered, 250 mL of D10 administered as well by EMS personnel with response.  Here in the emergency department patient does open her eyes to verbal stimuli, does not follow commands.  Not talking.  On chronic opiates as well as benzos     Family reports she normally talks, walks with a walker, does Monday Wednesday Friday dialysis, did go to  "dialysis yesterday     See previous note from transfer:     44-year-old female with history of hypertension, hyperlipidemia, DM, ESRD, CHF,  COPD on 3L, CVA, peripheral vascular disease, chronic lymphedema, hypothyroidism, gout, and gastroesophageal reflux disease whe presents for evaluation of midsternal chest pain described stabbing.  Pt also reporting pain  at the top of her abdomen, epigastric area.  Sx acute in onset a hours prior to arrival.  Patient also reports pain at the top of her stomach, Pepcid taken without relief.  All other systems reviewed and notable nausea, otherwise unremarkable.  Patient did go dialysis today, had 5-1/2 L removed.     After further chart review it was noted that patient was just discharged from outside facility after extensive workup for GI bleed, anemia, jaundice, and hyperbilirubinemia.  Hemoglobin was 6.4 from baseline of10.  She was transferred to [facility] from [facility] for further evaluation. See Hospital course from prior discharge summary below:     "She was put on ceftriaxone. Hepatology and Advanced Endoscopy Service were consulted for hyperbilirubinemia. Gastroenterology was consulted for gastrointestinal bleed. Hematology-Oncology was consulted for anemia with hyperbilirubinemia and positive Britt test. CT showed diffuse anasarca, pericardial effusion, enlarged lymph nodes vs soft tissue densities, hepatosplenomegaly. Hepatology recommended liver biopsy. Gastroenterology recommended outpatient endoscopy, after she is medically optimized, due to lack of overt bleeding. Hematology-Oncology recommended breast ultrasound. Leukocytosis persisted. Sed rate was normal. CRP was elevated. Midodrine was started for post-dialysis hypotension. She developed hepatic encephalopathy with ammonia of 111 umol/L. She was transfused another unit of packed red blood cells in preparation for liver biopsy. She required vitamin K for elevated INR. She desired to sit at the side of the " "bed but was unable to on her own. Physical and Occupational Therapy were consulted and recommended high intensity therapy. Midodrine was stopped as blood pressures increased to normal. Her home hypertension medications continued to be held. 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 MRCP showed no obstruction or stricture but was limited by body habitus. Hepatology recommended ursodiol. Upon review of imaging, left axilla ultrasound was recommended instead of breast ultrasound as her anasarca would make interpretation of a breast ultrasound difficult. Axilla ultrasound showed abnormal axillary adenopathy with at least 6 abnormal lymph nodes (BI-RADS 4). She underwent left axilla lymph node biopsy on 11/11/2024. Hematology-Oncology reported that she would not currently for inpatient chemotherapy as she had no bulky lymphadenopathy or obstruction or compression of organs due to lymphadenopathy. Additionally, her persistent hyperbilirubinemia excluded her from several chemotherapy regimens, and her heart failure and end stage renal disease exclude her from nearly all other remaining regimens. She awaited skilled nursing facility and was accepted on 11/15/2024. However, syphilis test was incidentally positive per the [place] [employer], so the nursing facility could not accept her before she started treatement. It was found that she had been found to have syphilis at another hospital previously. She was given the 1st dose of intramuscular penicillin G and was accepted to the nursing facility where she will get the 2nd and 3rd doses."     IM HPI:  Patient more awake and alert this morning.  Patient states the night prior to admission she took 60 units Lantus at bedtime.  She states she woke up in the middle of the night unable to sleep and took 2 Tylenol p.m..  Patient " denies use of short-acting insulin.  She states in addition to Lantus she also takes Tradjenta.  Patient denies fever, chills, CP, SOB, nausea, vomiting, diarrhea.  Patient states she has been eating well.  Blood sugars remain low this morning despite continued D10 IV at 75 mils per hour.  We will transition to regular diet as patient is more awake and alert.  Continue close monitoring of glucose levels.  Patient reports hitting her left thigh on wheelchair a few days prior to admission.  Soft tissue swelling with drainage noted.  Question hematoma versus abscess.  We will obtain lower extremity soft tissue ultrasound today.  Patient afebrile.  White count elevated.  Review of patient's chart shows white blood cell counts around 18-20 K for more than a month and has previously been understood to be reactive given negative infectious workup and no clinical symptoms.  White count today 28.53.  Lactic acid ordered.  Patient with known history end-stage renal disease on dialysis.  Nephrology has been consulted.  We will plan to dialyze patient today per Nephrology.  We will continue current treatment plan and monitoring.    Past Medical History:   Diagnosis Date    Anemia     Anemia     Breast mass     Chronic constipation     CKD (chronic kidney disease)     Diabetes mellitus     Dialysis patient     MON, TUE, THURS, SAT.    Embolic stroke involving right middle cerebral artery 2024    Encounter for blood transfusion     Erosive gastritis 2016    GERD (gastroesophageal reflux disease)     Gout     High cholesterol     Hypertension     Knee pain     Thyroid disease     Unspecified cataract 2022       Past Surgical History:   Procedure Laterality Date    AV FISTULA PLACEMENT Left 2019    Procedure: creation av fistula;  Surgeon: Buck Fernandez MD;  Location: [facility];  Service: Cardiovascular;  Laterality: Left;  Left Radialcephalic: PACU     SECTION  , , ,     CHOLECYSTECTOMY       COLONOSCOPY N/A 10/23/2017    Procedure: COLONOSCOPY;  Surgeon: Bri Harry MD;  Location: [facility];  Service: Endoscopy;  Laterality: N/A;    EXCISIONAL BIOPSY Left 12/30/2022    Procedure: EXCISIONAL BIOPSY;  Surgeon: Mick Juarez MD;  Location: [facility];  Service: General;  Laterality: Left;    TUBAL LIGATION      UPPER GASTROINTESTINAL ENDOSCOPY  03/31/2016    Erosive Gastritis-Dr Bailey       Review of patient's allergies indicates:   Allergen Reactions    Tramadol Itching and Nausea And Vomiting       No current facility-administered medications on file prior to encounter.     Current Outpatient Medications on File Prior to Encounter   Medication Sig    aspirin (ECOTRIN) 81 MG EC tablet Take 1 tablet (81 mg total) by mouth once daily.    atorvastatin (LIPITOR) 40 MG tablet Take 1 tablet by mouth once daily    clopidogreL (PLAVIX) 75 mg tablet Take 1 tablet (75 mg total) by mouth once daily.    colchicine (COLCRYS) 0.6 mg tablet TAKE 1 TABLET BY MOUTH ONCE DAILY AS NEEDED FOR GOUT PAIN    famotidine (PEPCID) 20 MG tablet Take 0.5 tablets (10 mg total) by mouth daily as needed for Heartburn.    hydrOXYzine HCL (ATARAX) 25 MG tablet Take 1 tablet (25 mg total) by mouth 2 (two) times daily as needed for Itching or Anxiety.    insulin aspart U-100 (NOVOLOG FLEXPEN U-100 INSULIN) 100 unit/mL (3 mL) InPn pen Inject 10 Units into the skin 2 (two) times daily with meals.    lactulose (CHRONULAC) 10 gram/15 mL solution Take 30 mLs (20 g total) by mouth 2 (two) times daily.    linaGLIPtin (TRADJENTA) 5 mg Tab tablet TAKE 1 TABLET (5 MG TOTAL) BY MOUTH ONCE DAILY.    losartan (COZAAR) 100 MG tablet Take 1 tablet (100 mg total) by mouth once daily.    medroxyPROGESTERone (PROVERA) 10 MG tablet Take 2 tablets (20 mg total) by mouth 3 (three) times daily for 10 days, THEN 2 tablets (20 mg total) once daily.    miconazole (MICOTIN) 2 % cream Apply topically 2 (two) times daily. Between thighs, under abdomen  pannus, under breasts    nebivoloL (BYSTOLIC) 20 mg Tab Take 1 tablet by mouth once daily.    NIFEdipine (PROCARDIA-XL) 60 MG (OSM) 24 hr tablet Take 1 tablet (60 mg total) by mouth once daily.    ondansetron (ZOFRAN-ODT) 4 MG TbDL Take 1 tablet (4 mg total) by mouth every 8 (eight) hours as needed ([place]).    oxyCODONE (ROXICODONE) 5 MG immediate release tablet Take 1 tablet (5 mg total) by mouth every 12 (twelve) hours as needed for Pain.    pantoprazole (PROTONIX) 40 MG tablet Take 1 tablet (40 mg total) by mouth once daily.    traZODone (DESYREL) 100 MG tablet Take 100 mg by mouth nightly as needed.    ursodioL (ACTIGALL) 300 mg capsule Take 1 capsule (300 mg total) by mouth 3 (three) times daily.     Family History       Problem Relation (Age of Onset)    Breast cancer Maternal Aunt    Diabetes Father    Ovarian cancer Maternal Aunt    Thyroid disease Mother          Tobacco Use    Smoking status: Never     Passive exposure: Never    Smokeless tobacco: Never   Substance and Sexual Activity    Alcohol use: No     Alcohol/week: 0.0 standard drinks of alcohol    Drug use: No    Sexual activity: Yes     Partners: Male     Birth control/protection: None, Surgical     Review of Systems   Constitutional:  Positive for activity change, appetite change and fatigue.   Respiratory:  Negative for shortness of breath.    Genitourinary:  Positive for decreased urine volume.   Neurological:  Positive for weakness.   All other systems reviewed and are negative.    Objective:     Vital Signs (Most Recent):  Temp: 98.5 °F (36.9 °C) (12/16/24 0754)  Pulse: 85 (12/16/24 0754)  Resp: 18 (12/16/24 0754)  BP: (!) 163/89 (12/16/24 0754)  SpO2: 95 % (12/16/24 0754) Vital Signs (24h Range):  Temp:  [98 °F (36.7 °C)-98.6 °F (37 °C)] 98.5 °F (36.9 °C)  Pulse:  [76-85] 85  Resp:  [16-20] 18  SpO2:  [95 %-99 %] 95 %  BP: (163-192)/() 163/89     Weight: 89.8 kg (198 lb)  Body mass index is 33.99 kg/m².     Physical Exam  Vitals  "reviewed.   Constitutional:       Appearance: Normal appearance.   HENT:      Head: Normocephalic and atraumatic.   Cardiovascular:      Rate and Rhythm: Normal rate and regular rhythm.   Pulmonary:      Effort: Pulmonary effort is normal.      Breath sounds: Normal breath sounds.   Abdominal:      General: Bowel sounds are normal.      Palpations: Abdomen is soft.   Musculoskeletal:         General: No swelling.   Skin:     General: Skin is warm and dry.   Neurological:      General: No focal deficit present.      Mental Status: She is alert and oriented to person, place, and time.   Psychiatric:         Mood and Affect: Mood normal.         Behavior: Behavior normal.         Thought Content: Thought content normal.         Judgment: Judgment normal.          Significant Labs: All pertinent labs within the past 24 hours have been reviewed.  CBC:   Recent Labs   Lab 12/15/24  0548 12/16/24  0601   WBC 17.20* 15.22*   HGB 8.9* 8.9*   HCT 26.3* 26.8*    344     CMP:   Recent Labs   Lab 12/15/24  0548 12/16/24  0601   * 126*   K 5.5* 5.7*   CL 94* 91*   CO2 28 25   * 264*   BUN 49* 57*   CREATININE 4.3* 4.9*   CALCIUM 9.1 9.1   PROT 8.1 8.3   ALBUMIN 1.8* 1.9*   BILITOT 4.8* 5.2*   ALKPHOS 495* 539*   AST 64* 71*   ALT 45* 48*   ANIONGAP 8 10     Cardiac Markers: No results for input(s): "CKMB", "MYOGLOBIN", "BNP", "TROPISTAT" in the last 48 hours.  Magnesium:   Recent Labs   Lab 12/15/24  0548 12/16/24  0602   MG 1.8 1.8     Prealbumin: No results for input(s): "PREALBUMIN" in the last 48 hours.    Significant Imaging: I have reviewed all pertinent imaging results/findings within the past 24 hours.  Assessment/Plan:     * Hypoglycemia  Patient alert this am. BG better. Continue D10 continuous IV fluid.    We will transition to Renal diet.  Continue close monitoring.  Will refer to Endo as outpt for poss insulin pump after d/c.      Bacteremia  Blood cultures prelim show gram +ve cocci in clusters " resembling staph  - afebrile with persistent leukocytosis  - Potential left thigh wound as the source, will look for others    Plan  - repeat BCx  - Start vanc  - Echo ordered      Altered mental status  Patient awake and alert this morning.  Believe altered mental status on presentation secondary to hypoglycemia.  Continue to treat underlying condition.      Debility  Patient with Acute on chronic debility due to  chronic illness . The patient's latest AMPAC (Activity Measure for Post Acute Care) Score is listed below.    AM-PAC Score - How much help does the patient need for each activity listed  Basic Mobility Total Score: 15  Turning over in bed (including adjusting bedclothes, sheets and blankets)?: A little  Sitting down on and standing up from a chair with arms (e.g., wheelchair, bedside commode, etc.): A little  Moving from lying on back to sitting on the side of the bed?: A little  Moving to and from a bed to a chair (including a wheelchair)?: A little  Need to walk in hospital room?: A lot  Climbing 3-5 steps with a railing?: Unable    Plan  - Progressive mobility protocol initated  - PT/OT consulted  - Fall precautions in place            Chronic hypoxic respiratory failure  Patient with reported chronic hypoxemia requiring home O2 via nasal cannula.  O2 sats currently greater than 95% on 3 L O2 via nasal cannula.    History of cerebrovascular accident (CVA) with residual deficit  At baseline.  Continue BP control.      Hepatic dysfunction  Appears at baseline.  Continue home medications.      Leukocytosis  White count 16.7 today.  This appears to be above patient's baseline of 18-20 K. patient denies recent fever and is afebrile today.  Lactic acid ordered.  Ultrasound soft tissue left lower extremity ordered to rule out abscess.  Continue close monitoring with daily labs.      Hyperbilirubinemia  Bilirubin 5.4 this morning.  This appears to be decreased from previous admission.  Continue monitoring with  daily labs.      ESRD (end stage renal disease) on dialysis  ESRD- cont MWF HD schedule and renal diet.  Hypoglycemia - resolved - remains on D10 slow IV drip.  Cont ADA renal diet and 1 liter/day fluid restriction.  Daily weights.    Creatine stable for now. BMP reviewed- noted Estimated Creatinine Clearance: 15.7 mL/min (A) (based on SCr of 4.9 mg/dL (H)). according to latest data. Based on current GFR, CKD stage is end stage.  Monitor UOP and serial BMP and adjust therapy as needed. Renally dose meds. Avoid nephrotoxic medications and procedures.  Continue dialysis per renal.    Heart failure with preserved ejection fraction  Patient currently without evidence of volume overload.  No acute exacerbation noted.  Continue home medications and volume removal with HD.  Monitor volume status closely.      History of anemia due to CKD  Anemia - due to CKD  Continue to monitor Hgb/Hct levels- evaluate for acute blood loss if Hgb drops acutely from baseline.  Add/titrate TOÑA supp therapy if pt on dialysis to reach therapeutic Hgb goal 9-11 mg/dl.  Check iron stores - ferritin and iron saturation and replace if needed or Indicated with IV iron supp.      GERD (gastroesophageal reflux disease)  Continue famotidine.      Hyperlipidemia associated with type 2 diabetes mellitus  Holding statin at this time secondary to hepatic dysfunction.      Hypertension associated with diabetes  Resume home medications.  Monitor blood blood pressures closely.  Adjust medications as needed.        VTE Risk Mitigation (From admission, onward)           Ordered     IP VTE HIGH RISK PATIENT  Once         12/12/24 1509     Place sequential compression device  Until discontinued         12/12/24 1509     Place KADIE hose  Until discontinued         12/12/24 1509                        Thank you for your consult. I will follow-up with patient. Please contact us if you have any additional questions.    Melva Wild MD  Department of Hospital  Medicine   [facility]

## 2024-12-16 NOTE — ASSESSMENT & PLAN NOTE
Creatine stable for now. BMP reviewed- noted Estimated Creatinine Clearance: 15.7 mL/min (A) (based on SCr of 4.9 mg/dL (H)). according to latest data. Based on current GFR, CKD stage is end stage.  Monitor UOP and serial BMP and adjust therapy as needed. Renally dose meds. Avoid nephrotoxic medications and procedures.  Continue dialysis per renal.

## 2024-12-16 NOTE — ASSESSMENT & PLAN NOTE
White count 16.7 today.  This appears to be above patient's baseline of 18-20 K. patient denies recent fever and is afebrile today.  Lactic acid ordered.  Ultrasound soft tissue left lower extremity ordered to rule out abscess.  Continue close monitoring with daily labs.  12/16 KD: Afebrile WBC 15.22-cont trending down.  1st-BC positive, 2nd & 3rd-BC- neg . Continue IV Rocephin.  Awaiting results of Doppler ultrasound to right upper thigh to r/o infection.  Echo today.

## 2024-12-16 NOTE — PLAN OF CARE
12/16/24 1040   Medicare Message   Important Message from Medicare regarding Discharge Appeal Rights Explained to patient/caregiver;Signed/date by patient/caregiver   Date IMM was signed 12/16/24   Time IMM was signed 1040     Patient awake, alert, is currently IP .  Reviewed Medicare Important Message with patient.  Signs IM.  Copy offered and declined.

## 2024-12-16 NOTE — PLAN OF CARE
Plan of care reviewed with the patient. Accuchecks completed per order. Patient able to ambulate to commode with lift assist from sitting.

## 2024-12-16 NOTE — PT/OT/SLP PROGRESS
Physical Therapy Treatment    Patient Name:  Pilar Fernandez   MRN:  [id number]    Recommendations:     Discharge Recommendations: Moderate Intensity Therapy  Discharge Equipment Recommendations: none  Barriers to discharge:  current patient status    Assessment:     Pilar Fernandez is a 44 y.o. female admitted with a medical diagnosis of Hypoglycemia.  She presents with the following impairments/functional limitations: weakness, impaired endurance, impaired self care skills, impaired functional mobility, gait instability, impaired balance, decreased lower extremity function, decreased safety awareness, decreased ROM, edema, impaired cardiopulmonary response to activity .    Rehab Prognosis: Good; patient would benefit from acute skilled PT services to address these deficits and reach maximum level of function.    Recent Surgery: * No surgery found *      Plan:     During this hospitalization, patient to be seen 5 x/week to address the identified rehab impairments via gait training, therapeutic activities, therapeutic exercises and progress toward the following goals:    Plan of Care Expires:  12/20/24    Subjective     Chief Complaint: Patient reports generalized fatigue and (left) LE discomfort. Patient reports she has been dealing with lymphedema for a while and after having dialysis she has been able to move better. Patient reports she will have dialysis this PM.   Patient/Family Comments/goals: to return home to [facility]  Pain/Comfort:   7/10 (left) LE      Objective:     Communicated with nurse prior to session.  Patient found  sitting up in wheelchair. Patient had just been transferred to wheelchair by nursing staff  with  Peripheral IV, Oxygen upon PT entry to room.     General Precautions: Standard, fall  Orthopedic Precautions: N/A  Braces: N/A  Respiratory Status: Nasal cannula, flow 3 L/min     Functional Mobility:patient decline any further standing at this time but did agree to LE exercises in  wheelchair.          Treatment & Education:  (Bilateral) LE therex Active on (right) and Active Assistive on (left) AP, LAQ, hip flexion, heel slides, glut squeezes. Patient educated to perform LE exercises as able throughout the day.     Patient left up in chair with all lines intact and call button in reach..    GOALS:   Multidisciplinary Problems       Physical Therapy Goals          Problem: Physical Therapy    Goal Priority Disciplines Outcome Interventions   Physical Therapy Goal     PT, PT/OT Progressing    Description: Patient will increase functional independence with mobility by performin. Supine to sit with Modified [place]  2. Sit to supine with Modified [place]  3. Rolling to Left and Right with Modified [place].  4. Sit to stand transfer with Supervision  5. Bed to chair transfer with Supervision using Rolling Walker  6. Gait  x 50 feet with Stand-by Assistance using Rolling Walker.   7. Lower extremity exercise program x10 reps per handout, with independence                         Time Tracking:     PT Received On:  2024  PT Start Time:     1134  PT Stop Time:  1157  PT Total Time (min):   23    Billable Minutes: Therapeutic Exercise 23                2024

## 2024-12-16 NOTE — ASSESSMENT & PLAN NOTE
At baseline.  Continue BP control.  12/16 KD: Echo today. Awaiting results of Doppler ultrasound to right upper thigh to r/o infection/clot

## 2024-12-16 NOTE — PT/OT/SLP PROGRESS
Occupational Therapy      Patient Name:  Pilar Fernandez   MRN:  [id number]    Patient not seen today secondary to Off the floor for procedure/surgery. Will follow-up tomorrow.    12/16/2024

## 2024-12-16 NOTE — SUBJECTIVE & OBJECTIVE
Past Medical History:   Diagnosis Date    Anemia     Anemia     Breast mass     Chronic constipation     CKD (chronic kidney disease)     Diabetes mellitus     Dialysis patient     GRZEGORZ BARRIOS, THURS, SAT.    Embolic stroke involving right middle cerebral artery 2024    Encounter for blood transfusion     Erosive gastritis 2016    GERD (gastroesophageal reflux disease)     Gout     High cholesterol     Hypertension     Knee pain     Thyroid disease     Unspecified cataract 2022       Past Surgical History:   Procedure Laterality Date    AV FISTULA PLACEMENT Left 2019    Procedure: creation av fistula;  Surgeon: Buck Fernandez MD;  Location: [facility];  Service: Cardiovascular;  Laterality: Left;  Left Radialcephalic: PACU     SECTION  , , ,     CHOLECYSTECTOMY      COLONOSCOPY N/A 10/23/2017    Procedure: COLONOSCOPY;  Surgeon: Bri Harry MD;  Location: [facility];  Service: Endoscopy;  Laterality: N/A;    EXCISIONAL BIOPSY Left 2022    Procedure: EXCISIONAL BIOPSY;  Surgeon: Mick Juarez MD;  Location: [facility];  Service: General;  Laterality: Left;    TUBAL LIGATION      UPPER GASTROINTESTINAL ENDOSCOPY  2016    Erosive Gastritis-Dr Bailey       Review of patient's allergies indicates:   Allergen Reactions    Tramadol Itching and Nausea And Vomiting       No current facility-administered medications on file prior to encounter.     Current Outpatient Medications on File Prior to Encounter   Medication Sig    aspirin (ECOTRIN) 81 MG EC tablet Take 1 tablet (81 mg total) by mouth once daily.    atorvastatin (LIPITOR) 40 MG tablet Take 1 tablet by mouth once daily    clopidogreL (PLAVIX) 75 mg tablet Take 1 tablet (75 mg total) by mouth once daily.    colchicine (COLCRYS) 0.6 mg tablet TAKE 1 TABLET BY MOUTH ONCE DAILY AS NEEDED FOR GOUT PAIN    famotidine (PEPCID) 20 MG tablet Take 0.5 tablets (10 mg total) by mouth daily as needed for Heartburn.     hydrOXYzine HCL (ATARAX) 25 MG tablet Take 1 tablet (25 mg total) by mouth 2 (two) times daily as needed for Itching or Anxiety.    insulin aspart U-100 (NOVOLOG FLEXPEN U-100 INSULIN) 100 unit/mL (3 mL) InPn pen Inject 10 Units into the skin 2 (two) times daily with meals.    lactulose (CHRONULAC) 10 gram/15 mL solution Take 30 mLs (20 g total) by mouth 2 (two) times daily.    linaGLIPtin (TRADJENTA) 5 mg Tab tablet TAKE 1 TABLET (5 MG TOTAL) BY MOUTH ONCE DAILY.    losartan (COZAAR) 100 MG tablet Take 1 tablet (100 mg total) by mouth once daily.    medroxyPROGESTERone (PROVERA) 10 MG tablet Take 2 tablets (20 mg total) by mouth 3 (three) times daily for 10 days, THEN 2 tablets (20 mg total) once daily.    miconazole (MICOTIN) 2 % cream Apply topically 2 (two) times daily. Between thighs, under abdomen pannus, under breasts    nebivoloL (BYSTOLIC) 20 mg Tab Take 1 tablet by mouth once daily.    NIFEdipine (PROCARDIA-XL) 60 MG (OSM) 24 hr tablet Take 1 tablet (60 mg total) by mouth once daily.    ondansetron (ZOFRAN-ODT) 4 MG TbDL Take 1 tablet (4 mg total) by mouth every 8 (eight) hours as needed ([place]).    oxyCODONE (ROXICODONE) 5 MG immediate release tablet Take 1 tablet (5 mg total) by mouth every 12 (twelve) hours as needed for Pain.    pantoprazole (PROTONIX) 40 MG tablet Take 1 tablet (40 mg total) by mouth once daily.    traZODone (DESYREL) 100 MG tablet Take 100 mg by mouth nightly as needed.    ursodioL (ACTIGALL) 300 mg capsule Take 1 capsule (300 mg total) by mouth 3 (three) times daily.     Family History       Problem Relation (Age of Onset)    Breast cancer Maternal Aunt    Diabetes Father    Ovarian cancer Maternal Aunt    Thyroid disease Mother          Tobacco Use    Smoking status: Never     Passive exposure: Never    Smokeless tobacco: Never   Substance and Sexual Activity    Alcohol use: No     Alcohol/week: 0.0 standard drinks of alcohol    Drug use: No    Sexual activity: Yes     Partners:  Male     Birth control/protection: None, Surgical     Review of Systems   Constitutional:  Positive for activity change, appetite change and fatigue.   Respiratory:  Negative for shortness of breath.    Genitourinary:  Positive for decreased urine volume.   Neurological:  Positive for weakness.   All other systems reviewed and are negative.    Objective:     Vital Signs (Most Recent):  Temp: 98.5 °F (36.9 °C) (12/16/24 0754)  Pulse: 85 (12/16/24 0754)  Resp: 18 (12/16/24 0754)  BP: (!) 163/89 (12/16/24 0754)  SpO2: 95 % (12/16/24 0754) Vital Signs (24h Range):  Temp:  [98 °F (36.7 °C)-98.6 °F (37 °C)] 98.5 °F (36.9 °C)  Pulse:  [76-85] 85  Resp:  [16-20] 18  SpO2:  [95 %-99 %] 95 %  BP: (163-192)/() 163/89     Weight: 89.8 kg (198 lb)  Body mass index is 33.99 kg/m².     Physical Exam  Vitals reviewed.   Constitutional:       Appearance: Normal appearance.   HENT:      Head: Normocephalic and atraumatic.   Cardiovascular:      Rate and Rhythm: Normal rate and regular rhythm.   Pulmonary:      Effort: Pulmonary effort is normal.      Breath sounds: Normal breath sounds.   Abdominal:      General: Bowel sounds are normal.      Palpations: Abdomen is soft.   Musculoskeletal:         General: No swelling.   Skin:     General: Skin is warm and dry.   Neurological:      General: No focal deficit present.      Mental Status: She is alert and oriented to person, place, and time.   Psychiatric:         Mood and Affect: Mood normal.         Behavior: Behavior normal.         Thought Content: Thought content normal.         Judgment: Judgment normal.          Significant Labs: All pertinent labs within the past 24 hours have been reviewed.  CBC:   Recent Labs   Lab 12/15/24  0548 12/16/24  0601   WBC 17.20* 15.22*   HGB 8.9* 8.9*   HCT 26.3* 26.8*    344     CMP:   Recent Labs   Lab 12/15/24  0548 12/16/24  0601   * 126*   K 5.5* 5.7*   CL 94* 91*   CO2 28 25   * 264*   BUN 49* 57*   CREATININE 4.3*  "4.9*   CALCIUM 9.1 9.1   PROT 8.1 8.3   ALBUMIN 1.8* 1.9*   BILITOT 4.8* 5.2*   ALKPHOS 495* 539*   AST 64* 71*   ALT 45* 48*   ANIONGAP 8 10     Cardiac Markers: No results for input(s): "CKMB", "MYOGLOBIN", "BNP", "TROPISTAT" in the last 48 hours.  Magnesium:   Recent Labs   Lab 12/15/24  0548 12/16/24  0602   MG 1.8 1.8     Prealbumin: No results for input(s): "PREALBUMIN" in the last 48 hours.    Significant Imaging: I have reviewed all pertinent imaging results/findings within the past 24 hours.  "

## 2024-12-16 NOTE — ASSESSMENT & PLAN NOTE
Blood cultures prelim show gram +ve cocci in clusters resembling staph  - afebrile with persistent leukocytosis  - Potential left thigh wound as the source, will look for others    Plan  - repeat BCx  - Start vanc  - Echo ordered  12/16 KD: Afebrile WBC 15.22-cont trending down.  1st-BC positive, 2nd & 3rd-BC- neg . Continue IV Rocephin.  Awaiting results of Doppler ultrasound to right upper thigh to r/o infection.  Echo today.

## 2024-12-16 NOTE — PLAN OF CARE
Plan of care reviewed and ongoing with patient. 18 gauge IV to R FA saline locked, L AV fistula/graft present, 3 L NC tolerating well, tele electrode connections intact. BSC within reach of patient, transfers with stand by/ one person assist. Neuro checks performed q4h per orders, no changes noted during the night. Call light and personal items within reach.       Problem: Infection  Goal: Absence of Infection Signs and Symptoms  Outcome: Not Progressing     Problem: Adult Inpatient Plan of Care  Goal: Plan of Care Review  Outcome: Not Progressing  Goal: Patient-Specific Goal (Individualized)  Outcome: Not Progressing  Goal: Absence of Hospital-Acquired Illness or Injury  Outcome: Not Progressing  Goal: Optimal Comfort and Wellbeing  Outcome: Not Progressing  Goal: Readiness for Transition of Care  Outcome: Not Progressing     Problem: Diabetes Comorbidity  Goal: Blood Glucose Level Within Targeted Range  Outcome: Not Progressing     Problem: Wound  Goal: Optimal Coping  Outcome: Not Progressing  Goal: Optimal Functional Ability  Outcome: Not Progressing  Goal: Absence of Infection Signs and Symptoms  Outcome: Not Progressing  Goal: Improved Oral Intake  Outcome: Not Progressing  Goal: Optimal Pain Control and Function  Outcome: Not Progressing  Goal: Skin Health and Integrity  Outcome: Not Progressing  Goal: Optimal Wound Healing  Outcome: Not Progressing     Problem: Skin Injury Risk Increased  Goal: Skin Health and Integrity  Outcome: Not Progressing     Problem: Hemodialysis  Goal: Safe, Effective Therapy Delivery  Outcome: Not Progressing  Goal: Effective Tissue Perfusion  Outcome: Not Progressing  Goal: Absence of Infection Signs and Symptoms  Outcome: Not Progressing     Problem: Functional Deficit  Goal: Improved Balance and Postural Control  Outcome: Not Progressing  Goal: Optimal Coordination  Outcome: Not Progressing  Goal: Improved Muscle Strength  Outcome: Not Progressing  Goal: Optimal Range of  Motion  Outcome: Not Progressing

## 2024-12-16 NOTE — ASSESSMENT & PLAN NOTE
Patient alert this am. BG better. Continue D10 continuous IV fluid.    We will transition to Renal diet.  Continue close monitoring.  Will refer to Endo as outpt for poss insulin pump after d/c.  12/16 KD:  Decrease cont. D10 to 30cc/hr (glucose 264mg/dl)

## 2024-12-17 ENCOUNTER — ANESTHESIA EVENT (OUTPATIENT)
Dept: SURGERY | Facility: HOSPITAL | Age: 44
End: 2024-12-17
Payer: MEDICARE

## 2024-12-17 ENCOUNTER — ANESTHESIA (OUTPATIENT)
Dept: SURGERY | Facility: HOSPITAL | Age: 44
End: 2024-12-17
Payer: MEDICARE

## 2024-12-17 PROBLEM — L02.419 THIGH ABSCESS: Status: ACTIVE | Noted: 2024-12-17

## 2024-12-17 LAB
ALBUMIN SERPL BCP-MCNC: 1.7 G/DL (ref 3.5–5.2)
ALP SERPL-CCNC: 479 U/L (ref 55–135)
ALT SERPL W/O P-5'-P-CCNC: 43 U/L (ref 10–44)
ANION GAP SERPL CALC-SCNC: 10 MMOL/L (ref 3–11)
AST SERPL-CCNC: 62 U/L (ref 10–40)
BACTERIA BLD CULT: ABNORMAL
BASOPHILS # BLD AUTO: 0.11 K/UL (ref 0–0.2)
BASOPHILS NFR BLD: 0.8 % (ref 0–1.9)
BILIRUB SERPL-MCNC: 4.4 MG/DL (ref 0.1–1)
BUN SERPL-MCNC: 43 MG/DL (ref 6–20)
CALCIUM SERPL-MCNC: 8.9 MG/DL (ref 8.7–10.5)
CHLORIDE SERPL-SCNC: 92 MMOL/L (ref 95–110)
CO2 SERPL-SCNC: 26 MMOL/L (ref 23–29)
CREAT SERPL-MCNC: 4.2 MG/DL (ref 0.5–1.4)
DIFFERENTIAL METHOD BLD: ABNORMAL
EOSINOPHIL # BLD AUTO: 0.9 K/UL (ref 0–0.5)
EOSINOPHIL NFR BLD: 6.3 % (ref 0–8)
ERYTHROCYTE [DISTWIDTH] IN BLOOD BY AUTOMATED COUNT: 17.3 % (ref 11.5–14.5)
EST. GFR  (NO RACE VARIABLE): 12.7 ML/MIN/1.73 M^2
GLUCOSE SERPL-MCNC: 236 MG/DL (ref 70–110)
HCT VFR BLD AUTO: 23.8 % (ref 37–48.5)
HGB BLD-MCNC: 8 G/DL (ref 12–16)
IMM GRANULOCYTES # BLD AUTO: 0.17 K/UL (ref 0–0.04)
IMM GRANULOCYTES NFR BLD AUTO: 1.3 % (ref 0–0.5)
LYMPHOCYTES # BLD AUTO: 1.7 K/UL (ref 1–4.8)
LYMPHOCYTES NFR BLD: 12.3 % (ref 18–48)
MAGNESIUM SERPL-MCNC: 1.7 MG/DL (ref 1.6–2.6)
MCH RBC QN AUTO: 30.7 PG (ref 27–31)
MCHC RBC AUTO-ENTMCNC: 33.6 G/DL (ref 32–36)
MCV RBC AUTO: 91 FL (ref 82–98)
MONOCYTES # BLD AUTO: 1.5 K/UL (ref 0.3–1)
MONOCYTES NFR BLD: 11 % (ref 4–15)
NEUTROPHILS # BLD AUTO: 9.3 K/UL (ref 1.8–7.7)
NEUTROPHILS NFR BLD: 68.3 % (ref 38–73)
NRBC BLD-RTO: 0 /100 WBC
PLATELET # BLD AUTO: 292 K/UL (ref 150–450)
PMV BLD AUTO: 10.8 FL (ref 9.2–12.9)
POTASSIUM SERPL-SCNC: 4.9 MMOL/L (ref 3.5–5.1)
PROT SERPL-MCNC: 7.5 G/DL (ref 6–8.4)
RBC # BLD AUTO: 2.61 M/UL (ref 4–5.4)
SODIUM SERPL-SCNC: 128 MMOL/L (ref 136–145)
WBC # BLD AUTO: 13.58 K/UL (ref 3.9–12.7)

## 2024-12-17 PROCEDURE — 36415 COLL VENOUS BLD VENIPUNCTURE: CPT

## 2024-12-17 PROCEDURE — 94761 N-INVAS EAR/PLS OXIMETRY MLT: CPT

## 2024-12-17 PROCEDURE — 94799 UNLISTED PULMONARY SVC/PX: CPT

## 2024-12-17 PROCEDURE — 83735 ASSAY OF MAGNESIUM: CPT

## 2024-12-17 PROCEDURE — 37000009 HC ANESTHESIA EA ADD 15 MINS: Performed by: SURGERY

## 2024-12-17 PROCEDURE — 99900031 HC PATIENT EDUCATION (STAT)

## 2024-12-17 PROCEDURE — 80053 COMPREHEN METABOLIC PANEL: CPT

## 2024-12-17 PROCEDURE — 25000003 PHARM REV CODE 250: Performed by: INTERNAL MEDICINE

## 2024-12-17 PROCEDURE — 27000221 HC OXYGEN, UP TO 24 HOURS

## 2024-12-17 PROCEDURE — 25000003 PHARM REV CODE 250

## 2024-12-17 PROCEDURE — 97530 THERAPEUTIC ACTIVITIES: CPT | Mod: CO

## 2024-12-17 PROCEDURE — 87070 CULTURE OTHR SPECIMN AEROBIC: CPT | Performed by: SURGERY

## 2024-12-17 PROCEDURE — 37000008 HC ANESTHESIA 1ST 15 MINUTES: Performed by: SURGERY

## 2024-12-17 PROCEDURE — 25000003 PHARM REV CODE 250: Performed by: SURGERY

## 2024-12-17 PROCEDURE — 36000704 HC OR TIME LEV I 1ST 15 MIN: Performed by: SURGERY

## 2024-12-17 PROCEDURE — 87075 CULTR BACTERIA EXCEPT BLOOD: CPT | Performed by: SURGERY

## 2024-12-17 PROCEDURE — 36000705 HC OR TIME LEV I EA ADD 15 MIN: Performed by: SURGERY

## 2024-12-17 PROCEDURE — 21400001 HC TELEMETRY ROOM

## 2024-12-17 PROCEDURE — 63600175 PHARM REV CODE 636 W HCPCS: Performed by: SURGERY

## 2024-12-17 PROCEDURE — 99900035 HC TECH TIME PER 15 MIN (STAT)

## 2024-12-17 PROCEDURE — 0J9M0ZZ DRAINAGE OF LEFT UPPER LEG SUBCUTANEOUS TISSUE AND FASCIA, OPEN APPROACH: ICD-10-PCS | Performed by: SURGERY

## 2024-12-17 PROCEDURE — 63600175 PHARM REV CODE 636 W HCPCS: Performed by: NURSE ANESTHETIST, CERTIFIED REGISTERED

## 2024-12-17 PROCEDURE — 85025 COMPLETE CBC W/AUTO DIFF WBC: CPT

## 2024-12-17 RX ORDER — HYDROCODONE BITARTRATE AND ACETAMINOPHEN 5; 325 MG/1; MG/1
1 TABLET ORAL EVERY 4 HOURS PRN
Status: DISCONTINUED | OUTPATIENT
Start: 2024-12-17 | End: 2024-12-18 | Stop reason: HOSPADM

## 2024-12-17 RX ORDER — PROPOFOL 10 MG/ML
INJECTION, EMULSION INTRAVENOUS
Status: DISCONTINUED | OUTPATIENT
Start: 2024-12-17 | End: 2024-12-17

## 2024-12-17 RX ORDER — FENTANYL CITRATE 50 UG/ML
INJECTION, SOLUTION INTRAMUSCULAR; INTRAVENOUS
Status: DISCONTINUED | OUTPATIENT
Start: 2024-12-17 | End: 2024-12-17

## 2024-12-17 RX ORDER — HYDROCODONE BITARTRATE AND ACETAMINOPHEN 10; 325 MG/1; MG/1
1 TABLET ORAL EVERY 4 HOURS PRN
Status: DISCONTINUED | OUTPATIENT
Start: 2024-12-17 | End: 2024-12-18 | Stop reason: HOSPADM

## 2024-12-17 RX ORDER — ACETAMINOPHEN 325 MG/1
650 TABLET ORAL 2 TIMES DAILY PRN
Status: DISCONTINUED | OUTPATIENT
Start: 2024-12-17 | End: 2024-12-18 | Stop reason: HOSPADM

## 2024-12-17 RX ORDER — MIDAZOLAM HYDROCHLORIDE 1 MG/ML
INJECTION INTRAMUSCULAR; INTRAVENOUS
Status: DISCONTINUED | OUTPATIENT
Start: 2024-12-17 | End: 2024-12-17

## 2024-12-17 RX ORDER — LIDOCAINE HYDROCHLORIDE AND EPINEPHRINE 10; 20 UG/ML; MG/ML
INJECTION, SOLUTION INFILTRATION; PERINEURAL
Status: DISCONTINUED | OUTPATIENT
Start: 2024-12-17 | End: 2024-12-17 | Stop reason: HOSPADM

## 2024-12-17 RX ADMIN — MUPIROCIN: 20 OINTMENT TOPICAL at 09:12

## 2024-12-17 RX ADMIN — METOPROLOL TARTRATE 100 MG: 100 TABLET, FILM COATED ORAL at 09:12

## 2024-12-17 RX ADMIN — URSODIOL 300 MG: 300 CAPSULE ORAL at 03:12

## 2024-12-17 RX ADMIN — HYDRALAZINE HYDROCHLORIDE 50 MG: 50 TABLET ORAL at 09:12

## 2024-12-17 RX ADMIN — ASPIRIN 81 MG: 81 TABLET, COATED ORAL at 09:12

## 2024-12-17 RX ADMIN — LOSARTAN POTASSIUM 100 MG: 50 TABLET, FILM COATED ORAL at 09:12

## 2024-12-17 RX ADMIN — LACTULOSE 20 G: 20 SOLUTION ORAL at 09:12

## 2024-12-17 RX ADMIN — FENTANYL CITRATE 100 MCG: 50 INJECTION INTRAMUSCULAR; INTRAVENOUS at 02:12

## 2024-12-17 RX ADMIN — HYDROCODONE BITARTRATE AND ACETAMINOPHEN 1 TABLET: 10; 325 TABLET ORAL at 09:12

## 2024-12-17 RX ADMIN — GUAIFENESIN AND DEXTROMETHORPHAN 5 ML: 20; 200 SYRUP ORAL at 09:12

## 2024-12-17 RX ADMIN — URSODIOL 300 MG: 300 CAPSULE ORAL at 09:12

## 2024-12-17 RX ADMIN — MEDROXYPROGESTERONE ACETATE 20 MG: 10 TABLET ORAL at 09:12

## 2024-12-17 RX ADMIN — PROPOFOL 40 MG: 10 INJECTION, EMULSION INTRAVENOUS at 02:12

## 2024-12-17 RX ADMIN — MIDAZOLAM 2 MG: 1 INJECTION INTRAMUSCULAR; INTRAVENOUS at 02:12

## 2024-12-17 RX ADMIN — PROPOFOL 60 MG: 10 INJECTION, EMULSION INTRAVENOUS at 02:12

## 2024-12-17 RX ADMIN — NIFEDIPINE 60 MG: 30 TABLET, FILM COATED, EXTENDED RELEASE ORAL at 09:12

## 2024-12-17 NOTE — OP NOTE
[facility] Surg  Surgery Department  Operative Note    SUMMARY     Date of Procedure: 12/17/2024     Procedure: Procedure(s) (LRB):  INCISION AND DRAINAGE, ABSCESS (Left)     Surgeons and Role:     * Darcy Guadalupe MD - Primary    Assisting Surgeon: None    Pre-Operative Diagnosis: Thigh abscess [L02.419]    Post-Operative Diagnosis: Post-Op Diagnosis Codes:     * Thigh abscess [L02.419]    Anesthesia: Monitor Anesthesia Care with local    Operative Findings (including complications, if any): large loculated cavity to left lateral thigh measuring 17x 7cm and 2cm deep    Description of Technical Procedures: After informed consent was obtained including the risk of bleeding, pain, infection, scar and need for repeat procedure, the patient agreed to the procedure.  She was identified in the holding area and taken to OR B. She was placed in a right lateral decubitus position.  The area was prepped with betadine and draped in a sterile fashion.  Time out was performed.  She was given antibiotics with dialysis.  The area was anesthetized with lidocaine with epinephrine.  A linear incision was made.  Purulent drainage was expressed.  Culture was taken.  The loculations were broken.  The cavity was irrigated.  The cavity was packed with Dakin's moistened gauze.  Sterile dressing applied.      Intravenous fluids: 0    Estimated Blood Loss (EBL): *10ml           Implants: * No implants in log *    Specimens:   Specimen (24h ago, onward)      Culture                    Condition: Good    Disposition:  to recovery unit stable    Attestation: Op Note Attestation: I performed the procedure.

## 2024-12-17 NOTE — TRANSFER OF CARE
Anesthesia Transfer of Care Note    Patient: Pilar Fernandez    Procedure(s) Performed: Procedure(s) (LRB):  INCISION AND DRAINAGE, ABSCESS (Left)    Patient location: OPS    Anesthesia Type: MAC    Transport from OR: Transported from OR on 2-3 L/min O2 by NC with adequate spontaneous ventilation    Post pain: adequate analgesia    Post assessment: no apparent anesthetic complications    Post vital signs: stable    Level of consciousness: awake, alert and oriented    Nausea/Vomiting: no nausea/vomiting    Complications: none    Transfer of care protocol was followed      Last vitals:     /60  P 76  R 14  O2 Sat 96% 3L NC

## 2024-12-17 NOTE — ASSESSMENT & PLAN NOTE
White count 16.7 today.  This appears to be above patient's baseline of 18-20 K. patient denies recent fever and is afebrile today.  Lactic acid ordered.  Ultrasound soft tissue left lower extremity ordered to rule out abscess.  Continue close monitoring with daily labs.  12/16 KD: Afebrile WBC 15.22-cont trending down.  1st-BC positive, 2nd & 3rd-BC- neg . Continue IV Rocephin.  Awaiting results of Doppler ultrasound to right upper thigh to r/o infection.  Echo today.   12/17 KD: awaiting GS consult for left thigh wound recs. wound culture today. WBC 13.58 today (12/16/24 15.22). H&H 8.0/23.8 today (12/16/24 8.9/26.8). Echo- cardiac dysfunction noted, f/u with cardiology after hospital d/c.

## 2024-12-17 NOTE — ASSESSMENT & PLAN NOTE
Patient alert this am. BG better. Continue D10 continuous IV fluid.    We will transition to Renal diet.  Continue close monitoring.  Will refer to Endo as outpt for poss insulin pump after d/c.  12/16 KD:  Decrease cont. D10 to 30cc/hr (glucose 264mg/dl)  12/17 KD: d/c D5W, CBGs w/ SSI

## 2024-12-17 NOTE — SUBJECTIVE & OBJECTIVE
No current facility-administered medications on file prior to encounter.     Current Outpatient Medications on File Prior to Encounter   Medication Sig    aspirin (ECOTRIN) 81 MG EC tablet Take 1 tablet (81 mg total) by mouth once daily.    atorvastatin (LIPITOR) 40 MG tablet Take 1 tablet by mouth once daily    clopidogreL (PLAVIX) 75 mg tablet Take 1 tablet (75 mg total) by mouth once daily.    colchicine (COLCRYS) 0.6 mg tablet TAKE 1 TABLET BY MOUTH ONCE DAILY AS NEEDED FOR GOUT PAIN    famotidine (PEPCID) 20 MG tablet Take 0.5 tablets (10 mg total) by mouth daily as needed for Heartburn.    hydrOXYzine HCL (ATARAX) 25 MG tablet Take 1 tablet (25 mg total) by mouth 2 (two) times daily as needed for Itching or Anxiety.    insulin aspart U-100 (NOVOLOG FLEXPEN U-100 INSULIN) 100 unit/mL (3 mL) InPn pen Inject 10 Units into the skin 2 (two) times daily with meals.    lactulose (CHRONULAC) 10 gram/15 mL solution Take 30 mLs (20 g total) by mouth 2 (two) times daily.    linaGLIPtin (TRADJENTA) 5 mg Tab tablet TAKE 1 TABLET (5 MG TOTAL) BY MOUTH ONCE DAILY.    losartan (COZAAR) 100 MG tablet Take 1 tablet (100 mg total) by mouth once daily.    medroxyPROGESTERone (PROVERA) 10 MG tablet Take 2 tablets (20 mg total) by mouth 3 (three) times daily for 10 days, THEN 2 tablets (20 mg total) once daily.    miconazole (MICOTIN) 2 % cream Apply topically 2 (two) times daily. Between thighs, under abdomen pannus, under breasts    nebivoloL (BYSTOLIC) 20 mg Tab Take 1 tablet by mouth once daily.    NIFEdipine (PROCARDIA-XL) 60 MG (OSM) 24 hr tablet Take 1 tablet (60 mg total) by mouth once daily.    ondansetron (ZOFRAN-ODT) 4 MG TbDL Take 1 tablet (4 mg total) by mouth every 8 (eight) hours as needed ([place]).    oxyCODONE (ROXICODONE) 5 MG immediate release tablet Take 1 tablet (5 mg total) by mouth every 12 (twelve) hours as needed for Pain.    pantoprazole (PROTONIX) 40 MG tablet Take 1 tablet (40 mg total) by mouth once  daily.    traZODone (DESYREL) 100 MG tablet Take 100 mg by mouth nightly as needed.    ursodioL (ACTIGALL) 300 mg capsule Take 1 capsule (300 mg total) by mouth 3 (three) times daily.       Review of patient's allergies indicates:   Allergen Reactions    Tramadol Itching and Nausea And Vomiting       Past Medical History:   Diagnosis Date    Anemia     Anemia     Breast mass     Chronic constipation     CKD (chronic kidney disease)     Diabetes mellitus     Dialysis patient     GRZEGORZ BARRIOS, THURS, SAT.    Embolic stroke involving right middle cerebral artery 2024    Encounter for blood transfusion     Erosive gastritis 2016    GERD (gastroesophageal reflux disease)     Gout     High cholesterol     Hypertension     Knee pain     Thyroid disease     Unspecified cataract 2022     Past Surgical History:   Procedure Laterality Date    AV FISTULA PLACEMENT Left 2019    Procedure: creation av fistula;  Surgeon: Buck Fernandez MD;  Location: [facility];  Service: Cardiovascular;  Laterality: Left;  Left Radialcephalic: PACU     SECTION  , , ,     CHOLECYSTECTOMY      COLONOSCOPY N/A 10/23/2017    Procedure: COLONOSCOPY;  Surgeon: Bri Harry MD;  Location: [facility];  Service: Endoscopy;  Laterality: N/A;    EXCISIONAL BIOPSY Left 2022    Procedure: EXCISIONAL BIOPSY;  Surgeon: Mick Juarez MD;  Location: [facility];  Service: General;  Laterality: Left;    TUBAL LIGATION      UPPER GASTROINTESTINAL ENDOSCOPY  2016    Erosive Gastritis-Dr Bailey     Family History       Problem Relation (Age of Onset)    Breast cancer Maternal Aunt    Diabetes Father    Ovarian cancer Maternal Aunt    Thyroid disease Mother          Tobacco Use    Smoking status: Never     Passive exposure: Never    Smokeless tobacco: Never   Substance and Sexual Activity    Alcohol use: No     Alcohol/week: 0.0 standard drinks of alcohol    Drug use: No    Sexual activity: Yes     Partners: Male      Birth control/protection: None, Surgical     Review of Systems   Constitutional:  Positive for activity change, appetite change and fatigue.   HENT:  Negative for sore throat, tinnitus and trouble swallowing.    Respiratory:  Negative for shortness of breath.    Gastrointestinal:  Negative for abdominal pain, constipation, diarrhea, nausea and rectal pain.   Endocrine: Negative for cold intolerance and heat intolerance.   Genitourinary:  Positive for decreased urine volume.   Musculoskeletal:  Positive for gait problem and myalgias. Negative for arthralgias, back pain and joint swelling.   Skin:  Positive for wound.        left upper extremity abscess with drainage.    Neurological:  Positive for weakness. Negative for dizziness, light-headedness, numbness and headaches.   Psychiatric/Behavioral:  Negative for behavioral problems.    All other systems reviewed and are negative.    Objective:     Vital Signs (Most Recent):  Temp: 98.8 °F (37.1 °C) (12/17/24 0736)  Pulse: 81 (12/17/24 0840)  Resp: 19 (12/17/24 0736)  BP: 138/71 (12/17/24 0736)  SpO2: 98 % (12/17/24 0736) Vital Signs (24h Range):  Temp:  [98 °F (36.7 °C)-98.8 °F (37.1 °C)] 98.8 °F (37.1 °C)  Pulse:  [72-89] 81  Resp:  [15-19] 19  SpO2:  [97 %-99 %] 98 %  BP: (138-187)/(69-91) 138/71     Weight: 89.8 kg (198 lb)  Body mass index is 33.99 kg/m².     Physical Exam  Vitals reviewed.   Constitutional:       Appearance: Normal appearance.   HENT:      Head: Normocephalic and atraumatic.      Mouth/Throat:      Mouth: Mucous membranes are moist.      Pharynx: Oropharynx is clear.   Eyes:      Conjunctiva/sclera: Conjunctivae normal.      Pupils: Pupils are equal, round, and reactive to light.   Cardiovascular:      Rate and Rhythm: Normal rate and regular rhythm.   Pulmonary:      Effort: Pulmonary effort is normal.      Breath sounds: Normal breath sounds.   Abdominal:      General: Bowel sounds are normal.      Palpations: Abdomen is soft.    Musculoskeletal:         General: Swelling (To the left lateral thigh with desquamation the surface of the skin and noted fluctuance) present.      Right lower leg: Edema present.      Left lower leg: Edema present.   Skin:     General: Skin is warm and dry.      Comments: left lower extremity skin changes with desquamation in multiple areas   Neurological:      General: No focal deficit present.      Mental Status: She is alert and oriented to person, place, and time.      Motor: Weakness present.   Psychiatric:         Mood and Affect: Mood normal.         Behavior: Behavior normal.         Thought Content: Thought content normal.         Judgment: Judgment normal.            I have reviewed all pertinent lab results within the past 24 hours.  CBC:   Recent Labs   Lab 12/17/24  0606   WBC 13.58*   RBC 2.61*   HGB 8.0*   HCT 23.8*      MCV 91   MCH 30.7   MCHC 33.6     BMP:   Recent Labs   Lab 12/17/24  0606   *   *   K 4.9   CL 92*   CO2 26   BUN 43*   CREATININE 4.2*   CALCIUM 8.9   MG 1.7     CMP:   Recent Labs   Lab 12/17/24  0606   *   CALCIUM 8.9   ALBUMIN 1.7*   PROT 7.5   *   K 4.9   CO2 26   CL 92*   BUN 43*   CREATININE 4.2*   ALKPHOS 479*   ALT 43   AST 62*   BILITOT 4.4*     Coagulation:   Recent Labs   Lab 12/16/24  1549   LABPROT 12.3   INR 1.1   APTT 30.8       Significant Diagnostics:  I have reviewed all pertinent imaging results/findings within the past 24 hours.

## 2024-12-17 NOTE — ANESTHESIA PREPROCEDURE EVALUATION
12/17/2024  Pilar Fernandez is a 44 y.o., female.      Pre-op Assessment    I have reviewed the Patient Summary Reports.    I have reviewed the NPO Status.   I have reviewed the Medications.     Review of Systems  Anesthesia Hx:  No problems with previous Anesthesia             Denies Family Hx of Anesthesia complications.    Denies Personal Hx of Anesthesia complications.                    Social:  Non-Smoker       Cardiovascular:     Hypertension, well controlled       CHF   PVD     cardiomyopathy                           Pulmonary:      Shortness of breath                  Renal/:  Chronic Renal Disease, ESRD                Hepatic/GI:   PUD,  GERD, well controlled   Elevated lft             Musculoskeletal:  Arthritis               Neurological:   CVA, no residual symptoms                                    Endocrine:  Diabetes, well controlled, type 2 Hypothyroidism            Lab Results   Component Value Date    WBC 13.58 (H) 12/17/2024    HGB 8.0 (L) 12/17/2024    HCT 23.8 (L) 12/17/2024    MCV 91 12/17/2024     12/17/2024      CMP  Sodium   Date Value Ref Range Status   12/17/2024 128 (L) 136 - 145 mmol/L Final     Potassium   Date Value Ref Range Status   12/17/2024 4.9 3.5 - 5.1 mmol/L Final     Chloride   Date Value Ref Range Status   12/17/2024 92 (L) 95 - 110 mmol/L Final     CO2   Date Value Ref Range Status   12/17/2024 26 23 - 29 mmol/L Final     Glucose   Date Value Ref Range Status   12/17/2024 236 (H) 70 - 110 mg/dL Final     BUN   Date Value Ref Range Status   12/17/2024 43 (H) 6 - 20 mg/dL Final     Creatinine   Date Value Ref Range Status   12/17/2024 4.2 (H) 0.5 - 1.4 mg/dL Final     Calcium   Date Value Ref Range Status   12/17/2024 8.9 8.7 - 10.5 mg/dL Final     Total Protein   Date Value Ref Range Status   12/17/2024 7.5 6.0 - 8.4 g/dL Final     Albumin   Date  Value Ref Range Status   12/17/2024 1.7 (L) 3.5 - 5.2 g/dL Final     Total Bilirubin   Date Value Ref Range Status   12/17/2024 4.4 (H) 0.1 - 1.0 mg/dL Final     Comment:     For infants and newborns, interpretation of results should be based  on gestational age, weight and in agreement with clinical  observations.    Premature Infant recommended reference ranges:  Up to 24 hours.............<8.0 mg/dL  Up to 48 hours............<12.0 mg/dL  3-5 days..................<15.0 mg/dL  6-29 days.................<15.0 mg/dL    For patients on Eltrombopag therapy, use of Dimension [place] TBIL is   not   recommended.       Alkaline Phosphatase   Date Value Ref Range Status   12/17/2024 479 (H) 55 - 135 U/L Final     AST   Date Value Ref Range Status   12/17/2024 62 (H) 10 - 40 U/L Final     ALT   Date Value Ref Range Status   12/17/2024 43 10 - 44 U/L Final     Anion Gap   Date Value Ref Range Status   12/17/2024 10 3 - 11 mmol/L Final     eGFR   Date Value Ref Range Status   12/17/2024 12.7 (A) >60 mL/min/1.73 m^2 Final   04/18/2020 19 (L) >60- mL/min/1.73m Final        Physical Exam    Airway:  Mallampati: III / III    Dental:  Periodontal disease        Anesthesia Plan  Type of Anesthesia, risks & benefits discussed:    Anesthesia Type: MAC  Intra-op Monitoring Plan: Standard ASA Monitors  Post Op Pain Control Plan: multimodal analgesia  Induction:  IV  Airway Plan: Direct  Informed Consent: Informed consent signed with the Patient and all parties understand the risks and agree with anesthesia plan.  All questions answered.   ASA Score: 4  Day of Surgery Review of History & Physical: I have interviewed and examined the patient. I have reviewed the patient's H&P dated: There are no significant changes.     Ready For Surgery From Anesthesia Perspective.     .

## 2024-12-17 NOTE — SUBJECTIVE & OBJECTIVE
Past Medical History:   Diagnosis Date    Anemia     Anemia     Breast mass     Chronic constipation     CKD (chronic kidney disease)     Diabetes mellitus     Dialysis patient     GRZEGORZ BARRIOS, THURS, SAT.    Embolic stroke involving right middle cerebral artery 2024    Encounter for blood transfusion     Erosive gastritis 2016    GERD (gastroesophageal reflux disease)     Gout     High cholesterol     Hypertension     Knee pain     Thyroid disease     Unspecified cataract 2022       Past Surgical History:   Procedure Laterality Date    AV FISTULA PLACEMENT Left 2019    Procedure: creation av fistula;  Surgeon: Buck Fernandez MD;  Location: [facility];  Service: Cardiovascular;  Laterality: Left;  Left Radialcephalic: PACU     SECTION  , , ,     CHOLECYSTECTOMY      COLONOSCOPY N/A 10/23/2017    Procedure: COLONOSCOPY;  Surgeon: Bri Harry MD;  Location: [facility];  Service: Endoscopy;  Laterality: N/A;    EXCISIONAL BIOPSY Left 2022    Procedure: EXCISIONAL BIOPSY;  Surgeon: Mick Juarez MD;  Location: [facility];  Service: General;  Laterality: Left;    TUBAL LIGATION      UPPER GASTROINTESTINAL ENDOSCOPY  2016    Erosive Gastritis-Dr Bailey       Review of patient's allergies indicates:   Allergen Reactions    Tramadol Itching and Nausea And Vomiting       No current facility-administered medications on file prior to encounter.     Current Outpatient Medications on File Prior to Encounter   Medication Sig    aspirin (ECOTRIN) 81 MG EC tablet Take 1 tablet (81 mg total) by mouth once daily.    atorvastatin (LIPITOR) 40 MG tablet Take 1 tablet by mouth once daily    clopidogreL (PLAVIX) 75 mg tablet Take 1 tablet (75 mg total) by mouth once daily.    colchicine (COLCRYS) 0.6 mg tablet TAKE 1 TABLET BY MOUTH ONCE DAILY AS NEEDED FOR GOUT PAIN    famotidine (PEPCID) 20 MG tablet Take 0.5 tablets (10 mg total) by mouth daily as needed for Heartburn.     hydrOXYzine HCL (ATARAX) 25 MG tablet Take 1 tablet (25 mg total) by mouth 2 (two) times daily as needed for Itching or Anxiety.    insulin aspart U-100 (NOVOLOG FLEXPEN U-100 INSULIN) 100 unit/mL (3 mL) InPn pen Inject 10 Units into the skin 2 (two) times daily with meals.    lactulose (CHRONULAC) 10 gram/15 mL solution Take 30 mLs (20 g total) by mouth 2 (two) times daily.    linaGLIPtin (TRADJENTA) 5 mg Tab tablet TAKE 1 TABLET (5 MG TOTAL) BY MOUTH ONCE DAILY.    losartan (COZAAR) 100 MG tablet Take 1 tablet (100 mg total) by mouth once daily.    medroxyPROGESTERone (PROVERA) 10 MG tablet Take 2 tablets (20 mg total) by mouth 3 (three) times daily for 10 days, THEN 2 tablets (20 mg total) once daily.    miconazole (MICOTIN) 2 % cream Apply topically 2 (two) times daily. Between thighs, under abdomen pannus, under breasts    nebivoloL (BYSTOLIC) 20 mg Tab Take 1 tablet by mouth once daily.    NIFEdipine (PROCARDIA-XL) 60 MG (OSM) 24 hr tablet Take 1 tablet (60 mg total) by mouth once daily.    ondansetron (ZOFRAN-ODT) 4 MG TbDL Take 1 tablet (4 mg total) by mouth every 8 (eight) hours as needed ([place]).    oxyCODONE (ROXICODONE) 5 MG immediate release tablet Take 1 tablet (5 mg total) by mouth every 12 (twelve) hours as needed for Pain.    pantoprazole (PROTONIX) 40 MG tablet Take 1 tablet (40 mg total) by mouth once daily.    traZODone (DESYREL) 100 MG tablet Take 100 mg by mouth nightly as needed.    ursodioL (ACTIGALL) 300 mg capsule Take 1 capsule (300 mg total) by mouth 3 (three) times daily.     Family History       Problem Relation (Age of Onset)    Breast cancer Maternal Aunt    Diabetes Father    Ovarian cancer Maternal Aunt    Thyroid disease Mother          Tobacco Use    Smoking status: Never     Passive exposure: Never    Smokeless tobacco: Never   Substance and Sexual Activity    Alcohol use: No     Alcohol/week: 0.0 standard drinks of alcohol    Drug use: No    Sexual activity: Yes     Partners:  Male     Birth control/protection: None, Surgical     Review of Systems   Constitutional:  Positive for activity change, appetite change and fatigue.   Respiratory:  Negative for shortness of breath.    Genitourinary:  Positive for decreased urine volume.   Skin:  Positive for wound.        left upper extremity abscess with drainage.    Neurological:  Positive for weakness.   All other systems reviewed and are negative.    Objective:     Vital Signs (Most Recent):  Temp: 98.8 °F (37.1 °C) (12/17/24 0736)  Pulse: 82 (12/17/24 0736)  Resp: 19 (12/17/24 0736)  BP: 138/71 (12/17/24 0736)  SpO2: 98 % (12/17/24 0736) Vital Signs (24h Range):  Temp:  [98 °F (36.7 °C)-98.8 °F (37.1 °C)] 98.8 °F (37.1 °C)  Pulse:  [72-89] 82  Resp:  [15-19] 19  SpO2:  [97 %-99 %] 98 %  BP: (138-187)/(69-91) 138/71     Weight: 89.8 kg (198 lb)  Body mass index is 33.99 kg/m².     Physical Exam  Vitals reviewed.   Constitutional:       Appearance: Normal appearance.   HENT:      Head: Normocephalic and atraumatic.   Cardiovascular:      Rate and Rhythm: Normal rate and regular rhythm.   Pulmonary:      Effort: Pulmonary effort is normal.      Breath sounds: Normal breath sounds.   Abdominal:      General: Bowel sounds are normal.      Palpations: Abdomen is soft.   Musculoskeletal:         General: No swelling.      Right lower leg: Edema present.      Left lower leg: Edema present.   Skin:     General: Skin is warm and dry.      Comments: left upper extremity abscess w/ drainage     Neurological:      General: No focal deficit present.      Mental Status: She is alert and oriented to person, place, and time.      Motor: Weakness present.   Psychiatric:         Mood and Affect: Mood normal.         Behavior: Behavior normal.         Thought Content: Thought content normal.         Judgment: Judgment normal.          Significant Labs: All pertinent labs within the past 24 hours have been reviewed.  CBC:   Recent Labs   Lab 12/16/24  0601  "12/17/24  0606   WBC 15.22* 13.58*   HGB 8.9* 8.0*   HCT 26.8* 23.8*    292     CMP:   Recent Labs   Lab 12/16/24  0601 12/17/24  0606   * 128*   K 5.7* 4.9   CL 91* 92*   CO2 25 26   * 236*   BUN 57* 43*   CREATININE 4.9* 4.2*   CALCIUM 9.1 8.9   PROT 8.3 7.5   ALBUMIN 1.9* 1.7*   BILITOT 5.2* 4.4*   ALKPHOS 539* 479*   AST 71* 62*   ALT 48* 43   ANIONGAP 10 10     Cardiac Markers: No results for input(s): "CKMB", "MYOGLOBIN", "BNP", "TROPISTAT" in the last 48 hours.  Magnesium:   Recent Labs   Lab 12/16/24  0602 12/17/24  0606   MG 1.8 1.7     Prealbumin: No results for input(s): "PREALBUMIN" in the last 48 hours.    Significant Imaging: I have reviewed all pertinent imaging results/findings within the past 24 hours.  "

## 2024-12-17 NOTE — NURSING
Pt called nurse to the room with c/o drainage from possible abscess to (L) thigh, noted drainage to be dark red, light red, pus, Area cleaned and new bandage placed, pt tolerated well.

## 2024-12-17 NOTE — ASSESSMENT & PLAN NOTE
Plan drainage today   Outpatient wound care will be appropriate drainage  IV antibiotics with dialysis treatments.

## 2024-12-17 NOTE — PLAN OF CARE
POC reviewed w/ pt and purposeful rounding ongoing. Meds administered per MAR. VSS on 3L NC. Pt Aaox4 and 1 assist to stand. LE remain edematous. Pt received HD this shift and 2.5L of fluid reported taken off. Dr. Guadalupe contacted for General surgery consult for L thigh abscess. Call bell and personal items w/ in reach. Pt denies needs at this time.     Problem: Adult Inpatient Plan of Care  Goal: Plan of Care Review  Outcome: Progressing  Goal: Patient-Specific Goal (Individualized)  Outcome: Progressing  Goal: Absence of Hospital-Acquired Illness or Injury  Outcome: Progressing  Goal: Optimal Comfort and Wellbeing  Outcome: Progressing  Goal: Readiness for Transition of Care  Outcome: Progressing     Problem: Diabetes Comorbidity  Goal: Blood Glucose Level Within Targeted Range  Outcome: Progressing     Problem: Wound  Goal: Optimal Coping  Outcome: Progressing  Goal: Optimal Functional Ability  Outcome: Progressing  Goal: Absence of Infection Signs and Symptoms  Outcome: Progressing  Goal: Improved Oral Intake  Outcome: Progressing  Goal: Optimal Pain Control and Function  Outcome: Progressing  Goal: Skin Health and Integrity  Outcome: Progressing  Goal: Optimal Wound Healing  Outcome: Progressing     Problem: Skin Injury Risk Increased  Goal: Skin Health and Integrity  Outcome: Progressing     Problem: Hemodialysis  Goal: Safe, Effective Therapy Delivery  Outcome: Progressing  Goal: Effective Tissue Perfusion  Outcome: Progressing  Goal: Absence of Infection Signs and Symptoms  Outcome: Progressing     Problem: Functional Deficit  Goal: Improved Balance and Postural Control  Outcome: Progressing  Goal: Optimal Coordination  Outcome: Progressing  Goal: Improved Muscle Strength  Outcome: Progressing  Goal: Optimal Range of Motion  Outcome: Progressing

## 2024-12-17 NOTE — HPI
Patient admitted for altered mental status and hypoglycemia.  During the course of the hospitalization was noted that she had some swelling along her left lateral thigh.  Ultrasound was performed yesterday that was concerning for abscess.  Upon questioning the patient she reports hitting it on her wheelchair.  She reports using a warm compress/heating pad to aid in some discomfort and it just made things worse.  There has been a little bit of spontaneous drainage last p.m..

## 2024-12-17 NOTE — CONSULTS
[facility] Surg  General Surgery  Consult Note    Patient Name: Pilar Fernandez  MRN: [id number]  Code Status: Full Code  Admission Date: 12/12/2024  Hospital Length of Stay: 4 days  Attending Physician: Debra Fong MD  Primary Care Provider: Lindsey Singletary FNP    Patient information was obtained from patient, ER records, and primary team.     Consults  Subjective:     Principal Problem: Hypoglycemia    History of Present Illness: Patient admitted for altered mental status and hypoglycemia.  During the course of the hospitalization was noted that she had some swelling along her left lateral thigh.  Ultrasound was performed yesterday that was concerning for abscess.  Upon questioning the patient she reports hitting it on her wheelchair.  She reports using a warm compress/heating pad to aid in some discomfort and it just made things worse.  There has been a little bit of spontaneous drainage last p.m..    No current facility-administered medications on file prior to encounter.     Current Outpatient Medications on File Prior to Encounter   Medication Sig    aspirin (ECOTRIN) 81 MG EC tablet Take 1 tablet (81 mg total) by mouth once daily.    atorvastatin (LIPITOR) 40 MG tablet Take 1 tablet by mouth once daily    clopidogreL (PLAVIX) 75 mg tablet Take 1 tablet (75 mg total) by mouth once daily.    colchicine (COLCRYS) 0.6 mg tablet TAKE 1 TABLET BY MOUTH ONCE DAILY AS NEEDED FOR GOUT PAIN    famotidine (PEPCID) 20 MG tablet Take 0.5 tablets (10 mg total) by mouth daily as needed for Heartburn.    hydrOXYzine HCL (ATARAX) 25 MG tablet Take 1 tablet (25 mg total) by mouth 2 (two) times daily as needed for Itching or Anxiety.    insulin aspart U-100 (NOVOLOG FLEXPEN U-100 INSULIN) 100 unit/mL (3 mL) InPn pen Inject 10 Units into the skin 2 (two) times daily with meals.    lactulose (CHRONULAC) 10 gram/15 mL solution Take 30 mLs (20 g total) by mouth 2 (two) times daily.    linaGLIPtin (TRADJENTA) 5 mg  Tab tablet TAKE 1 TABLET (5 MG TOTAL) BY MOUTH ONCE DAILY.    losartan (COZAAR) 100 MG tablet Take 1 tablet (100 mg total) by mouth once daily.    medroxyPROGESTERone (PROVERA) 10 MG tablet Take 2 tablets (20 mg total) by mouth 3 (three) times daily for 10 days, THEN 2 tablets (20 mg total) once daily.    miconazole (MICOTIN) 2 % cream Apply topically 2 (two) times daily. Between thighs, under abdomen pannus, under breasts    nebivoloL (BYSTOLIC) 20 mg Tab Take 1 tablet by mouth once daily.    NIFEdipine (PROCARDIA-XL) 60 MG (OSM) 24 hr tablet Take 1 tablet (60 mg total) by mouth once daily.    ondansetron (ZOFRAN-ODT) 4 MG TbDL Take 1 tablet (4 mg total) by mouth every 8 (eight) hours as needed ([place]).    oxyCODONE (ROXICODONE) 5 MG immediate release tablet Take 1 tablet (5 mg total) by mouth every 12 (twelve) hours as needed for Pain.    pantoprazole (PROTONIX) 40 MG tablet Take 1 tablet (40 mg total) by mouth once daily.    traZODone (DESYREL) 100 MG tablet Take 100 mg by mouth nightly as needed.    ursodioL (ACTIGALL) 300 mg capsule Take 1 capsule (300 mg total) by mouth 3 (three) times daily.       Review of patient's allergies indicates:   Allergen Reactions    Tramadol Itching and Nausea And Vomiting       Past Medical History:   Diagnosis Date    Anemia     Anemia     Breast mass     Chronic constipation     CKD (chronic kidney disease)     Diabetes mellitus     Dialysis patient     MON, ISABELAE, THXIN, SAT.    Embolic stroke involving right middle cerebral artery 7/13/2024    Encounter for blood transfusion     Erosive gastritis 03/2016    GERD (gastroesophageal reflux disease)     Gout     High cholesterol     Hypertension     Knee pain     Thyroid disease     Unspecified cataract 09/2022     Past Surgical History:   Procedure Laterality Date    AV FISTULA PLACEMENT Left 8/5/2019    Procedure: creation av fistula;  Surgeon: Buck Fernandez MD;  Location: [facility];  Service: Cardiovascular;  Laterality: Left;   Left Radialcephalic: PACU     SECTION  , , ,     CHOLECYSTECTOMY      COLONOSCOPY N/A 10/23/2017    Procedure: COLONOSCOPY;  Surgeon: Bri Harry MD;  Location: [facility];  Service: Endoscopy;  Laterality: N/A;    EXCISIONAL BIOPSY Left 2022    Procedure: EXCISIONAL BIOPSY;  Surgeon: Mick Juarez MD;  Location: [facility];  Service: General;  Laterality: Left;    TUBAL LIGATION      UPPER GASTROINTESTINAL ENDOSCOPY  2016    Erosive Gastritis-Dr Bailey     Family History       Problem Relation (Age of Onset)    Breast cancer Maternal Aunt    Diabetes Father    Ovarian cancer Maternal Aunt    Thyroid disease Mother          Tobacco Use    Smoking status: Never     Passive exposure: Never    Smokeless tobacco: Never   Substance and Sexual Activity    Alcohol use: No     Alcohol/week: 0.0 standard drinks of alcohol    Drug use: No    Sexual activity: Yes     Partners: Male     Birth control/protection: None, Surgical     Review of Systems   Constitutional:  Positive for activity change, appetite change and fatigue.   HENT:  Negative for sore throat, tinnitus and trouble swallowing.    Respiratory:  Negative for shortness of breath.    Gastrointestinal:  Negative for abdominal pain, constipation, diarrhea, nausea and rectal pain.   Endocrine: Negative for cold intolerance and heat intolerance.   Genitourinary:  Positive for decreased urine volume.   Musculoskeletal:  Positive for gait problem and myalgias. Negative for arthralgias, back pain and joint swelling.   Skin:  Positive for wound.        left upper extremity abscess with drainage.    Neurological:  Positive for weakness. Negative for dizziness, light-headedness, numbness and headaches.   Psychiatric/Behavioral:  Negative for behavioral problems.    All other systems reviewed and are negative.    Objective:     Vital Signs (Most Recent):  Temp: 98.8 °F (37.1 °C) (24 0736)  Pulse: 81 (24 0840)  Resp: 19  (12/17/24 0736)  BP: 138/71 (12/17/24 0736)  SpO2: 98 % (12/17/24 0736) Vital Signs (24h Range):  Temp:  [98 °F (36.7 °C)-98.8 °F (37.1 °C)] 98.8 °F (37.1 °C)  Pulse:  [72-89] 81  Resp:  [15-19] 19  SpO2:  [97 %-99 %] 98 %  BP: (138-187)/(69-91) 138/71     Weight: 89.8 kg (198 lb)  Body mass index is 33.99 kg/m².     Physical Exam  Vitals reviewed.   Constitutional:       Appearance: Normal appearance.   HENT:      Head: Normocephalic and atraumatic.      Mouth/Throat:      Mouth: Mucous membranes are moist.      Pharynx: Oropharynx is clear.   Eyes:      Conjunctiva/sclera: Conjunctivae normal.      Pupils: Pupils are equal, round, and reactive to light.   Cardiovascular:      Rate and Rhythm: Normal rate and regular rhythm.   Pulmonary:      Effort: Pulmonary effort is normal.      Breath sounds: Normal breath sounds.   Abdominal:      General: Bowel sounds are normal.      Palpations: Abdomen is soft.   Musculoskeletal:         General: Swelling (To the left lateral thigh with desquamation the surface of the skin and noted fluctuance) present.      Right lower leg: Edema present.      Left lower leg: Edema present.   Skin:     General: Skin is warm and dry.      Comments: left lower extremity skin changes with desquamation in multiple areas   Neurological:      General: No focal deficit present.      Mental Status: She is alert and oriented to person, place, and time.      Motor: Weakness present.   Psychiatric:         Mood and Affect: Mood normal.         Behavior: Behavior normal.         Thought Content: Thought content normal.         Judgment: Judgment normal.            I have reviewed all pertinent lab results within the past 24 hours.  CBC:   Recent Labs   Lab 12/17/24  0606   WBC 13.58*   RBC 2.61*   HGB 8.0*   HCT 23.8*      MCV 91   MCH 30.7   MCHC 33.6     BMP:   Recent Labs   Lab 12/17/24  0606   *   *   K 4.9   CL 92*   CO2 26   BUN 43*   CREATININE 4.2*   CALCIUM 8.9   MG 1.7      CMP:   Recent Labs   Lab 12/17/24  0606   *   CALCIUM 8.9   ALBUMIN 1.7*   PROT 7.5   *   K 4.9   CO2 26   CL 92*   BUN 43*   CREATININE 4.2*   ALKPHOS 479*   ALT 43   AST 62*   BILITOT 4.4*     Coagulation:   Recent Labs   Lab 12/16/24  1549   LABPROT 12.3   INR 1.1   APTT 30.8       Significant Diagnostics:  I have reviewed all pertinent imaging results/findings within the past 24 hours.    Assessment/Plan:     Thigh abscess  Plan drainage today   Outpatient wound care will be appropriate drainage  IV antibiotics with dialysis treatments.      VTE Risk Mitigation (From admission, onward)           Ordered     IP VTE HIGH RISK PATIENT  Once         12/12/24 1509     Place sequential compression device  Until discontinued         12/12/24 1509     Place KADIE hose  Until discontinued         12/12/24 1509                    Thank you for your consult. I will follow-up with patient. Please contact us if you have any additional questions.    Darcy Guadalupe MD  General Surgery  [facility] Surg

## 2024-12-17 NOTE — ASSESSMENT & PLAN NOTE
Blood cultures prelim show gram +ve cocci in clusters resembling staph  - afebrile with persistent leukocytosis  - Potential left thigh wound as the source, will look for others    Plan  - repeat BCx  - Start vanc  - Echo ordered  12/16 KD: Afebrile WBC 15.22-cont trending down.  1st-BC positive, 2nd & 3rd-BC- neg . Continue IV Rocephin.  Awaiting results of Doppler ultrasound to right upper thigh to r/o infection.  Echo today.   12/17 KD: awaiting GS consult for left thigh wound recs. wound culture today. WBC 13.58 today (12/16/24 15.22). H&H 8.0/23.8 today (12/16/24 8.9/26.8). Echo- cardiac dysfunction noted, f/u with cardiology after hospital d/c.

## 2024-12-17 NOTE — ASSESSMENT & PLAN NOTE
Anemia - due to CKD  Continue to monitor Hgb/Hct levels- evaluate for acute blood loss if Hgb drops acutely from baseline.  Add/titrate TOÑA supp therapy if pt on dialysis to reach therapeutic Hgb goal 9-11 mg/dl.  Check iron stores - ferritin and iron saturation and replace if needed or Indicated with IV iron supp.  12/17 KD:H&H 8.0/23.8 today (12/16/24 8.9/26.8). cont. To monitor

## 2024-12-17 NOTE — PLAN OF CARE
Problem: Occupational Therapy  Goal: Occupational Therapy Goal  Description: Goals to be met by: 12/20/24     Patient will increase functional independence with ADLs by performing:    UE Dressing with Set-up Assistance.  LE Dressing with Minimal Assistance.  Grooming while seated at sink with Modified [place].  Toileting from toilet with Set-up Assistance for hygiene and clothing management.   Bathing from  shower chair/bench with Supervision.  Toilet transfer to toilet with Supervision.    Outcome: Progressing

## 2024-12-17 NOTE — PT/OT/SLP PROGRESS
Physical Therapy      Patient Name:  Pilar Fernandez   MRN:  [id number]    Patient not seen today secondary to Other (Comment). Patient is eating her lunch. Will follow-up this afternoon if patient is not DC.

## 2024-12-17 NOTE — PROGRESS NOTES
[facility]  Progress Note    Patient Name: Pilar Fernandez  MRN: [id number]  Patient Class: IP- Inpatient   Admission Date: 12/12/2024  Length of Stay: 4 days  Attending Physician: Debra Fong MD  Primary Care Provider: Lindsey Singletary FNP        Subjective     Principal Problem:Hypoglycemia        HPI:  ED HPI:    Chief Complaint   Patient presents with    Altered Mental Status       Per EMS, pt was found unresponsive by family at home and had a CBG of 48. Police gave pt 8mg narcan IN before EMS arrival. Gave pt 250 ml bag of D10 and had CBG of 116 on second check. Pt is increasingly responsive and opening eyes upon arrival. Pt normally on 3 L NC at home , but was placed on 4L during transport.      44-year-old female history of end-stage renal disease on dialysis, diabetes on insulin, CVA in the past, hypertension presents to the emergency department with altered mental status, last known well was sometime last night before going to bed, no in knows what time she went to bed.  Family found her this morning unresponsive.  EMS arrived, CBG was 45, 8 mg of intranasal Narcan administered, 250 mL of D10 administered as well by EMS personnel with response.  Here in the emergency department patient does open her eyes to verbal stimuli, does not follow commands.  Not talking.  On chronic opiates as well as benzos     Family reports she normally talks, walks with a walker, does Monday Wednesday Friday dialysis, did go to dialysis yesterday     See previous note from transfer:     44-year-old female with history of hypertension, hyperlipidemia, DM, ESRD, CHF,  COPD on 3L, CVA, peripheral vascular disease, chronic lymphedema, hypothyroidism, gout, and gastroesophageal reflux disease whe presents for evaluation of midsternal chest pain described stabbing.  Pt also reporting pain  at the top of her abdomen, epigastric area.  Sx acute in onset a hours prior to arrival.  Patient also  "reports pain at the top of her stomach, Pepcid taken without relief.  All other systems reviewed and notable nausea, otherwise unremarkable.  Patient did go dialysis today, had 5-1/2 L removed.     After further chart review it was noted that patient was just discharged from outside facility after extensive workup for GI bleed, anemia, jaundice, and hyperbilirubinemia.  Hemoglobin was 6.4 from baseline of10.  She was transferred to [facility] from [facility] for further evaluation. See Hospital course from prior discharge summary below:     "She was put on ceftriaxone. Hepatology and Advanced Endoscopy Service were consulted for hyperbilirubinemia. Gastroenterology was consulted for gastrointestinal bleed. Hematology-Oncology was consulted for anemia with hyperbilirubinemia and positive Britt test. CT showed diffuse anasarca, pericardial effusion, enlarged lymph nodes vs soft tissue densities, hepatosplenomegaly. Hepatology recommended liver biopsy. Gastroenterology recommended outpatient endoscopy, after she is medically optimized, due to lack of overt bleeding. Hematology-Oncology recommended breast ultrasound. Leukocytosis persisted. Sed rate was normal. CRP was elevated. Midodrine was started for post-dialysis hypotension. She developed hepatic encephalopathy with ammonia of 111 umol/L. She was transfused another unit of packed red blood cells in preparation for liver biopsy. She required vitamin K for elevated INR. She desired to sit at the side of the bed but was unable to on her own. Physical and Occupational Therapy were consulted and recommended high intensity therapy. Midodrine was stopped as blood pressures increased to normal. Her home hypertension medications continued to be held. 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 MRCP showed no obstruction or stricture " "but was limited by body habitus. Hepatology recommended ursodiol. Upon review of imaging, left axilla ultrasound was recommended instead of breast ultrasound as her anasarca would make interpretation of a breast ultrasound difficult. Axilla ultrasound showed abnormal axillary adenopathy with at least 6 abnormal lymph nodes (BI-RADS 4). She underwent left axilla lymph node biopsy on 11/11/2024. Hematology-Oncology reported that she would not currently for inpatient chemotherapy as she had no bulky lymphadenopathy or obstruction or compression of organs due to lymphadenopathy. Additionally, her persistent hyperbilirubinemia excluded her from several chemotherapy regimens, and her heart failure and end stage renal disease exclude her from nearly all other remaining regimens. She awaited skilled nursing facility and was accepted on 11/15/2024. However, syphilis test was incidentally positive per the [place] [facility], so the nursing facility could not accept her before she started treatement. It was found that she had been found to have syphilis at another hospital previously. She was given the 1st dose of intramuscular penicillin G and was accepted to the nursing facility where she will get the 2nd and 3rd doses."     IM HPI:  Patient more awake and alert this morning.  Patient states the night prior to admission she took 60 units Lantus at bedtime.  She states she woke up in the middle of the night unable to sleep and took 2 Tylenol p.m..  Patient denies use of short-acting insulin.  She states in addition to Lantus she also takes Tradjenta.  Patient denies fever, chills, CP, SOB, nausea, vomiting, diarrhea.  Patient states she has been eating well.  Blood sugars remain low this morning despite continued D10 IV at 75 mils per hour.  We will transition to regular diet as patient is more awake and alert.  Continue close monitoring of glucose levels.  Patient reports hitting her left thigh on wheelchair a " few days prior to admission.  Soft tissue swelling with drainage noted.  Question hematoma versus abscess.  We will obtain lower extremity soft tissue ultrasound today.  Patient afebrile.  White count elevated.  Review of patient's chart shows white blood cell counts around 18-20 K for more than a month and has previously been understood to be reactive given negative infectious workup and no clinical symptoms.  White count today 28.53.  Lactic acid ordered.  Patient with known history end-stage renal disease on dialysis.  Nephrology has been consulted.  We will plan to dialyze patient today per Nephrology.  We will continue current treatment plan and monitoring.    Overview/Hospital Course:  12/14/24: Pt seen this am, s/p HD on 12/13, overall doing better.  this am. Pending soft tissue US to be done sometime this weekend for left thigh wound, minimal drainage today not currently on abx. Afebrile with improving leukocytosis. Patient requesting something to help with leg pain.   12/15/24: Patient afebrile with persistent leukocytosis, blood cultures prelim result today show gram +ve cocci in clusters resembling staph. She does have this left thigh wound that may be the source. TTE order placed as well and started on Vanc pending culture sensitization. Labs worsening, will likely require HD today or Monday.   12/16 KD:  Afebrile WBC 15.22-cont trending down.  1st-BC positive, 2nd & 3rd-BC- neg . Continue IV Rocephin.  Awaiting results of Doppler ultrasound to right upper thigh to r/o infection.  Echo today.   12/17 KD: Pt. Afebrile, awaiting GS consult for left thigh wound recs. wound culture today. WBC 13.58 today (12/16/24 15.22). H&H 8.0/23.8 today (12/16/24 8.9/26.8). Echo- cardiac dysfunction noted, f/u with cardiology after hospital d/c.    Past Medical History:   Diagnosis Date    Anemia     Anemia     Breast mass     Chronic constipation     CKD (chronic kidney disease)     Diabetes mellitus     Dialysis  patient     GRZEGORZ BARRIOS, THURS, SAT.    Embolic stroke involving right middle cerebral artery 2024    Encounter for blood transfusion     Erosive gastritis 2016    GERD (gastroesophageal reflux disease)     Gout     High cholesterol     Hypertension     Knee pain     Thyroid disease     Unspecified cataract 2022       Past Surgical History:   Procedure Laterality Date    AV FISTULA PLACEMENT Left 2019    Procedure: creation av fistula;  Surgeon: Buck Fernandez MD;  Location: [facility];  Service: Cardiovascular;  Laterality: Left;  Left Radialcephalic: PACU     SECTION  , , ,     CHOLECYSTECTOMY      COLONOSCOPY N/A 10/23/2017    Procedure: COLONOSCOPY;  Surgeon: Bri Harry MD;  Location: [facility];  Service: Endoscopy;  Laterality: N/A;    EXCISIONAL BIOPSY Left 2022    Procedure: EXCISIONAL BIOPSY;  Surgeon: Mick Juarez MD;  Location: [facility];  Service: General;  Laterality: Left;    TUBAL LIGATION      UPPER GASTROINTESTINAL ENDOSCOPY  2016    Erosive Gastritis-Dr Bailey       Review of patient's allergies indicates:   Allergen Reactions    Tramadol Itching and Nausea And Vomiting       No current facility-administered medications on file prior to encounter.     Current Outpatient Medications on File Prior to Encounter   Medication Sig    aspirin (ECOTRIN) 81 MG EC tablet Take 1 tablet (81 mg total) by mouth once daily.    atorvastatin (LIPITOR) 40 MG tablet Take 1 tablet by mouth once daily    clopidogreL (PLAVIX) 75 mg tablet Take 1 tablet (75 mg total) by mouth once daily.    colchicine (COLCRYS) 0.6 mg tablet TAKE 1 TABLET BY MOUTH ONCE DAILY AS NEEDED FOR GOUT PAIN    famotidine (PEPCID) 20 MG tablet Take 0.5 tablets (10 mg total) by mouth daily as needed for Heartburn.    hydrOXYzine HCL (ATARAX) 25 MG tablet Take 1 tablet (25 mg total) by mouth 2 (two) times daily as needed for Itching or Anxiety.    insulin aspart U-100 (NOVOLOG FLEXPEN U-100  INSULIN) 100 unit/mL (3 mL) InPn pen Inject 10 Units into the skin 2 (two) times daily with meals.    lactulose (CHRONULAC) 10 gram/15 mL solution Take 30 mLs (20 g total) by mouth 2 (two) times daily.    linaGLIPtin (TRADJENTA) 5 mg Tab tablet TAKE 1 TABLET (5 MG TOTAL) BY MOUTH ONCE DAILY.    losartan (COZAAR) 100 MG tablet Take 1 tablet (100 mg total) by mouth once daily.    medroxyPROGESTERone (PROVERA) 10 MG tablet Take 2 tablets (20 mg total) by mouth 3 (three) times daily for 10 days, THEN 2 tablets (20 mg total) once daily.    miconazole (MICOTIN) 2 % cream Apply topically 2 (two) times daily. Between thighs, under abdomen pannus, under breasts    nebivoloL (BYSTOLIC) 20 mg Tab Take 1 tablet by mouth once daily.    NIFEdipine (PROCARDIA-XL) 60 MG (OSM) 24 hr tablet Take 1 tablet (60 mg total) by mouth once daily.    ondansetron (ZOFRAN-ODT) 4 MG TbDL Take 1 tablet (4 mg total) by mouth every 8 (eight) hours as needed ([place]).    oxyCODONE (ROXICODONE) 5 MG immediate release tablet Take 1 tablet (5 mg total) by mouth every 12 (twelve) hours as needed for Pain.    pantoprazole (PROTONIX) 40 MG tablet Take 1 tablet (40 mg total) by mouth once daily.    traZODone (DESYREL) 100 MG tablet Take 100 mg by mouth nightly as needed.    ursodioL (ACTIGALL) 300 mg capsule Take 1 capsule (300 mg total) by mouth 3 (three) times daily.     Family History       Problem Relation (Age of Onset)    Breast cancer Maternal Aunt    Diabetes Father    Ovarian cancer Maternal Aunt    Thyroid disease Mother          Tobacco Use    Smoking status: Never     Passive exposure: Never    Smokeless tobacco: Never   Substance and Sexual Activity    Alcohol use: No     Alcohol/week: 0.0 standard drinks of alcohol    Drug use: No    Sexual activity: Yes     Partners: Male     Birth control/protection: None, Surgical     Review of Systems   Constitutional:  Positive for activity change, appetite change and fatigue.   Respiratory:  Negative  for shortness of breath.    Genitourinary:  Positive for decreased urine volume.   Skin:  Positive for wound.        left upper extremity abscess with drainage.    Neurological:  Positive for weakness.   All other systems reviewed and are negative.    Objective:     Vital Signs (Most Recent):  Temp: 98.8 °F (37.1 °C) (12/17/24 0736)  Pulse: 82 (12/17/24 0736)  Resp: 19 (12/17/24 0736)  BP: 138/71 (12/17/24 0736)  SpO2: 98 % (12/17/24 0736) Vital Signs (24h Range):  Temp:  [98 °F (36.7 °C)-98.8 °F (37.1 °C)] 98.8 °F (37.1 °C)  Pulse:  [72-89] 82  Resp:  [15-19] 19  SpO2:  [97 %-99 %] 98 %  BP: (138-187)/(69-91) 138/71     Weight: 89.8 kg (198 lb)  Body mass index is 33.99 kg/m².     Physical Exam  Vitals reviewed.   Constitutional:       Appearance: Normal appearance.   HENT:      Head: Normocephalic and atraumatic.   Cardiovascular:      Rate and Rhythm: Normal rate and regular rhythm.   Pulmonary:      Effort: Pulmonary effort is normal.      Breath sounds: Normal breath sounds.   Abdominal:      General: Bowel sounds are normal.      Palpations: Abdomen is soft.   Musculoskeletal:         General: No swelling.      Right lower leg: Edema present.      Left lower leg: Edema present.   Skin:     General: Skin is warm and dry.      Comments: left upper extremity abscess w/ drainage     Neurological:      General: No focal deficit present.      Mental Status: She is alert and oriented to person, place, and time.      Motor: Weakness present.   Psychiatric:         Mood and Affect: Mood normal.         Behavior: Behavior normal.         Thought Content: Thought content normal.         Judgment: Judgment normal.          Significant Labs: All pertinent labs within the past 24 hours have been reviewed.  CBC:   Recent Labs   Lab 12/16/24 0601 12/17/24 0606   WBC 15.22* 13.58*   HGB 8.9* 8.0*   HCT 26.8* 23.8*    292     CMP:   Recent Labs   Lab 12/16/24 0601 12/17/24 0606   * 128*   K 5.7* 4.9   CL 91*  "92*   CO2 25 26   * 236*   BUN 57* 43*   CREATININE 4.9* 4.2*   CALCIUM 9.1 8.9   PROT 8.3 7.5   ALBUMIN 1.9* 1.7*   BILITOT 5.2* 4.4*   ALKPHOS 539* 479*   AST 71* 62*   ALT 48* 43   ANIONGAP 10 10     Cardiac Markers: No results for input(s): "CKMB", "MYOGLOBIN", "BNP", "TROPISTAT" in the last 48 hours.  Magnesium:   Recent Labs   Lab 12/16/24  0602 12/17/24  0606   MG 1.8 1.7     Prealbumin: No results for input(s): "PREALBUMIN" in the last 48 hours.    Significant Imaging: I have reviewed all pertinent imaging results/findings within the past 24 hours.    Assessment and Plan     * Hypoglycemia  Patient alert this am. BG better. Continue D10 continuous IV fluid.    We will transition to Renal diet.  Continue close monitoring.  Will refer to Endo as outpt for poss insulin pump after d/c.  12/16 KD:  Decrease cont. D10 to 30cc/hr (glucose 264mg/dl)  12/17 KD: d/c D5W, CBGs w/ SSI    Secondary hyperparathyroidism        Bacteremia  Blood cultures prelim show gram +ve cocci in clusters resembling staph  - afebrile with persistent leukocytosis  - Potential left thigh wound as the source, will look for others    Plan  - repeat BCx  - Start vanc  - Echo ordered  12/16 KD: Afebrile WBC 15.22-cont trending down.  1st-BC positive, 2nd & 3rd-BC- neg . Continue IV Rocephin.  Awaiting results of Doppler ultrasound to right upper thigh to r/o infection.  Echo today.   12/17 KD: awaiting GS consult for left thigh wound recs. wound culture today. WBC 13.58 today (12/16/24 15.22). H&H 8.0/23.8 today (12/16/24 8.9/26.8). Echo- cardiac dysfunction noted, f/u with cardiology after hospital d/c.      Altered mental status  Patient awake and alert this morning.  Believe altered mental status on presentation secondary to hypoglycemia.  Continue to treat underlying condition.      Debility  Patient with Acute on chronic debility due to  chronic illness . The patient's latest AMPAC (Activity Measure for Post Acute Care) Score is " listed below.    AM-PAC Score - How much help does the patient need for each activity listed  Basic Mobility Total Score: 15  Turning over in bed (including adjusting bedclothes, sheets and blankets)?: A little  Sitting down on and standing up from a chair with arms (e.g., wheelchair, bedside commode, etc.): A little  Moving from lying on back to sitting on the side of the bed?: A little  Moving to and from a bed to a chair (including a wheelchair)?: A little  Need to walk in hospital room?: A lot  Climbing 3-5 steps with a railing?: Unable    Plan  - Progressive mobility protocol initated  - PT/OT consulted  - Fall precautions in place            Chronic hypoxic respiratory failure  Patient with reported chronic hypoxemia requiring home O2 via nasal cannula.  O2 sats currently greater than 95% on 3 L O2 via nasal cannula.    History of cerebrovascular accident (CVA) with residual deficit  At baseline.  Continue BP control.  12/16 KD: Echo today. Awaiting results of Doppler ultrasound to right upper thigh to r/o infection/clot    12/17 KD:  Echo- cardiac dysfunction noted, f/u with cardiology after hospital d/c.    Hepatic dysfunction  Appears at baseline.  Continue home medications.      Leukocytosis  White count 16.7 today.  This appears to be above patient's baseline of 18-20 K. patient denies recent fever and is afebrile today.  Lactic acid ordered.  Ultrasound soft tissue left lower extremity ordered to rule out abscess.  Continue close monitoring with daily labs.  12/16 KD: Afebrile WBC 15.22-cont trending down.  1st-BC positive, 2nd & 3rd-BC- neg . Continue IV Rocephin.  Awaiting results of Doppler ultrasound to right upper thigh to r/o infection.  Echo today.   12/17 KD: awaiting GS consult for left thigh wound recs. wound culture today. WBC 13.58 today (12/16/24 15.22). H&H 8.0/23.8 today (12/16/24 8.9/26.8). Echo- cardiac dysfunction noted, f/u with cardiology after hospital  d/c.      Hyperbilirubinemia  Bilirubin 5.4 this morning.  This appears to be decreased from previous admission.  Continue monitoring with daily labs.      ESRD (end stage renal disease) on dialysis  Creatine stable for now. BMP reviewed- noted Estimated Creatinine Clearance: 18.3 mL/min (A) (based on SCr of 4.2 mg/dL (H)). according to latest data. Based on current GFR, CKD stage is end stage.  Monitor UOP and serial BMP and adjust therapy as needed. Renally dose meds. Avoid nephrotoxic medications and procedures.  Continue dialysis per renal.    Heart failure with preserved ejection fraction  Patient currently without evidence of volume overload.  No acute exacerbation noted.  Continue home medications and volume removal with HD.  Monitor volume status closely.  12/17 KD: Echo- cardiac dysfunction noted, f/u with cardiology after hospital d/c.      History of anemia due to CKD  Anemia - due to CKD  Continue to monitor Hgb/Hct levels- evaluate for acute blood loss if Hgb drops acutely from baseline.  Add/titrate TOÑA supp therapy if pt on dialysis to reach therapeutic Hgb goal 9-11 mg/dl.  Check iron stores - ferritin and iron saturation and replace if needed or Indicated with IV iron supp.  12/17 KD:H&H 8.0/23.8 today (12/16/24 8.9/26.8). cont. To monitor    GERD (gastroesophageal reflux disease)  Continue famotidine.      Hyperlipidemia associated with type 2 diabetes mellitus  Holding statin at this time secondary to hepatic dysfunction.      Hypertension associated with diabetes  Resume home medications.  Monitor blood blood pressures closely.  Adjust medications as needed.        VTE Risk Mitigation (From admission, onward)           Ordered     IP VTE HIGH RISK PATIENT  Once         12/12/24 1509     Place sequential compression device  Until discontinued         12/12/24 1509     Place KADIE hose  Until discontinued         12/12/24 1509                    Discharge Planning   SHASHA:      Code Status: Full Code    Medical Readiness for Discharge Date: Treatment  Discharge Plan A: Home with family, Home                Please place Justification for DME        TY COATS, NP  Department of Hospital Medicine   [facility]

## 2024-12-17 NOTE — PT/OT/SLP PROGRESS
Occupational Therapy   Treatment    Name: Pilar Fernandez  MRN: [id number]  Admitting Diagnosis:  Hypoglycemia  Day of Surgery    Recommendations:     Discharge Recommendations: Low Intensity Therapy  Discharge Equipment Recommendations:  none  Barriers to discharge:  Other (Comment) (medical status)    Assessment:     Pilar Fernandez is a 44 y.o. female with a medical diagnosis of Hypoglycemia.  She presents with good participation. Performance deficits affecting function are weakness, impaired endurance, impaired sensation, impaired self care skills, impaired functional mobility, gait instability, impaired balance, decreased upper extremity function, decreased lower extremity function, decreased safety awareness, pain, decreased ROM, impaired skin, impaired cardiopulmonary response to activity, impaired joint extensibility, impaired muscle length.     Rehab Prognosis:  Good; patient would benefit from acute skilled OT services to address these deficits and reach maximum level of function.       Plan:     Patient to be seen 5 x/week to address the above listed problems via self-care/home management, therapeutic activities, therapeutic exercises  Plan of Care Expires: 12/20/24  Plan of Care Reviewed with: patient    Subjective     Chief Complaint: I am waiting for them to come get me for surgery.   Patient/Family Comments/goals: Get stronger   Pain/Comfort:  Pain Rating 1: other (see comments) (did not quantify)  Location - Side 1: Left  Location 1: leg  Pain Addressed 1: Pre-medicate for activity, Reposition, Cessation of Activity, Nurse notified  Pain Rating Post-Intervention 1: other (see comments) (Did not quantify)    Objective:     Communicated with: occupational therapist and nurse prior to session.  Patient found up in chair with peripheral IV, oxygen upon OT entry to room.    General Precautions: Standard, fall    Orthopedic Precautions:N/A  Braces: N/A  Respiratory Status: Nasal cannula, flow 3  L/min     Occupational Performance:     Bed Mobility:    Patient completed Rolling/Turning to Left with  contact guard assistance  Patient completed Scooting/Bridging with minimum assistance  Patient completed Sit to Supine with minimum assistance     Functional Mobility/Transfers:  Patient completed Sit <> Stand Transfer with moderate assistance  with  rolling walker   Functional Mobility: Pt completed functional mobility by rolling wheelchair about 30 ft with min assistance for turning and maneuvering wheelchair.     [facility] 6 Click ADL: 19    Treatment & Education:  Pt was provided education / instruction regarding role of OT and established OT POC.      Patient left  supine in bed with HOB elevated  with all lines intact, nurse notified, and surgery staff  present taking her to surgery    GOALS:   Multidisciplinary Problems       Occupational Therapy Goals          Problem: Occupational Therapy    Goal Priority Disciplines Outcome Interventions   Occupational Therapy Goal     OT, PT/OT Progressing    Description: Goals to be met by: 12/20/24     Patient will increase functional independence with ADLs by performing:    UE Dressing with Set-up Assistance.  LE Dressing with Minimal Assistance.  Grooming while seated at sink with Modified [place].  Toileting from toilet with Set-up Assistance for hygiene and clothing management.   Bathing from  shower chair/bench with Supervision.  Toilet transfer to toilet with Supervision.                         Time Tracking:     OT Date of Treatment: 12/17/24  OT Start Time: 1325  OT Stop Time: 1344  OT Total Time (min): 19 min    Billable Minutes:Therapeutic Activity 19 min    OT/MIKE: MIKE     Number of MIKE visits since last OT visit: 1    12/17/2024  Lolis GONZALES

## 2024-12-17 NOTE — ASSESSMENT & PLAN NOTE
Patient currently without evidence of volume overload.  No acute exacerbation noted.  Continue home medications and volume removal with HD.  Monitor volume status closely.  12/17 KD: Echo- cardiac dysfunction noted, f/u with cardiology after hospital d/c.

## 2024-12-17 NOTE — ASSESSMENT & PLAN NOTE
Creatine stable for now. BMP reviewed- noted Estimated Creatinine Clearance: 18.3 mL/min (A) (based on SCr of 4.2 mg/dL (H)). according to latest data. Based on current GFR, CKD stage is end stage.  Monitor UOP and serial BMP and adjust therapy as needed. Renally dose meds. Avoid nephrotoxic medications and procedures.  Continue dialysis per renal.

## 2024-12-17 NOTE — ASSESSMENT & PLAN NOTE
At baseline.  Continue BP control.  12/16 KD: Echo today. Awaiting results of Doppler ultrasound to right upper thigh to r/o infection/clot    12/17 KD:  Echo- cardiac dysfunction noted, f/u with cardiology after hospital d/c.

## 2024-12-18 VITALS
DIASTOLIC BLOOD PRESSURE: 78 MMHG | RESPIRATION RATE: 19 BRPM | OXYGEN SATURATION: 100 % | HEIGHT: 64 IN | SYSTOLIC BLOOD PRESSURE: 127 MMHG | BODY MASS INDEX: 33.8 KG/M2 | TEMPERATURE: 97 F | HEART RATE: 88 BPM | WEIGHT: 198 LBS

## 2024-12-18 LAB
ALBUMIN SERPL BCP-MCNC: 1.8 G/DL (ref 3.5–5.2)
ALP SERPL-CCNC: 554 U/L (ref 55–135)
ALT SERPL W/O P-5'-P-CCNC: 47 U/L (ref 10–44)
ANION GAP SERPL CALC-SCNC: 10 MMOL/L (ref 3–11)
AST SERPL-CCNC: 78 U/L (ref 10–40)
BACTERIA BLD CULT: NORMAL
BASOPHILS # BLD AUTO: 0.13 K/UL (ref 0–0.2)
BASOPHILS NFR BLD: 1 % (ref 0–1.9)
BILIRUB SERPL-MCNC: 5 MG/DL (ref 0.1–1)
BUN SERPL-MCNC: 53 MG/DL (ref 6–20)
CALCIUM SERPL-MCNC: 9.1 MG/DL (ref 8.7–10.5)
CHLORIDE SERPL-SCNC: 89 MMOL/L (ref 95–110)
CO2 SERPL-SCNC: 26 MMOL/L (ref 23–29)
CREAT SERPL-MCNC: 5.1 MG/DL (ref 0.5–1.4)
DIFFERENTIAL METHOD BLD: ABNORMAL
EOSINOPHIL # BLD AUTO: 0.9 K/UL (ref 0–0.5)
EOSINOPHIL NFR BLD: 7 % (ref 0–8)
ERYTHROCYTE [DISTWIDTH] IN BLOOD BY AUTOMATED COUNT: 17.5 % (ref 11.5–14.5)
EST. GFR  (NO RACE VARIABLE): 10 ML/MIN/1.73 M^2
GLUCOSE SERPL-MCNC: 329 MG/DL (ref 70–110)
HCT VFR BLD AUTO: 26 % (ref 37–48.5)
HGB BLD-MCNC: 8.4 G/DL (ref 12–16)
IMM GRANULOCYTES # BLD AUTO: 0.22 K/UL (ref 0–0.04)
IMM GRANULOCYTES NFR BLD AUTO: 1.7 % (ref 0–0.5)
LYMPHOCYTES # BLD AUTO: 1.3 K/UL (ref 1–4.8)
LYMPHOCYTES NFR BLD: 10.1 % (ref 18–48)
MAGNESIUM SERPL-MCNC: 1.8 MG/DL (ref 1.6–2.6)
MCH RBC QN AUTO: 29.8 PG (ref 27–31)
MCHC RBC AUTO-ENTMCNC: 32.3 G/DL (ref 32–36)
MCV RBC AUTO: 92 FL (ref 82–98)
MONOCYTES # BLD AUTO: 1.5 K/UL (ref 0.3–1)
MONOCYTES NFR BLD: 11.5 % (ref 4–15)
NEUTROPHILS # BLD AUTO: 8.7 K/UL (ref 1.8–7.7)
NEUTROPHILS NFR BLD: 68.7 % (ref 38–73)
NRBC BLD-RTO: 0 /100 WBC
PLATELET # BLD AUTO: 335 K/UL (ref 150–450)
PMV BLD AUTO: 11 FL (ref 9.2–12.9)
POTASSIUM SERPL-SCNC: 5 MMOL/L (ref 3.5–5.1)
PROT SERPL-MCNC: 8.1 G/DL (ref 6–8.4)
RBC # BLD AUTO: 2.82 M/UL (ref 4–5.4)
SODIUM SERPL-SCNC: 125 MMOL/L (ref 136–145)
VANCOMYCIN SERPL-MCNC: 16.4 UG/ML
WBC # BLD AUTO: 12.73 K/UL (ref 3.9–12.7)

## 2024-12-18 PROCEDURE — 94799 UNLISTED PULMONARY SVC/PX: CPT | Mod: XB

## 2024-12-18 PROCEDURE — 25000003 PHARM REV CODE 250: Performed by: SURGERY

## 2024-12-18 PROCEDURE — 80053 COMPREHEN METABOLIC PANEL: CPT

## 2024-12-18 PROCEDURE — 63600175 PHARM REV CODE 636 W HCPCS: Performed by: INTERNAL MEDICINE

## 2024-12-18 PROCEDURE — 25000003 PHARM REV CODE 250

## 2024-12-18 PROCEDURE — 99900031 HC PATIENT EDUCATION (STAT)

## 2024-12-18 PROCEDURE — 94761 N-INVAS EAR/PLS OXIMETRY MLT: CPT

## 2024-12-18 PROCEDURE — 80100016 HC MAINTENANCE HEMODIALYSIS

## 2024-12-18 PROCEDURE — 80202 ASSAY OF VANCOMYCIN: CPT | Performed by: INTERNAL MEDICINE

## 2024-12-18 PROCEDURE — 83735 ASSAY OF MAGNESIUM: CPT

## 2024-12-18 PROCEDURE — 27000221 HC OXYGEN, UP TO 24 HOURS

## 2024-12-18 PROCEDURE — 25000003 PHARM REV CODE 250: Performed by: INTERNAL MEDICINE

## 2024-12-18 PROCEDURE — 85025 COMPLETE CBC W/AUTO DIFF WBC: CPT

## 2024-12-18 PROCEDURE — 99900035 HC TECH TIME PER 15 MIN (STAT)

## 2024-12-18 RX ORDER — SULFAMETHOXAZOLE AND TRIMETHOPRIM 800; 160 MG/1; MG/1
1 TABLET ORAL DAILY
Qty: 7 TABLET | Refills: 0 | Status: SHIPPED | OUTPATIENT
Start: 2024-12-18 | End: 2024-12-25

## 2024-12-18 RX ORDER — OXYCODONE HYDROCHLORIDE 5 MG/1
5 TABLET ORAL 3 TIMES DAILY PRN
Qty: 15 TABLET | Refills: 0 | Status: SHIPPED | OUTPATIENT
Start: 2024-12-18

## 2024-12-18 RX ADMIN — HYDROCODONE BITARTRATE AND ACETAMINOPHEN 1 TABLET: 10; 325 TABLET ORAL at 01:12

## 2024-12-18 RX ADMIN — HYDRALAZINE HYDROCHLORIDE 50 MG: 50 TABLET ORAL at 11:12

## 2024-12-18 RX ADMIN — METOPROLOL TARTRATE 100 MG: 100 TABLET, FILM COATED ORAL at 11:12

## 2024-12-18 RX ADMIN — FAMOTIDINE 20 MG: 20 TABLET, FILM COATED ORAL at 11:12

## 2024-12-18 RX ADMIN — NIFEDIPINE 60 MG: 30 TABLET, FILM COATED, EXTENDED RELEASE ORAL at 11:12

## 2024-12-18 RX ADMIN — ASPIRIN 81 MG: 81 TABLET, COATED ORAL at 11:12

## 2024-12-18 RX ADMIN — URSODIOL 300 MG: 300 CAPSULE ORAL at 03:12

## 2024-12-18 RX ADMIN — VANCOMYCIN HYDROCHLORIDE 500 MG: 500 INJECTION, POWDER, LYOPHILIZED, FOR SOLUTION INTRAVENOUS at 01:12

## 2024-12-18 RX ADMIN — HYDROCODONE BITARTRATE AND ACETAMINOPHEN 1 TABLET: 10; 325 TABLET ORAL at 05:12

## 2024-12-18 RX ADMIN — LACTULOSE 20 G: 20 SOLUTION ORAL at 11:12

## 2024-12-18 RX ADMIN — MEDROXYPROGESTERONE ACETATE 20 MG: 10 TABLET ORAL at 11:12

## 2024-12-18 RX ADMIN — HYDROCODONE BITARTRATE AND ACETAMINOPHEN 1 TABLET: 5; 325 TABLET ORAL at 11:12

## 2024-12-18 RX ADMIN — LOSARTAN POTASSIUM 100 MG: 50 TABLET, FILM COATED ORAL at 11:12

## 2024-12-18 RX ADMIN — URSODIOL 300 MG: 300 CAPSULE ORAL at 11:12

## 2024-12-18 NOTE — ASSESSMENT & PLAN NOTE
Patient alert this am. BG better. Continue D10 continuous IV fluid.    We will transition to Renal diet.  Continue close monitoring.  Will refer to Endo as outpt for poss insulin pump after d/c.  12/16 KD:  Decrease cont. D10 to 30cc/hr (glucose 264mg/dl)  12/17 KD: d/c D5W, CBGs w/ SSI  12/18 KD: Resume home diabetes regimen

## 2024-12-18 NOTE — PT/OT/SLP PROGRESS
Occupational Therapy      Patient Name:  Pilar Fernandez   MRN:  [id number]    Patient not seen today secondary to Dialysis. Will follow-up at later time/date as appropriate.    Mildred Nicole OT      12/18/2024

## 2024-12-18 NOTE — PT/OT/SLP DISCHARGE
Physical Therapy Discharge Summary    Name: Pilar Fernandez  MRN: [id number]   Principal Problem: Hypoglycemia     Patient Discharged from acute Physical Therapy on 24.  Please refer to prior PT noted date on 24 for functional status.     Assessment:     Patient appropriate for care in another setting.    Objective:     GOALS:   Multidisciplinary Problems       Physical Therapy Goals          Problem: Physical Therapy    Goal Priority Disciplines Outcome Interventions   Physical Therapy Goal     PT, PT/OT Adequate for Care Transition    Description: Patient will increase functional independence with mobility by performin. Supine to sit with Modified [place]  2. Sit to supine with Modified [place]  3. Rolling to Left and Right with Modified [place].  4. Sit to stand transfer with Supervision  5. Bed to chair transfer with Supervision using Rolling Walker  6. Gait  x 50 feet with Stand-by Assistance using Rolling Walker.   7. Lower extremity exercise program x10 reps per handout, with independence                         Reasons for Discontinuation of Therapy Services  Transfer to alternate level of care.      Plan:     Patient Discharged to: home.      2024

## 2024-12-18 NOTE — PLAN OF CARE
Follow up appointment with Lindsey Singletary has been scheduled for 12/23 at 1:15.  Fu appointment with Dr. Starks has been scheduled for Jan 2 at 10:00.  Reached out to Dr. Guadalupe's office, unable to schedule per phone at this time. Family or patient to call back to schedule this appointment.  Will need to see Dr. Guadalupe in 1 week, to be included in discharge instructions.  Patient discharging home with [facility].

## 2024-12-18 NOTE — PROGRESS NOTES
Pharmacokinetic Assessment Follow Up: IV Vancomycin    Vancomycin serum concentration assessment(s):    The random level was drawn correctly and can be used to guide therapy at this time. The measurement is within the desired definitive target range of 10 to 20 mcg/mL.    Vancomycin Regimen Plan:    One time dose of Vancomycin 500 mg to be given today after dialysis     Re-dose when the random level is less than 20 mcg/mL, next level to be drawn at 0430 on 12/20/2024    Drug levels (last 3 results):  Recent Labs   Lab Result Units 12/16/24  0602 12/18/24  0555   Vancomycin, Random ug/mL 20.1 16.4       Pharmacy will continue to follow and monitor vancomycin.    Please contact pharmacy at extension [phone] for questions regarding this assessment.    Thank you for the consult,   Staci Boyle       Patient brief summary:  Pilar Fernandez is a 44 y.o. female initiated on antimicrobial therapy with IV Vancomycin for treatment of skin & soft tissue infection    The patient's current regimen is pulse dosing based on random levels drawn on dialysis days     Drug Allergies:   Review of patient's allergies indicates:   Allergen Reactions    Tramadol Itching and Nausea And Vomiting       Actual Body Weight:   89.8 kg    BMI: 33.99 kg/m2    Renal Function:   Estimated Creatinine Clearance: 18.3 mL/min (A) (based on SCr of 4.2 mg/dL (H)).,     Dialysis Method (if applicable):  intermittent HD - MWF    CBC (last 72 hours):  Recent Labs   Lab Result Units 12/16/24  0601 12/17/24  0606 12/18/24  0555   WBC K/uL 15.22* 13.58* 12.73*   Hemoglobin g/dL 8.9* 8.0* 8.4*   Hematocrit % 26.8* 23.8* 26.0*   Platelets K/uL 344 292 335   Gran % % 71.3 68.3 68.7   Lymph % % 9.2* 12.3* 10.1*   Mono % % 10.6 11.0 11.5   Eosinophil % % 6.9 6.3 7.0   Basophil % % 0.9 0.8 1.0   Differential Method  Automated Automated Automated       Metabolic Panel (last 72 hours):  Recent Labs   Lab Result Units 12/16/24  0601 12/16/24  0602  12/17/24  0606 12/18/24  0555   Sodium mmol/L 126*  --  128*  --    Potassium mmol/L 5.7*  --  4.9  --    Chloride mmol/L 91*  --  92*  --    CO2 mmol/L 25  --  26  --    Glucose mg/dL 264*  --  236*  --    BUN mg/dL 57*  --  43*  --    Creatinine mg/dL 4.9*  --  4.2*  --    Albumin g/dL 1.9*  --  1.7*  --    Total Bilirubin mg/dL 5.2*  --  4.4*  --    Alkaline Phosphatase U/L 539*  --  479*  --    AST U/L 71*  --  62*  --    ALT U/L 48*  --  43  --    Magnesium mg/dL  --  1.8 1.7 1.8   Phosphorus mg/dL 5.6*  --   --   --        Vancomycin Administrations:  vancomycin given in the last 96 hours                     vancomycin (VANCOCIN) 500 mg in D5W 100 mL IVPB (MB+) (mg) 500 mg New Bag 12/16/24 1306    vancomycin 1750 mg in 0.9% sodium chloride 500 mL IVPB (mg) 1,750 mg New Bag 12/15/24 1124                    Microbiologic Results:  Microbiology Results (last 7 days)       Procedure Component Value Units Date/Time    Blood culture [[id number]] Collected: 12/13/24 0956    Order Status: Completed Specimen: Blood from Peripheral, Hand, Right Updated: 12/17/24 2212     Blood Culture, Routine No Growth to date      No Growth to date      No Growth to date      No Growth to date      No Growth to date    Culture, Anaerobic [[id number]] Collected: 12/17/24 1416    Order Status: Sent Specimen: Abscess from Leg, Left Updated: 12/17/24 2057    Aerobic culture [[id number]] Collected: 12/17/24 1416    Order Status: Sent Specimen: Abscess from Leg, Left Updated: 12/17/24 2057    Blood culture [[id number]] Collected: 12/15/24 0843    Order Status: Completed Specimen: Blood from Peripheral, Hand, Right Updated: 12/17/24 1612     Blood Culture, Routine No Growth to date      No Growth to date      No Growth to date    Blood culture [[id number]] Collected: 12/15/24 0858    Order Status: Completed Specimen: Blood from Peripheral, Antecubital, Right Updated: 12/17/24 1612     Blood Culture, Routine No Growth to date      No Growth  to date      No Growth to date    Blood culture [[id number]]  (Abnormal) Collected: 12/13/24 1010    Order Status: Completed Specimen: Blood from Peripheral, Wrist, Right Updated: 12/17/24 1025     Blood Culture, Routine Gram stain peds bottle: Gram positive cocci in clusters resembling Staph      Results called to and read back by: TYLER Jernigan 12/15/2024  07:20      COAGULASE-NEGATIVE STAPHYLOCOCCUS SPECIES  Organism is a probable contaminant      Aerobic culture [[id number]]     Order Status: No result Specimen: Abscess from Leg, Left     MRSA/SA Rapid ID by PCR from Blood culture [[id number]] Collected: 12/13/24 1010    Order Status: Completed Updated: 12/15/24 0920     Staph aureus ID by PCR Negative     Methicillin Resistant ID by PCR Negative

## 2024-12-18 NOTE — PT/OT/SLP PROGRESS
Physical Therapy      Patient Name:  Pilar Fernandez   MRN:  [id number]    Patient not seen today secondary to Dialysis. Will follow-up later today if possible or tomorrow, 12/19/2024.

## 2024-12-18 NOTE — ASSESSMENT & PLAN NOTE
Creatine stable for now. BMP reviewed- noted Estimated Creatinine Clearance: 15.1 mL/min (A) (based on SCr of 5.1 mg/dL (H)). according to latest data. Based on current GFR, CKD stage is end stage.  Monitor UOP and serial BMP and adjust therapy as needed. Renally dose meds. Avoid nephrotoxic medications and procedures.  Continue dialysis per renal.

## 2024-12-18 NOTE — PLAN OF CARE
[facility] - Med Surg  Discharge Final Note    Primary Care Provider: Lindsey Singletary FNP    Expected Discharge Date: 12/18/2024    Final Discharge Note (most recent)       Final Note - 12/18/24 0934          Final Note    Assessment Type Final Discharge Note     Anticipated Discharge Disposition Home-Health Care Svc        Post-Acute Status    Post-Acute Authorization Home Health     Home Health Status Set-up Complete/Auth obtained     Coverage [facility] DUAL COMPLETE HMO SNP     Discharge Delays None known at this time                     Important Message from Medicare  Important Message from Medicare regarding Discharge Appeal Rights: Explained to patient/caregiver, Signed/date by patient/caregiver     Date IMM was signed: 12/16/24  Time IMM was signed: 1040     Follow-up providers       Lindsey Singletary FNP   Specialty: Family Medicine   Relationship: PCP - General    [address] [address]   Phone: [phone]       Next Steps: Follow up in 1 week(s)    Vinh Starks MD   Specialty: Cardiology    [address] [address]   Phone: [phone]       Next Steps: Follow up in 2 week(s)    Darcy Guadalupe MD   Specialty: General Surgery    [address] [address]   Phone: [phone]       Next Steps: Follow up in 1 week(s)              After-discharge care                Home [facility] Care       [facility]   Service: Home Nursing, Home Rehabilitation    [address] [address]   Phone: [phone]                           Final note is completed. The patient will be discharging home with home health. She will resume home health with [facility] choice[facility].

## 2024-12-18 NOTE — ASSESSMENT & PLAN NOTE
White count 16.7 today.  This appears to be above patient's baseline of 18-20 K. patient denies recent fever and is afebrile today.  Lactic acid ordered.  Ultrasound soft tissue left lower extremity ordered to rule out abscess.  Continue close monitoring with daily labs.  12/16 KD: Afebrile WBC 15.22-cont trending down.  1st-BC positive, 2nd & 3rd-BC- neg . Continue IV Rocephin.  Awaiting results of Doppler ultrasound to right upper thigh to r/o infection.  Echo today.   12/17 KD: awaiting GS consult for left thigh wound recs. wound culture today. WBC 13.58 today (12/16/24 15.22). H&H 8.0/23.8 today (12/16/24 8.9/26.8). Echo- cardiac dysfunction noted, f/u with cardiology after hospital d/c.  12/18 KD: Start Bactrim DS 1 tablet QD x 7 days.. Cont. Daily WC

## 2024-12-18 NOTE — ANESTHESIA POSTPROCEDURE EVALUATION
Anesthesia Post Evaluation    Patient: Pilar Fernandez    Procedure(s) Performed: Procedure(s) (LRB):  INCISION AND DRAINAGE, ABSCESS (Left)    Final Anesthesia Type: MAC      Patient location during evaluation: med/surg floor  Patient participation: Yes- Able to Participate  Level of consciousness: oriented and awake and alert  Post-procedure vital signs: reviewed and stable  Pain management: adequate  Airway patency: patent    PONV status at discharge: No PONV  Anesthetic complications: no      Cardiovascular status: blood pressure returned to baseline  Respiratory status: spontaneous ventilation, room air and unassisted  Hydration status: euvolemic  Follow-up not needed.              Vitals Value Taken Time   /78 12/18/24 0930   Temp 36.3 °C (97.4 °F) 12/18/24 0715   Pulse 80 12/18/24 0930   Resp 18 12/18/24 0930   SpO2 98 % 12/18/24 0405         No case tracking events are documented in the log.      Pain/Vannesa Score: Pain Rating Prior to Med Admin: 9 (12/18/2024  5:55 AM)

## 2024-12-18 NOTE — DISCHARGE SUMMARY
[facility] Medicine  Discharge Summary      Patient Name: Pilar Fernandez  MRN: [id number]  [facility]: [id number]  Patient Class: IP- Inpatient  Admission Date: 12/12/2024  Hospital Length of Stay: 5 days  Discharge Date and Time:  12/18/2024 9:18 AM  Attending Physician: Debra Fong MD   Discharging Provider: TY COATS, JONATHAN  Primary Care Provider: Lindsey Singletary FNP    Primary Care Team: Networked reference to record PCT     HPI:   ED HPI:    Chief Complaint   Patient presents with    Altered Mental Status       Per EMS, pt was found unresponsive by family at home and had a CBG of 48. Police gave pt 8mg narcan IN before EMS arrival. Gave pt 250 ml bag of D10 and had CBG of 116 on second check. Pt is increasingly responsive and opening eyes upon arrival. Pt normally on 3 L NC at home , but was placed on 4L during transport.      44-year-old female history of end-stage renal disease on dialysis, diabetes on insulin, CVA in the past, hypertension presents to the emergency department with altered mental status, last known well was sometime last night before going to bed, no in knows what time she went to bed.  Family found her this morning unresponsive.  EMS arrived, CBG was 45, 8 mg of intranasal Narcan administered, 250 mL of D10 administered as well by EMS personnel with response.  Here in the emergency department patient does open her eyes to verbal stimuli, does not follow commands.  Not talking.  On chronic opiates as well as benzos     Family reports she normally talks, walks with a walker, does Monday Wednesday Friday dialysis, did go to dialysis yesterday     See previous note from transfer:     44-year-old female with history of hypertension, hyperlipidemia, DM, ESRD, CHF,  COPD on 3L, CVA, peripheral vascular disease, chronic lymphedema, hypothyroidism, gout, and gastroesophageal reflux disease whe presents for evaluation of midsternal chest pain described stabbing.  Pt  "also reporting pain  at the top of her abdomen, epigastric area.  Sx acute in onset a hours prior to arrival.  Patient also reports pain at the top of her stomach, Pepcid taken without relief.  All other systems reviewed and notable nausea, otherwise unremarkable.  Patient did go dialysis today, had 5-1/2 L removed.     After further chart review it was noted that patient was just discharged from outside facility after extensive workup for GI bleed, anemia, jaundice, and hyperbilirubinemia.  Hemoglobin was 6.4 from baseline of10.  She was transferred to [facility] from [facility] for further evaluation. See Hospital course from prior discharge summary below:     "She was put on ceftriaxone. Hepatology and Advanced Endoscopy Service were consulted for hyperbilirubinemia. Gastroenterology was consulted for gastrointestinal bleed. Hematology-Oncology was consulted for anemia with hyperbilirubinemia and positive Britt test. CT showed diffuse anasarca, pericardial effusion, enlarged lymph nodes vs soft tissue densities, hepatosplenomegaly. Hepatology recommended liver biopsy. Gastroenterology recommended outpatient endoscopy, after she is medically optimized, due to lack of overt bleeding. Hematology-Oncology recommended breast ultrasound. Leukocytosis persisted. Sed rate was normal. CRP was elevated. Midodrine was started for post-dialysis hypotension. She developed hepatic encephalopathy with ammonia of 111 umol/L. She was transfused another unit of packed red blood cells in preparation for liver biopsy. She required vitamin K for elevated INR. She desired to sit at the side of the bed but was unable to on her own. Physical and Occupational Therapy were consulted and recommended high intensity therapy. Midodrine was stopped as blood pressures increased to normal. Her home hypertension medications continued to be held. 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 MRCP showed no obstruction or stricture but was limited by body habitus. Hepatology recommended ursodiol. Upon review of imaging, left axilla ultrasound was recommended instead of breast ultrasound as her anasarca would make interpretation of a breast ultrasound difficult. Axilla ultrasound showed abnormal axillary adenopathy with at least 6 abnormal lymph nodes (BI-RADS 4). She underwent left axilla lymph node biopsy on 11/11/2024. Hematology-Oncology reported that she would not currently for inpatient chemotherapy as she had no bulky lymphadenopathy or obstruction or compression of organs due to lymphadenopathy. Additionally, her persistent hyperbilirubinemia excluded her from several chemotherapy regimens, and her heart failure and end stage renal disease exclude her from nearly all other remaining regimens. She awaited skilled nursing facility and was accepted on 11/15/2024. However, syphilis test was incidentally positive per the [place] [facility] [employer], so the nursing facility could not accept her before she started treatement. It was found that she had been found to have syphilis at another hospital previously. She was given the 1st dose of intramuscular penicillin G and was accepted to the nursing facility where she will get the 2nd and 3rd doses."     IM HPI:  Patient more awake and alert this morning.  Patient states the night prior to admission she took 60 units Lantus at bedtime.  She states she woke up in the middle of the night unable to sleep and took 2 Tylenol p.m..  Patient denies use of short-acting insulin.  She states in addition to Lantus she also takes Tradjenta.  Patient denies fever, chills, CP, SOB, nausea, vomiting, diarrhea.  Patient states she has been eating well.  Blood sugars remain low this morning despite continued D10 IV at 75 mils per hour.  We will transition to regular diet as patient is " more awake and alert.  Continue close monitoring of glucose levels.  Patient reports hitting her left thigh on wheelchair a few days prior to admission.  Soft tissue swelling with drainage noted.  Question hematoma versus abscess.  We will obtain lower extremity soft tissue ultrasound today.  Patient afebrile.  White count elevated.  Review of patient's chart shows white blood cell counts around 18-20 K for more than a month and has previously been understood to be reactive given negative infectious workup and no clinical symptoms.  White count today 28.53.  Lactic acid ordered.  Patient with known history end-stage renal disease on dialysis.  Nephrology has been consulted.  We will plan to dialyze patient today per Nephrology.  We will continue current treatment plan and monitoring.    Procedure(s) (LRB):  INCISION AND DRAINAGE, ABSCESS (Left)      Hospital Course:   12/14/24: Pt seen this am, s/p HD on 12/13, overall doing better.  this am. Pending soft tissue US to be done sometime this weekend for left thigh wound, minimal drainage today not currently on abx. Afebrile with improving leukocytosis. Patient requesting something to help with leg pain.   12/15/24: Patient afebrile with persistent leukocytosis, blood cultures prelim result today show gram +ve cocci in clusters resembling staph. She does have this left thigh wound that may be the source. TTE order placed as well and started on Vanc pending culture sensitization. Labs worsening, will likely require HD today or Monday.   12/16 KD:  Afebrile WBC 15.22-cont trending down.  1st-BC positive, 2nd & 3rd-BC- neg . Continue IV Rocephin.  Awaiting results of Doppler ultrasound to right upper thigh to r/o infection.  Echo today.   12/17 KD: Pt. Afebrile, awaiting GS consult for left thigh wound recs. wound culture today. WBC 13.58 today (12/16/24 15.22). H&H 8.0/23.8 today (12/16/24 8.9/26.8). Echo- cardiac dysfunction noted, f/u with cardiology after  "hospital d/c.     Goals of Care Treatment Preferences:  Code Status: Full Code    Health care agent: Roberto Sharma (s.o.)  Health care agent number: [phone]       [place]: Yes  What is most important right now is to focus on remaining as independent as possible, symptom/pain control, extending life as long as possible, even it it means sacrificing quality.  Accordingly, we have decided that the best plan to meet the patient's goals includes continuing with treatment.      SDOH Screening:  The patient was screened for utility difficulties, food insecurity, transport difficulties, housing insecurity, and interpersonal safety and there were no concerns identified this admission.     Consults:   Consults (From admission, onward)          Status Ordering Provider     Inpatient consult to General Surgery  Once        Provider:  Darcy Guadalupe MD    Acknowledged MIN ESPOSITO     Pharmacy to dose Vancomycin consult  Once        Provider:  (Not yet assigned)   Placed in "And" Linked Group    Acknowledged URVASHI BRANDT     Inpatient consult to Registered Dietitian/Nutritionist  Once        Provider:  (Not yet assigned)    Completed MIN ESPOSITO     Inpatient consult to Nephrology  Once        Provider:  Melva Wild MD    Acknowledged KAREN HARRIS            * Hypoglycemia  Patient alert this am. BG better. Continue D10 continuous IV fluid.    We will transition to Renal diet.  Continue close monitoring.  Will refer to Endo as outpt for poss insulin pump after d/c.  12/16 KD:  Decrease cont. D10 to 30cc/hr (glucose 264mg/dl)  12/17 KD: d/c D5W, CBGs w/ SSI  12/18 KD: Resume home diabetes regimen    Thigh abscess  Wound care daily per instructions      Secondary hyperparathyroidism        Bacteremia  Blood cultures prelim show gram +ve cocci in clusters resembling staph  - afebrile with persistent leukocytosis  - Potential left thigh wound as the source, will look for others    Plan  - " repeat BCx  - Start vanc  - Echo ordered  12/16 KD: Afebrile WBC 15.22-cont trending down.  1st-BC positive, 2nd & 3rd-BC- neg . Continue IV Rocephin.  Awaiting results of Doppler ultrasound to right upper thigh to r/o infection.  Echo today.   12/17 KD: awaiting GS consult for left thigh wound recs. wound culture today. WBC 13.58 today (12/16/24 15.22). H&H 8.0/23.8 today (12/16/24 8.9/26.8). Echo- cardiac dysfunction noted, f/u with cardiology after hospital d/c.  12/18 KD: Cont. Daily WC, start bactrim ds 1 tab daily x 7 days      Altered mental status  Patient awake and alert this morning.  Believe altered mental status on presentation secondary to hypoglycemia.  Continue to treat underlying condition.      Debility  Patient with Acute on chronic debility due to  chronic illness . The patient's latest AMPAC (Activity Measure for Post Acute Care) Score is listed below.    AM-PAC Score - How much help does the patient need for each activity listed  Basic Mobility Total Score: 15  Turning over in bed (including adjusting bedclothes, sheets and blankets)?: A little  Sitting down on and standing up from a chair with arms (e.g., wheelchair, bedside commode, etc.): A little  Moving from lying on back to sitting on the side of the bed?: A little  Moving to and from a bed to a chair (including a wheelchair)?: A little  Need to walk in hospital room?: A lot  Climbing 3-5 steps with a railing?: Unable    Plan  - Progressive mobility protocol initated  - PT/OT consulted  - Fall precautions in place            Chronic hypoxic respiratory failure  Patient with reported chronic hypoxemia requiring home O2 via nasal cannula.  O2 sats currently greater than 95% on 3 L O2 via nasal cannula.    History of cerebrovascular accident (CVA) with residual deficit  At baseline.  Continue BP control.  12/16 KD: Echo today. Awaiting results of Doppler ultrasound to right upper thigh to r/o infection/clot    12/17 KD:  Echo- cardiac  dysfunction noted, f/u with cardiology after hospital d/c.    Hepatic dysfunction  Appears at baseline.  Continue home medications.      Leukocytosis  White count 16.7 today.  This appears to be above patient's baseline of 18-20 K. patient denies recent fever and is afebrile today.  Lactic acid ordered.  Ultrasound soft tissue left lower extremity ordered to rule out abscess.  Continue close monitoring with daily labs.  12/16 KD: Afebrile WBC 15.22-cont trending down.  1st-BC positive, 2nd & 3rd-BC- neg . Continue IV Rocephin.  Awaiting results of Doppler ultrasound to right upper thigh to r/o infection.  Echo today.   12/17 KD: awaiting GS consult for left thigh wound recs. wound culture today. WBC 13.58 today (12/16/24 15.22). H&H 8.0/23.8 today (12/16/24 8.9/26.8). Echo- cardiac dysfunction noted, f/u with cardiology after hospital d/c.  12/18 KD: Start Bactrim DS 1 tablet QD x 7 days.. Cont. Daily WC       Hyperbilirubinemia  Bilirubin 5.4 this morning.  This appears to be decreased from previous admission.  Continue monitoring with daily labs.      ESRD (end stage renal disease) on dialysis  Creatine stable for now. BMP reviewed- noted Estimated Creatinine Clearance: 15.1 mL/min (A) (based on SCr of 5.1 mg/dL (H)). according to latest data. Based on current GFR, CKD stage is end stage.  Monitor UOP and serial BMP and adjust therapy as needed. Renally dose meds. Avoid nephrotoxic medications and procedures.  Continue dialysis per renal.    Heart failure with preserved ejection fraction  Patient currently without evidence of volume overload.  No acute exacerbation noted.  Continue home medications and volume removal with HD.  Monitor volume status closely.  12/17 KD: Echo- cardiac dysfunction noted, f/u with cardiology after hospital d/c.      History of anemia due to CKD  Anemia - due to CKD  Continue to monitor Hgb/Hct levels- evaluate for acute blood loss if Hgb drops acutely from baseline.  Add/titrate TOÑA  supp therapy if pt on dialysis to reach therapeutic Hgb goal 9-11 mg/dl.  Check iron stores - ferritin and iron saturation and replace if needed or Indicated with IV iron supp.  12/17 KD:H&H 8.0/23.8 today (12/16/24 8.9/26.8). cont. To monitor    GERD (gastroesophageal reflux disease)  Continue famotidine.      Hyperlipidemia associated with type 2 diabetes mellitus  Holding statin at this time secondary to hepatic dysfunction.      Hypertension associated with diabetes  Resume home medications.  Monitor blood blood pressures closely.  Adjust medications as needed.        Final Active Diagnoses:    Diagnosis Date Noted POA    PRINCIPAL PROBLEM:  Hypoglycemia [E16.2] 12/12/2024 Yes    Thigh abscess [L02.419] 12/17/2024 Yes    Secondary hyperparathyroidism [N25.81] 12/16/2024 Unknown    Bacteremia [R78.81] 12/15/2024 Unknown    Altered mental status [R41.82] 12/12/2024 Yes    Debility [R53.81] 11/11/2024 Yes     Chronic    Hepatic dysfunction [K76.89] 11/09/2024 Yes     Chronic    History of cerebrovascular accident (CVA) with residual deficit [I69.30] 11/09/2024 Not Applicable     Chronic    Chronic hypoxic respiratory failure [J96.11] 11/09/2024 Yes     Chronic    Hyperbilirubinemia [E80.6] 10/30/2024 Yes    Leukocytosis [D72.829] 10/30/2024 Yes    Heart failure with preserved ejection fraction [I50.30] 10/06/2020 Yes     Chronic    ESRD (end stage renal disease) on dialysis [N18.6, Z99.2] 10/06/2020 Not Applicable     Chronic    History of anemia due to CKD [N18.9, Z86.2] 08/05/2019 Not Applicable    GERD (gastroesophageal reflux disease) [K21.9] 03/17/2016 Yes     Chronic    Hypertension associated with diabetes [E11.59, I15.2] 01/25/2016 Yes     Chronic    Hyperlipidemia associated with type 2 diabetes mellitus [E11.69, E78.5] 01/25/2016 Yes     Chronic      Problems Resolved During this Admission:       Discharged Condition: stable    Disposition: Home-Health Care [facility] [place]    Follow Up:   Follow-up Information        Lindsey Singletary FNP Follow up in 1 week(s).    Specialty: Family Medicine  Contact information:  [address] [facility] [address]  [phone]               Vinh Starks MD Follow up in 2 week(s).    Specialty: Cardiology  Contact information:  [address] [facility] [address]  [phone]               Darcy Guadalupe MD Follow up in 1 week(s).    Specialty: General Surgery  Contact information:  [address] [facility] [address]  [phone]                           Patient Instructions:      SUBSEQUENT HOME HEALTH ORDERS     Order Specific Question Answer Comments   What Home Health Agency is the patient currently using? Other/External Nursing Care     Activity as tolerated       Significant Diagnostic Studies: N/A    Pending Diagnostic Studies:       None           Medications:  Reconciled Home Medications:      Medication List        START taking these medications      sulfamethoxazole-trimethoprim 800-160mg 800-160 mg Tab  Commonly known as: BACTRIM DS  Take 1 tablet by mouth once daily. for 7 days            CHANGE how you take these medications      oxyCODONE 5 MG immediate release tablet  Commonly known as: ROXICODONE  Take 1 tablet (5 mg total) by mouth 3 (three) times daily as needed for Pain.  What changed: when to take this            CONTINUE taking these medications      aspirin 81 MG EC tablet  Commonly known as: ECOTRIN  Take 1 tablet (81 mg total) by mouth once daily.     atorvastatin 40 MG tablet  Commonly known as: LIPITOR  Take 1 tablet by mouth once daily     clopidogreL 75 mg tablet  Commonly known as: PLAVIX  Take 1 tablet (75 mg total) by mouth once daily.     colchicine 0.6 mg tablet  Commonly known as: COLCRYS  TAKE 1 TABLET BY MOUTH ONCE DAILY AS NEEDED FOR GOUT PAIN     famotidine 20 MG tablet  Commonly known as: PEPCID  Take 0.5 tablets (10 mg total) by mouth daily as needed for Heartburn.     hydrOXYzine HCL 25 MG tablet  Commonly known as:  ATARAX  Take 1 tablet (25 mg total) by mouth 2 (two) times daily as needed for Itching or Anxiety.     insulin aspart U-100 100 unit/mL (3 mL) Inpn pen  Commonly known as: NovoLOG Flexpen U-100 Insulin  Inject 10 Units into the skin 2 (two) times daily with meals.     lactulose 10 gram/15 mL solution  Commonly known as: CHRONULAC  Take 30 mLs (20 g total) by mouth 2 (two) times daily.     linaGLIPtin 5 mg Tab tablet  Commonly known as: TRADJENTA  TAKE 1 TABLET (5 MG TOTAL) BY MOUTH ONCE DAILY.     losartan 100 MG tablet  Commonly known as: COZAAR  Take 1 tablet (100 mg total) by mouth once daily.     medroxyPROGESTERone 10 MG tablet  Commonly known as: PROVERA  Take 2 tablets (20 mg total) by mouth 3 (three) times daily for 10 days, THEN 2 tablets (20 mg total) once daily.  Start taking on: December 4, 2024     miconazole 2 % cream  Commonly known as: MICOTIN  Apply topically 2 (two) times daily. Between thighs, under abdomen pannus, under breasts     nebivoloL 20 mg Tab  Commonly known as: BYSTOLIC  Take 1 tablet by mouth once daily.     NIFEdipine 60 MG (OSM) 24 hr tablet  Commonly known as: PROCARDIA-XL  Take 1 tablet (60 mg total) by mouth once daily.     ondansetron 4 MG Tbdl  Commonly known as: ZOFRAN-ODT  Take 1 tablet (4 mg total) by mouth every 8 (eight) hours as needed ([place]).     pantoprazole 40 MG tablet  Commonly known as: PROTONIX  Take 1 tablet (40 mg total) by mouth once daily.     traZODone 100 MG tablet  Commonly known as: DESYREL  Take 100 mg by mouth nightly as needed.     ursodioL 300 mg capsule  Commonly known as: ACTIGALL  Take 1 capsule (300 mg total) by mouth 3 (three) times daily.              Indwelling Lines/Drains at time of discharge:   Lines/Drains/Airways       Drain  Duration                  Hemodialysis AV Fistula Left forearm -- days         Hemodialysis AV Fistula Left upper arm -- days         Hemodialysis AV Fistula 08/05/19 Left forearm 1962 days                    Time  spent on the discharge of patient: 25 minutes         TY COATS, JONATHAN  Department of Hospital Medicine  [facility]

## 2024-12-18 NOTE — ASSESSMENT & PLAN NOTE
Blood cultures prelim show gram +ve cocci in clusters resembling staph  - afebrile with persistent leukocytosis  - Potential left thigh wound as the source, will look for others    Plan  - repeat BCx  - Start vanc  - Echo ordered  12/16 KD: Afebrile WBC 15.22-cont trending down.  1st-BC positive, 2nd & 3rd-BC- neg . Continue IV Rocephin.  Awaiting results of Doppler ultrasound to right upper thigh to r/o infection.  Echo today.   12/17 KD: awaiting GS consult for left thigh wound recs. wound culture today. WBC 13.58 today (12/16/24 15.22). H&H 8.0/23.8 today (12/16/24 8.9/26.8). Echo- cardiac dysfunction noted, f/u with cardiology after hospital d/c.  12/18 KD: Cont. Daily WC, start bactrim ds 1 tab daily x 7 days

## 2024-12-18 NOTE — PLAN OF CARE
Problem: Physical Therapy  Goal: Physical Therapy Goal  Description: Patient will increase functional independence with mobility by performin. Supine to sit with Modified [place]  2. Sit to supine with Modified [place]  3. Rolling to Left and Right with Modified [place].  4. Sit to stand transfer with Supervision  5. Bed to chair transfer with Supervision using Rolling Walker  6. Gait  x 50 feet with Stand-by Assistance using Rolling Walker.   7. Lower extremity exercise program x10 reps per handout, with independence    Outcome: Adequate for Care Transition

## 2024-12-18 NOTE — PLAN OF CARE
POC reviewed w/ pt and purposeful rounding ongoing. Meds administered per MAR. VSS on 3L NC. Pt AAOx4 and stand-by assist to transfer from bed to wheelchair. Pt denies pain at this time.  Call light and personal items w/ in reach. Pt denies needs at this time.     Problem: Infection  Goal: Absence of Infection Signs and Symptoms  Outcome: Progressing     Problem: Adult Inpatient Plan of Care  Goal: Plan of Care Review  Outcome: Progressing  Goal: Patient-Specific Goal (Individualized)  Outcome: Progressing  Goal: Absence of Hospital-Acquired Illness or Injury  Outcome: Progressing  Goal: Optimal Comfort and Wellbeing  Outcome: Progressing  Goal: Readiness for Transition of Care  Outcome: Progressing     Problem: Diabetes Comorbidity  Goal: Blood Glucose Level Within Targeted Range  Outcome: Progressing     Problem: Wound  Goal: Optimal Coping  Outcome: Progressing  Goal: Optimal Functional Ability  Outcome: Progressing  Goal: Absence of Infection Signs and Symptoms  Outcome: Progressing  Goal: Improved Oral Intake  Outcome: Progressing  Goal: Optimal Pain Control and Function  Outcome: Progressing  Goal: Skin Health and Integrity  Outcome: Progressing  Goal: Optimal Wound Healing  Outcome: Progressing     Problem: Functional Deficit  Goal: Improved Balance and Postural Control  Outcome: Progressing  Goal: Optimal Coordination  Outcome: Progressing  Goal: Improved Muscle Strength  Outcome: Progressing  Goal: Optimal Range of Motion  Outcome: Progressing     Problem: Fall Injury Risk  Goal: Absence of Fall and Fall-Related Injury  Outcome: Progressing

## 2024-12-18 NOTE — DISCHARGE INSTRUCTIONS
Wound care to left thigh: Change outer dressing prn saturation. Pack with Dakin's moistened gauze daily  Cont. Home 02 2 l per NC   Cont. Dialysis M, W, F      Our goal at [facility] is to always give you outstanding care and exceptional service. You may receive a survey by mail, text or e-mail in the next 7-10 days from Meche Mello and our leadership team asking about the care you received with us. The survey should only take 5-10 minutes to complete and is very important to us.     Your feedback provides us with a way to recognize our staff who work tirelessly to provide the best care! Also, your responses help us learn how to improve when your experience was below our aspiration of excellence. We WILL use your feedback to continue making improvements to help us provide the highest quality care. We keep your personal information and feedback confidential. We appreciate your time completing this survey and can't wait to hear from you!!!     We want you to leave us today feeling like you can DEFINITELY recommend us to others! We look forward to your continued care with us! Thanks so much for choosing [facility] for your healthcare needs!

## 2024-12-19 NOTE — PLAN OF CARE
Discharge instructions reviewed with the patient. Peripheral IV removed and intact. Telemetry monitor removed and returned to proper location. Patient wheeled downstairs by staff to personal transportation.

## 2024-12-19 NOTE — NURSING
Patient slipped and slid to the floor while standing up near commode. Staff in room at the time and helped patient get back up. Patient assess for injuries and no injuries noted. Dr. Fong notified.

## 2024-12-20 LAB
BACTERIA BLD CULT: NORMAL
BACTERIA BLD CULT: NORMAL

## 2024-12-20 NOTE — PHYSICIAN QUERY
Question: Please clarify the relationship between the Hypertension and Diabetes Mellitus.    Provider Query Response:  Hypertension is not a manifestation of diabetes mellitus (essential hypertension)

## 2024-12-20 NOTE — PHYSICIAN QUERY
Provider, please specify the diagnosis associated with above clinical findings.  Metabolic encephalopathy

## 2024-12-21 LAB — BACTERIA SPEC AEROBE CULT: NO GROWTH

## 2024-12-24 LAB — BACTERIA SPEC ANAEROBE CULT: NORMAL

## 2025-01-01 ENCOUNTER — CLINICAL SUPPORT (OUTPATIENT)
Dept: CARDIOLOGY | Facility: HOSPITAL | Age: 45
DRG: 871 | End: 2025-01-01
Attending: FAMILY MEDICINE
Payer: MEDICARE

## 2025-01-01 ENCOUNTER — ANESTHESIA (OUTPATIENT)
Dept: MEDSURG UNIT | Facility: HOSPITAL | Age: 45
DRG: 871 | End: 2025-01-01
Payer: MEDICARE

## 2025-01-01 ENCOUNTER — HOSPITAL ENCOUNTER (INPATIENT)
Facility: HOSPITAL | Age: 45
LOS: 3 days | DRG: 871 | End: 2025-06-06
Attending: FAMILY MEDICINE | Admitting: INTERNAL MEDICINE
Payer: MEDICARE

## 2025-01-01 ENCOUNTER — ANESTHESIA EVENT (OUTPATIENT)
Dept: MEDSURG UNIT | Facility: HOSPITAL | Age: 45
DRG: 871 | End: 2025-01-01
Payer: MEDICARE

## 2025-01-01 VITALS
DIASTOLIC BLOOD PRESSURE: 64 MMHG | SYSTOLIC BLOOD PRESSURE: 114 MMHG | OXYGEN SATURATION: 45 % | WEIGHT: 260.13 LBS | RESPIRATION RATE: 121 BRPM | HEART RATE: 117 BPM | HEIGHT: 64 IN | TEMPERATURE: 98 F | BODY MASS INDEX: 44.41 KG/M2

## 2025-01-01 DIAGNOSIS — N18.6 ESRD (END STAGE RENAL DISEASE): ICD-10-CM

## 2025-01-01 DIAGNOSIS — I31.39 PERICARDIAL EFFUSION: ICD-10-CM

## 2025-01-01 DIAGNOSIS — A41.9 SEPSIS, DUE TO UNSPECIFIED ORGANISM, UNSPECIFIED WHETHER ACUTE ORGAN DYSFUNCTION PRESENT: ICD-10-CM

## 2025-01-01 DIAGNOSIS — R41.82 ALTERED MENTAL STATUS, UNSPECIFIED ALTERED MENTAL STATUS TYPE: ICD-10-CM

## 2025-01-01 DIAGNOSIS — Z99.2 ESRD (END STAGE RENAL DISEASE) ON DIALYSIS: Chronic | ICD-10-CM

## 2025-01-01 DIAGNOSIS — E16.2 HYPOGLYCEMIA: Primary | ICD-10-CM

## 2025-01-01 DIAGNOSIS — I95.9 HYPOTENSION: ICD-10-CM

## 2025-01-01 DIAGNOSIS — S81.802S LEG WOUND, LEFT, SEQUELA: ICD-10-CM

## 2025-01-01 DIAGNOSIS — N18.6 ESRD (END STAGE RENAL DISEASE) ON DIALYSIS: Chronic | ICD-10-CM

## 2025-01-01 DIAGNOSIS — A41.9 SEPSIS: ICD-10-CM

## 2025-01-01 DIAGNOSIS — Z13.6 SCREENING FOR CARDIOVASCULAR CONDITION: ICD-10-CM

## 2025-01-01 LAB
ABSOLUTE EOSINOPHIL (OHS): 0.14 K/UL
ABSOLUTE MONOCYTE (OHS): 0.85 K/UL (ref 0.3–1)
ABSOLUTE NEUTROPHIL COUNT (OHS): 17.14 K/UL (ref 1.8–7.7)
ABSOLUTE NEUTROPHIL MANUAL (OHS): 20.9 K/UL
ABSOLUTE NEUTROPHIL MANUAL (OHS): 24.9 K/UL
ABSOLUTE NEUTROPHIL MANUAL (OHS): 28.6 K/UL
ABSOLUTE NEUTROPHIL MANUAL (OHS): 30.6 K/UL
AC: 18
ALBUMIN SERPL BCP-MCNC: 1.1 G/DL (ref 3.5–5.2)
ALBUMIN SERPL BCP-MCNC: 1.2 G/DL (ref 3.5–5.2)
ALBUMIN SERPL BCP-MCNC: 1.3 G/DL (ref 3.5–5.2)
ALBUMIN SERPL BCP-MCNC: 1.4 G/DL (ref 3.5–5.2)
ALBUMIN SERPL BCP-MCNC: 1.5 G/DL (ref 3.5–5.2)
ALP SERPL-CCNC: 307 UNIT/L (ref 40–150)
ALP SERPL-CCNC: 322 UNIT/L (ref 40–150)
ALP SERPL-CCNC: 374 UNIT/L (ref 40–150)
ALP SERPL-CCNC: 374 UNIT/L (ref 40–150)
ALP SERPL-CCNC: 400 UNIT/L (ref 40–150)
ALT SERPL W/O P-5'-P-CCNC: 8 UNIT/L (ref 10–44)
ALT SERPL W/O P-5'-P-CCNC: <8 UNIT/L (ref 10–44)
AMMONIA PLAS-SCNC: 68 UMOL/L (ref 10–50)
ANION GAP (OHS): 13 MMOL/L (ref 8–16)
ANION GAP (OHS): 14 MMOL/L (ref 8–16)
ANION GAP (OHS): 20 MMOL/L (ref 8–16)
ANION GAP (OHS): 22 MMOL/L (ref 8–16)
ANISOCYTOSIS BLD QL SMEAR: SLIGHT
AST SERPL-CCNC: 17 UNIT/L (ref 11–45)
AST SERPL-CCNC: 17 UNIT/L (ref 11–45)
AST SERPL-CCNC: 18 UNIT/L (ref 11–45)
AST SERPL-CCNC: 24 UNIT/L (ref 11–45)
AST SERPL-CCNC: 33 UNIT/L (ref 11–45)
BASOPHILS # BLD AUTO: 0.04 K/UL
BASOPHILS NFR BLD AUTO: 0.2 %
BILIRUB SERPL-MCNC: 0.8 MG/DL (ref 0.1–1)
BILIRUB SERPL-MCNC: 1 MG/DL (ref 0.1–1)
BILIRUB SERPL-MCNC: 1 MG/DL (ref 0.1–1)
BILIRUB SERPL-MCNC: 1.1 MG/DL (ref 0.1–1)
BILIRUB SERPL-MCNC: 1.2 MG/DL (ref 0.1–1)
BUN SERPL-MCNC: 42 MG/DL (ref 6–20)
BUN SERPL-MCNC: 44 MG/DL (ref 6–20)
BUN SERPL-MCNC: 46 MG/DL (ref 6–20)
BUN SERPL-MCNC: 48 MG/DL (ref 6–20)
BUN SERPL-MCNC: 48 MG/DL (ref 6–20)
BUN SERPL-MCNC: 50 MG/DL (ref 6–20)
CALCIUM SERPL-MCNC: 7.7 MG/DL (ref 8.7–10.5)
CALCIUM SERPL-MCNC: 7.8 MG/DL (ref 8.7–10.5)
CALCIUM SERPL-MCNC: 7.9 MG/DL (ref 8.7–10.5)
CALCIUM SERPL-MCNC: 7.9 MG/DL (ref 8.7–10.5)
CALCIUM SERPL-MCNC: 8.4 MG/DL (ref 8.7–10.5)
CALCIUM SERPL-MCNC: 8.4 MG/DL (ref 8.7–10.5)
CHLORIDE SERPL-SCNC: 95 MMOL/L (ref 95–110)
CHLORIDE SERPL-SCNC: 96 MMOL/L (ref 95–110)
CHLORIDE SERPL-SCNC: 97 MMOL/L (ref 95–110)
CHLORIDE SERPL-SCNC: 98 MMOL/L (ref 95–110)
CHLORIDE SERPL-SCNC: 99 MMOL/L (ref 95–110)
CHLORIDE SERPL-SCNC: 99 MMOL/L (ref 95–110)
CO2 SERPL-SCNC: 14 MMOL/L (ref 23–29)
CO2 SERPL-SCNC: 15 MMOL/L (ref 23–29)
CO2 SERPL-SCNC: 20 MMOL/L (ref 23–29)
CO2 SERPL-SCNC: 20 MMOL/L (ref 23–29)
CO2 SERPL-SCNC: 21 MMOL/L (ref 23–29)
CO2 SERPL-SCNC: 22 MMOL/L (ref 23–29)
CREAT SERPL-MCNC: 5.4 MG/DL (ref 0.5–1.4)
CREAT SERPL-MCNC: 5.6 MG/DL (ref 0.5–1.4)
CREAT SERPL-MCNC: 5.9 MG/DL (ref 0.5–1.4)
CREAT SERPL-MCNC: 5.9 MG/DL (ref 0.5–1.4)
CREAT SERPL-MCNC: 6 MG/DL (ref 0.5–1.4)
CREAT SERPL-MCNC: 6.2 MG/DL (ref 0.5–1.4)
EAG (OHS): 137 MG/DL (ref 68–131)
EOSINOPHIL NFR BLD MANUAL: 2 % (ref 0–8)
ERYTHROCYTE [DISTWIDTH] IN BLOOD BY AUTOMATED COUNT: 17 % (ref 11.5–14.5)
ERYTHROCYTE [DISTWIDTH] IN BLOOD BY AUTOMATED COUNT: 17 % (ref 11.5–14.5)
ERYTHROCYTE [DISTWIDTH] IN BLOOD BY AUTOMATED COUNT: 17.2 % (ref 11.5–14.5)
ERYTHROCYTE [DISTWIDTH] IN BLOOD BY AUTOMATED COUNT: 17.4 % (ref 11.5–14.5)
ERYTHROCYTE [DISTWIDTH] IN BLOOD BY AUTOMATED COUNT: 18.1 % (ref 11.5–14.5)
FIO2: 100 %
FIO2: 32 %
GFR SERPLBLD CREATININE-BSD FMLA CKD-EPI: 8 ML/MIN/1.73/M2
GFR SERPLBLD CREATININE-BSD FMLA CKD-EPI: 9 ML/MIN/1.73/M2
GFR SERPLBLD CREATININE-BSD FMLA CKD-EPI: 9 ML/MIN/1.73/M2
GLUCOSE SERPL-MCNC: 127 MG/DL (ref 70–110)
GLUCOSE SERPL-MCNC: 22 MG/DL (ref 70–110)
GLUCOSE SERPL-MCNC: 299 MG/DL (ref 70–110)
GLUCOSE SERPL-MCNC: 58 MG/DL (ref 70–110)
GLUCOSE SERPL-MCNC: 72 MG/DL (ref 70–110)
GLUCOSE SERPL-MCNC: 88 MG/DL (ref 70–110)
HBA1C MFR BLD: 6.4 % (ref 4–5.6)
HCT VFR BLD AUTO: 27.1 % (ref 37–48.5)
HCT VFR BLD AUTO: 27.9 % (ref 37–48.5)
HCT VFR BLD AUTO: 29 % (ref 37–48.5)
HCT VFR BLD AUTO: 30.3 % (ref 37–48.5)
HCT VFR BLD AUTO: 31.8 % (ref 37–48.5)
HGB BLD-MCNC: 10.2 GM/DL (ref 12–16)
HGB BLD-MCNC: 10.5 GM/DL (ref 12–16)
HGB BLD-MCNC: 8.3 GM/DL (ref 12–16)
HGB BLD-MCNC: 8.9 GM/DL (ref 12–16)
HGB BLD-MCNC: 9.6 GM/DL (ref 12–16)
HOLD SPECIMEN: NORMAL
IMM GRANULOCYTES # BLD AUTO: 0.93 K/UL (ref 0–0.04)
IMM GRANULOCYTES NFR BLD AUTO: 4.7 % (ref 0–0.5)
LACTATE SERPL-SCNC: 1.1 MMOL/L (ref 0.5–2.2)
LACTATE SERPL-SCNC: 2.3 MMOL/L (ref 0.5–2.2)
LACTATE SERPL-SCNC: 3 MMOL/L (ref 0.5–2.2)
LACTATE SERPL-SCNC: >12 MMOL/L (ref 0.5–2.2)
LARGE/GIANT PLATELETS (OHS): PRESENT
LPM: 3
LYMPHOCYTES # BLD AUTO: 0.5 K/UL (ref 1–4.8)
LYMPHOCYTES NFR BLD MANUAL: 11 % (ref 18–48)
LYMPHOCYTES NFR BLD MANUAL: 2 % (ref 18–48)
LYMPHOCYTES NFR BLD MANUAL: 2 % (ref 18–48)
LYMPHOCYTES NFR BLD MANUAL: 4 % (ref 18–48)
Lab: ABNORMAL
Lab: ABNORMAL
MCH RBC QN AUTO: 26.5 PG (ref 27–31)
MCH RBC QN AUTO: 26.6 PG (ref 27–31)
MCH RBC QN AUTO: 26.6 PG (ref 27–31)
MCH RBC QN AUTO: 26.8 PG (ref 27–31)
MCH RBC QN AUTO: 27 PG (ref 27–31)
MCHC RBC AUTO-ENTMCNC: 29.7 G/DL (ref 32–36)
MCHC RBC AUTO-ENTMCNC: 32.8 G/DL (ref 32–36)
MCHC RBC AUTO-ENTMCNC: 33 G/DL (ref 32–36)
MCHC RBC AUTO-ENTMCNC: 33.1 G/DL (ref 32–36)
MCHC RBC AUTO-ENTMCNC: 33.7 G/DL (ref 32–36)
MCV RBC AUTO: 80 FL (ref 82–98)
MCV RBC AUTO: 82 FL (ref 82–98)
MCV RBC AUTO: 89 FL (ref 82–98)
METAMYELOCYTES NFR BLD MANUAL: 3 %
METAMYELOCYTES NFR BLD MANUAL: 8 %
MODIFIED ALLEN'S TEST: ABNORMAL
MODIFIED ALLEN'S TEST: POSITIVE
MONOCYTES NFR BLD MANUAL: 4 % (ref 4–15)
MONOCYTES NFR BLD MANUAL: 7 % (ref 4–15)
MONOCYTES NFR BLD MANUAL: 8 % (ref 4–15)
MONOCYTES NFR BLD MANUAL: 9 % (ref 4–15)
MRSA ID BY PCR (OHS): NEGATIVE
MYELOCYTES NFR BLD MANUAL: 1 %
MYELOCYTES NFR BLD MANUAL: 3 %
MYELOCYTES NFR BLD MANUAL: 3 %
NEUTROPHILS NFR BLD MANUAL: 52 % (ref 38–73)
NEUTROPHILS NFR BLD MANUAL: 74 % (ref 38–73)
NEUTROPHILS NFR BLD MANUAL: 78 % (ref 38–73)
NEUTROPHILS NFR BLD MANUAL: 81 % (ref 38–73)
NEUTS BAND NFR BLD MANUAL: 11 %
NEUTS BAND NFR BLD MANUAL: 22 %
NEUTS BAND NFR BLD MANUAL: 6 %
NEUTS BAND NFR BLD MANUAL: 9 %
NOTIFIED BY: ABNORMAL
NOTIFIED BY: ABNORMAL
NUCLEATED RBC (/100WBC) (OHS): 0 /100 WBC
O2DEVICE: ABNORMAL
O2DEVICE: ABNORMAL
OHS QRS DURATION: 90 MS
OHS QTC CALCULATION: 485 MS
PCO2 BLDA: 42.6 MMHG (ref 35–45)
PEAK FLOW: 60
PEEP: 5
PH SMN: 7.3 [PH] (ref 7.34–7.45)
PLATELET # BLD AUTO: 234 K/UL (ref 150–450)
PLATELET # BLD AUTO: 254 K/UL (ref 150–450)
PLATELET # BLD AUTO: 302 K/UL (ref 150–450)
PLATELET # BLD AUTO: 333 K/UL (ref 150–450)
PLATELET # BLD AUTO: 342 K/UL (ref 150–450)
PMV BLD AUTO: 10.9 FL (ref 9.2–12.9)
PMV BLD AUTO: 11 FL (ref 9.2–12.9)
PMV BLD AUTO: 11.2 FL (ref 9.2–12.9)
PO2 BLDA: 42.9 MMHG (ref 40–60)
PO2 BLDA: 55.4 MMHG (ref 80–100)
POC BASE DEFICIT: -5.5 MMOL/L (ref -2–2)
POC HCO3: 19.9 MMOL/L (ref 24–28)
POC NOTIFIED NOTE: ABNORMAL
POC NOTIFIED NOTE: ABNORMAL
POC PERFORMED BY: ABNORMAL
POC PERFORMED BY: ABNORMAL
POC SATURATED O2: 44.7 % (ref 95–100)
POC SATURATED O2: 86.8 % (ref 95–100)
POC TEMPERATURE: 37 C
POC TEMPERATURE: 37 C
POCT GLUCOSE: 103 MG/DL (ref 70–110)
POCT GLUCOSE: 113 MG/DL (ref 70–110)
POCT GLUCOSE: 123 MG/DL (ref 70–110)
POCT GLUCOSE: 127 MG/DL (ref 70–110)
POCT GLUCOSE: 24 MG/DL (ref 70–110)
POCT GLUCOSE: 44 MG/DL (ref 70–110)
POCT GLUCOSE: 44 MG/DL (ref 70–110)
POCT GLUCOSE: 55 MG/DL (ref 70–110)
POCT GLUCOSE: 56 MG/DL (ref 70–110)
POCT GLUCOSE: 57 MG/DL (ref 70–110)
POCT GLUCOSE: 65 MG/DL (ref 70–110)
POCT GLUCOSE: 66 MG/DL (ref 70–110)
POCT GLUCOSE: 67 MG/DL (ref 70–110)
POCT GLUCOSE: 69 MG/DL (ref 70–110)
POCT GLUCOSE: 76 MG/DL (ref 70–110)
POCT GLUCOSE: 82 MG/DL (ref 70–110)
POCT GLUCOSE: 92 MG/DL (ref 70–110)
POTASSIUM SERPL-SCNC: 5 MMOL/L (ref 3.5–5.1)
POTASSIUM SERPL-SCNC: 5.1 MMOL/L (ref 3.5–5.1)
POTASSIUM SERPL-SCNC: 5.2 MMOL/L (ref 3.5–5.1)
POTASSIUM SERPL-SCNC: 5.6 MMOL/L (ref 3.5–5.1)
POTASSIUM SERPL-SCNC: 5.7 MMOL/L (ref 3.5–5.1)
POTASSIUM SERPL-SCNC: 5.9 MMOL/L (ref 3.5–5.1)
PROT SERPL-MCNC: 5 GM/DL (ref 6–8.4)
PROT SERPL-MCNC: 5.2 GM/DL (ref 6–8.4)
PROT SERPL-MCNC: 5.4 GM/DL (ref 6–8.4)
PROT SERPL-MCNC: 5.5 GM/DL (ref 6–8.4)
PROT SERPL-MCNC: 5.8 GM/DL (ref 6–8.4)
PROVIDER NOTIFIED: ABNORMAL
PROVIDER NOTIFIED: ABNORMAL
RBC # BLD AUTO: 3.12 M/UL (ref 4–5.4)
RBC # BLD AUTO: 3.32 M/UL (ref 4–5.4)
RBC # BLD AUTO: 3.61 M/UL (ref 4–5.4)
RBC # BLD AUTO: 3.78 M/UL (ref 4–5.4)
RBC # BLD AUTO: 3.96 M/UL (ref 4–5.4)
RELATIVE EOSINOPHIL (OHS): 0.7 %
RELATIVE LYMPHOCYTE (OHS): 2.6 % (ref 18–48)
RELATIVE MONOCYTE (OHS): 4.3 % (ref 4–15)
RELATIVE NEUTROPHIL (OHS): 87.5 % (ref 38–73)
SODIUM SERPL-SCNC: 130 MMOL/L (ref 136–145)
SODIUM SERPL-SCNC: 131 MMOL/L (ref 136–145)
SODIUM SERPL-SCNC: 131 MMOL/L (ref 136–145)
SODIUM SERPL-SCNC: 133 MMOL/L (ref 136–145)
SODIUM SERPL-SCNC: 133 MMOL/L (ref 136–145)
SODIUM SERPL-SCNC: 135 MMOL/L (ref 136–145)
SPECIMEN SOURCE: ABNORMAL
SPECIMEN SOURCE: ABNORMAL
STAPH AUREUS ID BY PCR (OHS): NEGATIVE
T4 FREE SERPL-MCNC: <0.42 NG/DL (ref 0.71–1.51)
TOXIC GRANULES BLD QL SMEAR: PRESENT
TROPONIN I SERPL HS-MCNC: 427 NG/L
TSH SERPL-ACNC: 7.44 UIU/ML (ref 0.4–4)
VT: 420
WBC # BLD AUTO: 19.6 K/UL (ref 3.9–12.7)
WBC # BLD AUTO: 24.01 K/UL (ref 3.9–12.7)
WBC # BLD AUTO: 28.66 K/UL (ref 3.9–12.7)
WBC # BLD AUTO: 36.01 K/UL (ref 3.9–12.7)
WBC # BLD AUTO: 38.69 K/UL (ref 3.9–12.7)

## 2025-01-01 PROCEDURE — 63600175 PHARM REV CODE 636 W HCPCS: Performed by: FAMILY MEDICINE

## 2025-01-01 PROCEDURE — 25000003 PHARM REV CODE 250: Performed by: INTERNAL MEDICINE

## 2025-01-01 PROCEDURE — 5A12012 PERFORMANCE OF CARDIAC OUTPUT, SINGLE, MANUAL: ICD-10-PCS | Performed by: INTERNAL MEDICINE

## 2025-01-01 PROCEDURE — 36415 COLL VENOUS BLD VENIPUNCTURE: CPT | Performed by: INTERNAL MEDICINE

## 2025-01-01 PROCEDURE — 84484 ASSAY OF TROPONIN QUANT: CPT | Performed by: INTERNAL MEDICINE

## 2025-01-01 PROCEDURE — 25000003 PHARM REV CODE 250: Performed by: FAMILY MEDICINE

## 2025-01-01 PROCEDURE — 99900031 HC PATIENT EDUCATION (STAT)

## 2025-01-01 PROCEDURE — 94761 N-INVAS EAR/PLS OXIMETRY MLT: CPT

## 2025-01-01 PROCEDURE — 85025 COMPLETE CBC W/AUTO DIFF WBC: CPT | Performed by: FAMILY MEDICINE

## 2025-01-01 PROCEDURE — C1751 CATH, INF, PER/CENT/MIDLINE: HCPCS

## 2025-01-01 PROCEDURE — 63600175 PHARM REV CODE 636 W HCPCS: Performed by: INTERNAL MEDICINE

## 2025-01-01 PROCEDURE — 25000003 PHARM REV CODE 250: Performed by: SURGERY

## 2025-01-01 PROCEDURE — 27000221 HC OXYGEN, UP TO 24 HOURS

## 2025-01-01 PROCEDURE — 31500 INSERT EMERGENCY AIRWAY: CPT | Performed by: NURSE ANESTHETIST, CERTIFIED REGISTERED

## 2025-01-01 PROCEDURE — 02HV33Z INSERTION OF INFUSION DEVICE INTO SUPERIOR VENA CAVA, PERCUTANEOUS APPROACH: ICD-10-PCS | Performed by: INTERNAL MEDICINE

## 2025-01-01 PROCEDURE — 97165 OT EVAL LOW COMPLEX 30 MIN: CPT

## 2025-01-01 PROCEDURE — 92950 HEART/LUNG RESUSCITATION CPR: CPT

## 2025-01-01 PROCEDURE — 82140 ASSAY OF AMMONIA: CPT | Performed by: INTERNAL MEDICINE

## 2025-01-01 PROCEDURE — 25000003 PHARM REV CODE 250: Performed by: EMERGENCY MEDICINE

## 2025-01-01 PROCEDURE — 82803 BLOOD GASES ANY COMBINATION: CPT

## 2025-01-01 PROCEDURE — 84439 ASSAY OF FREE THYROXINE: CPT | Performed by: INTERNAL MEDICINE

## 2025-01-01 PROCEDURE — 20000000 HC ICU ROOM

## 2025-01-01 PROCEDURE — 83605 ASSAY OF LACTIC ACID: CPT | Performed by: FAMILY MEDICINE

## 2025-01-01 PROCEDURE — 97162 PT EVAL MOD COMPLEX 30 MIN: CPT

## 2025-01-01 PROCEDURE — 80100016 HC MAINTENANCE HEMODIALYSIS

## 2025-01-01 PROCEDURE — 0BH17EZ INSERTION OF ENDOTRACHEAL AIRWAY INTO TRACHEA, VIA NATURAL OR ARTIFICIAL OPENING: ICD-10-PCS | Performed by: INTERNAL MEDICINE

## 2025-01-01 PROCEDURE — 93010 ELECTROCARDIOGRAM REPORT: CPT | Mod: ,,, | Performed by: INTERNAL MEDICINE

## 2025-01-01 PROCEDURE — 80053 COMPREHEN METABOLIC PANEL: CPT | Performed by: FAMILY MEDICINE

## 2025-01-01 PROCEDURE — S5010 5% DEXTROSE AND 0.45% SALINE: HCPCS | Performed by: FAMILY MEDICINE

## 2025-01-01 PROCEDURE — 36415 COLL VENOUS BLD VENIPUNCTURE: CPT | Performed by: FAMILY MEDICINE

## 2025-01-01 PROCEDURE — 5A1935Z RESPIRATORY VENTILATION, LESS THAN 24 CONSECUTIVE HOURS: ICD-10-PCS | Performed by: INTERNAL MEDICINE

## 2025-01-01 PROCEDURE — 36415 COLL VENOUS BLD VENIPUNCTURE: CPT | Performed by: SURGERY

## 2025-01-01 PROCEDURE — 80053 COMPREHEN METABOLIC PANEL: CPT | Performed by: INTERNAL MEDICINE

## 2025-01-01 PROCEDURE — 99900035 HC TECH TIME PER 15 MIN (STAT)

## 2025-01-01 PROCEDURE — 82962 GLUCOSE BLOOD TEST: CPT

## 2025-01-01 PROCEDURE — 96375 TX/PRO/DX INJ NEW DRUG ADDON: CPT

## 2025-01-01 PROCEDURE — 83605 ASSAY OF LACTIC ACID: CPT | Performed by: INTERNAL MEDICINE

## 2025-01-01 PROCEDURE — 87077 CULTURE AEROBIC IDENTIFY: CPT | Performed by: FAMILY MEDICINE

## 2025-01-01 PROCEDURE — 87150 DNA/RNA AMPLIFIED PROBE: CPT | Performed by: FAMILY MEDICINE

## 2025-01-01 PROCEDURE — 21400001 HC TELEMETRY ROOM

## 2025-01-01 PROCEDURE — 85025 COMPLETE CBC W/AUTO DIFF WBC: CPT | Performed by: INTERNAL MEDICINE

## 2025-01-01 PROCEDURE — 83605 ASSAY OF LACTIC ACID: CPT | Performed by: SURGERY

## 2025-01-01 PROCEDURE — 94002 VENT MGMT INPAT INIT DAY: CPT

## 2025-01-01 PROCEDURE — 36600 WITHDRAWAL OF ARTERIAL BLOOD: CPT

## 2025-01-01 PROCEDURE — 83036 HEMOGLOBIN GLYCOSYLATED A1C: CPT | Performed by: FAMILY MEDICINE

## 2025-01-01 PROCEDURE — 27100171 HC OXYGEN HIGH FLOW UP TO 24 HOURS

## 2025-01-01 PROCEDURE — 63600175 PHARM REV CODE 636 W HCPCS: Performed by: EMERGENCY MEDICINE

## 2025-01-01 PROCEDURE — 87040 BLOOD CULTURE FOR BACTERIA: CPT | Performed by: INTERNAL MEDICINE

## 2025-01-01 PROCEDURE — 99285 EMERGENCY DEPT VISIT HI MDM: CPT | Mod: 25

## 2025-01-01 PROCEDURE — 36410 VNPNXR 3YR/> PHY/QHP DX/THER: CPT

## 2025-01-01 PROCEDURE — 94799 UNLISTED PULMONARY SVC/PX: CPT

## 2025-01-01 PROCEDURE — 36569 INSJ PICC 5 YR+ W/O IMAGING: CPT

## 2025-01-01 PROCEDURE — 11000001 HC ACUTE MED/SURG PRIVATE ROOM

## 2025-01-01 PROCEDURE — 96376 TX/PRO/DX INJ SAME DRUG ADON: CPT

## 2025-01-01 PROCEDURE — 99900026 HC AIRWAY MAINTENANCE (STAT)

## 2025-01-01 PROCEDURE — 93005 ELECTROCARDIOGRAM TRACING: CPT

## 2025-01-01 PROCEDURE — 94761 N-INVAS EAR/PLS OXIMETRY MLT: CPT | Mod: XB

## 2025-01-01 PROCEDURE — 96374 THER/PROPH/DIAG INJ IV PUSH: CPT

## 2025-01-01 RX ORDER — HEPARIN SODIUM 5000 [USP'U]/ML
5000 INJECTION, SOLUTION INTRAVENOUS; SUBCUTANEOUS EVERY 8 HOURS
Status: DISCONTINUED | OUTPATIENT
Start: 2025-01-01 | End: 2025-06-07 | Stop reason: HOSPADM

## 2025-01-01 RX ORDER — FAMOTIDINE 20 MG/1
20 TABLET, FILM COATED ORAL
Status: DISCONTINUED | OUTPATIENT
Start: 2025-06-08 | End: 2025-01-01

## 2025-01-01 RX ORDER — SODIUM BICARBONATE 1 MEQ/ML
SYRINGE (ML) INTRAVENOUS CODE/TRAUMA/SEDATION MEDICATION
Status: COMPLETED | OUTPATIENT
Start: 2025-01-01 | End: 2025-01-01

## 2025-01-01 RX ORDER — TALC
6 POWDER (GRAM) TOPICAL NIGHTLY PRN
Status: DISCONTINUED | OUTPATIENT
Start: 2025-01-01 | End: 2025-06-07 | Stop reason: HOSPADM

## 2025-01-01 RX ORDER — MUPIROCIN 20 MG/G
OINTMENT TOPICAL 2 TIMES DAILY
Status: DISCONTINUED | OUTPATIENT
Start: 2025-01-01 | End: 2025-06-07 | Stop reason: HOSPADM

## 2025-01-01 RX ORDER — ATORVASTATIN CALCIUM 40 MG/1
40 TABLET, FILM COATED ORAL NIGHTLY
Status: DISCONTINUED | OUTPATIENT
Start: 2025-01-01 | End: 2025-01-01

## 2025-01-01 RX ORDER — DEXTROSE 50 % IN WATER (D50W) INTRAVENOUS SYRINGE
25
Status: COMPLETED | OUTPATIENT
Start: 2025-01-01 | End: 2025-01-01

## 2025-01-01 RX ORDER — DEXTROSE MONOHYDRATE AND SODIUM CHLORIDE 5; .45 G/100ML; G/100ML
1000 INJECTION, SOLUTION INTRAVENOUS
Status: COMPLETED | OUTPATIENT
Start: 2025-01-01 | End: 2025-01-01

## 2025-01-01 RX ORDER — FAMOTIDINE 20 MG/1
20 TABLET, FILM COATED ORAL
Status: DISCONTINUED | OUTPATIENT
Start: 2025-01-01 | End: 2025-01-01

## 2025-01-01 RX ORDER — PANTOPRAZOLE SODIUM 40 MG/10ML
40 INJECTION, POWDER, LYOPHILIZED, FOR SOLUTION INTRAVENOUS DAILY
Status: DISCONTINUED | OUTPATIENT
Start: 2025-01-01 | End: 2025-06-07 | Stop reason: HOSPADM

## 2025-01-01 RX ORDER — LEVOTHYROXINE SODIUM 100 UG/1
100 TABLET ORAL
Status: DISCONTINUED | OUTPATIENT
Start: 2025-01-01 | End: 2025-01-01

## 2025-01-01 RX ORDER — INSULIN ASPART 100 [IU]/ML
0-5 INJECTION, SOLUTION INTRAVENOUS; SUBCUTANEOUS
Status: DISCONTINUED | OUTPATIENT
Start: 2025-01-01 | End: 2025-06-07 | Stop reason: HOSPADM

## 2025-01-01 RX ORDER — IBUPROFEN 200 MG
24 TABLET ORAL
Status: DISCONTINUED | OUTPATIENT
Start: 2025-01-01 | End: 2025-06-07 | Stop reason: HOSPADM

## 2025-01-01 RX ORDER — IBUPROFEN 200 MG
16 TABLET ORAL
Status: DISCONTINUED | OUTPATIENT
Start: 2025-01-01 | End: 2025-06-07 | Stop reason: HOSPADM

## 2025-01-01 RX ORDER — SODIUM CHLORIDE 0.9 % (FLUSH) 0.9 %
10 SYRINGE (ML) INJECTION EVERY 12 HOURS PRN
Status: DISCONTINUED | OUTPATIENT
Start: 2025-01-01 | End: 2025-06-07 | Stop reason: HOSPADM

## 2025-01-01 RX ORDER — ALBUMIN HUMAN 250 G/1000ML
12.5 SOLUTION INTRAVENOUS ONCE
Status: DISCONTINUED | OUTPATIENT
Start: 2025-01-01 | End: 2025-06-07 | Stop reason: HOSPADM

## 2025-01-01 RX ORDER — CALCIUM CHLORIDE INJECTION 100 MG/ML
INJECTION, SOLUTION INTRAVENOUS CODE/TRAUMA/SEDATION MEDICATION
Status: COMPLETED | OUTPATIENT
Start: 2025-01-01 | End: 2025-01-01

## 2025-01-01 RX ORDER — GLUCAGON 1 MG
1 KIT INJECTION
Status: DISCONTINUED | OUTPATIENT
Start: 2025-01-01 | End: 2025-06-07 | Stop reason: HOSPADM

## 2025-01-01 RX ORDER — MIDODRINE HYDROCHLORIDE 5 MG/1
10 TABLET ORAL 2 TIMES DAILY WITH MEALS
Status: DISCONTINUED | OUTPATIENT
Start: 2025-01-01 | End: 2025-01-01

## 2025-01-01 RX ORDER — DEXTROSE AND SODIUM CHLORIDE 10; .45 G/100ML; G/100ML
INJECTION, SOLUTION INTRAVENOUS CONTINUOUS
Status: DISCONTINUED | OUTPATIENT
Start: 2025-01-01 | End: 2025-01-01

## 2025-01-01 RX ORDER — EPINEPHRINE 0.1 MG/ML
INJECTION INTRAVENOUS CODE/TRAUMA/SEDATION MEDICATION
Status: COMPLETED | OUTPATIENT
Start: 2025-01-01 | End: 2025-01-01

## 2025-01-01 RX ORDER — NOREPINEPHRINE BITARTRATE/D5W 4MG/250ML
PLASTIC BAG, INJECTION (ML) INTRAVENOUS
Status: DISCONTINUED
Start: 2025-01-01 | End: 2025-06-07 | Stop reason: HOSPADM

## 2025-01-01 RX ORDER — MUPIROCIN 20 MG/G
OINTMENT TOPICAL 2 TIMES DAILY
Status: CANCELLED | OUTPATIENT
Start: 2025-01-01 | End: 2025-06-09

## 2025-01-01 RX ORDER — ASPIRIN 81 MG/1
81 TABLET ORAL DAILY
Status: DISCONTINUED | OUTPATIENT
Start: 2025-01-01 | End: 2025-01-01

## 2025-01-01 RX ORDER — FAMOTIDINE 20 MG/1
20 TABLET, FILM COATED ORAL DAILY
Status: DISCONTINUED | OUTPATIENT
Start: 2025-01-01 | End: 2025-01-01

## 2025-01-01 RX ORDER — SODIUM BICARBONATE 1 MEQ/ML
100 SYRINGE (ML) INTRAVENOUS ONCE
Status: COMPLETED | OUTPATIENT
Start: 2025-01-01 | End: 2025-01-01

## 2025-01-01 RX ORDER — NAPROXEN SODIUM 220 MG/1
81 TABLET, FILM COATED ORAL DAILY
Status: DISCONTINUED | OUTPATIENT
Start: 2025-06-07 | End: 2025-06-07 | Stop reason: HOSPADM

## 2025-01-01 RX ORDER — DEXTROSE MONOHYDRATE 100 MG/ML
INJECTION, SOLUTION INTRAVENOUS CONTINUOUS
Status: DISCONTINUED | OUTPATIENT
Start: 2025-01-01 | End: 2025-06-07 | Stop reason: HOSPADM

## 2025-01-01 RX ORDER — OXYCODONE HYDROCHLORIDE 5 MG/1
5 TABLET ORAL EVERY 4 HOURS PRN
Status: DISCONTINUED | OUTPATIENT
Start: 2025-01-01 | End: 2025-06-07 | Stop reason: HOSPADM

## 2025-01-01 RX ORDER — SODIUM CHLORIDE 9 MG/ML
INJECTION, SOLUTION INTRAVENOUS ONCE
Status: CANCELLED | OUTPATIENT
Start: 2025-01-01 | End: 2025-01-01

## 2025-01-01 RX ORDER — ENOXAPARIN SODIUM 100 MG/ML
40 INJECTION SUBCUTANEOUS EVERY 24 HOURS
Status: DISCONTINUED | OUTPATIENT
Start: 2025-01-01 | End: 2025-01-01

## 2025-01-01 RX ORDER — MORPHINE SULFATE 4 MG/ML
4 INJECTION, SOLUTION INTRAMUSCULAR; INTRAVENOUS EVERY 4 HOURS PRN
Status: DISCONTINUED | OUTPATIENT
Start: 2025-01-01 | End: 2025-06-07 | Stop reason: HOSPADM

## 2025-01-01 RX ORDER — SODIUM CHLORIDE 0.9 % (FLUSH) 0.9 %
10 SYRINGE (ML) INJECTION
Status: DISCONTINUED | OUTPATIENT
Start: 2025-01-01 | End: 2025-06-07 | Stop reason: HOSPADM

## 2025-01-01 RX ORDER — NOREPINEPHRINE BITARTRATE/D5W 4MG/250ML
0-3 PLASTIC BAG, INJECTION (ML) INTRAVENOUS CONTINUOUS
Status: DISCONTINUED | OUTPATIENT
Start: 2025-01-01 | End: 2025-01-01

## 2025-01-01 RX ORDER — DEXTROSE MONOHYDRATE AND SODIUM CHLORIDE 5; .9 G/100ML; G/100ML
INJECTION, SOLUTION INTRAVENOUS CONTINUOUS
Status: DISCONTINUED | OUTPATIENT
Start: 2025-01-01 | End: 2025-01-01

## 2025-01-01 RX ORDER — ONDANSETRON HYDROCHLORIDE 2 MG/ML
4 INJECTION, SOLUTION INTRAVENOUS EVERY 8 HOURS PRN
Status: DISCONTINUED | OUTPATIENT
Start: 2025-01-01 | End: 2025-06-07 | Stop reason: HOSPADM

## 2025-01-01 RX ORDER — MORPHINE SULFATE 2 MG/ML
2 INJECTION, SOLUTION INTRAMUSCULAR; INTRAVENOUS
Status: COMPLETED | OUTPATIENT
Start: 2025-01-01 | End: 2025-01-01

## 2025-01-01 RX ADMIN — DEXTROSE MONOHYDRATE: 100 INJECTION, SOLUTION INTRAVENOUS at 07:06

## 2025-01-01 RX ADMIN — VANCOMYCIN HYDROCHLORIDE 2000 MG: 2 INJECTION, POWDER, LYOPHILIZED, FOR SOLUTION INTRAVENOUS at 09:06

## 2025-01-01 RX ADMIN — DEXTROSE AND SODIUM CHLORIDE: 5; 900 INJECTION, SOLUTION INTRAVENOUS at 09:06

## 2025-01-01 RX ADMIN — METOPROLOL SUCCINATE 12.5 MG: 25 TABLET, EXTENDED RELEASE ORAL at 08:06

## 2025-01-01 RX ADMIN — NOREPINEPHRINE BITARTRATE 0.3 MCG/KG/MIN: 4 INJECTION, SOLUTION INTRAVENOUS at 07:06

## 2025-01-01 RX ADMIN — DEXTROSE MONOHYDRATE 25 G: 25 INJECTION, SOLUTION INTRAVENOUS at 05:06

## 2025-01-01 RX ADMIN — PIPERACILLIN SODIUM AND TAZOBACTAM SODIUM 4.5 G: 4; .5 INJECTION, POWDER, LYOPHILIZED, FOR SOLUTION INTRAVENOUS at 05:06

## 2025-01-01 RX ADMIN — SODIUM BICARBONATE 50 MEQ: 84 INJECTION, SOLUTION INTRAVENOUS at 08:06

## 2025-01-01 RX ADMIN — MUPIROCIN: 20 OINTMENT TOPICAL at 11:06

## 2025-01-01 RX ADMIN — METOPROLOL SUCCINATE 12.5 MG: 25 TABLET, EXTENDED RELEASE ORAL at 09:06

## 2025-01-01 RX ADMIN — CALCIUM CHLORIDE 1 G: 100 INJECTION INTRAVENOUS; INTRAVENTRICULAR at 08:06

## 2025-01-01 RX ADMIN — EPINEPHRINE 1 MG: 0.1 INJECTION INTRACARDIAC; INTRAVENOUS at 08:06

## 2025-01-01 RX ADMIN — MORPHINE SULFATE 2 MG: 2 INJECTION, SOLUTION INTRAMUSCULAR; INTRAVENOUS at 05:06

## 2025-01-01 RX ADMIN — CALCIUM CHLORIDE 1 G: 100 INJECTION INTRAVENOUS; INTRAVENTRICULAR at 06:06

## 2025-01-01 RX ADMIN — ASPIRIN 81 MG: 81 TABLET, COATED ORAL at 11:06

## 2025-01-01 RX ADMIN — HYDROCORTISONE SODIUM SUCCINATE 100 MG: 100 INJECTION, POWDER, FOR SOLUTION INTRAMUSCULAR; INTRAVENOUS at 08:06

## 2025-01-01 RX ADMIN — EPINEPHRINE 1 MG: 0.1 INJECTION INTRACARDIAC; INTRAVENOUS at 07:06

## 2025-01-01 RX ADMIN — COLLAGENASE SANTYL: 250 OINTMENT TOPICAL at 11:06

## 2025-01-01 RX ADMIN — DEXTROSE AND SODIUM CHLORIDE: 5; 900 INJECTION, SOLUTION INTRAVENOUS at 11:06

## 2025-01-01 RX ADMIN — LACTULOSE 20 G: 20 SOLUTION ORAL at 12:06

## 2025-01-01 RX ADMIN — PIPERACILLIN SODIUM AND TAZOBACTAM SODIUM 4.5 G: 4; .5 INJECTION, POWDER, LYOPHILIZED, FOR SOLUTION INTRAVENOUS at 06:06

## 2025-01-01 RX ADMIN — METOPROLOL SUCCINATE 12.5 MG: 25 TABLET, EXTENDED RELEASE ORAL at 11:06

## 2025-01-01 RX ADMIN — SODIUM BICARBONATE: 84 INJECTION, SOLUTION INTRAVENOUS at 08:06

## 2025-01-01 RX ADMIN — OXYCODONE HYDROCHLORIDE 5 MG: 5 TABLET ORAL at 12:06

## 2025-01-01 RX ADMIN — ATORVASTATIN CALCIUM 40 MG: 40 TABLET, FILM COATED ORAL at 08:06

## 2025-01-01 RX ADMIN — DEXTROSE MONOHYDRATE 25 G: 25 INJECTION, SOLUTION INTRAVENOUS at 07:06

## 2025-01-01 RX ADMIN — SODIUM BICARBONATE 50 MEQ: 84 INJECTION, SOLUTION INTRAVENOUS at 06:06

## 2025-01-01 RX ADMIN — OXYCODONE HYDROCHLORIDE 5 MG: 5 TABLET ORAL at 08:06

## 2025-01-01 RX ADMIN — LEVOTHYROXINE SODIUM 100 MCG: 100 TABLET ORAL at 06:06

## 2025-01-01 RX ADMIN — PIPERACILLIN AND TAZOBACTAM 4.5 G: 4; .5 INJECTION, POWDER, LYOPHILIZED, FOR SOLUTION INTRAVENOUS; PARENTERAL at 06:06

## 2025-01-01 RX ADMIN — SODIUM BICARBONATE 50 MEQ: 84 INJECTION, SOLUTION INTRAVENOUS at 07:06

## 2025-01-01 RX ADMIN — NOREPINEPHRINE BITARTRATE 0.05 MCG/KG/MIN: 4 INJECTION, SOLUTION INTRAVENOUS at 05:06

## 2025-01-01 RX ADMIN — EPINEPHRINE 1 MG: 0.1 INJECTION INTRACARDIAC; INTRAVENOUS at 06:06

## 2025-01-01 RX ADMIN — COLLAGENASE SANTYL: 250 OINTMENT TOPICAL at 09:06

## 2025-01-01 RX ADMIN — DEXTROSE AND SODIUM CHLORIDE: 5; 900 INJECTION, SOLUTION INTRAVENOUS at 06:06

## 2025-01-01 RX ADMIN — DEXTROSE AND SODIUM CHLORIDE 1000 ML: 5; 450 INJECTION, SOLUTION INTRAVENOUS at 05:06

## 2025-01-01 RX ADMIN — FAMOTIDINE 20 MG: 20 TABLET, FILM COATED ORAL at 11:06

## 2025-01-01 RX ADMIN — MIDODRINE HYDROCHLORIDE 10 MG: 5 TABLET ORAL at 12:06

## 2025-01-01 RX ADMIN — Medication 24 G: at 06:06

## 2025-01-01 RX ADMIN — MUPIROCIN: 20 OINTMENT TOPICAL at 09:06

## 2025-01-01 RX ADMIN — SODIUM BICARBONATE 100 MEQ: 84 INJECTION, SOLUTION INTRAVENOUS at 08:06

## 2025-01-01 RX ADMIN — DEXTROSE MONOHYDRATE 25 G: 25 INJECTION, SOLUTION INTRAVENOUS at 04:06

## 2025-01-01 RX ADMIN — PIPERACILLIN SODIUM AND TAZOBACTAM SODIUM 4.5 G: 4; .5 INJECTION, POWDER, LYOPHILIZED, FOR SOLUTION INTRAVENOUS at 08:06

## 2025-01-01 RX ADMIN — MORPHINE SULFATE 4 MG: 4 INJECTION, SOLUTION INTRAMUSCULAR; INTRAVENOUS at 02:06

## 2025-01-01 RX ADMIN — MORPHINE SULFATE 4 MG: 4 INJECTION, SOLUTION INTRAMUSCULAR; INTRAVENOUS at 08:06

## 2025-01-01 RX ADMIN — ASPIRIN 81 MG: 81 TABLET, COATED ORAL at 09:06

## 2025-01-03 ENCOUNTER — EXTERNAL HOME HEALTH (OUTPATIENT)
Dept: HOME HEALTH SERVICES | Facility: HOSPITAL | Age: 45
End: 2025-01-03
Payer: MEDICARE

## 2025-01-03 DIAGNOSIS — R53.1 ASTHENIA: Primary | ICD-10-CM

## 2025-01-07 PROBLEM — G47.00 INSOMNIA: Status: ACTIVE | Noted: 2025-01-07

## 2025-01-08 PROBLEM — K75.89 CHOLESTATIC HEPATITIS: Status: ACTIVE | Noted: 2025-01-08

## 2025-01-10 DIAGNOSIS — R11.0 NAUSEA: ICD-10-CM

## 2025-01-10 RX ORDER — ONDANSETRON 4 MG/1
4 TABLET, ORALLY DISINTEGRATING ORAL EVERY 8 HOURS PRN
Qty: 30 TABLET | Refills: 1 | Status: SHIPPED | OUTPATIENT
Start: 2025-01-10

## 2025-01-15 ENCOUNTER — DOCUMENT SCAN (OUTPATIENT)
Dept: HOME HEALTH SERVICES | Facility: HOSPITAL | Age: 45
End: 2025-01-15
Payer: MEDICARE

## 2025-01-16 ENCOUNTER — DOCUMENT SCAN (OUTPATIENT)
Dept: HOME HEALTH SERVICES | Facility: HOSPITAL | Age: 45
End: 2025-01-16
Payer: MEDICARE

## 2025-01-28 DIAGNOSIS — S81.802D WOUND OF LEFT LOWER EXTREMITY, SUBSEQUENT ENCOUNTER: ICD-10-CM

## 2025-01-28 RX ORDER — OXYCODONE HYDROCHLORIDE 10 MG/1
10 TABLET ORAL EVERY 8 HOURS PRN
Qty: 21 TABLET | Refills: 0 | Status: SHIPPED | OUTPATIENT
Start: 2025-01-28 | End: 2025-01-30 | Stop reason: SDUPTHER

## 2025-02-10 DIAGNOSIS — R10.9 ABDOMINAL PAIN, UNSPECIFIED ABDOMINAL LOCATION: Primary | ICD-10-CM

## 2025-02-25 DIAGNOSIS — R68.89 ABNORMAL ANKLE BRACHIAL INDEX (ABI): Primary | ICD-10-CM

## 2025-04-11 DIAGNOSIS — Z74.09 IMPAIRED MOBILITY: ICD-10-CM

## 2025-04-11 DIAGNOSIS — J06.9 UPPER RESPIRATORY TRACT INFECTION, UNSPECIFIED TYPE: ICD-10-CM

## 2025-04-11 DIAGNOSIS — S81.802D WOUND OF LEFT LOWER EXTREMITY, SUBSEQUENT ENCOUNTER: ICD-10-CM

## 2025-04-11 RX ORDER — PROMETHAZINE HYDROCHLORIDE AND DEXTROMETHORPHAN HYDROBROMIDE 6.25; 15 MG/5ML; MG/5ML
5 SYRUP ORAL EVERY 8 HOURS PRN
Qty: 120 ML | Refills: 0 | Status: SHIPPED | OUTPATIENT
Start: 2025-04-11 | End: 2025-04-21

## 2025-04-11 RX ORDER — AMOXICILLIN AND CLAVULANATE POTASSIUM 500; 125 MG/1; MG/1
1 TABLET, FILM COATED ORAL 2 TIMES DAILY
Qty: 20 TABLET | Refills: 0 | Status: SHIPPED | OUTPATIENT
Start: 2025-04-11

## 2025-04-11 RX ORDER — TRAMADOL HYDROCHLORIDE 50 MG/1
50 TABLET ORAL EVERY 6 HOURS PRN
Qty: 60 TABLET | Refills: 0 | Status: SHIPPED | OUTPATIENT
Start: 2025-04-11

## 2025-04-15 ENCOUNTER — HOSPITAL ENCOUNTER (EMERGENCY)
Facility: HOSPITAL | Age: 45
Discharge: HOME | End: 2025-04-15
Attending: EMERGENCY MEDICINE
Payer: MEDICARE

## 2025-04-15 VITALS
OXYGEN SATURATION: 98 % | HEART RATE: 105 BPM | DIASTOLIC BLOOD PRESSURE: 75 MMHG | HEIGHT: 64 IN | SYSTOLIC BLOOD PRESSURE: 123 MMHG | TEMPERATURE: 97 F | WEIGHT: 215 LBS | BODY MASS INDEX: 36.7 KG/M2 | RESPIRATION RATE: 20 BRPM

## 2025-04-15 DIAGNOSIS — Z51.89 VISIT FOR WOUND CHECK: Primary | ICD-10-CM

## 2025-04-15 LAB
ABSOLUTE EOSINOPHIL (OHS): 0.34 K/UL
ABSOLUTE MONOCYTE (OHS): 1.06 K/UL (ref 0.3–1)
ABSOLUTE NEUTROPHIL COUNT (OHS): 5.02 K/UL (ref 1.8–7.7)
ALBUMIN SERPL BCP-MCNC: 2.7 G/DL (ref 3.5–5.2)
ALP SERPL-CCNC: 209 UNIT/L (ref 40–150)
ALT SERPL W/O P-5'-P-CCNC: <8 UNIT/L (ref 10–44)
ANION GAP (OHS): 12 MMOL/L (ref 8–16)
AST SERPL-CCNC: 13 UNIT/L (ref 11–45)
BASOPHILS # BLD AUTO: 0.12 K/UL
BASOPHILS NFR BLD AUTO: 1.6 %
BILIRUB SERPL-MCNC: 0.7 MG/DL (ref 0.1–1)
BUN SERPL-MCNC: 35 MG/DL (ref 6–20)
CALCIUM SERPL-MCNC: 8.6 MG/DL (ref 8.7–10.5)
CHLORIDE SERPL-SCNC: 97 MMOL/L (ref 95–110)
CO2 SERPL-SCNC: 24 MMOL/L (ref 23–29)
CREAT SERPL-MCNC: 6.5 MG/DL (ref 0.5–1.4)
ERYTHROCYTE [DISTWIDTH] IN BLOOD BY AUTOMATED COUNT: 15.5 % (ref 11.5–14.5)
GFR SERPLBLD CREATININE-BSD FMLA CKD-EPI: 7 ML/MIN/1.73/M2
GLUCOSE SERPL-MCNC: 134 MG/DL (ref 70–110)
HCT VFR BLD AUTO: 36.8 % (ref 37–48.5)
HGB BLD-MCNC: 11.7 GM/DL (ref 12–16)
IMM GRANULOCYTES # BLD AUTO: 0.06 K/UL (ref 0–0.04)
IMM GRANULOCYTES NFR BLD AUTO: 0.8 % (ref 0–0.5)
LYMPHOCYTES # BLD AUTO: 0.8 K/UL (ref 1–4.8)
MCH RBC QN AUTO: 28.5 PG (ref 27–31)
MCHC RBC AUTO-ENTMCNC: 31.8 G/DL (ref 32–36)
MCV RBC AUTO: 90 FL (ref 82–98)
NUCLEATED RBC (/100WBC) (OHS): 0 /100 WBC
PLATELET # BLD AUTO: 307 K/UL (ref 150–450)
PMV BLD AUTO: 9.8 FL (ref 9.2–12.9)
POTASSIUM SERPL-SCNC: 4.1 MMOL/L (ref 3.5–5.1)
PROT SERPL-MCNC: 7.3 GM/DL (ref 6–8.4)
RBC # BLD AUTO: 4.1 M/UL (ref 4–5.4)
RELATIVE EOSINOPHIL (OHS): 4.6 %
RELATIVE LYMPHOCYTE (OHS): 10.8 % (ref 18–48)
RELATIVE MONOCYTE (OHS): 14.3 % (ref 4–15)
RELATIVE NEUTROPHIL (OHS): 67.9 % (ref 38–73)
SODIUM SERPL-SCNC: 133 MMOL/L (ref 136–145)
WBC # BLD AUTO: 7.4 K/UL (ref 3.9–12.7)

## 2025-04-15 PROCEDURE — 84450 TRANSFERASE (AST) (SGOT): CPT | Performed by: EMERGENCY MEDICINE

## 2025-04-15 PROCEDURE — 85025 COMPLETE CBC W/AUTO DIFF WBC: CPT | Performed by: EMERGENCY MEDICINE

## 2025-04-15 PROCEDURE — 36415 COLL VENOUS BLD VENIPUNCTURE: CPT | Performed by: EMERGENCY MEDICINE

## 2025-04-15 PROCEDURE — 99283 EMERGENCY DEPT VISIT LOW MDM: CPT

## 2025-04-15 NOTE — ED PROVIDER NOTES
Encounter Date: 4/15/2025       History     Chief Complaint   Patient presents with    Wound Infection     EMS reports pt from home with a wound on upper left thigh. Had a wound Vac on and it was taken off this past Thursday and pt thinks it's getting infected. Sees Dr Guadalupe for wound care.     45-year-old female with diabetes and hypertension presents to the ER for wound check.  Supposed to have a wound VAC on her left thigh, she states home health took it off on Thursday.  Sent here for wound check.  No fever.  Patient states it looks and feels about the same as it has been.  Minimal drainage      Review of patient's allergies indicates:  No Known Allergies  Past Medical History:   Diagnosis Date    Anemia     Anemia     Breast mass     Chronic constipation     CKD (chronic kidney disease)     Diabetes mellitus     Dialysis patient     MON, TUE, THURS, SAT.    Embolic stroke involving right middle cerebral artery 2024    Encounter for blood transfusion     Erosive gastritis 2016    GERD (gastroesophageal reflux disease)     Gout     High cholesterol     Hypertension     Knee pain     Thyroid disease     Unspecified cataract 2022     Past Surgical History:   Procedure Laterality Date    AV FISTULA PLACEMENT Left 2019    Procedure: creation av fistula;  Surgeon: Buck Fernandez MD;  Location: [facility];  Service: Cardiovascular;  Laterality: Left;  Left Radialcephalic: PACU     SECTION  , , ,     CHOLECYSTECTOMY      COLONOSCOPY N/A 10/23/2017    Procedure: COLONOSCOPY;  Surgeon: Bri Harry MD;  [facility];  Service: Endoscopy;  Laterality: N/A;    EXCISIONAL BIOPSY Left 2022    Procedure: EXCISIONAL BIOPSY;  Surgeon: Mick Juarez MD;  Location: [facility];  Service: General;  Laterality: Left;    INCISION AND DRAINAGE OF ABSCESS Left 2024    Procedure: INCISION AND DRAINAGE, ABSCESS;  Surgeon: Darcy Guadalupe MD;  Location: [facility];   Service: General;  Laterality: Left;    TUBAL LIGATION      UPPER GASTROINTESTINAL ENDOSCOPY  03/31/2016    Erosive Gastritis-Dr Bailey     Family History   Problem Relation Name Age of Onset    Thyroid disease Mother      Diabetes Father      Ovarian cancer Maternal Aunt Malathi     Breast cancer Maternal Aunt Ada      Social History[1]  Review of Systems   Constitutional:  Negative for fever.   HENT:  Negative for sore throat.    Respiratory:  Negative for shortness of breath.    Cardiovascular:  Negative for chest pain.   Gastrointestinal:  Negative for nausea.   Genitourinary:  Negative for dysuria.   Musculoskeletal:  Negative for back pain.   Skin:  Positive for wound. Negative for rash.   Neurological:  Negative for weakness.   Hematological:  Does not bruise/bleed easily.   All other systems reviewed and are negative.      Physical Exam     Initial Vitals [04/15/25 1223]   BP Pulse Resp Temp SpO2   123/75 105 20 96.8 °F (36 °C) 98 %      MAP       --         Physical Exam    Nursing note and vitals reviewed.  Constitutional: She appears well-developed and well-nourished. She is not diaphoretic. No distress.   HENT:   Head: Normocephalic and atraumatic.   Eyes: Conjunctivae and EOM are normal. Pupils are equal, round, and reactive to light.   Neck: Neck supple.   Normal range of motion.  Cardiovascular:  Normal rate, regular rhythm, normal heart sounds and intact distal pulses.           No murmur heard.  Pulmonary/Chest: Breath sounds normal. No respiratory distress. She has no wheezes. She has no rhonchi. She has no rales. She exhibits no tenderness.   Abdominal: Abdomen is soft. Bowel sounds are normal.   Musculoskeletal:         General: No tenderness or edema. Normal range of motion.      Cervical back: Normal range of motion and neck supple.      Comments: Left lateral thigh with wound, packed, no drainage noted, no foul smell.  Induration surrounding the wound that patient says is chronic      Neurological: She is alert and oriented to person, place, and time. She has normal strength. No cranial nerve deficit. GCS score is 15. GCS eye subscore is 4. GCS verbal subscore is 5. GCS motor subscore is 6.   Skin: Skin is warm and dry. Capillary refill takes less than 2 seconds.         ED Course   Procedures  Labs Reviewed   COMPREHENSIVE METABOLIC PANEL - Abnormal       Result Value    Sodium 133 (*)     Potassium 4.1      Chloride 97      CO2 24      Glucose 134 (*)     BUN 35 (*)     Creatinine 6.5 (*)     Calcium 8.6 (*)     Protein Total 7.3      Albumin 2.7 (*)     Bilirubin Total 0.7       (*)     AST 13      ALT <8 (*)     Anion Gap 12      eGFR 7 (*)    CBC WITH DIFFERENTIAL - Abnormal    WBC 7.40      RBC 4.10      HGB 11.7 (*)     HCT 36.8 (*)     MCV 90      MCH 28.5      MCHC 31.8 (*)     RDW 15.5 (*)     Platelet Count 307      MPV 9.8      Nucleated RBC 0      Neut % 67.9      Lymph % 10.8 (*)     Mono % 14.3      Eos % 4.6      Basophil % 1.6      Imm Grans % 0.8 (*)     Neut # 5.02      Lymph # 0.80 (*)     Mono # 1.06 (*)     Eos # 0.34      Baso # 0.12      Imm Grans # 0.06 (*)    CBC W/ AUTO DIFFERENTIAL    Narrative:     The following orders were created for panel order CBC auto differential.  Procedure                               Abnormality         Status                     ---------                               -----------         ------                     CBC with Differential[[id number]]       Abnormal            Final result                 Please view results for these tests on the individual orders.          Imaging Results    None          Medications - No data to display  Medical Decision Making  Amount and/or Complexity of Data Reviewed  Labs: ordered.               ED Course as of 04/15/25 1340   Tue Apr 15, 2025   1338 White blood cell count is 7.4.  No fever.  Renal functions are roughly baseline.  Stable for discharge and follow up [SD]      ED Course User  Index  [SD] Bao Barroso MD               Medical Decision Making:   Differential Diagnosis:   Wound check  ED Management:  Discussed case with  - will check white blood cell count, patient can go home if normal and resume wound VAC             Clinical Impression:  Final diagnoses:  [Z51.89] Visit for wound check (Primary)          ED Disposition Condition    Discharge Stable          ED Prescriptions    None       Follow-up Information       Follow up With Specialties Details Why Contact Info    Darcy Guadalupe MD General Surgery In 1 day  [address] [place] [id number]  [phone]                 [1]   Social History  Tobacco Use    Smoking status: Never     Passive exposure: Never    Smokeless tobacco: Never   Substance Use Topics    Alcohol use: No     Alcohol/week: 0.0 standard drinks of alcohol    Drug use: No        Bao Barroso MD  04/15/25 6545

## 2025-05-21 ENCOUNTER — HOSPITAL ENCOUNTER (OUTPATIENT)
Facility: HOSPITAL | Age: 45
Discharge: HOME HEALTH | End: 2025-05-22
Attending: FAMILY MEDICINE | Admitting: SURGERY
Payer: MEDICARE

## 2025-05-21 ENCOUNTER — ANESTHESIA EVENT (OUTPATIENT)
Dept: SURGERY | Facility: HOSPITAL | Age: 45
End: 2025-05-21
Payer: MEDICARE

## 2025-05-21 ENCOUNTER — ANESTHESIA (OUTPATIENT)
Dept: SURGERY | Facility: HOSPITAL | Age: 45
End: 2025-05-21
Payer: MEDICARE

## 2025-05-21 DIAGNOSIS — L02.416 ABSCESS OF LEFT LEG: ICD-10-CM

## 2025-05-21 DIAGNOSIS — Z99.2 ESRD (END STAGE RENAL DISEASE) ON DIALYSIS: Chronic | ICD-10-CM

## 2025-05-21 DIAGNOSIS — E11.59 HYPERTENSION ASSOCIATED WITH DIABETES: Chronic | ICD-10-CM

## 2025-05-21 DIAGNOSIS — S71.102D OPEN WOUND OF LEFT THIGH, SUBSEQUENT ENCOUNTER: ICD-10-CM

## 2025-05-21 DIAGNOSIS — I15.2 HYPERTENSION ASSOCIATED WITH DIABETES: Chronic | ICD-10-CM

## 2025-05-21 DIAGNOSIS — N18.6 ESRD (END STAGE RENAL DISEASE) ON DIALYSIS: Chronic | ICD-10-CM

## 2025-05-21 DIAGNOSIS — R58 BLEEDING: ICD-10-CM

## 2025-05-21 DIAGNOSIS — T14.8XXA BLEEDING FROM WOUND: Primary | ICD-10-CM

## 2025-05-21 PROBLEM — S71.109A OPEN THIGH WOUND: Status: ACTIVE | Noted: 2025-05-21

## 2025-05-21 LAB
ABSOLUTE EOSINOPHIL (OHS): 0.1 K/UL
ABSOLUTE MONOCYTE (OHS): 1.23 K/UL (ref 0.3–1)
ABSOLUTE NEUTROPHIL COUNT (OHS): 10.33 K/UL (ref 1.8–7.7)
ALBUMIN SERPL BCP-MCNC: 1.8 G/DL (ref 3.5–5.2)
ALP SERPL-CCNC: 168 UNIT/L (ref 40–150)
ALT SERPL W/O P-5'-P-CCNC: <8 UNIT/L (ref 10–44)
ANION GAP (OHS): 13 MMOL/L (ref 8–16)
APTT PPP: 35.5 SECONDS (ref 21–32)
AST SERPL-CCNC: 6 UNIT/L (ref 11–45)
BASOPHILS # BLD AUTO: 0.05 K/UL
BASOPHILS NFR BLD AUTO: 0.4 %
BILIRUB SERPL-MCNC: 0.5 MG/DL (ref 0.1–1)
BUN SERPL-MCNC: 37 MG/DL (ref 6–20)
CALCIUM SERPL-MCNC: 7.8 MG/DL (ref 8.7–10.5)
CHLORIDE SERPL-SCNC: 94 MMOL/L (ref 95–110)
CO2 SERPL-SCNC: 22 MMOL/L (ref 23–29)
CREAT SERPL-MCNC: 5.5 MG/DL (ref 0.5–1.4)
ERYTHROCYTE [DISTWIDTH] IN BLOOD BY AUTOMATED COUNT: 16.3 % (ref 11.5–14.5)
GFR SERPLBLD CREATININE-BSD FMLA CKD-EPI: 9 ML/MIN/1.73/M2
GLUCOSE SERPL-MCNC: 220 MG/DL (ref 70–110)
HCT VFR BLD AUTO: 39.4 % (ref 37–48.5)
HGB BLD-MCNC: 12.7 GM/DL (ref 12–16)
HOLD SPECIMEN: NORMAL
IMM GRANULOCYTES # BLD AUTO: 0.57 K/UL (ref 0–0.04)
IMM GRANULOCYTES NFR BLD AUTO: 4.3 % (ref 0–0.5)
INR PPP: 1.3 (ref 0.8–1.2)
LYMPHOCYTES # BLD AUTO: 0.84 K/UL (ref 1–4.8)
MCH RBC QN AUTO: 27.3 PG (ref 27–31)
MCHC RBC AUTO-ENTMCNC: 32.2 G/DL (ref 32–36)
MCV RBC AUTO: 85 FL (ref 82–98)
NUCLEATED RBC (/100WBC) (OHS): 0 /100 WBC
PLATELET # BLD AUTO: 306 K/UL (ref 150–450)
PMV BLD AUTO: 11.4 FL (ref 9.2–12.9)
POTASSIUM SERPL-SCNC: 4 MMOL/L (ref 3.5–5.1)
PROT SERPL-MCNC: 6.7 GM/DL (ref 6–8.4)
PROTHROMBIN TIME: 14.4 SECONDS (ref 9–12.5)
RBC # BLD AUTO: 4.66 M/UL (ref 4–5.4)
RELATIVE EOSINOPHIL (OHS): 0.8 %
RELATIVE LYMPHOCYTE (OHS): 6.4 % (ref 18–48)
RELATIVE MONOCYTE (OHS): 9.4 % (ref 4–15)
RELATIVE NEUTROPHIL (OHS): 78.7 % (ref 38–73)
SODIUM SERPL-SCNC: 129 MMOL/L (ref 136–145)
WBC # BLD AUTO: 13.12 K/UL (ref 3.9–12.7)

## 2025-05-21 PROCEDURE — 99285 EMERGENCY DEPT VISIT HI MDM: CPT | Mod: 25

## 2025-05-21 PROCEDURE — 85610 PROTHROMBIN TIME: CPT | Performed by: FAMILY MEDICINE

## 2025-05-21 PROCEDURE — 82040 ASSAY OF SERUM ALBUMIN: CPT | Performed by: FAMILY MEDICINE

## 2025-05-21 PROCEDURE — 93005 ELECTROCARDIOGRAM TRACING: CPT

## 2025-05-21 PROCEDURE — 37000008 HC ANESTHESIA 1ST 15 MINUTES: Performed by: SURGERY

## 2025-05-21 PROCEDURE — G0378 HOSPITAL OBSERVATION PER HR: HCPCS

## 2025-05-21 PROCEDURE — 36415 COLL VENOUS BLD VENIPUNCTURE: CPT | Performed by: FAMILY MEDICINE

## 2025-05-21 PROCEDURE — 85025 COMPLETE CBC W/AUTO DIFF WBC: CPT | Performed by: FAMILY MEDICINE

## 2025-05-21 PROCEDURE — 63600175 PHARM REV CODE 636 W HCPCS: Performed by: NURSE ANESTHETIST, CERTIFIED REGISTERED

## 2025-05-21 PROCEDURE — 93010 ELECTROCARDIOGRAM REPORT: CPT | Mod: ,,, | Performed by: INTERNAL MEDICINE

## 2025-05-21 PROCEDURE — 36000706: Performed by: SURGERY

## 2025-05-21 PROCEDURE — 85730 THROMBOPLASTIN TIME PARTIAL: CPT | Performed by: FAMILY MEDICINE

## 2025-05-21 PROCEDURE — 37000009 HC ANESTHESIA EA ADD 15 MINS: Performed by: SURGERY

## 2025-05-21 PROCEDURE — 25000003 PHARM REV CODE 250: Performed by: SURGERY

## 2025-05-21 PROCEDURE — 36000707: Performed by: SURGERY

## 2025-05-21 RX ORDER — HYDROXYZINE HYDROCHLORIDE 25 MG/1
25 TABLET, FILM COATED ORAL 2 TIMES DAILY PRN
Status: DISCONTINUED | OUTPATIENT
Start: 2025-05-21 | End: 2025-05-22 | Stop reason: HOSPADM

## 2025-05-21 RX ORDER — QUETIAPINE FUMARATE 50 MG/1
50 TABLET, FILM COATED ORAL NIGHTLY
Status: DISCONTINUED | OUTPATIENT
Start: 2025-05-21 | End: 2025-05-22 | Stop reason: HOSPADM

## 2025-05-21 RX ORDER — BUPIVACAINE HYDROCHLORIDE AND EPINEPHRINE 5; 5 MG/ML; UG/ML
INJECTION, SOLUTION EPIDURAL; INTRACAUDAL; PERINEURAL
Status: DISCONTINUED | OUTPATIENT
Start: 2025-05-21 | End: 2025-05-21 | Stop reason: HOSPADM

## 2025-05-21 RX ORDER — METOCLOPRAMIDE HYDROCHLORIDE 5 MG/ML
INJECTION INTRAMUSCULAR; INTRAVENOUS
Status: DISCONTINUED | OUTPATIENT
Start: 2025-05-21 | End: 2025-05-21

## 2025-05-21 RX ORDER — SODIUM CHLORIDE 0.9 % (FLUSH) 0.9 %
10 SYRINGE (ML) INJECTION
Status: DISCONTINUED | OUTPATIENT
Start: 2025-05-21 | End: 2025-05-22 | Stop reason: HOSPADM

## 2025-05-21 RX ORDER — SODIUM CHLORIDE 9 MG/ML
INJECTION, SOLUTION INTRAVENOUS CONTINUOUS
Status: DISCONTINUED | OUTPATIENT
Start: 2025-05-21 | End: 2025-05-22 | Stop reason: HOSPADM

## 2025-05-21 RX ORDER — ONDANSETRON 4 MG/1
4 TABLET, ORALLY DISINTEGRATING ORAL EVERY 8 HOURS PRN
Status: DISCONTINUED | OUTPATIENT
Start: 2025-05-21 | End: 2025-05-22 | Stop reason: HOSPADM

## 2025-05-21 RX ORDER — LEVOTHYROXINE SODIUM 100 UG/1
100 TABLET ORAL
Status: DISCONTINUED | OUTPATIENT
Start: 2025-05-22 | End: 2025-05-22 | Stop reason: HOSPADM

## 2025-05-21 RX ORDER — PANTOPRAZOLE SODIUM 40 MG/1
40 TABLET, DELAYED RELEASE ORAL DAILY
Status: DISCONTINUED | OUTPATIENT
Start: 2025-05-22 | End: 2025-05-22 | Stop reason: HOSPADM

## 2025-05-21 RX ORDER — METOCLOPRAMIDE 5 MG/1
5 TABLET ORAL
Status: DISCONTINUED | OUTPATIENT
Start: 2025-05-21 | End: 2025-05-22 | Stop reason: HOSPADM

## 2025-05-21 RX ORDER — TRAMADOL HYDROCHLORIDE 50 MG/1
50 TABLET, FILM COATED ORAL EVERY 6 HOURS PRN
Status: DISCONTINUED | OUTPATIENT
Start: 2025-05-21 | End: 2025-05-22 | Stop reason: HOSPADM

## 2025-05-21 RX ORDER — SODIUM CHLORIDE 9 MG/ML
INJECTION, SOLUTION INTRAVENOUS ONCE
Status: DISCONTINUED | OUTPATIENT
Start: 2025-05-21 | End: 2025-05-22 | Stop reason: HOSPADM

## 2025-05-21 RX ORDER — FENTANYL CITRATE 50 UG/ML
INJECTION, SOLUTION INTRAMUSCULAR; INTRAVENOUS
Status: DISCONTINUED | OUTPATIENT
Start: 2025-05-21 | End: 2025-05-21

## 2025-05-21 RX ORDER — OXYCODONE HYDROCHLORIDE 10 MG/1
10 TABLET ORAL EVERY 6 HOURS PRN
Status: DISCONTINUED | OUTPATIENT
Start: 2025-05-21 | End: 2025-05-22 | Stop reason: HOSPADM

## 2025-05-21 RX ORDER — MUPIROCIN 20 MG/G
OINTMENT TOPICAL 2 TIMES DAILY
Status: DISCONTINUED | OUTPATIENT
Start: 2025-05-21 | End: 2025-05-22 | Stop reason: HOSPADM

## 2025-05-21 RX ORDER — NIFEDIPINE 30 MG/1
60 TABLET, EXTENDED RELEASE ORAL DAILY
Status: DISCONTINUED | OUTPATIENT
Start: 2025-05-22 | End: 2025-05-22 | Stop reason: HOSPADM

## 2025-05-21 RX ORDER — PREDNISONE 20 MG/1
20 TABLET ORAL DAILY
Status: DISCONTINUED | OUTPATIENT
Start: 2025-05-22 | End: 2025-05-22 | Stop reason: HOSPADM

## 2025-05-21 RX ORDER — LOSARTAN POTASSIUM 50 MG/1
100 TABLET ORAL DAILY
Status: DISCONTINUED | OUTPATIENT
Start: 2025-05-22 | End: 2025-05-21 | Stop reason: SDUPTHER

## 2025-05-21 RX ORDER — LABETALOL HYDROCHLORIDE 5 MG/ML
INJECTION, SOLUTION INTRAVENOUS
Status: DISCONTINUED | OUTPATIENT
Start: 2025-05-21 | End: 2025-05-21

## 2025-05-21 RX ORDER — CALCITRIOL 0.25 UG/1
0.25 CAPSULE ORAL DAILY
Status: DISCONTINUED | OUTPATIENT
Start: 2025-05-22 | End: 2025-05-22 | Stop reason: HOSPADM

## 2025-05-21 RX ORDER — HYDROMORPHONE HYDROCHLORIDE 1 MG/ML
1 INJECTION, SOLUTION INTRAMUSCULAR; INTRAVENOUS; SUBCUTANEOUS EVERY 4 HOURS PRN
Status: DISCONTINUED | OUTPATIENT
Start: 2025-05-21 | End: 2025-05-22 | Stop reason: HOSPADM

## 2025-05-21 RX ORDER — TALC
6 POWDER (GRAM) TOPICAL NIGHTLY PRN
Status: DISCONTINUED | OUTPATIENT
Start: 2025-05-21 | End: 2025-05-21

## 2025-05-21 RX ORDER — LOSARTAN POTASSIUM 50 MG/1
100 TABLET ORAL DAILY
Status: DISCONTINUED | OUTPATIENT
Start: 2025-05-22 | End: 2025-05-22 | Stop reason: HOSPADM

## 2025-05-21 RX ORDER — PROPOFOL 10 MG/ML
INJECTION, EMULSION INTRAVENOUS
Status: DISCONTINUED | OUTPATIENT
Start: 2025-05-21 | End: 2025-05-21

## 2025-05-21 RX ORDER — MEDROXYPROGESTERONE ACETATE 10 MG/1
20 TABLET ORAL DAILY
Status: DISCONTINUED | OUTPATIENT
Start: 2025-05-22 | End: 2025-05-22 | Stop reason: HOSPADM

## 2025-05-21 RX ADMIN — PROPOFOL 30 MG: 10 INJECTION, EMULSION INTRAVENOUS at 05:05

## 2025-05-21 RX ADMIN — FENTANYL CITRATE 50 MCG: 50 INJECTION INTRAMUSCULAR; INTRAVENOUS at 05:05

## 2025-05-21 RX ADMIN — PROPOFOL 40 MG: 10 INJECTION, EMULSION INTRAVENOUS at 05:05

## 2025-05-21 RX ADMIN — LABETALOL HYDROCHLORIDE 10 MG: 5 INJECTION INTRAVENOUS at 05:05

## 2025-05-21 RX ADMIN — OXYCODONE HYDROCHLORIDE 10 MG: 10 TABLET ORAL at 08:05

## 2025-05-21 RX ADMIN — SODIUM CHLORIDE: 9 INJECTION, SOLUTION INTRAVENOUS at 08:05

## 2025-05-21 RX ADMIN — METOCLOPRAMIDE 10 MG: 5 INJECTION, SOLUTION INTRAMUSCULAR; INTRAVENOUS at 04:05

## 2025-05-21 RX ADMIN — PROPOFOL 90 MG: 10 INJECTION, EMULSION INTRAVENOUS at 05:05

## 2025-05-21 RX ADMIN — MUPIROCIN: 20 OINTMENT TOPICAL at 08:05

## 2025-05-21 RX ADMIN — METOCLOPRAMIDE 5 MG: 5 TABLET ORAL at 08:05

## 2025-05-21 NOTE — TRANSFER OF CARE
Anesthesia Transfer of Care Note    Patient: Pilar Fernandez    Procedure(s) Performed: Procedure(s) (LRB):  EXPLORATION, WOUND, LOWER EXTREMITY (Left)    Patient location: [facility] Surgical Floor    Anesthesia Type: MAC    Transport from OR: Transported from OR on room air with adequate spontaneous ventilation    Post pain: adequate analgesia    Post assessment: no apparent anesthetic complications    Post vital signs: stable    Level of consciousness: awake, oriented and alert    Nausea/Vomiting: no nausea/vomiting    Complications: none    Transfer of care protocol was followed      Last vitals:     /65  P 90  R 24  O2 Sat 94$ 3L NC

## 2025-05-21 NOTE — ED PROVIDER NOTES
"Encounter Date: 2025       History     Chief Complaint   Patient presents with    Post-op Problem     Pt to ED via EMS - stated that pt had "debridement of abscess to left leg" earlier in the day at [facility] - since then has produced 3 canisters of blood from wound vac - active bleeding upon arrival.      45-year-old female with end-stage renal disease status post I and D of left thigh abscess today by Dr. Baldwin at [facility] presents the ED via EMS with reports of bleeding from operative wound.  Patient had wound VAC placed and per EMS had 3 cannabis sirs of krishan blood.  Dr. Abrasion called notified as she would take patient to OR.  Or staff called out.  Patient denies any paijn        Review of patient's allergies indicates:  No Known Allergies  Past Medical History:   Diagnosis Date    Anemia     Anemia     Breast mass     Chronic constipation     CKD (chronic kidney disease)     Diabetes mellitus     Dialysis patient     MON, TUE, THURS, SAT.    Embolic stroke involving right middle cerebral artery 2024    Encounter for blood transfusion     Erosive gastritis 2016    GERD (gastroesophageal reflux disease)     Gout     High cholesterol     Hypertension     Knee pain     Thyroid disease     Unspecified cataract 2022     Past Surgical History:   Procedure Laterality Date    AV FISTULA PLACEMENT Left 2019    Procedure: creation av fistula;  Surgeon: Buck Fernandez MD;  [facility]: [facility];  Service: Cardiovascular;  Laterality: Left;  Left Radialcephalic: PACU     SECTION  , , ,     CHOLECYSTECTOMY      COLONOSCOPY N/A 10/23/2017    Procedure: COLONOSCOPY;  Surgeon: Bri Harry MD;  Location: [facility];  Service: Endoscopy;  Laterality: N/A;    EXCISIONAL BIOPSY Left 2022    Procedure: EXCISIONAL BIOPSY;  Surgeon: Mick Juarez MD;  Location: [facility];  Service: General;  Laterality: Left;    EXPLORATION, WOUND, LOWER EXTREMITY Left " 5/21/2025    Procedure: EXPLORATION, WOUND, LOWER EXTREMITY;  Surgeon: Darcy Guadalupe MD;  Location: [facility] OR;  Service: General;  Laterality: Left;    INCISION AND DRAINAGE OF ABSCESS Left 12/17/2024    Procedure: INCISION AND DRAINAGE, ABSCESS;  Surgeon: Darcy Guadalupe MD;  Location: [facility] OR;  Service: General;  Laterality: Left;    TUBAL LIGATION      UPPER GASTROINTESTINAL ENDOSCOPY  03/31/2016    Erosive Gastritis-Dr Bailey     Family History   Problem Relation Name Age of Onset    Thyroid disease Mother      Diabetes Father      Ovarian cancer Maternal Aunt Malathi     Breast cancer Maternal Aunt Ada      Social History[1]  Review of Systems   Skin:  Positive for wound.   All other systems reviewed and are negative.      Physical Exam     Initial Vitals   BP Pulse Resp Temp SpO2   05/21/25 1534 05/21/25 1534 05/21/25 1534 05/21/25 1535 05/21/25 1534   139/67 107 20 97.6 °F (36.4 °C) 100 %      MAP       --                Physical Exam    Nursing note and vitals reviewed.  Constitutional: Vital signs are normal. She appears well-developed and well-nourished. She is not diaphoretic.  Non-toxic appearance. She does not have a sickly appearance.   Morbid obesity   HENT:   Head: Normocephalic and atraumatic.   Right Ear: Hearing, tympanic membrane, external ear and ear canal normal.   Left Ear: Tympanic membrane, external ear and ear canal normal. Mouth/Throat: Uvula is midline, oropharynx is clear and moist and mucous membranes are normal.   Eyes: Conjunctivae, EOM and lids are normal. Pupils are equal, round, and reactive to light. Lids are everted and swept, no foreign bodies found.   Neck: Trachea normal and phonation normal. Neck supple.   Normal range of motion.   Full passive range of motion without pain.     Cardiovascular:  Normal rate, regular rhythm, normal heart sounds, intact distal pulses and normal pulses.           Pulmonary/Chest: No respiratory distress. She has no wheezes. She has no  rhonchi. She has no rales. She exhibits no tenderness.   Abdominal: Abdomen is soft. Bowel sounds are normal. There is no abdominal tenderness.   Musculoskeletal:      Cervical back: Normal, full passive range of motion without pain, normal range of motion and neck supple.      Thoracic back: Normal.      Lumbar back: Normal.     Neurological: She is alert and oriented to person, place, and time. She has normal strength. No cranial nerve deficit or sensory deficit.   Skin: Skin is intact.   Patient has 5 cm x 3 cm wound on left thigh.  Mild oozing noted.  No pulsatile blood noted.  Wound dressed with compressive dressing   Psychiatric: She has a normal mood and affect. Her speech is normal and behavior is normal.         ED Course   Critical Care    Date/Time: 5/21/2025 3:34 PM    Performed by: Sanchez Garcia Jr., MD  Authorized by: Sanchez Garcia Jr., MD  Direct patient critical care time: 15 minutes  Additional history critical care time: 5 minutes  Ordering / reviewing critical care time: 5 minutes  Documentation critical care time: 5 minutes  Consulting other physicians critical care time: 15 minutes  Total critical care time (exclusive of procedural time) : 45 minutes  Critical care was necessary to treat or prevent imminent or life-threatening deterioration of the following conditions: cardiac failure, sepsis, metabolic crisis, dehydration, shock, toxidrome, respiratory failure, hepatic failure, circulatory failure, endocrine crisis, renal failure and trauma.  Critical care was time spent personally by me on the following activities: discussions with primary provider, examination of patient, evaluation of patient's response to treatment, ordering and performing treatments and interventions, ordering and review of laboratory studies, obtaining history from patient or surrogate, ordering and review of radiographic studies, pulse oximetry, re-evaluation of patient's condition and review of old  charts.        Labs Reviewed   APTT - Abnormal       Result Value    PTT 35.5 (*)    COMPREHENSIVE METABOLIC PANEL - Abnormal    Sodium 129 (*)     Potassium 4.0      Chloride 94 (*)     CO2 22 (*)     Glucose 220 (*)     BUN 37 (*)     Creatinine 5.5 (*)     Calcium 7.8 (*)     Protein Total 6.7      Albumin 1.8 (*)     Bilirubin Total 0.5       (*)     AST 6 (*)     ALT <8 (*)     Anion Gap 13      eGFR 9 (*)    PROTIME-INR - Abnormal    PT 14.4 (*)     INR 1.3 (*)    CBC WITH DIFFERENTIAL - Abnormal    WBC 13.12 (*)     RBC 4.66      HGB 12.7      HCT 39.4      MCV 85      MCH 27.3      MCHC 32.2      RDW 16.3 (*)     Platelet Count 306      MPV 11.4      Nucleated RBC 0      Neut % 78.7 (*)     Lymph % 6.4 (*)     Mono % 9.4      Eos % 0.8      Basophil % 0.4      Imm Grans % 4.3 (*)     Neut # 10.33 (*)     Lymph # 0.84 (*)     Mono # 1.23 (*)     Eos # 0.10      Baso # 0.05      Imm Grans # 0.57 (*)    CBC W/ AUTO DIFFERENTIAL    Narrative:     The following orders were created for panel order CBC auto differential.  Procedure                               Abnormality         Status                     ---------                               -----------         ------                     CBC with Differential[[id number]]       Abnormal            Final result                 Please view results for these tests on the individual orders.   EXTRA TUBES    Narrative:     The following orders were created for panel order EXTRA TUBES.  Procedure                               Abnormality         Status                     ---------                               -----------         ------                     Lavender Top Hold[[id number]]                               Final result                 Please view results for these tests on the individual orders.   LAVENDER TOP HOLD    Extra Tube Hold       EKG Readings: (Independently Interpreted)   Rhythm: Sinus Tachycardia. Heart Rate: 107. Ectopy: No Ectopy.  Conduction: Normal. ST Segments: Normal ST Segments. Clinical Impression: Sinus Tachycardia     ECG Results              EKG 12-lead (Final result)        Collection Time Result Time QRS Duration OHS QTC Calculation    05/21/25 15:36:24 05/22/25 07:02:17 80 485                     Final result by Interface, Lab In [facility] (05/22/25 07:02:25)                   Narrative:    Test Reason : R58,    Vent. Rate : 107 BPM     Atrial Rate : 107 BPM     P-R Int : 142 ms          QRS Dur :  80 ms      QT Int : 364 ms       P-R-T Axes :  44  91   0 degrees    QTcB Int : 485 ms    Sinus tachycardia  Rightward axis  Low voltage QRS  Nonspecific T wave abnormality Abnormal R wave progression  Cannot rule out Anterior infarct ,age undetermined  Abnormal ECG  When compared with ECG of 30-Nov-2024 00:06,  Aberrant conduction is no longer Present  Vent. rate has increased by  39 bpm  Minimal criteria for Anterior infarct are now Present  Nonspecific T wave abnormality now evident in Lateral leads  Confirmed by Hemal Henderson ([id number]) on 5/22/2025 7:02:16 AM    Referred By: AAAREFERRAL SELF           Confirmed By: Hemal Henderson                                  Imaging Results    None          Medications   iohexoL (OMNIPAQUE 350) injection 100 mL (100 mLs Intravenous Given 5/22/25 1330)     Medical Decision Making  Amount and/or Complexity of Data Reviewed  Labs: ordered.                          Medical Decision Making:   Differential Diagnosis:   Postoperative bleeding, bleeding disorder, venous bleeding  ED Management:  Dr. Baldwin reports she will take patient to operating room             Clinical Impression:  Final diagnoses:  [T14.8XXA] Bleeding from wound (Primary)  [L02.416] Abscess of left leg  [R58] Bleeding          ED Disposition Condition    Observation                     Sanchez Garcia Jr., MD  05/21/25 9725         [1]   Social History  Tobacco Use    Smoking status: Never     Passive exposure: Never    Smokeless  tobacco: Never   Substance Use Topics    Alcohol use: No     Alcohol/week: 0.0 standard drinks of alcohol    Drug use: No        Sanchez Garcia Jr., MD  05/28/25 4737

## 2025-05-21 NOTE — ED NOTES
Contacted Tigre with Dialysis, he previously spoke with Dr Wild and the plan is to Dialyze pt in the morning bu  will review Labs and make a decision, since pt was not dialyzed today due to her surgery at [facility].

## 2025-05-21 NOTE — ANESTHESIA PREPROCEDURE EVALUATION
05/21/2025  Pilar Fernandez is a 45 y.o., female.      Pre-op Assessment    I have reviewed the Patient Summary Reports.     I have reviewed the Nursing Notes. I have reviewed the NPO Status.   I have reviewed the Medications.     Review of Systems  Anesthesia Hx:  No problems with previous Anesthesia   History of prior surgery of interest to airway management or planning:          Denies Family Hx of Anesthesia complications.    Denies Personal Hx of Anesthesia complications.                    Social:  Non-Smoker       Hematology/Oncology:       -- Anemia:                                  Cardiovascular:     Hypertension       CHF   PVD                                Renal/:  Chronic Renal Disease, Dialysis                Hepatic/GI:   PUD,  GERD                Neurological:   CVA, no residual symptoms                                    Endocrine:  Diabetes Hypothyroidism            Physical Exam  General: Well nourished, Cooperative and Alert    Airway:  Mallampati: III     Dental:      Anesthesia Plan  Type of Anesthesia, risks & benefits discussed:    Anesthesia Type: MAC  Intra-op Monitoring Plan: Standard ASA Monitors  Post Op Pain Control Plan: multimodal analgesia  Induction:  IV  Informed Consent: Informed consent signed with the Patient and all parties understand the risks and agree with anesthesia plan.  All questions answered.   ASA Score: 4 Emergent  Day of Surgery Review of History & Physical: H&P Update referred to the surgeon/provider.    Ready For Surgery From Anesthesia Perspective.   .

## 2025-05-21 NOTE — H&P
[facility] - Emergency Department  General Surgery  History & Physical    Patient Name: Pilar Fernandez  MRN: [id number]  Admission Date: 5/21/2025  Attending Physician: Darcy Guadalupe MD   Primary Care Provider: Debra Fong MD    Patient information was obtained from patient, spouse/SO, past medical records, and ER records.     Subjective:     Chief Complaint/Reason for Admission: postop bleeding    History of Present Illness:  Patient is a 45 y.o. female presents with bleeding after debridement this AM.   called about bleeding after vac removal.  Sent to ER    No current facility-administered medications on file prior to encounter.     Current Outpatient Medications on File Prior to Encounter   Medication Sig    ACCU-CHEK GUIDE TEST STRIPS Strp USE 1 TEST STRIP TWICE DAILY AS DIRECTED TO CHECK CBG    ACCU-CHEK SOFTCLIX LANCETS Misc Apply 1 each topically 2 (two) times daily.    acetaminophen-codeine 300-30mg (TYLENOL #3) 300-30 mg Tab Take 1 tablet by mouth every 6 (six) hours.    ALPRAZolam (XANAX) 0.5 MG tablet Take 0.5 mg by mouth. For 30 days    amoxicillin-clavulanate 500-125mg (AUGMENTIN) 500-125 mg Tab Take 1 tablet (500 mg total) by mouth 2 (two) times daily.    aspirin (ECOTRIN) 81 MG EC tablet Take 1 tablet (81 mg total) by mouth once daily.    atorvastatin (LIPITOR) 40 MG tablet Take 1 tablet by mouth once daily    calcitRIOL (ROCALTROL) 0.25 MCG Cap Take 1 capsule (0.25 mcg total) by mouth once daily.    cetirizine (ZYRTEC) 5 MG tablet Take 5 mg by mouth.    famotidine (PEPCID) 20 MG tablet Take 0.5 tablets (10 mg total) by mouth daily as needed for Heartburn. (Patient not taking: Reported on 1/17/2025)    hydrOXYzine HCL (ATARAX) 25 MG tablet Take 1 tablet (25 mg total) by mouth 2 (two) times daily as needed for Itching or Anxiety.    insulin aspart U-100 (NOVOLOG FLEXPEN U-100 INSULIN) 100 unit/mL (3 mL) InPn pen Inject 10 Units into the skin 2 (two) times daily with meals.     lactulose (CHRONULAC) 10 gram/15 mL solution Take 30 mLs (20 g total) by mouth 2 (two) times daily.    levoFLOXacin (LEVAQUIN) 500 MG tablet Take 1 tablet (500 mg total) by mouth once daily.    linaGLIPtin (TRADJENTA) 5 mg Tab tablet TAKE 1 TABLET (5 MG TOTAL) BY MOUTH ONCE DAILY.    LINZESS 145 mcg Cap capsule TAKE 2 CAPSULES BY MOUTH ONCE DAILY BEFORE BREAKFAST    loperamide (IMODIUM) 2 mg capsule Take by mouth.    losartan (COZAAR) 100 MG tablet Take 1 tablet (100 mg total) by mouth once daily.    magnesium oxide (MAG-OX) 400 mg (241.3 mg magnesium) tablet Take 1 tablet by mouth. (Patient not taking: Reported on 1/17/2025)    medroxyPROGESTERone (PROVERA) 10 MG tablet Take 2 tablets (20 mg total) by mouth once daily.    methoxy peg-epoetin beta (MIRCERA INJ) Inject 50 mcg into the skin.    metoclopramide HCl (REGLAN) 5 MG tablet Take 5 mg by mouth 4 (four) times daily before meals and nightly.    miconazole (MICOTIN) 2 % cream Apply topically 2 (two) times daily. Between thighs, under abdomen pannus, under breasts    nebivoloL (BYSTOLIC) 20 mg Tab Take 1 tablet by mouth once daily. (Patient not taking: Reported on 1/17/2025)    NIFEdipine (PROCARDIA-XL) 60 MG (OSM) 24 hr tablet Take 1 tablet (60 mg total) by mouth once daily.    olmesartan (BENICAR) 40 MG tablet olmesartan 40 mg tablet, [RxNorm: 097548]    ondansetron (ZOFRAN-ODT) 4 MG TbDL Take 1 tablet (4 mg total) by mouth every 8 (eight) hours as needed ([place]).    oxyCODONE (ROXICODONE) 10 mg Tab immediate release tablet Take 1 tablet (10 mg total) by mouth every 8 (eight) hours as needed for Pain.    pantoprazole (PROTONIX) 40 MG tablet Take 1 tablet (40 mg total) by mouth once daily.    predniSONE (DELTASONE) 20 MG tablet Take 1 tablet (20 mg total) by mouth once daily.    promethazine (PHENERGAN) 25 MG tablet Take 1 tablet (25 mg total) by mouth 3 (three) times daily as needed for Nausea.    SEROQUEL 50 mg tablet Take 1 tablet (50 mg total) by mouth  nightly.    sevelamer carbonate (RENVELA) 800 mg Tab     SYNTHROID 100 mcg tablet Take 1 tablet (100 mcg total) by mouth before breakfast.    traMADoL (ULTRAM) 50 mg tablet Take 1 tablet (50 mg total) by mouth every 6 (six) hours as needed for Pain.    triamcinolone acetonide 0.1% (KENALOG) 0.1 % ointment Apply topically 2 (two) times daily.    ursodioL (ACTIGALL) 300 mg capsule Take 1 capsule (300 mg total) by mouth 3 (three) times daily.       Review of patient's allergies indicates:  No Known Allergies    Past Medical History:   Diagnosis Date    Anemia     Anemia     Breast mass     Chronic constipation     CKD (chronic kidney disease)     Diabetes mellitus     Dialysis patient     MON, TUE, THXIN, SAT.    Embolic stroke involving right middle cerebral artery 2024    Encounter for blood transfusion     Erosive gastritis 2016    GERD (gastroesophageal reflux disease)     Gout     High cholesterol     Hypertension     Knee pain     Thyroid disease     Unspecified cataract 2022     Past Surgical History:   Procedure Laterality Date    AV FISTULA PLACEMENT Left 2019    Procedure: creation av fistula;  Surgeon: Buck Fernandez MD;  Location: [facility];  Service: Cardiovascular;  Laterality: Left;  Left Radialcephalic: PACU     SECTION  , , ,     CHOLECYSTECTOMY      COLONOSCOPY N/A 10/23/2017    Procedure: COLONOSCOPY;  Surgeon: Bri Harry MD;  Location: [facility];  Service: Endoscopy;  Laterality: N/A;    EXCISIONAL BIOPSY Left 2022    Procedure: EXCISIONAL BIOPSY;  Surgeon: Mick Juarez MD;  Location: [facility];  Service: General;  Laterality: Left;    INCISION AND DRAINAGE OF ABSCESS Left 2024    Procedure: INCISION AND DRAINAGE, ABSCESS;  Surgeon: Darcy Guadalupe MD;  Location: [facility] OR;  Service: General;  Laterality: Left;    TUBAL LIGATION      UPPER GASTROINTESTINAL ENDOSCOPY  2016    Erosive Gastritis-Dr Bailey     Family History        Problem Relation (Age of Onset)    Breast cancer Maternal Aunt    Diabetes Father    Ovarian cancer Maternal Aunt    Thyroid disease Mother          Tobacco Use    Smoking status: Never     Passive exposure: Never    Smokeless tobacco: Never   Substance and Sexual Activity    Alcohol use: No     Alcohol/week: 0.0 standard drinks of alcohol    Drug use: No    Sexual activity: Yes     Partners: Male     Birth control/protection: None, Surgical     Review of Systems   Constitutional:  Positive for activity change and fatigue. Negative for fever.   HENT:  Negative for trouble swallowing and voice change.    Respiratory:  Negative for shortness of breath and wheezing.    Cardiovascular:  Positive for leg swelling. Negative for chest pain.   Gastrointestinal:  Negative for abdominal distention and abdominal pain.   Musculoskeletal:  Positive for arthralgias, gait problem, joint swelling and myalgias.   Neurological:  Negative for dizziness, syncope and light-headedness.   Psychiatric/Behavioral:  Negative for confusion and decreased concentration. The patient is not nervous/anxious and is not hyperactive.    All other systems reviewed and are negative.    Objective:     Vital Signs (Most Recent):  Temp: 97.6 °F (36.4 °C) (05/21/25 1535)  Pulse: 107 (05/21/25 1617)  Resp: 18 (05/21/25 1617)  BP: (!) 178/114 (05/21/25 1617)  SpO2: 99 % (05/21/25 1617) Vital Signs (24h Range):  Temp:  [97.6 °F (36.4 °C)] 97.6 °F (36.4 °C)  Pulse:  [107] 107  Resp:  [18-20] 18  SpO2:  [96 %-100 %] 99 %  BP: (139-178)/() 178/114     Weight: (!) 140.6 kg (310 lb)  Body mass index is 53.21 kg/m².    Physical Exam  Vitals and nursing note reviewed.   Constitutional:       General: She is not in acute distress.     Appearance: She is obese. She is ill-appearing.   HENT:      Head: Normocephalic and atraumatic.      Nose: Nose normal.   Eyes:      Extraocular Movements: Extraocular movements intact.      Conjunctiva/sclera: Conjunctivae  normal.      Pupils: Pupils are equal, round, and reactive to light.   Cardiovascular:      Rate and Rhythm: Normal rate and regular rhythm.      Pulses: Normal pulses.      Heart sounds: Normal heart sounds.   Pulmonary:      Effort: Pulmonary effort is normal. No respiratory distress.      Breath sounds: Normal breath sounds. No wheezing, rhonchi or rales.   Abdominal:      General: Bowel sounds are normal. There is no distension.      Palpations: Abdomen is soft.      Tenderness: There is no abdominal tenderness. There is no guarding or rebound.   Musculoskeletal:         General: Swelling (4+ peripheral edema) present.      Cervical back: Normal range of motion and neck supple.   Skin:     Comments: Dressing to left thigh   Neurological:      General: No focal deficit present.      Mental Status: She is alert and oriented to person, place, and time.   Psychiatric:         Mood and Affect: Mood normal.         Behavior: Behavior normal.         Thought Content: Thought content normal.         Judgment: Judgment normal.         Significant Labs:  I have reviewed all pertinent lab results within the past 24 hours.  CBC:   Recent Labs   Lab 05/21/25  1613   WBC 13.12*   RBC 4.66   HGB 12.7   HCT 39.4      MCV 85   MCH 27.3   MCHC 32.2     BMP:   Recent Labs   Lab 05/21/25  1628   *   *   K 4.0   CL 94*   CO2 22*   BUN 37*   CREATININE 5.5*   CALCIUM 7.8*     CMP:   Recent Labs   Lab 05/21/25  1628   *   CALCIUM 7.8*   ALBUMIN 1.8*   PROT 6.7   *   K 4.0   CO2 22*   CL 94*   BUN 37*   CREATININE 5.5*   ALKPHOS 168*   ALT <8*   AST 6*   BILITOT 0.5       Significant Diagnostics:  I have reviewed all pertinent imaging results/findings within the past 24 hours.    Assessment/Plan:     Active Diagnoses:    Diagnosis Date Noted POA    PRINCIPAL PROBLEM:  Bleeding from wound [T14.8XXA] 05/21/2025 Yes    Open thigh wound [S71.109A] 05/21/2025 Yes      Problems Resolved During this Admission:    OR for assessment and control  VTE Risk Mitigation (From admission, onward)      None            Darcy Guadalupe MD  General Surgery  [facility] - Emergency Department

## 2025-05-21 NOTE — ED NOTES
Dr. Guadalupe called to speak to ED physician prior to arrival - stating that pt will be taken to OR upon arrival. Post procedure at [facility] earlier today. House Supervisor calling out on-call surgery crew.

## 2025-05-22 VITALS
TEMPERATURE: 99 F | HEIGHT: 64 IN | DIASTOLIC BLOOD PRESSURE: 70 MMHG | OXYGEN SATURATION: 97 % | HEART RATE: 105 BPM | BODY MASS INDEX: 50.02 KG/M2 | RESPIRATION RATE: 20 BRPM | WEIGHT: 293 LBS | SYSTOLIC BLOOD PRESSURE: 153 MMHG

## 2025-05-22 LAB
ANION GAP (OHS): 15 MMOL/L (ref 8–16)
BUN SERPL-MCNC: 39 MG/DL (ref 6–20)
CALCIUM SERPL-MCNC: 7.7 MG/DL (ref 8.7–10.5)
CHLORIDE SERPL-SCNC: 94 MMOL/L (ref 95–110)
CO2 SERPL-SCNC: 20 MMOL/L (ref 23–29)
CREAT SERPL-MCNC: 5.5 MG/DL (ref 0.5–1.4)
ERYTHROCYTE [DISTWIDTH] IN BLOOD BY AUTOMATED COUNT: 15.8 % (ref 11.5–14.5)
GFR SERPLBLD CREATININE-BSD FMLA CKD-EPI: 9 ML/MIN/1.73/M2
GLUCOSE SERPL-MCNC: 146 MG/DL (ref 70–110)
HCT VFR BLD AUTO: 31.2 % (ref 37–48.5)
HGB BLD-MCNC: 10.2 GM/DL (ref 12–16)
MAGNESIUM SERPL-MCNC: 1.6 MG/DL (ref 1.6–2.6)
MCH RBC QN AUTO: 27.3 PG (ref 27–31)
MCHC RBC AUTO-ENTMCNC: 32.7 G/DL (ref 32–36)
MCV RBC AUTO: 84 FL (ref 82–98)
OHS QRS DURATION: 80 MS
OHS QTC CALCULATION: 485 MS
PHOSPHATE SERPL-MCNC: 5.8 MG/DL (ref 2.7–4.5)
PLATELET # BLD AUTO: 358 K/UL (ref 150–450)
PMV BLD AUTO: 11 FL (ref 9.2–12.9)
POTASSIUM SERPL-SCNC: 5.8 MMOL/L (ref 3.5–5.1)
RBC # BLD AUTO: 3.73 M/UL (ref 4–5.4)
SODIUM SERPL-SCNC: 129 MMOL/L (ref 136–145)
WBC # BLD AUTO: 13.74 K/UL (ref 3.9–12.7)

## 2025-05-22 PROCEDURE — 94799 UNLISTED PULMONARY SVC/PX: CPT

## 2025-05-22 PROCEDURE — 83735 ASSAY OF MAGNESIUM: CPT | Performed by: SURGERY

## 2025-05-22 PROCEDURE — G0378 HOSPITAL OBSERVATION PER HR: HCPCS

## 2025-05-22 PROCEDURE — 80048 BASIC METABOLIC PNL TOTAL CA: CPT | Performed by: SURGERY

## 2025-05-22 PROCEDURE — 99900031 HC PATIENT EDUCATION (STAT)

## 2025-05-22 PROCEDURE — 85027 COMPLETE CBC AUTOMATED: CPT | Performed by: SURGERY

## 2025-05-22 PROCEDURE — 94761 N-INVAS EAR/PLS OXIMETRY MLT: CPT

## 2025-05-22 PROCEDURE — 25000003 PHARM REV CODE 250: Performed by: SURGERY

## 2025-05-22 PROCEDURE — 84100 ASSAY OF PHOSPHORUS: CPT | Performed by: SURGERY

## 2025-05-22 PROCEDURE — 36415 COLL VENOUS BLD VENIPUNCTURE: CPT | Performed by: SURGERY

## 2025-05-22 PROCEDURE — 25500020 PHARM REV CODE 255: Performed by: SURGERY

## 2025-05-22 PROCEDURE — G0257 UNSCHED DIALYSIS ESRD PT HOS: HCPCS

## 2025-05-22 PROCEDURE — 63600175 PHARM REV CODE 636 W HCPCS: Performed by: SURGERY

## 2025-05-22 PROCEDURE — 99900035 HC TECH TIME PER 15 MIN (STAT)

## 2025-05-22 PROCEDURE — 27000221 HC OXYGEN, UP TO 24 HOURS

## 2025-05-22 RX ORDER — FAMOTIDINE 20 MG/1
10 TABLET, FILM COATED ORAL 2 TIMES DAILY
Qty: 60 TABLET | Refills: 3 | Status: SHIPPED | OUTPATIENT
Start: 2025-05-22 | End: 2026-05-22

## 2025-05-22 RX ADMIN — OXYCODONE HYDROCHLORIDE 10 MG: 10 TABLET ORAL at 03:05

## 2025-05-22 RX ADMIN — MEDROXYPROGESTERONE ACETATE 20 MG: 10 TABLET ORAL at 10:05

## 2025-05-22 RX ADMIN — LEVOTHYROXINE SODIUM 100 MCG: 100 TABLET ORAL at 06:05

## 2025-05-22 RX ADMIN — METOCLOPRAMIDE 5 MG: 5 TABLET ORAL at 01:05

## 2025-05-22 RX ADMIN — IOHEXOL 100 ML: 350 INJECTION, SOLUTION INTRAVENOUS at 01:05

## 2025-05-22 RX ADMIN — NIFEDIPINE 60 MG: 30 TABLET, FILM COATED, EXTENDED RELEASE ORAL at 10:05

## 2025-05-22 RX ADMIN — HYDROMORPHONE HYDROCHLORIDE 1 MG: 1 INJECTION, SOLUTION INTRAMUSCULAR; INTRAVENOUS; SUBCUTANEOUS at 05:05

## 2025-05-22 RX ADMIN — MUPIROCIN: 20 OINTMENT TOPICAL at 10:05

## 2025-05-22 RX ADMIN — PANTOPRAZOLE SODIUM 40 MG: 40 TABLET, DELAYED RELEASE ORAL at 10:05

## 2025-05-22 RX ADMIN — METOCLOPRAMIDE 5 MG: 5 TABLET ORAL at 06:05

## 2025-05-22 RX ADMIN — CALCITRIOL 0.25 MCG: 0.25 CAPSULE, LIQUID FILLED ORAL at 09:05

## 2025-05-22 RX ADMIN — HYDROMORPHONE HYDROCHLORIDE 1 MG: 1 INJECTION, SOLUTION INTRAMUSCULAR; INTRAVENOUS; SUBCUTANEOUS at 01:05

## 2025-05-22 RX ADMIN — LOSARTAN POTASSIUM 100 MG: 50 TABLET, FILM COATED ORAL at 10:05

## 2025-05-22 RX ADMIN — OXYCODONE HYDROCHLORIDE 10 MG: 10 TABLET ORAL at 09:05

## 2025-05-22 RX ADMIN — LACTULOSE 20 G: 20 SOLUTION ORAL at 10:05

## 2025-05-22 RX ADMIN — PREDNISONE 20 MG: 20 TABLET ORAL at 10:05

## 2025-05-22 RX ADMIN — HYDROMORPHONE HYDROCHLORIDE 1 MG: 1 INJECTION, SOLUTION INTRAMUSCULAR; INTRAVENOUS; SUBCUTANEOUS at 09:05

## 2025-05-22 NOTE — PLAN OF CARE
[facility]  Discharge Final Note    Primary Care Provider: Debra Fong MD    Expected Discharge Date: 5/22/2025    Final Discharge Note (most recent)       Final Note - 05/22/25 1520          Final Note    Assessment Type Final Discharge Note     Anticipated Discharge Disposition [place] [facility]     Hospital Resources/Appts/Education Provided Appointments scheduled and added to AVS        Post-Acute Status    Post-Acute Authorization Home Health     Home Health Status Set-up Complete/Auth obtained     Discharge Delays None known at this time                 Final note is completed. The patient will be discharging home with family, and she will resume home health. Evon with Nursing Care is aware.

## 2025-05-22 NOTE — DISCHARGE INSTRUCTIONS
Our goal at [facility] is to always give you outstanding care and exceptional service. You may receive a survey from [employer] by mail, text or e-mail in the next 24-48 hours asking about the care you received with us. The survey should only take 5-10 minutes to complete and is very important to us.     Your feedback provides us with a way to recognize our staff who work tirelessly to provide the best care! Also, your responses help us learn how to improve when your experience was below our aspiration of excellence. We are always looking for ways to improve your stay. We WILL use your feedback to continue making improvements to help us provide the highest quality care. We keep your personal information and feedback confidential. We appreciate your time completing this survey and can't wait to hear from you!!!    We look forward to your continued care with us! Thanks so much for choosing [facility] for your healthcare needs!

## 2025-05-22 NOTE — SUBJECTIVE & OBJECTIVE
Interval History: No acute issues overnight.  No recurrent bleeding.  Underwent dialysis this AM.    Medications:  Continuous Infusions:   0.9% NaCl   Intravenous Continuous 125 mL/hr at 05/21/25 2028 New Bag at 05/21/25 2028     Scheduled Meds:   0.9% NaCl   Intravenous Once    calcitRIOL  0.25 mcg Oral Daily    lactulose  20 g Oral BID    levothyroxine  100 mcg Oral Before breakfast    linaCLOtide  290 mcg Oral Before breakfast    losartan  100 mg Oral Daily    medroxyPROGESTERone  20 mg Oral Daily    metoclopramide HCl  5 mg Oral QID (AC & HS)    mupirocin   Nasal BID    NIFEdipine  60 mg Oral Daily    pantoprazole  40 mg Oral Daily    predniSONE  20 mg Oral Daily    QUEtiapine  50 mg Oral Nightly     PRN Meds:  Current Facility-Administered Medications:     HYDROmorphone, 1 mg, Intravenous, Q4H PRN    hydrOXYzine HCL, 25 mg, Oral, BID PRN    ondansetron, 4 mg, Oral, Q8H PRN    oxyCODONE, 10 mg, Oral, Q6H PRN    sodium chloride 0.9%, 250 mL, Intravenous, PRN    sodium chloride 0.9%, 10 mL, Intravenous, PRN    traMADoL, 50 mg, Oral, Q6H PRN     Review of patient's allergies indicates:  No Known Allergies  Objective:     Vital Signs (Most Recent):  Temp: 99.1 °F (37.3 °C) (05/22/25 1158)  Pulse: 105 (05/22/25 1300)  Resp: 20 (05/22/25 1322)  BP: (!) 153/70 (05/22/25 1158)  SpO2: 97 % (05/22/25 1300) Vital Signs (24h Range):  Temp:  [97.5 °F (36.4 °C)-99.1 °F (37.3 °C)] 99.1 °F (37.3 °C)  Pulse:  [] 105  Resp:  [9-20] 20  SpO2:  [95 %-100 %] 97 %  BP: (139-178)/() 153/70     Weight: (!) 140.6 kg (310 lb)  Body mass index is 53.21 kg/m².    Intake/Output - Last 3 Shifts         05/20 0700 05/21 0659 05/21 0700 05/22 0659 05/22 0700  05/23 0659    P.O.  400 240    Other   400    Total Intake(mL/kg)  400 (2.8) 640 (4.6)    Other   3400    Total Output   3400    Net  +400 -2760                    Physical Exam  Vitals and nursing note reviewed.   Constitutional:       General: She is not in acute  distress.     Appearance: She is obese. She is ill-appearing.   Eyes:      Extraocular Movements: Extraocular movements intact.      Conjunctiva/sclera: Conjunctivae normal.      Pupils: Pupils are equal, round, and reactive to light.   Cardiovascular:      Rate and Rhythm: Regular rhythm. Tachycardia present.   Pulmonary:      Effort: Pulmonary effort is normal. No respiratory distress.      Breath sounds: Rales present. No wheezing.   Abdominal:      General: There is distension.      Tenderness: There is abdominal tenderness (along pannus with significant edema and peau d'orange changes). There is no guarding or rebound.   Musculoskeletal:      Cervical back: Normal range of motion and neck supple.   Skin:     Comments: No bleeding.  No purulence upon removal of packing.  Vac applied after tunneling packed with dry gauze.     Neurological:      Mental Status: She is alert and oriented to person, place, and time. Mental status is at baseline.   Psychiatric:         Mood and Affect: Mood normal.         Thought Content: Thought content normal.         Judgment: Judgment normal.          Significant Labs:  I have reviewed all pertinent lab results within the past 24 hours.  CBC:   Recent Labs   Lab 05/22/25  0545   WBC 13.74*   RBC 3.73*   HGB 10.2*   HCT 31.2*      MCV 84   MCH 27.3   MCHC 32.7     BMP:   Recent Labs   Lab 05/22/25  0545   *   *   K 5.8*   CL 94*   CO2 20*   BUN 39*   CREATININE 5.5*   CALCIUM 7.7*   MG 1.6     CMP:   Recent Labs   Lab 05/21/25  1628 05/22/25  0545   * 146*   CALCIUM 7.8* 7.7*   ALBUMIN 1.8*  --    PROT 6.7  --    * 129*   K 4.0 5.8*   CO2 22* 20*   CL 94* 94*   BUN 37* 39*   CREATININE 5.5* 5.5*   ALKPHOS 168*  --    ALT <8*  --    AST 6*  --    BILITOT 0.5  --        Significant Diagnostics:  I have reviewed all pertinent imaging results/findings within the past 24 hours.  CT: I have reviewed all pertinent results/findings within the past 24 hours  and my personal findings are:  pending

## 2025-05-22 NOTE — PLAN OF CARE
Plan of care reviewed and ongoing with patient and spouse at the bedside. 22 gauge IV to R FA, 3L NC. Bed change completed due to weeping from L leg, skin fold dry sheet moisture pad applied to bilateral groin, wound noted to L ankle. Up to BSC with 2 person assist, pt did not tolerate well. PRN medication administered with relief offered. Call light and personal items within reach of patient.       Problem: Adult Inpatient Plan of Care  Goal: Plan of Care Review  Outcome: Not Progressing  Goal: Patient-Specific Goal (Individualized)  Outcome: Not Progressing  Goal: Absence of Hospital-Acquired Illness or Injury  Outcome: Not Progressing  Goal: Optimal Comfort and Wellbeing  Outcome: Not Progressing  Goal: Readiness for Transition of Care  Outcome: Not Progressing     Problem: Bariatric Environmental Safety  Goal: Safety Maintained with Care  Outcome: Not Progressing     Problem: Hemodialysis  Goal: Safe, Effective Therapy Delivery  Outcome: Not Progressing  Goal: Effective Tissue Perfusion  Outcome: Not Progressing  Goal: Absence of Infection Signs and Symptoms  Outcome: Not Progressing     Problem: Diabetes Comorbidity  Goal: Blood Glucose Level Within Targeted Range  Outcome: Not Progressing     Problem: Wound  Goal: Optimal Coping  Outcome: Not Progressing  Goal: Optimal Functional Ability  Outcome: Not Progressing  Goal: Absence of Infection Signs and Symptoms  Outcome: Not Progressing  Goal: Improved Oral Intake  Outcome: Not Progressing  Goal: Optimal Pain Control and Function  Outcome: Not Progressing  Goal: Skin Health and Integrity  Outcome: Not Progressing  Goal: Optimal Wound Healing  Outcome: Not Progressing     Problem: Fall Injury Risk  Goal: Absence of Fall and Fall-Related Injury  Outcome: Not Progressing

## 2025-05-22 NOTE — PROGRESS NOTES
[facility]  General Surgery  Progress Note    Subjective:     History of Present Illness:  No notes on file    Post-Op Info:  Procedure(s) (LRB):  EXPLORATION, WOUND, LOWER EXTREMITY (Left)   1 Day Post-Op     Interval History: No acute issues overnight.  No recurrent bleeding.  Underwent dialysis this AM.    Medications:  Continuous Infusions:   0.9% NaCl   Intravenous Continuous 125 mL/hr at 05/21/25 2028 New Bag at 05/21/25 2028     Scheduled Meds:   0.9% NaCl   Intravenous Once    calcitRIOL  0.25 mcg Oral Daily    lactulose  20 g Oral BID    levothyroxine  100 mcg Oral Before breakfast    linaCLOtide  290 mcg Oral Before breakfast    losartan  100 mg Oral Daily    medroxyPROGESTERone  20 mg Oral Daily    metoclopramide HCl  5 mg Oral QID (AC & HS)    mupirocin   Nasal BID    NIFEdipine  60 mg Oral Daily    pantoprazole  40 mg Oral Daily    predniSONE  20 mg Oral Daily    QUEtiapine  50 mg Oral Nightly     PRN Meds:  Current Facility-Administered Medications:     HYDROmorphone, 1 mg, Intravenous, Q4H PRN    hydrOXYzine HCL, 25 mg, Oral, BID PRN    ondansetron, 4 mg, Oral, Q8H PRN    oxyCODONE, 10 mg, Oral, Q6H PRN    sodium chloride 0.9%, 250 mL, Intravenous, PRN    sodium chloride 0.9%, 10 mL, Intravenous, PRN    traMADoL, 50 mg, Oral, Q6H PRN     Review of patient's allergies indicates:  No Known Allergies  Objective:     Vital Signs (Most Recent):  Temp: 99.1 °F (37.3 °C) (05/22/25 1158)  Pulse: 105 (05/22/25 1300)  Resp: 20 (05/22/25 1322)  BP: (!) 153/70 (05/22/25 1158)  SpO2: 97 % (05/22/25 1300) Vital Signs (24h Range):  Temp:  [97.5 °F (36.4 °C)-99.1 °F (37.3 °C)] 99.1 °F (37.3 °C)  Pulse:  [] 105  Resp:  [9-20] 20  SpO2:  [95 %-100 %] 97 %  BP: (139-178)/() 153/70     Weight: (!) 140.6 kg (310 lb)  Body mass index is 53.21 kg/m².    Intake/Output - Last 3 Shifts         05/20 0700 05/21 0659 05/21 0700 05/22 0659 05/22 0700 05/23 0659    P.O.  400 240    Other   400    Total  Intake(mL/kg)  400 (2.8) 640 (4.6)    Other   3400    Total Output   3400    Net  +400 -2760                    Physical Exam  Vitals and nursing note reviewed.   Constitutional:       General: She is not in acute distress.     Appearance: She is obese. She is ill-appearing.   Eyes:      Extraocular Movements: Extraocular movements intact.      Conjunctiva/sclera: Conjunctivae normal.      Pupils: Pupils are equal, round, and reactive to light.   Cardiovascular:      Rate and Rhythm: Regular rhythm. Tachycardia present.   Pulmonary:      Effort: Pulmonary effort is normal. No respiratory distress.      Breath sounds: Rales present. No wheezing.   Abdominal:      General: There is distension.      Tenderness: There is abdominal tenderness (along pannus with significant edema and peau d'orange changes). There is no guarding or rebound.   Musculoskeletal:      Cervical back: Normal range of motion and neck supple.   Skin:     Comments: No bleeding.  No purulence upon removal of packing.  Vac applied after tunneling packed with dry gauze.     Neurological:      Mental Status: She is alert and oriented to person, place, and time. Mental status is at baseline.   Psychiatric:         Mood and Affect: Mood normal.         Thought Content: Thought content normal.         Judgment: Judgment normal.          Significant Labs:  I have reviewed all pertinent lab results within the past 24 hours.  CBC:   Recent Labs   Lab 05/22/25  0545   WBC 13.74*   RBC 3.73*   HGB 10.2*   HCT 31.2*      MCV 84   MCH 27.3   MCHC 32.7     BMP:   Recent Labs   Lab 05/22/25  0545   *   *   K 5.8*   CL 94*   CO2 20*   BUN 39*   CREATININE 5.5*   CALCIUM 7.7*   MG 1.6     CMP:   Recent Labs   Lab 05/21/25  1628 05/22/25  0545   * 146*   CALCIUM 7.8* 7.7*   ALBUMIN 1.8*  --    PROT 6.7  --    * 129*   K 4.0 5.8*   CO2 22* 20*   CL 94* 94*   BUN 37* 39*   CREATININE 5.5* 5.5*   ALKPHOS 168*  --    ALT <8*  --    AST 6*   --    BILITOT 0.5  --        Significant Diagnostics:  I have reviewed all pertinent imaging results/findings within the past 24 hours.  CT: I have reviewed all pertinent results/findings within the past 24 hours and my personal findings are:  pending  Assessment/Plan:     * Bleeding from wound  No recurrent bleeding      Open thigh wound  Vac replaced    Lymphedema  Need CTA to plan treatment    ESRD (end stage renal disease) on dialysis  Dialysis per [facility]    Peripheral vascular disease  CTA pending        Darcy Guadalupe MD  General Surgery  [facility]

## 2025-05-22 NOTE — ANESTHESIA POSTPROCEDURE EVALUATION
Anesthesia Post Evaluation    Patient: Pilar Fernandez    Procedure(s) Performed: Procedure(s) (LRB):  EXPLORATION, WOUND, LOWER EXTREMITY (Left)    Final Anesthesia Type: MAC      Patient location during evaluation: med/surg floor  Patient participation: Yes- Able to Participate  Level of consciousness: awake and alert and oriented  Post-procedure vital signs: reviewed and stable  Pain management: adequate  Airway patency: patent    PONV status at discharge: No PONV  Anesthetic complications: no      Cardiovascular status: blood pressure returned to baseline  Respiratory status: spontaneous ventilation, room air and unassisted  Hydration status: euvolemic  Follow-up not needed.              Vitals Value Taken Time   /70 05/22/25 07:44   Temp 36.4 °C (97.5 °F) 05/22/25 07:44   Pulse 109 05/22/25 07:44   Resp 18 05/22/25 07:44   SpO2 99 % 05/22/25 07:44         No case tracking events are documented in the log.      Pain/Vannesa Score: Pain Rating Prior to Med Admin: 10 (5/22/2025  5:37 AM)  Pain Rating Post Med Admin: 4 (5/22/2025  6:07 AM)

## 2025-05-22 NOTE — DISCHARGE SUMMARY
[facility]  Discharge Note  Short Stay    Procedure(s) (LRB):  EXPLORATION, WOUND, LOWER EXTREMITY (Left)      OUTCOME: Condition has improved and patient is now ready for discharge.    DISPOSITION: Home-Health Care [facility]    FINAL DIAGNOSIS:  Bleeding from wound    FOLLOWUP: In clinic    DISCHARGE INSTRUCTIONS:    Discharge Procedure Orders   Diet renal     Notify your health care provider if you experience any of the following:  temperature >100.4     Notify your health care provider if you experience any of the following:  persistent nausea and vomiting or diarrhea     Notify your health care provider if you experience any of the following:  severe uncontrolled pain     Notify your health care provider if you experience any of the following:  redness, tenderness, or signs of infection (pain, swelling, redness, odor or green/yellow discharge around incision site)     Notify your health care provider if you experience any of the following:  difficulty breathing or increased cough     Notify your health care provider if you experience any of the following:  worsening rash     Notify your health care provider if you experience any of the following:  persistent dizziness, light-headedness, or visual disturbances     Notify your health care provider if you experience any of the following:  increased confusion or weakness     Change dressing (specify)   Order Comments: Per home health instructions     SUBSEQUENT HOME HEALTH ORDERS   Order Comments: Vac changes Tues, Thursday and Saturday.  Cleanse with NS.  Pack with rolled gauze in tunneling and undermining and black sponge central wound.  Alternatively, dry rolled gauze daily if vac problems  Unna boots to bilateral LE twice weekly.  CTA reveals 3 vessel runoff     Order Specific Question Answer Comments   What Home Health Agency is the patient currently using? Other/External      Tube Feedings/Formulas   Order Comments: TID     Order Specific Question Answer  Comments   Select Adult Formula: Other Nepro   Route: By mouth      Activity as tolerated        TIME SPENT ON DISCHARGE: 20 minutes

## 2025-05-22 NOTE — PLAN OF CARE
05/22/25 1223   CORRALES Message   Medicare Outpatient and Observation Notification regarding financial responsibility Given to patient/caregiver;Explained to patient/caregiver;Signed/date by patient/caregiver   Date CORRALES was signed 05/22/25   Time CORRALES was signed 1225

## 2025-05-22 NOTE — PLAN OF CARE
[facility]  Initial Discharge Assessment       Primary Care Provider: Debra Fong MD    Admission Diagnosis: Bleeding [R58]  Abscess of left leg [L02.416]  Bleeding from wound [T14.8XXA]    Admission Date: 5/21/2025  Expected Discharge Date: 5/22/2025    Transition of Care Barriers: None    Payor: [facility] MCARE / Plan: [facility] DUAL COMPLETE HMO SNP / Product Type: Medicare Advantage /     Extended Emergency Contact Information  Primary Emergency Contact: Roberto Sharma  Mobile Phone: [phone]  Relation: Significant other  Secondary Emergency Contact: Mile Welch  Mobile Phone: [phone]  Relation: Mother    Discharge Plan A: Home with family, Home Health  Discharge Plan B: Home with family, Home Health      [facility] [address] [place] [address] - [address] [facility]  [address] [facility] [place] [address]  Phone: [phone] Fax: [phone]    [facility] - [place], [place] - [address]  [address]  [place] [address]  Phone: [phone] Fax: [phone]      Initial Assessment (most recent)       Adult Discharge Assessment - 05/22/25 1030          Discharge Assessment    Assessment Type Discharge Planning Assessment     Confirmed/corrected address, phone number and insurance Yes     Confirmed Demographics Correct on Facesheet     Source of Information health record;patient;family     People in Home significant other     Name(s) of People in Home Roberto (Significant other)  [phone] (Mobile)     Do you expect to return to your current living situation? Yes     Do you have help at home or someone to help you manage your care at home? Yes     Who are your caregiver(s) and their phone number(s)? Amadou  [phone]     Prior to hospitilization cognitive status: Alert/Oriented     Current cognitive status: Alert/Oriented     Walking or Climbing Stairs Difficulty yes     Walking or Climbing Stairs ambulation difficulty, requires  "equipment;ambulation difficulty, assistance 1 person;stair climbing difficulty, assistance 1 person     Mobility Management wheelchair and rolling   Patient reports she has a rollator if needed    Dressing/Bathing Difficulty yes     Dressing/Bathing bathing difficulty, requires equipment;bathing difficulty, assistance 1 person;dressing difficulty, assistance 1 person     Dressing/Bathing Management shower chair     Home Layout Able to live on 1st floor     Equipment Currently Used at Home oxygen;shower chair;wound care supplies     Readmission within 30 days? No     Patient currently being followed by outpatient case management? Unable to determine (comments)     Do you currently have service(s) that help you manage your care at home? Yes     Name and Contact number of agency [facility]     Is the pt/caregiver preference to resume services with current agency Yes     Do you take prescription medications? Yes     Do you have prescription coverage? Yes     Do you have any problems affording any of your prescribed medications? TBD     Is the patient taking medications as prescribed? yes     How do you get to doctors appointments? family or friend will provide     Are you on dialysis? No     Do you take coumadin? No     Discharge Plan A Home with family;Home Health     Discharge Plan B Home with family;Home Health     DME Needed Upon Discharge  other (see comments)   TBD    Discharge Plan discussed with: Patient     Transition of Care Barriers None        Physical Activity    On average, how many days per week do you engage in moderate to strenuous exercise (like a brisk walk)? 7 days   "I exercise my legs."    On average, how many minutes do you engage in exercise at this level? 10 min        Financial Resource Strain    How hard is it for you to pay for the very basics like food, housing, medical care, and heating? Not very hard        Housing Stability    In the last 12 months, was there a time when you were not " able to pay the mortgage or rent on time? No     At any time in the past 12 months, were you homeless or living in a shelter (including now)? No        Transportation Needs    In the past 12 months, has lack of transportation kept you from medical appointments or from getting medications? No     In the past 12 months, has lack of transportation kept you from meetings, work, or from getting things needed for daily living? No        Food Insecurity    Within the past 12 months, you worried that your food would run out before you got the money to buy more. Never true     Within the past 12 months, the food you bought just didn't last and you didn't have money to get more. Never true        Stress    Do you feel stress - tense, restless, nervous, or anxious, or unable to sleep at night because your mind is troubled all the time - these days? Not at all        Social Isolation    How often do you feel lonely or isolated from those around you?  Never        Utilities    In the past 12 months has the electric, gas, oil, or water company threatened to shut off services in your home? No        Health Literacy    How often do you need to have someone help you when you read instructions, pamphlets, or other written material from your doctor or pharmacy? Never                 Discharge assessment completed with the patient. The patient's family was present at her bedside, and she gave me permission to complete the assessment while they were present.The patient is from home. She plans to return home with her family upon discharge. She requires durable medical equipment and home health for her care. She reports that she does have assistance from her sister who is also her caregiver through the state. The patient plans to resume home health with [facility].     Discharge planning checklist given to the patient/family with instructions to contact  for any needs.  SW will follow as needed.

## 2025-05-22 NOTE — OP NOTE
[facility]  Surgery Department  Operative Note    SUMMARY     Date of Procedure: 5/21/2025     Procedure: Procedure(s) (LRB):  EXPLORATION, WOUND, LOWER EXTREMITY (Left)     Surgeons and Role:     * Darcy Guadalupe MD - Primary    Assisting Surgeon: None    Pre-Operative Diagnosis: Bleeding from wound [T14.8XXA]    Post-Operative Diagnosis: Post-Op Diagnosis Codes:     * Bleeding from wound [T14.8XXA]    Anesthesia: Local MAC-  Propofol 320mg, Fentanyl 100mcg, Labetolol 10mg    Operative Findings (including complications, if any): dilated veins at anterior mid skin edge and inferior corner of wound, ligated with suture    Description of Technical Procedures: After informed consent was obtained including the risks of bleeding, pain, infection, scar, death and need for repeat operation, the patient agreed to the procedure.  She was taken to OR B on her gurney.  The wound was exposed then prepped with betadine and draped in a sterile fashion.  Time out was performed.  Patient was previously given Vancomycin on the AM surgery and it has not been dialyzed out of her system yet.  The wound was explored and 2 sites of venous ooze identified.  The sites were made hemostatic with a figure of eight stitch of 0-vicryl times two.  Once hemostatic, the undermining and tuneling were packed with dry rolled gauze.  The central wound was packed with black sponge and secured with adhesive.  Suction seal noted.  Patient awoken and recovered without incident. Findings discussed with family.    Intravenous Fluids: 60mL    Estimated Blood Loss (EBL): * 10mL           Implants: * No implants in log *    Specimens:   Specimen (24h ago, onward)      None                  Condition: Good    Disposition: Recovery unit stable    Attestation: Op Note Attestation: I performed the procedure.

## 2025-05-23 ENCOUNTER — HOSPITAL ENCOUNTER (EMERGENCY)
Facility: HOSPITAL | Age: 45
Discharge: HOME | End: 2025-05-23
Attending: EMERGENCY MEDICINE
Payer: MEDICARE

## 2025-05-23 VITALS
SYSTOLIC BLOOD PRESSURE: 164 MMHG | DIASTOLIC BLOOD PRESSURE: 73 MMHG | WEIGHT: 272.5 LBS | TEMPERATURE: 98 F | HEIGHT: 64 IN | BODY MASS INDEX: 46.52 KG/M2 | HEART RATE: 100 BPM | OXYGEN SATURATION: 94 %

## 2025-05-23 DIAGNOSIS — Z46.89 ENCOUNTER FOR MANAGEMENT OF WOUND VAC: Primary | ICD-10-CM

## 2025-05-23 PROCEDURE — 99283 EMERGENCY DEPT VISIT LOW MDM: CPT

## 2025-05-23 NOTE — ED PROVIDER NOTES
Encounter Date: 2025       History     Chief Complaint   Patient presents with    wound vac     Pt to ER to see about her wound vac.  The machine keeps beeping, pt called home health nurse to come and check it and they advised patient to go to the ER.     45-year-old female presents to the ER via EMS to check her wound VAC.  Has a wound VAC to her left lateral thigh.  Suction is not working.  No active bleeding      Review of patient's allergies indicates:  No Known Allergies  Past Medical History:   Diagnosis Date    Anemia     Anemia     Breast mass     Chronic constipation     CKD (chronic kidney disease)     Diabetes mellitus     Dialysis patient     MON, TUE, THURS, SAT.    Embolic stroke involving right middle cerebral artery 2024    Encounter for blood transfusion     Erosive gastritis 2016    GERD (gastroesophageal reflux disease)     Gout     High cholesterol     Hypertension     Knee pain     Thyroid disease     Unspecified cataract 2022     Past Surgical History:   Procedure Laterality Date    AV FISTULA PLACEMENT Left 2019    Procedure: creation av fistula;  Surgeon: Buck Fernandez MD;  Location: [facility];  Service: Cardiovascular;  Laterality: Left;  Left Radialcephalic: PACU     SECTION  , , ,     CHOLECYSTECTOMY      COLONOSCOPY N/A 10/23/2017    Procedure: COLONOSCOPY;  Surgeon: Bri Harry MD;  Location: [facility];  Service: Endoscopy;  Laterality: N/A;    EXCISIONAL BIOPSY Left 2022    Procedure: EXCISIONAL BIOPSY;  Surgeon: Mick Juarez MD;  Location: [facility];  Service: General;  Laterality: Left;    EXPLORATION, WOUND, LOWER EXTREMITY Left 2025    Procedure: EXPLORATION, WOUND, LOWER EXTREMITY;  Surgeon: Darcy Guadalupe MD;  Location: [facility] OR;  Service: General;  Laterality: Left;    INCISION AND DRAINAGE OF ABSCESS Left 2024    Procedure: INCISION AND DRAINAGE, ABSCESS;  Surgeon: Darcy Guadalupe MD;  Location:  [facility] OR;  Service: General;  Laterality: Left;    TUBAL LIGATION      UPPER GASTROINTESTINAL ENDOSCOPY  03/31/2016    Erosive Gastritis-Dr Bailey     Family History   Problem Relation Name Age of Onset    Thyroid disease Mother      Diabetes Father      Ovarian cancer Maternal Aunt Malathi     Breast cancer Maternal Aunt Ada      Social History[1]  Review of Systems   Constitutional:  Negative for fever.   HENT:  Negative for sore throat.    Respiratory:  Negative for shortness of breath.    Cardiovascular:  Negative for chest pain.   Gastrointestinal:  Negative for nausea.   Genitourinary:  Negative for dysuria.   Musculoskeletal:  Negative for back pain.   Skin:  Positive for wound. Negative for rash.   Neurological:  Negative for weakness.   Hematological:  Does not bruise/bleed easily.   All other systems reviewed and are negative.      Physical Exam     Initial Vitals   BP Pulse Resp Temp SpO2   05/23/25 1110 05/23/25 1110 -- 05/23/25 1106 05/23/25 1110   (!) 164/73 100  97.9 °F (36.6 °C) (!) 94 %      MAP       --                Physical Exam    Nursing note and vitals reviewed.  Constitutional: She appears well-developed and well-nourished. She is not diaphoretic. No distress.   Obese female   HENT:   Head: Normocephalic and atraumatic.   Eyes: Conjunctivae and EOM are normal. Pupils are equal, round, and reactive to light.   Neck: Neck supple.   Normal range of motion.  Cardiovascular:  Normal rate, regular rhythm, normal heart sounds and intact distal pulses.           No murmur heard.  Pulmonary/Chest: Breath sounds normal. No respiratory distress. She has no wheezes. She has no rhonchi. She has no rales. She exhibits no tenderness.   Abdominal: Abdomen is soft. Bowel sounds are normal.   Musculoskeletal:         General: Edema present. No tenderness. Normal range of motion.      Cervical back: Normal range of motion and neck supple.      Comments: Wound to lateral thigh with wound VAC in place, serous  fluid leaking from wound site     Neurological: She is alert and oriented to person, place, and time. She has normal strength. No cranial nerve deficit. GCS score is 15. GCS eye subscore is 4. GCS verbal subscore is 5. GCS motor subscore is 6.   Skin: Skin is warm and dry. Capillary refill takes less than 2 seconds.         ED Course   Procedures  Labs Reviewed - No data to display       Imaging Results    None          Medications - No data to display  Medical Decision Making             ED Course as of 05/23/25 1144   Fri May 23, 2025   1143 Dr. Guadalupe here, dressing reinforced, wound VAC working well.  Stable for discharge [SD]      ED Course User Index  [SD] Bao Barroso MD               Medical Decision Making:   Differential Diagnosis:   Wound VAC malfunction             Clinical Impression:  Final diagnoses:  [Z46.89] Encounter for management of wound VAC (Primary)          ED Disposition Condition    Discharge Stable          ED Prescriptions    None       Follow-up Information       Follow up With Specialties Details Why Contact Info    Darcy Guadalupe MD General Surgery  As needed [address] [place] [id number]  [phone]                     [1]   Social History  Tobacco Use    Smoking status: Never     Passive exposure: Never    Smokeless tobacco: Never   Substance Use Topics    Alcohol use: No     Alcohol/week: 0.0 standard drinks of alcohol    Drug use: No        Bao Barroso MD  05/23/25 114

## 2025-06-03 NOTE — ED PROVIDER NOTES
Encounter Date: 6/3/2025       History     Chief Complaint   Patient presents with    Altered Mental Status     Pt in by EMS with AMS and low glucose.  Pt has multiple wounds including wound vac.  Pt also with BP 80s/40s. Initial glucose 39, 30 g PO glucose tabs given. 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  Family reports that she was nonresponsive.  EMS states that original CBG was 30 they gave her D50 EN route to ED. when she came in ED she was alert and oriented but continues to have hyperglycemia.  Patient reports she went to dialysis yesterday but has not eaten food or taking medications.  Her PCP is .         Review of patient's allergies indicates:  No Known Allergies  Past Medical History:   Diagnosis Date    Anemia     Anemia     Breast mass     Chronic constipation     CKD (chronic kidney disease)     Diabetes mellitus     Dialysis patient     MON, TUE, THURS, SAT.    Embolic stroke involving right middle cerebral artery 2024    Encounter for blood transfusion     Erosive gastritis 2016    GERD (gastroesophageal reflux disease)     Gout     High cholesterol     Hypertension     Knee pain     Thyroid disease     Unspecified cataract 2022     Past Surgical History:   Procedure Laterality Date    AV FISTULA PLACEMENT Left 2019    Procedure: creation av fistula;  Surgeon: Buck Fernandez MD;  Location: [facility];  Service: Cardiovascular;  Laterality: Left;  Left Radialcephalic: PACU     SECTION  , , ,     CHOLECYSTECTOMY      COLONOSCOPY N/A 10/23/2017    Procedure: COLONOSCOPY;  Surgeon: Bri Harry MD;  Location: [facility];  Service: Endoscopy;  Laterality: N/A;    EXCISIONAL BIOPSY Left 2022    Procedure: EXCISIONAL BIOPSY;  Surgeon: Mick Juarez MD;   Location: [facility] OR;  Service: General;  Laterality: Left;    EXPLORATION, WOUND, LOWER EXTREMITY Left 5/21/2025    Procedure: EXPLORATION, WOUND, LOWER EXTREMITY;  Surgeon: Darcy Guadalupe MD;  Location: [facility] OR;  Service: General;  Laterality: Left;    INCISION AND DRAINAGE OF ABSCESS Left 12/17/2024    Procedure: INCISION AND DRAINAGE, ABSCESS;  Surgeon: Darcy Guadalupe MD;  Location: [facility] OR;  Service: General;  Laterality: Left;    TUBAL LIGATION      UPPER GASTROINTESTINAL ENDOSCOPY  03/31/2016    Erosive Gastritis-Dr Bailey     Family History   Problem Relation Name Age of Onset    Thyroid disease Mother      Diabetes Father      Ovarian cancer Maternal Aunt Malathi     Breast cancer Maternal Aunt Ada      Social History[1]  Review of Systems   Constitutional:  Positive for fatigue.   All other systems reviewed and are negative.      Physical Exam     Initial Vitals   BP Pulse Resp Temp SpO2   06/03/25 1706 06/03/25 1640 06/03/25 1640 06/03/25 1706 06/03/25 1659   (!) 143/67 97 15 97.2 °F (36.2 °C) 95 %      MAP       --                Physical Exam    Nursing note and vitals reviewed.  Constitutional: Vital signs are normal. She appears well-developed and well-nourished. She is not diaphoretic.  Non-toxic appearance. She does not have a sickly appearance.   HENT:   Head: Normocephalic and atraumatic.   Right Ear: Hearing, tympanic membrane, external ear and ear canal normal.   Left Ear: Tympanic membrane, external ear and ear canal normal. Mouth/Throat: Uvula is midline, oropharynx is clear and moist and mucous membranes are normal.   Eyes: Conjunctivae, EOM and lids are normal. Pupils are equal, round, and reactive to light. Lids are everted and swept, no foreign bodies found.   Neck: Trachea normal and phonation normal. Neck supple. No thyromegaly present. No tracheal deviation present. No JVD present.   Normal range of motion.   Full passive range of motion without pain.     Cardiovascular:  Normal  rate, regular rhythm, normal heart sounds, intact distal pulses and normal pulses.           Pulmonary/Chest: No stridor. No respiratory distress. She has no wheezes. She has no rhonchi. She has no rales. She exhibits no tenderness.   Abdominal: Abdomen is soft. Bowel sounds are normal. She exhibits no distension and no mass. There is no abdominal tenderness. There is no rebound and no guarding.   Musculoskeletal:      Cervical back: Normal, full passive range of motion without pain, normal range of motion and neck supple.      Thoracic back: Normal.      Lumbar back: Normal.     Lymphadenopathy:     She has no cervical adenopathy.   Neurological: She is alert and oriented to person, place, and time. She has normal strength. No cranial nerve deficit or sensory deficit.   Skin: Skin is intact.   Chronic nonhealing ulcers to bilateral lower extremities.  No noted discharge.   Psychiatric: She has a normal mood and affect. Her speech is normal and behavior is normal.         ED Course   Critical Care    Date/Time: 6/3/2025 6:06 PM    Performed by: Sanchez Garcia Jr., MD  Authorized by: Sanchez Garcia Jr., MD  Direct patient critical care time: 15 minutes  Additional history critical care time: 15 minutes  Ordering / reviewing critical care time: 15 minutes  Documentation critical care time: 15 minutes  Consulting other physicians critical care time: 15 minutes  Total critical care time (exclusive of procedural time) : 75 minutes  Critical care was necessary to treat or prevent imminent or life-threatening deterioration of the following conditions: cardiac failure, dehydration, trauma, sepsis, metabolic crisis, circulatory failure, endocrine crisis, shock, renal failure, toxidrome, respiratory failure, hepatic failure and CNS failure or compromise.  Critical care was time spent personally by me on the following activities: discussions with consultants, discussions with primary provider, ordering and review of  radiographic studies, ordering and review of laboratory studies, ordering and performing treatments and interventions, obtaining history from patient or surrogate, examination of patient, evaluation of patient's response to treatment, pulse oximetry, review of old charts and re-evaluation of patient's condition.        Labs Reviewed   COMPREHENSIVE METABOLIC PANEL - Abnormal       Result Value    Sodium 131 (*)     Potassium 5.2 (*)     Chloride 95      CO2 22 (*)     Glucose 22 (*)     BUN 48 (*)     Creatinine 5.9 (*)     Calcium 7.9 (*)     Protein Total 5.8 (*)     Albumin 1.5 (*)     Bilirubin Total 1.0       (*)     AST 24      ALT <8 (*)     Anion Gap 14      eGFR 8 (*)    CBC WITH DIFFERENTIAL - Abnormal    WBC 19.60 (*)     RBC 3.96 (*)     HGB 10.5 (*)     HCT 31.8 (*)     MCV 80 (*)     MCH 26.5 (*)     MCHC 33.0      RDW 17.0 (*)     Platelet Count 342      MPV 10.9      Nucleated RBC 0      Neut % 87.5 (*)     Lymph % 2.6 (*)     Mono % 4.3      Eos % 0.7      Basophil % 0.2      Imm Grans % 4.7 (*)     Neut # 17.14 (*)     Lymph # 0.50 (*)     Mono # 0.85      Eos # 0.14      Baso # 0.04      Imm Grans # 0.93 (*)    POCT GLUCOSE - Abnormal    POCT Glucose 24 (*)    POCT GLUCOSE - Abnormal    POCT Glucose 55 (*)    LACTIC ACID, PLASMA - Normal    Lactic Acid Level 1.1      Narrative:     Falsely low lactic acid results can be found in samples containing >=13.0 mg/dL total bilirubin and/or >=3.5 mg/dL direct bilirubin.    CULTURE, BLOOD   CULTURE, BLOOD   CBC W/ AUTO DIFFERENTIAL    Narrative:     The following orders were created for panel order CBC auto differential.  Procedure                               Abnormality         Status                     ---------                               -----------         ------                     CBC with Differential[[id number]]       Abnormal            Final result                 Please view results for these tests on the individual orders.   LACTIC  ACID, PLASMA   URINALYSIS, REFLEX TO URINE CULTURE   POCT GLUCOSE MONITORING CONTINUOUS   POCT GLUCOSE MONITORING CONTINUOUS          Imaging Results              X-Ray Chest AP Portable (Preliminary result)  Result time 06/03/25 17:36:52      Wet Read by Sanchez Garcia Jr., MD (06/03/25 17:36:52, [facility], Emergency Medicine)    No acute abnormality                                     Medications   piperacillin-tazobactam (ZOSYN) 4.5 g in D5W 100 mL IVPB (MB+) (has no administration in time range)   dextrose 50 % in water (D50W) injection 25 g (25 g Intravenous Given 6/3/25 1653)   dextrose 5 % and 0.45 % NaCl infusion (1,000 mLs Intravenous New Bag 6/3/25 1705)   morphine injection 2 mg (2 mg Intravenous Given 6/3/25 1712)   dextrose 50 % in water (D50W) injection 25 g (25 g Intravenous Given 6/3/25 1736)     Medical Decision Making  Amount and/or Complexity of Data Reviewed  Labs: ordered.  Radiology: ordered and independent interpretation performed.    Risk  Prescription drug management.               ED Course as of 06/03/25 1807 Tue Jun 03, 2025   1748 Patient continues to be hyperglycemic. [BM]      ED Course User Index  [BM] Sanchez Garcia Jr., MD               Medical Decision Making:   Differential Diagnosis:   Sepsis, hypoglycemia, drug abuse, dehydration, acute on chronic renal failure             Clinical Impression:  Final diagnoses:  [Z13.6] Screening for cardiovascular condition  [E16.2] Hypoglycemia (Primary)  [N18.6] ESRD (end stage renal disease)  [S81.802S] Leg wound, left, sequela  [A41.9] Sepsis, due to unspecified organism, unspecified whether acute organ dysfunction present          ED Disposition Condition    Admit                       [1]   Social History  Tobacco Use    Smoking status: Never     Passive exposure: Never    Smokeless tobacco: Never   Substance Use Topics    Alcohol use: No     Alcohol/week: 0.0 standard drinks of alcohol    Drug use: No         Sanchez Garcia Jr., MD  06/03/25 1800

## 2025-06-04 PROBLEM — M79.3 PANNICULITIS: Status: ACTIVE | Noted: 2025-01-01

## 2025-06-04 PROBLEM — S81.802S LEG WOUND, LEFT, SEQUELA: Status: ACTIVE | Noted: 2025-01-01

## 2025-06-04 PROBLEM — R00.0 TACHYCARDIA: Status: ACTIVE | Noted: 2025-01-01

## 2025-06-04 PROBLEM — A41.9 SEPSIS: Status: ACTIVE | Noted: 2025-01-01

## 2025-06-04 PROBLEM — G72.9 MYOPATHY: Status: ACTIVE | Noted: 2025-01-01

## 2025-06-04 NOTE — PLAN OF CARE
Plan of care discussed with Significant other and pt. On admit at 2030, pt. Resting well through the night, glucose monitoring, lethargic, sleeps unless aroused, dialysis pt., multiple wounds, wound care as ordered, wound vac in use to left lower extremity, to 125 mmHG, sangenous drainage noted, able to make needs known, no BP to left arm/dialysis shunt present, call bell in reach, will continue to monitor.      Problem: Diabetes Comorbidity  Goal: Blood Glucose Level Within Targeted Range  Outcome: Ongoing     Problem: Wound  Goal: Optimal Coping  Outcome: Ongoing  Goal: Optimal Functional Ability  Outcome: Ongoing  Goal: Absence of Infection Signs and Symptoms  Outcome: Ongoing  Goal: Improved Oral Intake  Outcome: Ongoing  Goal: Optimal Pain Control and Function  Outcome: Ongoing  Goal: Skin Health and Integrity  Outcome: Ongoing  Goal: Optimal Wound Healing  Outcome: Ongoing     Problem: Adult Inpatient Plan of Care  Goal: Plan of Care Review  Outcome: Ongoing  Goal: Patient-Specific Goal (Individualized)  Outcome: Ongoing  Goal: Absence of Hospital-Acquired Illness or Injury  Outcome: Ongoing  Goal: Optimal Comfort and Wellbeing  Outcome: Ongoing  Goal: Readiness for Transition of Care  Outcome: Ongoing     Problem: Skin Injury Risk Increased  Goal: Skin Health and Integrity  Outcome: Ongoing     Problem: Fall Injury Risk  Goal: Absence of Fall and Fall-Related Injury  Outcome: Ongoing

## 2025-06-04 NOTE — CONSULTS
[facility] Surg  Adult Nutrition  Consult Note    SUMMARY     Recommendations  1. Rec'd Renal Consistent CHO diet.   2. Rec'd Gustavo BID to promote wound healing.   3. Rec'd ONS: Nova Source Renal TID to provide additional nutrition.   4. Encourage PO intake and compliance with all ONS.   5. RD to follow and make rec's accordingly.  Goals:   1. Pt will consume > 75% EEN/EPN via PO intake by next RD follow up.  Nutrition Goal Status: new  Communication of RD Recs: other (Documented in POC)    Nutrition Discharge Planning     Nutrition Discharge Planning: Too early to determine, pending clinical course    Assessment and Plan    Nutrition Problem  Inadequate protein energy intake    Related to (etiology):   Increased demand for protein energy secondary to delayed wound healing, sepsis dx, dialysis 4x/weekly    Signs and Symptoms (as evidenced by):   Intake <25% estimated needs     Interventions/Recommendations (treatment strategy):  1. Rec'd Renal Consistent CHO diet.   2. Rec'd Gustavo BID to promote wound healing.   3. Rec'd ONS: Nova Source Renal TID to provide additional nutrition.   4. Encourage PO intake and compliance with all ONS.   5. RD to follow and make rec's accordingly.    Nutrition Diagnosis Status:   New    Malnutrition Assessment  NFPE not warranted at this time. Pt does not meet positive malnutrition criteria.    Nutrition Related Social Determinants of Health  Pt declined to answer.    Reason for Assessment    Reason For Assessment: consult (Wound/Wound VAC)  Diagnosis: infection/sepsis  General Information Comments: RD consulted for Wound/Wound VAC. Pt with multiple wounds and a dx of Sepsis. Pt has a hx of CKD and is on dialysis 4x/week. Pt also has hx of diabetes. Pt currently has a decreased appetite with poor PO intake. Pt has increased nutrient needs (protein and calories) due to current dx, dialysis, and wounds. Rec'd adding ONS to promote wound healing and to provide additional nutrition.  "Encourage complaince with all ONS and PO intake once ONS is ordered. RD to follow and make rec's accordingly.    Nutrition/Diet History    Nutrition Intake History: Diabetic Diet  Food Preferences: None  Spiritual, Cultural Beliefs, Latter day Practices, Values that Affect Care: yes  Food Allergies: NKFA  Factors Affecting Nutritional Intake: decreased appetite    Anthropometrics    Height: 5' 4" (162.6 cm)  Height (inches): 64 in  Height Method: Stated  Weight: 118 kg (260 lb 2.3 oz)  Weight (lb): 260.15 lb  Weight Method: Bed Scale  Ideal Body Weight (IBW), Female: 120 lb  % Ideal Body Weight, Female (lb): 216.79 %  BMI (Calculated): 44.6  BMI Grade: greater than 40 - morbid obesity    Lab/Procedures/Meds    Pertinent Labs Reviewed: reviewed  Pertinent Labs Comments: GFR = 8, Albumin 1.5, Glucose = 22 on 6/3  Pertinent Medications Reviewed: reviewed    Estimated/Assessed Needs    Weight Used For Calorie Calculations: 70.3 kg (155 lb) (AdjBW)  Energy Calorie Requirements (kcal): 2460 (35 kcal/kg AdjBW)  Energy Need Method: Kcal/kg  Protein Requirements: 109 (2.0g/kg IBW)  Weight Used For Protein Calculations: 54.4 kg (120 lb) (IBW)  Fluid Requirements (mL): 2460 (1mL/kcal)  Estimated Fluid Requirement Method: RDA Method  RDA Method (mL): 2460  CHO Requirement: 82g CHO per meal    Nutrition Prescription Ordered    Current Diet Order: Consistent CHO Renal on Dialysis  Oral Nutrition Supplement: None    Evaluation of Received Nutrient/Fluid Intake    % Kcal Needs: 0-25%  % Protein Needs: 0-25%  I/O: -250  Energy Calories Required: not meeting needs  Protein Required: not meeting needs  Tolerance: tolerating  % Intake of Estimated Energy Needs: 0 - 25 %  % Meal Intake: 0 - 25 %    PES Statement  Inadequate protein energy intake related to Increased demand for protein energy secondary to delayed wound healing, sepsis dx, dialysis 4x/weekly as evidenced by Intake <25% estimated needs  Status: New    Nutrition " Risk    Level of Risk/Frequency of Follow-up: moderate     Monitor and Evaluation    Monitor and Evaluation: Energy intake, Food and beverage intake, Protein intake, Carbohydrate intake, Diet order, Food and nutrition knowledge, Beliefs and attitudes, Weight, Electrolyte and renal panel, Gastrointestinal profile, Inflammatory profile, Glucose/endocrine profile, Lipid profile     Nutrition Follow-Up    RD Follow-up?: Yes

## 2025-06-04 NOTE — PROGRESS NOTES
Pharmacist Renal Dose Adjustment Note    Pilar Fernandez is a 45 y.o. female being treated with the medication famotidine    Patient Data:    Vital Signs (Most Recent):  Temp: 97.8 °F (36.6 °C) (06/04/25 0444)  Pulse: (!) 112 (06/04/25 1135)  Resp: 20 (06/04/25 1219)  BP: (!) 110/53 (06/04/25 1135)  SpO2: 96 % (06/04/25 0826) Vital Signs (72h Range):  Temp:  [96.9 °F (36.1 °C)-97.8 °F (36.6 °C)]   Pulse:  []   Resp:  [12-20]   BP: ()/(39-70)   SpO2:  [91 %-99 %]      Recent Labs   Lab 06/03/25  1649   CREATININE 5.9*     Serum creatinine: 5.9 mg/dL (H) 06/03/25 1649  Estimated creatinine clearance: 15 mL/min (A)    Medication:famotidine dose: 20 mg frequency daily will be changed to medication:famotidine dose:20 mg frequency:q48hr    Pharmacist's Name: Amie Haynes  Pharmacist's Extension: [phone]

## 2025-06-04 NOTE — PROGRESS NOTES
Pharmacist Renal Dose Adjustment Note    Pilar Fernandez is a 45 y.o. female being treated with the medication zosyn    Patient Data:    Vital Signs (Most Recent):  Temp: 97.2 °F (36.2 °C) (06/03/25 1706)  Pulse: 102 (06/03/25 2002)  Resp: 12 (06/03/25 2002)  BP: (!) 145/70 (06/03/25 2002)  SpO2: 95 % (06/03/25 2002) Vital Signs (72h Range):  Temp:  [97.2 °F (36.2 °C)]   Pulse:  []   Resp:  [12-18]   BP: (143-159)/(66-70)   SpO2:  [95 %-99 %]      Recent Labs   Lab 06/03/25  1649   CREATININE 5.9*     Serum creatinine: 5.9 mg/dL (H) 06/03/25 1649  Estimated creatinine clearance: 14.7 mL/min (A)    Medication:zosyn dose: 3.375g frequency q8h will be changed to medication:zosyn dose:4.5g frequency:q12h    Pharmacist's Name: Cyn Dubon  Pharmacist's Extension:

## 2025-06-04 NOTE — SUBJECTIVE & OBJECTIVE
No current facility-administered medications on file prior to encounter.     Current Outpatient Medications on File Prior to Encounter   Medication Sig    ACCU-CHEK GUIDE TEST STRIPS Strp USE 1 TEST STRIP TWICE DAILY AS DIRECTED TO CHECK CBG    ACCU-CHEK SOFTCLIX LANCETS Misc Apply 1 each topically 2 (two) times daily.    acetaminophen-codeine 300-30mg (TYLENOL #3) 300-30 mg Tab Take 1 tablet by mouth every 6 (six) hours.    ALPRAZolam (XANAX) 0.5 MG tablet Take 0.5 mg by mouth. For 30 days    amoxicillin-clavulanate 500-125mg (AUGMENTIN) 500-125 mg Tab Take 1 tablet (500 mg total) by mouth 2 (two) times daily.    aspirin (ECOTRIN) 81 MG EC tablet Take 1 tablet (81 mg total) by mouth once daily.    atorvastatin (LIPITOR) 40 MG tablet Take 1 tablet by mouth once daily    calcitRIOL (ROCALTROL) 0.25 MCG Cap Take 1 capsule (0.25 mcg total) by mouth once daily.    cetirizine (ZYRTEC) 5 MG tablet Take 5 mg by mouth.    famotidine (PEPCID) 20 MG tablet Take 0.5 tablets (10 mg total) by mouth 2 (two) times daily.    hydrOXYzine HCL (ATARAX) 25 MG tablet Take 1 tablet (25 mg total) by mouth 2 (two) times daily as needed for Itching or Anxiety.    insulin aspart U-100 (NOVOLOG FLEXPEN U-100 INSULIN) 100 unit/mL (3 mL) InPn pen Inject 10 Units into the skin 2 (two) times daily with meals.    lactulose (CHRONULAC) 10 gram/15 mL solution Take 30 mLs (20 g total) by mouth 2 (two) times daily.    levoFLOXacin (LEVAQUIN) 500 MG tablet Take 1 tablet (500 mg total) by mouth once daily.    linaGLIPtin (TRADJENTA) 5 mg Tab tablet TAKE 1 TABLET (5 MG TOTAL) BY MOUTH ONCE DAILY.    LINZESS 145 mcg Cap capsule TAKE 2 CAPSULES BY MOUTH ONCE DAILY BEFORE BREAKFAST    loperamide (IMODIUM) 2 mg capsule Take by mouth.    losartan (COZAAR) 100 MG tablet Take 1 tablet (100 mg total) by mouth once daily.    magnesium oxide (MAG-OX) 400 mg (241.3 mg magnesium) tablet Take 1 tablet by mouth. (Patient not taking: Reported on 1/17/2025)     medroxyPROGESTERone (PROVERA) 10 MG tablet Take 2 tablets (20 mg total) by mouth once daily.    methoxy peg-epoetin beta (MIRCERA INJ) Inject 50 mcg into the skin.    metoclopramide HCl (REGLAN) 5 MG tablet Take 5 mg by mouth 4 (four) times daily before meals and nightly.    miconazole (MICOTIN) 2 % cream Apply topically 2 (two) times daily. Between thighs, under abdomen pannus, under breasts    nebivoloL (BYSTOLIC) 20 mg Tab Take 1 tablet by mouth once daily.    NIFEdipine (PROCARDIA-XL) 60 MG (OSM) 24 hr tablet Take 1 tablet (60 mg total) by mouth once daily.    olmesartan (BENICAR) 40 MG tablet olmesartan 40 mg tablet, [RxNorm: 523320]    ondansetron (ZOFRAN-ODT) 4 MG TbDL Take 1 tablet (4 mg total) by mouth every 8 (eight) hours as needed ([place]).    oxyCODONE (ROXICODONE) 10 mg Tab immediate release tablet Take 1 tablet (10 mg total) by mouth every 8 (eight) hours as needed for Pain.    pantoprazole (PROTONIX) 40 MG tablet Take 1 tablet (40 mg total) by mouth once daily.    predniSONE (DELTASONE) 20 MG tablet Take 1 tablet (20 mg total) by mouth once daily.    promethazine (PHENERGAN) 25 MG tablet Take 1 tablet (25 mg total) by mouth 3 (three) times daily as needed for Nausea.    SEROQUEL 50 mg tablet Take 1 tablet (50 mg total) by mouth nightly.    sevelamer carbonate (RENVELA) 800 mg Tab     SYNTHROID 100 mcg tablet Take 1 tablet (100 mcg total) by mouth before breakfast.    traMADoL (ULTRAM) 50 mg tablet Take 1 tablet (50 mg total) by mouth every 6 (six) hours as needed for Pain.    triamcinolone acetonide 0.1% (KENALOG) 0.1 % ointment Apply topically 2 (two) times daily.    ursodioL (ACTIGALL) 300 mg capsule Take 1 capsule (300 mg total) by mouth 3 (three) times daily.       Review of patient's allergies indicates:  No Known Allergies    Past Medical History:   Diagnosis Date    Anemia     Anemia     Breast mass     Chronic constipation     CKD (chronic kidney disease)     Diabetes mellitus     Dialysis  patient     GRZEGORZ BARRIOS THURS, SAT.    Embolic stroke involving right middle cerebral artery 2024    Encounter for blood transfusion     Erosive gastritis 2016    GERD (gastroesophageal reflux disease)     Gout     High cholesterol     Hypertension     Knee pain     Thyroid disease     Unspecified cataract 2022     Past Surgical History:   Procedure Laterality Date    AV FISTULA PLACEMENT Left 2019    Procedure: creation av fistula;  Surgeon: Buck Fernandez MD;  Location: [facility] OR;  Service: Cardiovascular;  Laterality: Left;  Left Radialcephalic: PACU     SECTION  , , ,     CHOLECYSTECTOMY      COLONOSCOPY N/A 10/23/2017    Procedure: COLONOSCOPY;  Surgeon: Bri Harry MD;  Location: [facility];  Service: Endoscopy;  Laterality: N/A;    EXCISIONAL BIOPSY Left 2022    Procedure: EXCISIONAL BIOPSY;  Surgeon: Mick Juarez MD;  Location: [facility] OR;  Service: General;  Laterality: Left;    EXPLORATION, WOUND, LOWER EXTREMITY Left 2025    Procedure: EXPLORATION, WOUND, LOWER EXTREMITY;  Surgeon: Darcy Guadalupe MD;  Location: [facility] OR;  Service: General;  Laterality: Left;    INCISION AND DRAINAGE OF ABSCESS Left 2024    Procedure: INCISION AND DRAINAGE, ABSCESS;  Surgeon: Darcy Guadalupe MD;  Location: [facility] OR;  Service: General;  Laterality: Left;    TUBAL LIGATION      UPPER GASTROINTESTINAL ENDOSCOPY  2016    Erosive Gastritis-Dr Bailey     Family History       Problem Relation (Age of Onset)    Breast cancer Maternal Aunt    Diabetes Father    Ovarian cancer Maternal Aunt    Thyroid disease Mother          Tobacco Use    Smoking status: Never     Passive exposure: Never    Smokeless tobacco: Never   Substance and Sexual Activity    Alcohol use: No     Alcohol/week: 0.0 standard drinks of alcohol    Drug use: No    Sexual activity: Yes     Partners: Male     Birth control/protection: None, Surgical     Review of Systems   Constitutional:   Positive for activity change. Negative for chills, fatigue and fever.   HENT:  Negative for postnasal drip and sneezing.    Respiratory:  Negative for cough and shortness of breath.    Cardiovascular:  Positive for leg swelling. Negative for chest pain and palpitations.   Gastrointestinal:  Negative for abdominal pain, diarrhea, nausea and vomiting.   Musculoskeletal:  Positive for arthralgias and myalgias.   Skin:  Positive for color change and wound.   Neurological:  Positive for weakness.     Objective:     Vital Signs (Most Recent):  Temp: 98.3 °F (36.8 °C) (06/04/25 1700)  Pulse: (!) 117 (06/04/25 1700)  Resp: 18 (06/04/25 1700)  BP: (!) 86/47 (06/04/25 1700)  SpO2: 98 % (06/04/25 1700) Vital Signs (24h Range):  Temp:  [96.9 °F (36.1 °C)-98.3 °F (36.8 °C)] 98.3 °F (36.8 °C)  Pulse:  [] 117  Resp:  [12-20] 18  SpO2:  [91 %-99 %] 98 %  BP: ()/(39-70) 86/47     Weight: 118 kg (260 lb 2.3 oz)  Body mass index is 44.65 kg/m².     Physical Exam  Vitals and nursing note reviewed.   Constitutional:       Appearance: She is obese.   HENT:      Nose: Nose normal.      Mouth/Throat:      Mouth: Mucous membranes are moist.   Eyes:      Extraocular Movements: Extraocular movements intact.      Pupils: Pupils are equal, round, and reactive to light.   Cardiovascular:      Rate and Rhythm: Normal rate and regular rhythm.   Pulmonary:      Effort: Pulmonary effort is normal.      Breath sounds: Normal breath sounds.      Comments: O2 via NC  Abdominal:      General: Bowel sounds are normal. There is no distension.      Palpations: Abdomen is soft.      Tenderness: There is no abdominal tenderness.      Comments: Significant abdominal wall edema with superficial skin breakdown on nearly all of the underside of the pannus   Musculoskeletal:         General: Swelling (4+ edema) present.      Cervical back: Normal range of motion.      Right lower leg: Edema present.      Left lower leg: Edema present.      Comments:  Wound vac to left lateral leg intact with no significant drainage     Neurological:      General: No focal deficit present.      Mental Status: She is alert.   Psychiatric:         Mood and Affect: Mood normal.         Behavior: Behavior normal.         Thought Content: Thought content normal.            I have reviewed all pertinent lab results within the past 24 hours.  CBC:   Recent Labs   Lab 06/04/25  0831   WBC 24.01*   RBC 3.78*   HGB 10.2*   HCT 30.3*      MCV 80*   MCH 27.0   MCHC 33.7     BMP:   Recent Labs   Lab 06/04/25  0831   GLU 72   *   K 5.1   CL 97   CO2 21*   BUN 50*   CREATININE 6.0*   CALCIUM 7.8*     CMP:   Recent Labs   Lab 06/04/25  0831   GLU 72   CALCIUM 7.8*   ALBUMIN 1.4*   PROT 5.4*   *   K 5.1   CO2 21*   CL 97   BUN 50*   CREATININE 6.0*   ALKPHOS 307*   ALT <8*   AST 18   BILITOT 1.0       Significant Diagnostics:  I have reviewed all pertinent imaging results/findings within the past 24 hours.

## 2025-06-04 NOTE — HPI
45-year-old female with significant past medical history of ESRD disease on dialysis; chronic wounds of bilateral lower extremities with wound VAC presents the ED with reports of hypoglycemia. Family reports that she was nonresponsive. EMS states that original CBG was 30 they gave her D50 EN route to ED. when she came in ED she was alert and oriented but continues to have hyperglycemia. Patient reports she went to dialysis yesterday but has not eaten food or taking medications. Her PCP is .   Pt denies taking any insulin recently.  Reports she has been compliant with her dialysis treatment

## 2025-06-04 NOTE — SUBJECTIVE & OBJECTIVE
Past Medical History:   Diagnosis Date    Anemia     Anemia     Breast mass     Chronic constipation     CKD (chronic kidney disease)     Diabetes mellitus     Dialysis patient     GRZEGORZ BARRIOS THURS, SAT.    Embolic stroke involving right middle cerebral artery 2024    Encounter for blood transfusion     Erosive gastritis 2016    GERD (gastroesophageal reflux disease)     Gout     High cholesterol     Hypertension     Knee pain     Thyroid disease     Unspecified cataract 2022       Past Surgical History:   Procedure Laterality Date    AV FISTULA PLACEMENT Left 2019    Procedure: creation av fistula;  Surgeon: Buck Fernandez MD;  Location: [facility] OR;  Service: Cardiovascular;  Laterality: Left;  Left Radialcephalic: PACU     SECTION  , , ,     CHOLECYSTECTOMY      COLONOSCOPY N/A 10/23/2017    Procedure: COLONOSCOPY;  Surgeon: Bri Harry MD;  Location: [facility];  Service: Endoscopy;  Laterality: N/A;    EXCISIONAL BIOPSY Left 2022    Procedure: EXCISIONAL BIOPSY;  Surgeon: Mick Juarez MD;  Location: [facility] OR;  Service: General;  Laterality: Left;    EXPLORATION, WOUND, LOWER EXTREMITY Left 2025    Procedure: EXPLORATION, WOUND, LOWER EXTREMITY;  Surgeon: Darcy Guadalupe MD;  Location: [facility] OR;  Service: General;  Laterality: Left;    INCISION AND DRAINAGE OF ABSCESS Left 2024    Procedure: INCISION AND DRAINAGE, ABSCESS;  Surgeon: Darcy Guadalupe MD;  Location: [facility] OR;  Service: General;  Laterality: Left;    TUBAL LIGATION      UPPER GASTROINTESTINAL ENDOSCOPY  2016    Erosive Gastritis-Dr Bailey       Review of patient's allergies indicates:  No Known Allergies    No current facility-administered medications on file prior to encounter.     Current Outpatient Medications on File Prior to Encounter   Medication Sig    ACCU-CHEK GUIDE TEST STRIPS Strp USE 1 TEST STRIP TWICE DAILY AS DIRECTED TO CHECK CBG    ACCU-CHEK SOFTCLIX LANCETS  Misc Apply 1 each topically 2 (two) times daily.    acetaminophen-codeine 300-30mg (TYLENOL #3) 300-30 mg Tab Take 1 tablet by mouth every 6 (six) hours.    ALPRAZolam (XANAX) 0.5 MG tablet Take 0.5 mg by mouth. For 30 days    amoxicillin-clavulanate 500-125mg (AUGMENTIN) 500-125 mg Tab Take 1 tablet (500 mg total) by mouth 2 (two) times daily.    aspirin (ECOTRIN) 81 MG EC tablet Take 1 tablet (81 mg total) by mouth once daily.    atorvastatin (LIPITOR) 40 MG tablet Take 1 tablet by mouth once daily    calcitRIOL (ROCALTROL) 0.25 MCG Cap Take 1 capsule (0.25 mcg total) by mouth once daily.    cetirizine (ZYRTEC) 5 MG tablet Take 5 mg by mouth.    famotidine (PEPCID) 20 MG tablet Take 0.5 tablets (10 mg total) by mouth 2 (two) times daily.    hydrOXYzine HCL (ATARAX) 25 MG tablet Take 1 tablet (25 mg total) by mouth 2 (two) times daily as needed for Itching or Anxiety.    insulin aspart U-100 (NOVOLOG FLEXPEN U-100 INSULIN) 100 unit/mL (3 mL) InPn pen Inject 10 Units into the skin 2 (two) times daily with meals.    lactulose (CHRONULAC) 10 gram/15 mL solution Take 30 mLs (20 g total) by mouth 2 (two) times daily.    levoFLOXacin (LEVAQUIN) 500 MG tablet Take 1 tablet (500 mg total) by mouth once daily.    linaGLIPtin (TRADJENTA) 5 mg Tab tablet TAKE 1 TABLET (5 MG TOTAL) BY MOUTH ONCE DAILY.    LINZESS 145 mcg Cap capsule TAKE 2 CAPSULES BY MOUTH ONCE DAILY BEFORE BREAKFAST    loperamide (IMODIUM) 2 mg capsule Take by mouth.    losartan (COZAAR) 100 MG tablet Take 1 tablet (100 mg total) by mouth once daily.    magnesium oxide (MAG-OX) 400 mg (241.3 mg magnesium) tablet Take 1 tablet by mouth. (Patient not taking: Reported on 1/17/2025)    medroxyPROGESTERone (PROVERA) 10 MG tablet Take 2 tablets (20 mg total) by mouth once daily.    methoxy peg-epoetin beta (MIRCERA INJ) Inject 50 mcg into the skin.    metoclopramide HCl (REGLAN) 5 MG tablet Take 5 mg by mouth 4 (four) times daily before meals and nightly.     miconazole (MICOTIN) 2 % cream Apply topically 2 (two) times daily. Between thighs, under abdomen pannus, under breasts    nebivoloL (BYSTOLIC) 20 mg Tab Take 1 tablet by mouth once daily.    NIFEdipine (PROCARDIA-XL) 60 MG (OSM) 24 hr tablet Take 1 tablet (60 mg total) by mouth once daily.    olmesartan (BENICAR) 40 MG tablet olmesartan 40 mg tablet, [RxNorm: 335890]    ondansetron (ZOFRAN-ODT) 4 MG TbDL Take 1 tablet (4 mg total) by mouth every 8 (eight) hours as needed ([place]).    oxyCODONE (ROXICODONE) 10 mg Tab immediate release tablet Take 1 tablet (10 mg total) by mouth every 8 (eight) hours as needed for Pain.    pantoprazole (PROTONIX) 40 MG tablet Take 1 tablet (40 mg total) by mouth once daily.    predniSONE (DELTASONE) 20 MG tablet Take 1 tablet (20 mg total) by mouth once daily.    promethazine (PHENERGAN) 25 MG tablet Take 1 tablet (25 mg total) by mouth 3 (three) times daily as needed for Nausea.    SEROQUEL 50 mg tablet Take 1 tablet (50 mg total) by mouth nightly.    sevelamer carbonate (RENVELA) 800 mg Tab     SYNTHROID 100 mcg tablet Take 1 tablet (100 mcg total) by mouth before breakfast.    traMADoL (ULTRAM) 50 mg tablet Take 1 tablet (50 mg total) by mouth every 6 (six) hours as needed for Pain.    triamcinolone acetonide 0.1% (KENALOG) 0.1 % ointment Apply topically 2 (two) times daily.    ursodioL (ACTIGALL) 300 mg capsule Take 1 capsule (300 mg total) by mouth 3 (three) times daily.     Family History       Problem Relation (Age of Onset)    Breast cancer Maternal Aunt    Diabetes Father    Ovarian cancer Maternal Aunt    Thyroid disease Mother          Tobacco Use    Smoking status: Never     Passive exposure: Never    Smokeless tobacco: Never   Substance and Sexual Activity    Alcohol use: No     Alcohol/week: 0.0 standard drinks of alcohol    Drug use: No    Sexual activity: Yes     Partners: Male     Birth control/protection: None, Surgical     Review of Systems   Constitutional:   Positive for activity change. Negative for chills, fatigue and fever.   HENT:  Negative for postnasal drip and sneezing.    Respiratory:  Negative for cough and shortness of breath.    Cardiovascular:  Positive for leg swelling. Negative for chest pain and palpitations.   Gastrointestinal:  Negative for abdominal pain, diarrhea, nausea and vomiting.   Musculoskeletal:  Positive for arthralgias.   Skin:  Positive for wound.   Neurological:  Positive for weakness.     Objective:     Vital Signs (Most Recent):  Temp: 97.8 °F (36.6 °C) (06/04/25 0444)  Pulse: (!) 113 (06/04/25 0826)  Resp: 18 (06/04/25 0848)  BP: (!) 110/53 (06/04/25 0826)  SpO2: 96 % (06/04/25 0826) Vital Signs (24h Range):  Temp:  [96.9 °F (36.1 °C)-97.8 °F (36.6 °C)] 97.8 °F (36.6 °C)  Pulse:  [] 113  Resp:  [12-18] 18  SpO2:  [91 %-99 %] 96 %  BP: ()/(39-70) 110/53     Weight: 118 kg (260 lb 3.2 oz)  Body mass index is 44.66 kg/m².     Physical Exam  Constitutional:       Appearance: She is obese.   HENT:      Nose: Nose normal.      Mouth/Throat:      Mouth: Mucous membranes are moist.   Eyes:      Extraocular Movements: Extraocular movements intact.      Pupils: Pupils are equal, round, and reactive to light.   Cardiovascular:      Rate and Rhythm: Normal rate and regular rhythm.   Pulmonary:      Effort: Pulmonary effort is normal.      Breath sounds: Normal breath sounds.      Comments: O2 via nc  Abdominal:      General: Bowel sounds are normal. There is no distension.      Palpations: Abdomen is soft.      Tenderness: There is no abdominal tenderness.      Comments: Positive edema of abd wall   Musculoskeletal:      Cervical back: Normal range of motion.      Comments: 3+ edema of lower est - up to abdominal wall  Wound vac to left leg   Neurological:      General: No focal deficit present.      Mental Status: She is alert.              CRANIAL NERVES     CN III, IV, VI   Pupils are equal, round, and reactive to light.        Significant Labs: All pertinent labs within the past 24 hours have been reviewed.  Recent Lab Results  (Last 5 results in the past 24 hours)        06/04/25  0028   06/03/25  2253   06/03/25  2143   06/03/25 2004 06/03/25  1824        Albumin               ALP               ALT               Anion Gap               AST               Baso #               Basophil %               BILIRUBIN TOTAL               BUN               Calcium               Chloride               CO2               Creatinine               eGFR               Eos #               Eos %               Estimated Avg Glucose               Glucose               Gran # (ANC)               Hematocrit               Hemoglobin               Hemoglobin A1C External               Immature Grans (Abs)               Immature Granulocytes               Lactic Acid Level     2.3           Lymph #               Lymph %               MCH               MCHC               MCV               Mono #               Mono %               MPV               Neut %               nRBC               QRS Duration               OHS QTC Calculation               Platelet Count               POCT Glucose 92   57     82   65       Potassium               PROTEIN TOTAL               RBC               RDW               Sodium               WBC                                      Significant Imaging: I have reviewed all pertinent imaging results/findings within the past 24 hours.

## 2025-06-04 NOTE — PLAN OF CARE
Recommendations  1. Rec'd Renal Consistent CHO diet.   2. Rec'd Gustavo BID to promote wound healing.   3. Rec'd ONS: [facility] Renal TID to provide additional nutrition.   4. Encourage PO intake and compliance with all ONS.   5. RD to follow and make rec's accordingly.  Goals:   1. Pt will consume > 75% EEN/EPN via PO intake by next RD follow up.  Nutrition Goal Status: new

## 2025-06-04 NOTE — PLAN OF CARE
[facility]  Initial Discharge Assessment       Primary Care Provider: Debra Fong MD    Admission Diagnosis: Screening for cardiovascular condition [Z13.6]  Hypoglycemia [E16.2]  ESRD (end stage renal disease) [N18.6]  Leg wound, left, sequela [S81.802S]  Sepsis, due to unspecified organism, unspecified whether acute organ dysfunction present [A41.9]    Admission Date: 6/3/2025  Expected Discharge Date: 6/6/2025         Payor: [facility] MANAGED MCARE / Plan: [facility] DUAL COMPLETE HMO SNP / Product Type: Medicare Advantage /     Extended Emergency Contact Information  Primary Emergency Contact: Roberto Sharma  Mobile Phone: [phone]  Relation: Significant other  Secondary Emergency Contact: Mile Welch  Mobile Phone: [phone]  Relation: Mother   needed? No    Discharge Plan A: Long-term acute care facility (LTAC)  Discharge Plan B: Skilled Nursing Facility      [address]  [address] [facility] [address]  Phone: [phone] Fax: [phone]    [facility] - [place], [place] - [address]  [address]  [place] [address]  Phone: [phone] Fax: [phone]      Initial Assessment (most recent)       Adult Discharge Assessment - 06/04/25 1126          Discharge Assessment    Assessment Type Discharge Planning Assessment     Confirmed/corrected address, phone number and insurance Yes     Confirmed Demographics Correct on Facesheet     Source of Information patient;health record     People in Home significant other     Name(s) of People in Home Roberto (Significant other)  [phone]     Do you expect to return to your current living situation? Other (see comments)   LTAC vs SNF    Do you have help at home or someone to help you manage your care at home? Yes     Prior to hospitilization cognitive status: Alert/Oriented     Current cognitive status: Alert/Oriented     Walking or  Climbing Stairs Difficulty yes     Walking or Climbing Stairs ambulation difficulty, requires equipment;stair climbing difficulty, assistance 1 person;ambulation difficulty, assistance 1 person;transferring difficulty, assistance 1 person     Mobility Management wheelchair, rolling walker, or rollator (as needed)     Dressing/Bathing Difficulty yes     Dressing/Bathing bathing difficulty, requires equipment;bathing difficulty, assistance 1 person;dressing difficulty, assistance 1 person     Dressing/Bathing Management shower chair     Home Layout Able to live on 1st floor     Equipment Currently Used at Home shower chair;wheelchair;walker, rolling;rollator;oxygen;wound care supplies     Readmission within 30 days? Yes     Patient currently being followed by outpatient case management? Unable to determine (comments)     Do you currently have service(s) that help you manage your care at home? Yes     Name and Contact number of [phone]     Is the pt/caregiver preference to resume services with current agency Yes     Do you take prescription medications? Yes     Do you have prescription coverage? Yes     Do you have any problems affording any of your prescribed medications? TBD     How do you get to doctors appointments? family or friend will provide     Are you on dialysis? Yes     Dialysis Name and Scheduled days [facility]     Discharge Plan A Long-term acute care facility (LTAC)     Discharge Plan B Skilled Nursing Facility     Discharge Plan discussed with: Patient                     Readmission Assessment (most recent)       Readmission Assessment - 06/04/25 1414          Readmission    Was this a planned readmission? No     Why were you hospitalized in the last 30 days? Encounter for management of wound VAC     Why were you readmitted? Alarmed about signs/symptoms     When you left the hospital how did you feel? The patient reports she felt fine.     When you left the  "hospital where did you go? Home with Family   The patient also resumed home health with Nursing Care.    Tell me about what happened between when you left the hospital and the day you returned. "My legs started turning black."     When did you start not feeling well? "Yesterday"     Did you call anyone? Yes     Who did you call? Specialist   "Dr. Guadalupe"    Did you try to see or did see a doctor or nurse before you came? No     Why? [facility]                 Discharge assessment completed with the patient. The patient is from home. She requires assistance and durable medical assistance for her care. The patient and I had a discussion about her discharge plan of care. The patient is open to LTAC vs SNF placement if recommended. I provided the patient with a list of LTAC facilities.  She expressed that she wants to stay local.     Discharge planning checklist given to the patient/family with instructions to contact  for any needs.  SW will follow as needed.            "

## 2025-06-04 NOTE — ASSESSMENT & PLAN NOTE
Creatine stable for now. BMP reviewed- noted Estimated Creatinine Clearance: 15 mL/min (A) (based on SCr of 5.9 mg/dL (H)). according to latest data. Based on current GFR, CKD stage is end stage.  Monitor UOP and serial BMP and adjust therapy as needed. Renally dose meds. Avoid nephrotoxic medications and procedures.  Dialysis per renal

## 2025-06-04 NOTE — H&P
[facility] Medicine  History & Physical    Patient Name: Pilar Fernandez  MRN: [id number]  Patient Class: IP- Inpatient  Admission Date: 6/3/2025  Attending Physician: Debra Fong MD   Primary Care Provider: Debra Fong MD         Patient information was obtained from patient and ER records.     Subjective:     Principal Problem:Sepsis    Chief Complaint:   Chief Complaint   Patient presents with    Altered Mental Status     Pt in by EMS with AMS and low glucose.  Pt has multiple wounds including wound vac.  Pt also with BP 80s/40s. Initial glucose 39, 30 g PO glucose tabs given. Repeat glucose by EMS 40        HPI: 45-year-old female with significant past medical history of ESRD disease on dialysis; chronic wounds of bilateral lower extremities with wound VAC presents the ED with reports of hypoglycemia. Family reports that she was nonresponsive. EMS states that original CBG was 30 they gave her D50 EN route to ED. when she came in ED she was alert and oriented but continues to have hyperglycemia. Patient reports she went to dialysis yesterday but has not eaten food or taking medications. Her PCP is .   Pt denies taking any insulin recently.  Reports she has been compliant with her dialysis treatment    Past Medical History:   Diagnosis Date    Anemia     Anemia     Breast mass     Chronic constipation     CKD (chronic kidney disease)     Diabetes mellitus     Dialysis patient     MON, TUE, THURS, SAT.    Embolic stroke involving right middle cerebral artery 7/13/2024    Encounter for blood transfusion     Erosive gastritis 03/2016    GERD (gastroesophageal reflux disease)     Gout     High cholesterol     Hypertension     Knee pain     Thyroid disease     Unspecified cataract 09/2022       Past Surgical History:   Procedure Laterality Date    AV FISTULA PLACEMENT Left 8/5/2019    Procedure: creation av fistula;  Surgeon: Buck Fernandez MD;  Location: [facility] OR;   Service: Cardiovascular;  Laterality: Left;  Left Radialcephalic: PACU     SECTION  , , ,     CHOLECYSTECTOMY      COLONOSCOPY N/A 10/23/2017    Procedure: COLONOSCOPY;  Surgeon: Bri Harry MD;  Location: [facility];  Service: Endoscopy;  Laterality: N/A;    EXCISIONAL BIOPSY Left 2022    Procedure: EXCISIONAL BIOPSY;  Surgeon: Mick Juarez MD;  Location: [facility] OR;  Service: General;  Laterality: Left;    EXPLORATION, WOUND, LOWER EXTREMITY Left 2025    Procedure: EXPLORATION, WOUND, LOWER EXTREMITY;  Surgeon: Darcy Guadalupe MD;  Location: [facility] OR;  Service: General;  Laterality: Left;    INCISION AND DRAINAGE OF ABSCESS Left 2024    Procedure: INCISION AND DRAINAGE, ABSCESS;  Surgeon: Darcy Guadalupe MD;  Location: [facility] OR;  Service: General;  Laterality: Left;    TUBAL LIGATION      UPPER GASTROINTESTINAL ENDOSCOPY  2016    Erosive Gastritis-Dr Bailey       Review of patient's allergies indicates:  No Known Allergies    No current facility-administered medications on file prior to encounter.     Current Outpatient Medications on File Prior to Encounter   Medication Sig    ACCU-CHEK GUIDE TEST STRIPS Strp USE 1 TEST STRIP TWICE DAILY AS DIRECTED TO CHECK CBG    ACCU-CHEK SOFTCLIX LANCETS Misc Apply 1 each topically 2 (two) times daily.    acetaminophen-codeine 300-30mg (TYLENOL #3) 300-30 mg Tab Take 1 tablet by mouth every 6 (six) hours.    ALPRAZolam (XANAX) 0.5 MG tablet Take 0.5 mg by mouth. For 30 days    amoxicillin-clavulanate 500-125mg (AUGMENTIN) 500-125 mg Tab Take 1 tablet (500 mg total) by mouth 2 (two) times daily.    aspirin (ECOTRIN) 81 MG EC tablet Take 1 tablet (81 mg total) by mouth once daily.    atorvastatin (LIPITOR) 40 MG tablet Take 1 tablet by mouth once daily    calcitRIOL (ROCALTROL) 0.25 MCG Cap Take 1 capsule (0.25 mcg total) by mouth once daily.    cetirizine (ZYRTEC) 5 MG tablet Take 5 mg by mouth.    famotidine  (PEPCID) 20 MG tablet Take 0.5 tablets (10 mg total) by mouth 2 (two) times daily.    hydrOXYzine HCL (ATARAX) 25 MG tablet Take 1 tablet (25 mg total) by mouth 2 (two) times daily as needed for Itching or Anxiety.    insulin aspart U-100 (NOVOLOG FLEXPEN U-100 INSULIN) 100 unit/mL (3 mL) InPn pen Inject 10 Units into the skin 2 (two) times daily with meals.    lactulose (CHRONULAC) 10 gram/15 mL solution Take 30 mLs (20 g total) by mouth 2 (two) times daily.    levoFLOXacin (LEVAQUIN) 500 MG tablet Take 1 tablet (500 mg total) by mouth once daily.    linaGLIPtin (TRADJENTA) 5 mg Tab tablet TAKE 1 TABLET (5 MG TOTAL) BY MOUTH ONCE DAILY.    LINZESS 145 mcg Cap capsule TAKE 2 CAPSULES BY MOUTH ONCE DAILY BEFORE BREAKFAST    loperamide (IMODIUM) 2 mg capsule Take by mouth.    losartan (COZAAR) 100 MG tablet Take 1 tablet (100 mg total) by mouth once daily.    magnesium oxide (MAG-OX) 400 mg (241.3 mg magnesium) tablet Take 1 tablet by mouth. (Patient not taking: Reported on 1/17/2025)    medroxyPROGESTERone (PROVERA) 10 MG tablet Take 2 tablets (20 mg total) by mouth once daily.    methoxy peg-epoetin beta (MIRCERA INJ) Inject 50 mcg into the skin.    metoclopramide HCl (REGLAN) 5 MG tablet Take 5 mg by mouth 4 (four) times daily before meals and nightly.    miconazole (MICOTIN) 2 % cream Apply topically 2 (two) times daily. Between thighs, under abdomen pannus, under breasts    nebivoloL (BYSTOLIC) 20 mg Tab Take 1 tablet by mouth once daily.    NIFEdipine (PROCARDIA-XL) 60 MG (OSM) 24 hr tablet Take 1 tablet (60 mg total) by mouth once daily.    olmesartan (BENICAR) 40 MG tablet olmesartan 40 mg tablet, [RxNorm: 862199]    ondansetron (ZOFRAN-ODT) 4 MG TbDL Take 1 tablet (4 mg total) by mouth every 8 (eight) hours as needed ([place]).    oxyCODONE (ROXICODONE) 10 mg Tab immediate release tablet Take 1 tablet (10 mg total) by mouth every 8 (eight) hours as needed for Pain.    pantoprazole (PROTONIX) 40 MG tablet Take  1 tablet (40 mg total) by mouth once daily.    predniSONE (DELTASONE) 20 MG tablet Take 1 tablet (20 mg total) by mouth once daily.    promethazine (PHENERGAN) 25 MG tablet Take 1 tablet (25 mg total) by mouth 3 (three) times daily as needed for Nausea.    SEROQUEL 50 mg tablet Take 1 tablet (50 mg total) by mouth nightly.    sevelamer carbonate (RENVELA) 800 mg Tab     SYNTHROID 100 mcg tablet Take 1 tablet (100 mcg total) by mouth before breakfast.    traMADoL (ULTRAM) 50 mg tablet Take 1 tablet (50 mg total) by mouth every 6 (six) hours as needed for Pain.    triamcinolone acetonide 0.1% (KENALOG) 0.1 % ointment Apply topically 2 (two) times daily.    ursodioL (ACTIGALL) 300 mg capsule Take 1 capsule (300 mg total) by mouth 3 (three) times daily.     Family History       Problem Relation (Age of Onset)    Breast cancer Maternal Aunt    Diabetes Father    Ovarian cancer Maternal Aunt    Thyroid disease Mother          Tobacco Use    Smoking status: Never     Passive exposure: Never    Smokeless tobacco: Never   Substance and Sexual Activity    Alcohol use: No     Alcohol/week: 0.0 standard drinks of alcohol    Drug use: No    Sexual activity: Yes     Partners: Male     Birth control/protection: None, Surgical     Review of Systems   Constitutional:  Positive for activity change. Negative for chills, fatigue and fever.   HENT:  Negative for postnasal drip and sneezing.    Respiratory:  Negative for cough and shortness of breath.    Cardiovascular:  Positive for leg swelling. Negative for chest pain and palpitations.   Gastrointestinal:  Negative for abdominal pain, diarrhea, nausea and vomiting.   Musculoskeletal:  Positive for arthralgias.   Skin:  Positive for wound.   Neurological:  Positive for weakness.     Objective:     Vital Signs (Most Recent):  Temp: 97.8 °F (36.6 °C) (06/04/25 0444)  Pulse: (!) 113 (06/04/25 0826)  Resp: 18 (06/04/25 0848)  BP: (!) 110/53 (06/04/25 0826)  SpO2: 96 % (06/04/25 0826) Vital  Signs (24h Range):  Temp:  [96.9 °F (36.1 °C)-97.8 °F (36.6 °C)] 97.8 °F (36.6 °C)  Pulse:  [] 113  Resp:  [12-18] 18  SpO2:  [91 %-99 %] 96 %  BP: ()/(39-70) 110/53     Weight: 118 kg (260 lb 3.2 oz)  Body mass index is 44.66 kg/m².     Physical Exam  Constitutional:       Appearance: She is obese.   HENT:      Nose: Nose normal.      Mouth/Throat:      Mouth: Mucous membranes are moist.   Eyes:      Extraocular Movements: Extraocular movements intact.      Pupils: Pupils are equal, round, and reactive to light.   Cardiovascular:      Rate and Rhythm: Normal rate and regular rhythm.   Pulmonary:      Effort: Pulmonary effort is normal.      Breath sounds: Normal breath sounds.      Comments: O2 via nc  Abdominal:      General: Bowel sounds are normal. There is no distension.      Palpations: Abdomen is soft.      Tenderness: There is no abdominal tenderness.      Comments: Positive edema of abd wall   Musculoskeletal:      Cervical back: Normal range of motion.      Comments: 3+ edema of lower est - up to abdominal wall  Wound vac to left leg   Neurological:      General: No focal deficit present.      Mental Status: She is alert.              CRANIAL NERVES     CN III, IV, VI   Pupils are equal, round, and reactive to light.       Significant Labs: All pertinent labs within the past 24 hours have been reviewed.  Recent Lab Results  (Last 5 results in the past 24 hours)        06/04/25  0028   06/03/25  2253   06/03/25  2143   06/03/25 2004 06/03/25  1824        Albumin               ALP               ALT               Anion Gap               AST               Baso #               Basophil %               BILIRUBIN TOTAL               BUN               Calcium               Chloride               CO2               Creatinine               eGFR               Eos #               Eos %               Estimated Avg Glucose               Glucose               Gran # (ANC)               Hematocrit                Hemoglobin               Hemoglobin A1C External               Immature Grans (Abs)               Immature Granulocytes               Lactic Acid Level     2.3           Lymph #               Lymph %               MCH               MCHC               MCV               Mono #               Mono %               MPV               Neut %               nRBC               QRS Duration               OHS QTC Calculation               Platelet Count               POCT Glucose 92   57     82   65       Potassium               PROTEIN TOTAL               RBC               RDW               Sodium               WBC                                      Significant Imaging: I have reviewed all pertinent imaging results/findings within the past 24 hours.  Assessment/Plan:     Assessment & Plan  Peripheral vascular disease  Asa qd  Start cholesterol meds since liver function has improved    ESRD (end stage renal disease) on dialysis  Creatine stable for now. BMP reviewed- noted Estimated Creatinine Clearance: 15 mL/min (A) (based on SCr of 5.9 mg/dL (H)). according to latest data. Based on current GFR, CKD stage is end stage.  Monitor UOP and serial BMP and adjust therapy as needed. Renally dose meds. Avoid nephrotoxic medications and procedures.  Dialysis per renal  Hypothyroidism  On synthroid    Type 2 diabetes mellitus, with long-term current use of insulin  A1c 6.4%  Do not order oral meds at this time      Hypoglycemia  On iv dextrose due to recurrence of low bs    Sepsis  This patient does have evidence of infective focus  My overall impression is sepsis without organ dysfunction.  Source: Skin and Soft Tissue Infection: Cellulitis  Antibiotics given-   Antibiotics (72h ago, onward)      Start     Stop Route Frequency Ordered    06/04/25 0630  piperacillin-tazobactam (ZOSYN) 4.5 g in D5W 100 mL IVPB (MB+)         -- IV Every 12 hours (non-standard times) 06/03/25 2126          Latest lactate reviewed-  Recent Labs   Lab  06/03/25 2143   LACTATE 2.3*     Organ dysfunction indicated by hypoglycemia    Fluid challenge Ideal Body Weight- The patient is obese (BMI>30) and their ideal body weight of Ideal body weight: 54.7 kg (120 lb 9.5 oz) will be used to calculate fluid bolus of 30 ml/kg.     Post- resuscitation assessment Yes - I attest a sepsis perfusion exam was performed within 6 hours of sepsis, severe sepsis, or septic shock presentation, following fluid resuscitation.      Will Not start Pressors- Levophed for MAP of 65  Source control achieved by: IV abxs; wound vac to left leg  Leg wound, left, sequela  Wound vac in place, iv abxs - plan for surgery consult today    Tachycardia  Start toprol xl 12.5 mg po bid      VTE Risk Mitigation (From admission, onward)           Ordered     IP VTE HIGH RISK PATIENT  Once         06/03/25 2126     Place sequential compression device  Until discontinued         06/03/25 2126                               Pharmacist Renal Dose Adjustment Note    Pilar Fernandez is a 45 y.o. female being treated with the medication zosyn    Patient Data:    Vital Signs (Most Recent):  Temp: 97.2 °F (36.2 °C) (06/03/25 1706)  Pulse: 102 (06/03/25 2002)  Resp: 12 (06/03/25 2002)  BP: (!) 145/70 (06/03/25 2002)  SpO2: 95 % (06/03/25 2002) Vital Signs (72h Range):  Temp:  [97.2 °F (36.2 °C)]   Pulse:  []   Resp:  [12-18]   BP: (143-159)/(66-70)   SpO2:  [95 %-99 %]      Recent Labs   Lab 06/03/25  1649   CREATININE 5.9*     Serum creatinine: 5.9 mg/dL (H) 06/03/25 1649  Estimated creatinine clearance: 14.7 mL/min (A)    Medication:zosyn dose: 3.375g frequency q8h will be changed to medication:zosyn dose:4.5g frequency:q12h    Pharmacist's Name: Cyn Dubon  Pharmacist's Extension:       Debra Fong MD  Department of Hospital Medicine  [facility]

## 2025-06-04 NOTE — ASSESSMENT & PLAN NOTE
This patient does have evidence of infective focus  My overall impression is sepsis without organ dysfunction.  Source: Skin and Soft Tissue Infection: Cellulitis  Antibiotics given-   Antibiotics (72h ago, onward)      Start     Stop Route Frequency Ordered    06/04/25 0630  piperacillin-tazobactam (ZOSYN) 4.5 g in D5W 100 mL IVPB (MB+)         -- IV Every 12 hours (non-standard times) 06/03/25 2126          Latest lactate reviewed-  Recent Labs   Lab 06/03/25 2143   LACTATE 2.3*     Organ dysfunction indicated by hypoglycemia    Fluid challenge Ideal Body Weight- The patient is obese (BMI>30) and their ideal body weight of Ideal body weight: 54.7 kg (120 lb 9.5 oz) will be used to calculate fluid bolus of 30 ml/kg.     Post- resuscitation assessment Yes - I attest a sepsis perfusion exam was performed within 6 hours of sepsis, severe sepsis, or septic shock presentation, following fluid resuscitation.      Will Not start Pressors- Levophed for MAP of 65  Source control achieved by: IV abxs; wound vac to left leg

## 2025-06-04 NOTE — PROCEDURES
"Pilar Fernandez is a 45 y.o. female patient.    Temp: 97.8 °F (36.6 °C) (06/04/25 0444)  Pulse: (!) 115 (06/04/25 1244)  Resp: 20 (06/04/25 1219)  BP: (!) 110/53 (06/04/25 1135)  SpO2: 96 % (06/04/25 0826)  Weight: 118 kg (260 lb 2.3 oz) (06/04/25 0950)  Height: 5' 4" (162.6 cm) (06/04/25 0950)    PICC  Date/Time: 6/4/2025 1:29 PM  Performed by: Ben Howe RN  Consent Done: Yes  Time out: Immediately prior to procedure a time out was called to verify the correct patient, procedure, equipment, support staff and site/side marked as required  Indications: med administration and vascular access  Anesthesia: local infiltration  Local anesthetic: lidocaine 1% without epinephrine  Anesthetic Total (mL): 4  Preparation: skin prepped with ChloraPrep  Skin prep agent dried: skin prep agent completely dried prior to procedure  Sterile barriers: all five maximum sterile barriers used - cap, mask, sterile gown, sterile gloves, and large sterile sheet  Hand hygiene: hand hygiene performed prior to central venous catheter insertion  Location details: right basilic  Catheter type: single lumen  Catheter size: 4 Fr  Catheter Length: 15cm    Ultrasound guidance: yes  Vessel Caliber: medium and patent, compressibility normal  Needle advanced into vessel with real time Ultrasound guidance.  Guidewire confirmed in vessel.  Sterile sheath used.  Number of attempts: 1  Post-procedure: blood return through all ports, sterile dressing applied and chlorhexidine patch    Complications: none  Comments: Arm circ- 27cm          Ben howe RN  6/4/2025    "

## 2025-06-04 NOTE — PLAN OF CARE
New referral was sent to [facility] in [place] per patient's request. Jennifer is willing to accept this patient, and they will submit for the prior auth when she is medically stable.       Jennifer Almazan  p [phone]  c [phone]  f [phone]

## 2025-06-05 NOTE — SUBJECTIVE & OBJECTIVE
Past Medical History:   Diagnosis Date    Anemia     Anemia     Breast mass     Chronic constipation     CKD (chronic kidney disease)     Diabetes mellitus     Dialysis patient     GRZEGORZ BARRIOS THURS, SAT.    Embolic stroke involving right middle cerebral artery 2024    Encounter for blood transfusion     Erosive gastritis 2016    GERD (gastroesophageal reflux disease)     Gout     High cholesterol     Hypertension     Knee pain     Thyroid disease     Unspecified cataract 2022       Past Surgical History:   Procedure Laterality Date    AV FISTULA PLACEMENT Left 2019    Procedure: creation av fistula;  Surgeon: Buck Fernandez MD;  Location: [facility] OR;  Service: Cardiovascular;  Laterality: Left;  Left Radialcephalic: PACU     SECTION  , , ,     CHOLECYSTECTOMY      COLONOSCOPY N/A 10/23/2017    Procedure: COLONOSCOPY;  Surgeon: Bri Harry MD;  [facility];  Service: Endoscopy;  Laterality: N/A;    EXCISIONAL BIOPSY Left 2022    Procedure: EXCISIONAL BIOPSY;  Surgeon: Mick Juarez MD;  Location: [facility] OR;  Service: General;  Laterality: Left;    EXPLORATION, WOUND, LOWER EXTREMITY Left 2025    Procedure: EXPLORATION, WOUND, LOWER EXTREMITY;  Surgeon: Darcy Guadalupe MD;  Location: [facility] OR;  Service: General;  Laterality: Left;    INCISION AND DRAINAGE OF ABSCESS Left 2024    Procedure: INCISION AND DRAINAGE, ABSCESS;  Surgeon: Darcy Guadalupe MD;  Location: [facility] OR;  Service: General;  Laterality: Left;    TUBAL LIGATION      UPPER GASTROINTESTINAL ENDOSCOPY  2016    Erosive Gastritis-Dr Bailey       Review of patient's allergies indicates:  No Known Allergies    No current facility-administered medications on file prior to encounter.     Current Outpatient Medications on File Prior to Encounter   Medication Sig    ACCU-CHEK GUIDE TEST STRIPS Strp USE 1 TEST STRIP TWICE DAILY AS DIRECTED TO CHECK CBG    ACCU-CHEK SOFTCLIX LANCETS  Misc Apply 1 each topically 2 (two) times daily.    acetaminophen-codeine 300-30mg (TYLENOL #3) 300-30 mg Tab Take 1 tablet by mouth every 6 (six) hours.    ALPRAZolam (XANAX) 0.5 MG tablet Take 0.5 mg by mouth. For 30 days    amoxicillin-clavulanate 500-125mg (AUGMENTIN) 500-125 mg Tab Take 1 tablet (500 mg total) by mouth 2 (two) times daily.    aspirin (ECOTRIN) 81 MG EC tablet Take 1 tablet (81 mg total) by mouth once daily.    atorvastatin (LIPITOR) 40 MG tablet Take 1 tablet by mouth once daily    calcitRIOL (ROCALTROL) 0.25 MCG Cap Take 1 capsule (0.25 mcg total) by mouth once daily.    cetirizine (ZYRTEC) 5 MG tablet Take 5 mg by mouth.    famotidine (PEPCID) 20 MG tablet Take 0.5 tablets (10 mg total) by mouth 2 (two) times daily.    hydrOXYzine HCL (ATARAX) 25 MG tablet Take 1 tablet (25 mg total) by mouth 2 (two) times daily as needed for Itching or Anxiety.    insulin aspart U-100 (NOVOLOG FLEXPEN U-100 INSULIN) 100 unit/mL (3 mL) InPn pen Inject 10 Units into the skin 2 (two) times daily with meals.    lactulose (CHRONULAC) 10 gram/15 mL solution Take 30 mLs (20 g total) by mouth 2 (two) times daily.    levoFLOXacin (LEVAQUIN) 500 MG tablet Take 1 tablet (500 mg total) by mouth once daily.    linaGLIPtin (TRADJENTA) 5 mg Tab tablet TAKE 1 TABLET (5 MG TOTAL) BY MOUTH ONCE DAILY.    LINZESS 145 mcg Cap capsule TAKE 2 CAPSULES BY MOUTH ONCE DAILY BEFORE BREAKFAST    loperamide (IMODIUM) 2 mg capsule Take by mouth.    losartan (COZAAR) 100 MG tablet Take 1 tablet (100 mg total) by mouth once daily.    magnesium oxide (MAG-OX) 400 mg (241.3 mg magnesium) tablet Take 1 tablet by mouth. (Patient not taking: Reported on 1/17/2025)    medroxyPROGESTERone (PROVERA) 10 MG tablet Take 2 tablets (20 mg total) by mouth once daily.    methoxy peg-epoetin beta (MIRCERA INJ) Inject 50 mcg into the skin.    metoclopramide HCl (REGLAN) 5 MG tablet Take 5 mg by mouth 4 (four) times daily before meals and nightly.     miconazole (MICOTIN) 2 % cream Apply topically 2 (two) times daily. Between thighs, under abdomen pannus, under breasts    nebivoloL (BYSTOLIC) 20 mg Tab Take 1 tablet by mouth once daily.    NIFEdipine (PROCARDIA-XL) 60 MG (OSM) 24 hr tablet Take 1 tablet (60 mg total) by mouth once daily.    olmesartan (BENICAR) 40 MG tablet olmesartan 40 mg tablet, [RxNorm: 162900]    ondansetron (ZOFRAN-ODT) 4 MG TbDL Take 1 tablet (4 mg total) by mouth every 8 (eight) hours as needed ([place]).    oxyCODONE (ROXICODONE) 10 mg Tab immediate release tablet Take 1 tablet (10 mg total) by mouth every 8 (eight) hours as needed for Pain.    pantoprazole (PROTONIX) 40 MG tablet Take 1 tablet (40 mg total) by mouth once daily.    predniSONE (DELTASONE) 20 MG tablet Take 1 tablet (20 mg total) by mouth once daily.    promethazine (PHENERGAN) 25 MG tablet Take 1 tablet (25 mg total) by mouth 3 (three) times daily as needed for Nausea.    SEROQUEL 50 mg tablet Take 1 tablet (50 mg total) by mouth nightly.    sevelamer carbonate (RENVELA) 800 mg Tab     SYNTHROID 100 mcg tablet Take 1 tablet (100 mcg total) by mouth before breakfast.    traMADoL (ULTRAM) 50 mg tablet Take 1 tablet (50 mg total) by mouth every 6 (six) hours as needed for Pain.    triamcinolone acetonide 0.1% (KENALOG) 0.1 % ointment Apply topically 2 (two) times daily.    ursodioL (ACTIGALL) 300 mg capsule Take 1 capsule (300 mg total) by mouth 3 (three) times daily.     Family History       Problem Relation (Age of Onset)    Breast cancer Maternal Aunt    Diabetes Father    Ovarian cancer Maternal Aunt    Thyroid disease Mother          Tobacco Use    Smoking status: Never     Passive exposure: Never    Smokeless tobacco: Never   Substance and Sexual Activity    Alcohol use: No     Alcohol/week: 0.0 standard drinks of alcohol    Drug use: No    Sexual activity: Yes     Partners: Male     Birth control/protection: None, Surgical     Review of Systems   Constitutional:   Positive for activity change. Negative for chills, fatigue and fever.   HENT:  Negative for postnasal drip and sneezing.    Respiratory:  Negative for cough and shortness of breath.    Cardiovascular:  Positive for leg swelling. Negative for chest pain and palpitations.   Gastrointestinal:  Negative for abdominal pain, diarrhea, nausea and vomiting.   Musculoskeletal:  Positive for arthralgias.   Skin:  Positive for wound.   Neurological:  Positive for weakness.     Objective:     Vital Signs (Most Recent):  Temp: 98.3 °F (36.8 °C) (06/05/25 0802)  Pulse: (!) 113 (06/05/25 0802)  Resp: 16 (06/05/25 0601)  BP: (!) 97/42 (06/05/25 0802)  SpO2: 98 % (06/05/25 0802) Vital Signs (24h Range):  Temp:  [98.3 °F (36.8 °C)-98.6 °F (37 °C)] 98.3 °F (36.8 °C)  Pulse:  [105-121] 113  Resp:  [16-20] 16  SpO2:  [91 %-99 %] 98 %  BP: ()/(42-53) 97/42     Weight: 118 kg (260 lb 2.3 oz)  Body mass index is 44.65 kg/m².     Physical Exam  Constitutional:       Appearance: She is obese.   HENT:      Nose: Nose normal.      Mouth/Throat:      Mouth: Mucous membranes are moist.   Eyes:      Extraocular Movements: Extraocular movements intact.      Pupils: Pupils are equal, round, and reactive to light.   Cardiovascular:      Rate and Rhythm: Normal rate and regular rhythm.   Pulmonary:      Effort: Pulmonary effort is normal.      Breath sounds: Normal breath sounds.      Comments: O2 via nc  Abdominal:      General: Bowel sounds are normal. There is no distension.      Palpations: Abdomen is soft.      Tenderness: There is no abdominal tenderness.      Comments: Positive edema of abd wall   Musculoskeletal:      Cervical back: Normal range of motion.      Comments: 3+ edema of lower est - up to abdominal wall  Wound vac to left leg   Neurological:      General: No focal deficit present.      Mental Status: She is alert.              CRANIAL NERVES     CN III, IV, VI   Pupils are equal, round, and reactive to light.        Significant Labs: All pertinent labs within the past 24 hours have been reviewed.  Recent Lab Results         06/05/25  0555   06/04/25  2235   06/04/25  1942   06/04/25  1652        Albumin 1.3                          ALT <8             Anion Gap 14             AST 17             Bands 6.0             BILIRUBIN TOTAL 1.2  Comment: For infants and newborns, interpretation of results should be based   on gestational age, weight and in agreement with clinical   observations.    Premature Infant recommended reference ranges:   0-24 hours:  <8.0 mg/dL   24-48 hours: <12.0 mg/dL   3-5 days:    <15.0 mg/dL   6-29 days:   <15.0 mg/dL             BUN 48             Calcium 7.7             Chloride 96             CO2 20             Creatinine 6.2             eGFR 8  Comment: Estimated GFR calculated using the CKD-EPI creatinine (2021) equation.             Glucose 58             Gran # (ANC) 24.9             Hematocrit 29.0             Hemoglobin 9.6             Lactic Acid Level     3.0         Lymph % 4.0             MCH 26.6             MCHC 33.1             MCV 80             Mono % 8.0             MPV 11.0             Myelocytes 1.0             nRBC 0             Platelet Count 302             POCT Glucose   76     56       Potassium 5.6             PROTEIN TOTAL 5.5             RBC 3.61             RDW 17.2             Segmented Neutrophil % 81.0             Sodium 130             WBC 28.66                     Significant Imaging: I have reviewed all pertinent imaging results/findings within the past 24 hours.

## 2025-06-05 NOTE — PT/OT/SLP EVAL
"Occupational Therapy Evaluation     Name: Pilar Fernandez  MRN: [id number]  Admitting Diagnosis: Sepsis  Recent Surgery: * No surgery found *      Recommendations:     Discharge Recommendations: Moderate Intensity Therapy  Level of Assistance Recommended: 24 hours significant assistance  Discharge Equipment Recommendations: to be determined by next level of care, hospital bed  Barriers to discharge:      Assessment:     Pilar Fernandez is a 45 y.o. female with a medical diagnosis of Sepsis. She presents with performance deficits affecting function including weakness, impaired endurance, impaired self care skills, impaired functional mobility, gait instability, impaired balance, decreased ROM, pain, decreased safety awareness, decreased upper extremity function, decreased lower extremity function, impaired skin, edema, impaired cardiopulmonary response to activity. Per patient and family report, patient required assistance with self care tasks such as dressing, toileting and bathing and mobility prior to admit. Currently, patient requires physical assistance with self care tasks and mobility due to the aforementioned deficits. Based on prior level of function and performance this date, acute occupational therapy services are recommended to improve functional performance during ADLs to decrease caregiver burden and improve safety and independence during functional tasks.     Rehab Prognosis: Fair and Poor; patient would benefit from acute OT services to address these deficits and reach maximum level of function.    Plan:     Patient to be seen 3 x/week to address the above listed problems via self-care/home management, therapeutic activities, therapeutic exercises  Plan of Care Expires: 06/12/25  Plan of Care Reviewed with: patient, significant other    Subjective     Chief Complaint: Flat affect. "I just did that (sit EOB)"  Patient Comments/Goals: To get better   Pain/Comfort:  Pain Rating 1: 5/10 " (Patient premedicated.)  Location - Side 1: Left  Location 1: knee  Pain Addressed 1: Reposition, Distraction  Pain Rating Post-Intervention 1: 10/10    Patients cultural, spiritual, Zoroastrianism conflicts given the current situation: yes    Social History:  Living Environment: Patient lives with their significant other in a single story home with tub-shower combo  Prior Level of Function: Prior to admission, patient requires assistance with ADLs including dressing, bathing, toileting and functional transfers. Mostly w/c bound for FM, will use RW occassionally for short distance.   Roles and Routines: Patient was not driving and not working prior to admission.  Equipment Used at Home: shower chair, wheelchair, walker, rolling, wound care supplies, oxygen  DME owned (not currently used): none  Assistance Upon Discharge: family    Objective:     Communicated with nurse prior to session. Patient found HOB elevated with PICC line, peripheral IV, telemetry, wound vac upon OT entry to room.    General Precautions: Standard, fall   Orthopedic Precautions:     Braces: N/A    Respiratory Status: Nasal cannula, flow 2 L/min    Occupational Performance      Bed Mobility:   Scooting anteriorly to EOB to have both feet planted on floor: minimum assistance  Supine to sit from left side of bed with moderate assistance used bed rails to ascend trunk forward and scoot  Sit to Supine with maximal assistance x 2 on left side of bed  With use of bed rails, max cues for sequencing and physical assistance to manage bilateral LE on/off bed.   Patient Max A x 2 to scoot to HOB while supine with bed in trendelenburg.     Functional Mobility/Transfers:  Deferred secondary to safety     Activities of Daily Living:  Feeding: set up assistance  Grooming: minimum assistance  Upper Body Dressing: maximal assistance  Lower Body Dressing: total assistance  Toileting: total assistance    Cognitive/Visual Perceptual:  Cognitive/Psychosocial Skills:     -     Oriented to: Person, Place, Time, Situation  -     Follows Commands/attention: Follows one-step commands  -     Communication: clear/fluent  -     Safety awareness/insight to disability: impaired  -     Mood/Affect/Coping skills/emotional control: Flat affect and Guarded    Physical Exam:  Balance:    -     Sitting: stand by assistance  Postural examination/scapula alignment:    -       Rounded shoulders  -       Forward head  Skin integrity: Skin tear on posterior right lower calf, dry intact bandage on lower abdomen, bandage on medial upper thigh, Wound vac for left LE.  Edema:  Severe bilateral LE, Minimal for  left upper extremities   Upper Extremity Range of Motion:     -       Right Upper Extremity: WFL  -       Left Upper Extremity: WFL  Upper Extremity Strength:    -       Right Upper Extremity: 4-/5 Grossly   -       Left Upper Extremity: 4/5 grossly    Strength:    -       Right Upper Extremity: WFL  -       Left Upper Extremity: WFL    AMPAC 6 Click ADL:  AMPAC Total Score: 12    Treatment & Education:  Therapist provided facilitation and instruction of proper body mechanics, energy conservation, and fall prevention strategies during tasks listed above  Patient educated on role of OT, POC, and goals for therapy      Patient left HOB elevated with all lines intact, call button in reach, and family present.    GOALS:   Multidisciplinary Problems       Occupational Therapy Goals          Problem: Occupational Therapy    Goal Priority Disciplines Outcome Interventions   Occupational Therapy Goal     OT, PT/OT Progressing    Description: Goals to be met by: 6/12/25     Patient will increase functional independence with ADLs by performing:    Toileting from bedside commode with Moderate Assistance for hygiene and clothing management.   Sitting at edge of bed x15 minutes with Supervision.  Toilet transfer to bedside commode with Moderate Assistance.  Upper extremity exercise program x30 reps per  handout, with assistance as needed.                           History:     Past Medical History:   Diagnosis Date    Anemia     Anemia     Breast mass     Chronic constipation     CKD (chronic kidney disease)     Diabetes mellitus     Dialysis patient     GRZEGORZ BARRIOS THURS, SAT.    Embolic stroke involving right middle cerebral artery 2024    Encounter for blood transfusion     Erosive gastritis 2016    GERD (gastroesophageal reflux disease)     Gout     High cholesterol     Hypertension     Knee pain     Thyroid disease     Unspecified cataract 2022         Past Surgical History:   Procedure Laterality Date    AV FISTULA PLACEMENT Left 2019    Procedure: creation av fistula;  Surgeon: Buck Fernandez MD;  Location: [facility];  Service: Cardiovascular;  Laterality: Left;  Left Radialcephalic: PACU     SECTION  , , ,     CHOLECYSTECTOMY      COLONOSCOPY N/A 10/23/2017    Procedure: COLONOSCOPY;  Surgeon: Bri Harry MD;  Location: [facility];  Service: Endoscopy;  Laterality: N/A;    EXCISIONAL BIOPSY Left 2022    Procedure: EXCISIONAL BIOPSY;  Surgeon: Mick Juarez MD;  Location: [facility] OR;  Service: General;  Laterality: Left;    EXPLORATION, WOUND, LOWER EXTREMITY Left 2025    Procedure: EXPLORATION, WOUND, LOWER EXTREMITY;  Surgeon: Darcy Guadalpue MD;  Location: [facility] OR;  Service: General;  Laterality: Left;    INCISION AND DRAINAGE OF ABSCESS Left 2024    Procedure: INCISION AND DRAINAGE, ABSCESS;  Surgeon: Darcy Guadalupe MD;  Location: [facility] OR;  Service: General;  Laterality: Left;    TUBAL LIGATION      UPPER GASTROINTESTINAL ENDOSCOPY  2016    Erosive Gastritis-Dr Bailey       Time Tracking:     OT Date of Treatment: 25  OT Start Time: 951  OT Stop Time:   OT Total Time (min): 21 min    Billable Minutes: Evaluation 21 Mins     2025

## 2025-06-05 NOTE — ASSESSMENT & PLAN NOTE
Creatine stable for now. BMP reviewed- noted Estimated Creatinine Clearance: 14.3 mL/min (A) (based on SCr of 6.2 mg/dL (H)). according to latest data. Based on current GFR, CKD stage is end stage.  Monitor UOP and serial BMP and adjust therapy as needed. Renally dose meds. Avoid nephrotoxic medications and procedures.  Dialysis per renal

## 2025-06-05 NOTE — ASSESSMENT & PLAN NOTE
"This patient does have evidence of infective focus  My overall impression is sepsis without organ dysfunction.  Source: Skin and Soft Tissue Infection: Cellulitis  Antibiotics given-   Antibiotics (72h ago, onward)      Start     Stop Route Frequency Ordered    06/05/25 0900  neomycin-bacitracin-polymyxin ointment         -- Top Daily 06/04/25 1921 06/04/25 2019  vancomycin - pharmacy to dose  (vancomycin IVPB (PEDS and ADULTS))        Placed in "And" Linked Group    -- IV pharmacy to manage frequency 06/04/25 1921 06/04/25 1030  mupirocin 2 % ointment         06/09/25 0859 Nasl 2 times daily 06/04/25 0916    06/04/25 0630  piperacillin-tazobactam (ZOSYN) 4.5 g in D5W 100 mL IVPB (MB+)         -- IV Every 12 hours (non-standard times) 06/03/25 2126          Latest lactate reviewed-  Recent Labs   Lab 06/04/25 1942   LACTATE 3.0*     Organ dysfunction indicated by hypoglycemia    Fluid challenge Ideal Body Weight- The patient is obese (BMI>30) and their ideal body weight of Ideal body weight: 54.7 kg (120 lb 9.5 oz) will be used to calculate fluid bolus of 30 ml/kg.     Post- resuscitation assessment Yes - I attest a sepsis perfusion exam was performed within 6 hours of sepsis, severe sepsis, or septic shock presentation, following fluid resuscitation.      Will Not start Pressors- Levophed for MAP of 65  Source control achieved by: IV abxs; wound vac to left leg  6/5 bc positive for gram Positve cocci - cont current abxs  "

## 2025-06-05 NOTE — PROGRESS NOTES
"Pharmacokinetic Initial Assessment: IV Vancomycin    Assessment/Plan:    Initiate intravenous vancomycin with loading dose of 2000 mg once with subsequent doses when random concentrations are less than 20 mcg/mL  Desired empiric serum trough concentration is 10 to 15 mcg/mL  Draw vancomycin random level on 6/7 at 04:30.  Pharmacy will continue to follow and monitor vancomycin.      Please contact pharmacy with any questions regarding this assessment.     Thank you for the consult,   Ha Chau       Patient brief summary:  Pilar Fernandez is a 45 y.o. female initiated on antimicrobial therapy with IV Vancomycin for treatment of suspected skin & soft tissue infection    Drug Allergies:   Review of patient's allergies indicates:  No Known Allergies    Actual Body Weight:   118kg    Renal Function:   Estimated Creatinine Clearance: 14.8 mL/min (A) (based on SCr of 6 mg/dL (H)).,     Dialysis Method (if applicable):  intermittent HD  Tuesday,Thursday, and Saturday    CBC (last 72 hours):  Recent Labs   Lab Result Units 06/03/25  1649 06/04/25  0831   WBC K/uL 19.60* 24.01*   HGB gm/dL 10.5* 10.2*   Hemoglobin A1c % 6.4*  --    HCT % 31.8* 30.3*   Platelet Count K/uL 342 333   Lymph % % 2.6*  --    Lymphocyte % %  --  2.0*   Mono % % 4.3  --    Monocyte % %  --  9.0   Eos % % 0.7  --    Eosinophil % %  --  2.0   Basophil % % 0.2  --        Metabolic Panel (last 72 hours):  Recent Labs   Lab Result Units 06/03/25  1649 06/04/25  0831   Sodium mmol/L 131* 131*   Potassium mmol/L 5.2* 5.1   Chloride mmol/L 95 97   CO2 mmol/L 22* 21*   Glucose mg/dL 22* 72   BUN mg/dL 48* 50*   Creatinine mg/dL 5.9* 6.0*   Albumin g/dL 1.5* 1.4*   Bilirubin Total mg/dL 1.0 1.0   ALP unit/L 322* 307*   AST unit/L 24 18   ALT unit/L <8* <8*       Drug levels (last 3 results):  No results for input(s): "VANCOMYCINRA", "VANCORANDOM", "VANCOMYCINPE", "VANCOPEAK", "VANCOMYCINTR", "VANCOTROUGH" in the last 72 hours.    Microbiologic " Results:  Microbiology Results (last 7 days)       Procedure Component Value Units Date/Time    Blood culture x two cultures. Draw prior to antibiotics. [[id number]]  (Normal) Collected: 06/03/25 1649    Order Status: Completed Specimen: Blood from Peripheral, Antecubital, Right Updated: 06/04/25 1100     Blood Culture No Growth After 6 Hours    Blood culture x two cultures. Draw prior to antibiotics. [[id number]]  (Normal) Collected: 06/03/25 1708    Order Status: Completed Specimen: Blood from Peripheral, Wrist, Right Updated: 06/04/25 1100     Blood Culture No Growth After 6 Hours

## 2025-06-05 NOTE — PLAN OF CARE

## 2025-06-05 NOTE — PLAN OF CARE
Problem: Physical Therapy  Goal: Physical Therapy Goal  Description: Goals to be met by: 2025     Patient will increase functional independence with mobility by performin. Supine to sit with Minimal Assistance.  2. Sit to supine with Moderate Assistance.  3. Bed to chair transfer with Moderate Assistance with rolling walker using Step Transfer technique.  4. Sit to Stand with Moderate Assistance with rolling walker.  5. Gait  x 10  feet with Moderate Assistance with rolling walker.  6. Lower extremity exercise program x10 reps, with assistance as needed.     Outcome: Plan of care established

## 2025-06-05 NOTE — HOSPITAL COURSE
ND pt sleeping but will waken - pt will get dilaysis today; one set bc positive for G+solo - already on vanc and zosyn   pt lethargic this am - opens eyes and will answer yes.no question bur promptly falls back asleep.   Last dose of pain med was yesterday afternoon about 1400.  Did have dialysis with 3400ml removed yesterday   AA, weekend crosscover, patient was reported to have had cardiac arrest episode earlier today, was being treated for septic shock, was notified by ICU nurse that patient had coded again and ROSC was initially obtained, code blue called again and ROSC was unable to be obtained and time of death called by ED physician at .  home notified, family members were made aware and questions answered by ED physician.

## 2025-06-05 NOTE — PLAN OF CARE
Plan of care reviewed and ongoing with patient and spouse at bedside. R UA midline infusing D5/NS @ 50 mL/hr, 2L NC tolerating well, tele electrode intact, blood glucose monitored during the night. Wound Vac in place. Call light and personal items within reach of patient.       Problem: Adult Inpatient Plan of Care  Goal: Plan of Care Review  Outcome: Not Progressing  Goal: Patient-Specific Goal (Individualized)  Outcome: Not Progressing  Goal: Absence of Hospital-Acquired Illness or Injury  Outcome: Not Progressing  Goal: Optimal Comfort and Wellbeing  Outcome: Not Progressing  Goal: Readiness for Transition of Care  Outcome: Not Progressing     Problem: Bariatric Environmental Safety  Goal: Safety Maintained with Care  Outcome: Not Progressing     Problem: Diabetes Comorbidity  Goal: Blood Glucose Level Within Targeted Range  Outcome: Not Progressing     Problem: Wound  Goal: Optimal Coping  Outcome: Not Progressing  Goal: Optimal Functional Ability  Outcome: Not Progressing  Goal: Absence of Infection Signs and Symptoms  Outcome: Not Progressing  Goal: Improved Oral Intake  Outcome: Not Progressing  Goal: Optimal Pain Control and Function  Outcome: Not Progressing  Goal: Skin Health and Integrity  Outcome: Not Progressing  Goal: Optimal Wound Healing  Outcome: Not Progressing     Problem: Skin Injury Risk Increased  Goal: Skin Health and Integrity  Outcome: Not Progressing     Problem: Fall Injury Risk  Goal: Absence of Fall and Fall-Related Injury  Outcome: Not Progressing     Problem: Hemodialysis  Goal: Safe, Effective Therapy Delivery  Outcome: Not Progressing  Goal: Effective Tissue Perfusion  Outcome: Not Progressing  Goal: Absence of Infection Signs and Symptoms  Outcome: Not Progressing     Problem: Sepsis/Septic Shock  Goal: Optimal Coping  Outcome: Not Progressing  Goal: Absence of Bleeding  Outcome: Not Progressing  Goal: Blood Glucose Level Within Targeted Range  Outcome: Not Progressing  Goal:  Absence of Infection Signs and Symptoms  Outcome: Not Progressing  Goal: Optimal Nutrition Intake  Outcome: Not Progressing     Problem: Infection  Goal: Absence of Infection Signs and Symptoms  Outcome: Not Progressing

## 2025-06-05 NOTE — ASSESSMENT & PLAN NOTE
Affecting multiple areas of her body including her abdominal wall and inner thighs  Made complicated by significant 3rd spacing edema  Need significant nutritional support  Have discussed with nephrology albumin administration during dialysis to help mobilize some of the 3rd space fluid

## 2025-06-05 NOTE — CONSULTS
[facility]  General Surgery  Consult Note  6/4/25    Patient Name: Pilar Fernandez  MRN: [id number]  Code Status: Full Code  Admission Date: 6/3/2025  Hospital Length of Stay: 1 days  Attending Physician: Debra Fong MD  Primary Care Provider: Debra Fong MD    Patient information was obtained from patient, past medical records, ER records, and primary team.     Consults  Subjective:     Principal Problem: Sepsis    History of Present Illness: Admitted for hypoglycemia    No current facility-administered medications on file prior to encounter.     Current Outpatient Medications on File Prior to Encounter   Medication Sig    ACCU-CHEK GUIDE TEST STRIPS Strp USE 1 TEST STRIP TWICE DAILY AS DIRECTED TO CHECK CBG    ACCU-CHEK SOFTCLIX LANCETS Misc Apply 1 each topically 2 (two) times daily.    acetaminophen-codeine 300-30mg (TYLENOL #3) 300-30 mg Tab Take 1 tablet by mouth every 6 (six) hours.    ALPRAZolam (XANAX) 0.5 MG tablet Take 0.5 mg by mouth. For 30 days    amoxicillin-clavulanate 500-125mg (AUGMENTIN) 500-125 mg Tab Take 1 tablet (500 mg total) by mouth 2 (two) times daily.    aspirin (ECOTRIN) 81 MG EC tablet Take 1 tablet (81 mg total) by mouth once daily.    atorvastatin (LIPITOR) 40 MG tablet Take 1 tablet by mouth once daily    calcitRIOL (ROCALTROL) 0.25 MCG Cap Take 1 capsule (0.25 mcg total) by mouth once daily.    cetirizine (ZYRTEC) 5 MG tablet Take 5 mg by mouth.    famotidine (PEPCID) 20 MG tablet Take 0.5 tablets (10 mg total) by mouth 2 (two) times daily.    hydrOXYzine HCL (ATARAX) 25 MG tablet Take 1 tablet (25 mg total) by mouth 2 (two) times daily as needed for Itching or Anxiety.    insulin aspart U-100 (NOVOLOG FLEXPEN U-100 INSULIN) 100 unit/mL (3 mL) InPn pen Inject 10 Units into the skin 2 (two) times daily with meals.    lactulose (CHRONULAC) 10 gram/15 mL solution Take 30 mLs (20 g total) by mouth 2 (two) times daily.    levoFLOXacin (LEVAQUIN) 500 MG  tablet Take 1 tablet (500 mg total) by mouth once daily.    linaGLIPtin (TRADJENTA) 5 mg Tab tablet TAKE 1 TABLET (5 MG TOTAL) BY MOUTH ONCE DAILY.    LINZESS 145 mcg Cap capsule TAKE 2 CAPSULES BY MOUTH ONCE DAILY BEFORE BREAKFAST    loperamide (IMODIUM) 2 mg capsule Take by mouth.    losartan (COZAAR) 100 MG tablet Take 1 tablet (100 mg total) by mouth once daily.    magnesium oxide (MAG-OX) 400 mg (241.3 mg magnesium) tablet Take 1 tablet by mouth. (Patient not taking: Reported on 1/17/2025)    medroxyPROGESTERone (PROVERA) 10 MG tablet Take 2 tablets (20 mg total) by mouth once daily.    methoxy peg-epoetin beta (MIRCERA INJ) Inject 50 mcg into the skin.    metoclopramide HCl (REGLAN) 5 MG tablet Take 5 mg by mouth 4 (four) times daily before meals and nightly.    miconazole (MICOTIN) 2 % cream Apply topically 2 (two) times daily. Between thighs, under abdomen pannus, under breasts    nebivoloL (BYSTOLIC) 20 mg Tab Take 1 tablet by mouth once daily.    NIFEdipine (PROCARDIA-XL) 60 MG (OSM) 24 hr tablet Take 1 tablet (60 mg total) by mouth once daily.    olmesartan (BENICAR) 40 MG tablet olmesartan 40 mg tablet, [RxNorm: 808301]    ondansetron (ZOFRAN-ODT) 4 MG TbDL Take 1 tablet (4 mg total) by mouth every 8 (eight) hours as needed ([place]).    oxyCODONE (ROXICODONE) 10 mg Tab immediate release tablet Take 1 tablet (10 mg total) by mouth every 8 (eight) hours as needed for Pain.    pantoprazole (PROTONIX) 40 MG tablet Take 1 tablet (40 mg total) by mouth once daily.    predniSONE (DELTASONE) 20 MG tablet Take 1 tablet (20 mg total) by mouth once daily.    promethazine (PHENERGAN) 25 MG tablet Take 1 tablet (25 mg total) by mouth 3 (three) times daily as needed for Nausea.    SEROQUEL 50 mg tablet Take 1 tablet (50 mg total) by mouth nightly.    sevelamer carbonate (RENVELA) 800 mg Tab     SYNTHROID 100 mcg tablet Take 1 tablet (100 mcg total) by mouth before breakfast.    traMADoL (ULTRAM) 50 mg tablet Take 1  tablet (50 mg total) by mouth every 6 (six) hours as needed for Pain.    triamcinolone acetonide 0.1% (KENALOG) 0.1 % ointment Apply topically 2 (two) times daily.    ursodioL (ACTIGALL) 300 mg capsule Take 1 capsule (300 mg total) by mouth 3 (three) times daily.       Review of patient's allergies indicates:  No Known Allergies    Past Medical History:   Diagnosis Date    Anemia     Anemia     Breast mass     Chronic constipation     CKD (chronic kidney disease)     Diabetes mellitus     Dialysis patient     GRZEGORZ BARRIOS THURS, SAT.    Embolic stroke involving right middle cerebral artery 2024    Encounter for blood transfusion     Erosive gastritis 2016    GERD (gastroesophageal reflux disease)     Gout     High cholesterol     Hypertension     Knee pain     Thyroid disease     Unspecified cataract 2022     Past Surgical History:   Procedure Laterality Date    AV FISTULA PLACEMENT Left 2019    Procedure: creation av fistula;  Surgeon: Buck Fernandez MD;  Location: [facility];  Service: Cardiovascular;  Laterality: Left;  Left Radialcephalic: PACU     SECTION  , , ,     CHOLECYSTECTOMY      COLONOSCOPY N/A 10/23/2017    Procedure: COLONOSCOPY;  Surgeon: Bri Harry MD;  Location: [facility];  Service: Endoscopy;  Laterality: N/A;    EXCISIONAL BIOPSY Left 2022    Procedure: EXCISIONAL BIOPSY;  Surgeon: Mick Juarez MD;  Location: [facility] OR;  Service: General;  Laterality: Left;    EXPLORATION, WOUND, LOWER EXTREMITY Left 2025    Procedure: EXPLORATION, WOUND, LOWER EXTREMITY;  Surgeon: Darcy Guadalupe MD;  Location: [facility] OR;  Service: General;  Laterality: Left;    INCISION AND DRAINAGE OF ABSCESS Left 2024    Procedure: INCISION AND DRAINAGE, ABSCESS;  Surgeon: Darcy Guadalupe MD;  Location: [facility] OR;  Service: General;  Laterality: Left;    TUBAL LIGATION      UPPER GASTROINTESTINAL ENDOSCOPY  2016    Erosive Gastritis-Dr Lynn Cassa  History       Problem Relation (Age of Onset)    Breast cancer Maternal Aunt    Diabetes Father    Ovarian cancer Maternal Aunt    Thyroid disease Mother          Tobacco Use    Smoking status: Never     Passive exposure: Never    Smokeless tobacco: Never   Substance and Sexual Activity    Alcohol use: No     Alcohol/week: 0.0 standard drinks of alcohol    Drug use: No    Sexual activity: Yes     Partners: Male     Birth control/protection: None, Surgical     Review of Systems   Constitutional:  Positive for activity change. Negative for chills, fatigue and fever.   HENT:  Negative for postnasal drip and sneezing.    Respiratory:  Negative for cough and shortness of breath.    Cardiovascular:  Positive for leg swelling. Negative for chest pain and palpitations.   Gastrointestinal:  Negative for abdominal pain, diarrhea, nausea and vomiting.   Musculoskeletal:  Positive for arthralgias and myalgias.   Skin:  Positive for color change and wound.   Neurological:  Positive for weakness.     Objective:     Vital Signs (Most Recent):  Temp: 98.3 °F (36.8 °C) (06/04/25 1700)  Pulse: (!) 117 (06/04/25 1700)  Resp: 18 (06/04/25 1700)  BP: (!) 86/47 (06/04/25 1700)  SpO2: 98 % (06/04/25 1700) Vital Signs (24h Range):  Temp:  [96.9 °F (36.1 °C)-98.3 °F (36.8 °C)] 98.3 °F (36.8 °C)  Pulse:  [] 117  Resp:  [12-20] 18  SpO2:  [91 %-99 %] 98 %  BP: ()/(39-70) 86/47     Weight: 118 kg (260 lb 2.3 oz)  Body mass index is 44.65 kg/m².     Physical Exam  Vitals and nursing note reviewed.   Constitutional:       Appearance: She is obese.   HENT:      Nose: Nose normal.      Mouth/Throat:      Mouth: Mucous membranes are moist.   Eyes:      Extraocular Movements: Extraocular movements intact.      Pupils: Pupils are equal, round, and reactive to light.   Cardiovascular:      Rate and Rhythm: Normal rate and regular rhythm.   Pulmonary:      Effort: Pulmonary effort is normal.      Breath sounds: Normal breath sounds.       Comments: O2 via NC  Abdominal:      General: Bowel sounds are normal. There is no distension.      Palpations: Abdomen is soft.      Tenderness: There is no abdominal tenderness.      Comments: Significant abdominal wall edema with superficial skin breakdown on nearly all of the underside of the pannus   Musculoskeletal:         General: Swelling (4+ edema) present.      Cervical back: Normal range of motion.      Right lower leg: Edema present.      Left lower leg: Edema present.      Comments: Wound vac to left lateral leg intact with no significant drainage     Neurological:      General: No focal deficit present.      Mental Status: She is alert.   Psychiatric:         Mood and Affect: Mood normal.         Behavior: Behavior normal.         Thought Content: Thought content normal.            I have reviewed all pertinent lab results within the past 24 hours.  CBC:   Recent Labs   Lab 06/04/25  0831   WBC 24.01*   RBC 3.78*   HGB 10.2*   HCT 30.3*      MCV 80*   MCH 27.0   MCHC 33.7     BMP:   Recent Labs   Lab 06/04/25  0831   GLU 72   *   K 5.1   CL 97   CO2 21*   BUN 50*   CREATININE 6.0*   CALCIUM 7.8*     CMP:   Recent Labs   Lab 06/04/25  0831   GLU 72   CALCIUM 7.8*   ALBUMIN 1.4*   PROT 5.4*   *   K 5.1   CO2 21*   CL 97   BUN 50*   CREATININE 6.0*   ALKPHOS 307*   ALT <8*   AST 18   BILITOT 1.0       Significant Diagnostics:  I have reviewed all pertinent imaging results/findings within the past 24 hours.    Assessment/Plan:     Myopathy  Multifactorial but mostly due to disuse and hypoalbuminemia  Needs therapy and rehab preferably in an LTAC setting.  Physical and occupational therapy ordered for inpatient stay  Significant nutritional support.  Supplements ordered    Panniculitis  Affecting multiple areas of her body including her abdominal wall and inner thighs  Made complicated by significant 3rd spacing edema  Need significant nutritional support  Have discussed with nephrology  albumin administration during dialysis to help mobilize some of the 3rd space fluid    Leg wound, left, sequela  Continue wound VAC.  We will change on Friday    Hypoglycemia  On IV fluids but likely significantly contributed to poor oral intake    Type 2 diabetes mellitus, with long-term current use of insulin  Per primary/admitting physician    Lymphedema  Compression therapy as noted above    ESRD (end stage renal disease) on dialysis  Per nephrology    Peripheral vascular disease  Venous greater then arterial  Needs compression therapy to bilateral lower extremities      VTE Risk Mitigation (From admission, onward)           Ordered     IP VTE HIGH RISK PATIENT  Once         06/03/25 2126     Place sequential compression device  Until discontinued         06/03/25 2126                    Thank you for your consult. I will follow-up with patient. Please contact us if you have any additional questions.    Darcy Guadalupe MD  General Surgery  [facility]

## 2025-06-05 NOTE — PT/OT/SLP EVAL
Physical Therapy Evaluation     Patient Name: Pilar Fernandez   MRN: [id number]  Recent Surgery: * No surgery found *      Recommendations:     Discharge Recommendations: Moderate Intensity Therapy (LTAC versus nursing home)   Discharge Equipment Recommendations: to be determined by next level of care   Barriers to discharge: Decreased caregiver support and Ongoing medical treatment    Assessment:     Pilar Fernandez is a 45 y.o. female admitted with a medical diagnosis of Sepsis. She presents with the following impairments/functional limitations: weakness, impaired joint extensibility, impaired endurance, decreased ROM, impaired muscle length, impaired sensation, impaired self care skills, decreased coordination, decreased upper extremity function, impaired functional mobility, decreased lower extremity function, impaired skin, decreased safety awareness, edema, impaired balance, pain, impaired cardiopulmonary response to activity,  and gait instability. Patient's significant other reports that patient sleeps on a recliner chair at home, she needs assistance with basic ADL's and functional mobility, she has not been able to ambulate for the past few months. She goes to dialysis via wheelchair and gets transported via lift van.     At the time of evaluation, patient is very hard to awaken and yet she c/o pain rated 10/10.  BP was 98/49 mmHg with HR of 116 bpm. Patient's both LE and trunk are weeping with malodorous drainage.  She has severe swelling on both LE versus lymphedema.  She looks a lot older that her stated age.  She required moderate to maximum assistance with supine <> sit  with increase time, SBA with static sitting at  edge of bed, max assist x 2 with sit <> stand using a RW. Unable to progress to ambulation.      Awaiting LTAC placement. P.T. to work with patient to assist with functional mobility to prevent further decline in function.     Rehab Prognosis: Fair and Poor; patient would  "benefit from acute PT services to address these deficits and reach maximum level of function.    Plan:     During this hospitalization, patient to be seen  (3-5x a week) to address the above listed problems via gait training, therapeutic activities, therapeutic exercises, neuromuscular re-education    Plan of Care Expires: 06/13/25    Subjective     Chief Complaint: "I need my pain pill."   Patient Comments/Goals: unstated   Pain/Comfort:  Pain Rating 1: 10/10  Location - Side 1: Bilateral  Location - Orientation 1: lower  Location 1: leg  Pain Addressed 1: Reposition, Distraction, Cessation of Activity, Nurse notified  Pain Rating Post-Intervention 1:  (did not quantify)    Social History:  Living Environment: Patient lives with their significant other in a single story home with number of outside stair(s): 3  and ramp to enter.  Prior Level of Function: Prior to admission, patient requires assistance with ADLs including bathing and dressing  and completed transfers via step transfer with moderate assistance using rolling walker  Equipment Used at Home: shower chair, wheelchair, walker, rolling, wound care supplies, oxygen  DME owned (not currently used): none  Assistance Upon Discharge: significant other    Objective:     Communicated with nurse and patient  prior to session. Patient found HOB elevated with telemetry, peripheral IV, PICC line, wound vac upon PT entry to room.    General Precautions: Standard, fall   Orthopedic Precautions: N/A   Braces: N/A    Respiratory Status: Room air    Exams:  Cognition: Patient is oriented to Person and Place  RLE ROM: decrease on hip, knee and ankle   RLE Strength: grossly graded 2-/5   LLE ROM: decrease on hip, knee and ankle   LLE Strength: grossly graded 2-/5   Fine Motor Coordination:    -       Intact  Gross Motor Coordination: WFL  Postural Exam: Patient presented with the following abnormalities:    -       Rounded shoulders  -       Forward head  Sensation:    -    "    Impaired  light/touch on both LE   Skin Integrity/Edema:     -       Skin integrity: Wound on abdomen, and both LE   -       Edema: Severe on both LE with weeping     Functional Mobility:  Gait belt applied - Yes  Bed Mobility  Rolling Left: moderate assistance and maximal assistance  Rolling Right: moderate assistance and maximal assistance  Scooting: moderate assistance and maximal assistance  Supine to Sit: moderate assistance and maximal assistance for LE management and trunk management  Sit to Supine: moderate assistance and maximal assistance for LE management and trunk management  Transfers  Sit to Stand: maximal assistance and of 2 persons with rolling walker and with cues for hand placement and foot placement  Gait  Activity did not occur   Balance  Sitting: stand by assistance  Standing: minimum assistance and of 2 persons      Therapeutic Activities and Exercises:   Patient educated on role of acute care PT and PT POC, safety while in hospital including calling nurse for mobility, and call light usage  Patient performed 1 set(s) of 10 repetitions of the following seated exercises: ankle pumps and long arc quads for bilateral LE. Patient required skilled PT for instruction of exercises and appropriate cues to perform exercises safely and appropriately.  Bed positionin g    AM-PAC 6 CLICK MOBILITY  Total Score:9    Patient left HOB elevated with all lines intact, call button in reach, RN notified, and significant other present.    GOALS:   Multidisciplinary Problems       Physical Therapy Goals          Problem: Physical Therapy    Goal Priority Disciplines Outcome Interventions   Physical Therapy Goal     PT, PT/OT Progressing    Description: Goals to be met by: 2025     Patient will increase functional independence with mobility by performin. Supine to sit with Minimal Assistance.  2. Sit to supine with Moderate Assistance.  3. Bed to chair transfer with Moderate Assistance with rolling  walker using Step Transfer technique.  4. Sit to Stand with Moderate Assistance with rolling walker.  5. Gait  x 10  feet with Moderate Assistance with rolling walker.  6. Lower extremity exercise program x10 reps, with assistance as needed.                          DME Justifications:  No DME recommended requiring DME justifications    History:     Past Medical History:   Diagnosis Date    Anemia     Anemia     Breast mass     Chronic constipation     CKD (chronic kidney disease)     Diabetes mellitus     Dialysis patient     MON, TUE, THURS, SAT.    Embolic stroke involving right middle cerebral artery 2024    Encounter for blood transfusion     Erosive gastritis 2016    GERD (gastroesophageal reflux disease)     Gout     High cholesterol     Hypertension     Knee pain     Thyroid disease     Unspecified cataract 2022       Past Surgical History:   Procedure Laterality Date    AV FISTULA PLACEMENT Left 2019    Procedure: creation av fistula;  Surgeon: Buck Fernandez MD;  Location: [facility];  Service: Cardiovascular;  Laterality: Left;  Left Radialcephalic: PACU     SECTION  , , ,     CHOLECYSTECTOMY      COLONOSCOPY N/A 10/23/2017    Procedure: COLONOSCOPY;  Surgeon: Bri Harry MD;  Location: [facility];  Service: Endoscopy;  Laterality: N/A;    EXCISIONAL BIOPSY Left 2022    Procedure: EXCISIONAL BIOPSY;  Surgeon: Mick Juarez MD;  Location: [facility];  Service: General;  Laterality: Left;    EXPLORATION, WOUND, LOWER EXTREMITY Left 2025    Procedure: EXPLORATION, WOUND, LOWER EXTREMITY;  Surgeon: Darcy Guadalupe MD;  Location: [facility];  Service: General;  Laterality: Left;    INCISION AND DRAINAGE OF ABSCESS Left 2024    Procedure: INCISION AND DRAINAGE, ABSCESS;  Surgeon: Darcy Guadalupe MD;  Location: [facility];  Service: General;  Laterality: Left;    TUBAL LIGATION      UPPER GASTROINTESTINAL ENDOSCOPY  2016    Erosive  Gastritis-Dr Bailey       Time Tracking:     PT Received On: 06/05/25  PT Start Time: 0833  PT Stop Time: 0900  PT Total Time (min): 27 min     Billable Minutes: Evaluation moderate complexity     6/5/2025     normal for race

## 2025-06-05 NOTE — PLAN OF CARE
Problem: Occupational Therapy  Goal: Occupational Therapy Goal  Description: Goals to be met by: 6/12/25     Patient will increase functional independence with ADLs by performing:    Toileting from bedside commode with Moderate Assistance for hygiene and clothing management.   Sitting at edge of bed x15 minutes with Supervision.  Toilet transfer to bedside commode with Moderate Assistance.  Upper extremity exercise program x30 reps per handout, with assistance as needed.    Outcome: Established plan of care     Mildred Nicole, OT

## 2025-06-05 NOTE — PROGRESS NOTES
[facility]  Progress Note    Patient Name: Pilar Fernandez  MRN: [id number]  Patient Class: IP- Inpatient   Admission Date: 6/3/2025  Length of Stay: 2 days  Attending Physician: Debra Fong MD  Primary Care Provider: Debra Fong MD        Subjective     Principal Problem:Sepsis        HPI:  45-year-old female with significant past medical history of ESRD disease on dialysis; chronic wounds of bilateral lower extremities with wound VAC presents the ED with reports of hypoglycemia. Family reports that she was nonresponsive. EMS states that original CBG was 30 they gave her D50 EN route to ED. when she came in ED she was alert and oriented but continues to have hyperglycemia. Patient reports she went to dialysis yesterday but has not eaten food or taking medications. Her PCP is .   Pt denies taking any insulin recently.  Reports she has been compliant with her dialysis treatment    Overview/Hospital Course:   pt sleeping but will waken - pt will get dilaysis today; one set bc positive for G+solo - already on vanc and zosyn    Past Medical History:   Diagnosis Date    Anemia     Anemia     Breast mass     Chronic constipation     CKD (chronic kidney disease)     Diabetes mellitus     Dialysis patient     MON, TUE, THURS, SAT.    Embolic stroke involving right middle cerebral artery 2024    Encounter for blood transfusion     Erosive gastritis 2016    GERD (gastroesophageal reflux disease)     Gout     High cholesterol     Hypertension     Knee pain     Thyroid disease     Unspecified cataract 2022       Past Surgical History:   Procedure Laterality Date    AV FISTULA PLACEMENT Left 2019    Procedure: creation av fistula;  Surgeon: Buck Fernandez MD;  Location: [facility];  Service: Cardiovascular;  Laterality: Left;  Left Radialcephalic: PACU     SECTION  , , ,     CHOLECYSTECTOMY      COLONOSCOPY N/A 10/23/2017     Procedure: COLONOSCOPY;  Surgeon: Bri Harry MD;  Location: [facility];  Service: Endoscopy;  Laterality: N/A;    EXCISIONAL BIOPSY Left 12/30/2022    Procedure: EXCISIONAL BIOPSY;  Surgeon: Mick Juarez MD;  Location: [facility] OR;  Service: General;  Laterality: Left;    EXPLORATION, WOUND, LOWER EXTREMITY Left 5/21/2025    Procedure: EXPLORATION, WOUND, LOWER EXTREMITY;  Surgeon: Darcy Guadalupe MD;  Location: [facility] OR;  Service: General;  Laterality: Left;    INCISION AND DRAINAGE OF ABSCESS Left 12/17/2024    Procedure: INCISION AND DRAINAGE, ABSCESS;  Surgeon: Darcy Guadalupe MD;  Location: [facility] OR;  Service: General;  Laterality: Left;    TUBAL LIGATION      UPPER GASTROINTESTINAL ENDOSCOPY  03/31/2016    Erosive Gastritis-Dr Bailey       Review of patient's allergies indicates:  No Known Allergies    No current facility-administered medications on file prior to encounter.     Current Outpatient Medications on File Prior to Encounter   Medication Sig    ACCU-CHEK GUIDE TEST STRIPS Strp USE 1 TEST STRIP TWICE DAILY AS DIRECTED TO CHECK CBG    ACCU-CHEK SOFTCLIX LANCETS Misc Apply 1 each topically 2 (two) times daily.    acetaminophen-codeine 300-30mg (TYLENOL #3) 300-30 mg Tab Take 1 tablet by mouth every 6 (six) hours.    ALPRAZolam (XANAX) 0.5 MG tablet Take 0.5 mg by mouth. For 30 days    amoxicillin-clavulanate 500-125mg (AUGMENTIN) 500-125 mg Tab Take 1 tablet (500 mg total) by mouth 2 (two) times daily.    aspirin (ECOTRIN) 81 MG EC tablet Take 1 tablet (81 mg total) by mouth once daily.    atorvastatin (LIPITOR) 40 MG tablet Take 1 tablet by mouth once daily    calcitRIOL (ROCALTROL) 0.25 MCG Cap Take 1 capsule (0.25 mcg total) by mouth once daily.    cetirizine (ZYRTEC) 5 MG tablet Take 5 mg by mouth.    famotidine (PEPCID) 20 MG tablet Take 0.5 tablets (10 mg total) by mouth 2 (two) times daily.    hydrOXYzine HCL (ATARAX) 25 MG tablet Take 1 tablet (25 mg total) by mouth 2 (two) times  daily as needed for Itching or Anxiety.    insulin aspart U-100 (NOVOLOG FLEXPEN U-100 INSULIN) 100 unit/mL (3 mL) InPn pen Inject 10 Units into the skin 2 (two) times daily with meals.    lactulose (CHRONULAC) 10 gram/15 mL solution Take 30 mLs (20 g total) by mouth 2 (two) times daily.    levoFLOXacin (LEVAQUIN) 500 MG tablet Take 1 tablet (500 mg total) by mouth once daily.    linaGLIPtin (TRADJENTA) 5 mg Tab tablet TAKE 1 TABLET (5 MG TOTAL) BY MOUTH ONCE DAILY.    LINZESS 145 mcg Cap capsule TAKE 2 CAPSULES BY MOUTH ONCE DAILY BEFORE BREAKFAST    loperamide (IMODIUM) 2 mg capsule Take by mouth.    losartan (COZAAR) 100 MG tablet Take 1 tablet (100 mg total) by mouth once daily.    magnesium oxide (MAG-OX) 400 mg (241.3 mg magnesium) tablet Take 1 tablet by mouth. (Patient not taking: Reported on 1/17/2025)    medroxyPROGESTERone (PROVERA) 10 MG tablet Take 2 tablets (20 mg total) by mouth once daily.    methoxy peg-epoetin beta (MIRCERA INJ) Inject 50 mcg into the skin.    metoclopramide HCl (REGLAN) 5 MG tablet Take 5 mg by mouth 4 (four) times daily before meals and nightly.    miconazole (MICOTIN) 2 % cream Apply topically 2 (two) times daily. Between thighs, under abdomen pannus, under breasts    nebivoloL (BYSTOLIC) 20 mg Tab Take 1 tablet by mouth once daily.    NIFEdipine (PROCARDIA-XL) 60 MG (OSM) 24 hr tablet Take 1 tablet (60 mg total) by mouth once daily.    olmesartan (BENICAR) 40 MG tablet olmesartan 40 mg tablet, [RxNorm: 214171]    ondansetron (ZOFRAN-ODT) 4 MG TbDL Take 1 tablet (4 mg total) by mouth every 8 (eight) hours as needed ([place]).    oxyCODONE (ROXICODONE) 10 mg Tab immediate release tablet Take 1 tablet (10 mg total) by mouth every 8 (eight) hours as needed for Pain.    pantoprazole (PROTONIX) 40 MG tablet Take 1 tablet (40 mg total) by mouth once daily.    predniSONE (DELTASONE) 20 MG tablet Take 1 tablet (20 mg total) by mouth once daily.    promethazine (PHENERGAN) 25 MG tablet  Take 1 tablet (25 mg total) by mouth 3 (three) times daily as needed for Nausea.    SEROQUEL 50 mg tablet Take 1 tablet (50 mg total) by mouth nightly.    sevelamer carbonate (RENVELA) 800 mg Tab     SYNTHROID 100 mcg tablet Take 1 tablet (100 mcg total) by mouth before breakfast.    traMADoL (ULTRAM) 50 mg tablet Take 1 tablet (50 mg total) by mouth every 6 (six) hours as needed for Pain.    triamcinolone acetonide 0.1% (KENALOG) 0.1 % ointment Apply topically 2 (two) times daily.    ursodioL (ACTIGALL) 300 mg capsule Take 1 capsule (300 mg total) by mouth 3 (three) times daily.     Family History       Problem Relation (Age of Onset)    Breast cancer Maternal Aunt    Diabetes Father    Ovarian cancer Maternal Aunt    Thyroid disease Mother          Tobacco Use    Smoking status: Never     Passive exposure: Never    Smokeless tobacco: Never   Substance and Sexual Activity    Alcohol use: No     Alcohol/week: 0.0 standard drinks of alcohol    Drug use: No    Sexual activity: Yes     Partners: Male     Birth control/protection: None, Surgical     Review of Systems   Constitutional:  Positive for activity change. Negative for chills, fatigue and fever.   HENT:  Negative for postnasal drip and sneezing.    Respiratory:  Negative for cough and shortness of breath.    Cardiovascular:  Positive for leg swelling. Negative for chest pain and palpitations.   Gastrointestinal:  Negative for abdominal pain, diarrhea, nausea and vomiting.   Musculoskeletal:  Positive for arthralgias.   Skin:  Positive for wound.   Neurological:  Positive for weakness.     Objective:     Vital Signs (Most Recent):  Temp: 98.3 °F (36.8 °C) (06/05/25 0802)  Pulse: (!) 113 (06/05/25 0802)  Resp: 16 (06/05/25 0601)  BP: (!) 97/42 (06/05/25 0802)  SpO2: 98 % (06/05/25 0802) Vital Signs (24h Range):  Temp:  [98.3 °F (36.8 °C)-98.6 °F (37 °C)] 98.3 °F (36.8 °C)  Pulse:  [105-121] 113  Resp:  [16-20] 16  SpO2:  [91 %-99 %] 98 %  BP: ()/(42-53)  97/42     Weight: 118 kg (260 lb 2.3 oz)  Body mass index is 44.65 kg/m².     Physical Exam  Constitutional:       Appearance: She is obese.   HENT:      Nose: Nose normal.      Mouth/Throat:      Mouth: Mucous membranes are moist.   Eyes:      Extraocular Movements: Extraocular movements intact.      Pupils: Pupils are equal, round, and reactive to light.   Cardiovascular:      Rate and Rhythm: Normal rate and regular rhythm.   Pulmonary:      Effort: Pulmonary effort is normal.      Breath sounds: Normal breath sounds.      Comments: O2 via nc  Abdominal:      General: Bowel sounds are normal. There is no distension.      Palpations: Abdomen is soft.      Tenderness: There is no abdominal tenderness.      Comments: Positive edema of abd wall   Musculoskeletal:      Cervical back: Normal range of motion.      Comments: 3+ edema of lower est - up to abdominal wall  Wound vac to left leg   Neurological:      General: No focal deficit present.      Mental Status: She is alert.              CRANIAL NERVES     CN III, IV, VI   Pupils are equal, round, and reactive to light.       Significant Labs: All pertinent labs within the past 24 hours have been reviewed.  Recent Lab Results         06/05/25  0555   06/04/25  2235   06/04/25  1942   06/04/25  1652        Albumin 1.3                          ALT <8             Anion Gap 14             AST 17             Bands 6.0             BILIRUBIN TOTAL 1.2  Comment: For infants and newborns, interpretation of results should be based   on gestational age, weight and in agreement with clinical   observations.    Premature Infant recommended reference ranges:   0-24 hours:  <8.0 mg/dL   24-48 hours: <12.0 mg/dL   3-5 days:    <15.0 mg/dL   6-29 days:   <15.0 mg/dL             BUN 48             Calcium 7.7             Chloride 96             CO2 20             Creatinine 6.2             eGFR 8  Comment: Estimated GFR calculated using the CKD-EPI creatinine (2021)  "equation.             Glucose 58             Gran # (ANC) 24.9             Hematocrit 29.0             Hemoglobin 9.6             Lactic Acid Level     3.0         Lymph % 4.0             MCH 26.6             MCHC 33.1             MCV 80             Mono % 8.0             MPV 11.0             Myelocytes 1.0             nRBC 0             Platelet Count 302             POCT Glucose   76     56       Potassium 5.6             PROTEIN TOTAL 5.5             RBC 3.61             RDW 17.2             Segmented Neutrophil % 81.0             Sodium 130             WBC 28.66                     Significant Imaging: I have reviewed all pertinent imaging results/findings within the past 24 hours.      Assessment & Plan  Peripheral vascular disease  Asa qd  Start cholesterol meds since liver function has improved    ESRD (end stage renal disease) on dialysis  Creatine stable for now. BMP reviewed- noted Estimated Creatinine Clearance: 14.3 mL/min (A) (based on SCr of 6.2 mg/dL (H)). according to latest data. Based on current GFR, CKD stage is end stage.  Monitor UOP and serial BMP and adjust therapy as needed. Renally dose meds. Avoid nephrotoxic medications and procedures.  Dialysis per renal  Hypothyroidism  On synthroid  6/5 tsh pending  Type 2 diabetes mellitus, with long-term current use of insulin  A1c 6.4%  Do not order oral meds at this time      Hypoglycemia  On iv dextrose due to recurrence of low bs - increase ivfs of d5 to 75ml/hr    Sepsis  This patient does have evidence of infective focus  My overall impression is sepsis without organ dysfunction.  Source: Skin and Soft Tissue Infection: Cellulitis  Antibiotics given-   Antibiotics (72h ago, onward)      Start     Stop Route Frequency Ordered    06/05/25 0900  neomycin-bacitracin-polymyxin ointment         -- Top Daily 06/04/25 1921 06/04/25 2019  vancomycin - pharmacy to dose  (vancomycin IVPB (PEDS and ADULTS))        Placed in "And" Linked Group    -- IV " pharmacy to manage frequency 06/04/25 1921    06/04/25 1030  mupirocin 2 % ointment         06/09/25 0859 Nasl 2 times daily 06/04/25 0916    06/04/25 0630  piperacillin-tazobactam (ZOSYN) 4.5 g in D5W 100 mL IVPB (MB+)         -- IV Every 12 hours (non-standard times) 06/03/25 2126          Latest lactate reviewed-  Recent Labs   Lab 06/04/25  1942   LACTATE 3.0*     Organ dysfunction indicated by hypoglycemia    Fluid challenge Ideal Body Weight- The patient is obese (BMI>30) and their ideal body weight of Ideal body weight: 54.7 kg (120 lb 9.5 oz) will be used to calculate fluid bolus of 30 ml/kg.     Post- resuscitation assessment Yes - I attest a sepsis perfusion exam was performed within 6 hours of sepsis, severe sepsis, or septic shock presentation, following fluid resuscitation.      Will Not start Pressors- Levophed for MAP of 65  Source control achieved by: IV abxs; wound vac to left leg  6/5 bc positive for gram Positve cocci - cont current abxs  Leg wound, left, sequela  Wound vac in place, iv abxs - plan for surgery consult today  6/5 cont iv abxs and wound vac  Tachycardia  Start toprol xl 12.5 mg po bid    Lymphedema      Panniculitis  Dialysis today    Myopathy  Pt/ot eval    VTE Risk Mitigation (From admission, onward)           Ordered     IP VTE HIGH RISK PATIENT  Once         06/03/25 2126     Place sequential compression device  Until discontinued         06/03/25 2126                    Discharge Planning   SHASHA: 6/6/2025     Code Status: Full Code   Medical Readiness for Discharge Date:   Discharge Plan A: Long-term acute care facility (LTAC)                Please place Justification for DME        Debra Fong MD  Department of Hospital Medicine   [facility]

## 2025-06-06 PROBLEM — E03.9 HYPOTHYROIDISM: Chronic | Status: RESOLVED | Noted: 2020-10-06 | Resolved: 2025-01-01

## 2025-06-06 PROBLEM — E16.2 HYPOGLYCEMIA: Status: RESOLVED | Noted: 2024-01-01 | Resolved: 2025-01-01

## 2025-06-06 PROBLEM — I89.0 LYMPHEDEMA: Chronic | Status: RESOLVED | Noted: 2020-01-01 | Resolved: 2025-01-01

## 2025-06-06 PROBLEM — N18.9 HISTORY OF ANEMIA DUE TO CKD: Status: RESOLVED | Noted: 2019-08-05 | Resolved: 2025-01-01

## 2025-06-06 PROBLEM — R41.82 ALTERED MENTAL STATUS: Status: RESOLVED | Noted: 2024-01-01 | Resolved: 2025-01-01

## 2025-06-06 PROBLEM — N25.81 SECONDARY HYPERPARATHYROIDISM: Status: RESOLVED | Noted: 2024-01-01 | Resolved: 2025-01-01

## 2025-06-06 PROBLEM — R00.0 TACHYCARDIA: Status: RESOLVED | Noted: 2025-01-01 | Resolved: 2025-01-01

## 2025-06-06 PROBLEM — S81.802S LEG WOUND, LEFT, SEQUELA: Status: RESOLVED | Noted: 2025-01-01 | Resolved: 2025-01-01

## 2025-06-06 PROBLEM — I73.9 PERIPHERAL VASCULAR DISEASE: Chronic | Status: RESOLVED | Noted: 2019-08-05 | Resolved: 2025-01-01

## 2025-06-06 PROBLEM — E11.9 TYPE 2 DIABETES MELLITUS, WITH LONG-TERM CURRENT USE OF INSULIN: Chronic | Status: RESOLVED | Noted: 2024-01-01 | Resolved: 2025-01-01

## 2025-06-06 PROBLEM — M79.3 PANNICULITIS: Status: RESOLVED | Noted: 2025-01-01 | Resolved: 2025-01-01

## 2025-06-06 PROBLEM — G72.9 MYOPATHY: Status: RESOLVED | Noted: 2025-01-01 | Resolved: 2025-01-01

## 2025-06-06 PROBLEM — A41.9 SEPSIS: Status: RESOLVED | Noted: 2025-01-01 | Resolved: 2025-01-01

## 2025-06-06 PROBLEM — N18.6 ESRD (END STAGE RENAL DISEASE) ON DIALYSIS: Chronic | Status: RESOLVED | Noted: 2020-10-06 | Resolved: 2025-01-01

## 2025-06-06 PROBLEM — Z99.2 ESRD (END STAGE RENAL DISEASE) ON DIALYSIS: Chronic | Status: RESOLVED | Noted: 2020-10-06 | Resolved: 2025-01-01

## 2025-06-06 PROBLEM — Z86.2 HISTORY OF ANEMIA DUE TO CKD: Status: RESOLVED | Noted: 2019-08-05 | Resolved: 2025-01-01

## 2025-06-06 PROBLEM — Z79.4 TYPE 2 DIABETES MELLITUS, WITH LONG-TERM CURRENT USE OF INSULIN: Chronic | Status: RESOLVED | Noted: 2024-01-01 | Resolved: 2025-01-01

## 2025-06-06 NOTE — ASSESSMENT & PLAN NOTE
ESRD- pt tolerated HD today - seen on dialysis during treatment - tolerated well.  Plan next treatment Sat am with UF goal 3-4 liters as tolerated.

## 2025-06-06 NOTE — PT/OT/SLP PROGRESS
Physical Therapy      Patient Name:  Pilar Fernandez   MRN:  [id number]    Patient not seen this morning secondary to patient had a bowel movement and needs to be cleaned, attending nurse Page Vaughn RN made aware. Will follow-up as appropriate. .

## 2025-06-06 NOTE — PT/OT/SLP PROGRESS
Occupational Therapy      Patient Name:  Pilar Fernandez   MRN:  [id number]    Patient not seen today secondary to Other (Comment), Nurse/ MARY ALICE hold (Per nurse, abnomal lab values and increased lethargy.). Will follow-up at later time/date as appropriate.    Mildred Nicole, OT      6/6/2025

## 2025-06-06 NOTE — NURSING
PCT notified nurse at 1620 that patient was unresponsive. Nurse entered room with glucometer and crash cart to find patient unresponsive. RR called. Patient had a pulse at this time. BS 44. 1 amp of D50 given per orders. Recheck . Dr. Fong at bedside. Loss of pulse noted, compressions initiated, code blue called. Refer to Siomara Patel RN note for full detail of code. Pt brought to ICU. Report given to TYLER Masterson.

## 2025-06-06 NOTE — PROCEDURES
"Pilar Fernandez is a 45 y.o. female patient.    Temp: 97.9 °F (36.6 °C) (06/06/25 1715)  Pulse: 75 (06/06/25 1745)  Resp: 20 (06/06/25 1745)  BP: (!) 61/29 (06/06/25 1717)  SpO2: (!) 86 % (06/06/25 1745)  Weight: 118 kg (260 lb 2.3 oz) (06/04/25 0950)  Height: 5' 4" (162.6 cm) (06/04/25 0950)    PICC  Date/Time: 6/6/2025 6:35 PM  Performed by: Ben Howe RN  Consent Done: Yes  Time out: Immediately prior to procedure a time out was called to verify the correct patient, procedure, equipment, support staff and site/side marked as required  Indications: med administration and vascular access  Anesthesia: local infiltration  Local anesthetic: lidocaine 1% without epinephrine  Anesthetic Total (mL): 4  Preparation: skin prepped with ChloraPrep  Skin prep agent dried: skin prep agent completely dried prior to procedure  Sterile barriers: all five maximum sterile barriers used - cap, mask, sterile gown, sterile gloves, and large sterile sheet  Hand hygiene: hand hygiene performed prior to central venous catheter insertion  Location details: right basilic  Catheter type: triple lumen  Catheter size: 5 Fr  Catheter Length: 36cm    noVessel Caliber: medium and patent, compressibility normal  Needle advanced into vessel with real time Ultrasound guidance.  Guidewire confirmed in vessel.  Sterile sheath used.    Assessment: placement verified by x-ray  Complications: none          Ben Howe RN  6/6/2025    "

## 2025-06-06 NOTE — RESPIRATORY THERAPY
Unable to obtain ABG at 1810 and 1815. Weak pulse and limited access. Attempted ABG at right brachial artery.

## 2025-06-06 NOTE — SUBJECTIVE & OBJECTIVE
Past Medical History:   Diagnosis Date    Anemia     Anemia     Breast mass     Chronic constipation     CKD (chronic kidney disease)     Diabetes mellitus     Dialysis patient     GRZEGORZ BARRIOS THURS, SAT.    Embolic stroke involving right middle cerebral artery 2024    Encounter for blood transfusion     Erosive gastritis 2016    GERD (gastroesophageal reflux disease)     Gout     High cholesterol     Hypertension     Knee pain     Thyroid disease     Unspecified cataract 2022       Past Surgical History:   Procedure Laterality Date    AV FISTULA PLACEMENT Left 2019    Procedure: creation av fistula;  Surgeon: Buck Fernandez MD;  Location: [facility] OR;  Service: Cardiovascular;  Laterality: Left;  Left Radialcephalic: PACU     SECTION  , , ,     CHOLECYSTECTOMY      COLONOSCOPY N/A 10/23/2017    Procedure: COLONOSCOPY;  Surgeon: Bri Harry MD;  Location: [facility];  Service: Endoscopy;  Laterality: N/A;    EXCISIONAL BIOPSY Left 2022    Procedure: EXCISIONAL BIOPSY;  Surgeon: Mick Juarez MD;  Location: [facility] OR;  Service: General;  Laterality: Left;    EXPLORATION, WOUND, LOWER EXTREMITY Left 2025    Procedure: EXPLORATION, WOUND, LOWER EXTREMITY;  Surgeon: Darcy Guadalupe MD;  Location: [facility] OR;  Service: General;  Laterality: Left;    INCISION AND DRAINAGE OF ABSCESS Left 2024    Procedure: INCISION AND DRAINAGE, ABSCESS;  Surgeon: Darcy Guadalupe MD;  Location: [facility] OR;  Service: General;  Laterality: Left;    TUBAL LIGATION      UPPER GASTROINTESTINAL ENDOSCOPY  2016    Erosive Gastritis-Dr Bailey       Review of patient's allergies indicates:  No Known Allergies  Current Facility-Administered Medications   Medication Frequency    aspirin EC tablet 81 mg Daily    atorvastatin tablet 40 mg QHS    collagenase ointment Daily    dextrose 5 % and 0.9 % NaCl infusion Continuous    dextrose 50% injection 12.5 g PRN    dextrose 50% injection  25 g PRN    [START ON 6/6/2025] famotidine tablet 20 mg Q48H    glucagon (human recombinant) injection 1 mg PRN    glucose chewable tablet 16 g PRN    glucose chewable tablet 24 g PRN    insulin aspart U-100 pen 0-5 Units QID (AC + HS) PRN    [START ON 6/6/2025] levothyroxine tablet 125 mcg Before breakfast    melatonin tablet 6 mg Nightly PRN    metoprolol succinate (TOPROL-XL) 24 hr split tablet 12.5 mg BID    midodrine tablet 10 mg BID WM    morphine injection 4 mg Q4H PRN    mupirocin 2 % ointment BID    neomycin-bacitracin-polymyxin ointment Daily    ondansetron injection 4 mg Q8H PRN    oxyCODONE immediate release tablet 5 mg Q4H PRN    piperacillin-tazobactam (ZOSYN) 4.5 g in D5W 100 mL IVPB (MB+) Q12H    sodium chloride 0.9% flush 10 mL PRN    sodium chloride 0.9% flush 10 mL Q12H PRN    vancomycin - pharmacy to dose pharmacy to manage frequency     Family History       Problem Relation (Age of Onset)    Breast cancer Maternal Aunt    Diabetes Father    Ovarian cancer Maternal Aunt    Thyroid disease Mother          Tobacco Use    Smoking status: Never     Passive exposure: Never    Smokeless tobacco: Never   Substance and Sexual Activity    Alcohol use: No     Alcohol/week: 0.0 standard drinks of alcohol    Drug use: No    Sexual activity: Yes     Partners: Male     Birth control/protection: None, Surgical     Review of Systems   Constitutional:  Positive for activity change, appetite change and fatigue.   Respiratory:  Positive for shortness of breath.    Genitourinary:  Positive for decreased urine volume.   Neurological:  Positive for weakness.   All other systems reviewed and are negative.    Objective:     Vital Signs (Most Recent):  Temp: 98.6 °F (37 °C) (06/05/25 2002)  Pulse: (!) 115 (06/05/25 2002)  Resp: 18 (06/05/25 1749)  BP: (!) 111/44 (06/05/25 2002)  SpO2: 95 % (06/05/25 2002) Vital Signs (24h Range):  Temp:  [97.6 °F (36.4 °C)-99.6 °F (37.6 °C)] 98.6 °F (37 °C)  Pulse:  [] 115  Resp:   [16-18] 18  SpO2:  [91 %-98 %] 95 %  BP: ()/(42-67) 111/44     Weight: 118 kg (260 lb 2.3 oz) (06/04/25 0950)  Body mass index is 44.65 kg/m².  Body surface area is 2.31 meters squared.    I/O last 3 completed shifts:  In: 500 [P.O.:100; Other:400]  Out: 3400 [Other:3400]     Physical Exam  Vitals reviewed.   Constitutional:       Appearance: Normal appearance. She is obese.   HENT:      Head: Normocephalic and atraumatic.   Cardiovascular:      Rate and Rhythm: Normal rate and regular rhythm.      Heart sounds: Normal heart sounds.   Pulmonary:      Effort: Pulmonary effort is normal.      Breath sounds: Rales present.   Abdominal:      General: Bowel sounds are normal.      Palpations: Abdomen is soft.   Skin:     General: Skin is warm and dry.   Neurological:      General: No focal deficit present.      Mental Status: She is alert and oriented to person, place, and time. Mental status is at baseline.   Psychiatric:         Mood and Affect: Mood normal.         Behavior: Behavior normal.         Thought Content: Thought content normal.         Judgment: Judgment normal.          Significant Labs:  CBC:   Recent Labs   Lab 06/05/25  0555   WBC 28.66*   RBC 3.61*   HGB 9.6*   HCT 29.0*      MCV 80*   MCH 26.6*   MCHC 33.1     CMP:   Recent Labs   Lab 06/05/25  0555   GLU 58*   CALCIUM 7.7*   ALBUMIN 1.3*   PROT 5.5*   *   K 5.6*   CO2 20*   CL 96   BUN 48*   CREATININE 6.2*   ALKPHOS 374*   ALT <8*   AST 17   BILITOT 1.2*     Microbiology Results (last 7 days)       Procedure Component Value Units Date/Time    Blood culture x two cultures. Draw prior to antibiotics. [[id number]]  (Normal) Collected: 06/03/25 1649    Order Status: Completed Specimen: Blood from Peripheral, Antecubital, Right Updated: 06/05/25 1701     Blood Culture No Growth After 36 Hours    MRSA/SA Rapid ID by PCR from Blood culture [[id number]]  (Normal) Collected: 06/03/25 1708    Order Status: Completed Specimen: Blood from  "Peripheral, Wrist, Right Updated: 06/05/25 0224     Staph aureus ID by PCR Negative     MRSA ID by PCR Negative    Blood culture x two cultures. Draw prior to antibiotics. [[id number]]  (Abnormal) Collected: 06/03/25 1708    Order Status: Completed Specimen: Blood from Peripheral, Wrist, Right Updated: 06/05/25 0038     Blood Culture Positive - Aerobic and Anaerobic Bottles     GRAM STAIN Gram positive cocci in clusters resembling Staph     Comment: Aerobic Bottle Positive          Gram positive cocci in clusters resembling Staph     Comment: Anaerobic Bottle Positive              No results for input(s): "COLORU", "CLARITYU", "SPECGRAV", "PHUR", "PROTEINUA", "GLUCOSEU", "BILIRUBINCON", "BLOODU", "WBCU", "RBCU", "BACTERIA", "MUCUS", "NITRITE", "LEUKOCYTESUR", "UROBILINOGEN", "HYALINECASTS" in the last 168 hours.  All labs within the past 24 hours have been reviewed.    Significant Imaging:  Labs: Reviewed  X-Ray: Reviewed  US: Reviewed  CT: Reviewed  "

## 2025-06-06 NOTE — ASSESSMENT & PLAN NOTE
Ammonia level, new bc today  Cxr and abg  Ct head and abd/pelvis to evaluate panniculitlis and hypoglycemia

## 2025-06-06 NOTE — SUBJECTIVE & OBJECTIVE
Past Medical History:   Diagnosis Date    Anemia     Anemia     Breast mass     Chronic constipation     CKD (chronic kidney disease)     Diabetes mellitus     Dialysis patient     GRZEGORZ BARRIOS THURS, SAT.    Embolic stroke involving right middle cerebral artery 2024    Encounter for blood transfusion     Erosive gastritis 2016    GERD (gastroesophageal reflux disease)     Gout     High cholesterol     Hypertension     Knee pain     Thyroid disease     Unspecified cataract 2022       Past Surgical History:   Procedure Laterality Date    AV FISTULA PLACEMENT Left 2019    Procedure: creation av fistula;  Surgeon: Buck Fernandez MD;  Location: [facility] OR;  Service: Cardiovascular;  Laterality: Left;  Left Radialcephalic: PACU     SECTION  , , ,     CHOLECYSTECTOMY      COLONOSCOPY N/A 10/23/2017    Procedure: COLONOSCOPY;  Surgeon: Bri Harry MD;  Location: [facility];  Service: Endoscopy;  Laterality: N/A;    EXCISIONAL BIOPSY Left 2022    Procedure: EXCISIONAL BIOPSY;  Surgeon: Mick Juarez MD;  Location: [facility] OR;  Service: General;  Laterality: Left;    EXPLORATION, WOUND, LOWER EXTREMITY Left 2025    Procedure: EXPLORATION, WOUND, LOWER EXTREMITY;  Surgeon: Darcy Guadalupe MD;  Location: [facility] OR;  Service: General;  Laterality: Left;    INCISION AND DRAINAGE OF ABSCESS Left 2024    Procedure: INCISION AND DRAINAGE, ABSCESS;  Surgeon: Darcy Guadalupe MD;  Location: [facility] OR;  Service: General;  Laterality: Left;    TUBAL LIGATION      UPPER GASTROINTESTINAL ENDOSCOPY  2016    Erosive Gastritis-Dr Bailey       Review of patient's allergies indicates:  No Known Allergies    No current facility-administered medications on file prior to encounter.     Current Outpatient Medications on File Prior to Encounter   Medication Sig    ACCU-CHEK GUIDE TEST STRIPS Strp USE 1 TEST STRIP TWICE DAILY AS DIRECTED TO CHECK CBG    ACCU-CHEK SOFTCLIX LANCETS  Misc Apply 1 each topically 2 (two) times daily.    acetaminophen-codeine 300-30mg (TYLENOL #3) 300-30 mg Tab Take 1 tablet by mouth every 6 (six) hours.    ALPRAZolam (XANAX) 0.5 MG tablet Take 0.5 mg by mouth. For 30 days    amoxicillin-clavulanate 500-125mg (AUGMENTIN) 500-125 mg Tab Take 1 tablet (500 mg total) by mouth 2 (two) times daily.    aspirin (ECOTRIN) 81 MG EC tablet Take 1 tablet (81 mg total) by mouth once daily.    atorvastatin (LIPITOR) 40 MG tablet Take 1 tablet by mouth once daily    calcitRIOL (ROCALTROL) 0.25 MCG Cap Take 1 capsule (0.25 mcg total) by mouth once daily.    cetirizine (ZYRTEC) 5 MG tablet Take 5 mg by mouth.    famotidine (PEPCID) 20 MG tablet Take 0.5 tablets (10 mg total) by mouth 2 (two) times daily.    hydrOXYzine HCL (ATARAX) 25 MG tablet Take 1 tablet (25 mg total) by mouth 2 (two) times daily as needed for Itching or Anxiety.    insulin aspart U-100 (NOVOLOG FLEXPEN U-100 INSULIN) 100 unit/mL (3 mL) InPn pen Inject 10 Units into the skin 2 (two) times daily with meals.    lactulose (CHRONULAC) 10 gram/15 mL solution Take 30 mLs (20 g total) by mouth 2 (two) times daily.    levoFLOXacin (LEVAQUIN) 500 MG tablet Take 1 tablet (500 mg total) by mouth once daily.    linaGLIPtin (TRADJENTA) 5 mg Tab tablet TAKE 1 TABLET (5 MG TOTAL) BY MOUTH ONCE DAILY.    LINZESS 145 mcg Cap capsule TAKE 2 CAPSULES BY MOUTH ONCE DAILY BEFORE BREAKFAST    loperamide (IMODIUM) 2 mg capsule Take by mouth.    losartan (COZAAR) 100 MG tablet Take 1 tablet (100 mg total) by mouth once daily.    magnesium oxide (MAG-OX) 400 mg (241.3 mg magnesium) tablet Take 1 tablet by mouth. (Patient not taking: Reported on 1/17/2025)    medroxyPROGESTERone (PROVERA) 10 MG tablet Take 2 tablets (20 mg total) by mouth once daily.    methoxy peg-epoetin beta (MIRCERA INJ) Inject 50 mcg into the skin.    metoclopramide HCl (REGLAN) 5 MG tablet Take 5 mg by mouth 4 (four) times daily before meals and nightly.     miconazole (MICOTIN) 2 % cream Apply topically 2 (two) times daily. Between thighs, under abdomen pannus, under breasts    nebivoloL (BYSTOLIC) 20 mg Tab Take 1 tablet by mouth once daily.    NIFEdipine (PROCARDIA-XL) 60 MG (OSM) 24 hr tablet Take 1 tablet (60 mg total) by mouth once daily.    olmesartan (BENICAR) 40 MG tablet olmesartan 40 mg tablet, [RxNorm: 185156]    ondansetron (ZOFRAN-ODT) 4 MG TbDL Take 1 tablet (4 mg total) by mouth every 8 (eight) hours as needed ([place]).    oxyCODONE (ROXICODONE) 10 mg Tab immediate release tablet Take 1 tablet (10 mg total) by mouth every 8 (eight) hours as needed for Pain.    pantoprazole (PROTONIX) 40 MG tablet Take 1 tablet (40 mg total) by mouth once daily.    predniSONE (DELTASONE) 20 MG tablet Take 1 tablet (20 mg total) by mouth once daily.    promethazine (PHENERGAN) 25 MG tablet Take 1 tablet (25 mg total) by mouth 3 (three) times daily as needed for Nausea.    SEROQUEL 50 mg tablet Take 1 tablet (50 mg total) by mouth nightly.    sevelamer carbonate (RENVELA) 800 mg Tab     SYNTHROID 100 mcg tablet Take 1 tablet (100 mcg total) by mouth before breakfast.    traMADoL (ULTRAM) 50 mg tablet Take 1 tablet (50 mg total) by mouth every 6 (six) hours as needed for Pain.    triamcinolone acetonide 0.1% (KENALOG) 0.1 % ointment Apply topically 2 (two) times daily.    ursodioL (ACTIGALL) 300 mg capsule Take 1 capsule (300 mg total) by mouth 3 (three) times daily.     Family History       Problem Relation (Age of Onset)    Breast cancer Maternal Aunt    Diabetes Father    Ovarian cancer Maternal Aunt    Thyroid disease Mother          Tobacco Use    Smoking status: Never     Passive exposure: Never    Smokeless tobacco: Never   Substance and Sexual Activity    Alcohol use: No     Alcohol/week: 0.0 standard drinks of alcohol    Drug use: No    Sexual activity: Yes     Partners: Male     Birth control/protection: None, Surgical     Review of Systems   Constitutional:   Positive for activity change. Negative for chills, fatigue and fever.   HENT:  Negative for postnasal drip and sneezing.    Respiratory:  Negative for cough and shortness of breath.    Cardiovascular:  Positive for leg swelling. Negative for chest pain and palpitations.   Gastrointestinal:  Negative for abdominal pain, diarrhea, nausea and vomiting.   Musculoskeletal:  Positive for arthralgias.   Skin:  Positive for wound.   Neurological:  Positive for weakness.     Objective:     Vital Signs (Most Recent):  Temp: 98.8 °F (37.1 °C) (06/06/25 0512)  Pulse: 106 (06/06/25 0512)  Resp: 16 (06/06/25 0512)  BP: (!) 102/49 (06/06/25 0512)  SpO2: (!) 92 % (06/06/25 0512) Vital Signs (24h Range):  Temp:  [97.6 °F (36.4 °C)-99.6 °F (37.6 °C)] 98.8 °F (37.1 °C)  Pulse:  [] 106  Resp:  [16-18] 16  SpO2:  [92 %-98 %] 92 %  BP: ()/(42-67) 102/49     Weight: 118 kg (260 lb 2.3 oz)  Body mass index is 44.65 kg/m².     Physical Exam  Constitutional:       Appearance: She is obese. She is ill-appearing.      Comments: lethargic   HENT:      Nose: Nose normal.      Mouth/Throat:      Mouth: Mucous membranes are moist.   Eyes:      Extraocular Movements: Extraocular movements intact.      Pupils: Pupils are equal, round, and reactive to light.   Cardiovascular:      Rate and Rhythm: Normal rate and regular rhythm.   Pulmonary:      Effort: Pulmonary effort is normal.      Breath sounds: Normal breath sounds.      Comments: O2 via nc  Abdominal:      General: Bowel sounds are normal. There is no distension.      Palpations: Abdomen is soft.      Tenderness: There is no abdominal tenderness.      Comments: Positive edema of abd wall   Musculoskeletal:      Cervical back: Normal range of motion.      Comments: 3+ edema of lower est - up to abdominal wall  Wound vac to left leg   Neurological:      General: No focal deficit present.              CRANIAL NERVES     CN III, IV, VI   Pupils are equal, round, and reactive to  light.       Significant Labs: All pertinent labs within the past 24 hours have been reviewed.  Recent Lab Results         06/06/25  0534   06/05/25  2038   06/05/25  1812   06/05/25  1746        Albumin 1.2                          ALT <8             Anion Gap 14             AST 17             Bands 11.0             BILIRUBIN TOTAL 1.1  Comment: For infants and newborns, interpretation of results should be based   on gestational age, weight and in agreement with clinical   observations.    Premature Infant recommended reference ranges:   0-24 hours:  <8.0 mg/dL   24-48 hours: <12.0 mg/dL   3-5 days:    <15.0 mg/dL   6-29 days:   <15.0 mg/dL             BUN 42             Calcium 7.9             Chloride 99             CO2 20             Creatinine 5.4             eGFR 9  Comment: Estimated GFR calculated using the CKD-EPI creatinine (2021) equation.             Glucose 88             Gran # (ANC) 30.6             Hematocrit 27.1             Hemoglobin 8.9             Lymph % 2.0             MCH 26.8             MCHC 32.8             MCV 82             Metamyelocytes 3.0             Mono % 7.0             MPV 11.2             Myelocytes 3.0             nRBC 0             Platelet Count 234             POCT Glucose   103   123   44       Potassium 5.0             PROTEIN TOTAL 5.2             RBC 3.32             RDW 17.4             Segmented Neutrophil % 74.0             Sodium 133             WBC 36.01                     Significant Imaging: I have reviewed all pertinent imaging results/findings within the past 24 hours.

## 2025-06-06 NOTE — ANESTHESIA PROCEDURE NOTES
Ad Hoc Intubation    Date/Time: 6/6/2025 5:01 PM    Performed by: Robbie Jones CRNA  Authorized by: Robbie Jones CRNA    Indications:  Airway protection  Diagnosis:  Cardiac Arrest  Provider Requesting Consult:  Hetal  Patient Location:  Floor  Timeout:  6/6/2025 4:35 PM  Procedure Start Time:  6/6/2025 4:35 PM  Procedure End Time:  6/6/2025 4:36 PM  Staff:     Anesthesiologist Present: No    Intubation:     Induction: None required.    Mask Ventilation:  Easy mask    Attempts:  1    Attempted By:  CRNA    Method of Intubation:  Direct    Blade:  Arechiga 2    Laryngeal View Grade: Grade I - full view of chords      Difficult Airway Encountered?: No      Complications:  None    Airway Device:  Oral endotracheal tube    Airway Device Size:  7.5    Style/Cuff Inflation:  Cuffed (inflated to minimal occlusive pressure)    Tube secured:  21    Secured at:  The lips    Placement Verified By:  Colorimetric ETCO2 device    Complicating Factors:  None    Findings Post-Intubation:  BS equal bilateral and atraumatic/condition of teeth unchanged  Notes:      Present on M/S for a separate patient transport. [employer] called. Pt reportedly has low blood sugar, up to 120's after intervention prior to my arrival. Pt bradied down. Presenting rhythm: Asystole. CPR in progress. Epi x1mg to IV per team, BVM in place. Pt intubated. Good carotid pulse with compressions. Pt regained Sinus Tach in 110's. Bradied down. CPR initiated, Atropine 1mg IVP. Sinus tach obtained. Transported to ICU. Dr. Fong present entire time.

## 2025-06-06 NOTE — PLAN OF CARE
attempted to reach Kaela with [facility] to check on acceptance of patient for services - had to leave a message for a return phone call.

## 2025-06-06 NOTE — ASSESSMENT & PLAN NOTE
Creatine stable for now. BMP reviewed- noted Estimated Creatinine Clearance: 16.4 mL/min (A) (based on SCr of 5.4 mg/dL (H)). according to latest data. Based on current GFR, CKD stage is end stage.  Monitor UOP and serial BMP and adjust therapy as needed. Renally dose meds. Avoid nephrotoxic medications and procedures.  Dialysis per renal  6/6 dialysis removed 3400ml yesterday

## 2025-06-06 NOTE — CONSULTS
[facility] Surg  Nephrology  Consult Note    Patient Name: Pilar Fernandez  MRN: [id number]  Admission Date: 6/3/2025  Hospital Length of Stay: 2 days  Attending Provider: Debra Fong MD   Primary Care Physician: Debra Fong MD  Principal Problem:Sepsis    Consults  Subjective:     HPI:   Altered Mental Status        Pt in by EMS with AMS and low glucose.  Pt has multiple wounds including wound vac.  Pt also with BP 80s/40s. Initial glucose 39, 30 g PO glucose tabs given. Repeat glucose by EMS 40      45-year-old female with significant past medical history for and Yumiko disease on dialysis chronic wounds of bilateral lower extremities with wound VAC presents the ED with reports of hypoglycemia.  Family reports that she was nonresponsive.  EMS states that original CBG was 30 they gave her D50 EN route to ED. when she came in ED she was alert and oriented but continues to have hyperglycemia.  Patient reports she went to dialysis yesterday but has not eaten food or taking medications.  Her PCP is .            Review of patient's allergies indicates:  No Known Allergies       Past Medical History:   Diagnosis Date    Anemia      Anemia      Breast mass      Chronic constipation      CKD (chronic kidney disease)      Diabetes mellitus      Dialysis patient       MON, TUE, THURS, SAT.    Embolic stroke involving right middle cerebral artery 2024    Encounter for blood transfusion      Erosive gastritis 2016    GERD (gastroesophageal reflux disease)      Gout      High cholesterol      Hypertension      Knee pain      Thyroid disease      Unspecified cataract 2022            Past Surgical History:   Procedure Laterality Date    AV FISTULA PLACEMENT Left 2019     Procedure: creation av fistula;  Surgeon: Buck Fernandez MD;  Location: [facility];  Service: Cardiovascular;  Laterality: Left;  Left Radialcephalic: PACU     SECTION   , , ,      CHOLECYSTECTOMY        COLONOSCOPY N/A 10/23/2017     Procedure: COLONOSCOPY;  Surgeon: Bri Harry MD;  Location: [facility];  Service: Endoscopy;  Laterality: N/A;    EXCISIONAL BIOPSY Left 12/30/2022     Procedure: EXCISIONAL BIOPSY;  Surgeon: Mick Juarez MD;  Location: [facility] OR;  Service: General;  Laterality: Left;    EXPLORATION, WOUND, LOWER EXTREMITY Left 5/21/2025     Procedure: EXPLORATION, WOUND, LOWER EXTREMITY;  Surgeon: Darcy Guadalupe MD;  Location: [facility] OR;  Service: General;  Laterality: Left;    INCISION AND DRAINAGE OF ABSCESS Left 12/17/2024     Procedure: INCISION AND DRAINAGE, ABSCESS;  Surgeon: Darcy Guadalupe MD;  Location: [facility] OR;  Service: General;  Laterality: Left;    TUBAL LIGATION        UPPER GASTROINTESTINAL ENDOSCOPY   03/31/2016     Erosive Gastritis-Dr Bailey             Family History   Problem Relation Name Age of Onset    Thyroid disease Mother        Diabetes Father        Ovarian cancer Maternal Aunt Malathi      Breast cancer Maternal Aunt Ada        [Social History]    [Social History]        Tobacco Use    Smoking status: Never       Passive exposure: Never    Smokeless tobacco: Never   Substance Use Topics    Alcohol use: No       Alcohol/week: 0.0 standard drinks of alcohol    Drug use: No     Review of Systems   Constitutional:  Positive for fatigue.   All other systems reviewed and are negative.         Pt seen on HD this morning- tolerated treatment well with 3 liters net UF.    Past Medical History:   Diagnosis Date    Anemia     Anemia     Breast mass     Chronic constipation     CKD (chronic kidney disease)     Diabetes mellitus     Dialysis patient     MON, TUE, THURS, SAT.    Embolic stroke involving right middle cerebral artery 7/13/2024    Encounter for blood transfusion     Erosive gastritis 03/2016    GERD (gastroesophageal reflux disease)     Gout     High cholesterol     Hypertension     Knee pain     Thyroid disease     Unspecified  cataract 2022       Past Surgical History:   Procedure Laterality Date    AV FISTULA PLACEMENT Left 2019    Procedure: creation av fistula;  Surgeon: Buck Fernandez MD;  Location: [facility];  Service: Cardiovascular;  Laterality: Left;  Left Radialcephalic: PACU     SECTION  , , ,     CHOLECYSTECTOMY      COLONOSCOPY N/A 10/23/2017    Procedure: COLONOSCOPY;  Surgeon: Bri Harry MD;  Location: [facility];  Service: Endoscopy;  Laterality: N/A;    EXCISIONAL BIOPSY Left 2022    Procedure: EXCISIONAL BIOPSY;  Surgeon: Mick Juarez MD;  Location: [facility] OR;  Service: General;  Laterality: Left;    EXPLORATION, WOUND, LOWER EXTREMITY Left 2025    Procedure: EXPLORATION, WOUND, LOWER EXTREMITY;  Surgeon: Darcy Guadalupe MD;  Location: [facility] OR;  Service: General;  Laterality: Left;    INCISION AND DRAINAGE OF ABSCESS Left 2024    Procedure: INCISION AND DRAINAGE, ABSCESS;  Surgeon: Darcy Guadalupe MD;  Location: [facility] OR;  Service: General;  Laterality: Left;    TUBAL LIGATION      UPPER GASTROINTESTINAL ENDOSCOPY  2016    Erosive Gastritis-Dr Bailey       Review of patient's allergies indicates:  No Known Allergies  Current Facility-Administered Medications   Medication Frequency    aspirin EC tablet 81 mg Daily    atorvastatin tablet 40 mg QHS    collagenase ointment Daily    dextrose 5 % and 0.9 % NaCl infusion Continuous    dextrose 50% injection 12.5 g PRN    dextrose 50% injection 25 g PRN    [START ON 2025] famotidine tablet 20 mg Q48H    glucagon (human recombinant) injection 1 mg PRN    glucose chewable tablet 16 g PRN    glucose chewable tablet 24 g PRN    insulin aspart U-100 pen 0-5 Units QID (AC + HS) PRN    [START ON 2025] levothyroxine tablet 125 mcg Before breakfast    melatonin tablet 6 mg Nightly PRN    metoprolol succinate (TOPROL-XL) 24 hr split tablet 12.5 mg BID    midodrine tablet 10 mg BID WM    morphine injection 4 mg  Q4H PRN    mupirocin 2 % ointment BID    neomycin-bacitracin-polymyxin ointment Daily    ondansetron injection 4 mg Q8H PRN    oxyCODONE immediate release tablet 5 mg Q4H PRN    piperacillin-tazobactam (ZOSYN) 4.5 g in D5W 100 mL IVPB (MB+) Q12H    sodium chloride 0.9% flush 10 mL PRN    sodium chloride 0.9% flush 10 mL Q12H PRN    vancomycin - pharmacy to dose pharmacy to manage frequency     Family History       Problem Relation (Age of Onset)    Breast cancer Maternal Aunt    Diabetes Father    Ovarian cancer Maternal Aunt    Thyroid disease Mother          Tobacco Use    Smoking status: Never     Passive exposure: Never    Smokeless tobacco: Never   Substance and Sexual Activity    Alcohol use: No     Alcohol/week: 0.0 standard drinks of alcohol    Drug use: No    Sexual activity: Yes     Partners: Male     Birth control/protection: None, Surgical     Review of Systems   Constitutional:  Positive for activity change, appetite change and fatigue.   Respiratory:  Positive for shortness of breath.    Genitourinary:  Positive for decreased urine volume.   Neurological:  Positive for weakness.   All other systems reviewed and are negative.    Objective:     Vital Signs (Most Recent):  Temp: 98.6 °F (37 °C) (06/05/25 2002)  Pulse: (!) 115 (06/05/25 2002)  Resp: 18 (06/05/25 1749)  BP: (!) 111/44 (06/05/25 2002)  SpO2: 95 % (06/05/25 2002) Vital Signs (24h Range):  Temp:  [97.6 °F (36.4 °C)-99.6 °F (37.6 °C)] 98.6 °F (37 °C)  Pulse:  [] 115  Resp:  [16-18] 18  SpO2:  [91 %-98 %] 95 %  BP: ()/(42-67) 111/44     Weight: 118 kg (260 lb 2.3 oz) (06/04/25 0950)  Body mass index is 44.65 kg/m².  Body surface area is 2.31 meters squared.    I/O last 3 completed shifts:  In: 500 [P.O.:100; Other:400]  Out: 3400 [Other:3400]     Physical Exam  Vitals reviewed.   Constitutional:       Appearance: Normal appearance. She is obese.   HENT:      Head: Normocephalic and atraumatic.   Cardiovascular:      Rate and Rhythm:  Normal rate and regular rhythm.      Heart sounds: Normal heart sounds.   Pulmonary:      Effort: Pulmonary effort is normal.      Breath sounds: Rales present.   Abdominal:      General: Bowel sounds are normal.      Palpations: Abdomen is soft.   Skin:     General: Skin is warm and dry.   Neurological:      General: No focal deficit present.      Mental Status: She is alert and oriented to person, place, and time. Mental status is at baseline.   Psychiatric:         Mood and Affect: Mood normal.         Behavior: Behavior normal.         Thought Content: Thought content normal.         Judgment: Judgment normal.          Significant Labs:  CBC:   Recent Labs   Lab 06/05/25  0555   WBC 28.66*   RBC 3.61*   HGB 9.6*   HCT 29.0*      MCV 80*   MCH 26.6*   MCHC 33.1     CMP:   Recent Labs   Lab 06/05/25  0555   GLU 58*   CALCIUM 7.7*   ALBUMIN 1.3*   PROT 5.5*   *   K 5.6*   CO2 20*   CL 96   BUN 48*   CREATININE 6.2*   ALKPHOS 374*   ALT <8*   AST 17   BILITOT 1.2*     Microbiology Results (last 7 days)       Procedure Component Value Units Date/Time    Blood culture x two cultures. Draw prior to antibiotics. [[id number]]  (Normal) Collected: 06/03/25 1649    Order Status: Completed Specimen: Blood from Peripheral, Antecubital, Right Updated: 06/05/25 1701     Blood Culture No Growth After 36 Hours    MRSA/SA Rapid ID by PCR from Blood culture [[id number]]  (Normal) Collected: 06/03/25 1708    Order Status: Completed Specimen: Blood from Peripheral, Wrist, Right Updated: 06/05/25 0224     Staph aureus ID by PCR Negative     MRSA ID by PCR Negative    Blood culture x two cultures. Draw prior to antibiotics. [[id number]]  (Abnormal) Collected: 06/03/25 1708    Order Status: Completed Specimen: Blood from Peripheral, Wrist, Right Updated: 06/05/25 0038     Blood Culture Positive - Aerobic and Anaerobic Bottles     GRAM STAIN Gram positive cocci in clusters resembling Staph     Comment: Aerobic Bottle  "Positive          Gram positive cocci in clusters resembling Staph     Comment: Anaerobic Bottle Positive              No results for input(s): "COLORU", "CLARITYU", "SPECGRAV", "PHUR", "PROTEINUA", "GLUCOSEU", "BILIRUBINCON", "BLOODU", "WBCU", "RBCU", "BACTERIA", "MUCUS", "NITRITE", "LEUKOCYTESUR", "UROBILINOGEN", "HYALINECASTS" in the last 168 hours.  All labs within the past 24 hours have been reviewed.    Significant Imaging:  Labs: Reviewed  X-Ray: Reviewed  US: Reviewed  CT: Reviewed  Assessment/Plan:     Renal/  ESRD (end stage renal disease) on dialysis  ESRD- pt tolerated HD today - seen on dialysis during treatment - tolerated well.  Plan next treatment Sat am with UF goal 3-4 liters as tolerated.    Oncology  History of anemia due to CKD  Anemia - due to CKD  Continue to monitor Hgb/Hct levels- evaluate for acute blood loss if Hgb drops acutely from baseline.  Add/titrate TOÑA supp therapy if pt on dialysis to reach therapeutic Hgb goal 9-11 mg/dl.  Check iron stores - ferritin and iron saturation and replace if needed or Indicated with IV iron supp.    Endocrine  Secondary hyperparathyroidism  Secondary hyperparathyroidism of renal origin  Secondary HPT-  monitor Ca/ phos and PTH levels.  Cont Renal diet - low K, low phos and low sodium  Start on oral phos binders if not on already and titrate prn to reach phos goal 4.5-5.5.mg/dl.           Thank you for your consult. I will follow-up with patient. Please contact us if you have any additional questions.    Melva Wild MD  Nephrology  [facility]  "

## 2025-06-06 NOTE — HPI
Altered Mental Status        Pt in by EMS with AMS and low glucose.  Pt has multiple wounds including wound vac.  Pt also with BP 80s/40s. Initial glucose 39, 30 g PO glucose tabs given. Repeat glucose by EMS 40      45-year-old female with significant past medical history for and Yumiko disease on dialysis chronic wounds of bilateral lower extremities with wound VAC presents the ED with reports of hypoglycemia.  Family reports that she was nonresponsive.  EMS states that original CBG was 30 they gave her D50 EN route to ED. when she came in ED she was alert and oriented but continues to have hyperglycemia.  Patient reports she went to dialysis yesterday but has not eaten food or taking medications.  Her PCP is .            Review of patient's allergies indicates:  No Known Allergies       Past Medical History:   Diagnosis Date    Anemia      Anemia      Breast mass      Chronic constipation      CKD (chronic kidney disease)      Diabetes mellitus      Dialysis patient       MON, TUE, THURS, SAT.    Embolic stroke involving right middle cerebral artery 2024    Encounter for blood transfusion      Erosive gastritis 2016    GERD (gastroesophageal reflux disease)      Gout      High cholesterol      Hypertension      Knee pain      Thyroid disease      Unspecified cataract 2022            Past Surgical History:   Procedure Laterality Date    AV FISTULA PLACEMENT Left 2019     Procedure: creation av fistula;  Surgeon: Buck Fernandez MD;  Location: [facility];  Service: Cardiovascular;  Laterality: Left;  Left Radialcephalic: PACU     SECTION   , , ,     CHOLECYSTECTOMY        COLONOSCOPY N/A 10/23/2017     Procedure: COLONOSCOPY;  Surgeon: Bri Harry MD;  Location: [facility];  Service: Endoscopy;  Laterality: N/A;    EXCISIONAL BIOPSY Left 2022     Procedure: EXCISIONAL BIOPSY;  Surgeon: Mick Juarez MD;  Location: [facility];  Service: General;   Laterality: Left;    EXPLORATION, WOUND, LOWER EXTREMITY Left 5/21/2025     Procedure: EXPLORATION, WOUND, LOWER EXTREMITY;  Surgeon: Darcy Guadalupe MD;  Location: [facility] OR;  Service: General;  Laterality: Left;    INCISION AND DRAINAGE OF ABSCESS Left 12/17/2024     Procedure: INCISION AND DRAINAGE, ABSCESS;  Surgeon: Darcy Guadalupe MD;  Location: [facility] OR;  Service: General;  Laterality: Left;    TUBAL LIGATION        UPPER GASTROINTESTINAL ENDOSCOPY   03/31/2016     Erosive Gastritis-Dr Bailey             Family History   Problem Relation Name Age of Onset    Thyroid disease Mother        Diabetes Father        Ovarian cancer Maternal Aunt Malathi      Breast cancer Maternal Aunt Ada        [Social History]    [Social History]        Tobacco Use    Smoking status: Never       Passive exposure: Never    Smokeless tobacco: Never   Substance Use Topics    Alcohol use: No       Alcohol/week: 0.0 standard drinks of alcohol    Drug use: No     Review of Systems   Constitutional:  Positive for fatigue.   All other systems reviewed and are negative.         Pt seen on HD this morning- tolerated treatment well with 3 liters net UF.

## 2025-06-06 NOTE — PROGRESS NOTES
[facility] [place] [facility]  Progress Note    Patient Name: Pilar Fernandez  MRN: [id number]  Patient Class: IP- Inpatient   Admission Date: 6/3/2025  Length of Stay: 3 days  Attending Physician: Debra Fong MD  Primary Care Provider: Debra Fong MD        Subjective     Principal Problem:Sepsis        HPI:  45-year-old female with significant past medical history of ESRD disease on dialysis; chronic wounds of bilateral lower extremities with wound VAC presents the ED with reports of hypoglycemia. Family reports that she was nonresponsive. EMS states that original CBG was 30 they gave her D50 EN route to ED. when she came in ED she was alert and oriented but continues to have hyperglycemia. Patient reports she went to dialysis yesterday but has not eaten food or taking medications. Her PCP is .   Pt denies taking any insulin recently.  Reports she has been compliant with her dialysis treatment    Overview/Hospital Course:  6/5 ND pt sleeping but will waken - pt will get dilaysis today; one set bc positive for G+solo - already on vanc and zosyn  6/6 ND pt lethargic this am - opens eyes and will answer yes.no question bur promptly falls back asleep.   Last dose of pain med was yesterday afternoon about 1400.  Did have dialysis with 3400ml removed yesterday    Past Medical History:   Diagnosis Date    Anemia     Anemia     Breast mass     Chronic constipation     CKD (chronic kidney disease)     Diabetes mellitus     Dialysis patient     MON, TUE, THURS, SAT.    Embolic stroke involving right middle cerebral artery 7/13/2024    Encounter for blood transfusion     Erosive gastritis 03/2016    GERD (gastroesophageal reflux disease)     Gout     High cholesterol     Hypertension     Knee pain     Thyroid disease     Unspecified cataract 09/2022       Past Surgical History:   Procedure Laterality Date    AV FISTULA PLACEMENT Left 8/5/2019    Procedure: creation av fistula;   Surgeon: Buck Fernandez MD;  Location: [facility] OR;  Service: Cardiovascular;  Laterality: Left;  Left Radialcephalic: PACU     SECTION  , , ,     CHOLECYSTECTOMY      COLONOSCOPY N/A 10/23/2017    Procedure: COLONOSCOPY;  Surgeon: Bri Harry MD;  Location: [facility];  Service: Endoscopy;  Laterality: N/A;    EXCISIONAL BIOPSY Left 2022    Procedure: EXCISIONAL BIOPSY;  Surgeon: Mick Juarez MD;  Location: [facility] OR;  Service: General;  Laterality: Left;    EXPLORATION, WOUND, LOWER EXTREMITY Left 2025    Procedure: EXPLORATION, WOUND, LOWER EXTREMITY;  Surgeon: Darcy Guadalupe MD;  Location: [facility] OR;  Service: General;  Laterality: Left;    INCISION AND DRAINAGE OF ABSCESS Left 2024    Procedure: INCISION AND DRAINAGE, ABSCESS;  Surgeon: Darcy Guadalupe MD;  Location: [facility] OR;  Service: General;  Laterality: Left;    TUBAL LIGATION      UPPER GASTROINTESTINAL ENDOSCOPY  2016    Erosive Gastritis-Dr Bailey       Review of patient's allergies indicates:  No Known Allergies    No current facility-administered medications on file prior to encounter.     Current Outpatient Medications on File Prior to Encounter   Medication Sig    ACCU-CHEK GUIDE TEST STRIPS Strp USE 1 TEST STRIP TWICE DAILY AS DIRECTED TO CHECK CBG    ACCU-CHEK SOFTCLIX LANCETS Misc Apply 1 each topically 2 (two) times daily.    acetaminophen-codeine 300-30mg (TYLENOL #3) 300-30 mg Tab Take 1 tablet by mouth every 6 (six) hours.    ALPRAZolam (XANAX) 0.5 MG tablet Take 0.5 mg by mouth. For 30 days    amoxicillin-clavulanate 500-125mg (AUGMENTIN) 500-125 mg Tab Take 1 tablet (500 mg total) by mouth 2 (two) times daily.    aspirin (ECOTRIN) 81 MG EC tablet Take 1 tablet (81 mg total) by mouth once daily.    atorvastatin (LIPITOR) 40 MG tablet Take 1 tablet by mouth once daily    calcitRIOL (ROCALTROL) 0.25 MCG Cap Take 1 capsule (0.25 mcg total) by mouth once daily.    cetirizine (ZYRTEC) 5  MG tablet Take 5 mg by mouth.    famotidine (PEPCID) 20 MG tablet Take 0.5 tablets (10 mg total) by mouth 2 (two) times daily.    hydrOXYzine HCL (ATARAX) 25 MG tablet Take 1 tablet (25 mg total) by mouth 2 (two) times daily as needed for Itching or Anxiety.    insulin aspart U-100 (NOVOLOG FLEXPEN U-100 INSULIN) 100 unit/mL (3 mL) InPn pen Inject 10 Units into the skin 2 (two) times daily with meals.    lactulose (CHRONULAC) 10 gram/15 mL solution Take 30 mLs (20 g total) by mouth 2 (two) times daily.    levoFLOXacin (LEVAQUIN) 500 MG tablet Take 1 tablet (500 mg total) by mouth once daily.    linaGLIPtin (TRADJENTA) 5 mg Tab tablet TAKE 1 TABLET (5 MG TOTAL) BY MOUTH ONCE DAILY.    LINZESS 145 mcg Cap capsule TAKE 2 CAPSULES BY MOUTH ONCE DAILY BEFORE BREAKFAST    loperamide (IMODIUM) 2 mg capsule Take by mouth.    losartan (COZAAR) 100 MG tablet Take 1 tablet (100 mg total) by mouth once daily.    magnesium oxide (MAG-OX) 400 mg (241.3 mg magnesium) tablet Take 1 tablet by mouth. (Patient not taking: Reported on 1/17/2025)    medroxyPROGESTERone (PROVERA) 10 MG tablet Take 2 tablets (20 mg total) by mouth once daily.    methoxy peg-epoetin beta (MIRCERA INJ) Inject 50 mcg into the skin.    metoclopramide HCl (REGLAN) 5 MG tablet Take 5 mg by mouth 4 (four) times daily before meals and nightly.    miconazole (MICOTIN) 2 % cream Apply topically 2 (two) times daily. Between thighs, under abdomen pannus, under breasts    nebivoloL (BYSTOLIC) 20 mg Tab Take 1 tablet by mouth once daily.    NIFEdipine (PROCARDIA-XL) 60 MG (OSM) 24 hr tablet Take 1 tablet (60 mg total) by mouth once daily.    olmesartan (BENICAR) 40 MG tablet olmesartan 40 mg tablet, [RxNorm: 366352]    ondansetron (ZOFRAN-ODT) 4 MG TbDL Take 1 tablet (4 mg total) by mouth every 8 (eight) hours as needed ([place]).    oxyCODONE (ROXICODONE) 10 mg Tab immediate release tablet Take 1 tablet (10 mg total) by mouth every 8 (eight) hours as needed for Pain.     pantoprazole (PROTONIX) 40 MG tablet Take 1 tablet (40 mg total) by mouth once daily.    predniSONE (DELTASONE) 20 MG tablet Take 1 tablet (20 mg total) by mouth once daily.    promethazine (PHENERGAN) 25 MG tablet Take 1 tablet (25 mg total) by mouth 3 (three) times daily as needed for Nausea.    SEROQUEL 50 mg tablet Take 1 tablet (50 mg total) by mouth nightly.    sevelamer carbonate (RENVELA) 800 mg Tab     SYNTHROID 100 mcg tablet Take 1 tablet (100 mcg total) by mouth before breakfast.    traMADoL (ULTRAM) 50 mg tablet Take 1 tablet (50 mg total) by mouth every 6 (six) hours as needed for Pain.    triamcinolone acetonide 0.1% (KENALOG) 0.1 % ointment Apply topically 2 (two) times daily.    ursodioL (ACTIGALL) 300 mg capsule Take 1 capsule (300 mg total) by mouth 3 (three) times daily.     Family History       Problem Relation (Age of Onset)    Breast cancer Maternal Aunt    Diabetes Father    Ovarian cancer Maternal Aunt    Thyroid disease Mother          Tobacco Use    Smoking status: Never     Passive exposure: Never    Smokeless tobacco: Never   Substance and Sexual Activity    Alcohol use: No     Alcohol/week: 0.0 standard drinks of alcohol    Drug use: No    Sexual activity: Yes     Partners: Male     Birth control/protection: None, Surgical     Review of Systems   Constitutional:  Positive for activity change. Negative for chills, fatigue and fever.   HENT:  Negative for postnasal drip and sneezing.    Respiratory:  Negative for cough and shortness of breath.    Cardiovascular:  Positive for leg swelling. Negative for chest pain and palpitations.   Gastrointestinal:  Negative for abdominal pain, diarrhea, nausea and vomiting.   Musculoskeletal:  Positive for arthralgias.   Skin:  Positive for wound.   Neurological:  Positive for weakness.     Objective:     Vital Signs (Most Recent):  Temp: 98.8 °F (37.1 °C) (06/06/25 0512)  Pulse: 106 (06/06/25 0512)  Resp: 16 (06/06/25 0512)  BP: (!) 102/49  (06/06/25 0512)  SpO2: (!) 92 % (06/06/25 0512) Vital Signs (24h Range):  Temp:  [97.6 °F (36.4 °C)-99.6 °F (37.6 °C)] 98.8 °F (37.1 °C)  Pulse:  [] 106  Resp:  [16-18] 16  SpO2:  [92 %-98 %] 92 %  BP: ()/(42-67) 102/49     Weight: 118 kg (260 lb 2.3 oz)  Body mass index is 44.65 kg/m².     Physical Exam  Constitutional:       Appearance: She is obese. She is ill-appearing.      Comments: lethargic   HENT:      Nose: Nose normal.      Mouth/Throat:      Mouth: Mucous membranes are moist.   Eyes:      Extraocular Movements: Extraocular movements intact.      Pupils: Pupils are equal, round, and reactive to light.   Cardiovascular:      Rate and Rhythm: Normal rate and regular rhythm.   Pulmonary:      Effort: Pulmonary effort is normal.      Breath sounds: Normal breath sounds.      Comments: O2 via nc  Abdominal:      General: Bowel sounds are normal. There is no distension.      Palpations: Abdomen is soft.      Tenderness: There is no abdominal tenderness.      Comments: Positive edema of abd wall   Musculoskeletal:      Cervical back: Normal range of motion.      Comments: 3+ edema of lower est - up to abdominal wall  Wound vac to left leg   Neurological:      General: No focal deficit present.              CRANIAL NERVES     CN III, IV, VI   Pupils are equal, round, and reactive to light.       Significant Labs: All pertinent labs within the past 24 hours have been reviewed.  Recent Lab Results         06/06/25  0534   06/05/25  2038   06/05/25  1812   06/05/25  1746        Albumin 1.2                          ALT <8             Anion Gap 14             AST 17             Bands 11.0             BILIRUBIN TOTAL 1.1  Comment: For infants and newborns, interpretation of results should be based   on gestational age, weight and in agreement with clinical   observations.    Premature Infant recommended reference ranges:   0-24 hours:  <8.0 mg/dL   24-48 hours: <12.0 mg/dL   3-5 days:    <15.0 mg/dL    6-29 days:   <15.0 mg/dL             BUN 42             Calcium 7.9             Chloride 99             CO2 20             Creatinine 5.4             eGFR 9  Comment: Estimated GFR calculated using the CKD-EPI creatinine (2021) equation.             Glucose 88             Gran # (ANC) 30.6             Hematocrit 27.1             Hemoglobin 8.9             Lymph % 2.0             MCH 26.8             MCHC 32.8             MCV 82             Metamyelocytes 3.0             Mono % 7.0             MPV 11.2             Myelocytes 3.0             nRBC 0             Platelet Count 234             POCT Glucose   103   123   44       Potassium 5.0             PROTEIN TOTAL 5.2             RBC 3.32             RDW 17.4             Segmented Neutrophil % 74.0             Sodium 133             WBC 36.01                     Significant Imaging: I have reviewed all pertinent imaging results/findings within the past 24 hours.      Assessment & Plan  Peripheral vascular disease  Asa qd  Start cholesterol meds since liver function has improved    ESRD (end stage renal disease) on dialysis  Creatine stable for now. BMP reviewed- noted Estimated Creatinine Clearance: 16.4 mL/min (A) (based on SCr of 5.4 mg/dL (H)). according to latest data. Based on current GFR, CKD stage is end stage.  Monitor UOP and serial BMP and adjust therapy as needed. Renally dose meds. Avoid nephrotoxic medications and procedures.  Dialysis per renal  6/6 dialysis removed 3400ml yesterday  Hypothyroidism  On synthroid  6/5 tsh pending  6/6 synthroid increased to 125mcg  Type 2 diabetes mellitus, with long-term current use of insulin  A1c 6.4%  Do not order oral meds at this time      Hypoglycemia  On iv dextrose due to recurrence of low bs - increase ivfs of d5 to 75ml/hr    Sepsis  This patient does have evidence of infective focus  My overall impression is sepsis without organ dysfunction.  Source: Skin and Soft Tissue Infection: Cellulitis  Antibiotics  "given-   Antibiotics (72h ago, onward)      Start     Stop Route Frequency Ordered    06/05/25 0900  neomycin-bacitracin-polymyxin ointment         -- Top Daily 06/04/25 1921 06/04/25 2019  vancomycin - pharmacy to dose  (vancomycin IVPB (PEDS and ADULTS))        Placed in "And" Linked Group    -- IV pharmacy to manage frequency 06/04/25 1921 06/04/25 1030  mupirocin 2 % ointment         06/09/25 0859 Nasl 2 times daily 06/04/25 0916    06/04/25 0630  piperacillin-tazobactam (ZOSYN) 4.5 g in D5W 100 mL IVPB (MB+)         -- IV Every 12 hours (non-standard times) 06/03/25 2126          Latest lactate reviewed-  Recent Labs   Lab 06/04/25 1942   LACTATE 3.0*     Organ dysfunction indicated by hypoglycemia    Fluid challenge Ideal Body Weight- The patient is obese (BMI>30) and their ideal body weight of Ideal body weight: 54.7 kg (120 lb 9.5 oz) will be used to calculate fluid bolus of 30 ml/kg.     Post- resuscitation assessment Yes - I attest a sepsis perfusion exam was performed within 6 hours of sepsis, severe sepsis, or septic shock presentation, following fluid resuscitation.      Will Not start Pressors- Levophed for MAP of 65  Source control achieved by: IV abxs; wound vac to left leg  6/5 bc positive for gram Positve cocci - cont current abxs  6/6 cont iv abxs- new bc today, cxr today  Leg wound, left, sequela  Wound vac in place, iv abxs - plan for surgery consult today  6/5 cont iv abxs and wound vac  Tachycardia  Start toprol xl 12.5 mg po bid  6/6 will stop due to some lower bp  Lymphedema      Panniculitis  Dialysis today  6/6 cont iv abxs - workign on gettign more fluid down  Myopathy  Pt/ot eval    History of anemia due to CKD  H/h stable at this time    Altered mental status  Ammonia level, new bc today  Cxr and abg  Ct head and abd/pelvis to evaluate panniculitlis and hypoglycemia    Secondary hyperparathyroidism      VTE Risk Mitigation (From admission, onward)           Ordered     IP VTE " HIGH RISK PATIENT  Once         06/03/25 2126     Place sequential compression device  Until discontinued         06/03/25 2126                    Discharge Planning   SHASHA: 6/6/2025     Code Status: Full Code   Medical Readiness for Discharge Date:   Discharge Plan A: Long-term acute care facility (LTAC)                Please place Justification for DME        Debra Fong MD  Department of Hospital Medicine   [facility]

## 2025-06-06 NOTE — PT/OT/SLP PROGRESS
Physical Therapy      Patient Name:  Pilar Fernandez   MRN:  [id number]    Patient not seen today , second attempt for today, secondary to Nurse hold. Attending nurse Page Vaughn RN wants to defer treatment at this time due to patient's abnormal lab work and lethargy in addition to needing to be cleaned. Will follow-up as appropriate. .

## 2025-06-06 NOTE — ASSESSMENT & PLAN NOTE
Secondary hyperparathyroidism of renal origin  Secondary HPT-  monitor Ca/ phos and PTH levels.  Cont Renal diet - low K, low phos and low sodium  Start on oral phos binders if not on already and titrate prn to reach phos goal 4.5-5.5.mg/dl.

## 2025-06-06 NOTE — ASSESSMENT & PLAN NOTE
"This patient does have evidence of infective focus  My overall impression is sepsis without organ dysfunction.  Source: Skin and Soft Tissue Infection: Cellulitis  Antibiotics given-   Antibiotics (72h ago, onward)      Start     Stop Route Frequency Ordered    06/05/25 0900  neomycin-bacitracin-polymyxin ointment         -- Top Daily 06/04/25 1921 06/04/25 2019  vancomycin - pharmacy to dose  (vancomycin IVPB (PEDS and ADULTS))        Placed in "And" Linked Group    -- IV pharmacy to manage frequency 06/04/25 1921 06/04/25 1030  mupirocin 2 % ointment         06/09/25 0859 Nasl 2 times daily 06/04/25 0916    06/04/25 0630  piperacillin-tazobactam (ZOSYN) 4.5 g in D5W 100 mL IVPB (MB+)         -- IV Every 12 hours (non-standard times) 06/03/25 2126          Latest lactate reviewed-  Recent Labs   Lab 06/04/25 1942   LACTATE 3.0*     Organ dysfunction indicated by hypoglycemia    Fluid challenge Ideal Body Weight- The patient is obese (BMI>30) and their ideal body weight of Ideal body weight: 54.7 kg (120 lb 9.5 oz) will be used to calculate fluid bolus of 30 ml/kg.     Post- resuscitation assessment Yes - I attest a sepsis perfusion exam was performed within 6 hours of sepsis, severe sepsis, or septic shock presentation, following fluid resuscitation.      Will Not start Pressors- Levophed for MAP of 65  Source control achieved by: IV abxs; wound vac to left leg  6/5 bc positive for gram Positve cocci - cont current abxs  6/6 cont iv abxs- new bc today, cxr today  "

## 2025-06-06 NOTE — ASSESSMENT & PLAN NOTE
Hold po meds, insulin regime and/or CSI, goal 140-180, adjust prn     Lillian is here today for Veena's regularly scheduled immunotherapy injection per protocol. Patient denies recent illness, delayed reaction after last injection, or asthma exacerbation. Patient reports taking antihistimine before today's visit. Lungs clear and equal bilaterally. Patient received Veena's injection as recorded in flowsheet. Patient monitored for 30 minutes after injection and left the office at baseline state of health. Will continue allergy immunotherapy as planned. Patient instructed to call RN line if new symptoms or reaction from today's injection are noted.

## 2025-06-06 NOTE — SIGNIFICANT EVENT
Responded to code blue in ICU from the ER.  Patient in cardaic arrest.  Already intubated and CPR in progress.  She had received 1 dose of epinephrine.  Nursing staff tells me they noted that she Selvin down prior to arrest.  Patient found to be in pea arrest.  CPR was performed and patient given epinephrine, calcium, and bicarb.  ROSC obtained after 2 rounds of CPR. E-ICU available during code.  Updated patient's family members and  in the waiting room of status and event.    Vicente Mcneill

## 2025-06-06 NOTE — NURSING
PCT entered room and pt unresponsive; blood sugar found to be 44.   Dr. Fong at bedside.   1623, 1 amp D50 given per orders and blood sugar increased to 127.   1625 pt still unresponsive and loss of pulse noted and compressions initiated and code called.   1628 1mg epinephrine w/ no return of pulse and compressions resumed.   1630 an additional 1mg of epinephrine given and carotid pulse returned   1632 w/ .  1634 pt intubated  1634 HR decreased to 31 and pulse lost and 1mg atropine administered. Compressions resumed.   Pulse check at 1637 and pulse palpated and .  Pt stabilized for transport to ICU.

## 2025-06-06 NOTE — CARE UPDATE
Rapid response called for patient due to low sugar at 44 patient was given an amp of D50 and blood sugar was coming up appeared to be improving.  Then patient went unresponsive code blue was called.  CPR was initiated and was intubated.  Epi and atropine was given during the code return with a sinus rate.  Patient was brought down to the ICU, noted to have hypotension and Levophed started.  Ventilator set up and sats improved 98% on 100% FiO2.  NG tube was placed.  For hypoglycemia she she was started on D10 W at 50 mL an hour.  We will be checking sugars regularly.  New ABG in 1 hour

## 2025-06-07 LAB
BACTERIA BLD CULT: ABNORMAL
GRAM STN SPEC: ABNORMAL
GRAM STN SPEC: ABNORMAL

## 2025-06-07 NOTE — EICU
"Virtual ICU Admission    Admit Date: 6/3/2025  LOS: 3  Code Status: Full Code   : 1980  Bed:  A:     Diagnosis: Sepsis- Transferred from [facility] unit s/p CODE BLUE    Patient  has a past medical history of Anemia, Anemia, Breast mass, Chronic constipation, CKD (chronic kidney disease), Diabetes mellitus, Dialysis patient, Embolic stroke involving right middle cerebral artery, Encounter for blood transfusion, Erosive gastritis, GERD (gastroesophageal reflux disease), Gout, High cholesterol, Hypertension, Knee pain, Thyroid disease, and Unspecified cataract.    Last VS: BP (!) 95/55   Pulse 88   Temp 97.9 °F (36.6 °C) (Axillary)   Resp 19   Ht 5' 4" (1.626 m)   Wt 118 kg (260 lb 2.3 oz)   SpO2 (!) 91%   Breastfeeding No   BMI 44.65 kg/m²       VICU Review    VICU nurse assessment :  [place] completed, LDA documentation reconciliation completed, Skin care/wounds LDA reconciliation, Ventilator settings reviewed , Stress ulcer prophylaxis for ventilated patients review, and VTE prophylaxis review    Skin breakdown present on admission and Pressure injury prevention panel (order)    Nursing orders placed : IP MIKE Central Line Care and IP MIKE Peripheral IV Access          "

## 2025-06-07 NOTE — PLAN OF CARE
Problem: Physical Therapy  Goal: Physical Therapy Goal  Description: Goals to be met by: 2025     Patient will increase functional independence with mobility by performin. Supine to sit with Minimal Assistance.  2. Sit to supine with Moderate Assistance.  3. Bed to chair transfer with Moderate Assistance with rolling walker using Step Transfer technique.  4. Sit to Stand with Moderate Assistance with rolling walker.  5. Gait  x 10  feet with Moderate Assistance with rolling walker.  6. Lower extremity exercise program x10 reps, with assistance as needed.     Outcome: Patient

## 2025-06-07 NOTE — PLAN OF CARE
Received pt after rapid response x 1 and code blue x 2 on medsurg - intubated prior to arrival - guppy breaths - unable to obtain pulse ox and blood pressure upon arrival - dr galarza present in room - d10 infusing at 50 cc per hour and levophed infusion started - pressure improved slightly  then pt coded again - pressure improved after epi - atropine - bicarb and calcium during code family made aware of pt situation by dr menendez from er - pt remains full code - picc in line in place of midline - ok to use picc for infusions - large bm noted and cleansed - - ng tube placed to left nare to lis - moderate amount of yellow thick gastric secretions - multiple wounds and sores - wound vac in place - report given to night shift - will continue to monitor - 2 code blue episode after arrival to unit prior to night shift coming in - documented by eicu - both episodes pt when in to pea - er doctors respond to all incidents - dr guerrier also aware

## 2025-06-07 NOTE — PROGRESS NOTES
Shift Summary:    On arrival at 1845 for shift start, crash cart and nursing team/respiratory team at bedside for a recent code on this patient. I assumed the role of primary nurse soon after. At 1947 patient became hypotensive and HR was slowing with wide qrs complexes so eICU was called. During the call, the patient went into PEA and ACLS was intitiated with the end result of ROSC. See code documentation from eICU nurse. ED doctor arrived and performed a POCUS    Following ROSC new orders were being fulfilled including lab collections, new vasopressors added, and updating the attending MD's on-call physician. See order history and communication documentation.     Patient juan carlos then PEA again at 2037. ACLS initiated. ED and eICU MD present. See code documentation    ToD: 2047

## 2025-06-07 NOTE — DISCHARGE SUMMARY
[facility] Medicine  Discharge Summary      Patient Name: Pilar Fernandez  MRN: [id number]  WEI: [id number]  Patient Class: IP- Inpatient  Admission Date: 6/3/2025  Hospital Length of Stay: 3 days  Discharge Date and Time: 2025   Attending Physician: Debra Fong MD   Discharging Provider: Mxaimus Cordero MD  Primary Care Provider: Debra Fong MD    Primary Care Team: Networked reference to record PCT     HPI:   45-year-old female with significant past medical history of ESRD disease on dialysis; chronic wounds of bilateral lower extremities with wound VAC presents the ED with reports of hypoglycemia. Family reports that she was nonresponsive. EMS states that original CBG was 30 they gave her D50 EN route to ED. when she came in ED she was alert and oriented but continues to have hyperglycemia. Patient reports she went to dialysis yesterday but has not eaten food or taking medications. Her PCP is .   Pt denies taking any insulin recently.  Reports she has been compliant with her dialysis treatment    * No surgery found *      Hospital Course:    pt sleeping but will waken - pt will get dilaysis today; one set bc positive for G+solo - already on vanc and zosyn   pt lethargic this am - opens eyes and will answer yes.no question bur promptly falls back asleep.   Last dose of pain med was yesterday afternoon about 1400.  Did have dialysis with 3400ml removed yesterday   AA, weekend crosscover, patient was reported to have had cardiac arrest episode earlier today, was being treated for septic shock, was notified by ICU nurse that patient had coded again and ROSC was initially obtained, code blue called again and ROSC was unable to be obtained and time of death called by ED physician at .  home notified, family members were made aware and questions answered by ED physician.      Goals of Care Treatment Preferences:  Code Status: Full  "Code    Health care agent: Roberto Sharma (s.o.)  Health care agent number: [phone]       POLST: Yes  What is most important right now is to focus on remaining as independent as possible, symptom/pain control, extending life as long as possible, even it it means sacrificing quality.  Accordingly, we have decided that the best plan to meet the patient's goals includes continuing with treatment.         Consults:   Consults (From admission, onward)          Status Ordering Provider     Inpatient consult to PICC team ([facility])  Once        Provider:  (Not yet assigned)    Acknowledged MIN ESPOSITO     Pharmacy to dose Vancomycin consult  Once        Provider:  (Not yet assigned)   Placed in "And" Linked Group    Acknowledged WISAM KELLY     Inpatient consult to Social Work/Case Management  Once        Provider:  (Not yet assigned)    Acknowledged WISAM KELLY     Inpatient consult to Registered Dietitian/Nutritionist  Once        Provider:  (Not yet assigned)    Completed MIN ESPOSITO     Inpatient consult to Midline team  Once        Provider:  (Not yet assigned)    Acknowledged MIN ESPOSITO     Inpatient consult to Nephrology  Once        Provider:  (Not yet assigned)    Acknowledged TOSHIA VILLEDA JR     Inpatient consult to General Surgery  Once        Provider:  (Not yet assigned)    Acknowledged TOSHIA VILLEDA JR            Assessment & Plan    Final Active Diagnoses:      Problems Resolved During this Admission:    Diagnosis Date Noted Date Resolved POA    PRINCIPAL PROBLEM:  Sepsis [A41.9] 06/04/2025 06/06/2025 Yes    Leg wound, left, sequela [S81.802S] 06/04/2025 06/06/2025 Not Applicable    Tachycardia [R00.0] 06/04/2025 06/06/2025 Yes    Panniculitis [M79.3] 06/04/2025 06/06/2025 Yes    Myopathy [G72.9] 06/04/2025 06/06/2025 Yes    Secondary hyperparathyroidism [N25.81] 12/16/2024 06/06/2025 Yes    Hypoglycemia [E16.2] 12/12/2024 06/06/2025 Yes    Altered mental status " [R41.82] 2024 Yes    Type 2 diabetes mellitus, with long-term current use of insulin [E11.9, Z79.4] 10/30/2024 2025 Not Applicable     Chronic    ESRD (end stage renal disease) on dialysis [N18.6, Z99.2] 10/06/2020 2025 Not Applicable     Chronic    Hypothyroidism [E03.9] 10/06/2020 2025 Yes     Chronic    Lymphedema [I89.0] 2025 Yes     Chronic    Peripheral vascular disease [I73.9] 2019 Yes     Chronic    History of anemia due to CKD [N18.9, Z86.2] 2019 Not Applicable       Discharged Condition:     Disposition:     Follow Up:    Patient Instructions:   No discharge procedures on file.    Significant Diagnostic Studies: N/A    Pending Diagnostic Studies:       Procedure Component Value Units Date/Time    CBC auto differential [[id number]]     Order Status: Sent Lab Status: No result     Specimen: Blood     EKG 12-lead [[id number]]     Order Status: Sent Lab Status: No result     Echo [[id number]]     Order Status: Sent Lab Status: No result     Echo Saline Bubble? No [[id number]]     Order Status: Sent Lab Status: No result     Urinalysis, Reflex to Urine Culture Urine, Clean Catch [[id number]]     Order Status: Sent Lab Status: No result     Specimen: Urine     Vancomycin, Random [[id number]]     Order Status: Sent Lab Status: No result     Specimen: Blood            Medications:  None    Indwelling Lines/Drains at time of discharge:   Lines/Drains/Airways       Peripherally Inserted Central Catheter Line  Duration             PICC Triple Lumen 25 1800 right basilic <1 day              Drain  Duration                  Hemodialysis AV Fistula Left upper arm -- days         NG/OG Tube 25 1800 nasogastric Left nostril <1 day              Airway  Duration                  Airway - Non-Surgical 25 1635 Endotracheal Tube <1 day                    Time spent on the discharge of patient: 20 minutes         Maximus  MD Consuelo  Department of [facility]  [place] - Intensive Care

## 2025-06-07 NOTE — SIGNIFICANT EVENT
Responded to code blue from ED. Patient with brief code, ROSC obtained immediately prior to my arrival. I was told patient was again in PEA arrest. Discussed with E-ICU physician. He requested a bedside echo due to patient's history of pericardial effusion.  Bedside echo demonstrated pericardial effusion without RV dilatation and hyperdynamic LV not consistent with tamponade.  I requested that patient's attending physician be called.  Briefly discussed with patient's significant other and her mother of event and they also discussed further with E-ICU provider.    Vicente Mcneill

## 2025-06-07 NOTE — EICU
CODE BLUE VIRTUAL ICU NURSE NOTE       Admit Date: 6/3/2025  LOS: 3  Code Status: Full Code   : 1980  Age: 45 y.o.  Weight:   Wt Readings from Last 1 Encounters:   25 118 kg (260 lb 2.3 oz)     Sex: female  Race: Black or    Bed:  A:   MRN: [id number]  Code Start Time   Was the patient discharged from a PACU within last 24 hours? No   Did the patient receive conscious sedation/general anesthesia in last 24 hours? No   Was the patient in the ED within the past 24 hours? No   Was the patient on NIPPV within the past 24 hours? No   Attending Physician: Debra Fong MD  Primary Service: Internal Medicine     SITUATION    Why is the patient in the hospital?: Sepsis    Patient has a past medical history of Anemia, Anemia, Breast mass, Chronic constipation, CKD (chronic kidney disease), Diabetes mellitus, Dialysis patient, Embolic stroke involving right middle cerebral artery, Encounter for blood transfusion, Erosive gastritis, GERD (gastroesophageal reflux disease), Gout, High cholesterol, Hypertension, Knee pain, Thyroid disease, and Unspecified cataract.    Last Vitals:  Temp: 97.9 °F (36.6 °C) (1715)  Pulse: 84 (2030)  Resp: 16 (2030)  BP: 158/65 (2015)  SpO2: 61 % (2015)    24 Hours Vitals Range:  Temp:  [97.8 °F (36.6 °C)-99 °F (37.2 °C)]   Pulse:  [0-148]   Resp:  [9-137]   BP: ()/(19-89)   SpO2:  [59 %-98 %]     Labs:  Recent Labs     25  0555 25  0534 25   WBC 28.66* 36.01* 38.69*   HGB 9.6* 8.9* 8.3*   HCT 29.0* 27.1* 27.9*    234 254       Recent Labs     25  0534 25  1819 25   * 133* 135*   K 5.0 5.9* 5.7*   CL 99 99 98   CO2 20* 14* 15*   BUN 42* 46* 44*   CREATININE 5.4* 5.9* 5.6*   GLU 88 127* 299*        Recent Labs     25  0811 25   PH 7.299*  --    PCO2 42.6  --    PO2 55.4* 42.9   HCO3 19.9*  --    POCSATURATED 86.8* 44.7*         ASSESSMENT/INTERVENTIONS    Time of CPR initiation: 1952  Time of first shock: n/a  Time of first Epinephrine dose: 1952  Airway confirmation Yes  Please see Code Narrator Documentation for more details.    OUTCOME    ROSC obtained at 1954    Disposition: Remain in room 206    CODE TEAM MEMBERS    Code Leader: DEAN ELLIS    Resident/MARY ALICE: NA    BEDSIDE RN: Maykel JOHNSON RN:  Ilda Zambrano    RRT: Gary Cuello  Transcribed from information given at Bedside

## 2025-06-07 NOTE — PT/OT/SLP DISCHARGE
Physical Therapy Discharge Summary    Name: Pilar Fernandez  MRN: [id number]   Principal Problem: Sepsis     Patient Discharged from acute Physical Therapy on 2025.  Patient .      Assessment:     Patient .     Objective:     GOALS:   Multidisciplinary Problems       Physical Therapy Goals          Problem: Physical Therapy    Goal Priority Disciplines Outcome Interventions   Physical Therapy Goal     PT, PT/OT Unable to Meet    Description: Goals to be met by: 2025     Patient will increase functional independence with mobility by performin. Supine to sit with Minimal Assistance.  2. Sit to supine with Moderate Assistance.  3. Bed to chair transfer with Moderate Assistance with rolling walker using Step Transfer technique.  4. Sit to Stand with Moderate Assistance with rolling walker.  5. Gait  x 10  feet with Moderate Assistance with rolling walker.  6. Lower extremity exercise program x10 reps, with assistance as needed.                          Reasons for Discontinuation of Therapy Services  Patient .       Plan:     Patient .       2025

## 2025-06-07 NOTE — EICU
CODE BLUE VIRTUAL ICU NURSE NOTE       Admit Date: 6/3/2025  LOS: 3  Code Status: Full Code   : 1980  Age: 45 y.o.  Weight:   Wt Readings from Last 1 Encounters:   25 118 kg (260 lb 2.3 oz)     Sex: female  Race: Black or    Bed:  A:   MRN: [id number]  Code Start Time   Was the patient discharged from a PACU within last 24 hours? No   Did the patient receive conscious sedation/general anesthesia in last 24 hours? No   Was the patient in the ED within the past 24 hours? No   Was the patient on NIPPV within the past 24 hours? No   Attending Physician: Debra Fong MD  Primary Service: Internal Medicine     SITUATION    Why is the patient in the hospital?: Sepsis    Patient has a past medical history of Anemia, Anemia, Breast mass, Chronic constipation, CKD (chronic kidney disease), Diabetes mellitus, Dialysis patient, Embolic stroke involving right middle cerebral artery, Encounter for blood transfusion, Erosive gastritis, GERD (gastroesophageal reflux disease), Gout, High cholesterol, Hypertension, Knee pain, Thyroid disease, and Unspecified cataract.    Last Vitals:  Temp: 97.9 °F (36.6 °C) (1715)  Pulse: 117 (2045)  Resp: 121 (2045)  BP: 114/64 (2045)  SpO2: 45 % (2045)    24 Hours Vitals Range:  Temp:  [97.8 °F (36.6 °C)-99 °F (37.2 °C)]   Pulse:  [0-233]   Resp:  [9-137]   BP: ()/(19-89)   SpO2:  [45 %-98 %]     Labs:  Recent Labs     25  0555 25  0534 25   WBC 28.66* 36.01* 38.69*   HGB 9.6* 8.9* 8.3*   HCT 29.0* 27.1* 27.9*    234 254       Recent Labs     25  0534 25  1819 25   * 133* 135*   K 5.0 5.9* 5.7*   CL 99 99 98   CO2 20* 14* 15*   BUN 42* 46* 44*   CREATININE 5.4* 5.9* 5.6*   GLU 88 127* 299*        Recent Labs     25  0811 25   PH 7.299*  --    PCO2 42.6  --    PO2 55.4* 42.9   HCO3 19.9*  --    POCSATURATED 86.8* 44.7*         ASSESSMENT/INTERVENTIONS    Time of CPR initiation:   Time of first shock: n/a  Time of first Epinephrine dose:   Airway confirmation YES  Please see Code Narrator Documentation for more details.    OUTCOME    Patient ., Time of death: , and Pronounced by: VY Mcneill M.D.    Disposition: Remain in room 206    CODE TEAM MEMBERS    Code Leader: Dr. VY Mcneill    Resident/MARY ALICE: NA    BEDSIDE RN: Maykel JOHNSON RN:  Ilda Zambrano    RRT: Gary Cuello  Transcribed from information given from the bedside.

## 2025-06-07 NOTE — PROGRESS NOTES
Called by ICU nurse after patient had coded. EICU and ED physician ran code and ROSC was obtained, patient had repeat labs, imaging and ABG pending. Nurse reported patient had coded again, code blue was called, EICU and ED physician responded but ROSC was unable to be obtained and time of death was reported at 2047.     Maximus Cordero MD

## 2025-06-07 NOTE — SIGNIFICANT EVENT
Responded from emergency department due to Code Blue called.  Patient in cardiac arrest.  Had received epinephrine.  Found to be in PEA arrest. Epinephrine as well as calcium and bicarb given.  Unfortunately ROSC was unable to be obtained and no reversible causes identified. TOD was called at 2047. Notification given to patients significant other and mother.    Vicente Mcneill

## 2025-06-07 NOTE — EICU
Intervention Initiated From:  Bedside    Massiel intervened regarding:  Documentation for CODE BLUE in progress      CODE BLUE VIRTUAL ICU NURSE NOTE       Admit Date: 6/3/2025  LOS: 3  Code Status: Full Code   : 1980  Age: 45 y.o.  Weight:   Wt Readings from Last 1 Encounters:   25 118 kg (260 lb 2.3 oz)     Sex: female  Race: Black or    Bed:  A:   MRN: [id number]  Code Start Time 1825  Was the patient discharged from a PACU within last 24 hours? No   Did the patient receive conscious sedation/general anesthesia in last 24 hours? No   Was the patient in the ED within the past 24 hours? No   Was the patient on NIPPV within the past 24 hours? No   Attending Physician: Debra Fong MD  Primary Service: Internal Medicine     SITUATION    Why is the patient in the hospital?: Sepsis    Patient has a past medical history of Anemia, Anemia, Breast mass, Chronic constipation, CKD (chronic kidney disease), Diabetes mellitus, Dialysis patient, Embolic stroke involving right middle cerebral artery, Encounter for blood transfusion, Erosive gastritis, GERD (gastroesophageal reflux disease), Gout, High cholesterol, Hypertension, Knee pain, Thyroid disease, and Unspecified cataract.    Last Vitals:  Temp: 97.9 °F (36.6 °C) (1715)  Pulse: 88 (1900)  Resp: 19 (1900)  BP: 95/55 (1900)  SpO2: 91 % (1900)    24 Hours Vitals Range:  Temp:  [97.8 °F (36.6 °C)-99 °F (37.2 °C)]   Pulse:  [0-148]   Resp:  [9-137]   BP: ()/(19-89)   SpO2:  [75 %-98 %]     Labs:  Recent Labs     25  0555 25  0534 25  1819   WBC 28.66* 36.01* 38.69*   HGB 9.6* 8.9* 8.3*   HCT 29.0* 27.1* 27.9*    234 254       Recent Labs     25  0555 25  0534 25  1819   * 133* 133*   K 5.6* 5.0 5.9*   CL 96 99 99   CO2 20* 20* 14*   BUN 48* 42* 46*   CREATININE 6.2* 5.4* 5.9*   GLU 58* 88 127*        Recent Labs     25  0811   PH 7.299*   PCO2 42.6    PO2 55.4*   HCO3 19.9*   POCSATURATED 86.8*        ASSESSMENT/INTERVENTIONS    Time of CPR initiation: 1825  Time of first shock: n/a  Time of first Epinephrine dose: 1826  Airway confirmation Existing airway placement verified    Please see Code Narrator Documentation for more details.    OUTCOME    ROSC obtained at 1831    Disposition: Remain in room     CODE TEAM MEMBERS    Code Leader: Dr. Vicente Campa (ED Physician)    Resident/MARY ALICE: MINNA    BEDSIDE RN: Alexa JOHNSON RN:      RRT: Marion

## 2025-06-09 LAB — BACTERIA BLD CULT: NORMAL

## 2025-06-11 LAB
BACTERIA BLD CULT: NORMAL
BACTERIA BLD CULT: NORMAL

## (undated) DEVICE — GLOVE SURG ULTRA TOUCH 7

## (undated) DEVICE — BLANKET UPPER BODY 78.7X29.9IN

## (undated) DEVICE — COVER OVERHEAD SURG LT BLUE

## (undated) DEVICE — TRAY SKIN SCRUB WET 4 COMPART

## (undated) DEVICE — LINER GLOVE POWDERFREE SZ 6.5

## (undated) DEVICE — BANDAGE GAUZE COT STRL 4.5X4.1

## (undated) DEVICE — PAD ABDOMINAL STERILE 8X10IN

## (undated) DEVICE — SOL NACL IRR 1000ML BTL

## (undated) DEVICE — GLOVE SURGICAL LATEX SZ 6.5

## (undated) DEVICE — ELECTRODE REM PLYHSV RETURN 9

## (undated) DEVICE — SPONGE LAP VAG STRL DMT 2X72

## (undated) DEVICE — UNDERGLOVES BIOGEL PI SIZE 7.5

## (undated) DEVICE — SYR 10CC LUER LOCK

## (undated) DEVICE — PENCIL SMK EVAC CONNECTOR 10FT

## (undated) DEVICE — UNDERPAD DELUXE FLUFF 30X30IN

## (undated) DEVICE — Device

## (undated) DEVICE — SKIN MARKER STER DUAL TIP

## (undated) DEVICE — BANDAGE MATRIX HK LOOP 6IN 5YD

## (undated) DEVICE — SOL IRRI STRL WATER 1000ML

## (undated) DEVICE — SUT VICRYL 3-0 27 SH

## (undated) DEVICE — LINER SUCTION CANNISTER REGUGA